# Patient Record
Sex: FEMALE | Race: WHITE | NOT HISPANIC OR LATINO | Employment: FULL TIME | URBAN - METROPOLITAN AREA
[De-identification: names, ages, dates, MRNs, and addresses within clinical notes are randomized per-mention and may not be internally consistent; named-entity substitution may affect disease eponyms.]

---

## 2017-11-30 ENCOUNTER — CONVERSION ENCOUNTER (OUTPATIENT)
Dept: GENERAL RADIOLOGY | Facility: HOSPITAL | Age: 47
End: 2017-11-30

## 2018-03-01 ENCOUNTER — OFFICE VISIT CONVERTED (OUTPATIENT)
Dept: FAMILY MEDICINE CLINIC | Facility: CLINIC | Age: 48
End: 2018-03-01
Attending: NURSE PRACTITIONER

## 2018-03-19 ENCOUNTER — OFFICE VISIT CONVERTED (OUTPATIENT)
Dept: FAMILY MEDICINE CLINIC | Facility: CLINIC | Age: 48
End: 2018-03-19
Attending: NURSE PRACTITIONER

## 2018-04-13 ENCOUNTER — OFFICE VISIT CONVERTED (OUTPATIENT)
Dept: SURGERY | Facility: CLINIC | Age: 48
End: 2018-04-13
Attending: SURGERY

## 2018-04-13 ENCOUNTER — OFFICE VISIT CONVERTED (OUTPATIENT)
Dept: FAMILY MEDICINE CLINIC | Facility: CLINIC | Age: 48
End: 2018-04-13
Attending: NURSE PRACTITIONER

## 2018-04-20 ENCOUNTER — CONVERSION ENCOUNTER (OUTPATIENT)
Dept: SURGERY | Facility: CLINIC | Age: 48
End: 2018-04-20

## 2018-04-20 ENCOUNTER — OFFICE VISIT CONVERTED (OUTPATIENT)
Dept: SURGERY | Facility: CLINIC | Age: 48
End: 2018-04-20
Attending: SURGERY

## 2018-05-04 ENCOUNTER — OFFICE VISIT CONVERTED (OUTPATIENT)
Dept: SURGERY | Facility: CLINIC | Age: 48
End: 2018-05-04
Attending: SURGERY

## 2018-08-27 ENCOUNTER — OFFICE VISIT CONVERTED (OUTPATIENT)
Dept: FAMILY MEDICINE CLINIC | Facility: CLINIC | Age: 48
End: 2018-08-27
Attending: NURSE PRACTITIONER

## 2018-08-27 ENCOUNTER — CONVERSION ENCOUNTER (OUTPATIENT)
Dept: FAMILY MEDICINE CLINIC | Facility: CLINIC | Age: 48
End: 2018-08-27

## 2018-09-27 ENCOUNTER — OFFICE VISIT CONVERTED (OUTPATIENT)
Dept: SURGERY | Facility: CLINIC | Age: 48
End: 2018-09-27
Attending: SURGERY

## 2018-11-28 ENCOUNTER — OFFICE VISIT CONVERTED (OUTPATIENT)
Dept: FAMILY MEDICINE CLINIC | Facility: CLINIC | Age: 48
End: 2018-11-28
Attending: NURSE PRACTITIONER

## 2018-12-03 ENCOUNTER — CONVERSION ENCOUNTER (OUTPATIENT)
Dept: GENERAL RADIOLOGY | Facility: HOSPITAL | Age: 48
End: 2018-12-03

## 2019-03-11 ENCOUNTER — OFFICE VISIT CONVERTED (OUTPATIENT)
Dept: FAMILY MEDICINE CLINIC | Facility: CLINIC | Age: 49
End: 2019-03-11
Attending: NURSE PRACTITIONER

## 2019-03-11 ENCOUNTER — HOSPITAL ENCOUNTER (OUTPATIENT)
Dept: FAMILY MEDICINE CLINIC | Facility: CLINIC | Age: 49
Discharge: HOME | End: 2019-03-11
Attending: NURSE PRACTITIONER

## 2019-03-11 LAB
ALBUMIN SERPL-MCNC: 3.7 G/DL (ref 3.5–5)
ALBUMIN/GLOB SERPL: 0.8 {RATIO} (ref 1.4–2.6)
ALP SERPL-CCNC: 123 U/L (ref 42–98)
ALT SERPL-CCNC: 27 U/L (ref 10–40)
ANION GAP SERPL CALC-SCNC: 17 MMOL/L (ref 8–19)
AST SERPL-CCNC: 27 U/L (ref 15–50)
BASOPHILS # BLD AUTO: 0.02 10*3/UL (ref 0–0.2)
BASOPHILS NFR BLD AUTO: 0.2 % (ref 0–3)
BILIRUB SERPL-MCNC: 0.54 MG/DL (ref 0.2–1.3)
BUN SERPL-MCNC: 14 MG/DL (ref 5–25)
BUN/CREAT SERPL: 15 {RATIO} (ref 6–20)
CALCIUM SERPL-MCNC: 9.3 MG/DL (ref 8.7–10.4)
CHLORIDE SERPL-SCNC: 94 MMOL/L (ref 99–111)
CHOLEST SERPL-MCNC: 161 MG/DL (ref 107–200)
CHOLEST/HDLC SERPL: 2.8 {RATIO} (ref 3–6)
CONV ABS IMM GRAN: 0.06 10*3/UL (ref 0–0.2)
CONV CO2: 24 MMOL/L (ref 22–32)
CONV IMMATURE GRAN: 0.6 % (ref 0–1.8)
CONV TOTAL PROTEIN: 8.3 G/DL (ref 6.3–8.2)
CREAT UR-MCNC: 0.91 MG/DL (ref 0.5–0.9)
DEPRECATED RDW RBC AUTO: 43.3 FL (ref 36.4–46.3)
EOSINOPHIL # BLD AUTO: 0.16 10*3/UL (ref 0–0.7)
EOSINOPHIL # BLD AUTO: 1.6 % (ref 0–7)
ERYTHROCYTE [DISTWIDTH] IN BLOOD BY AUTOMATED COUNT: 13.2 % (ref 11.7–14.4)
EST. AVERAGE GLUCOSE BLD GHB EST-MCNC: 197 MG/DL
GFR SERPLBLD BASED ON 1.73 SQ M-ARVRAT: >60 ML/MIN/{1.73_M2}
GLOBULIN UR ELPH-MCNC: 4.6 G/DL (ref 2–3.5)
GLUCOSE SERPL-MCNC: 236 MG/DL (ref 65–99)
HBA1C MFR BLD: 13 G/DL (ref 12–16)
HBA1C MFR BLD: 8.5 % (ref 3.5–5.7)
HCT VFR BLD AUTO: 41.5 % (ref 37–47)
HDLC SERPL-MCNC: 57 MG/DL (ref 40–60)
LDLC SERPL CALC-MCNC: 92 MG/DL (ref 70–100)
LYMPHOCYTES # BLD AUTO: 0.43 10*3/UL (ref 1–5)
MCH RBC QN AUTO: 28.1 PG (ref 27–31)
MCHC RBC AUTO-ENTMCNC: 31.3 G/DL (ref 33–37)
MCV RBC AUTO: 89.8 FL (ref 81–99)
MONOCYTES # BLD AUTO: 0.53 10*3/UL (ref 0.2–1.2)
MONOCYTES NFR BLD AUTO: 5.3 % (ref 3–10)
NEUTROPHILS # BLD AUTO: 8.8 10*3/UL (ref 2–8)
NEUTROPHILS NFR BLD AUTO: 88 % (ref 30–85)
NRBC CBCN: 0 % (ref 0–0.7)
OSMOLALITY SERPL CALC.SUM OF ELEC: 278 MOSM/KG (ref 273–304)
PLATELET # BLD AUTO: 205 10*3/UL (ref 130–400)
PMV BLD AUTO: 11.7 FL (ref 9.4–12.3)
POTASSIUM SERPL-SCNC: 5 MMOL/L (ref 3.5–5.3)
RBC # BLD AUTO: 4.62 10*6/UL (ref 4.2–5.4)
SODIUM SERPL-SCNC: 130 MMOL/L (ref 135–147)
T4 FREE SERPL-MCNC: 1.1 NG/DL (ref 0.9–1.8)
TRIGL SERPL-MCNC: 61 MG/DL (ref 40–150)
TSH SERPL-ACNC: 1.24 M[IU]/L (ref 0.27–4.2)
VARIANT LYMPHS NFR BLD MANUAL: 4.3 % (ref 20–45)
VLDLC SERPL-MCNC: 12 MG/DL (ref 5–37)
WBC # BLD AUTO: 10 10*3/UL (ref 4.8–10.8)

## 2019-11-04 ENCOUNTER — OFFICE VISIT CONVERTED (OUTPATIENT)
Dept: FAMILY MEDICINE CLINIC | Facility: CLINIC | Age: 49
End: 2019-11-04
Attending: NURSE PRACTITIONER

## 2019-11-04 ENCOUNTER — HOSPITAL ENCOUNTER (OUTPATIENT)
Dept: FAMILY MEDICINE CLINIC | Facility: CLINIC | Age: 49
Discharge: HOME | End: 2019-11-04
Attending: NURSE PRACTITIONER

## 2019-11-04 LAB
ALBUMIN SERPL-MCNC: 3.9 G/DL (ref 3.5–5)
ALBUMIN/GLOB SERPL: 0.8 {RATIO} (ref 1.4–2.6)
ALP SERPL-CCNC: 99 U/L (ref 42–98)
ALT SERPL-CCNC: 20 U/L (ref 10–40)
ANION GAP SERPL CALC-SCNC: 15 MMOL/L (ref 8–19)
AST SERPL-CCNC: 21 U/L (ref 15–50)
BASOPHILS # BLD AUTO: 0.04 10*3/UL (ref 0–0.2)
BASOPHILS NFR BLD AUTO: 0.7 % (ref 0–3)
BILIRUB SERPL-MCNC: 0.3 MG/DL (ref 0.2–1.3)
BUN SERPL-MCNC: 11 MG/DL (ref 5–25)
BUN/CREAT SERPL: 14 {RATIO} (ref 6–20)
CALCIUM SERPL-MCNC: 9.5 MG/DL (ref 8.7–10.4)
CHLORIDE SERPL-SCNC: 99 MMOL/L (ref 99–111)
CHOLEST SERPL-MCNC: 167 MG/DL (ref 107–200)
CHOLEST/HDLC SERPL: 2.9 {RATIO} (ref 3–6)
CONV ABS IMM GRAN: 0.03 10*3/UL (ref 0–0.2)
CONV CO2: 25 MMOL/L (ref 22–32)
CONV IMMATURE GRAN: 0.5 % (ref 0–1.8)
CONV TOTAL PROTEIN: 9.1 G/DL (ref 6.3–8.2)
CREAT UR-MCNC: 0.81 MG/DL (ref 0.5–0.9)
DEPRECATED RDW RBC AUTO: 42.3 FL (ref 36.4–46.3)
EOSINOPHIL # BLD AUTO: 0.07 10*3/UL (ref 0–0.7)
EOSINOPHIL # BLD AUTO: 1.2 % (ref 0–7)
ERYTHROCYTE [DISTWIDTH] IN BLOOD BY AUTOMATED COUNT: 13 % (ref 11.7–14.4)
EST. AVERAGE GLUCOSE BLD GHB EST-MCNC: 226 MG/DL
GFR SERPLBLD BASED ON 1.73 SQ M-ARVRAT: >60 ML/MIN/{1.73_M2}
GLOBULIN UR ELPH-MCNC: 5.2 G/DL (ref 2–3.5)
GLUCOSE SERPL-MCNC: 250 MG/DL (ref 65–99)
HBA1C MFR BLD: 9.5 % (ref 3.5–5.7)
HCT VFR BLD AUTO: 43.1 % (ref 37–47)
HDLC SERPL-MCNC: 57 MG/DL (ref 40–60)
HGB BLD-MCNC: 13.3 G/DL (ref 12–16)
LDLC SERPL CALC-MCNC: 96 MG/DL (ref 70–100)
LYMPHOCYTES # BLD AUTO: 1.19 10*3/UL (ref 1–5)
LYMPHOCYTES NFR BLD AUTO: 20.4 % (ref 20–45)
MCH RBC QN AUTO: 27.6 PG (ref 27–31)
MCHC RBC AUTO-ENTMCNC: 30.9 G/DL (ref 33–37)
MCV RBC AUTO: 89.4 FL (ref 81–99)
MONOCYTES # BLD AUTO: 0.51 10*3/UL (ref 0.2–1.2)
MONOCYTES NFR BLD AUTO: 8.8 % (ref 3–10)
NEUTROPHILS # BLD AUTO: 3.98 10*3/UL (ref 2–8)
NEUTROPHILS NFR BLD AUTO: 68.4 % (ref 30–85)
NRBC CBCN: 0 % (ref 0–0.7)
OSMOLALITY SERPL CALC.SUM OF ELEC: 286 MOSM/KG (ref 273–304)
PLATELET # BLD AUTO: 201 10*3/UL (ref 130–400)
PMV BLD AUTO: 11.7 FL (ref 9.4–12.3)
POTASSIUM SERPL-SCNC: 4.9 MMOL/L (ref 3.5–5.3)
RBC # BLD AUTO: 4.82 10*6/UL (ref 4.2–5.4)
SODIUM SERPL-SCNC: 134 MMOL/L (ref 135–147)
T4 FREE SERPL-MCNC: 1.3 NG/DL (ref 0.9–1.8)
TRIGL SERPL-MCNC: 68 MG/DL (ref 40–150)
TSH SERPL-ACNC: 1.05 M[IU]/L (ref 0.27–4.2)
VLDLC SERPL-MCNC: 14 MG/DL (ref 5–37)
WBC # BLD AUTO: 5.82 10*3/UL (ref 4.8–10.8)

## 2019-11-06 ENCOUNTER — HOSPITAL ENCOUNTER (OUTPATIENT)
Dept: GENERAL RADIOLOGY | Facility: HOSPITAL | Age: 49
Discharge: HOME | End: 2019-11-06
Attending: NURSE PRACTITIONER

## 2019-12-13 ENCOUNTER — HOSPITAL ENCOUNTER (OUTPATIENT)
Dept: GENERAL RADIOLOGY | Facility: HOSPITAL | Age: 49
Discharge: HOME | End: 2019-12-13
Attending: NURSE PRACTITIONER

## 2019-12-16 ENCOUNTER — OFFICE VISIT CONVERTED (OUTPATIENT)
Dept: FAMILY MEDICINE CLINIC | Facility: CLINIC | Age: 49
End: 2019-12-16
Attending: NURSE PRACTITIONER

## 2020-01-08 ENCOUNTER — OFFICE VISIT CONVERTED (OUTPATIENT)
Dept: SURGERY | Facility: CLINIC | Age: 50
End: 2020-01-08
Attending: SURGERY

## 2020-01-21 ENCOUNTER — HOSPITAL ENCOUNTER (OUTPATIENT)
Dept: GASTROENTEROLOGY | Facility: HOSPITAL | Age: 50
Setting detail: HOSPITAL OUTPATIENT SURGERY
Discharge: HOME | End: 2020-01-21
Attending: SURGERY

## 2020-01-21 LAB — GLUCOSE BLD-MCNC: 222 MG/DL (ref 65–99)

## 2020-02-05 ENCOUNTER — OFFICE VISIT CONVERTED (OUTPATIENT)
Dept: SURGERY | Facility: CLINIC | Age: 50
End: 2020-02-05
Attending: SURGERY

## 2020-02-05 ENCOUNTER — CONVERSION ENCOUNTER (OUTPATIENT)
Dept: SURGERY | Facility: CLINIC | Age: 50
End: 2020-02-05

## 2020-02-21 ENCOUNTER — HOSPITAL ENCOUNTER (OUTPATIENT)
Dept: PERIOP | Facility: HOSPITAL | Age: 50
Setting detail: HOSPITAL OUTPATIENT SURGERY
Discharge: HOME | End: 2020-02-21
Attending: SURGERY

## 2020-02-21 LAB
GLUCOSE BLD-MCNC: 185 MG/DL (ref 65–99)
GLUCOSE BLD-MCNC: 209 MG/DL (ref 65–99)

## 2020-02-26 ENCOUNTER — OFFICE VISIT CONVERTED (OUTPATIENT)
Dept: SURGERY | Facility: CLINIC | Age: 50
End: 2020-02-26
Attending: SURGERY

## 2020-03-04 ENCOUNTER — OFFICE VISIT CONVERTED (OUTPATIENT)
Dept: SURGERY | Facility: CLINIC | Age: 50
End: 2020-03-04
Attending: NURSE PRACTITIONER

## 2020-03-05 ENCOUNTER — HOSPITAL ENCOUNTER (OUTPATIENT)
Dept: SURGERY | Facility: CLINIC | Age: 50
Discharge: HOME | End: 2020-03-05
Attending: NURSE PRACTITIONER

## 2020-03-08 LAB
AMOXICILLIN+CLAV SUSC ISLT: 4
AMPICILLIN SUSC ISLT: 16
AMPICILLIN+SULBAC SUSC ISLT: 4
BACTERIA SPEC AEROBE CULT: ABNORMAL
CEFAZOLIN SUSC ISLT: <=4
CEFEPIME SUSC ISLT: 2
CEFEPIME SUSC ISLT: <=1
CEFTAZIDIME SUSC ISLT: 2
CEFTAZIDIME SUSC ISLT: <=1
CEFTRIAXONE SUSC ISLT: <=1
CEFUROXIME ORAL SUSC ISLT: 16
CEFUROXIME PARENTER SUSC ISLT: 16
CIPROFLOXACIN SUSC ISLT: <=0.25
CIPROFLOXACIN SUSC ISLT: <=0.25
ERTAPENEM SUSC ISLT: <=0.5
GENTAMICIN SUSC ISLT: <=1
GENTAMICIN SUSC ISLT: <=1
LEVOFLOXACIN SUSC ISLT: 0.5
LEVOFLOXACIN SUSC ISLT: <=0.12
TETRACYCLINE SUSC ISLT: >=16
TMP SMX SUSC ISLT: >=320
TOBRAMYCIN SUSC ISLT: <=1
TOBRAMYCIN SUSC ISLT: <=1

## 2020-03-11 ENCOUNTER — OFFICE VISIT CONVERTED (OUTPATIENT)
Dept: SURGERY | Facility: CLINIC | Age: 50
End: 2020-03-11
Attending: SURGERY

## 2020-03-18 ENCOUNTER — OFFICE VISIT CONVERTED (OUTPATIENT)
Dept: SURGERY | Facility: CLINIC | Age: 50
End: 2020-03-18
Attending: SURGERY

## 2020-04-01 ENCOUNTER — CONVERSION ENCOUNTER (OUTPATIENT)
Dept: SURGERY | Facility: CLINIC | Age: 50
End: 2020-04-01

## 2020-04-01 ENCOUNTER — HOSPITAL ENCOUNTER (OUTPATIENT)
Dept: SURGERY | Facility: CLINIC | Age: 50
Discharge: HOME | End: 2020-04-01
Attending: SURGERY

## 2020-04-01 ENCOUNTER — OFFICE VISIT CONVERTED (OUTPATIENT)
Dept: SURGERY | Facility: CLINIC | Age: 50
End: 2020-04-01
Attending: SURGERY

## 2020-04-03 LAB
AMPICILLIN SUSC ISLT: <=0.25
BACTERIA SPEC AEROBE CULT: ABNORMAL
CEFOTAXIME SUSC ISLT: <=0.12
CEFTRIAXONE SUSC ISLT: <=0.12
CLINDAMYCIN SUSC ISLT: >=1
LEVOFLOXACIN SUSC ISLT: 0.5
PENICILLIN G SUSC ISLT: <=0.06
TETRACYCLINE SUSC ISLT: <=0.25
TIGECYCLINE SUSC ISLT: <=0.06
VANCOMYCIN SUSC ISLT: 0.5

## 2020-04-15 ENCOUNTER — OFFICE VISIT CONVERTED (OUTPATIENT)
Dept: SURGERY | Facility: CLINIC | Age: 50
End: 2020-04-15
Attending: SURGERY

## 2020-04-22 ENCOUNTER — OFFICE VISIT CONVERTED (OUTPATIENT)
Dept: SURGERY | Facility: CLINIC | Age: 50
End: 2020-04-22
Attending: SURGERY

## 2020-05-01 ENCOUNTER — HOSPITAL ENCOUNTER (OUTPATIENT)
Dept: PERIOP | Facility: HOSPITAL | Age: 50
Setting detail: HOSPITAL OUTPATIENT SURGERY
Discharge: HOME | End: 2020-05-01
Attending: SURGERY

## 2020-05-01 LAB
GLUCOSE BLD-MCNC: 204 MG/DL (ref 65–99)
GLUCOSE BLD-MCNC: 205 MG/DL (ref 65–99)

## 2020-05-07 ENCOUNTER — HOSPITAL ENCOUNTER (OUTPATIENT)
Dept: SURGERY | Facility: CLINIC | Age: 50
Discharge: HOME | End: 2020-05-07
Attending: NURSE PRACTITIONER

## 2020-05-07 ENCOUNTER — OFFICE VISIT CONVERTED (OUTPATIENT)
Dept: SURGERY | Facility: CLINIC | Age: 50
End: 2020-05-07
Attending: NURSE PRACTITIONER

## 2020-05-08 ENCOUNTER — HOSPITAL ENCOUNTER (OUTPATIENT)
Dept: INFUSION THERAPY | Facility: HOSPITAL | Age: 50
Setting detail: RECURRING SERIES
Discharge: HOME | End: 2020-08-06
Attending: NURSE PRACTITIONER

## 2020-05-13 ENCOUNTER — OFFICE VISIT CONVERTED (OUTPATIENT)
Dept: SURGERY | Facility: CLINIC | Age: 50
End: 2020-05-13
Attending: SURGERY

## 2020-05-20 ENCOUNTER — OFFICE VISIT CONVERTED (OUTPATIENT)
Dept: SURGERY | Facility: CLINIC | Age: 50
End: 2020-05-20
Attending: SURGERY

## 2020-06-03 ENCOUNTER — OFFICE VISIT CONVERTED (OUTPATIENT)
Dept: SURGERY | Facility: CLINIC | Age: 50
End: 2020-06-03
Attending: SURGERY

## 2020-06-17 ENCOUNTER — OFFICE VISIT CONVERTED (OUTPATIENT)
Dept: SURGERY | Facility: CLINIC | Age: 50
End: 2020-06-17
Attending: SURGERY

## 2020-06-18 ENCOUNTER — CONVERSION ENCOUNTER (OUTPATIENT)
Dept: FAMILY MEDICINE CLINIC | Facility: CLINIC | Age: 50
End: 2020-06-18

## 2020-06-18 ENCOUNTER — OFFICE VISIT CONVERTED (OUTPATIENT)
Dept: FAMILY MEDICINE CLINIC | Facility: CLINIC | Age: 50
End: 2020-06-18
Attending: NURSE PRACTITIONER

## 2020-06-18 ENCOUNTER — HOSPITAL ENCOUNTER (OUTPATIENT)
Dept: FAMILY MEDICINE CLINIC | Facility: CLINIC | Age: 50
Discharge: HOME | End: 2020-06-18
Attending: NURSE PRACTITIONER

## 2020-06-18 LAB
ALBUMIN SERPL-MCNC: 3.9 G/DL (ref 3.5–5)
ALBUMIN/GLOB SERPL: 0.9 {RATIO} (ref 1.4–2.6)
ALP SERPL-CCNC: 112 U/L (ref 42–98)
ALT SERPL-CCNC: 19 U/L (ref 10–40)
ANION GAP SERPL CALC-SCNC: 14 MMOL/L (ref 8–19)
AST SERPL-CCNC: 20 U/L (ref 15–50)
BASOPHILS # BLD AUTO: 0.04 10*3/UL (ref 0–0.2)
BASOPHILS NFR BLD AUTO: 0.6 % (ref 0–3)
BILIRUB SERPL-MCNC: 0.29 MG/DL (ref 0.2–1.3)
BUN SERPL-MCNC: 13 MG/DL (ref 5–25)
BUN/CREAT SERPL: 16 {RATIO} (ref 6–20)
CALCIUM SERPL-MCNC: 9.6 MG/DL (ref 8.7–10.4)
CHLORIDE SERPL-SCNC: 102 MMOL/L (ref 99–111)
CHOLEST SERPL-MCNC: 154 MG/DL (ref 107–200)
CHOLEST/HDLC SERPL: 2.9 {RATIO} (ref 3–6)
CONV ABS IMM GRAN: 0.03 10*3/UL (ref 0–0.2)
CONV CO2: 25 MMOL/L (ref 22–32)
CONV CREATININE URINE, RANDOM: 102.3 MG/DL (ref 10–300)
CONV IMMATURE GRAN: 0.4 % (ref 0–1.8)
CONV MICROALBUM.,U,RANDOM: <12 MG/L (ref 0–20)
CONV TOTAL PROTEIN: 8.4 G/DL (ref 6.3–8.2)
CREAT UR-MCNC: 0.81 MG/DL (ref 0.5–0.9)
DEPRECATED RDW RBC AUTO: 43.6 FL (ref 36.4–46.3)
EOSINOPHIL # BLD AUTO: 0.07 10*3/UL (ref 0–0.7)
EOSINOPHIL # BLD AUTO: 1 % (ref 0–7)
ERYTHROCYTE [DISTWIDTH] IN BLOOD BY AUTOMATED COUNT: 13.6 % (ref 11.7–14.4)
EST. AVERAGE GLUCOSE BLD GHB EST-MCNC: 209 MG/DL
GFR SERPLBLD BASED ON 1.73 SQ M-ARVRAT: >60 ML/MIN/{1.73_M2}
GLOBULIN UR ELPH-MCNC: 4.5 G/DL (ref 2–3.5)
GLUCOSE SERPL-MCNC: 163 MG/DL (ref 65–99)
HBA1C MFR BLD: 8.9 % (ref 3.5–5.7)
HCT VFR BLD AUTO: 42 % (ref 37–47)
HDLC SERPL-MCNC: 54 MG/DL (ref 40–60)
HGB BLD-MCNC: 12.9 G/DL (ref 12–16)
LDLC SERPL CALC-MCNC: 88 MG/DL (ref 70–100)
LYMPHOCYTES # BLD AUTO: 1.28 10*3/UL (ref 1–5)
LYMPHOCYTES NFR BLD AUTO: 17.8 % (ref 20–45)
MCH RBC QN AUTO: 26.5 PG (ref 27–31)
MCHC RBC AUTO-ENTMCNC: 30.7 G/DL (ref 33–37)
MCV RBC AUTO: 86.2 FL (ref 81–99)
MICROALBUMIN/CREAT UR: 11.7 MG/G{CRE} (ref 0–35)
MONOCYTES # BLD AUTO: 0.54 10*3/UL (ref 0.2–1.2)
MONOCYTES NFR BLD AUTO: 7.5 % (ref 3–10)
NEUTROPHILS # BLD AUTO: 5.23 10*3/UL (ref 2–8)
NEUTROPHILS NFR BLD AUTO: 72.7 % (ref 30–85)
NRBC CBCN: 0 % (ref 0–0.7)
OSMOLALITY SERPL CALC.SUM OF ELEC: 286 MOSM/KG (ref 273–304)
PLATELET # BLD AUTO: 275 10*3/UL (ref 130–400)
PMV BLD AUTO: 11.6 FL (ref 9.4–12.3)
POTASSIUM SERPL-SCNC: 4.7 MMOL/L (ref 3.5–5.3)
RBC # BLD AUTO: 4.87 10*6/UL (ref 4.2–5.4)
SODIUM SERPL-SCNC: 136 MMOL/L (ref 135–147)
T4 FREE SERPL-MCNC: 1.2 NG/DL (ref 0.9–1.8)
TRIGL SERPL-MCNC: 62 MG/DL (ref 40–150)
TSH SERPL-ACNC: 0.78 M[IU]/L (ref 0.27–4.2)
VLDLC SERPL-MCNC: 12 MG/DL (ref 5–37)
WBC # BLD AUTO: 7.19 10*3/UL (ref 4.8–10.8)

## 2020-07-22 ENCOUNTER — OFFICE VISIT CONVERTED (OUTPATIENT)
Dept: SURGERY | Facility: CLINIC | Age: 50
End: 2020-07-22
Attending: SURGERY

## 2020-07-22 ENCOUNTER — CONVERSION ENCOUNTER (OUTPATIENT)
Dept: SURGERY | Facility: CLINIC | Age: 50
End: 2020-07-22

## 2020-08-07 ENCOUNTER — HOSPITAL ENCOUNTER (OUTPATIENT)
Dept: INFUSION THERAPY | Facility: HOSPITAL | Age: 50
Setting detail: RECURRING SERIES
Discharge: HOME | End: 2020-08-31
Attending: NURSE PRACTITIONER

## 2020-10-01 ENCOUNTER — OFFICE VISIT CONVERTED (OUTPATIENT)
Dept: FAMILY MEDICINE CLINIC | Facility: CLINIC | Age: 50
End: 2020-10-01
Attending: NURSE PRACTITIONER

## 2020-10-01 ENCOUNTER — HOSPITAL ENCOUNTER (OUTPATIENT)
Dept: LAB | Facility: HOSPITAL | Age: 50
Discharge: HOME | End: 2020-10-01
Attending: NURSE PRACTITIONER

## 2020-10-01 LAB
ALBUMIN SERPL-MCNC: 4.1 G/DL (ref 3.5–5)
ALBUMIN/GLOB SERPL: 1 {RATIO} (ref 1.4–2.6)
ALP SERPL-CCNC: 99 U/L (ref 53–141)
ALT SERPL-CCNC: 28 U/L (ref 10–40)
ANION GAP SERPL CALC-SCNC: 15 MMOL/L (ref 8–19)
AST SERPL-CCNC: 29 U/L (ref 15–50)
BASOPHILS # BLD AUTO: 0.05 10*3/UL (ref 0–0.2)
BASOPHILS NFR BLD AUTO: 0.9 % (ref 0–3)
BILIRUB SERPL-MCNC: 0.38 MG/DL (ref 0.2–1.3)
BUN SERPL-MCNC: 13 MG/DL (ref 5–25)
BUN/CREAT SERPL: 15 {RATIO} (ref 6–20)
CALCIUM SERPL-MCNC: 9.3 MG/DL (ref 8.7–10.4)
CHLORIDE SERPL-SCNC: 103 MMOL/L (ref 99–111)
CHOLEST SERPL-MCNC: 171 MG/DL (ref 107–200)
CHOLEST/HDLC SERPL: 2.8 {RATIO} (ref 3–6)
CONV ABS IMM GRAN: 0.01 10*3/UL (ref 0–0.2)
CONV CO2: 24 MMOL/L (ref 22–32)
CONV IMMATURE GRAN: 0.2 % (ref 0–1.8)
CONV TOTAL PROTEIN: 8.3 G/DL (ref 6.3–8.2)
CREAT UR-MCNC: 0.86 MG/DL (ref 0.5–0.9)
DEPRECATED RDW RBC AUTO: 45.6 FL (ref 36.4–46.3)
EOSINOPHIL # BLD AUTO: 0.13 10*3/UL (ref 0–0.7)
EOSINOPHIL # BLD AUTO: 2.3 % (ref 0–7)
ERYTHROCYTE [DISTWIDTH] IN BLOOD BY AUTOMATED COUNT: 13.7 % (ref 11.7–14.4)
EST. AVERAGE GLUCOSE BLD GHB EST-MCNC: 214 MG/DL
GFR SERPLBLD BASED ON 1.73 SQ M-ARVRAT: >60 ML/MIN/{1.73_M2}
GLOBULIN UR ELPH-MCNC: 4.2 G/DL (ref 2–3.5)
GLUCOSE SERPL-MCNC: 151 MG/DL (ref 65–99)
HBA1C MFR BLD: 9.1 % (ref 3.5–5.7)
HCT VFR BLD AUTO: 44.3 % (ref 37–47)
HDLC SERPL-MCNC: 61 MG/DL (ref 40–60)
HGB BLD-MCNC: 13.7 G/DL (ref 12–16)
LDLC SERPL CALC-MCNC: 93 MG/DL (ref 70–100)
LYMPHOCYTES # BLD AUTO: 1.4 10*3/UL (ref 1–5)
LYMPHOCYTES NFR BLD AUTO: 25 % (ref 20–45)
MCH RBC QN AUTO: 28 PG (ref 27–31)
MCHC RBC AUTO-ENTMCNC: 30.9 G/DL (ref 33–37)
MCV RBC AUTO: 90.4 FL (ref 81–99)
MONOCYTES # BLD AUTO: 0.48 10*3/UL (ref 0.2–1.2)
MONOCYTES NFR BLD AUTO: 8.6 % (ref 3–10)
NEUTROPHILS # BLD AUTO: 3.54 10*3/UL (ref 2–8)
NEUTROPHILS NFR BLD AUTO: 63 % (ref 30–85)
NRBC CBCN: 0 % (ref 0–0.7)
OSMOLALITY SERPL CALC.SUM OF ELEC: 289 MOSM/KG (ref 273–304)
PLATELET # BLD AUTO: 210 10*3/UL (ref 130–400)
PMV BLD AUTO: 11.4 FL (ref 9.4–12.3)
POTASSIUM SERPL-SCNC: 4.3 MMOL/L (ref 3.5–5.3)
RBC # BLD AUTO: 4.9 10*6/UL (ref 4.2–5.4)
SODIUM SERPL-SCNC: 138 MMOL/L (ref 135–147)
TRIGL SERPL-MCNC: 83 MG/DL (ref 40–150)
VLDLC SERPL-MCNC: 17 MG/DL (ref 5–37)
WBC # BLD AUTO: 5.61 10*3/UL (ref 4.8–10.8)

## 2020-12-18 ENCOUNTER — HOSPITAL ENCOUNTER (OUTPATIENT)
Dept: GENERAL RADIOLOGY | Facility: HOSPITAL | Age: 50
Discharge: HOME | End: 2020-12-18
Attending: NURSE PRACTITIONER

## 2020-12-18 ENCOUNTER — OFFICE VISIT CONVERTED (OUTPATIENT)
Dept: FAMILY MEDICINE CLINIC | Facility: CLINIC | Age: 50
End: 2020-12-18
Attending: NURSE PRACTITIONER

## 2020-12-18 ENCOUNTER — HOSPITAL ENCOUNTER (OUTPATIENT)
Dept: FAMILY MEDICINE CLINIC | Facility: CLINIC | Age: 50
Discharge: HOME | End: 2020-12-18
Attending: NURSE PRACTITIONER

## 2020-12-18 ENCOUNTER — CONVERSION ENCOUNTER (OUTPATIENT)
Dept: FAMILY MEDICINE CLINIC | Facility: CLINIC | Age: 50
End: 2020-12-18

## 2020-12-18 LAB — URATE SERPL-MCNC: 3.9 MG/DL (ref 2.5–7.5)

## 2021-01-04 ENCOUNTER — OFFICE VISIT CONVERTED (OUTPATIENT)
Dept: FAMILY MEDICINE CLINIC | Facility: CLINIC | Age: 51
End: 2021-01-04
Attending: NURSE PRACTITIONER

## 2021-01-04 ENCOUNTER — HOSPITAL ENCOUNTER (OUTPATIENT)
Dept: FAMILY MEDICINE CLINIC | Facility: CLINIC | Age: 51
Discharge: HOME | End: 2021-01-04
Attending: NURSE PRACTITIONER

## 2021-01-04 LAB
ALBUMIN SERPL-MCNC: 3.9 G/DL (ref 3.5–5)
ALBUMIN/GLOB SERPL: 0.9 {RATIO} (ref 1.4–2.6)
ALP SERPL-CCNC: 110 U/L (ref 53–141)
ALT SERPL-CCNC: 29 U/L (ref 10–40)
ANION GAP SERPL CALC-SCNC: 13 MMOL/L (ref 8–19)
AST SERPL-CCNC: 26 U/L (ref 15–50)
BILIRUB SERPL-MCNC: 0.37 MG/DL (ref 0.2–1.3)
BUN SERPL-MCNC: 8 MG/DL (ref 5–25)
BUN/CREAT SERPL: 9 {RATIO} (ref 6–20)
CALCIUM SERPL-MCNC: 9.2 MG/DL (ref 8.7–10.4)
CHLORIDE SERPL-SCNC: 103 MMOL/L (ref 99–111)
CHOLEST SERPL-MCNC: 172 MG/DL (ref 107–200)
CHOLEST/HDLC SERPL: 3 {RATIO} (ref 3–6)
CONV CO2: 25 MMOL/L (ref 22–32)
CONV TOTAL PROTEIN: 8.4 G/DL (ref 6.3–8.2)
CREAT UR-MCNC: 0.9 MG/DL (ref 0.5–0.9)
EST. AVERAGE GLUCOSE BLD GHB EST-MCNC: 223 MG/DL
GFR SERPLBLD BASED ON 1.73 SQ M-ARVRAT: >60 ML/MIN/{1.73_M2}
GLOBULIN UR ELPH-MCNC: 4.5 G/DL (ref 2–3.5)
GLUCOSE SERPL-MCNC: 197 MG/DL (ref 65–99)
HBA1C MFR BLD: 9.4 % (ref 3.5–5.7)
HDLC SERPL-MCNC: 58 MG/DL (ref 40–60)
LDLC SERPL CALC-MCNC: 101 MG/DL (ref 70–100)
OSMOLALITY SERPL CALC.SUM OF ELEC: 288 MOSM/KG (ref 273–304)
POTASSIUM SERPL-SCNC: 4.1 MMOL/L (ref 3.5–5.3)
SODIUM SERPL-SCNC: 137 MMOL/L (ref 135–147)
T4 FREE SERPL-MCNC: 1.2 NG/DL (ref 0.9–1.8)
TRIGL SERPL-MCNC: 65 MG/DL (ref 40–150)
TSH SERPL-ACNC: 1.59 M[IU]/L (ref 0.27–4.2)
VLDLC SERPL-MCNC: 13 MG/DL (ref 5–37)

## 2021-01-28 ENCOUNTER — HOSPITAL ENCOUNTER (OUTPATIENT)
Dept: GENERAL RADIOLOGY | Facility: HOSPITAL | Age: 51
Discharge: HOME | End: 2021-01-28
Attending: NURSE PRACTITIONER

## 2021-03-31 ENCOUNTER — CONVERSION ENCOUNTER (OUTPATIENT)
Dept: FAMILY MEDICINE CLINIC | Facility: CLINIC | Age: 51
End: 2021-03-31

## 2021-03-31 ENCOUNTER — OFFICE VISIT CONVERTED (OUTPATIENT)
Dept: FAMILY MEDICINE CLINIC | Facility: CLINIC | Age: 51
End: 2021-03-31
Attending: NURSE PRACTITIONER

## 2021-03-31 ENCOUNTER — HOSPITAL ENCOUNTER (OUTPATIENT)
Dept: FAMILY MEDICINE CLINIC | Facility: CLINIC | Age: 51
Discharge: HOME | End: 2021-03-31
Attending: NURSE PRACTITIONER

## 2021-03-31 LAB
ALBUMIN SERPL-MCNC: 3.9 G/DL (ref 3.5–5)
ALBUMIN/GLOB SERPL: 0.9 {RATIO} (ref 1.4–2.6)
ALP SERPL-CCNC: 104 U/L (ref 53–141)
ALT SERPL-CCNC: 28 U/L (ref 10–40)
ANION GAP SERPL CALC-SCNC: 13 MMOL/L (ref 8–19)
AST SERPL-CCNC: 27 U/L (ref 15–50)
BILIRUB SERPL-MCNC: 0.3 MG/DL (ref 0.2–1.3)
BUN SERPL-MCNC: 19 MG/DL (ref 5–25)
BUN/CREAT SERPL: 20 {RATIO} (ref 6–20)
CALCIUM SERPL-MCNC: 9 MG/DL (ref 8.7–10.4)
CHLORIDE SERPL-SCNC: 104 MMOL/L (ref 99–111)
CHOLEST SERPL-MCNC: 143 MG/DL (ref 107–200)
CHOLEST/HDLC SERPL: 2.6 {RATIO} (ref 3–6)
CONV CO2: 24 MMOL/L (ref 22–32)
CONV TOTAL PROTEIN: 8.3 G/DL (ref 6.3–8.2)
CREAT UR-MCNC: 0.93 MG/DL (ref 0.5–0.9)
EST. AVERAGE GLUCOSE BLD GHB EST-MCNC: 169 MG/DL
GFR SERPLBLD BASED ON 1.73 SQ M-ARVRAT: >60 ML/MIN/{1.73_M2}
GLOBULIN UR ELPH-MCNC: 4.4 G/DL (ref 2–3.5)
GLUCOSE SERPL-MCNC: 144 MG/DL (ref 65–99)
HBA1C MFR BLD: 7.5 % (ref 3.5–5.7)
HDLC SERPL-MCNC: 56 MG/DL (ref 40–60)
LDLC SERPL CALC-MCNC: 73 MG/DL (ref 70–100)
OSMOLALITY SERPL CALC.SUM OF ELEC: 289 MOSM/KG (ref 273–304)
POTASSIUM SERPL-SCNC: 4.4 MMOL/L (ref 3.5–5.3)
SODIUM SERPL-SCNC: 137 MMOL/L (ref 135–147)
TRIGL SERPL-MCNC: 70 MG/DL (ref 40–150)
VLDLC SERPL-MCNC: 14 MG/DL (ref 5–37)

## 2021-05-10 NOTE — H&P
History and Physical      Patient Name: Mariela Aldrich   Patient ID: [id number]   Sex: Female   YOB: 1970    Primary Care Provider: Berenice THOMAS   Referring Provider: Berenice THOMAS    Visit Date: May 7, 2020    Provider: WILLIAM Ramirez   Location: [facility]   Location Address: [address] [place] [address]  [id number]   Location Phone: [phone]          Chief Complaint  · Follow Up      History Of Present Illness  Mariela Aldrich is a 49 year old /White female who is here to follow up status post surgery.      Patient reports today with complaints of wound dehiscence since last night. Patient underwent an re-excision of recurrent right groin hidradenitis. Patient reports that she has been with an extreme amount of yellowish drainage since the surgery. She currently denies any fever or chills.       Past Medical History  Disease Name Date Onset Notes   Anxiety --  --    Diabetes mellitus type II, controlled --  --    Hyperlipidemia --  --    Hypothyroidism --  --    Panic attack --  --    Screening Mammogram 12/13/19 WNL, Repeat in 1 year   Sinus trouble --  --          Past Surgical History  Procedure Name Date Notes   Colonoscopy 01/21/2020 External hemorrhoids   Hidradenitis incision and drainage --  right groin   Hysterectomy 2010 --    Knee surgery 2013 Bilateral         Medication List  Name Date Started Instructions   glipizide 10 mg oral tablet 02/11/2020 TAKE 1 TABLET BY MOUTH TWICE DAILY BEFORE MEAL(S) for 30 days   Januvia 100 mg oral tablet 02/11/2020 TAKE ONE TABLET BY MOUTH ONCE DAILY for 30 days   lisinopril 5 mg oral tablet 02/11/2020 take 1 tablet (5 mg) by oral route once daily for 30 days   Livalo 2 mg oral tablet 03/19/2020 take 1 tablet (2 mg) by oral route once daily for 14 days   miconazole nitrate 2 % topical aerosol powder 04/15/2020 apply to affected area(s) by topical route 2 times a day for 14 days   Multi Vitamin oral liquid  --     Requip XL 2 mg oral tablet extended release 24 hr 02/11/2020 take 1 tablet by oral route daily for 30 days   sertraline 100 mg oral tablet 11/04/2019 TAKE ONE & ONE-HALF TABLETS BY MOUTH ONCE DAILY for 90 days   Silvadene 1 % topical cream 04/15/2020 apply to affected area(s) by topical route 3 times a day for 14 days   Symbicort 160-4.5 mcg/actuation inhalation HFA aerosol inhaler 04/16/2020 INHALE 2 PUFFS BY MOUTH TWICE DAILY IN THE MORNING AND IN THE EVENING   Synjardy XR 10-1,000 mg oral tablet, IR - ER, biphasic 24hr 03/16/2020 TAKE 1 TABLET BY MOUTH ONCE DAILY IN THE MORNING WITH A MEAL   Synthroid 88 mcg oral tablet 02/11/2020 TAKE 1 TABLET BY MOUTH ONCE DAILY   Tresiba FlexTouch U-200 200 unit/mL (3 mL) subcutaneous insulin pen 11/05/2019 inject by subcutaneous route 10 units and titrate by 2 units every 5 days until fasting bs 130 or less. max 80 units per day.   Ventolin HFA 90 mcg/actuation inhalation HFA aerosol inhaler 03/19/2020 inhale 1 - 2 puffs (90 - 180 mcg) by inhalation route every 4-6 hours as needed   Zithromax Z-Justin 250 mg oral tablet 12/16/2019 take 2 tablets (500 mg) by oral route once daily for 1 day then 1 tablet (250 mg) by oral route once daily for 4 days   Zyrtec 10 mg oral tablet  take 1 tablet (10 mg) by oral route once daily         Allergy List  Allergen Name Date Reaction Notes   atorvastatin --  --  leg cramps   Crestor --  --  myalgia   Lipitor --  Leg cramps --    Lodine --  --  --    PENICILLINS --  --  --    pravastatin --  Leg cramps --        Allergies Reconciled  Family Medical History  Disease Name Relative/Age Notes   Thyroid disorder Brother/43  Father/70  Sister/49   thyroid cancer   Diabetes Brother/  Grandmother (maternal)/  Sister/   --          Social History  Finding Status Start/Stop Quantity Notes   Alcohol Never --/-- --  --    Tobacco Never --/-- --  --          Review of Systems  · Constitutional  o Denies  o : chills, fever  · Eyes  o Denies  o : yellowish  "discoloration of eyes  · HENT  o Denies  o : difficulty swallowing  · Cardiovascular  o Denies  o : chest pain on exertion  · Respiratory  o Denies  o : shortness of breath  · Genitourinary  o Denies  o : abnormal color of urine  · Integument  o Admits  o : additional integumentary symptoms except as noted in the HPI  o Denies  o : rash  · Neurologic  o Denies  o : tingling or numbness  · Musculoskeletal  o Denies  o : joint pain  · Endocrine  o Denies  o : weight gain, weight loss      Vitals  Date Time BP Position Site L\R Cuff Size HR RR TEMP (F) WT  HT  BMI kg/m2 BSA m2 O2 Sat HC       05/07/2020 10:23 AM       16  198lbs 8oz 5'  4\" 34.07 2.02           Physical Examination  · Constitutional  o Appearance  o : well developed, well-nourished, patient in no apparent distress  · Head and Face  o Head  o :   § Inspection  § : atraumatic, normocephalic  o Face  o :   § Inspection  § : no facial lesions  · Eyes  o Conjunctivae  o : conjunctivae normal  o Sclerae  o : sclerae white  · Neck  o Inspection/Palpation  o : normal appearance, no masses or tenderness, trachea midline  · Respiratory  o Respiratory Effort  o : breathing unlabored  · Skin and Subcutaneous Tissue  o General Inspection  o : no lesions present, no areas of discoloration, skin turgor normal, texture normal  · Neurologic  o Mental Status Examination  o :   § Orientation  § : grossly oriented to person, place and time  § Attention  § : attention normal, concentration abilities normal  § Fund of Knowledge  § : fund of knowledge within normal limits, patient aware of current events  o Gait and Station  o : normal gait, able to stand without difficulty  · Psychiatric  o Judgement and Insight  o : judgment and insight intact  o Mood and Affect  o : mood normal, affect appropriate     Right groin incision has dehisced 4cmx 6cm in depth at the posterior part of the incision. Granulation tissue is noted in the bed of the wound. There is a moderate amount of " thick yellowish drainage. Wound was cultured. wound was packed normal saline kerlex and covered with a dry dressing.               Assessment  · Postoperative Exam Following Surgery     V67.00/Z09  · Wound dehiscence     998.30/T81.30XA      Plan  · Orders  o Wound Culture with Sensitivities if indicated [facility] ([id number]) - V67.00/Z09, 998.30/T81.30XA - 05/07/2020   right groin  o WOUND CARE CONSULTATION (WOUND) - V67.00/Z09, 998.30/T81.30XA - 05/07/2020   Evaluate and treat left groin wound.   · Medications  o Medications have been Reconciled  o Transition of Care or Provider Policy  · Instructions  o Follow up in 1 week.  o Wound culture  o Follow-up with Dr. You in 1 week.   o Discussed with the patient about seeing Wound care at the hospital daily for dressing changes. She agrees with this plan is willing to proceed.   o Pain Rx given at this visit.  o Wound culture.  o Electronically Identified Patient Education Materials Provided Electronically  · Disposition  o Call or Return if symptoms worsen or persist.            Electronically Signed by: WILLIAM Ramirez -Author on May 8, 2020 08:49:34 AM

## 2021-05-12 NOTE — PROGRESS NOTES
Progress Note      Patient Name: Mariela Aldrich   Patient ID: [id number]   Sex: Female   YOB: 1970    Primary Care Provider: Berenice THOMAS   Referring Provider: Berenice THOMAS    Visit Date: April 22, 2020    Provider: Donato You MD   Location: [facility]   Location Address: [address] [place] [address]  [id number]   Location Phone: [phone]          Chief Complaint  · Follow Up Surgery      History Of Present Illness  Mariela Aldrich is a 49 year old /White female who presents today for a postoperative visit. She is very well known to me for attempted resection of groin hidradenitis. The patient has been using silvadene and has been using a fungal powder for a rash she had in her groin. The rash seems to be gone but she is still having a lot of mucosal drainage from the area.       Past Medical History  Anxiety; Diabetes mellitus type II, controlled; Hyperlipidemia; Hypothyroidism; Panic attack; Screening Mammogram; Sinus trouble         Past Surgical History  Colonoscopy; Hidradenitis incision and drainage; Hysterectomy; Knee surgery         Medication List  Bactrim -160 mg oral tablet; glipizide 10 mg oral tablet; Januvia 100 mg oral tablet; lisinopril 5 mg oral tablet; Livalo 2 mg oral tablet; miconazole nitrate 2 % topical aerosol powder; minocycline 100 mg oral capsule; Requip XL 2 mg oral tablet extended release 24 hr; sertraline 100 mg oral tablet; Silvadene 1 % topical cream; Symbicort 160-4.5 mcg/actuation inhalation HFA aerosol inhaler; Synjardy XR 10-1,000 mg oral tablet, IR - ER, biphasic 24hr; Synthroid 88 mcg oral tablet; Tresiba FlexTouch U-200 200 unit/mL (3 mL) subcutaneous insulin pen; Ventolin HFA 90 mcg/actuation inhalation HFA aerosol inhaler; Zithromax Z-Justin 250 mg oral tablet; Zyrtec 10 mg oral tablet         Allergy List  atorvastatin; Crestor; Lipitor; Lodine; PENICILLINS; pravastatin       Allergies Reconciled  [facility]  "History  Thyroid disorder; Diabetes         Social History  Alcohol (Never); Tobacco (Never)         Review of Systems  · Constitutional  o Denies  o : chills, fever  · Gastrointestinal  o Denies  o : nausea, vomiting      Vitals  Date Time BP Position Site L\R Cuff Size HR RR TEMP (F) WT  HT  BMI kg/m2 BSA m2 O2 Sat HC       04/22/2020 09:56 AM       16  201lbs 0oz 5'  4\" 34.5 2.03           Physical Examination  · Constitutional  o Appearance  o : well developed, well-nourished, alert and in no acute distress  · Head and Face  o Head  o :   § Inspection  § : no deformities or lesions  · Eyes  o Conjunctivae  o : clear  o Sclerae  o : nonicteric  · Neck  o Inspection/Palpation  o : normal appearance, no masses or tenderness, trachea midline  · Respiratory  o Respiratory Effort  o : breathing unlabored  o Inspection of Chest  o : normal appearance, no retractions  · Cardiovascular  o Heart  o : regular rate and rhythm  · Gastrointestinal  o Abdominal Examination  o :   § Abdomen  § : soft, nontender, nondistended  · Lymphatic  o Neck  o : no lymphadenopathy present  o Axilla  o : no lymphadenopathy present  o Groin  o : her groin appears to show basically hidradenitis again. She has a lot of mucosal discharge. She has lumped up, heaped up areas around the incision. It is now indurated. She may have some cellulitis.   · Skin and Subcutaneous Tissue  o General Inspection  o : no rashes present, no lesions present, no areas of discoloration  · Neurologic  o Cranial Nerves  o : grossly intact  o Sensation  o : grossly intact  o Gait and Station  o :   § Gait Screening  § : normal gait, able to stand without diffculty  o Cerebellar Function  o : no obvious abnormalities  · Psychiatric  o Judgement and Insight  o : judgment and insight intact  o Mood and Affect  o : mood normal, affect appropriate          Assessment  · Postoperative Exam Following Surgery     V67.00       Patient with a likely recurrent hidradenitis. "     Problems Reconciled  Plan  · Medications  o Medications have been Reconciled  o Transition of Care or Provider Policy  · Instructions  o Electronically Identified Patient Education Materials Provided Electronically     I don't think local wound care will help at this point in time. I think her hidradenitis has just recurred. She may have some cellulitis associated with it. What I would like to do is give her some antibiotics and make sure we clear up the infection as much as possible. We will likely need to plan for re-excision of the hidradenitis. I would like to discuss her case with plastics and see if they have any recommendations as well. I discussed all of this with the patient. All questions were answered.  She voiced understanding and agreed to proceed with the above plan.             Electronically Signed by: Padmaja Frey-, -Author on April 22, 2020 05:23:26 PM  Electronically Co-signed by: Donato You MD -Reviewer on April 22, 2020 11:36:50 PM

## 2021-05-12 NOTE — PROGRESS NOTES
Progress Note      Patient Name: Mariela Aldrich   Patient ID: [id number]   Sex: Female   YOB: 1970    Primary Care Provider: Berenice THOMAS   Referring Provider: Berenice THOMAS    Visit Date: April 1, 2020    Provider: Donato You MD   Location: [facility]   Location Address: [address] [place] [address]  [id number]   Location Phone: [phone]          Chief Complaint  · Follow Up Office Visit      History Of Present Illness  Mariela Aldrich is a 49 year old /White female who presents today follows-up status post excision of hidradenitis. The patient has been doing relatively well. She is still having some drainage from her right groin although there are some areas that appear to be improving. She also now has some pain and swelling on her left buttock, which has been draining some purulent material.       Past Medical History  Anxiety; Diabetes mellitus type II, controlled; Hyperlipidemia; Hypothyroidism; Panic attack; Screening Mammogram; Sinus trouble         Past Surgical History  Colonoscopy; Hidradenitis incision and drainage; Hysterectomy; Knee surgery         Medication List  Bactrim -160 mg oral tablet; glipizide 10 mg oral tablet; Januvia 100 mg oral tablet; lisinopril 5 mg oral tablet; Livalo 2 mg oral tablet; minocycline 100 mg oral capsule; Requip XL 2 mg oral tablet extended release 24 hr; sertraline 100 mg oral tablet; Symbicort 160-4.5 mcg/actuation inhalation HFA aerosol inhaler; Synjardy XR 10-1,000 mg oral tablet, IR - ER, biphasic 24hr; Synthroid 88 mcg oral tablet; Tresiba FlexTouch U-200 200 unit/mL (3 mL) subcutaneous insulin pen; Ventolin HFA 90 mcg/actuation inhalation HFA aerosol inhaler; Zithromax Z-Justin 250 mg oral tablet; Zyrtec 10 mg oral tablet         Allergy List  atorvastatin; Crestor; Lipitor; Lodine; PENICILLINS; pravastatin       Allergies Reconciled  Family Medical History  Thyroid disorder; Diabetes         Social  "History  Alcohol (Never); Tobacco (Never)         Review of Systems  · Cardiovascular  o Denies  o : chest pain on exertion, shortness of breath, lower extremity swelling  · Respiratory  o Denies  o : shortness of breath, coughing up blood  · Gastrointestinal  o Denies  o : chronic abdominal pain      Vitals  Date Time BP Position Site L\R Cuff Size HR RR TEMP (F) WT  HT  BMI kg/m2 BSA m2 O2 Sat        04/01/2020 10:00 AM       16  200lbs 0oz 5'  4\" 34.33 2.02           Physical Examination  · Constitutional  o Appearance  o : well developed, well-nourished, alert and in no acute distress  · Head and Face  o Head  o :   § Inspection  § : no deformities or lesions  · Eyes  o Conjunctivae  o : clear  o Sclerae  o : nonicteric  · Neck  o Inspection/Palpation  o : normal appearance, no masses or tenderness, trachea midline  · Respiratory  o Respiratory Effort  o : breathing unlabored  o Inspection of Chest  o : normal appearance, no retractions  · Cardiovascular  o Heart  o : regular rate and rhythm  · Gastrointestinal  o Abdominal Examination  o :   § Abdomen  § : soft, nontender, nondistended  · Lymphatic  o Neck  o : no lymphadenopathy present  o Axilla  o : no lymphadenopathy present  o Groin  o : at the right groin, the most medial portion of her wound actually appears to be healing better. The lateral portion of the wound also appears to be healing better. The center portion, which extends up to the mons, still has some open draining areas and some mucosal drainage. No obvious signs of infection.   · Skin and Subcutaneous Tissue  o General Inspection  o : her left anterior buttock has an approximately 3.0 cm round lesion, which is firm and indurated. There is some purulent drainage.  · Neurologic  o Cranial Nerves  o : grossly intact  o Sensation  o : grossly intact  o Gait and Station  o :   § Gait Screening  § : normal gait, able to stand without diffculty  o Cerebellar Function  o : no obvious " abnormalities  · Psychiatric  o Judgement and Insight  o : judgment and insight intact  o Mood and Affect  o : mood normal, affect appropriate          Assessment  · Postoperative follow-up     V67.00/Z09  · left bottom abscess     682.9/L02.91       Patient with a healing wound in the right groin and a new wound in the left buttock.     Problems Reconciled  Plan  · Orders  o Wound Culture ([id number]) - V67.00/Z09, 682.9/L02.91 - 04/01/2020  o I & D abscess, simple or single [facility] ([id number]) - 682.9/L02.91 - 04/01/2020  · Medications  o Medications have been Reconciled  o Transition of Care or Provider Policy     We I&D'd the left buttock wound today. She was prepped and local anesthesia was injected over the area of the wound and it was incised with an 11-blade, making an approximately 1.0 cm incision. We explored the wound with a culture as well as a cotton tipped applicator to break up any loculations. The culture was sent. We then copiously irrigated the wound and packed it with 1/4-inch strip gauze and dressed it with an ABD pad. Her right groin wound was dressed with iodine-soaked 4 x 4's and covered with gauze. The patient tolerated the procedure well. She will follow-up with me in 1-2 weeks for reassessment of the wounds.     We will call in some antibiotics for cellulitis and I would like to try and get her some Aquacel for the right groin wound. I discussed all of this with the patient. All questions were answered.  She voiced understanding and agreed to proceed with the above plan.             Electronically Signed by: Padmaja Frey-, -Author on April 1, 2020 01:45:11 PM  Electronically Co-signed by: Donato You MD -Reviewer on April 2, 2020 11:19:01 PM

## 2021-05-12 NOTE — PROGRESS NOTES
Progress Note      Patient Name: Mariela Aldrich   Patient ID: [id number]   Sex: Female   YOB: 1970    Primary Care Provider: Berenice THOMAS   Referring Provider: Berenice THOMAS    Visit Date: April 15, 2020    Provider: Donato You MD   Location: [facility]   Location Address: [address] [place] [address]  [id number]   Location Phone: [phone]          Chief Complaint  · Follow Up Office Visit      History Of Present Illness  Mariela Aldrich is a 49 year old /White female who presents today for a postoperative visit. She follows-up status post excision of groin hidradenitis. The patient continues to have mucosal drainage from the area. She is having a little bit of red irritated rash in the area as well.       Past Medical History  Anxiety; Diabetes mellitus type II, controlled; Hyperlipidemia; Hypothyroidism; Panic attack; Screening Mammogram; Sinus trouble         Past Surgical History  Colonoscopy; Hidradenitis incision and drainage; Hysterectomy; Knee surgery         Medication List  Bactrim -160 mg oral tablet; glipizide 10 mg oral tablet; Januvia 100 mg oral tablet; lisinopril 5 mg oral tablet; Livalo 2 mg oral tablet; miconazole nitrate 2 % topical aerosol powder; minocycline 100 mg oral capsule; Requip XL 2 mg oral tablet extended release 24 hr; sertraline 100 mg oral tablet; Silvadene 1 % topical cream; Symbicort 160-4.5 mcg/actuation inhalation HFA aerosol inhaler; Synjardy XR 10-1,000 mg oral tablet, IR - ER, biphasic 24hr; Synthroid 88 mcg oral tablet; Tresiba FlexTouch U-200 200 unit/mL (3 mL) subcutaneous insulin pen; Ventolin HFA 90 mcg/actuation inhalation HFA aerosol inhaler; Zithromax Z-Justin 250 mg oral tablet; Zyrtec 10 mg oral tablet         Allergy List  atorvastatin; Crestor; Lipitor; Lodine; PENICILLINS; pravastatin       Allergies Reconciled  Family Medical History  Thyroid disorder; Diabetes         Social History  Alcohol (Never);  "Tobacco (Never)         Review of Systems  · Cardiovascular  o Denies  o : chest pain on exertion, shortness of breath, lower extremity swelling  · Respiratory  o Denies  o : shortness of breath, coughing up blood  · Gastrointestinal  o Denies  o : chronic abdominal pain      Vitals  Date Time BP Position Site L\R Cuff Size HR RR TEMP (F) WT  HT  BMI kg/m2 BSA m2 O2 Sat HC       04/15/2020 09:14 AM       14  201lbs 0oz 5'  4\" 34.5 2.03           Physical Examination  · Constitutional  o Appearance  o : well developed, well-nourished, alert and in no acute distress  · Head and Face  o Head  o :   § Inspection  § : no deformities or lesions  · Eyes  o Conjunctivae  o : clear  o Sclerae  o : nonicteric  · Neck  o Inspection/Palpation  o : normal appearance, no masses or tenderness, trachea midline  · Respiratory  o Respiratory Effort  o : breathing unlabored  o Inspection of Chest  o : normal appearance, no retractions  · Cardiovascular  o Heart  o : regular rate and rhythm  · Gastrointestinal  o Abdominal Examination  o :   § Abdomen  § : soft, nontender, nondistended  · Lymphatic  o Neck  o : no lymphadenopathy present  o Axilla  o : no lymphadenopathy present  o Groin  o : her right groin does have some mucosal drainage. She has some more cobblestone appearing tissue underneath and poor epithelialization. She also has some petechial appearing rash in her groin.   · Skin and Subcutaneous Tissue  o General Inspection  o : no rashes present, no lesions present, no areas of discoloration  · Neurologic  o Cranial Nerves  o : grossly intact  o Sensation  o : grossly intact  o Gait and Station  o :   § Gait Screening  § : normal gait, able to stand without diffculty  o Cerebellar Function  o : no obvious abnormalities  · Psychiatric  o Judgement and Insight  o : judgment and insight intact  o Mood and Affect  o : mood normal, affect appropriate          Assessment  · Follow-up examination following " surgery     V67.00/Z09       Patient status post excision of hidradenitis with possible groin fungal infection.     Problems Reconciled  Plan  · Medications  o Medications have been Reconciled  o Transition of Care or Provider Policy     The patient doesn't seem to be epithelializing like I had hoped. It does look like she probably has some recurrence of the hidradenitis. She also may have a fungal infection in the groin. She also never got the Aquacel, as the dressings were way too expensive, which I agree she shouldn't have to pay that much dressings. We will start her on some Silvadene. I will cover with some 4 x 4. We will also get her some fungal powder for the rash. I will follow-up with her again in a week or two and I did discuss with her we may have to re-excise this area, as it looks like there may be some recurrence of the hidradenitis. I discussed all of this with the patient. All questions were answered.  They voiced understanding and agreed to proceed with the above plan.             Electronically Signed by: Padmaja Frey-, -Author on April 16, 2020 09:00:24 AM  Electronically Co-signed by: Donato You MD -Reviewer on April 16, 2020 11:19:34 AM

## 2021-05-13 NOTE — PROGRESS NOTES
Progress Note      Patient Name: Mariela Aldrich   Patient ID: [id number]   Sex: Female   YOB: 1970    Primary Care Provider: Berenice THOMAS   Referring Provider: Berenice THOMAS    Visit Date: May 20, 2020    Provider: Donato You MD   Location[facility]   Location Address: [address] [place] [address]  [id number]   Location Phone: [phone]          Chief Complaint  · Follow Up Office Visit      History Of Present Illness  Mariela Aldrich is a 49 year old /White female who presents today for a postoperative visit. She follows-up status post placement of a wound VAC after excision of hidradenitis in her right groin. The patient is doing well. She is getting wound VAC therapy. She is not reporting any new symptoms or issues.       Past Medical History  Anxiety; Diabetes mellitus type II, controlled; Hyperlipidemia; Hypothyroidism; Panic attack; Screening Mammogram; Sinus trouble         Past Surgical History  Colonoscopy; Hidradenitis incision and drainage; Hysterectomy; Knee surgery         Medication List  glipizide 10 mg oral tablet; Januvia 100 mg oral tablet; levofloxacin 500 mg oral tablet; lisinopril 5 mg oral tablet; Livalo 2 mg oral tablet; miconazole nitrate 2 % topical aerosol powder; Multi Vitamin oral liquid; Requip XL 2 mg oral tablet extended release 24 hr; ropinirole 2 mg oral tablet extended release 24 hr; sertraline 100 mg oral tablet; Silvadene 1 % topical cream; Symbicort 160-4.5 mcg/actuation inhalation HFA aerosol inhaler; Synjardy XR 10-1,000 mg oral tablet, IR - ER, biphasic 24hr; Synthroid 88 mcg oral tablet; Tresiba FlexTouch U-200 200 unit/mL (3 mL) subcutaneous insulin pen; Ventolin HFA 90 mcg/actuation inhalation HFA aerosol inhaler; Zithromax Z-Justin 250 mg oral tablet; Zyrtec 10 mg oral tablet         Allergy List  atorvastatin; Crestor; Lipitor; Lodine; PENICILLINS; pravastatin       Allergies Reconciled  Family [facility]  "History  Thyroid disorder; Diabetes         Social History  Alcohol (Never); Tobacco (Never)         Immunizations  Name Date Admin   Influenza    [id number]    Tdap          Review of Systems  · Constitutional  o Denies  o : chills, fever  · Gastrointestinal  o Denies  o : nausea, vomiting      Vitals  Date Time BP Position Site L\R Cuff Size HR RR TEMP (F) WT  HT  BMI kg/m2 BSA m2 O2 Sat        05/20/2020 10:37 AM       12  199lbs 0oz 5'  4\" 34.16 2.02           Physical Examination  · Constitutional  o Appearance  o : well developed, well-nourished, alert and in no acute distress  · Head and Face  o Head  o :   § Inspection  § : no deformities or lesions  · Eyes  o Conjunctivae  o : clear  o Sclerae  o : nonicteric  · Neck  o Inspection/Palpation  o : normal appearance, no masses or tenderness, trachea midline  · Respiratory  o Respiratory Effort  o : breathing unlabored  o Inspection of Chest  o : normal appearance, no retractions  · Cardiovascular  o Heart  o : regular rate and rhythm  · Gastrointestinal  o Abdominal Examination  o :   § Abdomen  § : abdomen is soft.  · Lymphatic  o Neck  o : no lymphadenopathy present  o Axilla  o : no lymphadenopathy present  o Groin  o : her right groin still has the wound VAC in place although I did get pictures from the wound care nurse today and it does seem to be healing quite well and closing appropriately.  · Skin and Subcutaneous Tissue  o General Inspection  o : no rashes present, no lesions present, no areas of discoloration  · Neurologic  o Cranial Nerves  o : grossly intact  o Sensation  o : grossly intact  o Gait and Station  o :   § Gait Screening  § : normal gait, able to stand without diffculty  o Cerebellar Function  o : no obvious abnormalities  · Psychiatric  o Judgement and Insight  o : judgment and insight intact  o Mood and Affect  o : mood normal, affect appropriate          Assessment     Patient status post excision of right groin hidradenitis. "       Plan  · Medications  o Medications have been Reconciled  o Transition of Care or Provider Policy  · Instructions  o Electronically Identified Patient Education Materials Provided Electronically     The patient is doing well and healing as expected. I am pleased with her overall progress. However, she is continuing to have a need for VAC therapy, which is difficult, so we will need to keep her out of work for another two weeks until hopefully she completes her VAC therapy. I discussed all of this with the patient. All questions were answered.  She voiced understanding and agreed to proceed with the above plan. She can follow-up with me in two weeks for reassessment.             Electronically Signed by: Padmaja Frey-, -Author on May 27, 2020 08:12:37 AM  Electronically Co-signed by: Donato You MD -Reviewer on May 27, 2020 09:06:36 AM

## 2021-05-13 NOTE — PROGRESS NOTES
Progress Note      Patient Name: Mariela Aldrich   Patient ID: [id number]   Sex: Female   YOB: 1970    Primary Care Provider: Berenice THOMAS   Referring Provider: Berenice THOMAS    Visit Date: June 18, 2020    Provider: WILLIAM Hickman   Location: [facility]   Location Address: [address] [place] [address]  [id number]   Location Phone: [phone]          Chief Complaint  · 6 Month Follow up for Diabetes, Hypothyroidism, and Hyperlipidemia      History Of Present Illness  Mariela Aldrich is a 49 year old /White female who presents for evaluation and treatment of:      6 Month Follow up for Diabetes, Hypothyroidism, and Hyperlipidemia  Pt had 2 surgeries with Dr. Moreira and currently doing would care at [facility] for incision in rui area.  pt continues with wound care.     Last lab work done 11/2019  Med refills needed    DM - 146 this morning. pt has cutback on sugar. improved over the last month.     Hypothyroid - compliant with meds.     Hyperlipidemia - denies any myalgias.    asthma - pt reports not as controlled as when she took adviair previously. pt is awakening coughing 3 nights per week. pt takes Zyrtec nightly. previously took singulair with minimal improvement.    RLS - flaring on occasion. pt is not drinking much water.      flu shot-9/2019  Pneu- 7/2019 P23  Tdap-2020  Mammo- 12/16/19  Colon-1/21/20  Dexa- 11/25/15  Non-smoker       Past Medical History  Disease Name Date Onset Notes   Anxiety --  --    Diabetes mellitus type II, controlled --  --    Hyperlipidemia --  --    Hypothyroidism --  --    Panic attack --  --    Screening Mammogram 12/13/19 WNL, Repeat in 1 year   Sinus trouble --  --          Past Surgical History  Procedure Name Date Notes   Colonoscopy 01/21/2020 External hemorrhoids   Hidradenitis incision and drainage --  right groin   Hysterectomy 2010 --    Knee surgery 2013 Bilateral         Medication List  Name Date Started  Instructions   Advair Diskus 250-50 mcg/dose inhalation blister with device 06/18/2020 inhale 1 puff by inhalation route 2 times per day in the morning and evening approximately 12 hours apart   glipizide 10 mg oral tablet 06/18/2020 TAKE 1 TABLET BY MOUTH TWICE DAILY BEFORE MEAL(S)   Humira 40 mg/0.8 mL subcutaneous syringe kit  inject 0.8 milliliter (40 mg) by subcutaneous route once weekly in the abdomen or thigh (rotate sites)   Januvia 100 mg oral tablet 06/18/2020 TAKE ONE TABLET BY MOUTH ONCE DAILY for 30 days   lisinopril 5 mg oral tablet 06/18/2020 TAKE 1 TABLET BY MOUTH ONCE DAILY   Livalo 2 mg oral tablet 06/18/2020 take 1 tablet (2 mg) by oral route once daily for 30 days   Multi Vitamin oral liquid  --    ropinirole 2 mg oral tablet extended release 24 hr 06/18/2020 TAKE 1 TABLET BY MOUTH ONCE DAILY   sertraline 100 mg oral tablet 06/18/2020 TAKE ONE & ONE-HALF TABLETS BY MOUTH ONCE DAILY   Synjardy XR 10-1,000 mg oral tablet, IR - ER, biphasic 24hr 06/18/2020 TAKE 1 TABLET BY MOUTH ONCE DAILY IN THE MORNING WITH A MEAL   Synthroid 88 mcg oral tablet 06/18/2020 TAKE 1 TABLET BY MOUTH ONCE DAILY   Tresiba FlexTouch U-200 200 unit/mL (3 mL) subcutaneous insulin pen 06/18/2020 inject 36 unites by subcutaneous route QD   Ventolin HFA 90 mcg/actuation inhalation HFA aerosol inhaler 03/19/2020 inhale 1 - 2 puffs (90 - 180 mcg) by inhalation route every 4-6 hours as needed   zinc 50 mg oral tablet  take 1 tablet by oral route daily   Zyrtec 10 mg oral tablet  take 1 tablet (10 mg) by oral route once daily         Allergy List  Allergen Name Date Reaction Notes   atorvastatin --  --  leg cramps   Crestor --  --  myalgia   Lipitor --  Leg cramps --    Lodine --  --  --    PENICILLINS --  --  --    pravastatin --  Leg cramps --          Family Medical History  Disease Name Relative/Age Notes   Thyroid disorder Brother/43  Father/70  Sister/49   thyroid cancer   Diabetes Brother/  Grandmother  "(maternal)/  Sister/   --          Social History  Finding Status Start/Stop Quantity Notes   Alcohol Never --/-- --  --    Tobacco Never --/-- --  --          Immunizations  NameDate Admin Mfg Trade Name Lot Number Route Inj VIS Given VIS Publication   Krtoslwup23/13/2019 NE Not Entered  NE NE     Comments:    [id number]/30/2019 NE Not Entered  NE NE 07/30/2019    Comments:    Tdap03/16/2020 NE Not Entered  NE NE     Comments: Administered by [facility]. see scanned injection info         Review of Systems  · Constitutional  o Denies  o : fatigue, fever, weight gain, weight loss, chills  · Cardiovascular  o Denies  o : chest Pain, palpitations, edema (swelling)  · Respiratory  o Denies  o : frequent cough, shortness of breath  · Gastrointestinal  o Denies  o : nausea, vomiting, changes in bowel habits  · Genitourinary  o Denies  o : dysuria, urinary frequency, urinary urgency, polyuria  · Neurologic  o Denies  o : headache, tingling or numbness, dizziness  · Musculoskeletal  o Admits  o : muscle aches, myalgias  o Denies  o : joint pain  · Psychiatric  o Denies  o : mood changes, memory changes, SI/HI      Vitals  Date Time BP Position Site L\R Cuff Size HR RR TEMP (F) WT  HT  BMI kg/m2 BSA m2 O2 Sat        11/04/2019 07:27 /69 Sitting    93 - R 18  200lbs 0oz 5'  4\" 34.33 2.02 96 %    12/16/2019 01:49 /76 Sitting    84 - R 24 99.4 203lbs 0oz 5'  4\" 34.84 2.04 98 %    06/18/2020 10:03 /75 Sitting    80 - R 18 99.1 197lbs 8oz 5'  4\" 33.9 2.01 98 %          Physical Examination  · Constitutional  o Appearance  o : well-nourished, in no acute distress  · Eyes  o Conjunctivae  o : conjunctivae normal  o Sclerae  o : sclerae white  o Pupils and Irises  o : pupils equal and round  o Eyelids/Ocular Adnexae  o : eyelid appearance normal, no exudates present  · Neck  o Inspection/Palpation  o : normal appearance, no masses or tenderness, trachea midline  o Range of Motion  o : cervical range " of motion within normal limits  o Thyroid  o : gland size normal, nontender, no nodules or masses present on palpation  · Respiratory  o Respiratory Effort  o : breathing unlabored  o Inspection of Chest  o : normal appearance  o Auscultation of Lungs  o : normal breath sounds throughout inspiration and expiration  · Cardiovascular  o Heart  o :   § Auscultation of Heart  § : regular rate and rhythm, no murmurs, gallops or rubs  o Peripheral Vascular System  o :   § Carotid Arteries  § : normal pulses bilaterally, no bruits present  · Gastrointestinal  o Abdominal Examination  o : abdomen nontender to palpation, tone normal without rigidity or guarding, no masses present, bowel sounds present  · Skin and Subcutaneous Tissue  o General Inspection  o : no rashes or lesions present, no areas of discoloration  o Body Hair  o : hair normal for age, general body hair distribution normal for age  o Digits and Nails  o : no clubbing, cyanosis, deformities or edema present, normal appearing nails  · Neurologic  o Mental Status Examination  o :   § Orientation  § : grossly oriented to person, place and time  o Gait and Station  o : normal gait, able to stand without difficulty  · Psychiatric  o Judgement and Insight  o : judgment and insight intact  o Mood and Affect  o : mood normal, affect appropriate          Assessment  · Mild persistent asthma with acute exacerbation     493.92/J45.31  · Type 2 diabetes mellitus with hyperglycemia, with long-term current use of insulin       Type 2 diabetes mellitus with hyperglycemia     250.00/E11.65  Long term (current) use of insulin     250.00/Z79.4  · Hyperlipidemia     272.4/E78.5  · Hypothyroidism     244.9/E03.9      Plan  · Orders  o Diabetes 2 Panel (Urine Microalbumin, CMP, Lipid, A1c, ) [facility] (78378, 91442, 14018, 04429) - - 06/18/2020  o CBC with Auto Diff [facility] (53299) - - 06/18/2020  o Thyroid Profile (02187, 24498, THYII) - 244.9/E03.9 - 06/18/2020  o ACO-39: Current  medications updated and reviewed () - - 06/18/2020  · Medications  o Blood Glucose Monitoring miscellaneous kit   SIG: use as directed DX: DM E11.9   DISP: (1) Kit with 0 refills  Prescribed on 06/18/2020     o Glucometer Test Strips   SIG: test blood sugar 1-2 times per day dx E11   DISP: (2) 100 counts with 3 refills  Prescribed on 06/18/2020     o Lancets,Ultra Thin 26 gauge miscellaneous misc   SIG: use as directed for 90 days Dx: 250.00   DISP: (1) 100 ct box with 3 refills  Prescribed on 06/18/2020     o glipizide 10 mg oral tablet   SIG: TAKE 1 TABLET BY MOUTH TWICE DAILY BEFORE MEAL(S)   DISP: (180) Tablet with 1 refills  Adjusted on 06/18/2020     o Januvia 100 mg oral tablet   SIG: TAKE ONE TABLET BY MOUTH ONCE DAILY for 30 days   DISP: (90) Tablet with 1 refills  Adjusted on 06/18/2020     o lisinopril 5 mg oral tablet   SIG: TAKE 1 TABLET BY MOUTH ONCE DAILY   DISP: (90) Tablet with 1 refills  Adjusted on 06/18/2020     o Livalo 2 mg oral tablet   SIG: take 1 tablet (2 mg) by oral route once daily for 30 days   DISP: (30) tablets with 5 refills  Adjusted on 06/18/2020     o ropinirole 2 mg oral tablet extended release 24 hr   SIG: TAKE 1 TABLET BY MOUTH ONCE DAILY   DISP: (90) Each with 1 refills  Adjusted on 06/18/2020     o sertraline 100 mg oral tablet   SIG: TAKE ONE & ONE-HALF TABLETS BY MOUTH ONCE DAILY   DISP: (135) Tablet with 1 refills  Adjusted on 06/18/2020     o Advair Diskus 250-50 mcg/dose inhalation blister with device   SIG: inhale 1 puff by inhalation route 2 times per day in the morning and evening approximately 12 hours apart   DISP: (1) 60 ct blist pack with 5 refills  Adjusted on 06/18/2020     o Synthroid 88 mcg oral tablet   SIG: TAKE 1 TABLET BY MOUTH ONCE DAILY   DISP: (30) Tablet with 2 refills  Adjusted on 06/18/2020     o Tresiba FlexTouch U-200 200 unit/mL (3 mL) subcutaneous insulin pen   SIG: inject 36 unites by subcutaneous route QD   DISP: (2) 3 ml syringe with 2  refills  Adjusted on 06/18/2020     o Synjardy XR 10-1,000 mg oral tablet, IR - ER, biphasic 24hr   SIG: TAKE 1 TABLET BY MOUTH ONCE DAILY IN THE MORNING WITH A MEAL   DISP: (30) Each with 2 refills  Refilled on 06/18/2020     o Medications have been Reconciled  o Transition of Care or Provider Policy  · Instructions  o Continue blood sugar monitoring daily and record. Bring your log to office visits. Call the office for readings below 70 and above 250 or any complications.  o Daily foot care. Avoid walking barefoot. Annual Dilated Eye Exam.  o Discussed with patient blood pressure monitoring, hemoglobin A1C levels need to be below 7.0, and LDL (Lipid) goals below 70.  o Advised that cheeses and other sources of dairy fats, animal fats, fast food, and the extras (candy, pastries, pies, doughnuts and cookies) all contain LDL raising nutrients. Advised to increase fruits, vegetables, whole grains, and to monitor portion sizes.   o Patient was educated/instructed on their diagnosis, treatment and medications prior to discharge from the clinic today.  o Call the office with any concerns or questions.  · Disposition  o Return to Office in 3 months.            Electronically Signed by: WILLIAM Hickman -Author on June 18, 2020 11:00:09 AM

## 2021-05-13 NOTE — PROGRESS NOTES
Progress Note      Patient Name: Mariela Aldrich   Patient ID: [id number]   Sex: Female   YOB: 1970    Primary Care Provider: Berenice THOMAS   Referring Provider: Berenice THOMAS    Visit Date: May 13, 2020    Provider: Donato You MD   Location: [facility]   Location Address: [address] [place] [address]  [id number]   Location Phone: [phone]          Chief Complaint  · Follow Up Office Visit      History Of Present Illness  Mariela Aldrich is a 49 year old /White female who presents today for a postoperative visit. She follows-up status post excision of groin hidradenitis. This is the first time I have seen the patient since her re-excision. She was noted by our NP, who saw her in the office, to have dehiscence of the wound. She was referred to the wound care center. I discussed her case with the wound care nurse and we have elected to place a wound VAC in that area. She seems to be doing fairly well with the VAC although she is having some pain with the VAC application and removal.       Past Medical History  Anxiety; Diabetes mellitus type II, controlled; Hyperlipidemia; Hypothyroidism; Panic attack; Screening Mammogram; Sinus trouble         Past Surgical History  Colonoscopy; Hidradenitis incision and drainage; Hysterectomy; Knee surgery         Medication List  glipizide 10 mg oral tablet; Januvia 100 mg oral tablet; levofloxacin 500 mg oral tablet; lisinopril 5 mg oral tablet; Livalo 2 mg oral tablet; miconazole nitrate 2 % topical aerosol powder; Multi Vitamin oral liquid; Requip XL 2 mg oral tablet extended release 24 hr; sertraline 100 mg oral tablet; Silvadene 1 % topical cream; Symbicort 160-4.5 mcg/actuation inhalation HFA aerosol inhaler; Synjardy XR 10-1,000 mg oral tablet, IR - ER, biphasic 24hr; Synthroid 88 mcg oral tablet; Tresiba FlexTouch U-200 200 unit/mL (3 mL) subcutaneous insulin pen; Ventolin HFA 90 mcg/actuation inhalation HFA aerosol  "inhaler; Zithromax Z-Justin 250 mg oral tablet; Zyrtec 10 mg oral tablet         Allergy List  atorvastatin; Crestor; Lipitor; Lodine; PENICILLINS; pravastatin       Allergies Reconciled  Family Medical History  Thyroid disorder; Diabetes         Social History  Alcohol (Never); Tobacco (Never)         Review of Systems  · Cardiovascular  o Denies  o : chest pain on exertion, shortness of breath, lower extremity swelling  · Respiratory  o Denies  o : shortness of breath, coughing up blood  · Gastrointestinal  o Denies  o : chronic abdominal pain      Vitals  Date Time BP Position Site L\R Cuff Size HR RR TEMP (F) WT  HT  BMI kg/m2 BSA m2 O2 Sat        05/13/2020 10:28 AM       14  198lbs 0oz 5'  4\" 33.99 2.01           Physical Examination  · Constitutional  o Appearance  o : well developed, well-nourished, alert and in no acute distress  · Head and Face  o Head  o :   § Inspection  § : no deformities or lesions  · Eyes  o Conjunctivae  o : clear  o Sclerae  o : nonicteric  · Neck  o Inspection/Palpation  o : normal appearance, no masses or tenderness, trachea midline  · Respiratory  o Respiratory Effort  o : breathing unlabored  o Inspection of Chest  o : normal appearance, no retractions  · Cardiovascular  o Heart  o : regular rate and rhythm  · Gastrointestinal  o Abdominal Examination  o :   § Abdomen  § : abdomen is soft.  · Lymphatic  o Neck  o : no lymphadenopathy present  o Axilla  o : no lymphadenopathy present  o Groin  o : at her right groin, the VAC has been removed for this visit. She has good granulation tissue in the base of the wound. She still has some heaped up margins of the skin. The remaining sutures have pulled through the skin.  · Skin and Subcutaneous Tissue  o General Inspection  o : no rashes present, no lesions present, no areas of discoloration  · Neurologic  o Cranial Nerves  o : grossly intact  o Sensation  o : grossly intact  o Gait and Station  o :   § Gait Screening  § : normal gait, " able to stand without diffculty  o Cerebellar Function  o : no obvious abnormalities  · Psychiatric  o Judgement and Insight  o : judgment and insight intact  o Mood and Affect  o : mood normal, affect appropriate          Assessment  · Follow-up surgery care     V67.00/Z09       Patient with right groin hidradenitis status post re-excision.     Problems Reconciled  Plan  · Medications  o Medications have been Reconciled  o Transition of Care or Provider Policy     Her re-excision is obviously not groin as well as I had hoped. I went ahead and removed her sutures today. We will continue on the VAC therapy for now, to see if we can get her to close up this wound. If she has recurrence of the hidradenitis, we may need to discuss with dermatology reinstituting Humira or some other biologic agent, as the repeated excisions are not as fruitful as I would have hoped. We will follow-up with wound VAC placement and see if we can get her to heal up appropriately. She should be out of work for the next week and we can reassess in one week to see how she is doing for when she can restart work. I discussed all of this with the patient. All questions were answered.  She voiced understanding and agreed to proceed with the above plan.             Electronically Signed by: Padmaja Frey-, -Author on May 14, 2020 09:45:06 AM  Electronically Co-signed by: Donato You MD -Reviewer on May 14, 2020 08:36:35 PM

## 2021-05-13 NOTE — PROGRESS NOTES
Progress Note      Patient Name: Mariela Aldrich   Patient ID: [id number]   Sex: Female   YOB: 1970    Primary Care Provider: Berenice THOMAS   Referring Provider: Berenice THOMAS    Visit Date: October 1, 2020    Provider: WILLIAM Hickman   Location: [facility]   Location Address: [address] [place] [address]  [id number]   Location Phone: [phone]          Chief Complaint  · 3 Month Follow up for Diabetes, HTN, Hypothyroidism, RLS and Anxiety      History Of Present Illness  Mariela Aldrich is a 50 year old /White female who presents for evaluation and treatment of:      3 Month Follow up for Diabetes, HTN, Hypothyroidism, RLS and Anxiety  Last lab work done 6/18/20  Med refills needed    Pt also requested to have Ropinirole increased for RLS. pt reports leg move mostly during the day.    DM - reports fasting bs 135. pt hasn't check post prandial however reports poor dietary intake. pt reports urinary frequency and incontinence with increase in synjardy. pt request lowering med.    HTN - wnl. pt reports compliance with meds.    Pt wants flu shot today       Past Medical History  Disease Name Date Onset Notes   Anxiety --  --    Diabetes mellitus type II, controlled --  --    Hyperlipidemia --  --    Hypothyroidism --  --    Panic attack --  --    Screening Mammogram 12/13/19 WNL, Repeat in 1 year   Sinus trouble --  --          Past Surgical History  Procedure Name Date Notes   Colonoscopy 01/21/2020 External hemorrhoids   Hidradenitis incision and drainage --  right groin   Hysterectomy 2010 --    Knee surgery 2013 Bilateral         Medication List  Name Date Started Instructions   Advair Diskus 250-50 mcg/dose inhalation blister with device 06/18/2020 inhale 1 puff by inhalation route 2 times per day in the morning and evening approximately 12 hours apart   Blood Glucose Monitoring miscellaneous kit 06/18/2020 use as directed DX: DM  "E11.9   glipizide 10 mg oral tablet 06/18/2020 TAKE 1 TABLET BY MOUTH TWICE DAILY BEFORE MEAL(S)   Glucometer Test Strips 06/18/2020 test blood sugar 1-2 times per day dx E11   Humira 40 mg/0.8 mL subcutaneous syringe kit  inject 0.8 milliliter (40 mg) by subcutaneous route once weekly in the abdomen or thigh (rotate sites)   Januvia 100 mg oral tablet 06/18/2020 TAKE ONE TABLET BY MOUTH ONCE DAILY for 30 days   Lancets,Ultra Thin 26 gauge miscellaneous misc 06/18/2020 use as directed for 90 days Dx: 250.00   lisinopril 5 mg oral tablet 06/18/2020 TAKE 1 TABLET BY MOUTH ONCE DAILY   Livalo 2 mg oral tablet 06/18/2020 take 1 tablet (2 mg) by oral route once daily for 30 days   Multi Vitamin oral liquid  --    Pen Needle 31 gauge x 5/16\" miscellaneous needle 08/10/2020 use as directed with Tresiba Flex Pen (Dx: Diabetes- E11.9)   ropinirole 2 mg oral tablet extended release 24 hr 06/18/2020 TAKE 1 TABLET BY MOUTH ONCE DAILY   sertraline 100 mg oral tablet 06/18/2020 TAKE ONE & ONE-HALF TABLETS BY MOUTH ONCE DAILY   Synjardy XR 25-1,000 mg oral tablet, IR - ER, biphasic 24hr 06/19/2020 take 1 tablet by oral route once daily in the morning with a meal for 30 days   Synthroid 88 mcg oral tablet 10/01/2020 Take 1 tablet by mouth once daily   Tresiba FlexTouch U-200 200 unit/mL (3 mL) subcutaneous insulin pen 06/18/2020 inject 36 unites by subcutaneous route QD   Ventolin HFA 90 mcg/actuation inhalation HFA aerosol inhaler 03/19/2020 inhale 1 - 2 puffs (90 - 180 mcg) by inhalation route every 4-6 hours as needed   zinc 50 mg oral tablet  take 1 tablet by oral route daily   Zyrtec 10 mg oral tablet  take 1 tablet (10 mg) by oral route once daily         Allergy List  Allergen Name Date Reaction Notes   atorvastatin --  --  leg cramps   Crestor --  --  myalgia   Lipitor --  Leg cramps --    Lodine --  --  --    PENICILLINS --  --  --    pravastatin --  Leg cramps --          Family Medical History  Disease Name Relative/Age " "Notes   Thyroid disorder Brother/43  Father/70  Sister/49   thyroid cancer   Diabetes Brother/  Grandmother (maternal)/  Sister/   --          Social History  Finding Status Start/Stop Quantity Notes   Alcohol Never --/-- --  --    Tobacco Never --/-- --  --          Immunizations  NameDate Admin Mfg Trade Name Lot Number Route Inj VIS Given VIS Publication   Lyshtbyel83/13/2019 NE Not Entered  NE NE     Comments:    Aehasjuok0638/30/2019 NE Not Entered  NE NE 07/30/2019    Comments:    Tdap03/16/2020 NE Not Entered  NE NE     Comments: Administered by [facility]. see scanned injection info         Review of Systems  · Constitutional  o Denies  o : fatigue, fever, weight gain, weight loss, chills  · Cardiovascular  o Denies  o : chest Pain, palpitations, edema (swelling)  · Respiratory  o Denies  o : frequent cough, shortness of breath  · Gastrointestinal  o Denies  o : nausea, vomiting, changes in bowel habits  · Genitourinary  o Denies  o : dysuria, urinary frequency, urinary urgency, polyuria  · Neurologic  o Denies  o : headache, tingling or numbness, dizziness  · Musculoskeletal  o Denies  o : joint pain, myalgias  · Psychiatric  o Denies  o : mood changes, memory changes, SI/HI      Vitals  Date Time BP Position Site L\R Cuff Size HR RR TEMP (F) WT  HT  BMI kg/m2 BSA m2 O2 Sat FR L/min FiO2 HC       06/18/2020 10:03 /75 Sitting    80 - R 18 99.1 197lbs 8oz 5'  4\" 33.9 2.01 98 %      10/01/2020 07:36 /69 Sitting    81 - R 18 98.3 209lbs 8oz 5'  4\" 35.96 2.07 97 %            Physical Examination  · Constitutional  o Appearance  o : well-nourished, in no acute distress  · Eyes  o Conjunctivae  o : conjunctivae normal  o Sclerae  o : sclerae white  o Pupils and Irises  o : pupils equal and round  o Eyelids/Ocular Adnexae  o : eyelid appearance normal, no exudates present  · Neck  o Inspection/Palpation  o : normal appearance, no masses or tenderness, trachea midline  o Range of Motion  o : " cervical range of motion within normal limits  o Thyroid  o : gland size normal, nontender, no nodules or masses present on palpation  · Respiratory  o Respiratory Effort  o : breathing unlabored  o Inspection of Chest  o : normal appearance  o Auscultation of Lungs  o : normal breath sounds throughout inspiration and expiration  · Cardiovascular  o Heart  o :   § Auscultation of Heart  § : regular rate and rhythm, no murmurs, gallops or rubs  o Peripheral Vascular System  o :   § Carotid Arteries  § : normal pulses bilaterally, no bruits present  § Extremities  § : no clubbing or edema  · Gastrointestinal  o Abdominal Examination  o : abdomen nontender to palpation, tone normal without rigidity or guarding, no masses present, bowel sounds present  · Skin and Subcutaneous Tissue  o General Inspection  o : no rashes or lesions present, no areas of discoloration  o Body Hair  o : hair normal for age, general body hair distribution normal for age  o Digits and Nails  o : no clubbing, cyanosis, deformities or edema present, normal appearing nails  · Neurologic  o Mental Status Examination  o :   § Orientation  § : grossly oriented to person, place and time  o Gait and Station  o : normal gait, able to stand without difficulty  · Psychiatric  o Judgement and Insight  o : judgment and insight intact  o Mood and Affect  o : mood normal, affect appropriate          Assessment  · Need for influenza vaccination     V04.81/Z23  · Anxiety disorder     300.00/F41.9  · Type 2 diabetes mellitus with hyperglycemia, with long-term current use of insulin       Type 2 diabetes mellitus with hyperglycemia     250.00/E11.65  Long term (current) use of insulin     250.00/Z79.4  · Hyperlipidemia     272.4/E78.5  · Hypothyroidism     244.9/E03.9  · RLS (restless legs syndrome)     333.94/G25.81      Plan  · Orders  o Immunization Admin Fee (Single) ([facility]) (52989) - V04.81/Z23 - 10/01/2020  o Fluzone Quadrivalent Vaccine, age 6 months +  (39437) - V04.81/Z23 - 10/01/2020   Vaccine - Influenza; Dose: 0.5; Site: Right Deltoid; Route: Intramuscular; Date: 10/01/2020 08:01:00; Exp: 06/30/2021; Lot: [id number]; Mfg: Guanriofi pasteur; TradeName: Fluzone Quadrivalent; Administered By: Felecia Felipe; Comment: Injection given. pt tolerated well. NDC 45421-565-74  o Diabetes 1 Panel (CMP, Lipid, A1c) [facility] (30368, 76425, 14696) - - 10/01/2020  o CBC with Auto Diff [facility] (26501) - - 10/01/2020  o ACO-39: Current medications updated and reviewed (1159F, ) - - 10/01/2020  o ACO-14: Influenza immunization administered or previously received [facility] () - - 10/01/2020  o ACO-19: Colorectal cancer screening results documented and reviewed (3017F) - - 10/01/2020  · Medications  o ropinirole 4 mg oral tablet extended release 24 hr   SIG: take 1 tablet by oral route once a day (at bedtime) for 90 days   DISP: (90) Tablet with 1 refills  Adjusted on 10/01/2020     o Synjardy XR 12.5-1,000 mg oral tablet, IR - ER, biphasic 24hr   SIG: take 1 tablet by oral route once daily in the morning with a meal for 30 days   DISP: (30) Tablet with 2 refills  Adjusted on 10/01/2020     o Synthroid 88 mcg oral tablet   SIG: Take 1 tablet by mouth once daily   DISP: (90) Tablet with 0 refills  Adjusted on 10/01/2020     o Tresiba FlexTouch U-200 200 unit/mL (3 mL) subcutaneous insulin pen   SIG: inject 42 units subcutaneous route QD, increase by 1 unit every 35 days until fasting bs 130 or less.   DISP: (3) Pen Needle with 2 refills  Adjusted on 10/01/2020     o Medications have been Reconciled  o Transition of Care or Provider Policy  · Instructions  o Continue blood sugar monitoring daily and record. Bring your log to office visits. Call the office for readings below 70 and above 250 or any complications.  o Daily foot care. Avoid walking barefoot. Annual Dilated Eye Exam.  o Discussed with patient blood pressure monitoring, hemoglobin A1C levels need to be below 7.0, and LDL  (Lipid) goals below 70.  o Advised that cheeses and other sources of dairy fats, animal fats, fast food, and the extras (candy, pastries, pies, doughnuts and cookies) all contain LDL raising nutrients. Advised to increase fruits, vegetables, whole grains, and to monitor portion sizes.   o Patient was educated/instructed on their diagnosis, treatment and medications prior to discharge from the clinic today.  o Call the office with any concerns or questions.  · Disposition  o Return to Office in 3 months.            Electronically Signed by: WILLIAM Hickman -Author on October 2, 2020 09:47:30 AM

## 2021-05-13 NOTE — PROGRESS NOTES
Progress Note      Patient Name: Mariela Aldrich   Patient ID: [id number]   Sex: Female   YOB: 1970    Primary Care Provider: Berenice THOMAS   Referring Provider: Berenice THOMAS    Visit Date: July 22, 2020    Provider: Donato You MD   Location: [facility]   Location Address: [address] [place] [address]  [id number]   Location Phone: [phone]          Chief Complaint  · Follow Up Office Visit      History Of Present Illness  Mariela Aldrich is a 49 year old /White female who presents today for a postoperative visit. She follows-up for a right groin wound after excision of hidradenitis. The patient has been getting very good care from our wound care center and reports no issues. She has had a little bit of open area that has required packing but otherwise has been doing well.       Past Medical History  Anxiety; Diabetes mellitus type II, controlled; Hyperlipidemia; Hypothyroidism; Panic attack; Screening Mammogram; Sinus trouble         Past Surgical History  Colonoscopy; Hidradenitis incision and drainage; Hysterectomy; Knee surgery         Medication List  Advair Diskus 250-50 mcg/dose inhalation blister with device; Blood Glucose Monitoring miscellaneous kit; glipizide 10 mg oral tablet; Glucometer Test Strips; Humira 40 mg/0.8 mL subcutaneous syringe kit; Januvia 100 mg oral tablet; Lancets,Ultra Thin 26 gauge miscellaneous misc; lisinopril 5 mg oral tablet; Livalo 2 mg oral tablet; Multi Vitamin oral liquid; ropinirole 2 mg oral tablet extended release 24 hr; sertraline 100 mg oral tablet; Synjardy XR 25-1,000 mg oral tablet, IR - ER, biphasic 24hr; Synthroid 88 mcg oral tablet; Tresiba FlexTouch U-200 200 unit/mL (3 mL) subcutaneous insulin pen; Ventolin HFA 90 mcg/actuation inhalation HFA aerosol inhaler; zinc 50 mg oral tablet; Zyrtec 10 mg oral tablet         Allergy List  atorvastatin; Crestor; Lipitor; Lodine; PENICILLINS; pravastatin       Allergies  "Reconciled  Family Medical History  Thyroid disorder; Diabetes         Social History  Alcohol (Never); Tobacco (Never)         Review of Systems  · Cardiovascular  o Denies  o : chest pain on exertion, shortness of breath, lower extremity swelling  · Respiratory  o Denies  o : shortness of breath, coughing up blood  · Gastrointestinal  o Denies  o : chronic abdominal pain      Vitals  Date Time BP Position Site L\R Cuff Size HR RR TEMP (F) WT  HT  BMI kg/m2 BSA m2 O2 Sat        07/22/2020 03:09 PM       12  197lbs 0oz 5'  4\" 33.81 2.01           Physical Examination  · Constitutional  o Appearance  o : well developed, well-nourished, alert and in no acute distress  · Head and Face  o Head  o :   § Inspection  § : no deformities or lesions  · Eyes  o Conjunctivae  o : clear  o Sclerae  o : nonicteric  · Neck  o Inspection/Palpation  o : normal appearance, no masses or tenderness, trachea midline  · Respiratory  o Respiratory Effort  o : breathing unlabored  o Inspection of Chest  o : normal appearance, no retractions  · Cardiovascular  o Heart  o : regular rate and rhythm  · Gastrointestinal  o Abdominal Examination  o :   § Abdomen  § : soft, nontender, nondistended  · Lymphatic  o Neck  o : no lymphadenopathy present  o Axilla  o : no lymphadenopathy present  o Groin  o : no lymphadenopathy present  · Skin and Subcutaneous Tissue  o General Inspection  o : her groin incision does appear to be healing well. She has a small area that is open with some packing. I did put some silver nitrate there but otherwise I am pretty pleased with how this is looking finally.   · Neurologic  o Cranial Nerves  o : grossly intact  o Sensation  o : grossly intact  o Gait and Station  o :   § Gait Screening  § : normal gait, able to stand without diffculty  o Cerebellar Function  o : no obvious abnormalities  · Psychiatric  o Judgement and Insight  o : judgment and insight intact  o Mood and Affect  o : mood normal, affect " appropriate          Assessment  · Postop check     V67.00/Z09    Problems Reconciled  Plan  · Medications  o Medications have been Reconciled  o Transition of Care or Provider Policy     At this point, the patient can continue her local wound care. As long as it continues to heal appropriately, she doesn't have to come back for follow-up with me. I will continue to follow her in conjunction with the wound care nurses. I discussed all of this with the patient. All questions were answered.  She voiced understanding and agreed to proceed with the above plan.             Electronically Signed by: Padmaja Frey-, -Author on July 23, 2020 10:15:26 AM  Electronically Co-signed by: Donato You MD -Reviewer on July 23, 2020 07:49:57 PM

## 2021-05-13 NOTE — PROGRESS NOTES
Progress Note      Patient Name: Mariela Aldrich   Patient ID: [id number]   Sex: Female   YOB: 1970    Primary Care Provider: Berenice THOMAS   Referring Provider: Berenice THOMAS    Visit Date: Manda 3, 2020    Provider: Donato You MD   Location: [facility]   Location Address: [address] [place] [address]  [id number]   Location Phone: [phone]          Chief Complaint  · Follow Up Office Visit      History Of Present Illness  Mariela Aldrich is a 49 year old /White female who presents today for a postoperative visit. She presents today for follow-up after excision of hidradenitis in the right groin. The patient continues to get local wound care. She has some open areas but they are healing. She is getting good granulation tissue. She is not reporting any new symptoms.       Past Medical History  Anxiety; Diabetes mellitus type II, controlled; Hyperlipidemia; Hypothyroidism; Panic attack; Screening Mammogram; Sinus trouble         Past Surgical History  Colonoscopy; Hidradenitis incision and drainage; Hysterectomy; Knee surgery         Medication List  glipizide 10 mg oral tablet; Januvia 100 mg oral tablet; levofloxacin 500 mg oral tablet; lisinopril 5 mg oral tablet; Livalo 2 mg oral tablet; miconazole nitrate 2 % topical aerosol powder; Multi Vitamin oral liquid; Requip XL 2 mg oral tablet extended release 24 hr; ropinirole 2 mg oral tablet extended release 24 hr; sertraline 100 mg oral tablet; Silvadene 1 % topical cream; Symbicort 160-4.5 mcg/actuation inhalation HFA aerosol inhaler; Synjardy XR 10-1,000 mg oral tablet, IR - ER, biphasic 24hr; Synthroid 88 mcg oral tablet; Tresiba FlexTouch U-200 200 unit/mL (3 mL) subcutaneous insulin pen; Ventolin HFA 90 mcg/actuation inhalation HFA aerosol inhaler; Zithromax Z-Justin 250 mg oral tablet; Zyrtec 10 mg oral tablet         Allergy List  atorvastatin; Crestor; Lipitor; Lodine; PENICILLINS; pravastatin  "      Allergies Reconciled  Family Medical History  Thyroid disorder; Diabetes         Social History  Alcohol (Never); Tobacco (Never)         Review of Systems  · Cardiovascular  o Denies  o : chest pain on exertion, shortness of breath, lower extremity swelling  · Respiratory  o Denies  o : shortness of breath, coughing up blood  · Gastrointestinal  o Denies  o : chronic abdominal pain      Vitals  Date Time BP Position Site L\R Cuff Size HR RR TEMP (F) WT  HT  BMI kg/m2 BSA m2 O2 Sat        06/03/2020 09:35 AM       12  203lbs 4oz 5'  4\" 34.89 2.04           Physical Examination  · Constitutional  o Appearance  o : well developed, well-nourished, alert and in no acute distress  · Head and Face  o Head  o :   § Inspection  § : no deformities or lesions  · Eyes  o Conjunctivae  o : clear  o Sclerae  o : nonicteric  · Neck  o Inspection/Palpation  o : normal appearance, no masses or tenderness, trachea midline  · Respiratory  o Respiratory Effort  o : breathing unlabored  o Inspection of Chest  o : normal appearance, no retractions  · Cardiovascular  o Heart  o : regular rate and rhythm  · Gastrointestinal  o Abdominal Examination  o :   § Abdomen  § : abdomen is soft.   · Lymphatic  o Neck  o : no lymphadenopathy present  o Axilla  o : no lymphadenopathy present  o Groin  o : no lymphadenopathy present  · Skin and Subcutaneous Tissue  o General Inspection  o : right groin incision is still open in the mid portion but she has good granulation tissue. She still has a little bit of mucoid drainage from the area. Overall, she appears to be healing relatively well.   · Neurologic  o Cranial Nerves  o : grossly intact  o Sensation  o : grossly intact  o Gait and Station  o :   § Gait Screening  § : normal gait, able to stand without diffculty  o Cerebellar Function  o : no obvious abnormalities  · Psychiatric  o Judgement and Insight  o : judgment and insight intact  o Mood and Affect  o : mood normal, affect " appropriate          Assessment  · Follow-up surgery care     V67.00/Z09       Patient status post excision of hidradenitis in the right groin.     Problems Reconciled  Plan  · Medications  o Medications have been Reconciled  o Transition of Care or Provider Policy     She is getting local wound care. I would prefer if we could to get the wound VAC back on. I think that helped her the most and it also decreases the amount of drainage she has to worry about. If she can't get the VAC back on, she can do local wound care as directed. I will follow-up with her again for reassessment in two weeks. She feels she is not able to adequately work secondary to her wound and I think that is reasonable given the findings today. We will keep her off for another two weeks. I discussed all of this with the patient. All questions were answered.  She voiced understanding and agreed to proceed with the above plan.             Electronically Signed by: Padmaja Frey-, -Author on June 4, 2020 09:05:36 AM  Electronically Co-signed by: Donato You MD -Reviewer on June 4, 2020 10:19:35 AM

## 2021-05-13 NOTE — PROGRESS NOTES
Progress Note      Patient Name: Mariela Aldrich   Patient ID: [id number]   Sex: Female   YOB: 1970    Primary Care Provider: Berenice THOMAS   Referring Provider: Berenice THOMAS    Visit Date: June 17, 2020    Provider: Donato You MD   Location: [facility]   Location Address: [address] [place] [address]  [id number]   Location Phone: [phone]          Chief Complaint  · Follow Up Office Visit      History Of Present Illness  Mariela Aldrich is a 49 year old /White female who presents today for a postoperative visit. She follows-up status post debridement of a right groin wound. The patient has been seen at our outpatient wound care center. She had a VAC previously placed but developed a rash and some irritation from the VAC so she is just getting local wound care with silvadene dressings and is tolerating that well. She seems to be improving. She did see dermatology today and they have restarted her on Humira. Otherwise, she is doing well.       Past Medical History  Anxiety; Diabetes mellitus type II, controlled; Hyperlipidemia; Hypothyroidism; Panic attack; Screening Mammogram; Sinus trouble         Past Surgical History  Colonoscopy; Hidradenitis incision and drainage; Hysterectomy; Knee surgery         Medication List  glipizide 10 mg oral tablet; Januvia 100 mg oral tablet; levofloxacin 500 mg oral tablet; lisinopril 5 mg oral tablet; Livalo 2 mg oral tablet; miconazole nitrate 2 % topical aerosol powder; Multi Vitamin oral liquid; Requip XL 2 mg oral tablet extended release 24 hr; ropinirole 2 mg oral tablet extended release 24 hr; sertraline 100 mg oral tablet; Silvadene 1 % topical cream; Symbicort 160-4.5 mcg/actuation inhalation HFA aerosol inhaler; Synjardy XR 10-1,000 mg oral tablet, IR - ER, biphasic 24hr; Synthroid 88 mcg oral tablet; Tresiba FlexTouch U-200 200 unit/mL (3 mL) subcutaneous insulin pen; Ventolin HFA 90 mcg/actuation inhalation HFA  "aerosol inhaler; Zithromax Z-Justin 250 mg oral tablet; Zyrtec 10 mg oral tablet         Allergy List  atorvastatin; Crestor; Lipitor; Lodine; PENICILLINS; pravastatin       Allergies Reconciled  Family Medical History  Thyroid disorder; Diabetes         Social History  Alcohol (Never); Tobacco (Never)         Review of Systems  · Cardiovascular  o Denies  o : chest pain on exertion, shortness of breath, lower extremity swelling  · Respiratory  o Denies  o : shortness of breath, coughing up blood  · Gastrointestinal  o Denies  o : chronic abdominal pain      Vitals  Date Time BP Position Site L\R Cuff Size HR RR TEMP (F) WT  HT  BMI kg/m2 BSA m2 O2 Sat HC       06/17/2020 12:58 PM       14  203lbs 0oz 5'  4\" 34.84 2.04           Physical Examination  · Constitutional  o Appearance  o : well developed, well-nourished, alert and in no acute distress  · Head and Face  o Head  o :   § Inspection  § : no deformities or lesions  · Eyes  o Conjunctivae  o : clear  o Sclerae  o : nonicteric  · Neck  o Inspection/Palpation  o : normal appearance, no masses or tenderness, trachea midline  · Respiratory  o Respiratory Effort  o : breathing unlabored  o Inspection of Chest  o : normal appearance, no retractions  · Cardiovascular  o Heart  o : regular rate and rhythm  · Gastrointestinal  o Abdominal Examination  o :   § Abdomen  § : abdomen is soft.  · Lymphatic  o Neck  o : no lymphadenopathy present  o Axilla  o : no lymphadenopathy present  o Groin  o : groin incision appears to be well dressed. I have seen pictures previously and she does have some granulation tissue and is healing well.  · Skin and Subcutaneous Tissue  o General Inspection  o : no rashes present, no lesions present, no areas of discoloration  · Neurologic  o Cranial Nerves  o : grossly intact  o Sensation  o : grossly intact  o Gait and Station  o :   § Gait Screening  § : normal gait, able to stand without diffculty  o Cerebellar Function  o : no obvious " abnormalities  · Psychiatric  o Judgement and Insight  o : judgment and insight intact  o Mood and Affect  o : mood normal, affect appropriate              Assessment  · Postoperative follow-up     V67.00/Z09       Patient status post excision of hidradenitis from the right groin.     Problems Reconciled  Plan  · Medications  o Medications have been Reconciled  o Transition of Care or Provider Policy     The patient is doing well and healing as expected. I am pleased with her progress. I think restarting the Humira is the probably the right answer, as we have had stops and starts with the success of her surgical intervention. I discussed all of this with the patient. All questions were answered.  She voiced understanding and agreed to proceed with the above plan.             Electronically Signed by: Padmaja Frey-, -Author on June 18, 2020 09:42:31 AM  Electronically Co-signed by: Donato You MD -Reviewer on June 18, 2020 03:41:16 PM

## 2021-05-14 VITALS
HEART RATE: 89 BPM | HEIGHT: 64 IN | TEMPERATURE: 98.1 F | WEIGHT: 199 LBS | RESPIRATION RATE: 18 BRPM | DIASTOLIC BLOOD PRESSURE: 73 MMHG | BODY MASS INDEX: 33.97 KG/M2 | OXYGEN SATURATION: 97 % | SYSTOLIC BLOOD PRESSURE: 102 MMHG

## 2021-05-14 VITALS
RESPIRATION RATE: 18 BRPM | DIASTOLIC BLOOD PRESSURE: 63 MMHG | WEIGHT: 210 LBS | HEART RATE: 97 BPM | SYSTOLIC BLOOD PRESSURE: 121 MMHG | TEMPERATURE: 98.1 F | BODY MASS INDEX: 35.85 KG/M2 | HEIGHT: 64 IN | OXYGEN SATURATION: 97 %

## 2021-05-14 VITALS
OXYGEN SATURATION: 97 % | BODY MASS INDEX: 36.43 KG/M2 | SYSTOLIC BLOOD PRESSURE: 138 MMHG | TEMPERATURE: 98.1 F | WEIGHT: 213.37 LBS | DIASTOLIC BLOOD PRESSURE: 76 MMHG | RESPIRATION RATE: 24 BRPM | HEIGHT: 64 IN | HEART RATE: 84 BPM

## 2021-05-14 VITALS
TEMPERATURE: 98.3 F | HEIGHT: 64 IN | RESPIRATION RATE: 18 BRPM | HEART RATE: 81 BPM | WEIGHT: 209.5 LBS | SYSTOLIC BLOOD PRESSURE: 120 MMHG | OXYGEN SATURATION: 97 % | DIASTOLIC BLOOD PRESSURE: 69 MMHG | BODY MASS INDEX: 35.77 KG/M2

## 2021-05-14 NOTE — PROGRESS NOTES
Progress Note      Patient Name: Mariela Aldrich   Patient ID: [id number]   Sex: Female   YOB: 1970    Primary Care Provider: Berenice THOMAS   Referring Provider: Berenice THOMAS    Visit Date: January 4, 2021    Provider: WILLIAM Hickman   Location: [facility]   Location Address: [address] [place] [address]  [id number]   Location Phone: [phone]          Chief Complaint  · 3 Month Follow up for Anxiety, Diabetes, Hypothyroidism, Hyperlipidemia, and RLS.      History Of Present Illness  Mariela Aldrich is a 50 year old /White female who presents for evaluation and treatment of:      3 Month Follow up for Anxiety, Diabetes, Hypothyroidism, Hyperlipidemia, and RLS.  Last lab work done 10/1/20  Med refills needed    Pt reported having some sinus drainage and congestion from allergies lately. Taking Zyrtec. pt had covid exposure on x-mas bella.     Dm - pt has joined weight [employer]. Pt reports her bs has been around 172 this morning. pt reports compliance with med. pt has not however titrated insulin as recommend to fasting bs of 130 or less.     RLS - controlled with meds.     Anxiety - pt reports controlled and doing well.     Hyperlipidemia - pt denies any myalgias.           flu shot-10/1/20  Pap- Unsure, since Hyst in 2010  Mammo-12/13/19, scheduled 1/28/21 @ [facility]  Colon- 1/2020  Dexa- 11/25/15       Past Medical History  Disease Name Date Onset Notes   Anxiety --  --    Bone Density Screening 11/25/15 --    Diabetes mellitus type II, controlled --  --    Hyperlipidemia --  --    Hypothyroidism --  --    Panic attack --  --    RLS (restless legs syndrome) 10/01/2020 --    Screening Mammogram 12/13/19 WNL, Repeat in 1 year   Sinus trouble --  --          Past Surgical History  Procedure Name Date Notes   Colonoscopy 01/21/2020 External hemorrhoids   Hidradenitis incision and drainage --  right groin   Hysterectomy 2010 --    Knee surgery  "2013 Bilateral         Medication List  Name Date Started Instructions   Advair Diskus 250-50 mcg/dose inhalation blister with device 01/04/2021 inhale 1 puff by inhalation route 2 times per day in the morning and evening approximately 12 hours apart   albuterol sulfate 90 mcg/actuation inhalation HFA aerosol inhaler 10/06/2020 inhale 1 - 2 puffs (90 - 180 mcg) by inhalation route every 4-6 hours as needed   Blood Glucose Monitoring miscellaneous kit 06/18/2020 use as directed DX: DM E11.9   diclofenac sodium 1 % topical gel 12/18/2020 apply 2 grams to the affected area(s) by topical route 4 times per day   diclofenac sodium 50 mg oral tablet,delayed release (DR/EC) 12/18/2020 take 1 tablet (50 mg) by oral route 2 times per day for 30 days   glipizide 10 mg oral tablet 01/04/2021 TAKE 1 TABLET BY MOUTH TWICE DAILY BEFORE MEAL(S)   Humira 40 mg/0.8 mL subcutaneous syringe kit  inject 0.8 milliliter (40 mg) by subcutaneous route once weekly in the abdomen or thigh (rotate sites)   Januvia 100 mg oral tablet 01/04/2021 Take 1 tablet by mouth once daily for 30 days   Lancets,Ultra Thin 26 gauge miscellaneous misc 06/18/2020 use as directed for 90 days Dx: 250.00   lisinopril 5 mg oral tablet 12/10/2020 TAKE 1 TABLET BY MOUTH ONCE DAILY   Livalo 2 mg oral tablet 01/04/2021 Take 1 tablet by mouth once daily   Multi Vitamin oral liquid  --    Pen Needle 31 gauge x 5/16\" miscellaneous needle 08/10/2020 use as directed with Tresiba Flex Pen (Dx: Diabetes- E11.9)   ropinirole 4 mg oral tablet extended release 24 hr 10/01/2020 take 1 tablet by oral route once a day (at bedtime) for 90 days   sertraline 100 mg oral tablet 01/04/2021 TAKE ONE & ONE-HALF TABLETS BY MOUTH ONCE DAILY   Synjardy XR 12.5-1,000 mg oral tablet, IR - ER, biphasic 24hr 01/04/2021 take 1 tablet by oral route once daily in the morning with a meal for 30 days   Synthroid 88 mcg oral tablet 12/31/2020 Take 1 tablet by mouth once daily   Tresiba FlexTouch " U-200 200 unit/mL (3 mL) subcutaneous insulin pen 01/04/2021 inject 46 units subcutaneous route QD, increase by 1 unit every 3-5 days until fasting bs 130 or less.   zinc 50 mg oral tablet  take 1 tablet by oral route daily   Zyrtec 10 mg oral tablet  take 1 tablet (10 mg) by oral route once daily         Allergy List  Allergen Name Date Reaction Notes   atorvastatin --  --  leg cramps   Crestor --  --  myalgia   Lipitor --  Leg cramps --    Lodine --  --  --    PENICILLINS --  --  --    pravastatin --  Leg cramps --          Family Medical History  Disease Name Relative/Age Notes   Thyroid disorder Brother/43  Father/70  Sister/49   thyroid cancer   Diabetes Brother/  Grandmother (maternal)/  Sister/   --          Social History  Finding Status Start/Stop Quantity Notes   Alcohol Never --/-- --  --    Tobacco Never --/-- --  --          Immunizations  NameDate Admin Mfg Trade Name Lot Number Route Inj VIS Given VIS Publication   Tmhyxudgh72/01/2020 [facility] Fluzone Quadrivalent [id number] IM RD 10/01/2020 08/15/2019   Comments: Injection given. pt tolerated well. NDC 27048-044-52   Xoolzxwae4743/30/2019 NE Not Entered  NE NE 07/30/2019    Comments:    Tdap03/16/2020 NE Not Entered  NE NE     Comments: Administered by [facility]. see scanned injection info         Review of Systems  · Constitutional  o Denies  o : fatigue, fever, weight gain, weight loss, chills  · Cardiovascular  o Denies  o : chest Pain, palpitations, edema (swelling)  · Respiratory  o Denies  o : frequent cough, shortness of breath  · Gastrointestinal  o Denies  o : nausea, vomiting, changes in bowel habits  · Genitourinary  o Denies  o : dysuria, urinary frequency, urinary urgency, polyuria  · Neurologic  o Denies  o : headache, tingling or numbness, dizziness  · Musculoskeletal  o Denies  o : joint pain, myalgias  · Endocrine  o Denies  o : polydipsia, polyphagia  · Psychiatric  o Denies  o : mood changes, memory changes,  "SI/HI  · Allergic-Immunologic  o Admits  o : seasonal allergies  o Denies  o : eczema, urticaria      Vitals  Date Time BP Position Site L\R Cuff Size HR RR TEMP (F) WT  HT  BMI kg/m2 BSA m2 O2 Sat FR L/min FiO2 HC       10/01/2020 07:36 /69 Sitting    81 - R 18 98.3 209lbs 8oz 5'  4\" 35.96 2.07 97 %      12/18/2020 11:24 /76 Sitting    84 - R 24 98.1 213lbs 6oz 5'  4\" 36.63 2.09 97 %      01/04/2021 07:08 /63 Sitting    97 - R 18 98.1 210lbs 0oz 5'  4\" 36.05 2.07 97 %            Physical Examination  · Constitutional  o Appearance  o : well-nourished, in no acute distress  · Eyes  o Conjunctivae  o : conjunctivae normal  o Sclerae  o : sclerae white  o Pupils and Irises  o : pupils equal and round  o Eyelids/Ocular Adnexae  o : eyelid appearance normal, no exudates present  · Ears, Nose, Mouth and Throat  o Ears  o :   § External Ears  § : external auditory canal appearance within normal limits, no discharge present  § Otoscopic Examination  § : tympanic membrane appearance within normal limits bilaterally, cerumen present gordy  o Nose  o :   § External Nose  § : appearance normal  § Intranasal Exam  § : mucosa within normal limits, vestibules normal, no intranasal lesions present, septum midline, sinuses non tender to palpation  § Nasopharynx  § : no discharge present  o Oral Cavity  o :   § Oral Mucosa  § : oral mucosa normal  § Lips  § : lip appearance normal  § Teeth  § : normal dentation for age  o Throat  o :   § Oropharynx  § : no inflammation or lesions present, tonsils within normal limits  · Neck  o Inspection/Palpation  o : normal appearance, no masses or tenderness, trachea midline  o Range of Motion  o : cervical range of motion within normal limits  o Thyroid  o : gland size normal, nontender, no nodules or masses present on palpation  · Respiratory  o Respiratory Effort  o : breathing unlabored  o Inspection of Chest  o : normal appearance  o Auscultation of Lungs  o : normal breath " sounds throughout inspiration and expiration  · Cardiovascular  o Heart  o :   § Auscultation of Heart  § : regular rate and rhythm, no murmurs, gallops or rubs  o Peripheral Vascular System  o :   § Carotid Arteries  § : normal pulses bilaterally, no bruits present  § Extremities  § : no clubbing or edema  · Gastrointestinal  o Abdominal Examination  o : abdomen nontender to palpation, tone normal without rigidity or guarding, no masses present, bowel sounds present  · Skin and Subcutaneous Tissue  o General Inspection  o : no rashes or lesions present, no areas of discoloration  o Body Hair  o : hair normal for age, general body hair distribution normal for age  o Digits and Nails  o : no clubbing, cyanosis, deformities or edema present, normal appearing nails  · Neurologic  o Mental Status Examination  o :   § Orientation  § : grossly oriented to person, place and time  o Gait and Station  o : normal gait, able to stand without difficulty  · Psychiatric  o Judgement and Insight  o : judgment and insight intact  o Mood and Affect  o : mood normal, affect appropriate          Assessment  · Anxiety disorder     300.00/F41.9  · Type 2 diabetes mellitus with hyperglycemia, with long-term current use of insulin       Type 2 diabetes mellitus with hyperglycemia     250.00/E11.65  Long term (current) use of insulin     250.00/Z79.4  · Hyperlipidemia     272.4/E78.5  · Hypothyroidism     244.9/E03.9  · RLS (restless legs syndrome)     333.94/G25.81      Plan  · Orders  o Diabetes 1 Panel (CMP, Lipid, A1c) [facility] (10220, 97958, 71533) - - 01/04/2021  o Thyroid Profile (53644, 03912, THYII) - 244.9/E03.9 - 01/04/2021  o ACO-14: Influenza immunization administered or previously received [facility] () - - 01/04/2021  o ACO-20: Screening Mammography documented and reviewed [facility] () - - 01/04/2021  · Medications  o glipizide 10 mg oral tablet   SIG: TAKE 1 TABLET BY MOUTH TWICE DAILY BEFORE MEAL(S)   DISP: (180) Tablet with 1  refills  Adjusted on 01/04/2021     o Januvia 100 mg oral tablet   SIG: Take 1 tablet by mouth once daily for 30 days   DISP: (90) Tablet with 1 refills  Adjusted on 01/04/2021     o Livalo 2 mg oral tablet   SIG: Take 1 tablet by mouth once daily   DISP: (30) Tablet with 5 refills  Adjusted on 01/04/2021     o Tresiba FlexTouch U-200 200 unit/mL (3 mL) subcutaneous insulin pen   SIG: inject 46 units subcutaneous route QD, increase by 1 unit every 3-5 days until fasting bs 130 or less.   DISP: (3) Pen Needle with 2 refills  Adjusted on 01/04/2021     o Advair Diskus 250-50 mcg/dose inhalation blister with device   SIG: inhale 1 puff by inhalation route 2 times per day in the morning and evening approximately 12 hours apart   DISP: (1) Each with 5 refills  Refilled on 01/04/2021     o sertraline 100 mg oral tablet   SIG: TAKE ONE & ONE-HALF TABLETS BY MOUTH ONCE DAILY   DISP: (135) Tablet with 1 refills  Refilled on 01/04/2021     o Synjardy XR 12.5-1,000 mg oral tablet, IR - ER, biphasic 24hr   SIG: take 1 tablet by oral route once daily in the morning with a meal for 30 days   DISP: (30) Tablet with 2 refills  Refilled on 01/04/2021     o Medications have been Reconciled  o Transition of Care or Provider Policy  · Instructions  o Continue blood sugar monitoring daily and record. Bring your log to office visits. Call the office for readings below 70 and above 250 or any complications.  o Daily foot care. Avoid walking barefoot. Annual Dilated Eye Exam.  o Discussed with patient blood pressure monitoring, hemoglobin A1C levels need to be below 7.0, and LDL (Lipid) goals below 70.  o Advised that cheeses and other sources of dairy fats, animal fats, fast food, and the extras (candy, pastries, pies, doughnuts and cookies) all contain LDL raising nutrients. Advised to increase fruits, vegetables, whole grains, and to monitor portion sizes.   o Patient instructed/educated on their diet and exercise program.  o Patient  was educated/instructed on their diagnosis, treatment and medications prior to discharge from the clinic today.  o Call the office with any concerns or questions.  · Disposition  o Return to Office in 3 months.            Electronically Signed by: WILLIAM Hickman - on January 4, 2021 08:13:45 AM

## 2021-05-14 NOTE — PROGRESS NOTES
Progress Note      Patient Name: Mariela Aldrich   Patient ID: [id number]   Sex: Female   YOB: 1970    Primary Care Provider: Berenice THOMAS   Referring Provider: Berenice THOMAS    Visit Date: March 31, 2021    Provider: WILLIAM Hickman   Location: [facility]   Location Address: [address] [place] [address]  [id number]   Location Phone: [phone]          Chief Complaint  · 3 Month Follow up for Anxiety, Diabetes, Hypothyroidism, Hyperlipidemia, and RLS.      History Of Present Illness  Mariela Aldrich is a 50 year old /White female who presents for evaluation and treatment of:      3 Month Follow up for Anxiety, Diabetes, Hypothyroidism, Hyperlipidemia, and RLS.  Last lab work done 1/4/21  Med refills needed   Pt brought in Blood sugar log, Wants to talk about her sugars.  Pt has had approx. 3 low blood sugars in the 60's. pt reports waking in the middle of the night.   Pt has made dietary changes and is doing weight watchers. over 10 lb weight loss.   Pt reports compliance with medications.     b/p wnl.    RLS - controlled except when she does extra walking at night.     flu shot-10/1/20  Pap-UKN since Hyst in 2010  Mammo- 1/28/21   Colon- 1/2020  Dexa- 11/25/15       Past Medical History  Disease Name Date Onset Notes   Anxiety --  --    Bone Density Screening 11/25/15 --    Diabetes mellitus type II, controlled --  --    Hyperlipidemia --  --    Hypothyroidism --  --    Panic attack --  --    RLS (restless legs syndrome) 10/01/2020 --    Screening Mammogram 1/28/21 WNL, Repeat in 1 year   Sinus trouble --  --          Past Surgical History  Procedure Name Date Notes   Colonoscopy 01/21/2020 External hemorrhoids   Hidradenitis incision and drainage --  right groin   Hysterectomy 2010 --    Knee surgery 2013 Bilateral         Medication List  Name Date Started Instructions   Advair Diskus 250-50 mcg/dose inhalation blister with device  "01/04/2021 inhale 1 puff by inhalation route 2 times per day in the morning and evening approximately 12 hours apart   albuterol sulfate 90 mcg/actuation inhalation HFA aerosol inhaler 10/06/2020 inhale 1 - 2 puffs (90 - 180 mcg) by inhalation route every 4-6 hours as needed   Blood Glucose Monitoring miscellaneous kit 06/18/2020 use as directed DX: DM E11.9   diclofenac sodium 1 % topical gel 12/18/2020 apply 2 grams to the affected area(s) by topical route 4 times per day   diclofenac sodium 50 mg oral tablet,delayed release (DR/EC) 12/18/2020 take 1 tablet (50 mg) by oral route 2 times per day for 30 days   glipizide 10 mg oral tablet 03/31/2021 take 1 tablet (10 mg) by oral route 2 times per day before meals for 90 days   Humira 40 mg/0.8 mL subcutaneous syringe kit  inject 0.8 milliliter (40 mg) by subcutaneous route once weekly in the abdomen or thigh (rotate sites)   Januvia 100 mg oral tablet 01/04/2021 Take 1 tablet by mouth once daily for 30 days   Lancets,Ultra Thin 26 gauge miscellaneous misc 06/18/2020 use as directed for 90 days Dx: 250.00   lisinopril 5 mg oral tablet 12/10/2020 TAKE 1 TABLET BY MOUTH ONCE DAILY   Livalo 2 mg oral tablet 01/04/2021 Take 1 tablet by mouth once daily   Multi Vitamin oral liquid  --    Pen Needle 31 gauge x 5/16\" miscellaneous needle 08/10/2020 use as directed with Tresiba Flex Pen (Dx: Diabetes- E11.9)   ropinirole 4 mg oral tablet extended release 24 hr 10/01/2020 take 1 tablet by oral route once a day (at bedtime) for 90 days   sertraline 100 mg oral tablet 01/04/2021 TAKE ONE & ONE-HALF TABLETS BY MOUTH ONCE DAILY   Synjardy XR 12.5-1,000 mg oral tablet, IR - ER, biphasic 24hr 01/04/2021 take 1 tablet by oral route once daily in the morning with a meal for 30 days   Synthroid 88 mcg oral tablet 12/31/2020 Take 1 tablet by mouth once daily   Tresiba FlexTouch U-200 200 unit/mL (3 mL) subcutaneous insulin pen 03/31/2021 inject 42 units subcutaneous route QD.   zinc 50 " mg oral tablet  take 1 tablet by oral route daily   Zyrtec 10 mg oral tablet  take 1 tablet (10 mg) by oral route once daily         Allergy List  Allergen Name Date Reaction Notes   atorvastatin --  --  leg cramps   Crestor --  --  myalgia   Lipitor --  Leg cramps --    Lodine --  --  --    PENICILLINS --  --  --    pravastatin --  Leg cramps --          Family Medical History  Disease Name Relative/Age Notes   Thyroid disorder Brother/43  Father/70  Sister/49   thyroid cancer   Diabetes Brother/  Grandmother (maternal)/  Sister/   --          Social History  Finding Status Start/Stop Quantity Notes   Alcohol Never --/-- --  --    Tobacco Never --/-- --  --          Immunizations  NameDate Admin Mfg Trade Name Lot Number Route Inj VIS Given VIS Publication   Wjzcppgzw58/01/2020 PMC Fluzone Quadrivalent [id number] IM RD 10/01/2020 08/15/2019   Comments: Injection given. pt tolerated well. NDC 28298-517-22   Beyurbjco3328/30/2019 NE Not Entered  NE NE 07/30/2019    Comments:    Tdap03/16/2020 NE Not Entered  NE NE     Comments: Administered by [facility]. see scanned injection info         Review of Systems  · Constitutional  o Admits  o : weight loss  o Denies  o : fatigue, fever, weight gain, chills  · Cardiovascular  o Denies  o : chest Pain, palpitations, edema (swelling)  · Respiratory  o Denies  o : frequent cough, shortness of breath  · Gastrointestinal  o Denies  o : nausea, vomiting, changes in bowel habits  · Genitourinary  o Denies  o : dysuria, urinary frequency, urinary urgency, polyuria  · Neurologic  o Denies  o : headache, tingling or numbness, dizziness  · Musculoskeletal  o Denies  o : joint pain, myalgias  · Endocrine  o Admits  o : low blood sugar  o Denies  o : polydipsia, polyphagia  · Psychiatric  o Denies  o : mood changes, memory changes, SI/HI      Vitals  Date Time BP Position Site L\R Cuff Size HR RR TEMP (F) WT  HT  BMI kg/m2 BSA m2 O2 Sat FR L/min FiO2 HC       12/18/2020 11:24 AM  "138/76 Sitting    84 - R 24 98.1 213lbs 6oz 5'  4\" 36.63 2.09 97 %      01/04/2021 07:08 /63 Sitting    97 - R 18 98.1 210lbs 0oz 5'  4\" 36.05 2.07 97 %      03/31/2021 07:08 /73 Sitting    89 - R 18 98.1 199lbs 0oz 5'  4\" 34.16 2.02 97 %            Physical Examination  · Constitutional  o Appearance  o : well-nourished, in no acute distress  · Eyes  o Conjunctivae  o : conjunctivae normal  o Sclerae  o : sclerae white  o Pupils and Irises  o : pupils equal and round  o Eyelids/Ocular Adnexae  o : eyelid appearance normal, no exudates present  · Neck  o Inspection/Palpation  o : normal appearance, no masses or tenderness, trachea midline  o Range of Motion  o : cervical range of motion within normal limits  o Thyroid  o : gland size normal, nontender, no nodules or masses present on palpation  · Respiratory  o Respiratory Effort  o : breathing unlabored  o Inspection of Chest  o : normal appearance  o Auscultation of Lungs  o : normal breath sounds throughout inspiration and expiration  · Cardiovascular  o Heart  o :   § Auscultation of Heart  § : regular rate and rhythm, no murmurs, gallops or rubs  o Peripheral Vascular System  o :   § Carotid Arteries  § : normal pulses bilaterally, no bruits present  § Extremities  § : no clubbing or edema  · Gastrointestinal  o Abdominal Examination  o : abdomen nontender to palpation, tone normal without rigidity or guarding, no masses present, bowel sounds present  · Skin and Subcutaneous Tissue  o General Inspection  o : no rashes or lesions present, no areas of discoloration  o Body Hair  o : hair normal for age, general body hair distribution normal for age  o Digits and Nails  o : no clubbing, cyanosis, deformities or edema present, normal appearing nails  · Neurologic  o Mental Status Examination  o :   § Orientation  § : grossly oriented to person, place and time  o Gait and Station  o : normal gait, able to stand without " difficulty  · Psychiatric  o Judgement and Insight  o : judgment and insight intact  o Mood and Affect  o : mood normal, affect appropriate          Assessment  · Anxiety disorder     300.00/F41.9  · Type 2 diabetes mellitus with hyperglycemia, with long-term current use of insulin       Type 2 diabetes mellitus with hyperglycemia     250.00/E11.65  Long term (current) use of insulin     250.00/Z79.4  · Hyperlipidemia     272.4/E78.5  · RLS (restless legs syndrome)     333.94/G25.81      Plan  · Orders  o Diabetes 1 Panel (CMP, Lipid, A1c) [facility] (72371, 82163, 94823) - - 03/31/2021  o ACO-14: Influenza immunization administered or previously received [facility] () - - 03/31/2021  o ACO-20: Screening Mammography documented and reviewed [facility] () - - 03/31/2021  o ACO-19: Colorectal cancer screening results documented and reviewed (3017F) - - 03/31/2021  o ACO-39: Current medications updated and reviewed (, 1159F) - - 03/31/2021  · Medications  o glipizide 10 mg oral tablet   SIG: take 1 tablet (10 mg) by oral route 2 times per day before meals for 90 days   DISP: (180) Tablet with 1 refills  Adjusted on 03/31/2021     o Tresiba FlexTouch U-200 200 unit/mL (3 mL) subcutaneous insulin pen   SIG: inject 42 units subcutaneous route QD.   DISP: (3) Pen Needle with 2 refills  Adjusted on 03/31/2021     o Medications have been Reconciled  o Transition of Care or Provider Policy  · Instructions  o Patient was given an SSRI/SSNRI medication and warned of possible side effects of the medication including potential for increased risk of suicidal thoughts and feelings. Patient was instructed that if they begin to exhibit any of these effects they will discontinue the medication immediately and contact our office or the ER ASAP.  o Continue blood sugar monitoring daily and record. Bring your log to office visits. Call the office for readings below 70 and above 250 or any complications.  o Daily foot care. Avoid walking  barefoot. Annual Dilated Eye Exam.  o Discussed with patient blood pressure monitoring, hemoglobin A1C levels need to be below 7.0, and LDL (Lipid) goals below 70.  o Advised that cheeses and other sources of dairy fats, animal fats, fast food, and the extras (candy, pastries, pies, doughnuts and cookies) all contain LDL raising nutrients. Advised to increase fruits, vegetables, whole grains, and to monitor portion sizes.   o Patient is taking medications as prescribed and doing well.   o Patient was educated/instructed on their diagnosis, treatment and medications prior to discharge from the clinic today.  o Call the office with any concerns or questions.  · Disposition  o Return to Office in 3 months.            Electronically Signed by: WILLIAM Hickman -Author on March 31, 2021 08:08:58 AM

## 2021-05-14 NOTE — PROGRESS NOTES
"   Progress Note      Patient Name: Mariela Aldrich   Patient ID: [id number]   Sex: Female   YOB: 1970    Primary Care Provider: Berenice THOMAS   Referring Provider: Berenice THOMAS    Visit Date: December 18, 2020    Provider: WILLIAM Hickman   Location: [facility]   Location Address: [address] [place] [address]  [id number]   Location Phone: [phone]          Chief Complaint  · Acute Visit for Right Hand/Wrist Pain      History Of Present Illness  Mariela Aldrich is a 50 year old /White female who presents for evaluation and treatment of:      Acute Visit for Right Hand/Wrist Pain  Has been going on for 1 month.  Pain with movement and thumb will \"click\".    Pt has been painting and reports pain is worse since doing this. brace has helped some. pt has not taken any otc meds.           Past Medical History  Disease Name Date Onset Notes   Anxiety --  --    Diabetes mellitus type II, controlled --  --    Hyperlipidemia --  --    Hypothyroidism --  --    Panic attack --  --    RLS (restless legs syndrome) 10/01/2020 --    Screening Mammogram 12/13/19 WNL, Repeat in 1 year   Sinus trouble --  --          Past Surgical History  Procedure Name Date Notes   Colonoscopy 01/21/2020 External hemorrhoids   Hidradenitis incision and drainage --  right groin   Hysterectomy 2010 --    Knee surgery 2013 Bilateral         Medication List  Name Date Started Instructions   Advair Diskus 250-50 mcg/dose inhalation blister with device 06/18/2020 inhale 1 puff by inhalation route 2 times per day in the morning and evening approximately 12 hours apart   albuterol sulfate 90 mcg/actuation inhalation HFA aerosol inhaler 10/06/2020 inhale 1 - 2 puffs (90 - 180 mcg) by inhalation route every 4-6 hours as needed   Blood Glucose Monitoring miscellaneous kit 06/18/2020 use as directed DX: DM E11.9   glipizide 10 mg oral tablet 12/14/2020 TAKE 1 TABLET BY MOUTH TWICE " "DAILY BEFORE MEAL(S)   Humira 40 mg/0.8 mL subcutaneous syringe kit  inject 0.8 milliliter (40 mg) by subcutaneous route once weekly in the abdomen or thigh (rotate sites)   Januvia 100 mg oral tablet 12/14/2020 Take 1 tablet by mouth once daily for 30 days   Lancets,Ultra Thin 26 gauge miscellaneous misc 06/18/2020 use as directed for 90 days Dx: 250.00   lisinopril 5 mg oral tablet 12/10/2020 TAKE 1 TABLET BY MOUTH ONCE DAILY   Livalo 2 mg oral tablet 06/18/2020 take 1 tablet (2 mg) by oral route once daily for 30 days   Multi Vitamin oral liquid  --    Pen Needle 31 gauge x 5/16\" miscellaneous needle 08/10/2020 use as directed with Tresiba Flex Pen (Dx: Diabetes- E11.9)   ropinirole 4 mg oral tablet extended release 24 hr 10/01/2020 take 1 tablet by oral route once a day (at bedtime) for 90 days   sertraline 100 mg oral tablet 06/18/2020 TAKE ONE & ONE-HALF TABLETS BY MOUTH ONCE DAILY   Synjardy XR 12.5-1,000 mg oral tablet, IR - ER, biphasic 24hr 10/01/2020 take 1 tablet by oral route once daily in the morning with a meal for 30 days   Synthroid 88 mcg oral tablet 10/19/2020 Take 1 tablet by mouth once daily   Tresiba FlexTouch U-200 200 unit/mL (3 mL) subcutaneous insulin pen 10/01/2020 inject 42 units subcutaneous route QD, increase by 1 unit every 35 days until fasting bs 130 or less.   Wixela Inhub 250-50 MCG/DOSE Inhalation Aerosol Powder Breath Activated 12/16/2020 INHALE 1 PUFF BY MOUTH TWICE DAILY IN THE MORNING AND EVENING APPROXIMATELY 12 HOURS APART.   zinc 50 mg oral tablet  take 1 tablet by oral route daily   Zyrtec 10 mg oral tablet  take 1 tablet (10 mg) by oral route once daily         Allergy List  Allergen Name Date Reaction Notes   atorvastatin --  --  leg cramps   Crestor --  --  myalgia   Lipitor --  Leg cramps --    Lodine --  --  --    PENICILLINS --  --  --    pravastatin --  Leg cramps --          Family Medical History  Disease Name Relative/Age Notes   Thyroid disorder " "Brother/43  Father/70  Sister/49   thyroid cancer   Diabetes Brother/  Grandmother (maternal)/  Sister/   --          Social History  Finding Status Start/Stop Quantity Notes   Alcohol Never --/-- --  --    Tobacco Never --/-- --  --          Immunizations  NameDate Admin Mfg Trade Name Lot Number Route Inj VIS Given VIS Publication   Seuexqhng07/01/2020 PMC Fluzone Quadrivalent [id number] IM RD 10/01/2020 08/15/2019   Comments: Injection given. pt tolerated well. NDC 95434-658-30   Cflldfoyb3869/30/2019 NE Not Entered  NE NE 07/30/2019    Comments:    Tdap03/16/2020 NE Not Entered  NE NE     Comments: Administered by [facility]. see scanned injection info         Review of Systems  · Constitutional  o Denies  o : fever, fatigue, weight loss, weight gain  · Cardiovascular  o Denies  o : lower extremity edema, claudication, chest pressure, palpitations  · Respiratory  o Denies  o : shortness of breath, wheezing, frequent cough, hemoptysis, dyspnea on exertion  · Gastrointestinal  o Denies  o : nausea, vomiting, diarrhea, constipation, abdominal pain  · Musculoskeletal  o Admits  o : wrist pain  o Denies  o : joint pain, myalgias      Vitals  Date Time BP Position Site L\R Cuff Size HR RR TEMP (F) WT  HT  BMI kg/m2 BSA m2 O2 Sat FR L/min FiO2 HC       10/01/2020 07:36 /69 Sitting    81 - R 18 98.3 209lbs 8oz 5'  4\" 35.96 2.07 97 %      12/18/2020 11:24 /76 Sitting    84 - R 24 98.1 213lbs 6oz 5'  4\" 36.63 2.09 97 %            Physical Examination  · Constitutional  o Appearance  o : well-nourished, in no acute distress  · Eyes  o Conjunctivae  o : conjunctivae normal  o Sclerae  o : sclerae white  o Pupils and Irises  o : pupils equal and round  o Eyelids/Ocular Adnexae  o : eyelid appearance normal, no exudates present  · Respiratory  o Respiratory Effort  o : breathing unlabored  o Inspection of Chest  o : normal appearance  o Auscultation of Lungs  o : normal breath sounds throughout inspiration " and expiration  · Cardiovascular  o Heart  o :   § Auscultation of Heart  § : regular rate and rhythm, no murmurs, gallops or rubs  · Musculoskeletal  o Right Upper Extremity  o : tenderness to palp on radial aspect of wrist. increased pain with thumb extention  · Skin and Subcutaneous Tissue  o General Inspection  o : no rashes or lesions present, no areas of discoloration  o Body Hair  o : hair normal for age, general body hair distribution normal for age  o Digits and Nails  o : no clubbing, cyanosis, deformities or edema present, normal appearing nails  · Neurologic  o Mental Status Examination  o :   § Orientation  § : grossly oriented to person, place and time  o Gait and Station  o : normal gait, able to stand without difficulty  · Psychiatric  o Judgement and Insight  o : judgment and insight intact  o Mood and Affect  o : mood normal, affect appropriate          Assessment  · Screening for depression     V79.0/Z13.89  · Visit for screening mammogram     V76.12/Z12.31  · Hand pain, right     729.5/M79.641  · Wrist pain     719.43/M25.539  · Arthralgia     719.40/M25.50  recommend continue to wear brace and rest wrist.  · Tendinopathy     727.9/M67.90      Plan  · Orders  o Annual depression screening, 15 minutes (, 80351) - V79.0/Z13.89 - 12/18/2020  o ACO-18: Negative screen for clinical depression using a standardized tool () - V79.0/Z13.89 - 12/18/2020  o Screening Mammography; Bilateral 3D (05232, 90766, ) - V76.12/Z12.31 - 12/18/2020  o ACO-39: Current medications updated and reviewed (1159F, ) - - 12/18/2020  o ACO-18: Negative screen for clinical depression using a standardized tool () - - 12/18/2020  o Xray wrist 3v right [facility] ([id number]) - 719.43/M25.539 - 12/18/2020  o Uric Acid Serum [facility] (67290) - - 12/18/2020  · Medications  o diclofenac sodium 50 mg oral tablet,delayed release (DR/EC)   SIG: take 1 tablet (50 mg) by oral route 2 times per day for 30 days    DISP: (60) Tablet with 0 refills  Prescribed on 12/18/2020     o diclofenac sodium 1 % topical gel   SIG: apply 2 grams to the affected area(s) by topical route 4 times per day   DISP: (1) Tube with 0 refills  Prescribed on 12/18/2020     o Medications have been Reconciled  o Transition of Care or Provider Policy  · Instructions  o Depression Screen completed and scanned into the EMR under the designated folder within the patient's documents.  o Today's PHQ-9 result is __0_  o Patient was educated/instructed on their diagnosis, treatment and medications prior to discharge from the clinic today.  o Call the office with any concerns or questions.  · Disposition  o Follow up pending results.            Electronically Signed by: WILLIAM Hickman -Author on December 18, 2020 11:36:05 AM

## 2021-05-15 VITALS — RESPIRATION RATE: 16 BRPM | HEIGHT: 64 IN | WEIGHT: 200 LBS | BODY MASS INDEX: 34.15 KG/M2

## 2021-05-15 VITALS — WEIGHT: 197 LBS | BODY MASS INDEX: 33.63 KG/M2 | RESPIRATION RATE: 12 BRPM | HEIGHT: 64 IN

## 2021-05-15 VITALS — WEIGHT: 203 LBS | HEIGHT: 64 IN | RESPIRATION RATE: 14 BRPM | BODY MASS INDEX: 34.66 KG/M2

## 2021-05-15 VITALS — BODY MASS INDEX: 34.66 KG/M2 | WEIGHT: 203 LBS | RESPIRATION RATE: 14 BRPM | HEIGHT: 64 IN

## 2021-05-15 VITALS — RESPIRATION RATE: 12 BRPM | BODY MASS INDEX: 34.7 KG/M2 | WEIGHT: 203.25 LBS | HEIGHT: 64 IN

## 2021-05-15 VITALS
HEART RATE: 84 BPM | DIASTOLIC BLOOD PRESSURE: 76 MMHG | BODY MASS INDEX: 34.66 KG/M2 | OXYGEN SATURATION: 98 % | RESPIRATION RATE: 24 BRPM | SYSTOLIC BLOOD PRESSURE: 115 MMHG | WEIGHT: 203 LBS | HEIGHT: 64 IN | TEMPERATURE: 99.4 F

## 2021-05-15 VITALS
BODY MASS INDEX: 34.15 KG/M2 | SYSTOLIC BLOOD PRESSURE: 115 MMHG | DIASTOLIC BLOOD PRESSURE: 69 MMHG | OXYGEN SATURATION: 96 % | WEIGHT: 200 LBS | HEART RATE: 93 BPM | HEIGHT: 64 IN | RESPIRATION RATE: 18 BRPM

## 2021-05-15 VITALS — BODY MASS INDEX: 34.31 KG/M2 | HEIGHT: 64 IN | RESPIRATION RATE: 16 BRPM | WEIGHT: 201 LBS

## 2021-05-15 VITALS — HEIGHT: 64 IN | BODY MASS INDEX: 34.15 KG/M2 | RESPIRATION RATE: 16 BRPM | WEIGHT: 200 LBS

## 2021-05-15 VITALS — HEIGHT: 64 IN | RESPIRATION RATE: 16 BRPM | WEIGHT: 198.5 LBS | BODY MASS INDEX: 33.89 KG/M2

## 2021-05-15 VITALS — HEIGHT: 64 IN | BODY MASS INDEX: 33.97 KG/M2 | WEIGHT: 199 LBS | RESPIRATION RATE: 14 BRPM

## 2021-05-15 VITALS — HEIGHT: 64 IN | RESPIRATION RATE: 14 BRPM | WEIGHT: 198 LBS | BODY MASS INDEX: 33.8 KG/M2

## 2021-05-15 VITALS — WEIGHT: 203 LBS | HEIGHT: 64 IN | RESPIRATION RATE: 16 BRPM | BODY MASS INDEX: 34.66 KG/M2

## 2021-05-15 VITALS — WEIGHT: 199.25 LBS | HEIGHT: 64 IN | RESPIRATION RATE: 12 BRPM | BODY MASS INDEX: 34.02 KG/M2

## 2021-05-15 VITALS
WEIGHT: 197.5 LBS | HEIGHT: 64 IN | BODY MASS INDEX: 33.72 KG/M2 | SYSTOLIC BLOOD PRESSURE: 106 MMHG | HEART RATE: 80 BPM | OXYGEN SATURATION: 98 % | TEMPERATURE: 99.1 F | RESPIRATION RATE: 18 BRPM | DIASTOLIC BLOOD PRESSURE: 75 MMHG

## 2021-05-15 VITALS — BODY MASS INDEX: 34.31 KG/M2 | RESPIRATION RATE: 14 BRPM | WEIGHT: 201 LBS | HEIGHT: 64 IN

## 2021-05-15 VITALS — BODY MASS INDEX: 33.97 KG/M2 | RESPIRATION RATE: 12 BRPM | HEIGHT: 64 IN | WEIGHT: 199 LBS

## 2021-05-15 VITALS — HEIGHT: 64 IN | RESPIRATION RATE: 12 BRPM | WEIGHT: 198 LBS | BODY MASS INDEX: 33.8 KG/M2

## 2021-05-16 VITALS — RESPIRATION RATE: 16 BRPM | BODY MASS INDEX: 32.78 KG/M2 | WEIGHT: 192 LBS | HEIGHT: 64 IN

## 2021-05-16 VITALS
SYSTOLIC BLOOD PRESSURE: 96 MMHG | HEART RATE: 102 BPM | TEMPERATURE: 97.7 F | HEIGHT: 66 IN | WEIGHT: 186 LBS | RESPIRATION RATE: 21 BRPM | BODY MASS INDEX: 29.89 KG/M2 | DIASTOLIC BLOOD PRESSURE: 73 MMHG | OXYGEN SATURATION: 97 %

## 2021-05-16 VITALS — WEIGHT: 183 LBS | RESPIRATION RATE: 16 BRPM | HEIGHT: 64 IN | BODY MASS INDEX: 31.24 KG/M2

## 2021-05-16 VITALS
HEART RATE: 101 BPM | WEIGHT: 183 LBS | OXYGEN SATURATION: 99 % | TEMPERATURE: 98.6 F | DIASTOLIC BLOOD PRESSURE: 83 MMHG | SYSTOLIC BLOOD PRESSURE: 122 MMHG | BODY MASS INDEX: 31.24 KG/M2 | RESPIRATION RATE: 20 BRPM | HEIGHT: 64 IN

## 2021-05-16 VITALS
HEIGHT: 64 IN | DIASTOLIC BLOOD PRESSURE: 73 MMHG | BODY MASS INDEX: 32.95 KG/M2 | SYSTOLIC BLOOD PRESSURE: 122 MMHG | RESPIRATION RATE: 23 BRPM | OXYGEN SATURATION: 98 % | TEMPERATURE: 97.7 F | WEIGHT: 193 LBS

## 2021-05-16 VITALS
WEIGHT: 197 LBS | TEMPERATURE: 98.3 F | HEIGHT: 64 IN | DIASTOLIC BLOOD PRESSURE: 66 MMHG | BODY MASS INDEX: 33.63 KG/M2 | RESPIRATION RATE: 30 BRPM | SYSTOLIC BLOOD PRESSURE: 91 MMHG | OXYGEN SATURATION: 98 % | HEART RATE: 112 BPM

## 2021-05-16 VITALS — BODY MASS INDEX: 31.24 KG/M2 | WEIGHT: 183 LBS | RESPIRATION RATE: 16 BRPM | HEIGHT: 64 IN

## 2021-05-16 VITALS
BODY MASS INDEX: 32.27 KG/M2 | HEIGHT: 64 IN | HEART RATE: 93 BPM | OXYGEN SATURATION: 98 % | WEIGHT: 189 LBS | SYSTOLIC BLOOD PRESSURE: 115 MMHG | TEMPERATURE: 98.1 F | RESPIRATION RATE: 21 BRPM | DIASTOLIC BLOOD PRESSURE: 76 MMHG

## 2021-05-16 VITALS
RESPIRATION RATE: 21 BRPM | TEMPERATURE: 97.6 F | BODY MASS INDEX: 32.78 KG/M2 | OXYGEN SATURATION: 97 % | DIASTOLIC BLOOD PRESSURE: 74 MMHG | HEIGHT: 64 IN | SYSTOLIC BLOOD PRESSURE: 118 MMHG | WEIGHT: 192 LBS | HEART RATE: 81 BPM

## 2021-06-07 RX ORDER — LISINOPRIL 5 MG/1
TABLET ORAL
Qty: 90 TABLET | Refills: 0 | Status: SHIPPED | OUTPATIENT
Start: 2021-06-07 | End: 2021-09-13

## 2021-06-23 PROBLEM — F41.9 ANXIETY: Status: ACTIVE | Noted: 2021-06-23

## 2021-06-23 PROBLEM — E78.5 HYPERLIPIDEMIA: Status: ACTIVE | Noted: 2021-06-23

## 2021-06-23 PROBLEM — E11.9 DIABETES MELLITUS TYPE II, CONTROLLED: Status: ACTIVE | Noted: 2021-06-23

## 2021-06-23 PROBLEM — J34.9 SINUS TROUBLE: Status: ACTIVE | Noted: 2021-06-23

## 2021-06-23 PROBLEM — F41.0 PANIC ATTACK: Status: ACTIVE | Noted: 2021-06-23

## 2021-06-23 PROBLEM — G25.81 RLS (RESTLESS LEGS SYNDROME): Status: ACTIVE | Noted: 2020-10-01

## 2021-06-23 PROBLEM — E03.9 HYPOTHYROIDISM: Status: ACTIVE | Noted: 2021-06-23

## 2021-06-23 RX ORDER — LEVOTHYROXINE SODIUM 88 UG/1
TABLET ORAL
COMMUNITY
Start: 2021-04-12 | End: 2021-07-19

## 2021-06-23 RX ORDER — ADALIMUMAB 40MG/0.8ML
0.8 KIT SUBCUTANEOUS
COMMUNITY

## 2021-06-23 RX ORDER — EMPAGLIFLOZIN, METFORMIN HYDROCHLORIDE 12.5; 1 MG/1; MG/1
TABLET, EXTENDED RELEASE ORAL
COMMUNITY
Start: 2021-05-28 | End: 2021-10-12

## 2021-06-23 RX ORDER — SERTRALINE HYDROCHLORIDE 100 MG/1
100 TABLET, FILM COATED ORAL DAILY
COMMUNITY
End: 2021-12-17

## 2021-06-23 RX ORDER — ROPINIROLE 4 MG/1
4 TABLET, FILM COATED ORAL NIGHTLY
COMMUNITY
End: 2021-11-15 | Stop reason: SDUPTHER

## 2021-06-23 RX ORDER — ZINC GLUCONATE 50 MG
TABLET ORAL
COMMUNITY

## 2021-06-23 RX ORDER — DIPHENOXYLATE HYDROCHLORIDE AND ATROPINE SULFATE 2.5; .025 MG/1; MG/1
TABLET ORAL
COMMUNITY

## 2021-06-23 RX ORDER — CETIRIZINE HYDROCHLORIDE 10 MG/1
TABLET ORAL
COMMUNITY

## 2021-06-23 RX ORDER — ALBUTEROL SULFATE 90 UG/1
2 AEROSOL, METERED RESPIRATORY (INHALATION) EVERY 4 HOURS PRN
COMMUNITY

## 2021-06-23 RX ORDER — GLIPIZIDE 10 MG/1
10 TABLET ORAL
COMMUNITY
End: 2021-12-17

## 2021-06-30 ENCOUNTER — OFFICE VISIT (OUTPATIENT)
Dept: FAMILY MEDICINE CLINIC | Facility: CLINIC | Age: 51
End: 2021-06-30

## 2021-06-30 VITALS
WEIGHT: 202 LBS | HEIGHT: 64 IN | BODY MASS INDEX: 34.49 KG/M2 | TEMPERATURE: 98.4 F | OXYGEN SATURATION: 97 % | SYSTOLIC BLOOD PRESSURE: 110 MMHG | RESPIRATION RATE: 18 BRPM | DIASTOLIC BLOOD PRESSURE: 74 MMHG | HEART RATE: 94 BPM

## 2021-06-30 DIAGNOSIS — E11.65 CONTROLLED TYPE 2 DIABETES MELLITUS WITH HYPERGLYCEMIA, WITHOUT LONG-TERM CURRENT USE OF INSULIN (HCC): Primary | ICD-10-CM

## 2021-06-30 DIAGNOSIS — N32.81 OAB (OVERACTIVE BLADDER): ICD-10-CM

## 2021-06-30 LAB
ALBUMIN SERPL-MCNC: 4.4 G/DL (ref 3.5–5.2)
ALBUMIN UR-MCNC: <1.2 MG/DL
ALBUMIN/GLOB SERPL: 1.1 G/DL
ALP SERPL-CCNC: 89 U/L (ref 39–117)
ALT SERPL W P-5'-P-CCNC: 21 U/L (ref 1–33)
ANION GAP SERPL CALCULATED.3IONS-SCNC: 9.1 MMOL/L (ref 5–15)
AST SERPL-CCNC: 23 U/L (ref 1–32)
BILIRUB SERPL-MCNC: 0.3 MG/DL (ref 0–1.2)
BUN SERPL-MCNC: 13 MG/DL (ref 6–20)
BUN/CREAT SERPL: 14.4 (ref 7–25)
CALCIUM SPEC-SCNC: 9.2 MG/DL (ref 8.6–10.5)
CHLORIDE SERPL-SCNC: 101 MMOL/L (ref 98–107)
CHOLEST SERPL-MCNC: 170 MG/DL (ref 0–200)
CO2 SERPL-SCNC: 26.9 MMOL/L (ref 22–29)
CREAT SERPL-MCNC: 0.9 MG/DL (ref 0.57–1)
CREAT UR-MCNC: 82.3 MG/DL
GFR SERPL CREATININE-BSD FRML MDRD: 66 ML/MIN/1.73
GLOBULIN UR ELPH-MCNC: 4 GM/DL
GLUCOSE SERPL-MCNC: 160 MG/DL (ref 65–99)
HBA1C MFR BLD: 9.21 % (ref 4.8–5.6)
HDLC SERPL-MCNC: 60 MG/DL (ref 40–60)
LDLC SERPL CALC-MCNC: 95 MG/DL (ref 0–100)
LDLC/HDLC SERPL: 1.56 {RATIO}
MICROALBUMIN/CREAT UR: NORMAL MG/G{CREAT}
POTASSIUM SERPL-SCNC: 4.5 MMOL/L (ref 3.5–5.2)
PROT SERPL-MCNC: 8.4 G/DL (ref 6–8.5)
SODIUM SERPL-SCNC: 137 MMOL/L (ref 136–145)
TRIGL SERPL-MCNC: 83 MG/DL (ref 0–150)
VLDLC SERPL-MCNC: 15 MG/DL (ref 5–40)

## 2021-06-30 PROCEDURE — 80061 LIPID PANEL: CPT | Performed by: NURSE PRACTITIONER

## 2021-06-30 PROCEDURE — 83036 HEMOGLOBIN GLYCOSYLATED A1C: CPT | Performed by: NURSE PRACTITIONER

## 2021-06-30 PROCEDURE — 99213 OFFICE O/P EST LOW 20 MIN: CPT | Performed by: NURSE PRACTITIONER

## 2021-06-30 PROCEDURE — 80053 COMPREHEN METABOLIC PANEL: CPT | Performed by: NURSE PRACTITIONER

## 2021-06-30 PROCEDURE — 82570 ASSAY OF URINE CREATININE: CPT | Performed by: NURSE PRACTITIONER

## 2021-06-30 PROCEDURE — 82043 UR ALBUMIN QUANTITATIVE: CPT | Performed by: NURSE PRACTITIONER

## 2021-06-30 RX ORDER — OXYBUTYNIN CHLORIDE 10 MG/1
10 TABLET, EXTENDED RELEASE ORAL DAILY
Qty: 90 TABLET | Refills: 1 | Status: SHIPPED | OUTPATIENT
Start: 2021-06-30 | End: 2021-10-28 | Stop reason: SDUPTHER

## 2021-06-30 RX ORDER — PEN NEEDLE, DIABETIC 31 GX5/16"
1 NEEDLE, DISPOSABLE MISCELLANEOUS TAKE AS DIRECTED
COMMUNITY
Start: 2021-05-24 | End: 2021-08-30

## 2021-06-30 NOTE — PROGRESS NOTES
Chief Complaint  Sore Throat, Earache (ithcing ), Follow-up, and Hypothyroidism    Subjective          Mariela Aldrich is a 50 y.o. female who presents to [facility]  History of Present Illness    Dm with hyperglycemia. Pt reports poor dietary intake over the last week d/t vacation. However bs this morning was 123.    HTN - well controlled.     OAB - pt c/o stress incontinence ongoing for 1 year. Pt had hysterectomy 10 years ago. Pt had 2 vaginal deliveries.       PHQ-2 Total Score: 0   PHQ-9 Total Score: 0       Review of Systems   Constitutional: Negative for fatigue.   Respiratory: Negative for cough and shortness of breath.    Cardiovascular: Negative for chest pain and palpitations.   Gastrointestinal: Negative for nausea and vomiting.   Endocrine: Negative for cold intolerance and heat intolerance.   Genitourinary: Positive for urgency. Negative for difficulty urinating and dysuria.        Incontinence     Neurological: Negative for dizziness and headaches.   Hematological: Negative for adenopathy. Does not bruise/bleed easily.   Psychiatric/Behavioral: Negative for confusion, dysphoric mood and sleep disturbance. The patient is not nervous/anxious.           Medical History: has a past medical history of Anxiety, Diabetes mellitus type 2, controlled (CMS/[facility]), Hyperlipidemia, Hypothyroidism, Panic attack, RLS (restless legs syndrome), and Sinus trouble.     Surgical History: has a past surgical history that includes Colonoscopy (01/21/2020); Groin Abscess Incision and Drainage (Right); Hysterectomy (2010); and Knee surgery (Bilateral, 2013).     Family History: family history includes Diabetes in her brother, maternal grandmother, and sister; Thyroid disease in her sister; Thyroid disease (age of onset: 43) in her brother; Thyroid disease (age of onset: 70) in her father.     Social History: reports that she has never smoked. She has never used smokeless tobacco. She  reports that she does not drink alcohol and does not use drugs.    Allergies: Atorvastatin, Crestor [rosuvastatin], Etodolac, Iodine, Penicillins, and Pravastatin      Health Maintenance Due   Topic Date Due   • ANNUAL PHYSICAL  Never done   • Hepatitis B (1 of 3 - Risk 3-dose series) Never done   • ZOSTER VACCINE (1 of 2) Never done   • URINE MICROALBUMIN  06/18/2021   • HEPATITIS C SCREENING  Never done   • DIABETIC FOOT EXAM  Never done   • DIABETIC EYE EXAM  06/22/2021   • LIPID PANEL  06/23/2021            Current Outpatient Medications:   •  adalimumab (Humira) 40 MG/0.8ML Prefilled Syringe Kit injection, 0.8 mL., Disp: , Rfl:   •  albuterol sulfate  (90 Base) MCG/ACT inhaler, Inhale 2 puffs Every 4 (Four) Hours As Needed., Disp: , Rfl:   •  B-D ULTRAFINE III SHORT PEN 31G X 8 MM misc, Inject 1 pen under the skin into the appropriate area as directed Take As Directed., Disp: , Rfl:   •  Blood Glucose Monitoring Suppl kit, , Disp: , Rfl:   •  cetirizine (ZyrTEC Allergy) 10 MG tablet, Zyrtec 10 mg oral tablet take 1 tablet (10 mg) by oral route once daily   Active, Disp: , Rfl:   •  Empagliflozin-metFORMIN HCl ER (Synjardy XR) 12.5-1000 MG tablet sustained-release 24 hour, Synjardy XR 12.5-1000 MG Oral Tablet Extended Release 24 Hour TAKE 1 TABLET BY MOUTH ONCE DAILY IN THE MORNING WITH A MEAL FOR 30 DAYS 5/28/2021  Active, Disp: , Rfl:   •  glipizide (GLUCOTROL) 10 MG tablet, Take 10 mg by mouth 2 (Two) Times a Day Before Meals., Disp: , Rfl:   •  Insulin Degludec (TRESIBA FLEXTOUCH) 200 UNIT/ML solution pen-injector pen injection, Inject  under the skin into the appropriate area as directed., Disp: , Rfl:   •  levothyroxine (Synthroid) 88 MCG tablet, Synthroid 88 mcg oral tablet Take 1 tablet by mouth once daily 4/12/2021  Active, Disp: , Rfl:   •  lisinopril (PRINIVIL,ZESTRIL) 5 MG tablet, Take 1 tablet by mouth once daily, Disp: 90 tablet, Rfl: 0  •  mometasone-formoterol (DULERA 100) 100-5 MCG/ACT  "inhaler, Inhale 2 puffs 2 (two) times a day., Disp: , Rfl:   •  multivitamin (MULTI-VITAMIN PO), , Disp: , Rfl:   •  pitavastatin calcium (LIVALO) 2 MG tablet tablet, Take 2 mg by mouth Every Night., Disp: , Rfl:   •  rOPINIRole (REQUIP) 4 MG tablet, Take 4 mg by mouth Every Night., Disp: , Rfl:   •  SAXagliptin (ONGLYZA) 5 MG tablet, Take 5 mg by mouth Daily., Disp: , Rfl:   •  sertraline (ZOLOFT) 100 MG tablet, Take 100 mg by mouth Daily., Disp: , Rfl:   •  SITagliptin (JANUVIA) 100 MG tablet, Take 100 mg by mouth Daily., Disp: , Rfl:   •  Zinc 50 MG tablet, zinc 50 mg oral tablet take 1 tablet by oral route daily   Active, Disp: , Rfl:       Immunization History   Administered Date(s) Administered   • Flu Vaccine Quad PF >18YRS 10/01/2020   • Pneumococcal Polysaccharide (PPSV23) 07/20/2019   • Td 05/08/1998   • Tdap 03/16/2020         Objective       Vitals:    06/30/21 0735   BP: 110/74   Pulse: 94   Resp: 18   Temp: 98.4 °F (36.9 °C)   TempSrc: Temporal   SpO2: 97%   Weight: 91.6 kg (202 lb)   Height: 162.6 cm (64\")   PainSc: 0-No pain      Body mass index is 34.67 kg/m².   Wt Readings from Last 3 Encounters:   06/30/21 91.6 kg (202 lb)   03/31/21 90.3 kg (199 lb)   01/04/21 95.3 kg (210 lb)      BP Readings from Last 3 Encounters:   06/30/21 110/74   03/31/21 102/73   01/04/21 121/63        Physical Exam  Constitutional:       General: She is not in acute distress.     Appearance: Normal appearance.   Eyes:      General: Lids are normal. No scleral icterus.        Left eye: No discharge.      Conjunctiva/sclera: Conjunctivae normal.      Pupils: Pupils are equal, round, and reactive to light.   Neck:      Thyroid: No thyroid mass, thyromegaly or thyroid tenderness.      Vascular: No carotid bruit.   Cardiovascular:      Rate and Rhythm: Normal rate.      Pulses: Normal pulses.      Heart sounds: Normal heart sounds. No murmur heard.   No friction rub. No gallop.    Pulmonary:      Effort: Pulmonary effort is " normal. No retractions.      Breath sounds: Normal breath sounds.   Abdominal:      General: Abdomen is flat. Bowel sounds are normal. There is no distension.      Palpations: Abdomen is soft.      Tenderness: There is no abdominal tenderness. There is no guarding.   Musculoskeletal:      Cervical back: Full passive range of motion without pain, normal range of motion and neck supple. No rigidity or tenderness.      Right lower leg: No edema.      Left lower leg: No edema.   Lymphadenopathy:      Cervical: No cervical adenopathy.   Skin:     General: Skin is warm and dry.      Findings: No lesion or rash.      Nails: There is no clubbing.   Neurological:      General: No focal deficit present.      Mental Status: She is alert and oriented to person, place, and time.      Coordination: Coordination is intact.      Gait: Gait is intact.   Psychiatric:         Attention and Perception: Attention normal.         Mood and Affect: Mood and affect normal.         Speech: Speech normal.         Behavior: Behavior normal. Behavior is cooperative.         Thought Content: Thought content normal.         Cognition and Memory: Cognition normal.         Judgment: Judgment normal.             Result Review :     CMP    CMP 10/1/20 1/4/21 3/31/21   Glucose 151 (A) 197 (A) 144 (A)   BUN 13 8 19   Creatinine 0.86 0.90 0.93 (A)   Sodium 138 137 137   Potassium 4.3 4.1 4.4   Chloride 103 103 104   Calcium 9.3 9.2 9.0   Albumin 4.1 3.9 3.9   Total Bilirubin 0.38 0.37 0.30   Alkaline Phosphatase 99 110 104   AST (SGOT) 29 26 27   ALT (SGPT) 28 29 28   (A) Abnormal value       Comments are available for some flowsheets but are not being displayed.           Lipid Panel    Lipid Panel 10/1/20 1/4/21 3/31/21   Total Cholesterol 171 172 143   Triglycerides 83 65 70   HDL Cholesterol 61 (A) 58 56   VLDL Cholesterol 17 13 14   LDL Cholesterol  93 101 (A) 73   (A) Abnormal value       Comments are available for some flowsheets but are not  being displayed.                          Assessment and Plan        Diagnoses and all orders for this visit:    1. Controlled type 2 diabetes mellitus with hyperglycemia, without long-term current use of insulin (CMS/[facility]) (Primary)  -     Comprehensive Metabolic Panel  -     Lipid Panel  -     Hemoglobin A1c  -     Microalbumin / Creatinine Urine Ratio - Urine, Clean Catch    2. OAB (overactive bladder)          Follow Up     Return in about 6 months (around 12/30/2021) for Next scheduled follow up.    Patient was given instructions and counseling regarding her condition or for health maintenance advice. Please see specific information pulled into the AVS if appropriate.     WILLIAM Hickman

## 2021-06-30 NOTE — PATIENT INSTRUCTIONS
Diabetes Mellitus and Nutrition, Adult  When you have diabetes, or diabetes mellitus, it is very important to have healthy eating habits because your blood sugar (glucose) levels are greatly affected by what you eat and drink. Eating healthy foods in the right amounts, at about the same times every day, can help you:  · Control your blood glucose.  · Lower your risk of heart disease.  · Improve your blood pressure.  · Reach or maintain a healthy weight.  What can affect my meal plan?  Every person with diabetes is different, and each person has different needs for a meal plan. Your health care provider may recommend that you work with a dietitian to make a meal plan that is best for you. Your meal plan may vary depending on factors such as:  · The calories you need.  · The medicines you take.  · Your weight.  · Your blood glucose, blood pressure, and cholesterol levels.  · Your activity level.  · Other health conditions you have, such as heart or kidney disease.  How do carbohydrates affect me?  Carbohydrates, also called carbs, affect your blood glucose level more than any other type of food. Eating carbs naturally raises the amount of glucose in your blood. Carb counting is a method for keeping track of how many carbs you eat. Counting carbs is important to keep your blood glucose at a healthy level, especially if you use insulin or take certain oral diabetes medicines.  It is important to know how many carbs you can safely have in each meal. This is different for every person. Your dietitian can help you calculate how many carbs you should have at each meal and for each snack.  How does alcohol affect me?  Alcohol can cause a sudden decrease in blood glucose (hypoglycemia), especially if you use insulin or take certain oral diabetes medicines. Hypoglycemia can be a life-threatening condition. Symptoms of hypoglycemia, such as sleepiness, dizziness, and confusion, are similar to symptoms of having too much  "alcohol.  · Do not drink alcohol if:  ? Your health care provider tells you not to drink.  ? You are pregnant, may be pregnant, or are planning to become pregnant.  · If you drink alcohol:  ? Do not drink on an empty stomach.  ? Limit how much you use to:  § 0-1 drink a day for women.  § 0-2 drinks a day for men.  ? Be aware of how much alcohol is in your drink. In the [place] one drink equals one 12 oz bottle of beer (355 mL), one 5 oz glass of wine (148 mL), or one 1½ oz glass of hard liquor (44 mL).  ? Keep yourself hydrated with water, diet soda, or unsweetened iced tea.  § Keep in mind that regular soda, juice, and other mixers may contain a lot of sugar and must be counted as carbs.  What are tips for following this plan?    Reading food labels  · Start by checking the serving size on the \"Nutrition Facts\" label of packaged foods and drinks. The amount of calories, carbs, fats, and other nutrients listed on the label is based on one serving of the item. Many items contain more than one serving per package.  · Check the total grams (g) of carbs in one serving. You can calculate the number of servings of carbs in one serving by dividing the total carbs by 15. For example, if a food has 30 g of total carbs per serving, it would be equal to 2 servings of carbs.  · Check the number of grams (g) of saturated fats and trans fats in one serving. Choose foods that have a low amount or none of these fats.  · Check the number of milligrams (mg) of salt (sodium) in one serving. Most people should limit total sodium intake to less than 2,300 mg per day.  · Always check the nutrition information of foods labeled as \"low-fat\" or \"nonfat.\" These foods may be higher in added sugar or refined carbs and should be avoided.  · Talk to your dietitian to identify your daily goals for nutrients listed on the label.  Shopping  · Avoid buying canned, pre-made, or processed foods. These foods tend to be high in fat, sodium, and added " sugar.  · Shop around the outside edge of the grocery store. This is where you will most often find fresh fruits and vegetables, bulk grains, fresh meats, and fresh dairy.  Cooking  · Use low-heat cooking methods, such as baking, instead of high-heat cooking methods like deep frying.  · Cook using healthy oils, such as olive, canola, or sunflower oil.  · Avoid cooking with butter, cream, or high-fat meats.  Meal planning  · Eat meals and snacks regularly, preferably at the same times every day. Avoid going long periods of time without eating.  · Eat foods that are high in fiber, such as fresh fruits, vegetables, beans, and whole grains. Talk with your dietitian about how many servings of carbs you can eat at each meal.  · Eat 4-6 oz (112-168 g) of lean protein each day, such as lean meat, chicken, fish, eggs, or tofu. One ounce (oz) of lean protein is equal to:  ? 1 oz (28 g) of meat, chicken, or fish.  ? 1 egg.  ? ¼ cup (62 g) of tofu.  · Eat some foods each day that contain healthy fats, such as avocado, nuts, seeds, and fish.  What foods should I eat?  Fruits  Berries. Apples. Oranges. Peaches. Apricots. Plums. Grapes. Willy. Papaya. Pomegranate. Kiwi. Cherries.  Vegetables  Lettuce. Spinach. Leafy greens, including kale, chard, guru greens, and mustard greens. Beets. Cauliflower. Cabbage. Broccoli. Carrots. Green beans. Tomatoes. Peppers. Onions. Cucumbers. Clemons sprouts.  Grains  Whole grains, such as whole-wheat or whole-grain bread, crackers, tortillas, cereal, and pasta. Unsweetened oatmeal. Quinoa. Brown or wild rice.  Meats and other proteins  Seafood. Poultry without skin. Lean cuts of poultry and beef. Tofu. Nuts. Seeds.  Dairy  Low-fat or fat-free dairy products such as milk, yogurt, and cheese.  The items listed above may not be a complete list of foods and beverages you can eat. Contact a dietitian for more information.  What foods should I avoid?  Fruits  Fruits canned with  syrup.  Vegetables  Canned vegetables. Frozen vegetables with butter or cream sauce.  Grains  Refined white flour and flour products such as bread, pasta, snack foods, and cereals. Avoid all processed foods.  Meats and other proteins  Fatty cuts of meat. Poultry with skin. Breaded or fried meats. Processed meat. Avoid saturated fats.  Dairy  Full-fat yogurt, cheese, or milk.  Beverages  Sweetened drinks, such as soda or iced tea.  The items listed above may not be a complete list of foods and beverages you should avoid. Contact a dietitian for more information.  Questions to ask a health care provider  · Do I need to meet with a diabetes educator?  · Do I need to meet with a dietitian?  · What number can I call if I have questions?  · When are the best times to check my blood glucose?  Where to find more information:  · American Diabetes Association: diabetes.org  · Academy of Nutrition and Dietetics: www.eatright.org  · National [place] of Diabetes and Digestive and Kidney Diseases: www.niddk.nih.gov  · Association of Diabetes Care and Education Specialists: www.diabeteseducator.org  Summary  · It is important to have healthy eating habits because your blood sugar (glucose) levels are greatly affected by what you eat and drink.  · A healthy meal plan will help you control your blood glucose and maintain a healthy lifestyle.  · Your health care provider may recommend that you work with a dietitian to make a meal plan that is best for you.  · Keep in mind that carbohydrates (carbs) and alcohol have immediate effects on your blood glucose levels. It is important to count carbs and to use alcohol carefully.  This information is not intended to replace advice given to you by your health care provider. Make sure you discuss any questions you have with your health care provider.  Document Revised: 11/24/2020 Document Reviewed: 11/24/2020  Elsevier Patient Education © 2021 Elsevier Inc.      Overactive Bladder,  Adult    Overactive bladder refers to a condition in which a person has a sudden need to pass urine. The person may leak urine if he or she cannot get to the bathroom fast enough (urinary incontinence). A person with this condition may also wake up several times in the night to go to the bathroom.  Overactive bladder is associated with poor nerve signals between your bladder and your brain. Your bladder may get the signal to empty before it is full. You may also have very sensitive muscles that make your bladder squeeze too soon. These symptoms might interfere with daily work or social activities.  What are the causes?  This condition may be associated with or caused by:  · Urinary tract infection.  · Infection of nearby tissues, such as the prostate.  · Prostate enlargement.  · Surgery on the uterus or urethra.  · Bladder stones, inflammation, or tumors.  · Drinking too much caffeine or alcohol.  · Certain medicines, especially medicines that get rid of extra fluid in the body (diuretics).  · Muscle or nerve weakness, especially from:  ? A spinal cord injury.  ? Stroke.  ? Multiple sclerosis.  ? Parkinson's disease.  · Diabetes.  · Constipation.  What increases the risk?  You may be at greater risk for overactive bladder if you:  · Are an older adult.  · Smoke.  · Are going through menopause.  · Have prostate problems.  · Have a neurological disease, such as stroke, dementia, Parkinson's disease, or multiple sclerosis (MS).  · Eat or drink things that irritate the bladder. These include alcohol, spicy food, and caffeine.  · Are overweight or obese.  What are the signs or symptoms?  Symptoms of this condition include:  · Sudden, strong urge to urinate.  · Leaking urine.  · Urinating 8 or more times a day.  · Waking up to urinate 2 or more times a night.  How is this diagnosed?  Your health care provider may suspect overactive bladder based on your symptoms. He or she will diagnose this condition by:  · A physical  exam and medical history.  · Blood or urine tests. You might need bladder or urine tests to help determine what is causing your overactive bladder.  You might also need to see a health care provider who specializes in urinary tract problems (urologist).  How is this treated?  Treatment for overactive bladder depends on the cause of your condition and whether it is mild or severe. You can also make lifestyle changes at home. Options include:  · Bladder training. This may include:  ? Learning to control the urge to urinate by following a schedule that directs you to urinate at regular intervals (timed voiding).  ? Doing Kegel exercises to strengthen your pelvic floor muscles, which support your bladder. Toning these muscles can help you control urination, even if your bladder muscles are overactive.  · Special devices. This may include:  ? Biofeedback, which uses sensors to help you become aware of your body's signals.  ? Electrical stimulation, which uses electrodes placed inside the body (implanted) or outside the body. These electrodes send gentle pulses of electricity to strengthen the nerves or muscles that control the bladder.  ? Women may use a plastic device that fits into the vagina and supports the bladder (pessary).  · Medicines.  ? Antibiotics to treat bladder infection.  ? Antispasmodics to stop the bladder from releasing urine at the wrong time.  ? Tricyclic antidepressants to relax bladder muscles.  ? Injections of botulinum toxin type A directly into the bladder tissue to relax bladder muscles.  · Lifestyle changes. This may include:  ? Weight loss. Talk to your health care provider about weight loss methods that would work best for you.  ? Diet changes. This may include reducing how much alcohol and caffeine you consume, or drinking fluids at different times of the day.  ? Not smoking. Do not use any products that contain nicotine or tobacco, such as cigarettes and e-cigarettes. If you need help  quitting, ask your health care provider.  · Surgery.  ? A device may be implanted to help manage the nerve signals that control urination.  ? An electrode may be implanted to stimulate electrical signals in the bladder.  ? A procedure may be done to change the shape of the bladder. This is done only in very severe cases.  Follow these instructions at home:  Lifestyle  · Make any diet or lifestyle changes that are recommended by your health care provider. These may include:  ? Drinking less fluid or drinking fluids at different times of the day.  ? Cutting down on caffeine or alcohol.  ? Doing Kegel exercises.  ? Losing weight if needed.  ? Eating a healthy and balanced diet to prevent constipation. This may include:  § Eating foods that are high in fiber, such as fresh fruits and vegetables, whole grains, and beans.  § Limiting foods that are high in fat and processed sugars, such as fried and sweet foods.  General instructions  · Take over-the-counter and prescription medicines only as told by your health care provider.  · If you were prescribed an antibiotic medicine, take it as told by your health care provider. Do not stop taking the antibiotic even if you start to feel better.  · Use any implants or pessary as told by your health care provider.  · If needed, wear pads to absorb urine leakage.  · Keep a journal or log to track how much and when you drink and when you feel the need to urinate. This will help your health care provider monitor your condition.  · Keep all follow-up visits as told by your health care provider. This is important.  Contact a health care provider if:  · You have a fever.  · Your symptoms do not get better with treatment.  · Your pain and discomfort get worse.  · You have more frequent urges to urinate.  Get help right away if:  · You are not able to control your bladder.  Summary  · Overactive bladder refers to a condition in which a person has a sudden need to pass urine.  · Several  conditions may lead to an overactive bladder.  · Treatment for overactive bladder depends on the cause and severity of your condition.  · Follow your health care provider's instructions about lifestyle changes, doing Kegel exercises, keeping a journal, and taking medicines.  This information is not intended to replace advice given to you by your health care provider. Make sure you discuss any questions you have with your health care provider.  Document Revised: 04/09/2020 Document Reviewed: 01/03/2019  Elsevier Patient Education © 2021 Elsevier Inc.

## 2021-07-06 ENCOUNTER — TELEPHONE (OUTPATIENT)
Dept: FAMILY MEDICINE CLINIC | Facility: CLINIC | Age: 51
End: 2021-07-06

## 2021-07-06 NOTE — TELEPHONE ENCOUNTER
Pt verbalized understanding via phone - pt declined to see Endo at this time. I advised pt of diet and exercise modifications and to call back if she decides to see Endo.

## 2021-07-19 RX ORDER — LEVOTHYROXINE SODIUM 88 MCG
TABLET ORAL
Qty: 90 TABLET | Refills: 1 | Status: SHIPPED | OUTPATIENT
Start: 2021-07-19 | End: 2022-01-24

## 2021-08-11 RX ORDER — INSULIN DEGLUDEC 200 U/ML
INJECTION, SOLUTION SUBCUTANEOUS
Qty: 9 ML | Refills: 0 | Status: SHIPPED | OUTPATIENT
Start: 2021-08-11 | End: 2021-09-20

## 2021-08-30 RX ORDER — PITAVASTATIN CALCIUM 2.09 MG/1
TABLET, FILM COATED ORAL
Qty: 30 TABLET | Refills: 0 | Status: SHIPPED | OUTPATIENT
Start: 2021-08-30 | End: 2021-09-20

## 2021-08-30 RX ORDER — PEN NEEDLE, DIABETIC 31 GX5/16"
NEEDLE, DISPOSABLE MISCELLANEOUS
Qty: 100 EACH | Refills: 0 | Status: SHIPPED | OUTPATIENT
Start: 2021-08-30 | End: 2021-11-29

## 2021-09-13 RX ORDER — LISINOPRIL 5 MG/1
TABLET ORAL
Qty: 90 TABLET | Refills: 1 | Status: SHIPPED | OUTPATIENT
Start: 2021-09-13 | End: 2022-03-04 | Stop reason: SDUPTHER

## 2021-09-20 RX ORDER — PITAVASTATIN CALCIUM 2.09 MG/1
TABLET, FILM COATED ORAL
Qty: 90 TABLET | Refills: 1 | Status: SHIPPED | OUTPATIENT
Start: 2021-09-20 | End: 2022-03-04 | Stop reason: SDUPTHER

## 2021-09-20 RX ORDER — INSULIN DEGLUDEC 200 U/ML
INJECTION, SOLUTION SUBCUTANEOUS
Qty: 9 ML | Refills: 1 | Status: SHIPPED | OUTPATIENT
Start: 2021-09-20 | End: 2021-12-17

## 2021-10-12 RX ORDER — EMPAGLIFLOZIN, METFORMIN HYDROCHLORIDE 12.5; 1 MG/1; MG/1
TABLET, EXTENDED RELEASE ORAL
Qty: 90 TABLET | Refills: 1 | Status: SHIPPED | OUTPATIENT
Start: 2021-10-12 | End: 2022-01-04 | Stop reason: DRUGHIGH

## 2021-10-28 ENCOUNTER — PATIENT MESSAGE (OUTPATIENT)
Dept: FAMILY MEDICINE CLINIC | Facility: CLINIC | Age: 51
End: 2021-10-28

## 2021-10-28 DIAGNOSIS — N32.81 OAB (OVERACTIVE BLADDER): ICD-10-CM

## 2021-10-28 RX ORDER — OXYBUTYNIN CHLORIDE 15 MG/1
15 TABLET, EXTENDED RELEASE ORAL DAILY
Qty: 90 TABLET | Refills: 1 | Status: SHIPPED | OUTPATIENT
Start: 2021-10-28 | End: 2022-01-03

## 2021-10-28 NOTE — TELEPHONE ENCOUNTER
From: Mariela Aldrich  To: WILLIAM Hickman  Sent: 10/28/2021 8:17 AM EDT  Subject: Medicine     My oxybutynin has stopped working. You said to let you know and you can increase it. Just wondering what to do.   Thanks,  Mariela Aldrich

## 2021-11-11 RX ORDER — ROPINIROLE 4 MG/1
TABLET, FILM COATED, EXTENDED RELEASE ORAL
Qty: 90 TABLET | Refills: 0 | OUTPATIENT
Start: 2021-11-11

## 2021-11-12 ENCOUNTER — TELEPHONE (OUTPATIENT)
Dept: FAMILY MEDICINE CLINIC | Facility: CLINIC | Age: 51
End: 2021-11-12

## 2021-11-12 NOTE — TELEPHONE ENCOUNTER
Caller: Mariela Aldrich    Relationship: Self    Best call back number: [phone]    What is the best time to reach you:ANYTIME  Who are you requesting to speak with (clinical staff, provider,  specific staff member):CLINICAL    Do you know the name of the person who called: N/A    What was the call regarding: PATIENT RECEIVED A MESSAGE TODAY THAT ONE OF HER REFILLS WAS DENIED AT THE PHARMACY.  SHE WOULD LIKE A CALL BACK.  PLEASE ADVISE.    Do you require a callback:YES

## 2021-11-15 RX ORDER — ROPINIROLE 4 MG/1
4 TABLET, FILM COATED ORAL NIGHTLY
Qty: 90 TABLET | Refills: 0 | Status: SHIPPED | OUTPATIENT
Start: 2021-11-15 | End: 2022-02-14

## 2021-11-29 RX ORDER — BLOOD SUGAR DIAGNOSTIC
STRIP MISCELLANEOUS
Qty: 50 EACH | Refills: 0 | Status: SHIPPED | OUTPATIENT
Start: 2021-11-29 | End: 2022-12-07

## 2021-11-29 RX ORDER — LANCETS
EACH MISCELLANEOUS
Qty: 102 EACH | Refills: 0 | Status: SHIPPED | OUTPATIENT
Start: 2021-11-29 | End: 2022-12-07

## 2021-11-29 RX ORDER — PEN NEEDLE, DIABETIC 31 GX5/16"
NEEDLE, DISPOSABLE MISCELLANEOUS
Qty: 100 EACH | Refills: 0 | Status: SHIPPED | OUTPATIENT
Start: 2021-11-29 | End: 2022-03-23

## 2021-12-17 RX ORDER — SERTRALINE HYDROCHLORIDE 100 MG/1
TABLET, FILM COATED ORAL
Qty: 135 TABLET | Refills: 0 | Status: SHIPPED | OUTPATIENT
Start: 2021-12-17 | End: 2022-03-04 | Stop reason: SDUPTHER

## 2021-12-17 RX ORDER — INSULIN DEGLUDEC 200 U/ML
INJECTION, SOLUTION SUBCUTANEOUS
Qty: 9 ML | Refills: 0 | Status: SHIPPED | OUTPATIENT
Start: 2021-12-17 | End: 2022-02-08

## 2021-12-17 RX ORDER — GLIPIZIDE 10 MG/1
TABLET ORAL
Qty: 180 TABLET | Refills: 0 | Status: SHIPPED | OUTPATIENT
Start: 2021-12-17 | End: 2022-03-04 | Stop reason: SDUPTHER

## 2021-12-17 RX ORDER — SITAGLIPTIN 100 MG/1
TABLET, FILM COATED ORAL
Qty: 90 TABLET | Refills: 0 | Status: SHIPPED | OUTPATIENT
Start: 2021-12-17 | End: 2022-03-04 | Stop reason: SDUPTHER

## 2022-01-03 ENCOUNTER — OFFICE VISIT (OUTPATIENT)
Dept: FAMILY MEDICINE CLINIC | Facility: CLINIC | Age: 52
End: 2022-01-03

## 2022-01-03 VITALS
SYSTOLIC BLOOD PRESSURE: 115 MMHG | BODY MASS INDEX: 35 KG/M2 | WEIGHT: 205 LBS | HEART RATE: 102 BPM | TEMPERATURE: 98.8 F | HEIGHT: 64 IN | DIASTOLIC BLOOD PRESSURE: 66 MMHG | OXYGEN SATURATION: 97 %

## 2022-01-03 DIAGNOSIS — R39.15 URINARY URGENCY: ICD-10-CM

## 2022-01-03 DIAGNOSIS — N32.81 OAB (OVERACTIVE BLADDER): ICD-10-CM

## 2022-01-03 DIAGNOSIS — Z01.84 IMMUNITY STATUS TESTING: ICD-10-CM

## 2022-01-03 DIAGNOSIS — E11.65 CONTROLLED TYPE 2 DIABETES MELLITUS WITH HYPERGLYCEMIA, WITHOUT LONG-TERM CURRENT USE OF INSULIN: ICD-10-CM

## 2022-01-03 DIAGNOSIS — E03.8 OTHER SPECIFIED HYPOTHYROIDISM: ICD-10-CM

## 2022-01-03 DIAGNOSIS — E78.2 MIXED HYPERLIPIDEMIA: Primary | ICD-10-CM

## 2022-01-03 DIAGNOSIS — Z12.31 VISIT FOR SCREENING MAMMOGRAM: ICD-10-CM

## 2022-01-03 LAB
ALBUMIN SERPL-MCNC: 4.2 G/DL (ref 3.5–5.2)
ALBUMIN/GLOB SERPL: 0.9 G/DL
ALP SERPL-CCNC: 109 U/L (ref 39–117)
ALT SERPL W P-5'-P-CCNC: 31 U/L (ref 1–33)
ANION GAP SERPL CALCULATED.3IONS-SCNC: 6.7 MMOL/L (ref 5–15)
AST SERPL-CCNC: 31 U/L (ref 1–32)
BILIRUB SERPL-MCNC: 0.3 MG/DL (ref 0–1.2)
BUN SERPL-MCNC: 13 MG/DL (ref 6–20)
BUN/CREAT SERPL: 12.4 (ref 7–25)
CALCIUM SPEC-SCNC: 9.4 MG/DL (ref 8.6–10.5)
CHLORIDE SERPL-SCNC: 99 MMOL/L (ref 98–107)
CHOLEST SERPL-MCNC: 145 MG/DL (ref 0–200)
CO2 SERPL-SCNC: 26.3 MMOL/L (ref 22–29)
CREAT SERPL-MCNC: 1.05 MG/DL (ref 0.57–1)
GFR SERPL CREATININE-BSD FRML MDRD: 55 ML/MIN/1.73
GLOBULIN UR ELPH-MCNC: 4.5 GM/DL
GLUCOSE SERPL-MCNC: 166 MG/DL (ref 65–99)
HBA1C MFR BLD: 9.3 % (ref 4.8–5.6)
HDLC SERPL-MCNC: 49 MG/DL (ref 40–60)
LDLC SERPL CALC-MCNC: 84 MG/DL (ref 0–100)
LDLC/HDLC SERPL: 1.72 {RATIO}
POTASSIUM SERPL-SCNC: 4.7 MMOL/L (ref 3.5–5.2)
PROT SERPL-MCNC: 8.7 G/DL (ref 6–8.5)
SODIUM SERPL-SCNC: 132 MMOL/L (ref 136–145)
TRIGL SERPL-MCNC: 58 MG/DL (ref 0–150)
TSH SERPL DL<=0.05 MIU/L-ACNC: 1.66 UIU/ML (ref 0.27–4.2)
VLDLC SERPL-MCNC: 12 MG/DL (ref 5–40)

## 2022-01-03 PROCEDURE — 80061 LIPID PANEL: CPT | Performed by: NURSE PRACTITIONER

## 2022-01-03 PROCEDURE — 86769 SARS-COV-2 COVID-19 ANTIBODY: CPT | Performed by: NURSE PRACTITIONER

## 2022-01-03 PROCEDURE — 83036 HEMOGLOBIN GLYCOSYLATED A1C: CPT | Performed by: NURSE PRACTITIONER

## 2022-01-03 PROCEDURE — 99214 OFFICE O/P EST MOD 30 MIN: CPT | Performed by: NURSE PRACTITIONER

## 2022-01-03 PROCEDURE — 80053 COMPREHEN METABOLIC PANEL: CPT | Performed by: NURSE PRACTITIONER

## 2022-01-03 PROCEDURE — 84443 ASSAY THYROID STIM HORMONE: CPT | Performed by: NURSE PRACTITIONER

## 2022-01-03 NOTE — PROGRESS NOTES
Answers for HPI/ROS submitted by the patient on 1/3/2022  Please describe your symptoms.: Check up  Have you had these symptoms before?: No  How long have you been having these symptoms?: 1-4 days  What is the primary reason for your visit?: Other    Chief Complaint  Follow-up, Hypothyroidism, Diabetes, and Hyperlipidemia    Subjective          Mariela Aldrich is a 51 y.o. female who presents to [facility]    History of Present Illness    HTN - well controlled.     DM - 106 yesterday and 149 this morning.     OAB - Pt reports compliance with med. Denies any improvement in stress incontinence and urinary urgency.    Allergies continue to flare. Pt has had allergy testing and will begin immunotherapy.     HLD - denies any myalgias.     PHQ-2 Total Score:     PHQ-9 Total Score:         Review of Systems   Constitutional: Negative for chills, fatigue and fever.   HENT: Positive for postnasal drip and sinus pressure. Negative for ear pain and sinus pain.    Respiratory: Negative for cough and shortness of breath.    Cardiovascular: Negative for chest pain and palpitations.   Gastrointestinal: Negative for constipation, diarrhea, nausea and vomiting.   Genitourinary: Positive for urgency.   Musculoskeletal: Negative for back pain and neck pain.   Skin: Negative for rash.   Allergic/Immunologic: Positive for environmental allergies.   Neurological: Negative for dizziness and headaches.           Medical History: has a past medical history of Anxiety, Diabetes mellitus type 2, controlled (HCC), Hyperlipidemia, Hypothyroidism, Panic attack, RLS (restless legs syndrome), and Sinus trouble.     Surgical History: has a past surgical history that includes Colonoscopy (01/21/2020); Groin Abscess Incision and Drainage (Right); Hysterectomy (2010); and Knee surgery (Bilateral, 2013).     Family History: family history includes Diabetes in her brother, maternal grandmother, and sister; Thyroid  disease in her sister; Thyroid disease (age of onset: 43) in her brother; Thyroid disease (age of onset: 70) in her father.     Social History: reports that she has never smoked. She has never used smokeless tobacco. She reports that she does not drink alcohol and does not use drugs.    Allergies: Atorvastatin, Crestor [rosuvastatin], Etodolac, Iodine, Penicillins, and Pravastatin      Health Maintenance Due   Topic Date Due   • ANNUAL PHYSICAL  Never done   • Hepatitis B (1 of 3 - Risk 3-dose series) Never done   • ZOSTER VACCINE (1 of 2) Never done   • HEPATITIS C SCREENING  Never done   • DIABETIC FOOT EXAM  Never done   • DIABETIC EYE EXAM  06/22/2021   • HEMOGLOBIN A1C  12/30/2021            Current Outpatient Medications:   •  Accu-Chek FastClix Lancets misc, USE TO CHECK BLOOD SUGAR 1-2 TIMES DAILY, Disp: 102 each, Rfl: 0  •  Accu-Chek Guide test strip, USE TO TEST 1 TO 2 TIMES DAILY., Disp: 50 each, Rfl: 0  •  adalimumab (Humira) 40 MG/0.8ML Prefilled Syringe Kit injection, 0.8 mL., Disp: , Rfl:   •  albuterol sulfate  (90 Base) MCG/ACT inhaler, Inhale 2 puffs Every 4 (Four) Hours As Needed., Disp: , Rfl:   •  B-D ULTRAFINE III SHORT PEN 31G X 8 MM misc, USE AS DIRECTED ONCE DAILY WITH TRESIBA FLEXPEN., Disp: 100 each, Rfl: 0  •  Blood Glucose Monitoring Suppl kit, , Disp: , Rfl:   •  cetirizine (ZyrTEC Allergy) 10 MG tablet, Zyrtec 10 mg oral tablet take 1 tablet (10 mg) by oral route once daily   Active, Disp: , Rfl:   •  glipizide (GLUCOTROL) 10 MG tablet, TAKE 1 TABLET BY MOUTH TWICE DAILY BEFORE MEAL(S), Disp: 180 tablet, Rfl: 0  •  Januvia 100 MG tablet, Take 1 tablet by mouth once daily, Disp: 90 tablet, Rfl: 0  •  lisinopril (PRINIVIL,ZESTRIL) 5 MG tablet, Take 1 tablet by mouth once daily, Disp: 90 tablet, Rfl: 1  •  Livalo 2 MG tablet tablet, Take 1 tablet by mouth once daily, Disp: 90 tablet, Rfl: 1  •  multivitamin (MULTI-VITAMIN PO), , Disp: , Rfl:   •  rOPINIRole (REQUIP) 4 MG tablet,  "Take 1 tablet by mouth Every Night., Disp: 90 tablet, Rfl: 0  •  sertraline (ZOLOFT) 100 MG tablet, TAKE 1 & 1/2 (ONE & ONE-HALF) TABLETS BY MOUTH ONCE DAILY, Disp: 135 tablet, Rfl: 0  •  Synjardy XR 12.5-1000 MG tablet sustained-release 24 hour, TAKE 1 TABLET BY MOUTH ONCE DAILY IN THE MORNING WITH A MEAL, Disp: 90 tablet, Rfl: 1  •  Synthroid 88 MCG tablet, Take 1 tablet by mouth once daily, Disp: 90 tablet, Rfl: 1  •  Tresiba FlexTouch 200 UNIT/ML solution pen-injector pen injection, INJECT 42 UNITS SUBCUTANEOUSLY ONCE DAILY, Disp: 9 mL, Rfl: 0  •  Wixela Inhub 250-50 MCG/DOSE DISKUS, INHALE 1 PUFF BY MOUTH TWICE DAILY IN THE MORNING AND EVENING APPROXIMATELY 12 HOURS APART., Disp: 60 each, Rfl: 5  •  Zinc 50 MG tablet, zinc 50 mg oral tablet take 1 tablet by oral route daily   Active, Disp: , Rfl:   •  Mirabegron ER (Myrbetriq) 50 MG tablet sustained-release 24 hour 24 hr tablet, Take 50 mg by mouth Daily., Disp: 30 tablet, Rfl: 2      Immunization History   Administered Date(s) Administered   • Flu Vaccine Quad PF >18YRS 10/01/2020   • Pneumococcal Polysaccharide (PPSV23) 07/20/2019   • Td 05/08/1998   • Tdap 03/16/2020         Objective       Vitals:    01/03/22 0745   BP: 115/66   BP Location: Right arm   Patient Position: Sitting   Cuff Size: Adult   Pulse: 102   Temp: 98.8 °F (37.1 °C)   TempSrc: Temporal   SpO2: 97%   Weight: 93 kg (205 lb)   Height: 162.6 cm (64.02\")      Body mass index is 35.17 kg/m².   Wt Readings from Last 3 Encounters:   01/03/22 93 kg (205 lb)   06/30/21 91.6 kg (202 lb)   03/31/21 90.3 kg (199 lb)      BP Readings from Last 3 Encounters:   01/03/22 115/66   06/30/21 110/74   03/31/21 102/73        Physical Exam  Constitutional:       General: She is not in acute distress.     Appearance: Normal appearance.   HENT:      Right Ear: Hearing, tympanic membrane, ear canal and external ear normal. No middle ear effusion.      Left Ear: Hearing, tympanic membrane, ear canal and external " ear normal.  No middle ear effusion.      Mouth/Throat:      Pharynx: No posterior oropharyngeal erythema.   Eyes:      General: Lids are normal. No scleral icterus.        Left eye: No discharge.      Conjunctiva/sclera: Conjunctivae normal.      Pupils: Pupils are equal, round, and reactive to light.   Neck:      Thyroid: No thyroid mass, thyromegaly or thyroid tenderness.      Vascular: No carotid bruit.   Cardiovascular:      Rate and Rhythm: Normal rate.      Pulses: Normal pulses.      Heart sounds: Normal heart sounds. No murmur heard.  No friction rub. No gallop.    Pulmonary:      Effort: Pulmonary effort is normal. No retractions.      Breath sounds: Normal breath sounds.   Abdominal:      General: Abdomen is flat. Bowel sounds are normal. There is no distension.      Palpations: Abdomen is soft.      Tenderness: There is no abdominal tenderness. There is no guarding.   Musculoskeletal:      Cervical back: Full passive range of motion without pain, normal range of motion and neck supple. No rigidity or tenderness.      Right lower leg: No edema.      Left lower leg: No edema.   Lymphadenopathy:      Cervical: No cervical adenopathy.   Skin:     General: Skin is warm and dry.      Findings: No lesion or rash.      Nails: There is no clubbing.   Neurological:      General: No focal deficit present.      Mental Status: She is alert and oriented to person, place, and time.      Coordination: Coordination is intact.      Gait: Gait is intact.   Psychiatric:         Attention and Perception: Attention normal.         Mood and Affect: Mood and affect normal.         Speech: Speech normal.         Behavior: Behavior normal. Behavior is cooperative.         Thought Content: Thought content normal.         Cognition and Memory: Cognition normal.         Judgment: Judgment normal.             Result Review :     Common labs    Common Labsle 3/31/21 6/30/21 6/30/21 6/30/21 6/30/21     0804 0804 0804 0805   Glucose 144  (A)  160 (A)     BUN 19  13     Creatinine 0.93 (A)  0.90     eGFR Non African Am   66     Sodium 137  137     Potassium 4.4  4.5     Chloride 104  101     Calcium 9.0  9.2     Albumin 3.9  4.40     Total Bilirubin 0.30  0.3     Alkaline Phosphatase 104  89     AST (SGOT) 27  23     ALT (SGPT) 28  21     Total Cholesterol    170    Total Cholesterol 143       Triglycerides 70   83    HDL Cholesterol 56   60    LDL Cholesterol  73   95    Hemoglobin A1C 7.5 (A) 9.21 (A)      Microalbumin, Urine     <1.2   (A) Abnormal value       Comments are available for some flowsheets but are not being displayed.                      Assessment and Plan        Diagnoses and all orders for this visit:    1. Mixed hyperlipidemia (Primary)  -     Comprehensive Metabolic Panel  -     Lipid Panel    2. Controlled type 2 diabetes mellitus with hyperglycemia, without long-term current use of insulin (HCC)  -     Hemoglobin A1c    3. Other specified hypothyroidism  -     TSH    4. Immunity status testing  -     SARS-CoV-2 Antibodies (Roche)    5. OAB (overactive bladder)  -     Mirabegron ER (Myrbetriq) 50 MG tablet sustained-release 24 hour 24 hr tablet; Take 50 mg by mouth Daily.  Dispense: 30 tablet; Refill: 2  -     Ambulatory Referral to Urology    6. Urinary urgency  -     Ambulatory Referral to Urology    7. Visit for screening mammogram  -     Mammo Screening Digital Tomosynthesis Bilateral With CAD; Future          Follow Up     Return in about 3 months (around 4/3/2022) for Next scheduled follow up.    Patient was given instructions and counseling regarding her condition or for health maintenance advice. Please see specific information pulled into the AVS if appropriate.     WILLIAM Hickman

## 2022-01-03 NOTE — PATIENT INSTRUCTIONS
Diabetes Mellitus and Nutrition, Adult  When you have diabetes, or diabetes mellitus, it is very important to have healthy eating habits because your blood sugar (glucose) levels are greatly affected by what you eat and drink. Eating healthy foods in the right amounts, at about the same times every day, can help you:  · Control your blood glucose.  · Lower your risk of heart disease.  · Improve your blood pressure.  · Reach or maintain a healthy weight.  What can affect my meal plan?  Every person with diabetes is different, and each person has different needs for a meal plan. Your health care provider may recommend that you work with a dietitian to make a meal plan that is best for you. Your meal plan may vary depending on factors such as:  · The calories you need.  · The medicines you take.  · Your weight.  · Your blood glucose, blood pressure, and cholesterol levels.  · Your activity level.  · Other health conditions you have, such as heart or kidney disease.  How do carbohydrates affect me?  Carbohydrates, also called carbs, affect your blood glucose level more than any other type of food. Eating carbs naturally raises the amount of glucose in your blood. Carb counting is a method for keeping track of how many carbs you eat. Counting carbs is important to keep your blood glucose at a healthy level, especially if you use insulin or take certain oral diabetes medicines.  It is important to know how many carbs you can safely have in each meal. This is different for every person. Your dietitian can help you calculate how many carbs you should have at each meal and for each snack.  How does alcohol affect me?  Alcohol can cause a sudden decrease in blood glucose (hypoglycemia), especially if you use insulin or take certain oral diabetes medicines. Hypoglycemia can be a life-threatening condition. Symptoms of hypoglycemia, such as sleepiness, dizziness, and confusion, are similar to symptoms of having too much  "alcohol.  · Do not drink alcohol if:  ? Your health care provider tells you not to drink.  ? You are pregnant, may be pregnant, or are planning to become pregnant.  · If you drink alcohol:  ? Do not drink on an empty stomach.  ? Limit how much you use to:  § 0-1 drink a day for women.  § 0-2 drinks a day for men.  ? Be aware of how much alcohol is in your drink. In the [place] one drink equals one 12 oz bottle of beer (355 mL), one 5 oz glass of wine (148 mL), or one 1½ oz glass of hard liquor (44 mL).  ? Keep yourself hydrated with water, diet soda, or unsweetened iced tea.  § Keep in mind that regular soda, juice, and other mixers may contain a lot of sugar and must be counted as carbs.  What are tips for following this plan?    Reading food labels  · Start by checking the serving size on the \"Nutrition Facts\" label of packaged foods and drinks. The amount of calories, carbs, fats, and other nutrients listed on the label is based on one serving of the item. Many items contain more than one serving per package.  · Check the total grams (g) of carbs in one serving. You can calculate the number of servings of carbs in one serving by dividing the total carbs by 15. For example, if a food has 30 g of total carbs per serving, it would be equal to 2 servings of carbs.  · Check the number of grams (g) of saturated fats and trans fats in one serving. Choose foods that have a low amount or none of these fats.  · Check the number of milligrams (mg) of salt (sodium) in one serving. Most people should limit total sodium intake to less than 2,300 mg per day.  · Always check the nutrition information of foods labeled as \"low-fat\" or \"nonfat.\" These foods may be higher in added sugar or refined carbs and should be avoided.  · Talk to your dietitian to identify your daily goals for nutrients listed on the label.  Shopping  · Avoid buying canned, pre-made, or processed foods. These foods tend to be high in fat, sodium, and added " sugar.  · Shop around the outside edge of the grocery store. This is where you will most often find fresh fruits and vegetables, bulk grains, fresh meats, and fresh dairy.  Cooking  · Use low-heat cooking methods, such as baking, instead of high-heat cooking methods like deep frying.  · Cook using healthy oils, such as olive, canola, or sunflower oil.  · Avoid cooking with butter, cream, or high-fat meats.  Meal planning  · Eat meals and snacks regularly, preferably at the same times every day. Avoid going long periods of time without eating.  · Eat foods that are high in fiber, such as fresh fruits, vegetables, beans, and whole grains. Talk with your dietitian about how many servings of carbs you can eat at each meal.  · Eat 4-6 oz (112-168 g) of lean protein each day, such as lean meat, chicken, fish, eggs, or tofu. One ounce (oz) of lean protein is equal to:  ? 1 oz (28 g) of meat, chicken, or fish.  ? 1 egg.  ? ¼ cup (62 g) of tofu.  · Eat some foods each day that contain healthy fats, such as avocado, nuts, seeds, and fish.  What foods should I eat?  Fruits  Berries. Apples. Oranges. Peaches. Apricots. Plums. Grapes. Willy. Papaya. Pomegranate. Kiwi. Cherries.  Vegetables  Lettuce. Spinach. Leafy greens, including kale, chard, guru greens, and mustard greens. Beets. Cauliflower. Cabbage. Broccoli. Carrots. Green beans. Tomatoes. Peppers. Onions. Cucumbers. Poyen sprouts.  Grains  Whole grains, such as whole-wheat or whole-grain bread, crackers, tortillas, cereal, and pasta. Unsweetened oatmeal. Quinoa. Brown or wild rice.  Meats and other proteins  Seafood. Poultry without skin. Lean cuts of poultry and beef. Tofu. Nuts. Seeds.  Dairy  Low-fat or fat-free dairy products such as milk, yogurt, and cheese.  The items listed above may not be a complete list of foods and beverages you can eat. Contact a dietitian for more information.  What foods should I avoid?  Fruits  Fruits canned with  syrup.  Vegetables  Canned vegetables. Frozen vegetables with butter or cream sauce.  Grains  Refined white flour and flour products such as bread, pasta, snack foods, and cereals. Avoid all processed foods.  Meats and other proteins  Fatty cuts of meat. Poultry with skin. Breaded or fried meats. Processed meat. Avoid saturated fats.  Dairy  Full-fat yogurt, cheese, or milk.  Beverages  Sweetened drinks, such as soda or iced tea.  The items listed above may not be a complete list of foods and beverages you should avoid. Contact a dietitian for more information.  Questions to ask a health care provider  · Do I need to meet with a diabetes educator?  · Do I need to meet with a dietitian?  · What number can I call if I have questions?  · When are the best times to check my blood glucose?  Where to find more information:  · American Diabetes Association: diabetes.org  · Academy of Nutrition and Dietetics: www.eatright.org  · National Colon of Diabetes and Digestive and Kidney Diseases: www.niddk.nih.gov  · Association of Diabetes Care and Education Specialists: www.diabeteseducator.org  Summary  · It is important to have healthy eating habits because your blood sugar (glucose) levels are greatly affected by what you eat and drink.  · A healthy meal plan will help you control your blood glucose and maintain a healthy lifestyle.  · Your health care provider may recommend that you work with a dietitian to make a meal plan that is best for you.  · Keep in mind that carbohydrates (carbs) and alcohol have immediate effects on your blood glucose levels. It is important to count carbs and to use alcohol carefully.  This information is not intended to replace advice given to you by your health care provider. Make sure you discuss any questions you have with your health care provider.  Document Revised: 11/24/2020 Document Reviewed: 11/24/2020  Elsevier Patient Education © 2021 [employer]

## 2022-01-04 LAB — SARS-COV-2 AB SERPL QL IA: NEGATIVE

## 2022-01-04 RX ORDER — EMPAGLIFLOZIN, METFORMIN HYDROCHLORIDE 12.5; 1 MG/1; MG/1
1 TABLET, EXTENDED RELEASE ORAL 2 TIMES DAILY
Qty: 180 TABLET | Refills: 1 | Status: SHIPPED | OUTPATIENT
Start: 2022-01-04 | End: 2022-09-07 | Stop reason: SDUPTHER

## 2022-01-24 RX ORDER — LEVOTHYROXINE SODIUM 88 MCG
TABLET ORAL
Qty: 90 TABLET | Refills: 0 | Status: SHIPPED | OUTPATIENT
Start: 2022-01-24 | End: 2022-03-04 | Stop reason: SDUPTHER

## 2022-02-07 ENCOUNTER — OFFICE VISIT (OUTPATIENT)
Dept: FAMILY MEDICINE CLINIC | Facility: CLINIC | Age: 52
End: 2022-02-07

## 2022-02-07 VITALS
WEIGHT: 208 LBS | SYSTOLIC BLOOD PRESSURE: 120 MMHG | BODY MASS INDEX: 35.51 KG/M2 | TEMPERATURE: 98.3 F | DIASTOLIC BLOOD PRESSURE: 60 MMHG | OXYGEN SATURATION: 95 % | HEART RATE: 113 BPM | HEIGHT: 64 IN

## 2022-02-07 DIAGNOSIS — R05.9 COUGH: ICD-10-CM

## 2022-02-07 DIAGNOSIS — Z20.822 EXPOSURE TO COVID-19 VIRUS: Primary | ICD-10-CM

## 2022-02-07 LAB
EXPIRATION DATE: ABNORMAL
INTERNAL CONTROL: ABNORMAL
Lab: ABNORMAL
SARS-COV-2 AG UPPER RESP QL IA.RAPID: DETECTED

## 2022-02-07 PROCEDURE — 87426 SARSCOV CORONAVIRUS AG IA: CPT | Performed by: FAMILY MEDICINE

## 2022-02-07 PROCEDURE — 99203 OFFICE O/P NEW LOW 30 MIN: CPT | Performed by: FAMILY MEDICINE

## 2022-02-07 NOTE — PROGRESS NOTES
Chief Complaint  cough, congestion (started last night, temp 99.6 ); Headache (pain across upper shoulder, into ears. ); and  Positive for Covid last week    SUBJECTIVE  Mariela Aldrich presents to [facility]    51-year-old has diabetes  had Covid patient has some nasal congestion cough some headache no dyspnea on exertion or shortness of breath not been vaccinated    PAST MEDICAL HISTORY  Allergies   Allergen Reactions   • Atorvastatin Myalgia   • Crestor [Rosuvastatin] Other (See Comments)     Legs cramps    • Etodolac Unknown - Low Severity   • Iodine Unknown - High Severity   • Penicillins Swelling   • Pravastatin Myalgia        Past Surgical History:   • COLONOSCOPY    exernal hemorrhoids   • GROIN ABSCESS INCISION AND DRAINAGE    hidradenitis   • HYSTERECTOMY   • KNEE SURGERY       Social History     Tobacco Use   • Smoking status: Never Smoker   • Smokeless tobacco: Never Used   Substance Use Topics   • Alcohol use: Never       Family History   Problem Relation Age of Onset   • Thyroid disease Father 70   • Thyroid disease Sister    • Diabetes Sister    • Thyroid disease Brother 43        thyroid cancer   • Diabetes Brother    • Diabetes Maternal Grandmother         Health Maintenance Due   Topic Date Due   • ANNUAL PHYSICAL  Never done   • COVID-19 Vaccine (1) Never done   • Hepatitis B (1 of 3 - Risk 3-dose series) Never done   • ZOSTER VACCINE (1 of 2) Never done   • HEPATITIS C SCREENING  Never done   • DIABETIC FOOT EXAM  Never done   • DIABETIC EYE EXAM  06/22/2021        Last Completed Colonoscopy          COLORECTAL CANCER SCREENING (COLONOSCOPY - Every 10 Years) Next due on 1/21/2030 01/21/2020  Outside Procedure: HI COLONOSCOPY FLX DX W/COLLJ SPEC WHEN PFRMD                REVIEW OF SYSTEMS    Cardiovascular no chest pain  ENT no loss of smell or taste just nasal congestion no pharyngitis  Respiratory some cough no shortness of breath pulse ox  "here in the office 95      OBJECTIVE  Vitals:    02/07/22 1524   BP: 120/60   Pulse: 113   Temp: 98.3 °F (36.8 °C)   SpO2: 95%   Weight: 94.3 kg (208 lb)   Height: 162.6 cm (64\")     Body mass index is 35.7 kg/m².    PHYSICAL EXAM    General no distress does not appear ill  Lungs clear and equal bilaterally no rales no rhonchi no wheezes  Cardiovascular regular rhythm no murmur  Covid positive ASSESSMENT & PLAN  Diagnoses and all orders for this visit:    1. Exposure to COVID-19 virus (Primary)  -     POCT SARS-CoV-2 Antigen GERMAN    2. Cough  -     POCT SARS-CoV-2 Antigen GERMAN    Other orders  -     Molnupiravir (LAGEVRIO) 200 MG capsule; Take 4 capsules by mouth Every 12 (Twelve) Hours for 5 days.  Dispense: 40 capsule; Refill: 0          Covid positive acute case only for 2 days needs to take the antiviral recheck if shortness of breath isolate for 5 days            Patient was given instructions and counseling regarding her condition or for health maintenance advice. Please see specific information pulled into the AVS if appropriate.   "

## 2022-02-08 RX ORDER — INSULIN DEGLUDEC 200 U/ML
INJECTION, SOLUTION SUBCUTANEOUS
Qty: 9 ML | Refills: 0 | Status: SHIPPED | OUTPATIENT
Start: 2022-02-08 | End: 2022-03-04 | Stop reason: SDUPTHER

## 2022-02-14 RX ORDER — ROPINIROLE 4 MG/1
TABLET, FILM COATED ORAL
Qty: 90 TABLET | Refills: 0 | Status: SHIPPED | OUTPATIENT
Start: 2022-02-14 | End: 2022-03-04 | Stop reason: SDUPTHER

## 2022-03-04 ENCOUNTER — TELEPHONE (OUTPATIENT)
Dept: FAMILY MEDICINE CLINIC | Facility: CLINIC | Age: 52
End: 2022-03-04

## 2022-03-04 ENCOUNTER — OFFICE VISIT (OUTPATIENT)
Dept: FAMILY MEDICINE CLINIC | Facility: CLINIC | Age: 52
End: 2022-03-04

## 2022-03-04 VITALS
WEIGHT: 205 LBS | HEART RATE: 89 BPM | SYSTOLIC BLOOD PRESSURE: 113 MMHG | HEIGHT: 64 IN | DIASTOLIC BLOOD PRESSURE: 69 MMHG | TEMPERATURE: 97.9 F | BODY MASS INDEX: 35 KG/M2 | OXYGEN SATURATION: 96 %

## 2022-03-04 DIAGNOSIS — Z79.4 TYPE 2 DIABETES MELLITUS WITH HYPERGLYCEMIA, WITH LONG-TERM CURRENT USE OF INSULIN: ICD-10-CM

## 2022-03-04 DIAGNOSIS — N32.81 OAB (OVERACTIVE BLADDER): ICD-10-CM

## 2022-03-04 DIAGNOSIS — E78.2 MIXED HYPERLIPIDEMIA: Primary | ICD-10-CM

## 2022-03-04 DIAGNOSIS — G25.81 RLS (RESTLESS LEGS SYNDROME): ICD-10-CM

## 2022-03-04 DIAGNOSIS — E03.8 OTHER SPECIFIED HYPOTHYROIDISM: ICD-10-CM

## 2022-03-04 DIAGNOSIS — E11.65 TYPE 2 DIABETES MELLITUS WITH HYPERGLYCEMIA, WITH LONG-TERM CURRENT USE OF INSULIN: ICD-10-CM

## 2022-03-04 DIAGNOSIS — F41.9 ANXIETY: ICD-10-CM

## 2022-03-04 PROCEDURE — 99214 OFFICE O/P EST MOD 30 MIN: CPT | Performed by: NURSE PRACTITIONER

## 2022-03-04 RX ORDER — ROPINIROLE 4 MG/1
4 TABLET, FILM COATED ORAL NIGHTLY
Qty: 90 TABLET | Refills: 1 | Status: SHIPPED | OUTPATIENT
Start: 2022-03-04 | End: 2022-05-16

## 2022-03-04 RX ORDER — ADALIMUMAB 40MG/0.4ML
KIT SUBCUTANEOUS
COMMUNITY
Start: 2022-02-07 | End: 2023-02-03

## 2022-03-04 RX ORDER — GLIPIZIDE 10 MG/1
10 TABLET ORAL
Qty: 180 TABLET | Refills: 1 | Status: SHIPPED | OUTPATIENT
Start: 2022-03-04 | End: 2023-01-23

## 2022-03-04 RX ORDER — SERTRALINE HYDROCHLORIDE 100 MG/1
150 TABLET, FILM COATED ORAL DAILY
Qty: 45 TABLET | Refills: 3 | Status: SHIPPED | OUTPATIENT
Start: 2022-03-04 | End: 2022-07-18

## 2022-03-04 RX ORDER — FLASH GLUCOSE SENSOR
1 KIT MISCELLANEOUS
Qty: 6 EACH | Refills: 1 | Status: SHIPPED | OUTPATIENT
Start: 2022-03-04 | End: 2022-03-09

## 2022-03-04 RX ORDER — LEVOTHYROXINE SODIUM 88 MCG
88 TABLET ORAL DAILY
Qty: 3 TABLET | Refills: 3 | Status: SHIPPED | OUTPATIENT
Start: 2022-03-04 | End: 2022-05-03

## 2022-03-04 RX ORDER — INSULIN DEGLUDEC 200 U/ML
42 INJECTION, SOLUTION SUBCUTANEOUS DAILY
Qty: 9 ML | Refills: 3 | Status: SHIPPED | OUTPATIENT
Start: 2022-03-04 | End: 2022-03-23

## 2022-03-04 RX ORDER — LISINOPRIL 5 MG/1
5 TABLET ORAL DAILY
Qty: 90 TABLET | Refills: 1 | Status: SHIPPED | OUTPATIENT
Start: 2022-03-04 | End: 2022-09-01

## 2022-03-04 NOTE — PATIENT INSTRUCTIONS
Diabetes Mellitus and Nutrition, Adult  When you have diabetes, or diabetes mellitus, it is very important to have healthy eating habits because your blood sugar (glucose) levels are greatly affected by what you eat and drink. Eating healthy foods in the right amounts, at about the same times every day, can help you:  · Control your blood glucose.  · Lower your risk of heart disease.  · Improve your blood pressure.  · Reach or maintain a healthy weight.  What can affect my meal plan?  Every person with diabetes is different, and each person has different needs for a meal plan. Your health care provider may recommend that you work with a dietitian to make a meal plan that is best for you. Your meal plan may vary depending on factors such as:  · The calories you need.  · The medicines you take.  · Your weight.  · Your blood glucose, blood pressure, and cholesterol levels.  · Your activity level.  · Other health conditions you have, such as heart or kidney disease.  How do carbohydrates affect me?  Carbohydrates, also called carbs, affect your blood glucose level more than any other type of food. Eating carbs naturally raises the amount of glucose in your blood. Carb counting is a method for keeping track of how many carbs you eat. Counting carbs is important to keep your blood glucose at a healthy level, especially if you use insulin or take certain oral diabetes medicines.  It is important to know how many carbs you can safely have in each meal. This is different for every person. Your dietitian can help you calculate how many carbs you should have at each meal and for each snack.  How does alcohol affect me?  Alcohol can cause a sudden decrease in blood glucose (hypoglycemia), especially if you use insulin or take certain oral diabetes medicines. Hypoglycemia can be a life-threatening condition. Symptoms of hypoglycemia, such as sleepiness, dizziness, and confusion, are similar to symptoms of having too much  "alcohol.  · Do not drink alcohol if:  ? Your health care provider tells you not to drink.  ? You are pregnant, may be pregnant, or are planning to become pregnant.  · If you drink alcohol:  ? Do not drink on an empty stomach.  ? Limit how much you use to:  § 0-1 drink a day for women.  § 0-2 drinks a day for men.  ? Be aware of how much alcohol is in your drink. In the [place] one drink equals one 12 oz bottle of beer (355 mL), one 5 oz glass of wine (148 mL), or one 1½ oz glass of hard liquor (44 mL).  ? Keep yourself hydrated with water, diet soda, or unsweetened iced tea.  § Keep in mind that regular soda, juice, and other mixers may contain a lot of sugar and must be counted as carbs.  What are tips for following this plan?    Reading food labels  · Start by checking the serving size on the \"Nutrition Facts\" label of packaged foods and drinks. The amount of calories, carbs, fats, and other nutrients listed on the label is based on one serving of the item. Many items contain more than one serving per package.  · Check the total grams (g) of carbs in one serving. You can calculate the number of servings of carbs in one serving by dividing the total carbs by 15. For example, if a food has 30 g of total carbs per serving, it would be equal to 2 servings of carbs.  · Check the number of grams (g) of saturated fats and trans fats in one serving. Choose foods that have a low amount or none of these fats.  · Check the number of milligrams (mg) of salt (sodium) in one serving. Most people should limit total sodium intake to less than 2,300 mg per day.  · Always check the nutrition information of foods labeled as \"low-fat\" or \"nonfat.\" These foods may be higher in added sugar or refined carbs and should be avoided.  · Talk to your dietitian to identify your daily goals for nutrients listed on the label.  Shopping  · Avoid buying canned, pre-made, or processed foods. These foods tend to be high in fat, sodium, and added " sugar.  · Shop around the outside edge of the grocery store. This is where you will most often find fresh fruits and vegetables, bulk grains, fresh meats, and fresh dairy.  Cooking  · Use low-heat cooking methods, such as baking, instead of high-heat cooking methods like deep frying.  · Cook using healthy oils, such as olive, canola, or sunflower oil.  · Avoid cooking with butter, cream, or high-fat meats.  Meal planning  · Eat meals and snacks regularly, preferably at the same times every day. Avoid going long periods of time without eating.  · Eat foods that are high in fiber, such as fresh fruits, vegetables, beans, and whole grains. Talk with your dietitian about how many servings of carbs you can eat at each meal.  · Eat 4-6 oz (112-168 g) of lean protein each day, such as lean meat, chicken, fish, eggs, or tofu. One ounce (oz) of lean protein is equal to:  ? 1 oz (28 g) of meat, chicken, or fish.  ? 1 egg.  ? ¼ cup (62 g) of tofu.  · Eat some foods each day that contain healthy fats, such as avocado, nuts, seeds, and fish.  What foods should I eat?  Fruits  Berries. Apples. Oranges. Peaches. Apricots. Plums. Grapes. Willy. Papaya. Pomegranate. Kiwi. Cherries.  Vegetables  Lettuce. Spinach. Leafy greens, including kale, chard, guru greens, and mustard greens. Beets. Cauliflower. Cabbage. Broccoli. Carrots. Green beans. Tomatoes. Peppers. Onions. Cucumbers. [place] sprouts.  Grains  Whole grains, such as whole-wheat or whole-grain bread, crackers, tortillas, cereal, and pasta. Unsweetened oatmeal. Quinoa. Brown or wild rice.  Meats and other proteins  Seafood. Poultry without skin. Lean cuts of poultry and beef. Tofu. Nuts. Seeds.  Dairy  Low-fat or fat-free dairy products such as milk, yogurt, and cheese.  The items listed above may not be a complete list of foods and beverages you can eat. Contact a dietitian for more information.  What foods should I avoid?  Fruits  Fruits canned with  syrup.  Vegetables  Canned vegetables. Frozen vegetables with butter or cream sauce.  Grains  Refined white flour and flour products such as bread, pasta, snack foods, and cereals. Avoid all processed foods.  Meats and other proteins  Fatty cuts of meat. Poultry with skin. Breaded or fried meats. Processed meat. Avoid saturated fats.  Dairy  Full-fat yogurt, cheese, or milk.  Beverages  Sweetened drinks, such as soda or iced tea.  The items listed above may not be a complete list of foods and beverages you should avoid. Contact a dietitian for more information.  Questions to ask a health care provider  · Do I need to meet with a diabetes educator?  · Do I need to meet with a dietitian?  · What number can I call if I have questions?  · When are the best times to check my blood glucose?  Where to find more information:  · American Diabetes Association: diabetes.org  · Academy of Nutrition and Dietetics: www.eatright.org  · National [place] of Diabetes and Digestive and Kidney Diseases: www.niddk.nih.gov  · Association of Diabetes Care and Education Specialists: www.diabeteseducator.org  Summary  · It is important to have healthy eating habits because your blood sugar (glucose) levels are greatly affected by what you eat and drink.  · A healthy meal plan will help you control your blood glucose and maintain a healthy lifestyle.  · Your health care provider may recommend that you work with a dietitian to make a meal plan that is best for you.  · Keep in mind that carbohydrates (carbs) and alcohol have immediate effects on your blood glucose levels. It is important to count carbs and to use alcohol carefully.  This information is not intended to replace advice given to you by your health care provider. Make sure you discuss any questions you have with your health care provider.  Document Revised: 11/24/2020 Document Reviewed: 11/24/2020  Elsevier Patient Education © 2021 [employer] Inc.

## 2022-03-04 NOTE — PROGRESS NOTES
Chief Complaint  Follow-up (3 month ), Hyperlipidemia, and Diabetes    Subjective          Mariela Aldrich is a 51 y.o. female who presents to [facility]    History of Present Illness    Diabetes-patient reports fasting blood sugar this morning was 145.  Patient reports field Synjardy to take twice daily.    Hypertension-well-controlled.    Hyperlipidemia-patient denies any myalgias with Livalo.    Restless leg-well-controlled with medications.    Anxiety-patient reports Zoloft is working well.    PHQ-2 Total Score: 0   PHQ-9 Total Score: 0       Review of Systems   Constitutional: Negative for chills, fatigue and fever.   Respiratory: Negative for cough and shortness of breath.    Cardiovascular: Negative for chest pain and palpitations.   Gastrointestinal: Negative for constipation, diarrhea, nausea and vomiting.   Musculoskeletal: Negative for back pain and neck pain.   Skin: Negative for rash.   Neurological: Negative for dizziness and headaches.   Psychiatric/Behavioral: Negative for sleep disturbance and suicidal ideas. The patient is nervous/anxious.           Medical History: has a past medical history of Anxiety, Diabetes mellitus type 2, controlled ([facility]), Hyperlipidemia, Hypothyroidism, Panic attack, RLS (restless legs syndrome), and Sinus trouble.     Surgical History: has a past surgical history that includes Colonoscopy (01/21/2020); Groin Abscess Incision and Drainage (Right); Hysterectomy (2010); and Knee surgery (Bilateral, 2013).     Family History: family history includes Diabetes in her brother, maternal grandmother, and sister; Thyroid disease in her sister; Thyroid disease (age of onset: 43) in her brother; Thyroid disease (age of onset: 70) in her father.     Social History: reports that she has never smoked. She has never used smokeless tobacco. She reports that she does not drink alcohol and does not use drugs.    Allergies: Atorvastatin, Crestor  [rosuvastatin], Etodolac, Iodine, Penicillins, and Pravastatin      Health Maintenance Due   Topic Date Due   • ANNUAL PHYSICAL  Never done   • Hepatitis B (1 of 3 - Risk 3-dose series) Never done   • ZOSTER VACCINE (1 of 2) Never done   • HEPATITIS C SCREENING  Never done   • DIABETIC FOOT EXAM  Never done   • DIABETIC EYE EXAM  06/22/2021            Current Outpatient Medications:   •  Accu-Chek FastClix Lancets misc, USE TO CHECK BLOOD SUGAR 1-2 TIMES DAILY, Disp: 102 each, Rfl: 0  •  Accu-Chek Guide test strip, USE TO TEST 1 TO 2 TIMES DAILY., Disp: 50 each, Rfl: 0  •  adalimumab (Humira) 40 MG/0.8ML Prefilled Syringe Kit injection, 0.8 mL., Disp: , Rfl:   •  albuterol sulfate  (90 Base) MCG/ACT inhaler, Inhale 2 puffs Every 4 (Four) Hours As Needed., Disp: , Rfl:   •  B-D ULTRAFINE III SHORT PEN 31G X 8 MM misc, USE AS DIRECTED ONCE DAILY WITH TRESIBA FLEXPEN., Disp: 100 each, Rfl: 0  •  Blood Glucose Monitoring Suppl kit, , Disp: , Rfl:   •  cetirizine (ZyrTEC Allergy) 10 MG tablet, Zyrtec 10 mg oral tablet take 1 tablet (10 mg) by oral route once daily   Active, Disp: , Rfl:   •  Empagliflozin-metFORMIN HCl ER (Synjardy XR) 12.5-1000 MG tablet sustained-release 24 hour, Take 1 tablet by mouth 2 (Two) Times a Day., Disp: 180 tablet, Rfl: 1  •  fluticasone-salmeterol (Wixela Inhub) 250-50 MCG/DOSE DISKUS, Inhale 1 puff 2 (Two) Times a Day., Disp: 60 each, Rfl: 5  •  glipizide (GLUCOTROL) 10 MG tablet, Take 1 tablet by mouth 2 (Two) Times a Day Before Meals., Disp: 180 tablet, Rfl: 1  •  Humira Pen 40 MG/0.4ML Pen-injector Kit, , Disp: , Rfl:   •  Insulin Degludec (Tresiba FlexTouch) 200 UNIT/ML solution pen-injector pen injection, Inject 42 Units under the skin into the appropriate area as directed Daily., Disp: 9 mL, Rfl: 3  •  lisinopril (PRINIVIL,ZESTRIL) 5 MG tablet, Take 1 tablet by mouth Daily., Disp: 90 tablet, Rfl: 1  •  Mirabegron ER (Myrbetriq) 50 MG tablet sustained-release 24 hour 24 hr  "tablet, Take 50 mg by mouth Daily., Disp: 28 tablet, Rfl: 0  •  multivitamin (MULTI-VITAMIN PO), , Disp: , Rfl:   •  pitavastatin calcium (Livalo) 2 MG tablet tablet, Take 1 tablet by mouth Daily., Disp: 90 tablet, Rfl: 1  •  rOPINIRole (REQUIP) 4 MG tablet, Take 1 tablet by mouth Every Night., Disp: 90 tablet, Rfl: 1  •  sertraline (ZOLOFT) 100 MG tablet, Take 1.5 tablets by mouth Daily., Disp: 45 tablet, Rfl: 3  •  SITagliptin (Januvia) 100 MG tablet, Take 1 tablet by mouth Daily., Disp: 90 tablet, Rfl: 1  •  Synthroid 88 MCG tablet, Take 1 tablet by mouth Daily., Disp: 3 tablet, Rfl: 3  •  Zinc 50 MG tablet, zinc 50 mg oral tablet take 1 tablet by oral route daily   Active, Disp: , Rfl:   •  Continuous Blood Gluc Sensor (Electric ImpStyle Kwabena 14 Day Sensor) misc, 1 unit marking on U-100 syringe Every 14 (Fourteen) Days. Dx: E11.9, Disp: 6 each, Rfl: 1      Immunization History   Administered Date(s) Administered   • Flu Vaccine Quad PF >18YRS 10/01/2020   • Pneumococcal Polysaccharide (PPSV23) 07/20/2019   • Td 05/08/1998   • Tdap 03/16/2020         Objective       Vitals:    03/04/22 0801   BP: 113/69   Pulse: 89   Temp: 97.9 °F (36.6 °C)   SpO2: 96%   Weight: 93 kg (205 lb)   Height: 162.6 cm (64\")      Body mass index is 35.19 kg/m².   Wt Readings from Last 3 Encounters:   03/04/22 93 kg (205 lb)   02/07/22 94.3 kg (208 lb)   01/03/22 93 kg (205 lb)      BP Readings from Last 3 Encounters:   03/04/22 113/69   02/07/22 120/60   01/03/22 115/66        Physical Exam  Vitals reviewed.   Constitutional:       Appearance: Normal appearance. She is well-developed.   HENT:      Head: Normocephalic and atraumatic.   Eyes:      Conjunctiva/sclera: Conjunctivae normal.      Pupils: Pupils are equal, round, and reactive to light.   Cardiovascular:      Rate and Rhythm: Normal rate and regular rhythm.      Heart sounds: Normal heart sounds. No murmur heard.      Pulmonary:      Effort: Pulmonary effort is normal.      Breath " sounds: Normal breath sounds. No wheezing or rhonchi.   Abdominal:      General: Bowel sounds are normal. There is no distension.      Palpations: Abdomen is soft.      Tenderness: There is no abdominal tenderness.   Skin:     General: Skin is warm and dry.   Neurological:      Mental Status: She is alert and oriented to person, place, and time.   Psychiatric:         Mood and Affect: Mood and affect normal.         Behavior: Behavior normal.         Thought Content: Thought content normal.         Judgment: Judgment normal.             Result Review :     CMP    CMP 3/31/21 6/30/21 1/3/22   Glucose 144 (A) 160 (A) 166 (A)   BUN 19 13 13   Creatinine 0.93 (A) 0.90 1.05 (A)   eGFR Non African Am  66 55 (A)   Sodium 137 137 132 (A)   Potassium 4.4 4.5 4.7   Chloride 104 101 99   Calcium 9.0 9.2 9.4   Albumin 3.9 4.40 4.20   Total Bilirubin 0.30 0.3 0.3   Alkaline Phosphatase 104 89 109   AST (SGOT) 27 23 31   ALT (SGPT) 28 21 31   (A) Abnormal value       Comments are available for some flowsheets but are not being displayed.           Lipid Panel    Lipid Panel 3/31/21 6/30/21 1/3/22   Total Cholesterol  170 145   Total Cholesterol 143     Triglycerides 70 83 58   HDL Cholesterol 56 60 49   VLDL Cholesterol 14 15 12   LDL Cholesterol  73 95 84   LDL/HDL Ratio  1.56 1.72      Comments are available for some flowsheets but are not being displayed.           Most Recent A1C    HGBA1C Most Recent 1/3/22   Hemoglobin A1C 9.30 (A)   (A) Abnormal value                       Assessment and Plan        Diagnoses and all orders for this visit:    1. Mixed hyperlipidemia (Primary)  -     pitavastatin calcium (Livalo) 2 MG tablet tablet; Take 1 tablet by mouth Daily.  Dispense: 90 tablet; Refill: 1    2. OAB (overactive bladder)    3. Other specified hypothyroidism  -     Synthroid 88 MCG tablet; Take 1 tablet by mouth Daily.  Dispense: 3 tablet; Refill: 3    4. Type 2 diabetes mellitus with hyperglycemia, with long-term current  use of insulin (HCC)  -     glipizide (GLUCOTROL) 10 MG tablet; Take 1 tablet by mouth 2 (Two) Times a Day Before Meals.  Dispense: 180 tablet; Refill: 1  -     SITagliptin (Januvia) 100 MG tablet; Take 1 tablet by mouth Daily.  Dispense: 90 tablet; Refill: 1  -     lisinopril (PRINIVIL,ZESTRIL) 5 MG tablet; Take 1 tablet by mouth Daily.  Dispense: 90 tablet; Refill: 1  -     Insulin Degludec (Tresiba FlexTouch) 200 UNIT/ML solution pen-injector pen injection; Inject 42 Units under the skin into the appropriate area as directed Daily.  Dispense: 9 mL; Refill: 3  -     Comprehensive Metabolic Panel; Future  -     Lipid Panel; Future  -     Hemoglobin A1c; Future  -     Microalbumin / Creatinine Urine Ratio - Urine, Clean Catch; Future  -     Continuous Blood Gluc Sensor (FreeStyle Kwabena 14 Day Sensor) misc; 1 unit marking on U-100 syringe Every 14 (Fourteen) Days. Dx: E11.9  Dispense: 6 each; Refill: 1    5. RLS (restless legs syndrome)  -     rOPINIRole (REQUIP) 4 MG tablet; Take 1 tablet by mouth Every Night.  Dispense: 90 tablet; Refill: 1    6. Anxiety  -     sertraline (ZOLOFT) 100 MG tablet; Take 1.5 tablets by mouth Daily.  Dispense: 45 tablet; Refill: 3    Other orders  -     fluticasone-salmeterol (Wixela Inhub) 250-50 MCG/DOSE DISKUS; Inhale 1 puff 2 (Two) Times a Day.  Dispense: 60 each; Refill: 5      Discussed glipizide and low blood sugars take with meals.  If patient has a low blood sugar below 70 recommend patient drop second night dose of glipizide.    Follow Up     Return in about 3 months (around 6/4/2022) for Next scheduled follow up.    Patient was given instructions and counseling regarding her condition or for health maintenance advice. Please see specific information pulled into the AVS if appropriate.     WILLIAM Hickman

## 2022-03-04 NOTE — TELEPHONE ENCOUNTER
Pharmacy Name:  [facility]    Pharmacy representative name: JONNATHAN    Pharmacy representative phone number: [phone]    What medication are you calling in regards to: FREESTYLE DUNCAN    Who is the provider that prescribed the medication: WILLIAM RENNER    Additional notes: PHARMACY NEEDS CLARIFICATION ON WHETHER THE SENSORS OR STRIPS ARE NEEDED.

## 2022-03-07 RX ORDER — FLASH GLUCOSE SENSOR
1 KIT MISCELLANEOUS
Qty: 1 EACH | Refills: 0 | Status: SHIPPED | OUTPATIENT
Start: 2022-03-07 | End: 2022-03-09

## 2022-03-08 ENCOUNTER — TELEPHONE (OUTPATIENT)
Dept: UROLOGY | Facility: CLINIC | Age: 52
End: 2022-03-08

## 2022-03-09 ENCOUNTER — PATIENT MESSAGE (OUTPATIENT)
Dept: FAMILY MEDICINE CLINIC | Facility: CLINIC | Age: 52
End: 2022-03-09

## 2022-03-09 RX ORDER — FLASH GLUCOSE SENSOR
1 KIT MISCELLANEOUS
Qty: 6 EACH | Refills: 1 | Status: SHIPPED | OUTPATIENT
Start: 2022-03-09 | End: 2022-03-10 | Stop reason: CLARIF

## 2022-03-10 NOTE — TELEPHONE ENCOUNTER
From: Mariela Aldrich  To: WILLIAM Hickman  Sent: 3/9/2022 5:35 PM EST  Subject: Freestyle Kwabena 2    Berenice,  I love the Freestyle Kwabena 2. It is really helping me to watch what I eat, the price for one sensor is $37.99. So that would be 75.98 a month. I researched on the internet and called the company and all they can do is send me a voucher for 2 at $75.00 a month. I cannot afford this and would therefore have to go back to the finger poke style. Is there anyway to get samples or discounts so I can continue to use this product?? I absolutely love it. Thanks for any help you can provide.   Thank you,   Mariela

## 2022-03-15 ENCOUNTER — HOSPITAL ENCOUNTER (OUTPATIENT)
Dept: MAMMOGRAPHY | Facility: HOSPITAL | Age: 52
Discharge: HOME | End: 2022-03-15
Admitting: NURSE PRACTITIONER

## 2022-03-15 DIAGNOSIS — Z12.31 VISIT FOR SCREENING MAMMOGRAM: ICD-10-CM

## 2022-03-15 PROCEDURE — 77067 SCR MAMMO BI INCL CAD: CPT

## 2022-03-15 PROCEDURE — 77063 BREAST TOMOSYNTHESIS BI: CPT

## 2022-03-23 DIAGNOSIS — E11.65 TYPE 2 DIABETES MELLITUS WITH HYPERGLYCEMIA, WITH LONG-TERM CURRENT USE OF INSULIN: ICD-10-CM

## 2022-03-23 DIAGNOSIS — Z79.4 TYPE 2 DIABETES MELLITUS WITH HYPERGLYCEMIA, WITH LONG-TERM CURRENT USE OF INSULIN: ICD-10-CM

## 2022-03-23 RX ORDER — PEN NEEDLE, DIABETIC 31 GX5/16"
NEEDLE, DISPOSABLE MISCELLANEOUS
Qty: 100 EACH | Refills: 0 | Status: SHIPPED | OUTPATIENT
Start: 2022-03-23 | End: 2022-06-27

## 2022-03-23 RX ORDER — INSULIN DEGLUDEC 200 U/ML
INJECTION, SOLUTION SUBCUTANEOUS
Qty: 9 ML | Refills: 0 | Status: SHIPPED | OUTPATIENT
Start: 2022-03-23 | End: 2022-06-06 | Stop reason: SDUPTHER

## 2022-05-03 DIAGNOSIS — E03.8 OTHER SPECIFIED HYPOTHYROIDISM: ICD-10-CM

## 2022-05-03 RX ORDER — LEVOTHYROXINE SODIUM 88 MCG
TABLET ORAL
Qty: 90 TABLET | Refills: 0 | Status: SHIPPED | OUTPATIENT
Start: 2022-05-03 | End: 2022-08-01

## 2022-05-03 NOTE — TELEPHONE ENCOUNTER
Caller: Elinor Mariela Negar    Relationship: Self    Best call back number:[phone]    Requested Prescriptions:   Requested Prescriptions     Pending Prescriptions Disp Refills   • Synthroid 88 MCG tablet [Pharmacy Med Name: Synthroid 88 MCG Oral Tablet] 90 tablet 0     Sig: Take 1 tablet by mouth once daily        Pharmacy where request should be sent: [facility] [address] [place] [address] [phone] [facility] [phone] [facility]     Additional details provided by patient:     Does the patient have less than a 3 day supply:  [x] Yes  [] No    Samuel Bethea Rep   05/03/22 09:31 EDT

## 2022-05-16 DIAGNOSIS — G25.81 RLS (RESTLESS LEGS SYNDROME): ICD-10-CM

## 2022-05-16 RX ORDER — ROPINIROLE 4 MG/1
TABLET, FILM COATED ORAL
Qty: 90 TABLET | Refills: 0 | Status: SHIPPED | OUTPATIENT
Start: 2022-05-16 | End: 2022-08-15

## 2022-06-01 DIAGNOSIS — N32.81 OAB (OVERACTIVE BLADDER): ICD-10-CM

## 2022-06-01 RX ORDER — MIRABEGRON 50 MG/1
TABLET, FILM COATED, EXTENDED RELEASE ORAL
Qty: 30 TABLET | Refills: 0 | Status: SHIPPED | OUTPATIENT
Start: 2022-06-01 | End: 2022-06-27

## 2022-06-06 ENCOUNTER — OFFICE VISIT (OUTPATIENT)
Dept: FAMILY MEDICINE CLINIC | Facility: CLINIC | Age: 52
End: 2022-06-06

## 2022-06-06 ENCOUNTER — LAB (OUTPATIENT)
Dept: LAB | Facility: HOSPITAL | Age: 52
End: 2022-06-06

## 2022-06-06 VITALS
DIASTOLIC BLOOD PRESSURE: 74 MMHG | OXYGEN SATURATION: 98 % | HEIGHT: 64 IN | HEART RATE: 93 BPM | WEIGHT: 204 LBS | BODY MASS INDEX: 34.83 KG/M2 | SYSTOLIC BLOOD PRESSURE: 126 MMHG

## 2022-06-06 DIAGNOSIS — E11.65 TYPE 2 DIABETES MELLITUS WITH HYPERGLYCEMIA, WITH LONG-TERM CURRENT USE OF INSULIN: Primary | ICD-10-CM

## 2022-06-06 DIAGNOSIS — E11.65 TYPE 2 DIABETES MELLITUS WITH HYPERGLYCEMIA, WITH LONG-TERM CURRENT USE OF INSULIN: ICD-10-CM

## 2022-06-06 DIAGNOSIS — Z79.4 TYPE 2 DIABETES MELLITUS WITH HYPERGLYCEMIA, WITH LONG-TERM CURRENT USE OF INSULIN: Primary | ICD-10-CM

## 2022-06-06 DIAGNOSIS — Z79.4 TYPE 2 DIABETES MELLITUS WITH HYPERGLYCEMIA, WITH LONG-TERM CURRENT USE OF INSULIN: ICD-10-CM

## 2022-06-06 DIAGNOSIS — N32.81 OAB (OVERACTIVE BLADDER): ICD-10-CM

## 2022-06-06 LAB
ALBUMIN SERPL-MCNC: 3.9 G/DL (ref 3.5–5.2)
ALBUMIN UR-MCNC: <1.2 MG/DL
ALBUMIN/GLOB SERPL: 0.8 G/DL
ALP SERPL-CCNC: 94 U/L (ref 39–117)
ALT SERPL W P-5'-P-CCNC: 22 U/L (ref 1–33)
ANION GAP SERPL CALCULATED.3IONS-SCNC: 11 MMOL/L (ref 5–15)
AST SERPL-CCNC: 20 U/L (ref 1–32)
BILIRUB SERPL-MCNC: 0.3 MG/DL (ref 0–1.2)
BUN SERPL-MCNC: 16 MG/DL (ref 6–20)
BUN/CREAT SERPL: 18 (ref 7–25)
CALCIUM SPEC-SCNC: 9.2 MG/DL (ref 8.6–10.5)
CHLORIDE SERPL-SCNC: 103 MMOL/L (ref 98–107)
CHOLEST SERPL-MCNC: 159 MG/DL (ref 0–200)
CO2 SERPL-SCNC: 23 MMOL/L (ref 22–29)
CREAT SERPL-MCNC: 0.89 MG/DL (ref 0.57–1)
CREAT UR-MCNC: 49.1 MG/DL
EGFRCR SERPLBLD CKD-EPI 2021: 78.1 ML/MIN/1.73
GLOBULIN UR ELPH-MCNC: 4.8 GM/DL
GLUCOSE SERPL-MCNC: 161 MG/DL (ref 65–99)
HBA1C MFR BLD: 9.4 % (ref 4.8–5.6)
HDLC SERPL-MCNC: 66 MG/DL (ref 40–60)
LDLC SERPL CALC-MCNC: 81 MG/DL (ref 0–100)
LDLC/HDLC SERPL: 1.22 {RATIO}
MICROALBUMIN/CREAT UR: NORMAL MG/G{CREAT}
POTASSIUM SERPL-SCNC: 4.4 MMOL/L (ref 3.5–5.2)
PROT SERPL-MCNC: 8.7 G/DL (ref 6–8.5)
SODIUM SERPL-SCNC: 137 MMOL/L (ref 136–145)
TRIGL SERPL-MCNC: 62 MG/DL (ref 0–150)
VLDLC SERPL-MCNC: 12 MG/DL (ref 5–40)

## 2022-06-06 PROCEDURE — 80061 LIPID PANEL: CPT

## 2022-06-06 PROCEDURE — 82570 ASSAY OF URINE CREATININE: CPT

## 2022-06-06 PROCEDURE — 80053 COMPREHEN METABOLIC PANEL: CPT

## 2022-06-06 PROCEDURE — 83036 HEMOGLOBIN GLYCOSYLATED A1C: CPT

## 2022-06-06 PROCEDURE — 99213 OFFICE O/P EST LOW 20 MIN: CPT | Performed by: NURSE PRACTITIONER

## 2022-06-06 PROCEDURE — 82043 UR ALBUMIN QUANTITATIVE: CPT

## 2022-06-06 RX ORDER — INSULIN DEGLUDEC 200 U/ML
42 INJECTION, SOLUTION SUBCUTANEOUS DAILY
Qty: 7 PEN | Refills: 1 | Status: SHIPPED | OUTPATIENT
Start: 2022-06-06 | End: 2022-09-01

## 2022-06-06 NOTE — PROGRESS NOTES
Chief Complaint  Diabetes    Subjective          Mariela Aldrich is a 51 y.o. female who presents to [facility]    History of Present Illness    DM uncontrolled. - previous hgba1c is 9.3.  Pt is using freestyle jennifer. Pt lowest bs was 68, highest was 435. Most recent was 168 this morning. Pt reports she is doing better with monitoring bs. Pt reports compliance with meds.     OAB - pt reports medication is working well.     PHQ-2 Total Score:     PHQ-9 Total Score:          Review of Systems   Constitutional: Negative for fatigue.   Respiratory: Negative for cough and shortness of breath.    Cardiovascular: Negative for chest pain and palpitations.   Gastrointestinal: Negative for nausea and vomiting.   Endocrine: Negative for cold intolerance and heat intolerance.   Genitourinary: Negative for difficulty urinating and dysuria.   Musculoskeletal: Negative for arthralgias, gait problem and joint swelling.   Neurological: Negative for dizziness, seizures and headaches.   Hematological: Negative for adenopathy. Does not bruise/bleed easily.   Psychiatric/Behavioral: Negative for confusion, dysphoric mood and sleep disturbance. The patient is not nervous/anxious.           Medical History: has a past medical history of Anxiety, Diabetes mellitus type 2, controlled (HCC), Hyperlipidemia, Hypothyroidism, Panic attack, RLS (restless legs syndrome), and Sinus trouble.     Surgical History: has a past surgical history that includes Colonoscopy (01/21/2020); Groin Abscess Incision and Drainage (Right); Hysterectomy (2010); and Knee surgery (Bilateral, 2013).     Family History: family history includes Diabetes in her brother, maternal grandmother, and sister; Thyroid disease in her sister; Thyroid disease (age of onset: 43) in her brother; Thyroid disease (age of onset: 70) in her father.     Social History: reports that she has never smoked. She has never used smokeless tobacco. She reports  that she does not drink alcohol and does not use drugs.    Allergies: Atorvastatin, Crestor [rosuvastatin], Etodolac, Iodine, Penicillins, and Pravastatin      Health Maintenance Due   Topic Date Due   • ANNUAL PHYSICAL  Never done   • COVID-19 Vaccine (1) Never done   • Hepatitis B (1 of 3 - Risk 3-dose series) Never done   • ZOSTER VACCINE (1 of 2) Never done   • Pneumococcal Vaccine 0-64 (2 - PCV) 07/20/2020   • HEPATITIS C SCREENING  Never done   • DIABETIC FOOT EXAM  Never done   • DIABETIC EYE EXAM  06/22/2021            Current Outpatient Medications:   •  Accu-Chek FastClix Lancets misc, USE TO CHECK BLOOD SUGAR 1-2 TIMES DAILY, Disp: 102 each, Rfl: 0  •  Accu-Chek Guide test strip, USE TO TEST 1 TO 2 TIMES DAILY., Disp: 50 each, Rfl: 0  •  adalimumab (Humira) 40 MG/0.8ML Prefilled Syringe Kit injection, 0.8 mL., Disp: , Rfl:   •  albuterol sulfate  (90 Base) MCG/ACT inhaler, Inhale 2 puffs Every 4 (Four) Hours As Needed., Disp: , Rfl:   •  B-D ULTRAFINE III SHORT PEN 31G X 8 MM misc, USE AS DIRECTED ONCE DAILY WITH  TRESIBA  FLEXPEN, Disp: 100 each, Rfl: 0  •  Blood Glucose Monitoring Suppl kit, , Disp: , Rfl:   •  cetirizine (zyrTEC) 10 MG tablet, Zyrtec 10 mg oral tablet take 1 tablet (10 mg) by oral route once daily   Active, Disp: , Rfl:   •  Continuous Blood Gluc Sensor (FreeStyle Kwabena 2 Sensor) misc, 1 each Every 14 (Fourteen) Days. Change sensor every 14 days. Dx: E11.9, Disp: 2 each, Rfl: 5  •  Empagliflozin-metFORMIN HCl ER (Synjardy XR) 12.5-1000 MG tablet sustained-release 24 hour, Take 1 tablet by mouth 2 (Two) Times a Day., Disp: 180 tablet, Rfl: 1  •  fluticasone-salmeterol (Wixela Inhub) 250-50 MCG/DOSE DISKUS, Inhale 1 puff 2 (Two) Times a Day., Disp: 60 each, Rfl: 5  •  glipizide (GLUCOTROL) 10 MG tablet, Take 1 tablet by mouth 2 (Two) Times a Day Before Meals., Disp: 180 tablet, Rfl: 1  •  Humira Pen 40 MG/0.4ML Pen-injector Kit, , Disp: , Rfl:   •  Insulin Degludec (Tresiba  "FlexTouch) 200 UNIT/ML solution pen-injector pen injection, Inject 42 Units under the skin into the appropriate area as directed Daily., Disp: 7 pen, Rfl: 1  •  lisinopril (PRINIVIL,ZESTRIL) 5 MG tablet, Take 1 tablet by mouth Daily., Disp: 90 tablet, Rfl: 1  •  multivitamin (THERAGRAN) tablet tablet, , Disp: , Rfl:   •  Myrbetriq 50 MG tablet sustained-release 24 hour 24 hr tablet, Take 1 tablet by mouth once daily, Disp: 30 tablet, Rfl: 0  •  pitavastatin calcium (Livalo) 2 MG tablet tablet, Take 1 tablet by mouth Daily., Disp: 90 tablet, Rfl: 1  •  rOPINIRole (REQUIP) 4 MG tablet, TAKE 1 TABLET BY MOUTH ONCE DAILY AT NIGHT, Disp: 90 tablet, Rfl: 0  •  sertraline (ZOLOFT) 100 MG tablet, Take 1.5 tablets by mouth Daily., Disp: 45 tablet, Rfl: 3  •  SITagliptin (Januvia) 100 MG tablet, Take 1 tablet by mouth Daily., Disp: 90 tablet, Rfl: 1  •  Synthroid 88 MCG tablet, Take 1 tablet by mouth once daily, Disp: 90 tablet, Rfl: 0  •  Zinc 50 MG tablet, zinc 50 mg oral tablet take 1 tablet by oral route daily   Active, Disp: , Rfl:       Immunization History   Administered Date(s) Administered   • Fluzone Split Quad (Multi-dose) 10/01/2020   • Pneumococcal Polysaccharide (PPSV23) 07/20/2019   • Td 05/08/1998   • Tdap 03/16/2020         Objective       Vitals:    06/06/22 0704   BP: 126/74   Pulse: 93   SpO2: 98%   Weight: 92.5 kg (204 lb)   Height: 162.6 cm (64\")      Body mass index is 35.02 kg/m².   Wt Readings from Last 3 Encounters:   06/06/22 92.5 kg (204 lb)   03/04/22 93 kg (205 lb)   02/07/22 94.3 kg (208 lb)      BP Readings from Last 3 Encounters:   06/06/22 126/74   03/04/22 113/69   02/07/22 120/60        Physical Exam  Constitutional:       General: She is not in acute distress.     Appearance: Normal appearance.   Eyes:      General: Lids are normal. No scleral icterus.     Conjunctiva/sclera: Conjunctivae normal.      Pupils: Pupils are equal, round, and reactive to light.   Neck:      Thyroid: No thyroid " mass, thyromegaly or thyroid tenderness.      Vascular: No carotid bruit.   Cardiovascular:      Rate and Rhythm: Normal rate.      Pulses: Normal pulses.      Heart sounds: Normal heart sounds. No murmur heard.    No friction rub. No gallop.   Pulmonary:      Effort: Pulmonary effort is normal. No retractions.      Breath sounds: Normal breath sounds.   Abdominal:      General: Abdomen is flat. Bowel sounds are normal. There is no distension.      Palpations: Abdomen is soft.      Tenderness: There is no abdominal tenderness. There is no guarding.   Musculoskeletal:      Cervical back: Full passive range of motion without pain, normal range of motion and neck supple. No rigidity or tenderness.      Right lower leg: No edema.      Left lower leg: No edema.   Lymphadenopathy:      Cervical: No cervical adenopathy.   Skin:     General: Skin is warm and dry.      Findings: No lesion or rash.      Nails: There is no clubbing.   Neurological:      General: No focal deficit present.      Mental Status: She is alert and oriented to person, place, and time.      Coordination: Coordination is intact.      Gait: Gait is intact.   Psychiatric:         Attention and Perception: Attention normal.         Mood and Affect: Mood and affect normal.         Speech: Speech normal.         Behavior: Behavior normal. Behavior is cooperative.         Thought Content: Thought content normal.         Cognition and Memory: Cognition normal.         Judgment: Judgment normal.             Result Review :     Common labs    Common Labsle 6/30/21 6/30/21 6/30/21 6/30/21 1/3/22 1/3/22 1/3/22    0804 0804 0804 0805 0829 [id number] 0829   Glucose  160 (A)     166 (A)   BUN  13     13   Creatinine  0.90     1.05 (A)   eGFR Non  Am  66     55 (A)   Sodium  137     132 (A)   Potassium  4.5     4.7   Chloride  101     99   Calcium  9.2     9.4   Albumin  4.40     4.20   Total Bilirubin  0.3     0.3   Alkaline Phosphatase  89     109   AST (SGOT)  23      31   ALT (SGPT)  21     31   Total Cholesterol   170   145    Triglycerides   83   58    HDL Cholesterol   60   49    LDL Cholesterol    95   84    Hemoglobin A1C 9.21 (A)    9.30 (A)     Microalbumin, Urine    <1.2      (A) Abnormal value                           Assessment and Plan        Diagnoses and all orders for this visit:    1. Type 2 diabetes mellitus with hyperglycemia, with long-term current use of insulin (HCC) (Primary)  -     Insulin Degludec (Tresiba FlexTouch) 200 UNIT/ML solution pen-injector pen injection; Inject 42 Units under the skin into the appropriate area as directed Daily.  Dispense: 7 pen; Refill: 1    2. OAB (overactive bladder)    Continue Myrbetriq as pt is doing well on med.       Follow Up     Return in about 3 months (around 9/6/2022) for Next scheduled follow up.    Patient was given instructions and counseling regarding her condition or for health maintenance advice. Please see specific information pulled into the AVS if appropriate.     WILLIAM Hickman

## 2022-06-06 NOTE — PATIENT INSTRUCTIONS
Diabetes Mellitus and Nutrition, Adult  When you have diabetes, or diabetes mellitus, it is very important to have healthy eating habits because your blood sugar (glucose) levels are greatly affected by what you eat and drink. Eating healthy foods in the right amounts, at about the same times every day, can help you:  Control your blood glucose.  Lower your risk of heart disease.  Improve your blood pressure.  Reach or maintain a healthy weight.  What can affect my meal plan?  Every person with diabetes is different, and each person has different needs for a meal plan. Your health care provider may recommend that you work with a dietitian to make a meal plan that is best for you. Your meal plan may vary depending on factors such as:  The calories you need.  The medicines you take.  Your weight.  Your blood glucose, blood pressure, and cholesterol levels.  Your activity level.  Other health conditions you have, such as heart or kidney disease.  How do carbohydrates affect me?  Carbohydrates, also called carbs, affect your blood glucose level more than any other type of food. Eating carbs naturally raises the amount of glucose in your blood. Carb counting is a method for keeping track of how many carbs you eat. Counting carbs is important to keep your blood glucose at a healthy level, especially if you use insulin or take certain oral diabetes medicines.  It is important to know how many carbs you can safely have in each meal. This is different for every person. Your dietitian can help you calculate how many carbs you should have at each meal and for each snack.  How does alcohol affect me?  Alcohol can cause a sudden decrease in blood glucose (hypoglycemia), especially if you use insulin or take certain oral diabetes medicines. Hypoglycemia can be a life-threatening condition. Symptoms of hypoglycemia, such as sleepiness, dizziness, and confusion, are similar to symptoms of having too much alcohol.  Do not drink  "alcohol if:  Your health care provider tells you not to drink.  You are pregnant, may be pregnant, or are planning to become pregnant.  If you drink alcohol:  Do not drink on an empty stomach.  Limit how much you use to:  0-1 drink a day for women.  0-2 drinks a day for men.  Be aware of how much alcohol is in your drink. In the [place] one drink equals one 12 oz bottle of beer (355 mL), one 5 oz glass of wine (148 mL), or one 1½ oz glass of hard liquor (44 mL).  Keep yourself hydrated with water, diet soda, or unsweetened iced tea.  Keep in mind that regular soda, juice, and other mixers may contain a lot of sugar and must be counted as carbs.  What are tips for following this plan?    Reading food labels  Start by checking the serving size on the \"Nutrition Facts\" label of packaged foods and drinks. The amount of calories, carbs, fats, and other nutrients listed on the label is based on one serving of the item. Many items contain more than one serving per package.  Check the total grams (g) of carbs in one serving. You can calculate the number of servings of carbs in one serving by dividing the total carbs by 15. For example, if a food has 30 g of total carbs per serving, it would be equal to 2 servings of carbs.  Check the number of grams (g) of saturated fats and trans fats in one serving. Choose foods that have a low amount or none of these fats.  Check the number of milligrams (mg) of salt (sodium) in one serving. Most people should limit total sodium intake to less than 2,300 mg per day.  Always check the nutrition information of foods labeled as \"low-fat\" or \"nonfat.\" These foods may be higher in added sugar or refined carbs and should be avoided.  Talk to your dietitian to identify your daily goals for nutrients listed on the label.  Shopping  Avoid buying canned, pre-made, or processed foods. These foods tend to be high in fat, sodium, and added sugar.  Shop around the outside edge of the grocery store. This " is where you will most often find fresh fruits and vegetables, bulk grains, fresh meats, and fresh dairy.  Cooking  Use low-heat cooking methods, such as baking, instead of high-heat cooking methods like deep frying.  Cook using healthy oils, such as olive, canola, or sunflower oil.  Avoid cooking with butter, cream, or high-fat meats.  Meal planning  Eat meals and snacks regularly, preferably at the same times every day. Avoid going long periods of time without eating.  Eat foods that are high in fiber, such as fresh fruits, vegetables, beans, and whole grains. Talk with your dietitian about how many servings of carbs you can eat at each meal.  Eat 4-6 oz (112-168 g) of lean protein each day, such as lean meat, chicken, fish, eggs, or tofu. One ounce (oz) of lean protein is equal to:  1 oz (28 g) of meat, chicken, or fish.  1 egg.  ¼ cup (62 g) of tofu.  Eat some foods each day that contain healthy fats, such as avocado, nuts, seeds, and fish.  What foods should I eat?  Fruits  Berries. Apples. Oranges. Peaches. Apricots. Plums. Grapes. Poteet. Papaya. Pomegranate. Kiwi. Cherries.  Vegetables  Lettuce. Spinach. Leafy greens, including kale, chard, guru greens, and mustard greens. Beets. Cauliflower. Cabbage. Broccoli. Carrots. Green beans. Tomatoes. Peppers. Onions. Cucumbers. [place] sprouts.  Grains  Whole grains, such as whole-wheat or whole-grain bread, crackers, tortillas, cereal, and pasta. Unsweetened oatmeal. Quinoa. Brown or wild rice.  Meats and other proteins  Seafood. Poultry without skin. Lean cuts of poultry and beef. Tofu. Nuts. Seeds.  Dairy  Low-fat or fat-free dairy products such as milk, yogurt, and cheese.  The items listed above may not be a complete list of foods and beverages you can eat. Contact a dietitian for more information.  What foods should I avoid?  Fruits  Fruits canned with syrup.  Vegetables  Canned vegetables. Frozen vegetables with butter or cream sauce.  Grains  Refined  white flour and flour products such as bread, pasta, snack foods, and cereals. Avoid all processed foods.  Meats and other proteins  Fatty cuts of meat. Poultry with skin. Breaded or fried meats. Processed meat. Avoid saturated fats.  Dairy  Full-fat yogurt, cheese, or milk.  Beverages  Sweetened drinks, such as soda or iced tea.  The items listed above may not be a complete list of foods and beverages you should avoid. Contact a dietitian for more information.  Questions to ask a health care provider  Do I need to meet with a diabetes educator?  Do I need to meet with a dietitian?  What number can I call if I have questions?  When are the best times to check my blood glucose?  Where to find more information:  American Diabetes Association: diabetes.org  Academy of Nutrition and Dietetics: www.eatright.org  National [place] of Diabetes and Digestive and Kidney Diseases: www.niddk.nih.gov  Association of Diabetes Care and Education Specialists: www.diabeteseducator.org  Summary  It is important to have healthy eating habits because your blood sugar (glucose) levels are greatly affected by what you eat and drink.  A healthy meal plan will help you control your blood glucose and maintain a healthy lifestyle.  Your health care provider may recommend that you work with a dietitian to make a meal plan that is best for you.  Keep in mind that carbohydrates (carbs) and alcohol have immediate effects on your blood glucose levels. It is important to count carbs and to use alcohol carefully.  This information is not intended to replace advice given to you by your health care provider. Make sure you discuss any questions you have with your health care provider.  Document Revised: 11/24/2020 Document Reviewed: 11/24/2020  Elsevier Patient Education © 2021 [employer]

## 2022-06-27 DIAGNOSIS — N32.81 OAB (OVERACTIVE BLADDER): ICD-10-CM

## 2022-06-27 RX ORDER — PEN NEEDLE, DIABETIC 31 GX5/16"
NEEDLE, DISPOSABLE MISCELLANEOUS
Qty: 100 EACH | Refills: 1 | Status: SHIPPED | OUTPATIENT
Start: 2022-06-27 | End: 2023-01-13

## 2022-06-27 RX ORDER — MIRABEGRON 50 MG/1
TABLET, FILM COATED, EXTENDED RELEASE ORAL
Qty: 30 TABLET | Refills: 2 | Status: SHIPPED | OUTPATIENT
Start: 2022-06-27 | End: 2022-10-03

## 2022-07-18 DIAGNOSIS — F41.9 ANXIETY: ICD-10-CM

## 2022-07-18 RX ORDER — SERTRALINE HYDROCHLORIDE 100 MG/1
TABLET, FILM COATED ORAL
Qty: 45 TABLET | Refills: 0 | Status: SHIPPED | OUTPATIENT
Start: 2022-07-18 | End: 2022-08-15

## 2022-07-30 DIAGNOSIS — E03.8 OTHER SPECIFIED HYPOTHYROIDISM: ICD-10-CM

## 2022-08-01 RX ORDER — LEVOTHYROXINE SODIUM 88 MCG
TABLET ORAL
Qty: 90 TABLET | Refills: 0 | Status: SHIPPED | OUTPATIENT
Start: 2022-08-01 | End: 2022-09-07 | Stop reason: SDUPTHER

## 2022-08-15 DIAGNOSIS — G25.81 RLS (RESTLESS LEGS SYNDROME): ICD-10-CM

## 2022-08-15 DIAGNOSIS — F41.9 ANXIETY: ICD-10-CM

## 2022-08-15 RX ORDER — ROPINIROLE 4 MG/1
TABLET, FILM COATED ORAL
Qty: 90 TABLET | Refills: 0 | Status: SHIPPED | OUTPATIENT
Start: 2022-08-15 | End: 2022-09-07 | Stop reason: SDUPTHER

## 2022-08-15 RX ORDER — SERTRALINE HYDROCHLORIDE 100 MG/1
TABLET, FILM COATED ORAL
Qty: 45 TABLET | Refills: 0 | Status: SHIPPED | OUTPATIENT
Start: 2022-08-15 | End: 2022-09-07 | Stop reason: SDUPTHER

## 2022-09-01 DIAGNOSIS — E11.65 TYPE 2 DIABETES MELLITUS WITH HYPERGLYCEMIA, WITH LONG-TERM CURRENT USE OF INSULIN: ICD-10-CM

## 2022-09-01 DIAGNOSIS — Z79.4 TYPE 2 DIABETES MELLITUS WITH HYPERGLYCEMIA, WITH LONG-TERM CURRENT USE OF INSULIN: ICD-10-CM

## 2022-09-01 RX ORDER — LISINOPRIL 5 MG/1
TABLET ORAL
Qty: 90 TABLET | Refills: 0 | Status: SHIPPED | OUTPATIENT
Start: 2022-09-01 | End: 2022-12-07 | Stop reason: SDUPTHER

## 2022-09-01 RX ORDER — INSULIN DEGLUDEC 200 U/ML
INJECTION, SOLUTION SUBCUTANEOUS
Qty: 9 ML | Refills: 0 | Status: SHIPPED | OUTPATIENT
Start: 2022-09-01 | End: 2022-09-07 | Stop reason: SDUPTHER

## 2022-09-01 NOTE — PROGRESS NOTES
Chief Complaint    Annual Exam  Annual Exam    Subjective          Mariela Aldrich is a 52 y.o. female who presents to [facility]    History of Present Illness    Annual Exam    DM - pt reports bs was variable and pt stopped glucotrol at night d/t having low bs of 56. Pt is working on diet. Doing 1900 calorie diet.    RLS - worse with increase activity but improved with med. Pt denies any numbness or tingling. Pt has used vibration plate with improvement.     Hypothyroid - energy level is improved. Pt is compliant with meds.     HLD - pt denies any myalgias.       PHQ-2 Total Score:     PHQ-9 Total Score:          Review of Systems   Constitutional: Negative for fatigue.   Respiratory: Negative for cough and shortness of breath.    Cardiovascular: Negative for chest pain and palpitations.   Gastrointestinal: Negative for nausea and vomiting.   Endocrine: Negative for cold intolerance and heat intolerance.   Genitourinary: Negative for difficulty urinating and dysuria.   Musculoskeletal: Negative for arthralgias, gait problem and joint swelling.        RLS   Neurological: Negative for dizziness, seizures and headaches.   Hematological: Negative for adenopathy. Does not bruise/bleed easily.   Psychiatric/Behavioral: Negative for confusion, dysphoric mood and sleep disturbance. The patient is not nervous/anxious.           Medical History: has a past medical history of Anxiety, Diabetes mellitus type 2, controlled (HCC), Hyperlipidemia, Hypothyroidism, Panic attack, RLS (restless legs syndrome), and Sinus trouble.     Surgical History: has a past surgical history that includes Colonoscopy (01/21/2020); Groin Abscess Incision and Drainage (Right); Hysterectomy (2010); and Knee surgery (Bilateral, 2013).     Family History: family history includes Diabetes in her brother, maternal grandmother, and sister; Thyroid disease in her sister; Thyroid disease (age of onset: 43) in her brother;  Thyroid disease (age of onset: 70) in her father.     Social History: reports that she has never smoked. She has never used smokeless tobacco. She reports that she does not drink alcohol and does not use drugs.    Allergies: Atorvastatin, Crestor [rosuvastatin], Etodolac, Iodine, Penicillins, and Pravastatin      Health Maintenance Due   Topic Date Due   • ANNUAL PHYSICAL  Never done   • DIABETIC FOOT EXAM  Never done            Current Outpatient Medications:   •  Accu-Chek FastClix Lancets misc, USE TO CHECK BLOOD SUGAR 1-2 TIMES DAILY, Disp: 102 each, Rfl: 0  •  Accu-Chek Guide test strip, USE TO TEST 1 TO 2 TIMES DAILY., Disp: 50 each, Rfl: 0  •  adalimumab (Humira) 40 MG/0.8ML Prefilled Syringe Kit injection, 0.8 mL., Disp: , Rfl:   •  albuterol sulfate  (90 Base) MCG/ACT inhaler, Inhale 2 puffs Every 4 (Four) Hours As Needed., Disp: , Rfl:   •  B-D ULTRAFINE III SHORT PEN 31G X 8 MM misc, USE AS DIRECTED ONCE DAILY WITH TRESIBA FLEXPEN, Disp: 100 each, Rfl: 1  •  Blood Glucose Monitoring Suppl kit, , Disp: , Rfl:   •  cetirizine (zyrTEC) 10 MG tablet, Zyrtec 10 mg oral tablet take 1 tablet (10 mg) by oral route once daily   Active, Disp: , Rfl:   •  Continuous Blood Gluc Sensor (FreeStyle Kwabena 2 Sensor) misc, 1 each Every 14 (Fourteen) Days. Change sensor every 14 days. Dx: E11.9, Disp: 2 each, Rfl: 5  •  Empagliflozin-metFORMIN HCl ER (Synjardy XR) 12.5-1000 MG tablet sustained-release 24 hour, Take 1 tablet by mouth 2 (Two) Times a Day., Disp: 180 tablet, Rfl: 1  •  fluticasone-salmeterol (Wixela Inhub) 250-50 MCG/DOSE DISKUS, Inhale 1 puff 2 (Two) Times a Day., Disp: 60 each, Rfl: 5  •  glipizide (GLUCOTROL) 10 MG tablet, Take 1 tablet by mouth 2 (Two) Times a Day Before Meals., Disp: 180 tablet, Rfl: 1  •  Humira Pen 40 MG/0.4ML Pen-injector Kit, , Disp: , Rfl:   •  Insulin Degludec (Tresiba FlexTouch) 200 UNIT/ML solution pen-injector pen injection, Inject 48 Units under the skin into the  "appropriate area as directed Daily., Disp: 21 mL, Rfl: 1  •  lisinopril (PRINIVIL,ZESTRIL) 5 MG tablet, Take 1 tablet by mouth once daily, Disp: 90 tablet, Rfl: 0  •  multivitamin (THERAGRAN) tablet tablet, , Disp: , Rfl:   •  Myrbetriq 50 MG tablet sustained-release 24 hour 24 hr tablet, Take 1 tablet by mouth once daily, Disp: 30 tablet, Rfl: 2  •  pitavastatin calcium (Livalo) 2 MG tablet tablet, Take 1 tablet by mouth Daily., Disp: 90 tablet, Rfl: 1  •  rOPINIRole (REQUIP) 4 MG tablet, Take 1 tablet by mouth Every Night., Disp: 90 tablet, Rfl: 1  •  sertraline (ZOLOFT) 100 MG tablet, Take 1.5 tablets by mouth Daily., Disp: 135 tablet, Rfl: 1  •  SITagliptin (Januvia) 100 MG tablet, Take 1 tablet by mouth Daily., Disp: 90 tablet, Rfl: 1  •  sulfamethoxazole-trimethoprim (BACTRIM DS,SEPTRA DS) 800-160 MG per tablet, Take 1 tablet by mouth 2 (Two) Times a Day., Disp: , Rfl:   •  Synthroid 88 MCG tablet, Take 1 tablet by mouth Daily., Disp: 90 tablet, Rfl: 1  •  Zinc 50 MG tablet, zinc 50 mg oral tablet take 1 tablet by oral route daily   Active, Disp: , Rfl:       Immunization History   Administered Date(s) Administered   • Fluzone Quad >6mos (Multi-dose) 10/01/2020   • Pneumococcal Polysaccharide (PPSV23) 07/20/2019   • Td 05/08/1998   • Tdap 03/16/2020         Objective       Vitals:    09/07/22 0701   BP: 108/66   BP Location: Left arm   Patient Position: Sitting   Cuff Size: Adult   Pulse: 88   Temp: 97.8 °F (36.6 °C)   TempSrc: Temporal   SpO2: 99%   Weight: 93.5 kg (206 lb 3 oz)   Height: 162.6 cm (64\")      Body mass index is 35.39 kg/m².   Wt Readings from Last 3 Encounters:   09/07/22 93.5 kg (206 lb 3 oz)   06/06/22 92.5 kg (204 lb)   03/04/22 93 kg (205 lb)      BP Readings from Last 3 Encounters:   09/07/22 108/66   06/06/22 126/74   03/04/22 113/69        Physical Exam  Constitutional:       General: She is not in acute distress.     Appearance: Normal appearance.   Eyes:      General: Lids are normal. " No scleral icterus.     Conjunctiva/sclera: Conjunctivae normal.      Pupils: Pupils are equal, round, and reactive to light.   Neck:      Thyroid: No thyroid mass, thyromegaly or thyroid tenderness.      Vascular: No carotid bruit.   Cardiovascular:      Rate and Rhythm: Normal rate.      Pulses: Normal pulses.           Dorsalis pedis pulses are 2+ on the right side and 2+ on the left side.      Heart sounds: Normal heart sounds. No murmur heard.    No friction rub. No gallop.   Pulmonary:      Effort: Pulmonary effort is normal. No retractions.      Breath sounds: Normal breath sounds.   Abdominal:      General: Abdomen is flat. Bowel sounds are normal. There is no distension.      Palpations: Abdomen is soft.      Tenderness: There is no abdominal tenderness. There is no guarding.   Musculoskeletal:      Cervical back: Full passive range of motion without pain, normal range of motion and neck supple. No rigidity or tenderness.      Right lower leg: No edema.      Left lower leg: No edema.   Feet:      Right foot:      Protective Sensation: 8 sites tested. 8 sites sensed.      Skin integrity: Callus present. No ulcer or blister.      Toenail Condition: Right toenails are normal.      Left foot:      Protective Sensation: 8 sites tested. 7 sites sensed.      Skin integrity: Callus present. No ulcer or blister.      Toenail Condition: Left toenails are normal.      Comments: Diabetic Foot Exam Performed     Lymphadenopathy:      Cervical: No cervical adenopathy.   Skin:     General: Skin is warm and dry.      Findings: No lesion or rash.      Nails: There is no clubbing.   Neurological:      General: No focal deficit present.      Mental Status: She is alert and oriented to person, place, and time.      Coordination: Coordination is intact.      Gait: Gait is intact.   Psychiatric:         Attention and Perception: Attention normal.         Mood and Affect: Mood and affect normal.         Speech: Speech normal.          Behavior: Behavior normal. Behavior is cooperative.         Thought Content: Thought content normal.         Cognition and Memory: Cognition normal.         Judgment: Judgment normal.             Result Review :       Common labs    Common Labsle 1/3/22 1/3/22 1/3/22 6/6/22 6/6/22 6/6/22 6/6/22    0829 0829 0829 0742 0742 0742 0744   Glucose   166 (A) 161 (A)      BUN   13 16      Creatinine   1.05 (A) 0.89      eGFR Non African Am   55 (A)       Sodium   132 (A) 137      Potassium   4.7 4.4      Chloride   99 103      Calcium   9.4 9.2      Albumin   4.20 3.90      Total Bilirubin   0.3 0.3      Alkaline Phosphatase   109 94      AST (SGOT)   31 20      ALT (SGPT)   31 22      Total Cholesterol  145   159     Triglycerides  58   62     HDL Cholesterol  49   66 (A)     LDL Cholesterol   84   81     Hemoglobin A1C 9.30 (A)     9.40 (A)    Microalbumin, Urine       <1.2   (A) Abnormal value                             Assessment and Plan        Diagnoses and all orders for this visit:    1. Annual physical exam (Primary)    2. Type 2 diabetes mellitus with hyperglycemia, with long-term current use of insulin (HCC)  -     Comprehensive Metabolic Panel; Future  -     Lipid Panel; Future  -     Hemoglobin A1c; Future  -     Empagliflozin-metFORMIN HCl ER (Synjardy XR) 12.5-1000 MG tablet sustained-release 24 hour; Take 1 tablet by mouth 2 (Two) Times a Day.  Dispense: 180 tablet; Refill: 1  -     SITagliptin (Januvia) 100 MG tablet; Take 1 tablet by mouth Daily.  Dispense: 90 tablet; Refill: 1  -     Insulin Degludec (Tresiba FlexTouch) 200 UNIT/ML solution pen-injector pen injection; Inject 48 Units under the skin into the appropriate area as directed Daily.  Dispense: 21 mL; Refill: 1    3. Other specified hypothyroidism  -     TSH; Future  -     Synthroid 88 MCG tablet; Take 1 tablet by mouth Daily.  Dispense: 90 tablet; Refill: 1    4. Mixed hyperlipidemia  -     pitavastatin calcium (Livalo) 2 MG tablet tablet;  Take 1 tablet by mouth Daily.  Dispense: 90 tablet; Refill: 1    5. RLS (restless legs syndrome)  -     rOPINIRole (REQUIP) 4 MG tablet; Take 1 tablet by mouth Every Night.  Dispense: 90 tablet; Refill: 1    6. Anxiety  -     sertraline (ZOLOFT) 100 MG tablet; Take 1.5 tablets by mouth Daily.  Dispense: 135 tablet; Refill: 1          Follow Up     Return in about 3 months (around 12/7/2022).    Updated annual wellness visit checklist.  Immunizations discussed.  Screening discussed and/or ordered.  Recommend yearly dental and eye exams. Also discussed monitoring of blood pressure and lipids.        Patient was given instructions and counseling regarding her condition or for health maintenance advice. Please see specific information pulled into the AVS if appropriate.     WILLIAM Hickman

## 2022-09-07 ENCOUNTER — OFFICE VISIT (OUTPATIENT)
Dept: FAMILY MEDICINE CLINIC | Facility: CLINIC | Age: 52
End: 2022-09-07

## 2022-09-07 VITALS
WEIGHT: 206.19 LBS | HEIGHT: 64 IN | DIASTOLIC BLOOD PRESSURE: 66 MMHG | OXYGEN SATURATION: 99 % | BODY MASS INDEX: 35.2 KG/M2 | TEMPERATURE: 97.8 F | SYSTOLIC BLOOD PRESSURE: 108 MMHG | HEART RATE: 88 BPM

## 2022-09-07 DIAGNOSIS — G25.81 RLS (RESTLESS LEGS SYNDROME): ICD-10-CM

## 2022-09-07 DIAGNOSIS — E03.8 OTHER SPECIFIED HYPOTHYROIDISM: ICD-10-CM

## 2022-09-07 DIAGNOSIS — E78.2 MIXED HYPERLIPIDEMIA: ICD-10-CM

## 2022-09-07 DIAGNOSIS — E11.65 TYPE 2 DIABETES MELLITUS WITH HYPERGLYCEMIA, WITH LONG-TERM CURRENT USE OF INSULIN: ICD-10-CM

## 2022-09-07 DIAGNOSIS — Z00.00 ANNUAL PHYSICAL EXAM: Primary | ICD-10-CM

## 2022-09-07 DIAGNOSIS — Z79.4 TYPE 2 DIABETES MELLITUS WITH HYPERGLYCEMIA, WITH LONG-TERM CURRENT USE OF INSULIN: ICD-10-CM

## 2022-09-07 DIAGNOSIS — F41.9 ANXIETY: ICD-10-CM

## 2022-09-07 PROCEDURE — 99396 PREV VISIT EST AGE 40-64: CPT | Performed by: NURSE PRACTITIONER

## 2022-09-07 RX ORDER — INSULIN DEGLUDEC 200 U/ML
48 INJECTION, SOLUTION SUBCUTANEOUS DAILY
Qty: 21 ML | Refills: 1 | Status: SHIPPED | OUTPATIENT
Start: 2022-09-07 | End: 2022-12-07 | Stop reason: SDUPTHER

## 2022-09-07 RX ORDER — SERTRALINE HYDROCHLORIDE 100 MG/1
150 TABLET, FILM COATED ORAL DAILY
Qty: 135 TABLET | Refills: 1 | Status: SHIPPED | OUTPATIENT
Start: 2022-09-07 | End: 2022-12-07 | Stop reason: SDUPTHER

## 2022-09-07 RX ORDER — LEVOTHYROXINE SODIUM 88 MCG
88 TABLET ORAL DAILY
Qty: 90 TABLET | Refills: 1 | Status: SHIPPED | OUTPATIENT
Start: 2022-09-07 | End: 2022-12-07 | Stop reason: SDUPTHER

## 2022-09-07 RX ORDER — GLIPIZIDE 10 MG/1
10 TABLET ORAL
Qty: 180 TABLET | Refills: 1 | Status: CANCELLED | OUTPATIENT
Start: 2022-09-07

## 2022-09-07 RX ORDER — ROPINIROLE 4 MG/1
4 TABLET, FILM COATED ORAL NIGHTLY
Qty: 90 TABLET | Refills: 1 | Status: SHIPPED | OUTPATIENT
Start: 2022-09-07 | End: 2022-12-07 | Stop reason: SDUPTHER

## 2022-09-07 RX ORDER — SULFAMETHOXAZOLE AND TRIMETHOPRIM 800; 160 MG/1; MG/1
1 TABLET ORAL 2 TIMES DAILY
COMMUNITY
Start: 2022-08-15 | End: 2023-03-14 | Stop reason: SDUPTHER

## 2022-09-07 RX ORDER — EMPAGLIFLOZIN, METFORMIN HYDROCHLORIDE 12.5; 1 MG/1; MG/1
1 TABLET, EXTENDED RELEASE ORAL 2 TIMES DAILY
Qty: 180 TABLET | Refills: 1 | Status: SHIPPED | OUTPATIENT
Start: 2022-09-07 | End: 2022-12-07 | Stop reason: SDUPTHER

## 2022-09-07 NOTE — PATIENT INSTRUCTIONS
Diabetes Mellitus and Nutrition, Adult  When you have diabetes, or diabetes mellitus, it is very important to have healthy eating habits because your blood sugar (glucose) levels are greatly affected by what you eat and drink. Eating healthy foods in the right amounts, at about the same times every day, can help you:  Control your blood glucose.  Lower your risk of heart disease.  Improve your blood pressure.  Reach or maintain a healthy weight.  What can affect my meal plan?  Every person with diabetes is different, and each person has different needs for a meal plan. Your health care provider may recommend that you work with a dietitian to make a meal plan that is best for you. Your meal plan may vary depending on factors such as:  The calories you need.  The medicines you take.  Your weight.  Your blood glucose, blood pressure, and cholesterol levels.  Your activity level.  Other health conditions you have, such as heart or kidney disease.  How do carbohydrates affect me?  Carbohydrates, also called carbs, affect your blood glucose level more than any other type of food. Eating carbs naturally raises the amount of glucose in your blood. Carb counting is a method for keeping track of how many carbs you eat. Counting carbs is important to keep your blood glucose at a healthy level, especially if you use insulin or take certain oral diabetes medicines.  It is important to know how many carbs you can safely have in each meal. This is different for every person. Your dietitian can help you calculate how many carbs you should have at each meal and for each snack.  How does alcohol affect me?  Alcohol can cause a sudden decrease in blood glucose (hypoglycemia), especially if you use insulin or take certain oral diabetes medicines. Hypoglycemia can be a life-threatening condition. Symptoms of hypoglycemia, such as sleepiness, dizziness, and confusion, are similar to symptoms of having too much alcohol.  Do not drink  "alcohol if:  Your health care provider tells you not to drink.  You are pregnant, may be pregnant, or are planning to become pregnant.  If you drink alcohol:  Do not drink on an empty stomach.  Limit how much you use to:  0-1 drink a day for women.  0-2 drinks a day for men.  Be aware of how much alcohol is in your drink. In the [place] one drink equals one 12 oz bottle of beer (355 mL), one 5 oz glass of wine (148 mL), or one 1½ oz glass of hard liquor (44 mL).  Keep yourself hydrated with water, diet soda, or unsweetened iced tea.  Keep in mind that regular soda, juice, and other mixers may contain a lot of sugar and must be counted as carbs.  What are tips for following this plan?    Reading food labels  Start by checking the serving size on the \"Nutrition Facts\" label of packaged foods and drinks. The amount of calories, carbs, fats, and other nutrients listed on the label is based on one serving of the item. Many items contain more than one serving per package.  Check the total grams (g) of carbs in one serving. You can calculate the number of servings of carbs in one serving by dividing the total carbs by 15. For example, if a food has 30 g of total carbs per serving, it would be equal to 2 servings of carbs.  Check the number of grams (g) of saturated fats and trans fats in one serving. Choose foods that have a low amount or none of these fats.  Check the number of milligrams (mg) of salt (sodium) in one serving. Most people should limit total sodium intake to less than 2,300 mg per day.  Always check the nutrition information of foods labeled as \"low-fat\" or \"nonfat.\" These foods may be higher in added sugar or refined carbs and should be avoided.  Talk to your dietitian to identify your daily goals for nutrients listed on the label.  Shopping  Avoid buying canned, pre-made, or processed foods. These foods tend to be high in fat, sodium, and added sugar.  Shop around the outside edge of the grocery store. This " is where you will most often find fresh fruits and vegetables, bulk grains, fresh meats, and fresh dairy.  Cooking  Use low-heat cooking methods, such as baking, instead of high-heat cooking methods like deep frying.  Cook using healthy oils, such as olive, canola, or sunflower oil.  Avoid cooking with butter, cream, or high-fat meats.  Meal planning  Eat meals and snacks regularly, preferably at the same times every day. Avoid going long periods of time without eating.  Eat foods that are high in fiber, such as fresh fruits, vegetables, beans, and whole grains. Talk with your dietitian about how many servings of carbs you can eat at each meal.  Eat 4-6 oz (112-168 g) of lean protein each day, such as lean meat, chicken, fish, eggs, or tofu. One ounce (oz) of lean protein is equal to:  1 oz (28 g) of meat, chicken, or fish.  1 egg.  ¼ cup (62 g) of tofu.  Eat some foods each day that contain healthy fats, such as avocado, nuts, seeds, and fish.  What foods should I eat?  Fruits  Berries. Apples. Oranges. Peaches. Apricots. Plums. Grapes. [place]. Papaya. Pomegranate. Kiwi. Cherries.  Vegetables  Lettuce. Spinach. Leafy greens, including kale, chard, guru greens, and mustard greens. Beets. Cauliflower. Cabbage. Broccoli. Carrots. Green beans. Tomatoes. Peppers. Onions. Cucumbers. [place] sprouts.  Grains  Whole grains, such as whole-wheat or whole-grain bread, crackers, tortillas, cereal, and pasta. Unsweetened oatmeal. Quinoa. Brown or wild rice.  Meats and other proteins  Seafood. Poultry without skin. Lean cuts of poultry and beef. Tofu. Nuts. Seeds.  Dairy  Low-fat or fat-free dairy products such as milk, yogurt, and cheese.  The items listed above may not be a complete list of foods and beverages you can eat. Contact a dietitian for more information.  What foods should I avoid?  Fruits  Fruits canned with syrup.  Vegetables  Canned vegetables. Frozen vegetables with butter or cream sauce.  Grains  Refined  white flour and flour products such as bread, pasta, snack foods, and cereals. Avoid all processed foods.  Meats and other proteins  Fatty cuts of meat. Poultry with skin. Breaded or fried meats. Processed meat. Avoid saturated fats.  Dairy  Full-fat yogurt, cheese, or milk.  Beverages  Sweetened drinks, such as soda or iced tea.  The items listed above may not be a complete list of foods and beverages you should avoid. Contact a dietitian for more information.  Questions to ask a health care provider  Do I need to meet with a diabetes educator?  Do I need to meet with a dietitian?  What number can I call if I have questions?  When are the best times to check my blood glucose?  Where to find more information:  American Diabetes Association: diabetes.org  Academy of Nutrition and Dietetics: www.eatright.org  National [place] of Diabetes and Digestive and Kidney Diseases: www.niddk.nih.gov  Association of Diabetes Care and Education Specialists: www.diabeteseducator.org  Summary  It is important to have healthy eating habits because your blood sugar (glucose) levels are greatly affected by what you eat and drink.  A healthy meal plan will help you control your blood glucose and maintain a healthy lifestyle.  Your health care provider may recommend that you work with a dietitian to make a meal plan that is best for you.  Keep in mind that carbohydrates (carbs) and alcohol have immediate effects on your blood glucose levels. It is important to count carbs and to use alcohol carefully.  This information is not intended to replace advice given to you by your health care provider. Make sure you discuss any questions you have with your health care provider.  Document Revised: 11/24/2020 Document Reviewed: 11/24/2020  Elsevier Patient Education © 2021 [employer]

## 2022-09-08 ENCOUNTER — CLINICAL SUPPORT (OUTPATIENT)
Dept: FAMILY MEDICINE CLINIC | Facility: CLINIC | Age: 52
End: 2022-09-08

## 2022-09-08 DIAGNOSIS — E03.8 OTHER SPECIFIED HYPOTHYROIDISM: ICD-10-CM

## 2022-09-08 LAB — TSH SERPL DL<=0.05 MIU/L-ACNC: 0.7 UIU/ML (ref 0.27–4.2)

## 2022-09-08 PROCEDURE — 84443 ASSAY THYROID STIM HORMONE: CPT | Performed by: NURSE PRACTITIONER

## 2022-09-08 PROCEDURE — 36415 COLL VENOUS BLD VENIPUNCTURE: CPT | Performed by: NURSE PRACTITIONER

## 2022-09-19 DIAGNOSIS — E11.65 TYPE 2 DIABETES MELLITUS WITH HYPERGLYCEMIA, WITH LONG-TERM CURRENT USE OF INSULIN: ICD-10-CM

## 2022-09-19 DIAGNOSIS — Z79.4 TYPE 2 DIABETES MELLITUS WITH HYPERGLYCEMIA, WITH LONG-TERM CURRENT USE OF INSULIN: ICD-10-CM

## 2022-09-19 RX ORDER — SITAGLIPTIN 100 MG/1
TABLET, FILM COATED ORAL
Qty: 90 TABLET | Refills: 0 | OUTPATIENT
Start: 2022-09-19

## 2022-09-19 RX ORDER — LISINOPRIL 5 MG/1
TABLET ORAL
Qty: 90 TABLET | Refills: 0 | OUTPATIENT
Start: 2022-09-19

## 2022-09-30 RX ORDER — FLUTICASONE PROPIONATE AND SALMETEROL 250; 50 UG/1; UG/1
POWDER RESPIRATORY (INHALATION)
Qty: 60 EACH | Refills: 2 | Status: SHIPPED | OUTPATIENT
Start: 2022-09-30 | End: 2022-12-07 | Stop reason: SDUPTHER

## 2022-10-03 DIAGNOSIS — N32.81 OAB (OVERACTIVE BLADDER): ICD-10-CM

## 2022-10-03 RX ORDER — MIRABEGRON 50 MG/1
TABLET, FILM COATED, EXTENDED RELEASE ORAL
Qty: 30 TABLET | Refills: 2 | Status: SHIPPED | OUTPATIENT
Start: 2022-10-03 | End: 2022-12-07 | Stop reason: SDUPTHER

## 2022-12-07 ENCOUNTER — OFFICE VISIT (OUTPATIENT)
Dept: FAMILY MEDICINE CLINIC | Facility: CLINIC | Age: 52
End: 2022-12-07

## 2022-12-07 VITALS
OXYGEN SATURATION: 99 % | TEMPERATURE: 98.3 F | HEART RATE: 88 BPM | DIASTOLIC BLOOD PRESSURE: 70 MMHG | BODY MASS INDEX: 34.02 KG/M2 | SYSTOLIC BLOOD PRESSURE: 112 MMHG | WEIGHT: 198.2 LBS

## 2022-12-07 DIAGNOSIS — Z12.31 VISIT FOR SCREENING MAMMOGRAM: Primary | ICD-10-CM

## 2022-12-07 DIAGNOSIS — J45.20 MILD INTERMITTENT ASTHMA WITHOUT COMPLICATION: ICD-10-CM

## 2022-12-07 DIAGNOSIS — E78.2 MIXED HYPERLIPIDEMIA: ICD-10-CM

## 2022-12-07 DIAGNOSIS — Z79.4 TYPE 2 DIABETES MELLITUS WITH HYPERGLYCEMIA, WITH LONG-TERM CURRENT USE OF INSULIN: ICD-10-CM

## 2022-12-07 DIAGNOSIS — G25.81 RLS (RESTLESS LEGS SYNDROME): ICD-10-CM

## 2022-12-07 DIAGNOSIS — N32.81 OAB (OVERACTIVE BLADDER): ICD-10-CM

## 2022-12-07 DIAGNOSIS — E03.8 OTHER SPECIFIED HYPOTHYROIDISM: ICD-10-CM

## 2022-12-07 DIAGNOSIS — E11.65 TYPE 2 DIABETES MELLITUS WITH HYPERGLYCEMIA, WITH LONG-TERM CURRENT USE OF INSULIN: ICD-10-CM

## 2022-12-07 DIAGNOSIS — F41.9 ANXIETY: ICD-10-CM

## 2022-12-07 LAB
ALBUMIN SERPL-MCNC: 3.8 G/DL (ref 3.5–5.2)
ALBUMIN/GLOB SERPL: 0.8 G/DL
ALP SERPL-CCNC: 100 U/L (ref 39–117)
ALT SERPL W P-5'-P-CCNC: 17 U/L (ref 1–33)
ANION GAP SERPL CALCULATED.3IONS-SCNC: 10 MMOL/L (ref 5–15)
AST SERPL-CCNC: 22 U/L (ref 1–32)
BILIRUB SERPL-MCNC: 0.3 MG/DL (ref 0–1.2)
BUN SERPL-MCNC: 17 MG/DL (ref 6–20)
BUN/CREAT SERPL: 17 (ref 7–25)
CALCIUM SPEC-SCNC: 9.5 MG/DL (ref 8.6–10.5)
CHLORIDE SERPL-SCNC: 101 MMOL/L (ref 98–107)
CHOLEST SERPL-MCNC: 150 MG/DL (ref 0–200)
CO2 SERPL-SCNC: 25 MMOL/L (ref 22–29)
CREAT SERPL-MCNC: 1 MG/DL (ref 0.57–1)
EGFRCR SERPLBLD CKD-EPI 2021: 67.5 ML/MIN/1.73
GLOBULIN UR ELPH-MCNC: 5 GM/DL
GLUCOSE SERPL-MCNC: 119 MG/DL (ref 65–99)
HBA1C MFR BLD: 10.4 % (ref 4.8–5.6)
HDLC SERPL-MCNC: 60 MG/DL (ref 40–60)
LDLC SERPL CALC-MCNC: 75 MG/DL (ref 0–100)
LDLC/HDLC SERPL: 1.24 {RATIO}
POTASSIUM SERPL-SCNC: 4.6 MMOL/L (ref 3.5–5.2)
PROT SERPL-MCNC: 8.8 G/DL (ref 6–8.5)
SODIUM SERPL-SCNC: 136 MMOL/L (ref 136–145)
TRIGL SERPL-MCNC: 77 MG/DL (ref 0–150)
VLDLC SERPL-MCNC: 15 MG/DL (ref 5–40)

## 2022-12-07 PROCEDURE — 99214 OFFICE O/P EST MOD 30 MIN: CPT | Performed by: NURSE PRACTITIONER

## 2022-12-07 PROCEDURE — 83036 HEMOGLOBIN GLYCOSYLATED A1C: CPT | Performed by: NURSE PRACTITIONER

## 2022-12-07 PROCEDURE — 80053 COMPREHEN METABOLIC PANEL: CPT | Performed by: NURSE PRACTITIONER

## 2022-12-07 PROCEDURE — 80061 LIPID PANEL: CPT | Performed by: NURSE PRACTITIONER

## 2022-12-07 RX ORDER — LEVOTHYROXINE SODIUM 88 MCG
88 TABLET ORAL DAILY
Qty: 90 TABLET | Refills: 1 | Status: SHIPPED | OUTPATIENT
Start: 2022-12-07 | End: 2023-03-29 | Stop reason: SDUPTHER

## 2022-12-07 RX ORDER — LANCETS
90 EACH MISCELLANEOUS 3 TIMES DAILY PRN
Qty: 100 EACH | Refills: 3 | Status: SHIPPED | OUTPATIENT
Start: 2022-12-07 | End: 2023-03-29 | Stop reason: SDUPTHER

## 2022-12-07 RX ORDER — PROCHLORPERAZINE 25 MG/1
1 SUPPOSITORY RECTAL
Qty: 1 EACH | Refills: 3 | Status: SHIPPED | OUTPATIENT
Start: 2022-12-07 | End: 2023-03-29 | Stop reason: SDUPTHER

## 2022-12-07 RX ORDER — CETIRIZINE HYDROCHLORIDE 10 MG/1
TABLET ORAL
Status: CANCELLED | OUTPATIENT
Start: 2022-12-07

## 2022-12-07 RX ORDER — SERTRALINE HYDROCHLORIDE 100 MG/1
150 TABLET, FILM COATED ORAL DAILY
Qty: 135 TABLET | Refills: 1 | Status: SHIPPED | OUTPATIENT
Start: 2022-12-07 | End: 2023-03-29 | Stop reason: SDUPTHER

## 2022-12-07 RX ORDER — INSULIN DEGLUDEC 200 U/ML
48 INJECTION, SOLUTION SUBCUTANEOUS DAILY
Qty: 21 ML | Refills: 1 | Status: SHIPPED | OUTPATIENT
Start: 2022-12-07 | End: 2023-03-29 | Stop reason: SDUPTHER

## 2022-12-07 RX ORDER — LISINOPRIL 5 MG/1
5 TABLET ORAL DAILY
Qty: 90 TABLET | Refills: 1 | Status: SHIPPED | OUTPATIENT
Start: 2022-12-07 | End: 2023-03-29 | Stop reason: SDUPTHER

## 2022-12-07 RX ORDER — SEMAGLUTIDE 1.34 MG/ML
0.25 INJECTION, SOLUTION SUBCUTANEOUS WEEKLY
Qty: 1.5 ML | Refills: 0 | Status: SHIPPED | OUTPATIENT
Start: 2022-12-07 | End: 2023-03-29

## 2022-12-07 RX ORDER — FLUTICASONE PROPIONATE AND SALMETEROL 250; 50 UG/1; UG/1
1 POWDER RESPIRATORY (INHALATION) 2 TIMES DAILY
Qty: 60 EACH | Refills: 2 | Status: SHIPPED | OUTPATIENT
Start: 2022-12-07 | End: 2023-03-31

## 2022-12-07 RX ORDER — BLOOD-GLUCOSE METER
1 EACH MISCELLANEOUS ONCE
Qty: 1 KIT | Refills: 0 | Status: SHIPPED | OUTPATIENT
Start: 2022-12-07 | End: 2022-12-07

## 2022-12-07 RX ORDER — EMPAGLIFLOZIN, METFORMIN HYDROCHLORIDE 12.5; 1 MG/1; MG/1
1 TABLET, EXTENDED RELEASE ORAL 2 TIMES DAILY
Qty: 180 TABLET | Refills: 1 | Status: SHIPPED | OUTPATIENT
Start: 2022-12-07 | End: 2023-03-29 | Stop reason: SDUPTHER

## 2022-12-07 RX ORDER — ROPINIROLE 4 MG/1
4 TABLET, FILM COATED ORAL NIGHTLY
Qty: 90 TABLET | Refills: 1 | Status: SHIPPED | OUTPATIENT
Start: 2022-12-07 | End: 2023-03-29 | Stop reason: SDUPTHER

## 2022-12-07 NOTE — PROGRESS NOTES
Chief Complaint  Diabetes    Subjective          Mariela Aldrich is a 52 y.o. female who presents to [facility]    History of Present Illness    Diabetes - pt reports bs this morning was 113. Pt would like to try ozempic. Pt has lost 8 lbs with diet modifications and has joined the gym.  Patient has not had any low blood sugars.    Hypertension-blood pressure is well controlled.    Overactive bladder-is improved with Myrbetriq but not fully resolved.  Patient has nocturia x2.    Anxiety-well managed.    At bedtime-patient continues to be managed by dermatology.    Hyperlipidemia-patient reports she did not have myalgias until starting the gym.  Patient does not have adequate water intake.    RLS - improved with medication and going to the gym.    Asthma well controlled. Pt has not used rescue inhaler in over 1 year. Immunotherapy is going well.        Review of Systems   Constitutional: Negative for fatigue.   Respiratory: Negative for cough and shortness of breath.    Cardiovascular: Negative for chest pain and palpitations.   Gastrointestinal: Negative for nausea and vomiting.   Endocrine: Negative for cold intolerance and heat intolerance.   Genitourinary: Negative for difficulty urinating and dysuria.   Musculoskeletal: Negative for arthralgias, gait problem and joint swelling.   Neurological: Positive for numbness. Negative for dizziness, seizures and headaches.   Hematological: Negative for adenopathy. Does not bruise/bleed easily.   Psychiatric/Behavioral: Negative for confusion, dysphoric mood and sleep disturbance. The patient is not nervous/anxious.           Medical History: has a past medical history of Anxiety, Diabetes mellitus type 2, controlled ([facility]), Hyperlipidemia, Hypothyroidism, Panic attack, RLS (restless legs syndrome), and Sinus trouble.     Surgical History: has a past surgical history that includes Colonoscopy (01/21/2020); Groin Abscess Incision and  Drainage (Right); Hysterectomy (2010); and Knee surgery (Bilateral, 2013).     Family History: family history includes Diabetes in her brother, maternal grandmother, and sister; Thyroid disease in her sister; Thyroid disease (age of onset: 43) in her brother; Thyroid disease (age of onset: 70) in her father.     Social History: reports that she has never smoked. She has never used smokeless tobacco. She reports that she does not drink alcohol and does not use drugs.    Allergies: Atorvastatin, Crestor [rosuvastatin], Etodolac, Iodine, Penicillins, and Pravastatin      Health Maintenance Due   Topic Date Due   • ZOSTER VACCINE (1 of 2) Never done   • HEMOGLOBIN A1C  12/06/2022            Current Outpatient Medications:   •  albuterol sulfate  (90 Base) MCG/ACT inhaler, Inhale 2 puffs Every 4 (Four) Hours As Needed., Disp: , Rfl:   •  B-D ULTRAFINE III SHORT PEN 31G X 8 MM misc, USE AS DIRECTED ONCE DAILY WITH TRESIBA FLEXPEN, Disp: 100 each, Rfl: 1  •  cetirizine (zyrTEC) 10 MG tablet, Zyrtec 10 mg oral tablet take 1 tablet (10 mg) by oral route once daily   Active, Disp: , Rfl:   •  Empagliflozin-metFORMIN HCl ER (Synjardy XR) 12.5-1000 MG tablet sustained-release 24 hour, Take 1 tablet by mouth 2 (Two) Times a Day., Disp: 180 tablet, Rfl: 1  •  Fluticasone-Salmeterol (ADVAIR/WIXELA) 250-50 MCG/ACT DISKUS, Inhale 1 puff 2 (Two) Times a Day., Disp: 60 each, Rfl: 2  •  Humira Pen 40 MG/0.4ML Pen-injector Kit, , Disp: , Rfl:   •  Insulin Degludec (Tresiba FlexTouch) 200 UNIT/ML solution pen-injector pen injection, Inject 48 Units under the skin into the appropriate area as directed Daily., Disp: 21 mL, Rfl: 1  •  lisinopril (PRINIVIL,ZESTRIL) 5 MG tablet, Take 1 tablet by mouth Daily., Disp: 90 tablet, Rfl: 1  •  Mirabegron ER (Myrbetriq) 50 MG tablet sustained-release 24 hour 24 hr tablet, Take 50 mg by mouth Daily., Disp: 30 tablet, Rfl: 5  •  multivitamin (THERAGRAN) tablet tablet, , Disp: , Rfl:   •   pitavastatin calcium (Livalo) 2 MG tablet tablet, Take 1 tablet by mouth Daily., Disp: 90 tablet, Rfl: 1  •  rOPINIRole (REQUIP) 4 MG tablet, Take 1 tablet by mouth Every Night., Disp: 90 tablet, Rfl: 1  •  sertraline (ZOLOFT) 100 MG tablet, Take 1.5 tablets by mouth Daily., Disp: 135 tablet, Rfl: 1  •  sulfamethoxazole-trimethoprim (BACTRIM DS,SEPTRA DS) 800-160 MG per tablet, Take 1 tablet by mouth 2 (Two) Times a Day., Disp: , Rfl:   •  Synthroid 88 MCG tablet, Take 1 tablet by mouth Daily., Disp: 90 tablet, Rfl: 1  •  Zinc 50 MG tablet, zinc 50 mg oral tablet take 1 tablet by oral route daily   Active, Disp: , Rfl:   •  adalimumab (Humira) 40 MG/0.8ML Prefilled Syringe Kit injection, 0.8 mL., Disp: , Rfl:   •  Blood Glucose Monitoring Suppl (OneTouch Verio) w/Device kit, 1 each 1 (One) Time for 1 dose., Disp: 1 kit, Rfl: 0  •  Continuous Blood Gluc Transmit (Dexcom G6 Transmitter) misc, 1 Device Every 3 (Three) Months., Disp: 1 each, Rfl: 3  •  glipizide (GLUCOTROL) 10 MG tablet, Take 1 tablet by mouth 2 (Two) Times a Day Before Meals., Disp: 180 tablet, Rfl: 1  •  glucose blood test strip, Use as instructed Dx: DM E11, Disp: 100 each, Rfl: 3  •  ONE TOUCH CLUB LANCETS misc, 90 each 3 (Three) Times a Day As Needed (check blood sugar)., Disp: 100 each, Rfl: 3  •  Semaglutide,0.25 or 0.5MG/DOS, (Ozempic, 0.25 or 0.5 MG/DOSE,) 2 MG/1.5ML solution pen-injector, Inject 0.25 mg under the skin into the appropriate area as directed 1 (One) Time Per Week., Disp: 1.5 mL, Rfl: 0      Immunization History   Administered Date(s) Administered   • Fluzone Quad >6mos (Multi-dose) 10/01/2020   • Pneumococcal Polysaccharide (PPSV23) 07/20/2019   • Td 05/08/1998   • Tdap 03/16/2020         Objective       Vitals:    12/07/22 0810   BP: 112/70   Pulse: 88   Temp: 98.3 °F (36.8 °C)   TempSrc: Temporal   SpO2: 99%   Weight: 89.9 kg (198 lb 3.2 oz)      Body mass index is 34.02 kg/m².   Wt Readings from Last 3 Encounters:   12/07/22  89.9 kg (198 lb 3.2 oz)   09/07/22 93.5 kg (206 lb 3 oz)   06/06/22 92.5 kg (204 lb)      BP Readings from Last 3 Encounters:   12/07/22 112/70   09/07/22 108/66   06/06/22 126/74        Physical Exam  Vitals reviewed.   Constitutional:       General: She is not in acute distress.     Appearance: Normal appearance. She is well-developed.   HENT:      Head: Normocephalic and atraumatic.   Eyes:      General: Lids are normal. No scleral icterus.     Conjunctiva/sclera: Conjunctivae normal.      Pupils: Pupils are equal, round, and reactive to light.   Neck:      Thyroid: No thyroid mass, thyromegaly or thyroid tenderness.      Vascular: No carotid bruit.   Cardiovascular:      Rate and Rhythm: Normal rate and regular rhythm.      Pulses: Normal pulses.      Heart sounds: Normal heart sounds. No murmur heard.    No friction rub. No gallop.   Pulmonary:      Effort: Pulmonary effort is normal. No retractions.      Breath sounds: Normal breath sounds. No wheezing or rhonchi.   Abdominal:      General: Abdomen is flat. Bowel sounds are normal. There is no distension.      Palpations: Abdomen is soft.      Tenderness: There is no abdominal tenderness. There is no guarding.   Musculoskeletal:      Cervical back: Full passive range of motion without pain, normal range of motion and neck supple. No rigidity or tenderness.      Right lower leg: No edema.      Left lower leg: No edema.   Lymphadenopathy:      Cervical: No cervical adenopathy.   Skin:     General: Skin is warm and dry.      Findings: No lesion or rash.      Nails: There is no clubbing.   Neurological:      General: No focal deficit present.      Mental Status: She is alert and oriented to person, place, and time.      Coordination: Coordination is intact.      Gait: Gait is intact.   Psychiatric:         Attention and Perception: Attention normal.         Mood and Affect: Mood and affect normal.         Speech: Speech normal.         Behavior: Behavior normal.  Behavior is cooperative.         Thought Content: Thought content normal.         Cognition and Memory: Cognition normal.         Judgment: Judgment normal.             Result Review :       Common labs    Common Labs 1/3/22 1/3/22 1/3/22 6/6/22 6/6/22 6/6/22 6/6/22    0829 0829 0829 0742 0742 0742 0744   Glucose   166 (A) 161 (A)      BUN   13 16      Creatinine   1.05 (A) 0.89      eGFR Non African Am   55 (A)       Sodium   132 (A) 137      Potassium   4.7 4.4      Chloride   99 103      Calcium   9.4 9.2      Albumin   4.20 3.90      Total Bilirubin   0.3 0.3      Alkaline Phosphatase   109 94      AST (SGOT)   31 20      ALT (SGPT)   31 22      Total Cholesterol  145   159     Triglycerides  58   62     HDL Cholesterol  49   66 (A)     LDL Cholesterol   84   81     Hemoglobin A1C 9.30 (A)     9.40 (A)    Microalbumin, Urine       <1.2   (A) Abnormal value                         Assessment and Plan        Diagnoses and all orders for this visit:    1. Visit for screening mammogram (Primary)  -     Mammo Screening Digital Tomosynthesis Bilateral With CAD; Future    2. Type 2 diabetes mellitus with hyperglycemia, with long-term current use of insulin (HCC)  -     Empagliflozin-metFORMIN HCl ER (Synjardy XR) 12.5-1000 MG tablet sustained-release 24 hour; Take 1 tablet by mouth 2 (Two) Times a Day.  Dispense: 180 tablet; Refill: 1  -     Insulin Degludec (Tresiba FlexTouch) 200 UNIT/ML solution pen-injector pen injection; Inject 48 Units under the skin into the appropriate area as directed Daily.  Dispense: 21 mL; Refill: 1  -     lisinopril (PRINIVIL,ZESTRIL) 5 MG tablet; Take 1 tablet by mouth Daily.  Dispense: 90 tablet; Refill: 1  -     Continuous Blood Gluc Transmit (Dexcom G6 Transmitter) misc; 1 Device Every 3 (Three) Months.  Dispense: 1 each; Refill: 3  -     Blood Glucose Monitoring Suppl (OneTouch Verio) w/Device kit; 1 each 1 (One) Time for 1 dose.  Dispense: 1 kit; Refill: 0  -     ONE TOUCH CLUB  LANCETS misc; 90 each 3 (Three) Times a Day As Needed (check blood sugar).  Dispense: 100 each; Refill: 3  -     glucose blood test strip; Use as instructed Dx: DM E11  Dispense: 100 each; Refill: 3  -     Semaglutide,0.25 or 0.5MG/DOS, (Ozempic, 0.25 or 0.5 MG/DOSE,) 2 MG/1.5ML solution pen-injector; Inject 0.25 mg under the skin into the appropriate area as directed 1 (One) Time Per Week.  Dispense: 1.5 mL; Refill: 0    3. OAB (overactive bladder)  -     Mirabegron ER (Myrbetriq) 50 MG tablet sustained-release 24 hour 24 hr tablet; Take 50 mg by mouth Daily.  Dispense: 30 tablet; Refill: 5    4. Mixed hyperlipidemia  -     pitavastatin calcium (Livalo) 2 MG tablet tablet; Take 1 tablet by mouth Daily.  Dispense: 90 tablet; Refill: 1    5. RLS (restless legs syndrome)  -     rOPINIRole (REQUIP) 4 MG tablet; Take 1 tablet by mouth Every Night.  Dispense: 90 tablet; Refill: 1    6. Anxiety  -     sertraline (ZOLOFT) 100 MG tablet; Take 1.5 tablets by mouth Daily.  Dispense: 135 tablet; Refill: 1    7. Other specified hypothyroidism  -     Synthroid 88 MCG tablet; Take 1 tablet by mouth Daily.  Dispense: 90 tablet; Refill: 1    8. Mild intermittent asthma without complication  -     Fluticasone-Salmeterol (ADVAIR/WIXELA) 250-50 MCG/ACT DISKUS; Inhale 1 puff 2 (Two) Times a Day.  Dispense: 60 each; Refill: 2      Continue blood sugar monitoring daily and record.  Bring your log to office visits.  Call the office for readings below 70 and above 250 or any complications.    Daily foot care.  Avoid walking barefoot.    Annual dilated eye exam.    Discussed with patient blood pressure monitoring, hemoglobin A1c levels need to be below 7, and LDL goals below 70.      Follow Up     Return in about 3 months (around 3/7/2023) for Next scheduled follow up.    Patient was given instructions and counseling regarding her condition or for health maintenance advice. Please see specific information pulled into the AVS if appropriate.      Berenice Avelar, APRN

## 2022-12-09 ENCOUNTER — TELEPHONE (OUTPATIENT)
Dept: FAMILY MEDICINE CLINIC | Facility: CLINIC | Age: 52
End: 2022-12-09

## 2022-12-09 NOTE — TELEPHONE ENCOUNTER
Caller: [facility] [address] [place], [place] [address], [place] - [address] [facility] [phone] [facility] [phone]     Relationship: Pharmacy          What was the call regarding:     TARYN SAID THEY DO NOT HAVE THE ONCE TOUCH VERIO IN STOCK BUT THEY DO HAVE THE ONE TOUCH VERIO FLEX . SHE IS WANTING TO KNOW IF PCP  ZURDO WOULD LIKE TO CHANGE THE PRESCRIPTION TO THIS.       Do you require a callback: YES

## 2022-12-21 RX ORDER — PROCHLORPERAZINE 25 MG/1
SUPPOSITORY RECTAL
Qty: 9 EACH | Refills: 1 | Status: SHIPPED | OUTPATIENT
Start: 2022-12-21 | End: 2023-03-29 | Stop reason: SDUPTHER

## 2023-01-13 RX ORDER — PEN NEEDLE, DIABETIC 31 GX5/16"
NEEDLE, DISPOSABLE MISCELLANEOUS
Qty: 100 EACH | Refills: 0 | Status: SHIPPED | OUTPATIENT
Start: 2023-01-13 | End: 2023-03-29 | Stop reason: SDUPTHER

## 2023-01-23 DIAGNOSIS — Z79.4 TYPE 2 DIABETES MELLITUS WITH HYPERGLYCEMIA, WITH LONG-TERM CURRENT USE OF INSULIN: ICD-10-CM

## 2023-01-23 DIAGNOSIS — E11.65 TYPE 2 DIABETES MELLITUS WITH HYPERGLYCEMIA, WITH LONG-TERM CURRENT USE OF INSULIN: ICD-10-CM

## 2023-01-23 RX ORDER — GLIPIZIDE 10 MG/1
TABLET ORAL
Qty: 180 TABLET | Refills: 1 | Status: SHIPPED | OUTPATIENT
Start: 2023-01-23 | End: 2023-03-29 | Stop reason: SDUPTHER

## 2023-01-24 ENCOUNTER — PREP FOR SURGERY (OUTPATIENT)
Dept: OTHER | Facility: HOSPITAL | Age: 53
End: 2023-01-24
Payer: COMMERCIAL

## 2023-01-24 ENCOUNTER — OFFICE VISIT (OUTPATIENT)
Dept: SURGERY | Facility: CLINIC | Age: 53
End: 2023-01-24
Payer: COMMERCIAL

## 2023-01-24 VITALS — RESPIRATION RATE: 12 BRPM | BODY MASS INDEX: 34.11 KG/M2 | WEIGHT: 199.8 LBS | HEIGHT: 64 IN

## 2023-01-24 DIAGNOSIS — L73.2 HIDRADENITIS: Primary | ICD-10-CM

## 2023-01-24 PROCEDURE — 99214 OFFICE O/P EST MOD 30 MIN: CPT | Performed by: SURGERY

## 2023-01-24 RX ORDER — SODIUM CHLORIDE 0.9 % (FLUSH) 0.9 %
10 SYRINGE (ML) INJECTION AS NEEDED
Status: CANCELLED | OUTPATIENT
Start: 2023-01-24

## 2023-01-24 RX ORDER — CLINDAMYCIN PHOSPHATE 900 MG/50ML
900 INJECTION INTRAVENOUS ONCE
Status: CANCELLED | OUTPATIENT
Start: 2023-01-24 | End: 2023-01-24

## 2023-01-24 RX ORDER — SODIUM CHLORIDE 0.9 % (FLUSH) 0.9 %
10 SYRINGE (ML) INJECTION EVERY 12 HOURS SCHEDULED
Status: CANCELLED | OUTPATIENT
Start: 2023-01-24

## 2023-01-24 RX ORDER — SODIUM CHLORIDE 9 MG/ML
40 INJECTION, SOLUTION INTRAVENOUS AS NEEDED
Status: CANCELLED | OUTPATIENT
Start: 2023-01-24

## 2023-01-24 NOTE — PROGRESS NOTES
Chief Complaint: Cyst    Subjective         History of Present Illness  Mariela Aldrich is a 52 y.o. female presents to [facility]. The patient is to be seen for  hidradenitis and a pilonidal cyst. The patient is accompanied by a adult male.    Hidradenitis   The patient continues to have difficulty healing from her right groin. She reports having a red, purple area that itches at times, but it has never opened up. The area on her left groin is still draining. She has been seeing Dr. Frey, who started her on Humira and Bactrim, which seems to be helping some. She last took the Humira this morning, and she notes that she takes the medication once a week. The patient states that after the surgery she had her wound packed and used a wound care pack. The patient states that she has been using a cream in the area where the skin is breaking down. The adult male states that the hidradenitis knot would get to the size of a baseball. The patient states that the hidradenitis would feel like a hard knot and notes it also feels like someone stabbing her. She reports that eventually the hidradenitis would start to drain. The patient does inquire if she can continue to take Bactrim.    Pilonidal cyst  The states that the cyst on her back does drains intermittently. The patient states that she is ready for the cyst to be removed.    Urinary incontinence  The patient states that after getting a hysterectomy in 2010 she noticed the urinary incontinence. She states that when she started taking Synjardy, the medication made the urinary incontinence worse. The patient states that she stopped eating sweets and no longer has urinary incontinence. She states that she is unsure if she has cystocele.     Objective     Past Medical History:   Diagnosis Date   • Anxiety    • Diabetes mellitus type 2, controlled (HCC)    • Hyperlipidemia    • Hypothyroidism    • Panic attack    • RLS (restless legs  syndrome)    • Sinus trouble        Past Surgical History:   Procedure Laterality Date   • COLONOSCOPY  01/21/2020    exernal hemorrhoids   • GROIN ABSCESS INCISION AND DRAINAGE Right     hidradenitis   • HYSTERECTOMY  2010   • KNEE SURGERY Bilateral 2013         Current Outpatient Medications:   •  adalimumab (Humira) 40 MG/0.8ML Prefilled Syringe Kit injection, 0.8 mL., Disp: , Rfl:   •  albuterol sulfate  (90 Base) MCG/ACT inhaler, Inhale 2 puffs Every 4 (Four) Hours As Needed., Disp: , Rfl:   •  B-D ULTRAFINE III SHORT PEN 31G X 8 MM misc, USE AS DIRECTED ONCE DAILY WITH TRESIBA FLEXPEN., Disp: 100 each, Rfl: 0  •  cetirizine (zyrTEC) 10 MG tablet, Zyrtec 10 mg oral tablet take 1 tablet (10 mg) by oral route once daily   Active, Disp: , Rfl:   •  Continuous Blood Gluc Sensor (Dexcom G6 Sensor), Every 10 (Ten) Days., Disp: 9 each, Rfl: 1  •  Continuous Blood Gluc Transmit (Dexcom G6 Transmitter) misc, 1 Device Every 3 (Three) Months., Disp: 1 each, Rfl: 3  •  Empagliflozin-metFORMIN HCl ER (Synjardy XR) 12.5-1000 MG tablet sustained-release 24 hour, Take 1 tablet by mouth 2 (Two) Times a Day., Disp: 180 tablet, Rfl: 1  •  Fluticasone-Salmeterol (ADVAIR/WIXELA) 250-50 MCG/ACT DISKUS, Inhale 1 puff 2 (Two) Times a Day., Disp: 60 each, Rfl: 2  •  glipizide (GLUCOTROL) 10 MG tablet, TAKE 1 TABLET BY MOUTH TWICE DAILY BEFORE MEAL(S), Disp: 180 tablet, Rfl: 1  •  glucose blood test strip, Use as instructed Dx: DM E11, Disp: 100 each, Rfl: 3  •  Humira Pen 40 MG/0.4ML Pen-injector Kit, , Disp: , Rfl:   •  Insulin Degludec (Tresiba FlexTouch) 200 UNIT/ML solution pen-injector pen injection, Inject 48 Units under the skin into the appropriate area as directed Daily., Disp: 21 mL, Rfl: 1  •  lisinopril (PRINIVIL,ZESTRIL) 5 MG tablet, Take 1 tablet by mouth Daily., Disp: 90 tablet, Rfl: 1  •  Mirabegron ER (Myrbetriq) 50 MG tablet sustained-release 24 hour 24 hr tablet, Take 50 mg by mouth Daily., Disp: 30 tablet,  Rfl: 5  •  multivitamin (THERAGRAN) tablet tablet, , Disp: , Rfl:   •  ONE TOUCH CLUB LANCETS misc, 90 each 3 (Three) Times a Day As Needed (check blood sugar)., Disp: 100 each, Rfl: 3  •  pitavastatin calcium (Livalo) 2 MG tablet tablet, Take 1 tablet by mouth Daily., Disp: 90 tablet, Rfl: 1  •  rOPINIRole (REQUIP) 4 MG tablet, Take 1 tablet by mouth Every Night., Disp: 90 tablet, Rfl: 1  •  Semaglutide,0.25 or 0.5MG/DOS, (Ozempic, 0.25 or 0.5 MG/DOSE,) 2 MG/1.5ML solution pen-injector, Inject 0.25 mg under the skin into the appropriate area as directed 1 (One) Time Per Week., Disp: 1.5 mL, Rfl: 0  •  sertraline (ZOLOFT) 100 MG tablet, Take 1.5 tablets by mouth Daily., Disp: 135 tablet, Rfl: 1  •  sulfamethoxazole-trimethoprim (BACTRIM DS,SEPTRA DS) 800-160 MG per tablet, Take 1 tablet by mouth 2 (Two) Times a Day., Disp: , Rfl:   •  Synthroid 88 MCG tablet, Take 1 tablet by mouth Daily., Disp: 90 tablet, Rfl: 1  •  Zinc 50 MG tablet, zinc 50 mg oral tablet take 1 tablet by oral route daily   Active, Disp: , Rfl:     Allergies   Allergen Reactions   • Atorvastatin Myalgia   • Crestor [Rosuvastatin] Other (See Comments)     Legs cramps    • Etodolac Unknown - Low Severity   • Iodine Unknown - High Severity   • Penicillins Swelling   • Pravastatin Myalgia        Family History   Problem Relation Age of Onset   • Thyroid disease Father 70   • Thyroid disease Sister    • Diabetes Sister    • Thyroid disease Brother 43        thyroid cancer   • Diabetes Brother    • Diabetes Maternal Grandmother        Social History     Socioeconomic History   • Marital status:    Tobacco Use   • Smoking status: Never   • Smokeless tobacco: Never   Vaping Use   • Vaping Use: Never used   Substance and Sexual Activity   • Alcohol use: Never   • Drug use: Never   • Sexual activity: Defer        Physical Exam   Post Surgical Incision     No acute distress. Nonlabored breathing. Abdomen is soft. She has a 10 x 12 cm area of red,  firm, nodular punctate infection consistent with hidradenitis in her left lower stomach. She also has a 6 x 7 cm area just above her gluteal fold that looks somewhat similar.    Result Review :               Assessment and Plan    There are no diagnoses linked to this encounter.     1. Patient with likely hidradenitis and pilonidal cyst.  We will plan for excision of these in the operating room. Risks, benefits, alternatives of these procedures were discussed extensively. All questions were answered. Patient voiced understanding and agreed to proceed with the above plan. We will go ahead and schedule her out at least 2 weeks because I would like her to stop the Humira for 2 weeks prior to surgery and she will need to hold the Humira postoperatively until healed. I have asked her to let Dr. Frey, her dermatologist, know that we are putting her Humira treatments on hold.            Follow Up   No follow-ups on file.  Patient was given instructions and counseling regarding her condition or for health maintenance advice. Please see specific information pulled into the AVS if appropriate.       Transcribed from ambient dictation for Donato You MD by Daniela Mittal.  01/24/23   15:14 EST    Patient or patient representative verbalized consent to the visit recording.  I have personally performed the services described in this document as transcribed by the above individual, and it is both accurate and complete.

## 2023-02-03 RX ORDER — TRIAMCINOLONE ACETONIDE 5 MG/G
1 CREAM TOPICAL DAILY
COMMUNITY
End: 2023-03-16

## 2023-02-03 NOTE — PRE-PROCEDURE INSTRUCTIONS
IMPORTANT INSTRUCTIONS - PRE-ADMISSION TESTING  1. DO NOT EAT OR CHEW anything after midnight the night before your procedure.    2. You may have CLEAR liquids up to __2____ hours prior to ARRIVAL time.   3. Take the following medications the morning of your procedure with JUST A SIP OF WATER:  __ALBUTEROL INHALER IF NEEDED AND BRING WITH YOU, ADVAIR INHALER AND BRING WITH YOU, MYRBETRIQ, BACTRIM, SYNTHROID_____________________________________________________________________________________________________________________________________________________________________________________    4. DO NOT BRING your medications to the hospital with you, UNLESS something has changed since your PRE-Admission Testing appointment.  5. Hold all vitamins, supplements, and NSAIDS (Non- steroidal anti-inflammatory meds) for one week prior to surgery (you MAY take Tylenol or Acetaminophen).  6. If you are diabetic, check your blood sugar the morning of your procedure. If it is less than 70 or if you are feeling symptomatic, call the following number for further instructions: [phone]  7. Use your inhalers/nebulizers as usual, the morning of your procedure. BRING YOUR INHALERS with you.   8. Bring your CPAP or BIPAP to hospital, ONLY IF YOU WILL BE SPENDING THE NIGHT.   9. Make sure you have a ride home and have someone who will stay with you the day of your procedure after you go home.  10. If you have any questions, please call your Pre-Admission Testing Nurse, __ANABELL______________ at [phone]___________.   11. Per anesthesia request, do not smoke for 24 hours before your procedure or as instructed by your surgeon.   12. BATHING INSTRUCTIONS GIVEN. NO JEWELRY DAY OF PROCEDURE. NO NAIL POLISH UPPER OR LOWER EXTREMITIES.   13. CASH, CHECK, OR CARD FOR MEDS TO BED IF INDICATED AT DISCHARGE  14. ENTRANCE A ELEVATOR A 3RD FLOOR SDS

## 2023-02-08 ENCOUNTER — ANESTHESIA (OUTPATIENT)
Dept: PERIOP | Facility: HOSPITAL | Age: 53
End: 2023-02-08
Payer: COMMERCIAL

## 2023-02-08 ENCOUNTER — ANESTHESIA EVENT (OUTPATIENT)
Dept: PERIOP | Facility: HOSPITAL | Age: 53
End: 2023-02-08
Payer: COMMERCIAL

## 2023-02-08 ENCOUNTER — HOSPITAL ENCOUNTER (OUTPATIENT)
Facility: HOSPITAL | Age: 53
Setting detail: HOSPITAL OUTPATIENT SURGERY
Discharge: HOME | End: 2023-02-08
Attending: SURGERY | Admitting: SURGERY
Payer: COMMERCIAL

## 2023-02-08 VITALS
SYSTOLIC BLOOD PRESSURE: 126 MMHG | TEMPERATURE: 97.7 F | BODY MASS INDEX: 33.61 KG/M2 | WEIGHT: 196.87 LBS | HEART RATE: 89 BPM | RESPIRATION RATE: 16 BRPM | HEIGHT: 64 IN | OXYGEN SATURATION: 99 % | DIASTOLIC BLOOD PRESSURE: 65 MMHG

## 2023-02-08 DIAGNOSIS — L73.2 HIDRADENITIS: ICD-10-CM

## 2023-02-08 LAB
ANION GAP SERPL CALCULATED.3IONS-SCNC: 9.1 MMOL/L (ref 5–15)
BUN SERPL-MCNC: 14 MG/DL (ref 6–20)
BUN/CREAT SERPL: 17.3 (ref 7–25)
CALCIUM SPEC-SCNC: 9.3 MG/DL (ref 8.6–10.5)
CHLORIDE SERPL-SCNC: 104 MMOL/L (ref 98–107)
CO2 SERPL-SCNC: 22.9 MMOL/L (ref 22–29)
CREAT SERPL-MCNC: 0.81 MG/DL (ref 0.57–1)
EGFRCR SERPLBLD CKD-EPI 2021: 86.9 ML/MIN/1.73
GLUCOSE BLDC GLUCOMTR-MCNC: 127 MG/DL (ref 70–99)
GLUCOSE SERPL-MCNC: 151 MG/DL (ref 65–99)
POTASSIUM SERPL-SCNC: 4.8 MMOL/L (ref 3.5–5.2)
SODIUM SERPL-SCNC: 136 MMOL/L (ref 136–145)

## 2023-02-08 PROCEDURE — 11462 EXC SKN HDRDNT ING SMPL/NTRM: CPT | Performed by: SPECIALIST/TECHNOLOGIST, OTHER

## 2023-02-08 PROCEDURE — 25010000002 METOCLOPRAMIDE PER 10 MG: Performed by: ANESTHESIOLOGY

## 2023-02-08 PROCEDURE — 25010000002 DEXAMETHASONE PER 1 MG: Performed by: NURSE ANESTHETIST, CERTIFIED REGISTERED

## 2023-02-08 PROCEDURE — 11771 EXC PILONIDAL CYST XTNSV: CPT | Performed by: SPECIALIST/TECHNOLOGIST, OTHER

## 2023-02-08 PROCEDURE — 80048 BASIC METABOLIC PNL TOTAL CA: CPT | Performed by: ANESTHESIOLOGY

## 2023-02-08 PROCEDURE — 82962 GLUCOSE BLOOD TEST: CPT

## 2023-02-08 PROCEDURE — 93005 ELECTROCARDIOGRAM TRACING: CPT | Performed by: ANESTHESIOLOGY

## 2023-02-08 PROCEDURE — 25010000002 ONDANSETRON PER 1 MG: Performed by: NURSE ANESTHETIST, CERTIFIED REGISTERED

## 2023-02-08 PROCEDURE — 0 LIDOCAINE 1 % SOLUTION 10 ML VIAL: Performed by: SURGERY

## 2023-02-08 PROCEDURE — 88304 TISSUE EXAM BY PATHOLOGIST: CPT | Performed by: SURGERY

## 2023-02-08 PROCEDURE — 11462 EXC SKN HDRDNT ING SMPL/NTRM: CPT | Performed by: SURGERY

## 2023-02-08 PROCEDURE — 25010000002 PROPOFOL 10 MG/ML EMULSION: Performed by: NURSE ANESTHETIST, CERTIFIED REGISTERED

## 2023-02-08 PROCEDURE — 25010000002 MIDAZOLAM PER 1 MG: Performed by: ANESTHESIOLOGY

## 2023-02-08 PROCEDURE — 25010000002 FENTANYL CITRATE (PF) 50 MCG/ML SOLUTION: Performed by: NURSE ANESTHETIST, CERTIFIED REGISTERED

## 2023-02-08 PROCEDURE — 11771 EXC PILONIDAL CYST XTNSV: CPT | Performed by: SURGERY

## 2023-02-08 RX ORDER — PROMETHAZINE HYDROCHLORIDE 25 MG/1
25 SUPPOSITORY RECTAL ONCE AS NEEDED
Status: DISCONTINUED | OUTPATIENT
Start: 2023-02-08 | End: 2023-02-08 | Stop reason: HOSPADM

## 2023-02-08 RX ORDER — HYDROCODONE BITARTRATE AND ACETAMINOPHEN 5; 325 MG/1; MG/1
1-2 TABLET ORAL EVERY 4 HOURS PRN
Qty: 30 TABLET | Refills: 0 | Status: SHIPPED | OUTPATIENT
Start: 2023-02-08 | End: 2023-02-21 | Stop reason: SDUPTHER

## 2023-02-08 RX ORDER — ACETAMINOPHEN 500 MG
1000 TABLET ORAL ONCE
Status: COMPLETED | OUTPATIENT
Start: 2023-02-08 | End: 2023-02-08

## 2023-02-08 RX ORDER — DOCUSATE SODIUM 100 MG/1
100 CAPSULE, LIQUID FILLED ORAL 2 TIMES DAILY PRN
Qty: 10 CAPSULE | Refills: 1 | Status: SHIPPED | OUTPATIENT
Start: 2023-02-08 | End: 2023-03-29

## 2023-02-08 RX ORDER — ONDANSETRON 2 MG/ML
4 INJECTION INTRAMUSCULAR; INTRAVENOUS ONCE AS NEEDED
Status: DISCONTINUED | OUTPATIENT
Start: 2023-02-08 | End: 2023-02-08 | Stop reason: HOSPADM

## 2023-02-08 RX ORDER — PROMETHAZINE HYDROCHLORIDE 12.5 MG/1
25 TABLET ORAL ONCE AS NEEDED
Status: DISCONTINUED | OUTPATIENT
Start: 2023-02-08 | End: 2023-02-08 | Stop reason: HOSPADM

## 2023-02-08 RX ORDER — CLINDAMYCIN PHOSPHATE 900 MG/50ML
900 INJECTION INTRAVENOUS ONCE
Status: COMPLETED | OUTPATIENT
Start: 2023-02-08 | End: 2023-02-08

## 2023-02-08 RX ORDER — DEXAMETHASONE SODIUM PHOSPHATE 4 MG/ML
INJECTION, SOLUTION INTRA-ARTICULAR; INTRALESIONAL; INTRAMUSCULAR; INTRAVENOUS; SOFT TISSUE AS NEEDED
Status: DISCONTINUED | OUTPATIENT
Start: 2023-02-08 | End: 2023-02-08 | Stop reason: SURG

## 2023-02-08 RX ORDER — MAGNESIUM HYDROXIDE 1200 MG/15ML
LIQUID ORAL AS NEEDED
Status: DISCONTINUED | OUTPATIENT
Start: 2023-02-08 | End: 2023-02-08 | Stop reason: HOSPADM

## 2023-02-08 RX ORDER — HYDROCODONE BITARTRATE AND ACETAMINOPHEN 5; 325 MG/1; MG/1
1 TABLET ORAL ONCE AS NEEDED
Status: DISCONTINUED | OUTPATIENT
Start: 2023-02-08 | End: 2023-02-08 | Stop reason: HOSPADM

## 2023-02-08 RX ORDER — PROPOFOL 10 MG/ML
VIAL (ML) INTRAVENOUS AS NEEDED
Status: DISCONTINUED | OUTPATIENT
Start: 2023-02-08 | End: 2023-02-08 | Stop reason: SURG

## 2023-02-08 RX ORDER — MIDAZOLAM HYDROCHLORIDE 1 MG/ML
2 INJECTION INTRAMUSCULAR; INTRAVENOUS ONCE
Status: COMPLETED | OUTPATIENT
Start: 2023-02-08 | End: 2023-02-08

## 2023-02-08 RX ORDER — ONDANSETRON 2 MG/ML
INJECTION INTRAMUSCULAR; INTRAVENOUS AS NEEDED
Status: DISCONTINUED | OUTPATIENT
Start: 2023-02-08 | End: 2023-02-08 | Stop reason: SURG

## 2023-02-08 RX ORDER — SODIUM CHLORIDE, SODIUM LACTATE, POTASSIUM CHLORIDE, CALCIUM CHLORIDE 600; 310; 30; 20 MG/100ML; MG/100ML; MG/100ML; MG/100ML
9 INJECTION, SOLUTION INTRAVENOUS CONTINUOUS PRN
Status: DISCONTINUED | OUTPATIENT
Start: 2023-02-08 | End: 2023-02-08 | Stop reason: HOSPADM

## 2023-02-08 RX ORDER — OXYCODONE HYDROCHLORIDE 5 MG/1
5 TABLET ORAL
Status: DISCONTINUED | OUTPATIENT
Start: 2023-02-08 | End: 2023-02-08 | Stop reason: HOSPADM

## 2023-02-08 RX ORDER — ROCURONIUM BROMIDE 10 MG/ML
INJECTION, SOLUTION INTRAVENOUS AS NEEDED
Status: DISCONTINUED | OUTPATIENT
Start: 2023-02-08 | End: 2023-02-08 | Stop reason: SURG

## 2023-02-08 RX ORDER — MEPERIDINE HYDROCHLORIDE 25 MG/ML
12.5 INJECTION INTRAMUSCULAR; INTRAVENOUS; SUBCUTANEOUS
Status: DISCONTINUED | OUTPATIENT
Start: 2023-02-08 | End: 2023-02-08 | Stop reason: HOSPADM

## 2023-02-08 RX ORDER — SODIUM CHLORIDE 9 MG/ML
40 INJECTION, SOLUTION INTRAVENOUS AS NEEDED
Status: DISCONTINUED | OUTPATIENT
Start: 2023-02-08 | End: 2023-02-08 | Stop reason: HOSPADM

## 2023-02-08 RX ORDER — FENTANYL CITRATE 50 UG/ML
INJECTION, SOLUTION INTRAMUSCULAR; INTRAVENOUS AS NEEDED
Status: DISCONTINUED | OUTPATIENT
Start: 2023-02-08 | End: 2023-02-08 | Stop reason: SURG

## 2023-02-08 RX ORDER — SODIUM CHLORIDE 0.9 % (FLUSH) 0.9 %
10 SYRINGE (ML) INJECTION AS NEEDED
Status: DISCONTINUED | OUTPATIENT
Start: 2023-02-08 | End: 2023-02-08 | Stop reason: HOSPADM

## 2023-02-08 RX ORDER — SODIUM CHLORIDE 0.9 % (FLUSH) 0.9 %
10 SYRINGE (ML) INJECTION EVERY 12 HOURS SCHEDULED
Status: DISCONTINUED | OUTPATIENT
Start: 2023-02-08 | End: 2023-02-08 | Stop reason: HOSPADM

## 2023-02-08 RX ORDER — METOCLOPRAMIDE HYDROCHLORIDE 5 MG/ML
10 INJECTION INTRAMUSCULAR; INTRAVENOUS ONCE AS NEEDED
Status: COMPLETED | OUTPATIENT
Start: 2023-02-08 | End: 2023-02-08

## 2023-02-08 RX ORDER — LIDOCAINE HYDROCHLORIDE 20 MG/ML
INJECTION, SOLUTION EPIDURAL; INFILTRATION; INTRACAUDAL; PERINEURAL AS NEEDED
Status: DISCONTINUED | OUTPATIENT
Start: 2023-02-08 | End: 2023-02-08 | Stop reason: SURG

## 2023-02-08 RX ADMIN — METOCLOPRAMIDE HYDROCHLORIDE 10 MG: 5 INJECTION INTRAMUSCULAR; INTRAVENOUS at 12:36

## 2023-02-08 RX ADMIN — SUGAMMADEX 200 MG: 100 INJECTION, SOLUTION INTRAVENOUS at 14:39

## 2023-02-08 RX ADMIN — LIDOCAINE HYDROCHLORIDE 80 MG: 20 INJECTION, SOLUTION EPIDURAL; INFILTRATION; INTRACAUDAL; PERINEURAL at 13:12

## 2023-02-08 RX ADMIN — MIDAZOLAM HYDROCHLORIDE 2 MG: 2 INJECTION, SOLUTION INTRAMUSCULAR; INTRAVENOUS at 12:37

## 2023-02-08 RX ADMIN — ONDANSETRON 4 MG: 2 INJECTION INTRAMUSCULAR; INTRAVENOUS at 13:19

## 2023-02-08 RX ADMIN — SODIUM CHLORIDE, POTASSIUM CHLORIDE, SODIUM LACTATE AND CALCIUM CHLORIDE: 600; 310; 30; 20 INJECTION, SOLUTION INTRAVENOUS at 14:21

## 2023-02-08 RX ADMIN — SODIUM CHLORIDE, POTASSIUM CHLORIDE, SODIUM LACTATE AND CALCIUM CHLORIDE 9 ML/HR: 600; 310; 30; 20 INJECTION, SOLUTION INTRAVENOUS at 11:54

## 2023-02-08 RX ADMIN — ROCURONIUM BROMIDE 50 MG: 10 INJECTION, SOLUTION INTRAVENOUS at 13:12

## 2023-02-08 RX ADMIN — ACETAMINOPHEN 1000 MG: 500 TABLET ORAL at 11:54

## 2023-02-08 RX ADMIN — PROPOFOL 150 MG: 10 INJECTION, EMULSION INTRAVENOUS at 13:12

## 2023-02-08 RX ADMIN — FENTANYL CITRATE 100 MCG: 50 INJECTION, SOLUTION INTRAMUSCULAR; INTRAVENOUS at 13:12

## 2023-02-08 RX ADMIN — DEXAMETHASONE SODIUM PHOSPHATE 4 MG: 4 INJECTION, SOLUTION INTRA-ARTICULAR; INTRALESIONAL; INTRAMUSCULAR; INTRAVENOUS; SOFT TISSUE at 13:19

## 2023-02-08 RX ADMIN — OXYCODONE HYDROCHLORIDE 5 MG: 5 TABLET ORAL at 15:26

## 2023-02-08 RX ADMIN — CLINDAMYCIN IN 5 PERCENT DEXTROSE 900 MG: 18 INJECTION, SOLUTION INTRAVENOUS at 13:13
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
## 2023-02-08 NOTE — OP NOTE
Preoperative diagnosis: Pilonidal cyst, abdominal wall hidradenitis    Postoperative diagnosis: Same    Procedure: Excision of pilonidal cyst and abdominal wall hidradenitis    Surgeon: Avelino    Anesthesia: General    Assistant: Irma Spangler    EBL: 37    Specimens: Pilonidal cyst- 10 x 6 cm taken down to the presacral fascia, abdominal wall hidradenitis- 35 x 10 cm-take down to the deep subcutaneous tissue    Complications: None    Indications: 52-year-old female with a history of hidradenitis presents with worsening purulent drainage and pain from her abdominal wall hidradenitis as well as what appears to be pilonidal cyst.  She presents today for elective excision.    PROCEDURE    Patient was seen in the preoperative holding area.  Risks, benefits, alternatives of the operation were again discussed in detail.  All of the patient and family's questions were answered.  They voiced understanding, and agreed to proceed.    Patient was brought to the operating room.  Monitoring devices and Pepe stockings were placed.  Anesthesia was administered.  Patient was prepped and draped in standard surgical fashion.  Patient was placed in the prone position and began the pilonidal cyst excision first.  After prepping and draping a timeout was performed to verify both the correct patient and correct procedure.    We began by injecting local anesthesia around the area of the pilonidal cyst.  Elliptical incision was made around the cyst.  We carried our dissection down the combination of sharp dissection and electrocautery.  We dissected down to go to the presacral fascia.  We dissected laterally until we got to normal healthy appearing tissue.  We amputated the pilonidal cyst site.  We then copiously irrigated the wound bed.  We made sure thing appeared to be hemostatic with no obvious underlying injury.  A Penrose drain was placed in the wound bed.  The wound was closed with deep interrupted 3-0 Vicryl sutures.  Skin was  closed with interrupted 3-0 nylon sutures.  It was dressed with Adaptic 4 x 4's and tape.    Patient was then flipped onto the OR stretcher and placed in the supine position.  She was reprepped and draped.  We injected local anesthesia around the area of the abdominal wall hidradenitis.  Incision was made around the areas of hidradenitis.  We carried our dissection down sharply and with electrocautery.  We dissected the hidradenitis sites free.  We dissected until we got to normal healthy appearing tissue.  She also had a small area on her right groin crease which was draining purulent material.  I excised that as well.  We then copiously irrigated this wound bed.  We again made sure the appear to be hemostatic with electrocautery.  A large Penrose drain was placed in the wound bed.  We then closed the wound with deep interrupted 3-0 Vicryl's.  The skin incision was closed with staples.  We also closed the right groin wound with interrupted 3-0 Sutures.  These wounds were also dressed with Adaptic 4 x 4's and tape.    The patient was then aroused from anesthesia, and taken off the OR table, and taken to PACU in stable condition.  Sponge, needle, and instrument counts were correct x2.  Irma Spangler was present for the entire procedure and helped in all portions of the case.    Assistant: Irma Spangler CSA was responsible for performing the following activities: Retraction, Suction, Irrigation, Suturing, Closing and Placing Dressing and their skilled assistance was necessary for the success of this case.      The operative note was dictated with the help of the dragon dictation system.

## 2023-02-08 NOTE — DISCHARGE INSTRUCTIONS
DISCHARGE INSTRUCTIONS  SURGICAL / AMBULATORY  PROCEDURES      For your surgery you had:  General anesthesia (you may have a sore throat for the first 24 hours)     You may experience dizziness, drowsiness, or light-headedness for several hours following surgery/procedure.  Do not stay alone today or tonight.  Limit your activity for 24 hours.  Resume your diet slowly.  Follow whatever special dietary instructions you may have been given by your doctor.  You should not drive or operate machinery, drink alcohol, or sign legally binding documents for 24 hours or while you are taking pain medication.    NOTIFY YOUR DOCTOR IF YOU EXPERIENCE ANY OF THE FOLLOWING:  Temperature greater than 101 degrees Fahrenheit  Shaking Chills  Redness or excessive drainage from incision  Nausea, vomiting and/or pain that is not controlled by prescribed medications  Increase in bleeding or bleeding that is excessive  Unable to urinate in 6 hours after surgery  If unable to reach your doctor, please go to the closest Emergency Room  You may begin dressing changes:     [x] in 24 hours (start after shower tomorrow.)   [] in 48 hours   [] Other:  You may remove Band-Aid/dressing tomorrow.  You may shower : tomorrow evening.  Apply an ice pack 24-48 hours.  Medications per physician instructions as indicated on Discharge Medication Information Sheet.      SPECIAL INSTRUCTIONS:  May shower tomorrow.  No tub bathing, swimming, or submerging incisions for 2 weeks.  Expect drainage from incisions especially at the Penrose drain sites.  Keep wounds covered with gauze and tape.  Change gauze daily. No lifting greater than 15 pounds or strenuous activity for 2 weeks.  No driving if taking narcotic pain medication.    Last dose of pain medication was given at:   -Tylenol (1000mg) last at 11:54am. 4000 mg total in 24 hrs.  -oxy 5 mg @ 3:26, next @ 7:26 pm

## 2023-02-08 NOTE — ANESTHESIA PREPROCEDURE EVALUATION
Anesthesia Evaluation     Patient summary reviewed and Nursing notes reviewed   no history of anesthetic complications:  NPO Solid Status: > 8 hours  NPO Liquid Status: > 2 hours           Airway   Mallampati: II  TM distance: >3 FB  Neck ROM: full  No difficulty expected  Dental      Pulmonary - normal exam    breath sounds clear to auscultation  (+) asthma,  Cardiovascular - normal exam  Exercise tolerance: good (4-7 METS)    Rhythm: regular  Rate: normal    (+) valvular problems/murmurs, hyperlipidemia,       Neuro/Psych  (+) psychiatric history,    GI/Hepatic/Renal/Endo    (+)   diabetes mellitus type 2, thyroid problem hypothyroidism    Musculoskeletal (-) negative ROS    Abdominal    Substance History - negative use     OB/GYN negative ob/gyn ROS         Other - negative ROS       ROS/Med Hx Other: PAT Nursing Notes unavailable.                   Anesthesia Plan    ASA 2     general     (Patient understands anesthesia not responsible for dental damage.    Pt with mvp will need preop antibiotics    Pt has dexcom right arm)  intravenous induction     Anesthetic plan, risks, benefits, and alternatives have been provided, discussed and informed consent has been obtained with: patient.    Use of blood products discussed with patient .   Plan discussed with CRNA.        CODE STATUS:

## 2023-02-10 LAB
CYTO UR: NORMAL
LAB AP CASE REPORT: NORMAL
LAB AP CLINICAL INFORMATION: NORMAL
PATH REPORT.FINAL DX SPEC: NORMAL
PATH REPORT.GROSS SPEC: NORMAL

## 2023-02-14 LAB — QT INTERVAL: 370 MS

## 2023-02-20 ENCOUNTER — TELEPHONE (OUTPATIENT)
Dept: SURGERY | Facility: CLINIC | Age: 53
End: 2023-02-20
Payer: COMMERCIAL

## 2023-02-20 NOTE — TELEPHONE ENCOUNTER
Pt had surgery on 02/08 for hidradenitis.  called to report a change in the drainage color and an odor that has started coming from the drainage. Pt has a penrose drain in the abdomen which is where the drainage is coming from. Also reports that there is a spot where the incision has opened. Area is not hot but does have a warm spot. She is on Bactrim already and they are concerned.

## 2023-02-20 NOTE — TELEPHONE ENCOUNTER
Billy is scheduling pt.  I called and informed pt of appt.  Pt wanted to be seen sooner, informed pt that was the soonest appt we had and if pt wanted to be seen sooner or had further concerns to please go to the ER.

## 2023-02-21 ENCOUNTER — OFFICE VISIT (OUTPATIENT)
Dept: SURGERY | Facility: CLINIC | Age: 53
End: 2023-02-21
Payer: COMMERCIAL

## 2023-02-21 VITALS — BODY MASS INDEX: 33.09 KG/M2 | WEIGHT: 193.8 LBS | HEIGHT: 64 IN

## 2023-02-21 DIAGNOSIS — L73.2 HIDRADENITIS: Primary | ICD-10-CM

## 2023-02-21 PROCEDURE — 99024 POSTOP FOLLOW-UP VISIT: CPT | Performed by: NURSE PRACTITIONER

## 2023-02-21 RX ORDER — HYDROCODONE BITARTRATE AND ACETAMINOPHEN 5; 325 MG/1; MG/1
1 TABLET ORAL EVERY 6 HOURS PRN
Qty: 20 TABLET | Refills: 0 | Status: SHIPPED | OUTPATIENT
Start: 2023-02-21

## 2023-02-21 RX ORDER — CLINDAMYCIN HYDROCHLORIDE 150 MG/1
150 CAPSULE ORAL 3 TIMES DAILY
Qty: 40 CAPSULE | Refills: 0 | Status: SHIPPED | OUTPATIENT
Start: 2023-02-21 | End: 2023-03-03

## 2023-02-21 NOTE — PROGRESS NOTES
Chief Complaint: Follow-up (WOUND CHECK)    Subjective      Postop concerns       History of Present Illness  Mariela Aldrich is a 52 y.o. female presents to [facility] GENERAL SURGERY for postop concerns.    Patient underwent an excision of hidradenitis of inguinal area on 2/8/2023 performed by Dr. Donato You.    Today patient presents with her  with complaints of drainage from her surgical area, she reports this drainage has odor to it.  Patient reports that she is currently on Bactrim.    She currently denies any fever or chills.    Admits to tolerating her diet well with no nausea.  Having bowel movements without difficulty.  Objective     Past Medical History:   Diagnosis Date   • Anxiety    • Asthma     PRN INHALER AND TAKES ALLERGY INJECTIONS   • Diabetes mellitus type 2, controlled ([facility])     AVERAGE    • Hidradenitis    • Hyperlipidemia    • Hypothyroidism    • Murmur     DIAGNOSED WHEN 17 BUT IS ASYMPTOMATIC AND IS FOLLOWED ONLY BY PCP   • Panic attack    • Pilonidal cyst    • Rheumatic fever     AS A CHILD   • RLS (restless legs syndrome)    • Sinus trouble        Past Surgical History:   Procedure Laterality Date   • COLONOSCOPY  01/21/2020    exernal hemorrhoids   • GROIN ABSCESS INCISION AND DRAINAGE Right     hidradenitis   • HYSTERECTOMY  2010   • KNEE SURGERY Bilateral 2013   • PILONIDAL CYSTECTOMY N/A 2/8/2023    Procedure: Excision Hidradenitis of Inguinal Area and Pilonidal Cystectomy;  Surgeon: Donato You MD;  Location: [facility];  Service: General;  Laterality: N/A;       Outpatient Medications Marked as Taking for the 2/21/23 encounter (Office Visit) with Ashley Sanchez, APRGAMAL   Medication Sig Dispense Refill   • adalimumab (Humira) 40 MG/0.8ML Prefilled Syringe Kit injection 0.8 mL. REPORTS THAT STOPPED A COUPLE WEEKS AGO PER DR YOU     • albuterol sulfate  (90 Base) MCG/ACT inhaler Inhale 2 puffs Every 4 (Four) Hours As Needed.     •  B-D ULTRAFINE III SHORT PEN 31G X 8 MM misc USE AS DIRECTED ONCE DAILY WITH TRESIBA FLEXPEN. 100 each 0   • cetirizine (zyrTEC) 10 MG tablet Zyrtec 10 mg oral tablet take 1 tablet (10 mg) by oral route once daily   Active     • Continuous Blood Gluc Sensor (Dexcom G6 Sensor) Every 10 (Ten) Days. 9 each 1   • Continuous Blood Gluc Transmit (Dexcom G6 Transmitter) misc 1 Device Every 3 (Three) Months. 1 each 3   • docusate sodium (Colace) 100 MG capsule Take 1 capsule by mouth 2 (Two) Times a Day As Needed for Constipation. 10 capsule 1   • Empagliflozin-metFORMIN HCl ER (Synjardy XR) 12.5-1000 MG tablet sustained-release 24 hour Take 1 tablet by mouth 2 (Two) Times a Day. (Patient taking differently: Take 1 tablet by mouth 2 (Two) Times a Day. INSTRUCTED PER ANES) 180 tablet 1   • Fluticasone-Salmeterol (ADVAIR/WIXELA) 250-50 MCG/ACT DISKUS Inhale 1 puff 2 (Two) Times a Day. 60 each 2   • glipizide (GLUCOTROL) 10 MG tablet TAKE 1 TABLET BY MOUTH TWICE DAILY BEFORE MEAL(S) 180 tablet 1   • glucose blood test strip Use as instructed Dx: DM E11 100 each 3   • Insulin Degludec (Tresiba FlexTouch) 200 UNIT/ML solution pen-injector pen injection Inject 48 Units under the skin into the appropriate area as directed Daily. (Patient taking differently: Inject 54 Units under the skin into the appropriate area as directed Every Night. INSTRUCTED PER ANESTHESIA PROTOCOL) 21 mL 1   • lisinopril (PRINIVIL,ZESTRIL) 5 MG tablet Take 1 tablet by mouth Daily. (Patient taking differently: Take 5 mg by mouth Every Night.) 90 tablet 1   • Mirabegron ER (Myrbetriq) 50 MG tablet sustained-release 24 hour 24 hr tablet Take 50 mg by mouth Daily. 30 tablet 5   • multivitamin (THERAGRAN) tablet tablet      • ONE TOUCH CLUB LANCETS misc 90 each 3 (Three) Times a Day As Needed (check blood sugar). 100 each 3   • pitavastatin calcium (Livalo) 2 MG tablet tablet Take 1 tablet by mouth Daily. (Patient taking differently: Take 2 mg by mouth Every  Night.) 90 tablet 1   • rOPINIRole (REQUIP) 4 MG tablet Take 1 tablet by mouth Every Night. 90 tablet 1   • Semaglutide,0.25 or 0.5MG/DOS, (Ozempic, 0.25 or 0.5 MG/DOSE,) 2 MG/1.5ML solution pen-injector Inject 0.25 mg under the skin into the appropriate area as directed 1 (One) Time Per Week. (Patient taking differently: Inject 0.25 mg under the skin into the appropriate area as directed 1 (One) Time Per Week. TAKES ON MONDAY) 1.5 mL 0   • sertraline (ZOLOFT) 100 MG tablet Take 1.5 tablets by mouth Daily. (Patient taking differently: Take 150 mg by mouth Every Night.) 135 tablet 1   • sulfamethoxazole-trimethoprim (BACTRIM DS,SEPTRA DS) 800-160 MG per tablet Take 1 tablet by mouth 2 (Two) Times a Day.     • Synthroid 88 MCG tablet Take 1 tablet by mouth Daily. 90 tablet 1   • triamcinolone (KENALOG) 0.5 % cream Apply 1 application topically to the appropriate area as directed Daily.     • Zinc 50 MG tablet zinc 50 mg oral tablet take 1 tablet by oral route daily   Active     • [DISCONTINUED] HYDROcodone-acetaminophen (NORCO) 5-325 MG per tablet Take 1-2 tablets by mouth Every 4 (Four) Hours As Needed (Pain). 30 tablet 0       Allergies   Allergen Reactions   • Etodolac Unknown - High Severity     HEAVINESS ON CHEST   • Penicillins Swelling   • Crestor [Rosuvastatin] Other (See Comments)     Legs cramps    • Pravastatin Myalgia   • Atorvastatin Myalgia        Family History   Problem Relation Age of Onset   • Thyroid disease Father 70   • Thyroid disease Sister    • Diabetes Sister    • Thyroid disease Brother 43        thyroid cancer   • Diabetes Brother    • Diabetes Maternal Grandmother        Social History     Socioeconomic History   • Marital status:    Tobacco Use   • Smoking status: Never   • Smokeless tobacco: Never   Vaping Use   • Vaping Use: Never used   Substance and Sexual Activity   • Alcohol use: Never   • Drug use: Never   • Sexual activity: Defer       Review of Systems   Constitutional:  "Negative for chills and fever.   Gastrointestinal: Positive for abdominal pain. Negative for abdominal distention, anal bleeding, blood in stool, constipation, diarrhea and rectal pain.        Vital Signs:   Ht 162.6 cm (64\")   Wt 87.9 kg (193 lb 12.8 oz)   BMI 33.27 kg/m²      Physical Exam  Vitals and nursing note reviewed.   Constitutional:       General: She is not in acute distress.     Appearance: She is obese.   HENT:      Head: Normocephalic.   Cardiovascular:      Rate and Rhythm: Normal rate.   Pulmonary:      Effort: Pulmonary effort is normal.      Breath sounds: No stridor.   Abdominal:      Palpations: Abdomen is soft.      Tenderness: There is abdominal tenderness.      Comments: Abdomen: Surgical incision is open on the lateral end.  I have removed all staples from the skin.  There is moderate amount of erythema around the surgical incision.  Penrose drain on left lateral side draining thick copious purulent drainage.     Left groin: Surgical incision is clean dry and intact with sutures present.   Neurological:      Mental Status: She is alert.          Result Review :          []  Laboratory  []  Radiology  []  Pathology  []  Microbiology  []  EKG/Telemetry   []  Cardiology/Vascular   [x]  Old records  Today I reviewed Dr. You's operative report.     Assessment and Plan    Diagnoses and all orders for this visit:    1. Hidradenitis (Primary)  -     HYDROcodone-acetaminophen (Norco) 5-325 MG per tablet; Take 1 tablet by mouth Every 6 (Six) Hours As Needed for Moderate Pain.  Dispense: 20 tablet; Refill: 0    Other orders  -     clindamycin (Cleocin) 150 MG capsule; Take 1 capsule by mouth 3 (Three) Times a Day for 10 days.  Dispense: 40 capsule; Refill: 0        Follow Up   Return for Keep scheduled follow-up appointment with Dr. You.     Seek medical emergency services:  Fever greater than 101 associated with chills.  Extreme pain.    Patient was given instructions and counseling " regarding her condition or for health maintenance advice. Please see specific information pulled into the AVS if appropriate.

## 2023-03-02 ENCOUNTER — OFFICE VISIT (OUTPATIENT)
Dept: SURGERY | Facility: CLINIC | Age: 53
End: 2023-03-02
Payer: COMMERCIAL

## 2023-03-02 VITALS — HEIGHT: 64 IN | BODY MASS INDEX: 32.95 KG/M2 | WEIGHT: 193 LBS

## 2023-03-02 DIAGNOSIS — L73.2 HIDRADENITIS: ICD-10-CM

## 2023-03-02 DIAGNOSIS — Z98.890 S/P SURGICAL REMOVAL OF PILONIDAL CYST: Primary | ICD-10-CM

## 2023-03-02 PROCEDURE — 99024 POSTOP FOLLOW-UP VISIT: CPT | Performed by: SURGERY

## 2023-03-02 NOTE — PROGRESS NOTES
Chief Complaint: Post-op    Subjective         History of Present Illness  Mariela Aldrich is a 52 y.o. female presents to [facility] GENERAL SURGERY. The patient is to be seen for status post excision of hidradenitis.    The patient is to be seen for follow-up after excision of hidradenitis. She is accompanied by an adult male.     Status post excision of hidradenitis  The patient was seen by WILLIAM Ramirez on 02/21. She discontinued the Bactrim, started clindamycin and instructed her to take a probiotic. She reports ongoing purulent drainage from the right thigh wound. She is unsure if the Penrose drain is obstructed. The erythema on the abdominal incision has improved. Addtionally the adult male adds she has active drainage from the pilonidal cystectomy as well. She admits she is not taking Humira injections at this time. She reports her glucose is controlled at this time. Pathology results demonstrated benign skin with sinus tract, fibrosis, and acute and chronic inflammation.    The patient reports pain when she swallows for 2 days.     Objective     Past Medical History:   Diagnosis Date   • Anxiety    • Asthma     PRN INHALER AND TAKES ALLERGY INJECTIONS   • Diabetes mellitus type 2, controlled ([facility])     AVERAGE    • Hidradenitis    • Hyperlipidemia    • Hypothyroidism    • Murmur     DIAGNOSED WHEN 17 BUT IS ASYMPTOMATIC AND IS FOLLOWED ONLY BY PCP   • Panic attack    • Pilonidal cyst    • Rheumatic fever     AS A CHILD   • RLS (restless legs syndrome)    • Sinus trouble        Past Surgical History:   Procedure Laterality Date   • COLONOSCOPY  01/21/2020    exernal hemorrhoids   • GROIN ABSCESS INCISION AND DRAINAGE Right     hidradenitis   • HYSTERECTOMY  2010   • KNEE SURGERY Bilateral 2013   • PILONIDAL CYSTECTOMY N/A 2/8/2023    Procedure: Excision Hidradenitis of Inguinal Area and Pilonidal Cystectomy;  Surgeon: Donato You MD;  Location: [facility] MAIN OR;  Service:  General;  Laterality: N/A;         Current Outpatient Medications:   •  adalimumab (Humira) 40 MG/0.8ML Prefilled Syringe Kit injection, 0.8 mL. REPORTS THAT STOPPED A COUPLE WEEKS AGO PER DR AGUSTIN, Disp: , Rfl:   •  albuterol sulfate  (90 Base) MCG/ACT inhaler, Inhale 2 puffs Every 4 (Four) Hours As Needed., Disp: , Rfl:   •  B-D ULTRAFINE III SHORT PEN 31G X 8 MM misc, USE AS DIRECTED ONCE DAILY WITH TRESIBA FLEXPEN., Disp: 100 each, Rfl: 0  •  cetirizine (zyrTEC) 10 MG tablet, Zyrtec 10 mg oral tablet take 1 tablet (10 mg) by oral route once daily   Active, Disp: , Rfl:   •  clindamycin (Cleocin) 150 MG capsule, Take 1 capsule by mouth 3 (Three) Times a Day for 10 days., Disp: 40 capsule, Rfl: 0  •  Continuous Blood Gluc Sensor (Dexcom G6 Sensor), Every 10 (Ten) Days., Disp: 9 each, Rfl: 1  •  Continuous Blood Gluc Transmit (Dexcom G6 Transmitter) misc, 1 Device Every 3 (Three) Months., Disp: 1 each, Rfl: 3  •  docusate sodium (Colace) 100 MG capsule, Take 1 capsule by mouth 2 (Two) Times a Day As Needed for Constipation., Disp: 10 capsule, Rfl: 1  •  Empagliflozin-metFORMIN HCl ER (Synjardy XR) 12.5-1000 MG tablet sustained-release 24 hour, Take 1 tablet by mouth 2 (Two) Times a Day. (Patient taking differently: Take 1 tablet by mouth 2 (Two) Times a Day. INSTRUCTED PER PAGE), Disp: 180 tablet, Rfl: 1  •  Fluticasone-Salmeterol (ADVAIR/WIXELA) 250-50 MCG/ACT DISKUS, Inhale 1 puff 2 (Two) Times a Day., Disp: 60 each, Rfl: 2  •  glipizide (GLUCOTROL) 10 MG tablet, TAKE 1 TABLET BY MOUTH TWICE DAILY BEFORE MEAL(S), Disp: 180 tablet, Rfl: 1  •  glucose blood test strip, Use as instructed Dx: DM E11, Disp: 100 each, Rfl: 3  •  HYDROcodone-acetaminophen (Norco) 5-325 MG per tablet, Take 1 tablet by mouth Every 6 (Six) Hours As Needed for Moderate Pain., Disp: 20 tablet, Rfl: 0  •  Insulin Degludec (Tresiba FlexTouch) 200 UNIT/ML solution pen-injector pen injection, Inject 48 Units under the skin into the  appropriate area as directed Daily. (Patient taking differently: Inject 54 Units under the skin into the appropriate area as directed Every Night. INSTRUCTED PER ANESTHESIA PROTOCOL), Disp: 21 mL, Rfl: 1  •  lisinopril (PRINIVIL,ZESTRIL) 5 MG tablet, Take 1 tablet by mouth Daily. (Patient taking differently: Take 1 tablet by mouth Every Night.), Disp: 90 tablet, Rfl: 1  •  Mirabegron ER (Myrbetriq) 50 MG tablet sustained-release 24 hour 24 hr tablet, Take 50 mg by mouth Daily., Disp: 30 tablet, Rfl: 5  •  multivitamin (THERAGRAN) tablet tablet, , Disp: , Rfl:   •  ONE TOUCH CLUB LANCETS misc, 90 each 3 (Three) Times a Day As Needed (check blood sugar)., Disp: 100 each, Rfl: 3  •  pitavastatin calcium (Livalo) 2 MG tablet tablet, Take 1 tablet by mouth Daily. (Patient taking differently: Take 1 tablet by mouth Every Night.), Disp: 90 tablet, Rfl: 1  •  rOPINIRole (REQUIP) 4 MG tablet, Take 1 tablet by mouth Every Night., Disp: 90 tablet, Rfl: 1  •  Semaglutide,0.25 or 0.5MG/DOS, (Ozempic, 0.25 or 0.5 MG/DOSE,) 2 MG/1.5ML solution pen-injector, Inject 0.25 mg under the skin into the appropriate area as directed 1 (One) Time Per Week. (Patient taking differently: Inject 0.25 mg under the skin into the appropriate area as directed 1 (One) Time Per Week. TAKES ON MONDAY), Disp: 1.5 mL, Rfl: 0  •  sertraline (ZOLOFT) 100 MG tablet, Take 1.5 tablets by mouth Daily. (Patient taking differently: Take 1.5 tablets by mouth Every Night.), Disp: 135 tablet, Rfl: 1  •  sulfamethoxazole-trimethoprim (BACTRIM DS,SEPTRA DS) 800-160 MG per tablet, Take 1 tablet by mouth 2 (Two) Times a Day., Disp: , Rfl:   •  Synthroid 88 MCG tablet, Take 1 tablet by mouth Daily., Disp: 90 tablet, Rfl: 1  •  triamcinolone (KENALOG) 0.5 % cream, Apply 1 application topically to the appropriate area as directed Daily., Disp: , Rfl:   •  Zinc 50 MG tablet, zinc 50 mg oral tablet take 1 tablet by oral route daily   Active, Disp: , Rfl:     Allergies    Allergen Reactions   • Etodolac Unknown - High Severity     HEAVINESS ON CHEST   • Penicillins Swelling   • Crestor [Rosuvastatin] Other (See Comments)     Legs cramps    • Pravastatin Myalgia   • Atorvastatin Myalgia        Family History   Problem Relation Age of Onset   • Thyroid disease Father 70   • Thyroid disease Sister    • Diabetes Sister    • Thyroid disease Brother 43        thyroid cancer   • Diabetes Brother    • Diabetes Maternal Grandmother        Social History     Socioeconomic History   • Marital status:    Tobacco Use   • Smoking status: Never   • Smokeless tobacco: Never   Vaping Use   • Vaping Use: Never used   Substance and Sexual Activity   • Alcohol use: Never   • Drug use: Never   • Sexual activity: Defer        Physical Exam   No acute distress. Nonlabored breathing. Abdomen is soft.    Post Surgical Incision  Surgical wound: Abdominal incision is healing relatively well. She has some small areas that are opening, but no active drainage along the midline. Right thigh wound does have some drainage and does look like it's active hidradenitis in that wound. Buttocks wound from the pilonidal cystectomy is completely open and draining. There is no skin approximation. It is not actively infected, but it is completely open.    Result Review :               Assessment and Plan    Diagnoses and all orders for this visit:    1. S/P surgical removal of pilonidal cyst (Primary)  -     Ambulatory Referral to Wound Clinic    1. Status post excision of hidradenitis in the inguinal and pilonidal region.       -     I will refer the patient over to wound care to see if they can get her in and get her evaluated for possible wound VAC on her pilonidal region. The abdominal and thigh wound are healing relatively well, and I think she can just continue the local wound care that she has been doing. Her sutures and staples were removed today. Discussed with the patient. All questions were answered. She  voiced understanding and agreed to proceed with the above plan.    Transcribed from ambient dictation for Donato You MD by Eliane Valero.  03/02/23   15:33 EST    Patient or patient representative verbalized consent to the visit recording.  I have personally performed the services described in this document as transcribed by the above individual, and it is both accurate and complete.      Follow Up   No follow-ups on file.  Patient was given instructions and counseling regarding her condition or for health maintenance advice. Please see specific information pulled into the AVS if appropriate.       Transcribed from ambient dictation for Donato You MD by Eliane Valero.  03/02/23   14:17 EST    Patient or patient representative verbalized consent to the visit recording.  I have personally performed the services described in this document as transcribed by the above individual, and it is both accurate and complete.

## 2023-03-03 ENCOUNTER — TELEPHONE (OUTPATIENT)
Dept: SURGERY | Facility: CLINIC | Age: 53
End: 2023-03-03
Payer: COMMERCIAL

## 2023-03-03 ENCOUNTER — HOSPITAL ENCOUNTER (OUTPATIENT)
Dept: INFUSION THERAPY | Facility: HOSPITAL | Age: 53
Discharge: HOME | End: 2023-03-03
Admitting: NURSE PRACTITIONER
Payer: COMMERCIAL

## 2023-03-03 VITALS
RESPIRATION RATE: 20 BRPM | DIASTOLIC BLOOD PRESSURE: 78 MMHG | TEMPERATURE: 98.2 F | OXYGEN SATURATION: 98 % | SYSTOLIC BLOOD PRESSURE: 114 MMHG | HEART RATE: 109 BPM

## 2023-03-03 DIAGNOSIS — L05.91 PILONIDAL CYST: Primary | ICD-10-CM

## 2023-03-03 DIAGNOSIS — Z98.890 S/P SURGICAL REMOVAL OF PILONIDAL CYST: Primary | ICD-10-CM

## 2023-03-03 DIAGNOSIS — L73.2 HIDRADENITIS: Primary | ICD-10-CM

## 2023-03-03 PROCEDURE — G0463 HOSPITAL OUTPT CLINIC VISIT: HCPCS

## 2023-03-03 NOTE — TELEPHONE ENCOUNTER
Caller: TOREY VINCENT   Relationship: SELF   Best call back number:[phone]    Who are you requesting to speak with (clinical staff, provider,  specific staff member): MA/NURSE     What was the call regarding: PT CALLING TO SPEAK W/ MA/NURSE IN REGARDS TO WOUND CLINIC REFERRAL PLACED.    Do you require a callback: YES

## 2023-03-03 NOTE — SIGNIFICANT NOTE
Wound Eval / Progress Noted     [facility]     Patient Name: Mariela Aldrich  : 1970  MRN: [id number]  Today's Date: 3/3/2023                 Admit Date: 3/3/2023    Visit Dx:    ICD-10-CM ICD-9-CM   1. Hidradenitis  L73.2 705.83       Patient Active Problem List   Diagnosis    Anxiety    Type 2 diabetes mellitus with hyperglycemia, with long-term current use of insulin (HCC)    Hyperlipidemia    Hypothyroidism    Panic attack    RLS (restless legs syndrome)    Sinus trouble    OAB (overactive bladder)    Mild intermittent asthma without complication    Hidradenitis        Past Medical History:   Diagnosis Date    Anxiety     Asthma     PRN INHALER AND TAKES ALLERGY INJECTIONS    Diabetes mellitus type 2, controlled (HCC)     AVERAGE     Hidradenitis     Hyperlipidemia     Hypothyroidism     Murmur     DIAGNOSED WHEN 17 BUT IS ASYMPTOMATIC AND IS FOLLOWED ONLY BY PCP    Panic attack     Pilonidal cyst     Rheumatic fever     AS A CHILD    RLS (restless legs syndrome)     Sinus trouble         Past Surgical History:   Procedure Laterality Date    COLONOSCOPY  2020    exernal hemorrhoids    GROIN ABSCESS INCISION AND DRAINAGE Right     hidradenitis    HYSTERECTOMY  2010    KNEE SURGERY Bilateral 2013    PILONIDAL CYSTECTOMY N/A 2023    Procedure: Excision Hidradenitis of Inguinal Area and Pilonidal Cystectomy;  Surgeon: Donato You MD;  Location: [facility] OR;  Service: General;  Laterality: N/A;         Physical Assessment:  Wound 23 1333 medial gluteal Incision (Active)   Wound Image   23 1655   Dressing Appearance intact;moist drainage 23 1655   Closure Sutures 23 1655   Base moist;red 23 1655   Periwound redness;warm 23 1655   Periwound Temperature warm 23 1655   Periwound Skin Turgor soft 23 1655   Edges open 23 1655   Wound Length (cm) 5.3 cm 23 1655   Wound Width (cm) 0.3 cm 23 1655   Wound Depth (cm) 1.8 cm  03/03/23 1655   Wound Surface Area (cm^2) 1.59 cm^2 03/03/23 1655   Wound Volume (cm^3) 2.862 cm^3 03/03/23 1655   Drainage Characteristics/Odor serosanguineous 03/03/23 1655   Drainage Amount moderate 03/03/23 1655   Care, Wound cleansed with;irrigated with;sterile normal saline 03/03/23 1655   Dressing Care dressing applied;packed with;packing strip;silicone;border dressing;gauze 03/03/23 1655   Periwound Care absorptive dressing applied 03/03/23 1655       Wound 02/08/23 1424 Left lower abdomen Incision (Active)   Wound Image   03/03/23 1655   Dressing Appearance intact;moist drainage 03/03/23 1655   Closure None 03/03/23 1655   Base red;moist;yellow 03/03/23 1655   Periwound macerated;redness 03/03/23 1655   Periwound Temperature warm 03/03/23 1655   Periwound Skin Turgor soft 03/03/23 1655   Edges open;rolled/closed 03/03/23 1655   Drainage Characteristics/Odor serosanguineous 03/03/23 1655   Drainage Amount small 03/03/23 1655   Care, Wound cleansed with;irrigated with;sterile normal saline 03/03/23 1655   Dressing Care dressing applied;hydrofiber;silver impregnated;gauze, dry;abdominal pad 03/03/23 1655   Periwound Care absorptive dressing applied 03/03/23 1655       Wound 02/08/23 1425 Right groin (Active)   Wound Image   03/03/23 1655   Dressing Appearance intact;moist drainage 03/03/23 1655   Closure None 03/03/23 1655   Base red;moist 03/03/23 1655   Periwound macerated;redness 03/03/23 1655   Periwound Temperature warm 03/03/23 1655   Periwound Skin Turgor soft 03/03/23 1655   Edges open 03/03/23 1655   Wound Length (cm) 0.4 cm 03/03/23 1655   Wound Width (cm) 0.4 cm 03/03/23 1655   Wound Depth (cm) 1.2 cm 03/03/23 1655   Wound Surface Area (cm^2) 0.16 cm^2 03/03/23 1655   Wound Volume (cm^3) 0.192 cm^3 03/03/23 1655   Drainage Characteristics/Odor serosanguineous 03/03/23 1655   Drainage Amount small 03/03/23 1655   Care, Wound cleansed with;irrigated with;sterile normal saline 03/03/23 1655   Dressing  Care dressing applied;packing strip;packed with;silicone;border dressing;gauze, dry 03/03/23 1655   Periwound Care absorptive dressing applied 03/03/23 1655        Wound Check / Follow-up:  Patient seen today for wound eval and treat and to obtain measurements for wound vac placement.   Patient with surgical incision to gluteal crease where pilonidal cyst was removed. Upper portion of incision with suture and penrose drain in place. Lower portion of incision dehisced with red moist tissue visible. Both areas with serosanguinous drainage visible on dressing.   Wound cleansed and irrigated with NS and gauze. Wound opening filled with plain packing strip, covered with dry gauze and secured with silicone border dressing.   Patient also with surgical intervention for hydradenitis suppurativa. She has an incision along the crease of abdominal fold and also an incision to her right groin. Penrose drain in place to left aspect of abdominal incision. Superficial separation of incision along left side. Recommending daily dressing with silver impregnated hydrofiber.   Right groin with small open area at base that is deeper. Cleansed with NS and gauze. Packing strip applied to opening to allow drainage and remainder of incision line covered with dry dressing and secured with silicone border dressing.       Impression: Surgical incision to gluteal crease, right groin and abdominal fold with intermittent dehiscence and delayed closure    Short term goals:  Regain skin integrity. Daily dressing changes. Apply wound vac to gluteal incision opening once approved.    Miladys Lloyd RN    3/3/2023    18:00 EST

## 2023-03-04 ENCOUNTER — HOSPITAL ENCOUNTER (OUTPATIENT)
Dept: INFUSION THERAPY | Facility: HOSPITAL | Age: 53
Discharge: HOME | End: 2023-03-04
Admitting: NURSE PRACTITIONER
Payer: COMMERCIAL

## 2023-03-04 VITALS
DIASTOLIC BLOOD PRESSURE: 70 MMHG | RESPIRATION RATE: 20 BRPM | TEMPERATURE: 98.9 F | HEART RATE: 100 BPM | SYSTOLIC BLOOD PRESSURE: 104 MMHG | OXYGEN SATURATION: 98 %

## 2023-03-04 DIAGNOSIS — L73.2 HIDRADENITIS: Primary | ICD-10-CM

## 2023-03-04 PROCEDURE — G0463 HOSPITAL OUTPT CLINIC VISIT: HCPCS

## 2023-03-05 ENCOUNTER — HOSPITAL ENCOUNTER (OUTPATIENT)
Dept: INFUSION THERAPY | Facility: HOSPITAL | Age: 53
Discharge: HOME | End: 2023-03-05
Admitting: NURSE PRACTITIONER
Payer: COMMERCIAL

## 2023-03-05 VITALS — TEMPERATURE: 98.5 F | SYSTOLIC BLOOD PRESSURE: 103 MMHG | DIASTOLIC BLOOD PRESSURE: 68 MMHG | HEART RATE: 106 BPM

## 2023-03-05 DIAGNOSIS — L73.2 HIDRADENITIS: Primary | ICD-10-CM

## 2023-03-05 PROCEDURE — G0463 HOSPITAL OUTPT CLINIC VISIT: HCPCS

## 2023-03-06 ENCOUNTER — TELEPHONE (OUTPATIENT)
Dept: SURGERY | Facility: CLINIC | Age: 53
End: 2023-03-06
Payer: COMMERCIAL

## 2023-03-06 ENCOUNTER — HOSPITAL ENCOUNTER (OUTPATIENT)
Dept: INFUSION THERAPY | Facility: HOSPITAL | Age: 53
Discharge: HOME | End: 2023-03-06
Admitting: NURSE PRACTITIONER
Payer: COMMERCIAL

## 2023-03-06 VITALS
RESPIRATION RATE: 18 BRPM | HEART RATE: 68 BPM | SYSTOLIC BLOOD PRESSURE: 107 MMHG | DIASTOLIC BLOOD PRESSURE: 57 MMHG | OXYGEN SATURATION: 100 % | TEMPERATURE: 98.7 F

## 2023-03-06 DIAGNOSIS — L73.2 HIDRADENITIS: Primary | ICD-10-CM

## 2023-03-06 PROCEDURE — 97605 NEG PRS WND THER DME<=50SQCM: CPT

## 2023-03-06 NOTE — TELEPHONE ENCOUNTER
Patient spoke to Leighann.  She tole her that Dr. You extended her time off work 3 or 4 more weeks, when she was seen on 03/02/23.  She needs a note on letterhead for her employer, and can get from My Chart.

## 2023-03-06 NOTE — SIGNIFICANT NOTE
Wound Eval / Progress Noted     [facility]     Patient Name: Mariela Aldrich  : 1970  MRN: [id number]  Today's Date: 3/6/2023                 Admit Date: 3/6/2023    Visit Dx:  No diagnosis found.    Patient Active Problem List   Diagnosis   • Anxiety   • Type 2 diabetes mellitus with hyperglycemia, with long-term current use of insulin ([facility])   • Hyperlipidemia   • Hypothyroidism   • Panic attack   • RLS (restless legs syndrome)   • Sinus trouble   • OAB (overactive bladder)   • Mild intermittent asthma without complication   • Hidradenitis        Past Medical History:   Diagnosis Date   • Anxiety    • Asthma     PRN INHALER AND TAKES ALLERGY INJECTIONS   • Diabetes mellitus type 2, controlled ([facility])     AVERAGE    • Hidradenitis    • Hyperlipidemia    • Hypothyroidism    • Murmur     DIAGNOSED WHEN 17 BUT IS ASYMPTOMATIC AND IS FOLLOWED ONLY BY PCP   • Panic attack    • Pilonidal cyst    • Rheumatic fever     AS A CHILD   • RLS (restless legs syndrome)    • Sinus trouble         Past Surgical History:   Procedure Laterality Date   • COLONOSCOPY  2020    exernal hemorrhoids   • GROIN ABSCESS INCISION AND DRAINAGE Right     hidradenitis   • HYSTERECTOMY     • KNEE SURGERY Bilateral    • PILONIDAL CYSTECTOMY N/A 2023    Procedure: Excision Hidradenitis of Inguinal Area and Pilonidal Cystectomy;  Surgeon: Donato You MD;  Location: [facility] OR;  Service: General;  Laterality: N/A;         Physical Assessment:  Wound 23 1333 medial gluteal Incision (Active)   Wound Image    23 1405   Dressing Appearance intact;dry 23 1405   Closure None 23 1405   Base moist;red;granulating;other (see comments);bleeding 23 1405   Red (%), Wound Tissue Color 100 23 1405   Periwound redness;moist;intact 23 1405   Periwound Temperature warm 23 1405   Periwound Skin Turgor soft 23 1405   Edges open 23 1405   Wound Length (cm) 4.8 cm 23  1405   Wound Width (cm) 1.5 cm 03/06/23 1405   Wound Depth (cm) 2.7 cm 03/06/23 1405   Wound Surface Area (cm^2) 7.2 cm^2 03/06/23 1405   Wound Volume (cm^3) 19.44 cm^3 03/06/23 1405   Drainage Characteristics/Odor serosanguineous;bleeding controlled 03/06/23 1405   Drainage Amount moderate 03/06/23 1405   Care, Wound cleansed with;irrigated with;sterile normal saline;negative pressure wound therapy 03/06/23 1405   Dressing Care dressing applied;foam;transparent film 03/06/23 1405   Periwound Care absorptive dressing applied 03/06/23 1405       Wound 02/08/23 1424 Left lower abdomen Incision (Active)   Wound Image   03/06/23 1405   Dressing Appearance moist drainage;intact 03/06/23 1405   Closure None 03/06/23 1405   Base red;dry;scab 03/06/23 1405   Periwound intact;dry;redness 03/06/23 1405   Periwound Temperature warm 03/06/23 1405   Periwound Skin Turgor soft 03/06/23 1405   Edges open;rolled/closed 03/06/23 1405   Drainage Characteristics/Odor serosanguineous 03/06/23 1405   Drainage Amount small 03/06/23 1405   Care, Wound cleansed with;irrigated with;sterile normal saline 03/06/23 1405   Dressing Care dressing applied;silver impregnated;hydrofiber;abdominal pad;other (see comments) 03/06/23 1405   Periwound Care absorptive dressing applied 03/06/23 1405       Wound 02/08/23 1425 Right groin (Active)   Wound Image   03/06/23 1405   Dressing Appearance moist drainage;intact 03/06/23 1405   Closure None 03/06/23 1405   Base moist;red 03/06/23 1405   Red (%), Wound Tissue Color 100 03/06/23 1405   Periwound intact;redness;moist 03/06/23 1405   Periwound Temperature warm 03/06/23 1405   Periwound Skin Turgor soft 03/06/23 1405   Edges open 03/06/23 1405   Wound Length (cm) 0.5 cm 03/06/23 1405   Wound Width (cm) 1 cm 03/06/23 1405   Wound Depth (cm) 0.8 cm 03/06/23 1405   Wound Surface Area (cm^2) 0.5 cm^2 03/06/23 1405   Wound Volume (cm^3) 0.4 cm^3 03/06/23 1405   Drainage Characteristics/Odor serosanguineous  03/06/23 1405   Drainage Amount small 03/06/23 1405   Care, Wound cleansed with;irrigated with;sterile normal saline 03/06/23 1405   Dressing Care dressing applied;packed with;packing strip;silicone;border dressing 03/06/23 1405   Periwound Care absorptive dressing applied 03/06/23 1405        Wound Check / Follow-up:  Patient seen today for a wound check, wound vac placement, and dressing changes. Surgical incision to gluteal crease from the removal of a pilonidal cyst. Wound base is red and moist, some sanguineous drainage noted. Periwound with raised tissue from prolonged exposure to drainage; tissue is reddened and moist. Irritation also noted. Penrose drain remains intact at this time. Filled wound bed with black foam and secured with transparent drape. Applied a second black foam for track pad up the right hip. Wound vac connected, foam compressed. Applied a hydrocolloid and eakins to drape closer to rectum to assist with seal.   Surgical incision to abdominal fold with a penrose drain at 3 o'clock. Separation noted along incision line. Cleansed with NS. Applied silver impregnated hydrofiber to wound and secured with an ABD pad. Right groin with small area with red moist tissue, measurable depth noted. Cleansed with NS. Filled wound with plain packing strip. Recommending every other day dressing changes    Impression: surgical incision to gluteal crease with secondary closure, right groin and abdominal fold with noted dehiscence, delayed closure    Short term goals:  Regain skin integrity, every other day dressing changes, NPWT on [facility]    No Henriquez RN    3/6/2023    16:25 EST

## 2023-03-08 ENCOUNTER — TELEPHONE (OUTPATIENT)
Dept: SURGERY | Facility: CLINIC | Age: 53
End: 2023-03-08
Payer: COMMERCIAL

## 2023-03-08 ENCOUNTER — HOSPITAL ENCOUNTER (OUTPATIENT)
Dept: INFUSION THERAPY | Facility: HOSPITAL | Age: 53
Discharge: HOME | End: 2023-03-08
Admitting: NURSE PRACTITIONER
Payer: COMMERCIAL

## 2023-03-08 VITALS
TEMPERATURE: 98.1 F | DIASTOLIC BLOOD PRESSURE: 68 MMHG | SYSTOLIC BLOOD PRESSURE: 105 MMHG | OXYGEN SATURATION: 96 % | RESPIRATION RATE: 20 BRPM | HEART RATE: 106 BPM

## 2023-03-08 DIAGNOSIS — L73.2 HIDRADENITIS: Primary | ICD-10-CM

## 2023-03-08 PROCEDURE — 97605 NEG PRS WND THER DME<=50SQCM: CPT

## 2023-03-08 NOTE — TELEPHONE ENCOUNTER
I spoke with patient in regards to her work note. She has requested that we write out a statement on letterhead stating why she is off of work/extending her S.T.D. Patient has given me verbal consent to note that she is extending her S.T.D/time off due to post op complications to include that she is now on a wound vac post op. Note will be written and put into patients chart. Patient verbalized understanding.

## 2023-03-08 NOTE — SIGNIFICANT NOTE
Wound Eval / Progress Noted    [facility]     Patient Name: Mariela Aldrich  : 1970  MRN: [id number]  Today's Date: 3/8/2023                 Admit Date: 3/8/2023    Visit Dx:    ICD-10-CM ICD-9-CM   1. Hidradenitis  L73.2 705.83       Patient Active Problem List   Diagnosis   • Anxiety   • Type 2 diabetes mellitus with hyperglycemia, with long-term current use of insulin ([facility])   • Hyperlipidemia   • Hypothyroidism   • Panic attack   • RLS (restless legs syndrome)   • Sinus trouble   • OAB (overactive bladder)   • Mild intermittent asthma without complication   • Hidradenitis        Past Medical History:   Diagnosis Date   • Anxiety    • Asthma     PRN INHALER AND TAKES ALLERGY INJECTIONS   • Diabetes mellitus type 2, controlled ([facility])     AVERAGE    • Hidradenitis    • Hyperlipidemia    • Hypothyroidism    • Murmur     DIAGNOSED WHEN 17 BUT IS ASYMPTOMATIC AND IS FOLLOWED ONLY BY PCP   • Panic attack    • Pilonidal cyst    • Rheumatic fever     AS A CHILD   • RLS (restless legs syndrome)    • Sinus trouble         Past Surgical History:   Procedure Laterality Date   • COLONOSCOPY  2020    exernal hemorrhoids   • GROIN ABSCESS INCISION AND DRAINAGE Right     hidradenitis   • HYSTERECTOMY     • KNEE SURGERY Bilateral    • PILONIDAL CYSTECTOMY N/A 2023    Procedure: Excision Hidradenitis of Inguinal Area and Pilonidal Cystectomy;  Surgeon: Donato You MD;  Location: [facility];  Service: General;  Laterality: N/A;         Physical Assessment:  Wound 23 1333 medial gluteal Incision (Active)   Wound Image   23 1105   Dressing Appearance intact;dry 23 1105   Closure None 23 1105   Base moist;red;granulating;bleeding;other (see comments) 23 1105   Red (%), Wound Tissue Color 100 23 1105   Periwound redness;moist;intact 23 1105   Periwound Temperature warm 23 1105   Periwound Skin Turgor soft 23 1105   Edges open 23 1105    Drainage Characteristics/Odor serosanguineous;bleeding controlled 03/08/23 1105   Drainage Amount moderate 03/08/23 1105   Care, Wound cleansed with;irrigated with;sterile normal saline;negative pressure wound therapy 03/08/23 1105   Dressing Care dressing applied;foam;transparent film 03/08/23 1105   Periwound Care absorptive dressing applied;barrier film applied 03/08/23 1105       Wound 02/08/23 1424 Left lower abdomen Incision (Active)   Wound Image   03/08/23 1105   Dressing Appearance moist drainage;intact 03/08/23 1105   Closure None 03/08/23 1105   Base red;dry;scab 03/08/23 1105   Periwound intact;dry;redness 03/08/23 1105   Periwound Temperature warm 03/08/23 1105   Periwound Skin Turgor soft 03/08/23 1105   Edges open;rolled/closed 03/08/23 1105   Drainage Amount none 03/08/23 1105   Care, Wound cleansed with;irrigated with;sterile normal saline 03/08/23 1105   Dressing Care dressing applied;silver impregnated;hydrofiber;abdominal pad 03/08/23 1105   Periwound Care absorptive dressing applied 03/08/23 1105       Wound 02/08/23 1425 Right groin (Active)   Wound Image   03/08/23 1105   Dressing Appearance moist drainage;intact 03/08/23 1105   Closure None 03/08/23 1105   Base moist;red 03/08/23 1105   Periwound intact;redness;moist 03/08/23 1105   Periwound Temperature warm 03/08/23 1105   Periwound Skin Turgor soft 03/08/23 1105   Edges open 03/08/23 1105   Drainage Characteristics/Odor serosanguineous 03/08/23 1105   Drainage Amount small 03/08/23 1105   Care, Wound cleansed with;irrigated with;sterile normal saline 03/08/23 1105   Dressing Care dressing applied;packed with;gauze, iodoform;packing strip;border dressing 03/08/23 1105   Periwound Care absorptive dressing applied 03/08/23 1105        Wound Check / Follow-up:  Patient seen today for a wound check and wound vac dressing change. Wound vac foam compressed and dressing intact. Patient reports no issues. Removed dressing with no  difficulty.  Tracie-rectal wound base is red and moist, with beefy red granular tissue. Moderate amount of sanguineous upon removal of dressing. Periwound is red and moist, some irritation noted. Cleansed and irrigated with NS. Applied skin barrier to periwound and then silver impregnated hydrofiber to protect the periwound. Filled wound bed with black foam and secured with transparent drape. Applied a second black foam up the right hip for a bridge. Wound vac connected, foam compressed.   Surgical incision to abdominal fold with penrose drain intact. Some drainage from the penrose drain causing irritation and redness. Cleansed incision with NS. Applied silver impregnated hydrofiber to incision and covered with an ABD pad. Right groin with small area with red moist, measurable depth noted. Cleansed with NS.Filled wound with plain packing strip. Recommending every other day dressing changes.     Impression: surgical incision to gluteal crease with secondary closure, right groin and abdominal incision with delayed closure    Short term goals:  Regain skin integrity, every other day dressing change NPWT on [facility]    No Henriquez RN    3/8/2023    11:26 EST

## 2023-03-10 ENCOUNTER — HOSPITAL ENCOUNTER (OUTPATIENT)
Dept: INFUSION THERAPY | Facility: HOSPITAL | Age: 53
Discharge: HOME | End: 2023-03-10
Admitting: NURSE PRACTITIONER
Payer: COMMERCIAL

## 2023-03-10 VITALS
HEART RATE: 118 BPM | RESPIRATION RATE: 20 BRPM | OXYGEN SATURATION: 100 % | TEMPERATURE: 98.1 F | DIASTOLIC BLOOD PRESSURE: 65 MMHG | SYSTOLIC BLOOD PRESSURE: 114 MMHG

## 2023-03-10 DIAGNOSIS — L73.2 HIDRADENITIS: Primary | ICD-10-CM

## 2023-03-10 PROCEDURE — 97605 NEG PRS WND THER DME<=50SQCM: CPT

## 2023-03-10 PROCEDURE — G0463 HOSPITAL OUTPT CLINIC VISIT: HCPCS

## 2023-03-13 ENCOUNTER — HOSPITAL ENCOUNTER (OUTPATIENT)
Dept: INFUSION THERAPY | Facility: HOSPITAL | Age: 53
Discharge: HOME | End: 2023-03-13
Admitting: NURSE PRACTITIONER
Payer: COMMERCIAL

## 2023-03-13 VITALS
DIASTOLIC BLOOD PRESSURE: 67 MMHG | RESPIRATION RATE: 18 BRPM | SYSTOLIC BLOOD PRESSURE: 113 MMHG | TEMPERATURE: 98.5 F | HEART RATE: 105 BPM | OXYGEN SATURATION: 99 %

## 2023-03-13 DIAGNOSIS — L73.2 HIDRADENITIS: Primary | ICD-10-CM

## 2023-03-13 PROCEDURE — 97605 NEG PRS WND THER DME<=50SQCM: CPT

## 2023-03-13 NOTE — ADDENDUM NOTE
Encounter addended by: Lanette Cantu RN on: 3/13/2023 2:54 PM   Actions taken: Flowsheet data copied forward, Flowsheet accepted, Clinical Note Signed

## 2023-03-13 NOTE — SIGNIFICANT NOTE
Wound Eval / Progress Noted    [facility]     Patient Name: Mariela Aldrich  : 1970  MRN: [id number]  Today's Date: 3/13/2023                 Admit Date: 3/13/2023    Visit Dx:    ICD-10-CM ICD-9-CM   1. Hidradenitis  L73.2 705.83       Patient Active Problem List   Diagnosis   • Anxiety   • Type 2 diabetes mellitus with hyperglycemia, with long-term current use of insulin ([facility])   • Hyperlipidemia   • Hypothyroidism   • Panic attack   • RLS (restless legs syndrome)   • Sinus trouble   • OAB (overactive bladder)   • Mild intermittent asthma without complication   • Hidradenitis        Past Medical History:   Diagnosis Date   • Anxiety    • Asthma     PRN INHALER AND TAKES ALLERGY INJECTIONS   • Diabetes mellitus type 2, controlled ([facility])     AVERAGE    • Hidradenitis    • Hyperlipidemia    • Hypothyroidism    • Murmur     DIAGNOSED WHEN 17 BUT IS ASYMPTOMATIC AND IS FOLLOWED ONLY BY PCP   • Panic attack    • Pilonidal cyst    • Rheumatic fever     AS A CHILD   • RLS (restless legs syndrome)    • Sinus trouble         Past Surgical History:   Procedure Laterality Date   • COLONOSCOPY  2020    exernal hemorrhoids   • GROIN ABSCESS INCISION AND DRAINAGE Right     hidradenitis   • HYSTERECTOMY     • KNEE SURGERY Bilateral    • PILONIDAL CYSTECTOMY N/A 2023    Procedure: Excision Hidradenitis of Inguinal Area and Pilonidal Cystectomy;  Surgeon: Donato You MD;  Location: [facility];  Service: General;  Laterality: N/A;         Physical Assessment:  Wound 23 1333 medial gluteal Incision (Active)   Wound Image   23 1250   Dressing Appearance dry;intact 23 1250   Closure None 23 1250   Base moist;red;granulating;bleeding;other (see comments) 23 1250   Periwound redness;moist;intact 23 1250   Periwound Temperature warm 23 1250   Periwound Skin Turgor soft 23 1250   Edges open 23 1250   Wound Length (cm) 4 cm 23 1250   Wound  Width (cm) 1.1 cm 03/13/23 1250   Wound Depth (cm) 3.2 cm 03/13/23 1250   Wound Surface Area (cm^2) 4.4 cm^2 03/13/23 1250   Wound Volume (cm^3) 14.08 cm^3 03/13/23 1250   Drainage Characteristics/Odor serosanguineous;bleeding controlled 03/13/23 1250   Drainage Amount moderate 03/13/23 1250   Care, Wound cleansed with;irrigated with;sterile normal saline;negative pressure wound therapy 03/13/23 1250   Dressing Care dressing applied;foam;transparent film;gauze;hydrocolloid;hydrofiber;silver impregnated 03/13/23 1250   Periwound Care absorptive dressing applied;barrier film applied;hydrocolloid barrier applied 03/13/23 1250       Wound 02/08/23 1424 Left lower abdomen Incision (Active)   Wound Image   03/13/23 1250   Dressing Appearance moist drainage;intact 03/13/23 1250   Closure None 03/13/23 1250   Base red;dry;scab;moist 03/13/23 1250   Periwound intact;dry;redness 03/13/23 1250   Periwound Temperature warm 03/13/23 1250   Periwound Skin Turgor soft 03/13/23 1250   Edges open;rolled/closed 03/13/23 1250   Drainage Characteristics/Odor serosanguineous;yellow 03/13/23 1250   Drainage Amount small 03/13/23 1250   Care, Wound cleansed with;sterile normal saline 03/13/23 1250   Dressing Care dressing applied;hydrofiber;silver impregnated;abdominal pad 03/13/23 1250   Periwound Care absorptive dressing applied 03/13/23 1250       Wound 02/08/23 1425 Right groin (Active)   Wound Image   03/13/23 1250   Dressing Appearance moist drainage;intact 03/13/23 1250   Closure None 03/13/23 1250   Base moist;red 03/13/23 1250   Periwound intact;redness;dry 03/13/23 1250   Periwound Temperature warm 03/13/23 1250   Periwound Skin Turgor soft 03/13/23 1250   Edges open 03/13/23 1250   Wound Length (cm) 0.3 cm 03/13/23 1250   Wound Width (cm) 1 cm 03/13/23 1250   Wound Depth (cm) 0.7 cm 03/13/23 1250   Wound Surface Area (cm^2) 0.3 cm^2 03/13/23 1250   Wound Volume (cm^3) 0.21 cm^3 03/13/23 1250   Drainage Characteristics/Odor  serosanguineous 03/13/23 1250   Drainage Amount small 03/13/23 1250   Care, Wound cleansed with;irrigated with;sterile normal saline 03/13/23 1250   Dressing Care dressing applied;packed with;packing strip;silicone;border dressing 03/13/23 1250   Periwound Care absorptive dressing applied 03/13/23 1250          Wound Check / Follow-up:  Patient seen today for wound follow-up and wound VAC dressing change. Wound VAC foam compressed, VAC is functioning properly without alarms. Dressing removed with use of adhesive remover spray. Cleansed and irrigated wound with normal saline and gauze. Penrose drain remains in place. Wound bed with moist, red tissue. Irritation remains to periwound tissue but lessened. Placed silver impregnated hydrofiber to periwound tissue. Filled wound bed with one piece of black foam. Applied additional piece of black foam for bridge and track pad. Applied strips of hydrocolloid to distal portion of dressing. Wound VAC connected, foam compressed, seal obtained. No signs of leaking. Surgical incision to abdominal fold with penrose drain in place. Irritation underneath drain remains with superficial open, red, pink tissue. Cleansed with normal saline and gauze. Applied silver impregnated hydrofiber to all areas and secured with ABD pad and paper tape. Right groin with small open area. Cleansed with normal saline and gauze. Filled wound with plain packing strip gauze and secured with silicone border dressing. Left groin with new reddened, raised area. Induration noted to surrounding tissue. Cleansed gently with normal saline and gauze. Applied silver impregnated hydrofiber and secured with silicone border dressing. Notified surgeon, Dr. You, of findings. MD states he will call in antibiotics for patient. Notified patient of this and she verbalized understanding. Recommending to continue current treatment.      Impression: Surgical incision to gluteal crease, right groin, and abdomen with  delayed closure.      Short term goals:  Regain skin integrity, negative pressure wound therapy, daily dressing changes.      Lanette Cantu RN    3/13/2023    14:43 EDT

## 2023-03-14 ENCOUNTER — APPOINTMENT (OUTPATIENT)
Dept: CT IMAGING | Facility: HOSPITAL | Age: 53
End: 2023-03-14
Payer: COMMERCIAL

## 2023-03-14 ENCOUNTER — TELEPHONE (OUTPATIENT)
Dept: WOUND CARE | Facility: HOSPITAL | Age: 53
End: 2023-03-14
Payer: COMMERCIAL

## 2023-03-14 ENCOUNTER — TELEPHONE (OUTPATIENT)
Dept: SURGERY | Facility: CLINIC | Age: 53
End: 2023-03-14
Payer: COMMERCIAL

## 2023-03-14 ENCOUNTER — HOSPITAL ENCOUNTER (EMERGENCY)
Facility: HOSPITAL | Age: 53
Discharge: HOME | End: 2023-03-15
Attending: EMERGENCY MEDICINE | Admitting: EMERGENCY MEDICINE
Payer: COMMERCIAL

## 2023-03-14 DIAGNOSIS — L02.91 ABSCESS: Primary | ICD-10-CM

## 2023-03-14 LAB
ALBUMIN SERPL-MCNC: 3.5 G/DL (ref 3.5–5.2)
ALBUMIN/GLOB SERPL: 0.6 G/DL
ALP SERPL-CCNC: 99 U/L (ref 39–117)
ALT SERPL W P-5'-P-CCNC: 14 U/L (ref 1–33)
ANION GAP SERPL CALCULATED.3IONS-SCNC: 10.7 MMOL/L (ref 5–15)
AST SERPL-CCNC: 16 U/L (ref 1–32)
BASOPHILS # BLD AUTO: 0.05 10*3/MM3 (ref 0–0.2)
BASOPHILS NFR BLD AUTO: 0.3 % (ref 0–1.5)
BILIRUB SERPL-MCNC: 0.3 MG/DL (ref 0–1.2)
BUN SERPL-MCNC: 17 MG/DL (ref 6–20)
BUN/CREAT SERPL: 23.6 (ref 7–25)
CALCIUM SPEC-SCNC: 9.4 MG/DL (ref 8.6–10.5)
CHLORIDE SERPL-SCNC: 98 MMOL/L (ref 98–107)
CO2 SERPL-SCNC: 24.3 MMOL/L (ref 22–29)
CREAT SERPL-MCNC: 0.72 MG/DL (ref 0.57–1)
D-LACTATE SERPL-SCNC: 0.8 MMOL/L (ref 0.5–2)
DEPRECATED RDW RBC AUTO: 39.8 FL (ref 37–54)
EGFRCR SERPLBLD CKD-EPI 2021: 100.1 ML/MIN/1.73
EOSINOPHIL # BLD AUTO: 0.17 10*3/MM3 (ref 0–0.4)
EOSINOPHIL NFR BLD AUTO: 1.1 % (ref 0.3–6.2)
ERYTHROCYTE [DISTWIDTH] IN BLOOD BY AUTOMATED COUNT: 13.2 % (ref 12.3–15.4)
GLOBULIN UR ELPH-MCNC: 5.5 GM/DL
GLUCOSE SERPL-MCNC: 146 MG/DL (ref 65–99)
HCT VFR BLD AUTO: 36.9 % (ref 34–46.6)
HGB BLD-MCNC: 12.2 G/DL (ref 12–15.9)
IMM GRANULOCYTES # BLD AUTO: 0.09 10*3/MM3 (ref 0–0.05)
IMM GRANULOCYTES NFR BLD AUTO: 0.6 % (ref 0–0.5)
LYMPHOCYTES # BLD AUTO: 2.24 10*3/MM3 (ref 0.7–3.1)
LYMPHOCYTES NFR BLD AUTO: 14.6 % (ref 19.6–45.3)
MCH RBC QN AUTO: 27.4 PG (ref 26.6–33)
MCHC RBC AUTO-ENTMCNC: 33.1 G/DL (ref 31.5–35.7)
MCV RBC AUTO: 82.9 FL (ref 79–97)
MONOCYTES # BLD AUTO: 1.02 10*3/MM3 (ref 0.1–0.9)
MONOCYTES NFR BLD AUTO: 6.6 % (ref 5–12)
NEUTROPHILS NFR BLD AUTO: 11.79 10*3/MM3 (ref 1.7–7)
NEUTROPHILS NFR BLD AUTO: 76.8 % (ref 42.7–76)
NRBC BLD AUTO-RTO: 0 /100 WBC (ref 0–0.2)
PLATELET # BLD AUTO: 354 10*3/MM3 (ref 140–450)
PMV BLD AUTO: 9.8 FL (ref 6–12)
POTASSIUM SERPL-SCNC: 4.1 MMOL/L (ref 3.5–5.2)
PROT SERPL-MCNC: 9 G/DL (ref 6–8.5)
RBC # BLD AUTO: 4.45 10*6/MM3 (ref 3.77–5.28)
SODIUM SERPL-SCNC: 133 MMOL/L (ref 136–145)
WBC NRBC COR # BLD: 15.36 10*3/MM3 (ref 3.4–10.8)

## 2023-03-14 PROCEDURE — 25010000002 ONDANSETRON PER 1 MG: Performed by: EMERGENCY MEDICINE

## 2023-03-14 PROCEDURE — 99284 EMERGENCY DEPT VISIT MOD MDM: CPT

## 2023-03-14 PROCEDURE — 25010000002 MORPHINE PER 10 MG: Performed by: EMERGENCY MEDICINE

## 2023-03-14 PROCEDURE — 74177 CT ABD & PELVIS W/CONTRAST: CPT

## 2023-03-14 PROCEDURE — 87040 BLOOD CULTURE FOR BACTERIA: CPT | Performed by: EMERGENCY MEDICINE

## 2023-03-14 PROCEDURE — 96374 THER/PROPH/DIAG INJ IV PUSH: CPT

## 2023-03-14 PROCEDURE — 96375 TX/PRO/DX INJ NEW DRUG ADDON: CPT

## 2023-03-14 PROCEDURE — 85025 COMPLETE CBC W/AUTO DIFF WBC: CPT | Performed by: EMERGENCY MEDICINE

## 2023-03-14 PROCEDURE — 83605 ASSAY OF LACTIC ACID: CPT | Performed by: EMERGENCY MEDICINE

## 2023-03-14 PROCEDURE — 80053 COMPREHEN METABOLIC PANEL: CPT | Performed by: EMERGENCY MEDICINE

## 2023-03-14 PROCEDURE — 25510000001 IOPAMIDOL PER 1 ML: Performed by: EMERGENCY MEDICINE

## 2023-03-14 RX ORDER — SULFAMETHOXAZOLE AND TRIMETHOPRIM 800; 160 MG/1; MG/1
1 TABLET ORAL ONCE
Status: COMPLETED | OUTPATIENT
Start: 2023-03-14 | End: 2023-03-14

## 2023-03-14 RX ORDER — ONDANSETRON 2 MG/ML
4 INJECTION INTRAMUSCULAR; INTRAVENOUS ONCE
Status: COMPLETED | OUTPATIENT
Start: 2023-03-14 | End: 2023-03-14

## 2023-03-14 RX ORDER — SULFAMETHOXAZOLE AND TRIMETHOPRIM 800; 160 MG/1; MG/1
1 TABLET ORAL 2 TIMES DAILY
Qty: 20 TABLET | Refills: 0 | Status: SHIPPED | OUTPATIENT
Start: 2023-03-14 | End: 2023-03-29

## 2023-03-14 RX ORDER — CEPHALEXIN 250 MG/1
500 CAPSULE ORAL ONCE
Status: COMPLETED | OUTPATIENT
Start: 2023-03-14 | End: 2023-03-14

## 2023-03-14 RX ADMIN — SODIUM CHLORIDE 1000 ML: 9 INJECTION, SOLUTION INTRAVENOUS at 22:04

## 2023-03-14 RX ADMIN — SULFAMETHOXAZOLE AND TRIMETHOPRIM 1 TABLET: 800; 160 TABLET ORAL at 22:04

## 2023-03-14 RX ADMIN — IOPAMIDOL 100 ML: 755 INJECTION, SOLUTION INTRAVENOUS at 23:16

## 2023-03-14 RX ADMIN — ONDANSETRON 4 MG: 2 INJECTION INTRAMUSCULAR; INTRAVENOUS at 22:04

## 2023-03-14 RX ADMIN — CEPHALEXIN 500 MG: 250 CAPSULE ORAL at 22:04

## 2023-03-14 RX ADMIN — MORPHINE SULFATE 4 MG: 4 INJECTION, SOLUTION INTRAMUSCULAR; INTRAVENOUS at 22:04

## 2023-03-14 NOTE — ED PROVIDER NOTES
Time: 4:58 PM EDT  Date of encounter:  3/14/2023  Independent Historian/Clinical History and Information was obtained by:   Patient  Chief Complaint   Patient presents with   • Groin Pain       History is limited by: N/A    History of Present Illness:  Patient is a 52 y.o. year old female who presents to the emergency department for evaluation of abscess. Has hx of hidradenitis suppurativa and had recent surgery for abscesses as well as pilonidal cyst. Patient has wound vac to back. She has had surgery by Dr Yuo for multiple abscesses. She started having increase in pain in left groin with swelling she called her surgeon and he called in  abx today, she has not picked it up yet. Patient report wound nurse convinced her to come in. No fever.    HPI    Patient Care Team  Primary Care Provider: Berenice Avelar APRN    Past Medical History:     Allergies   Allergen Reactions   • Etodolac Unknown - High Severity     HEAVINESS ON CHEST   • Penicillins Swelling   • Atorvastatin Myalgia   • Crestor [Rosuvastatin] Other (See Comments)     Legs cramps    • Pravastatin Myalgia     Past Medical History:   Diagnosis Date   • Anxiety    • Asthma     PRN INHALER AND TAKES ALLERGY INJECTIONS   • Diabetes mellitus type 2, controlled ([facility])     AVERAGE    • Hidradenitis    • Hyperlipidemia    • Hypothyroidism    • Murmur     DIAGNOSED WHEN 17 BUT IS ASYMPTOMATIC AND IS FOLLOWED ONLY BY PCP   • Panic attack    • Pilonidal cyst    • Rheumatic fever     AS A CHILD   • RLS (restless legs syndrome)    • Sinus trouble      Past Surgical History:   Procedure Laterality Date   • COLONOSCOPY  01/21/2020    exernal hemorrhoids   • GROIN ABSCESS INCISION AND DRAINAGE Right     hidradenitis   • HYSTERECTOMY  2010   • KNEE SURGERY Bilateral 2013   • PILONIDAL CYSTECTOMY N/A 2/8/2023    Procedure: Excision Hidradenitis of Inguinal Area and Pilonidal Cystectomy;  Surgeon: Donato You MD;  Location: [facility] MAIN OR;  Service:  General;  Laterality: N/A;     Family History   Problem Relation Age of Onset   • Thyroid disease Father 70   • Thyroid disease Sister    • Diabetes Sister    • Thyroid disease Brother 43        thyroid cancer   • Diabetes Brother    • Diabetes Maternal Grandmother        Home Medications:  Prior to Admission medications    Medication Sig Start Date End Date Taking? Authorizing Provider   adalimumab (Humira) 40 MG/0.8ML Prefilled Syringe Kit injection 0.8 mL. REPORTS THAT STOPPED A COUPLE WEEKS AGO PER DR AGUSTIN    Provider, MD Erica   albuterol sulfate  (90 Base) MCG/ACT inhaler Inhale 2 puffs Every 4 (Four) Hours As Needed.    Provider, MD CHAVO Maldonado-EASTON ULTRAFINE III SHORT PEN 31G X 8 MM misc USE AS DIRECTED ONCE DAILY WITH TRESIBA FLEXPEN. 1/13/23   Berenice Avelar APRN   cetirizine (zyrTEC) 10 MG tablet Zyrtec 10 mg oral tablet take 1 tablet (10 mg) by oral route once daily   Active    Provider, MD Erica   Continuous Blood Gluc Sensor (Dexcom G6 Sensor) Every 10 (Ten) Days. 12/21/22   Berenice Avelar APRN   Continuous Blood Gluc Transmit (Dexcom G6 Transmitter) misc 1 Device Every 3 (Three) Months. 12/7/22   Berenice Avelar APRN   docusate sodium (Colace) 100 MG capsule Take 1 capsule by mouth 2 (Two) Times a Day As Needed for Constipation. 2/8/23 2/8/24  Donato Agustin MD   Empagliflozin-metFORMIN HCl ER (Synjardy XR) 12.5-1000 MG tablet sustained-release 24 hour Take 1 tablet by mouth 2 (Two) Times a Day.  Patient taking differently: Take 1 tablet by mouth 2 (Two) Times a Day. INSTRUCTED PER ANES 12/7/22   Berenice Avelar APRN   Fluticasone-Salmeterol (ADVAIR/WIXELA) 250-50 MCG/ACT DISKUS Inhale 1 puff 2 (Two) Times a Day. 12/7/22   Berenice Avelar APRN   glipizide (GLUCOTROL) 10 MG tablet TAKE 1 TABLET BY MOUTH TWICE DAILY BEFORE MEAL(S) 1/23/23   Berenice Avelar APRN   glucose blood test strip Use as instructed Dx: DM E11 12/7/22   Berenice Avelar APRN    HYDROcodone-acetaminophen (Norco) 5-325 MG per tablet Take 1 tablet by mouth Every 6 (Six) Hours As Needed for Moderate Pain. 2/21/23   Ashley Sanchez APRN   Insulin Degludec (Tresiba FlexTouch) 200 UNIT/ML solution pen-injector pen injection Inject 48 Units under the skin into the appropriate area as directed Daily.  Patient taking differently: Inject 54 Units under the skin into the appropriate area as directed Every Night. INSTRUCTED PER ANESTHESIA PROTOCOL 12/7/22   Berenice Avelar APRN   lisinopril (PRINIVIL,ZESTRIL) 5 MG tablet Take 1 tablet by mouth Daily.  Patient taking differently: Take 1 tablet by mouth Every Night. 12/7/22   Berenice Avelar APRN   Mirabegron ER (Myrbetriq) 50 MG tablet sustained-release 24 hour 24 hr tablet Take 50 mg by mouth Daily. 12/7/22   Berenice Avelar APRN   multivitamin (THERAGRAN) tablet tablet     Provider, MD Erica   ONE TOUCH CLUB LANCETS misc 90 each 3 (Three) Times a Day As Needed (check blood sugar). 12/7/22   Berenice Avelar APRN   pitavastatin calcium (Livalo) 2 MG tablet tablet Take 1 tablet by mouth Daily.  Patient taking differently: Take 1 tablet by mouth Every Night. 12/7/22   Berenice Avelar APRN   rOPINIRole (REQUIP) 4 MG tablet Take 1 tablet by mouth Every Night. 12/7/22   Berenice Avelar APRN   Semaglutide,0.25 or 0.5MG/DOS, (Ozempic, 0.25 or 0.5 MG/DOSE,) 2 MG/1.5ML solution pen-injector Inject 0.25 mg under the skin into the appropriate area as directed 1 (One) Time Per Week.  Patient taking differently: Inject 0.25 mg under the skin into the appropriate area as directed 1 (One) Time Per Week. TAKES ON MONDAY 12/7/22   Berenice Avelar APRN   sertraline (ZOLOFT) 100 MG tablet Take 1.5 tablets by mouth Daily.  Patient taking differently: Take 1.5 tablets by mouth Every Night. 12/7/22   Berenice Avelar APRN   sulfamethoxazole-trimethoprim (BACTRIM DS,SEPTRA DS) 800-160 MG per tablet Take 1 tablet by mouth 2 (Two) Times a Day. 3/14/23   Avelino,  "Donato ESCOBAR MD   Synthroid 88 MCG tablet Take 1 tablet by mouth Daily. 12/7/22   Berenice vAelar APRN   triamcinolone (KENALOG) 0.5 % cream Apply 1 application topically to the appropriate area as directed Daily.    ProviderErica MD   Zinc 50 MG tablet zinc 50 mg oral tablet take 1 tablet by oral route daily   Active    ProviderErica MD   sulfamethoxazole-trimethoprim (BACTRIM DS,SEPTRA DS) 800-160 MG per tablet Take 1 tablet by mouth 2 (Two) Times a Day. 8/15/22 3/14/23  ProviderErica MD        Social History:   Social History     Tobacco Use   • Smoking status: Never   • Smokeless tobacco: Never   Vaping Use   • Vaping Use: Never used   Substance Use Topics   • Alcohol use: Never   • Drug use: Never         Review of Systems:  Review of Systems   Constitutional: Positive for chills. Negative for fever.   HENT: Negative for congestion, ear pain and sore throat.    Eyes: Negative for pain.   Respiratory: Negative for cough, chest tightness and shortness of breath.    Cardiovascular: Negative for chest pain.   Gastrointestinal: Negative for abdominal pain, diarrhea, nausea and vomiting.   Genitourinary: Negative for flank pain and hematuria.   Musculoskeletal: Negative for joint swelling.   Skin: Positive for wound. Negative for pallor.   Neurological: Negative for seizures and headaches.   All other systems reviewed and are negative.       Physical Exam:  /69   Pulse 93   Temp 99.1 °F (37.3 °C) (Oral)   Resp 18   Ht 162.6 cm (64\")   Wt 89 kg (196 lb 3.4 oz)   SpO2 92%   BMI 33.68 kg/m²     Physical Exam  Constitutional:       Appearance: Normal appearance.      Comments: Appears uncomfortable   HENT:      Head: Normocephalic and atraumatic.      Nose: Nose normal.      Mouth/Throat:      Mouth: Mucous membranes are moist.   Eyes:      Extraocular Movements: Extraocular movements intact.      Conjunctiva/sclera: Conjunctivae normal.      Pupils: Pupils are equal, round, and reactive " to light.   Cardiovascular:      Rate and Rhythm: Regular rhythm. Tachycardia present.      Pulses: Normal pulses.      Heart sounds: Normal heart sounds.   Pulmonary:      Effort: Pulmonary effort is normal.      Breath sounds: Normal breath sounds.   Abdominal:      General: There is no distension.      Palpations: Abdomen is soft.      Tenderness: There is no abdominal tenderness.   Musculoskeletal:         General: Normal range of motion.      Cervical back: Normal range of motion and neck supple.   Skin:     General: Skin is warm and dry.      Capillary Refill: Capillary refill takes less than 2 seconds.      Comments: Patient has incision site over lower abdomen.  She has mild surrounding erythema small amount of purulent drainage.  She also has an abscess that has packing in right groin area with surrounding erythema minimal drainage.  Patient has an abscess in her left groin area that is erythematous with fluctuance approximately 3 x 3 cm in size.  Patient also has a wound VAC to her lower back with minimal surrounding erythema mild abdominal tenderness.  Bilateral lower extremities neurovascularly intact.   Neurological:      General: No focal deficit present.      Mental Status: She is alert and oriented to person, place, and time. Mental status is at baseline.   Psychiatric:         Mood and Affect: Mood normal.         Behavior: Behavior normal.                  Procedures:  Incision & Drainage    Date/Time: 3/15/2023 2:03 AM  Performed by: Mariela Paul APRN  Authorized by: Toi Simmons MD     Consent:     Consent obtained:  Verbal    Consent given by:  Patient    Risks, benefits, and alternatives were discussed: yes      Risks discussed:  Bleeding, incomplete drainage, pain and infection    Alternatives discussed:  No treatment  Universal protocol:     Procedure explained and questions answered to patient or proxy's satisfaction: yes      Patient identity confirmed:  Verbally with patient and  "arm band  Location:     Type:  Abscess    Size:  2 cm    Location:  Trunk    Trunk location:  Abdomen  Pre-procedure details:     Skin preparation:  Povidone-iodine  Sedation:     Sedation type:  None  Anesthesia:     Anesthesia method:  Local infiltration    Local anesthetic:  Lidocaine 1% WITH epi  Procedure type:     Complexity:  Simple  Procedure details:     Ultrasound guidance: no      Needle aspiration: no      Incision types:  Stab incision    Incision depth:  Subcutaneous    Wound management:  Irrigated with saline    Drainage:  Bloody and purulent    Drainage amount:  Copious    Wound treatment:  Wound left open    Packing materials:  1/4 in iodoform gauze    Amount 1/4\" iodoform:  4 cm  Post-procedure details:     Procedure completion:  Tolerated well, no immediate complications          Medical Decision Making:      Comorbidities that affect care:    Hidradenitis, pilonidal cyst, Diabetes, Thyroid Disease    External Notes reviewed:    Previous Clinic Note: Patient was seen by Dr. You in his office on 3/2/2023 for follow-up after surgery: Removal of pilonidal cyst and excision of hidradenitis and inguinal and pilonidal region      The following orders were placed and all results were independently analyzed by me:  Orders Placed This Encounter   Procedures   • Incision & Drainage   • Blood Culture - Blood,   • Blood Culture - Blood,   • CT Abdomen Pelvis With Contrast   • Comprehensive Metabolic Panel   • Lactic Acid, Plasma   • CBC Auto Differential   • CBC & Differential       Medications Given in the Emergency Department:  Medications   morphine injection 4 mg (4 mg Intravenous Given 3/14/23 2204)   ondansetron (ZOFRAN) injection 4 mg (4 mg Intravenous Given 3/14/23 2204)   sodium chloride 0.9 % bolus 1,000 mL (0 mL Intravenous Stopped 3/15/23 0015)   sulfamethoxazole-trimethoprim (BACTRIM DS,SEPTRA DS) 800-160 MG per tablet 1 tablet (1 tablet Oral Given 3/14/23 2204)   cephalexin (KEFLEX) " capsule 500 mg (500 mg Oral Given 3/14/23 2204)   iopamidol (ISOVUE-370) 76 % injection 100 mL (100 mL Intravenous Given 3/14/23 2316)   lidocaine 1% - EPINEPHrine 1:371590 (XYLOCAINE W/EPI) 1 %-1:484505 injection 10 mL (10 mL Injection Given 3/15/23 0149)   morphine injection 4 mg (4 mg Intravenous Given 3/15/23 0148)        ED Course:    The patient was initially evaluated in the triage area where orders were placed. The patient was later dispositioned by Toi Simmons MD.      The patient was advised to stay for completion of workup which includes but is not limited to communication of labs and radiological results, reassessment and plan. The patient was advised that leaving prior to disposition by a provider could result in critical findings that are not communicated to the patient.          Labs:    Lab Results (last 24 hours)     Procedure Component Value Units Date/Time    CBC & Differential [[id number]]  (Abnormal) Collected: 03/14/23 2157    Specimen: Blood Updated: 03/14/23 2210    Narrative:      The following orders were created for panel order CBC & Differential.  Procedure                               Abnormality         Status                     ---------                               -----------         ------                     CBC Auto Differential[[id number]]        Abnormal            Final result                 Please view results for these tests on the individual orders.    Comprehensive Metabolic Panel [[id number]]  (Abnormal) Collected: 03/14/23 2157    Specimen: Blood Updated: 03/14/23 2225     Glucose 146 mg/dL      BUN 17 mg/dL      Creatinine 0.72 mg/dL      Sodium 133 mmol/L      Potassium 4.1 mmol/L      Chloride 98 mmol/L      CO2 24.3 mmol/L      Calcium 9.4 mg/dL      Total Protein 9.0 g/dL      Albumin 3.5 g/dL      ALT (SGPT) 14 U/L      AST (SGOT) 16 U/L      Alkaline Phosphatase 99 U/L      Total Bilirubin 0.3 mg/dL      Globulin 5.5 gm/dL      A/G Ratio 0.6 g/dL       BUN/Creatinine Ratio 23.6     Anion Gap 10.7 mmol/L      eGFR 100.1 mL/min/1.73     Narrative:      GFR Normal >60  Chronic Kidney Disease <60  Kidney Failure <15      Lactic Acid, Plasma [[id number]]  (Normal) Collected: 03/14/23 2157    Specimen: Blood Updated: 03/14/23 2223     Lactate 0.8 mmol/L     Blood Culture - Blood, Arm, Left [[id number]] Collected: 03/14/23 2157    Specimen: Blood from Arm, Left Updated: 03/14/23 2207    Blood Culture - Blood, Arm, Left [[id number]] Collected: 03/14/23 2157    Specimen: Blood from Arm, Left Updated: 03/14/23 2207    CBC Auto Differential [[id number]]  (Abnormal) Collected: 03/14/23 2157    Specimen: Blood Updated: 03/14/23 2210     WBC 15.36 10*3/mm3      RBC 4.45 10*6/mm3      Hemoglobin 12.2 g/dL      Hematocrit 36.9 %      MCV 82.9 fL      MCH 27.4 pg      MCHC 33.1 g/dL      RDW 13.2 %      RDW-SD 39.8 fl      MPV 9.8 fL      Platelets 354 10*3/mm3      Neutrophil % 76.8 %      Lymphocyte % 14.6 %      Monocyte % 6.6 %      Eosinophil % 1.1 %      Basophil % 0.3 %      Immature Grans % 0.6 %      Neutrophils, Absolute 11.79 10*3/mm3      Lymphocytes, Absolute 2.24 10*3/mm3      Monocytes, Absolute 1.02 10*3/mm3      Eosinophils, Absolute 0.17 10*3/mm3      Basophils, Absolute 0.05 10*3/mm3      Immature Grans, Absolute 0.09 10*3/mm3      nRBC 0.0 /100 WBC            Imaging:    CT Abdomen Pelvis With Contrast    Result Date: 3/15/2023  PROCEDURE: CT ABDOMEN PELVIS W CONTRAST  COMPARISON: 2/8/2011.  INDICATIONS: 52-year-old female w/ h/o abdominal pain, not otherwise specified (nos); also, h/o hidradenitis suppurativa (HS); recent surgery.  TECHNIQUE: After obtaining the patient's consent, 694 CT images were created with non-ionic intravenous contrast material.  No oral contrast agent was administered for the study.  PROTOCOL:   Standard CT imaging protocol performed.    RADIATION:   Total DLP: 1,398.1 mGy*cm   Automated exposure control was utilized to minimize radiation  dose. CONTRAST: 100 mL Isovue 370 I.V.  FINDINGS: There are recent postoperative changes of the anterior pelvic wall.  A surgical drain is in place within the anterior subcutaneous pelvic wall.  No focal sizable (drainable) fluid collection is seen in this region.  Within the left inguinal region, there is an area of mixed attenuation, which is superficially located and estimated at approximately 3.3 x 2.9 x 2.6 cm in long-axis axial, short-axis axial, and craniocaudal extent, respectively.  It is incompletely imaged, especially in its inferior extent.  It is probably centered at the level of an intertriginous fold.  It has a central CT number of approximately 31 Hounsfield units.  It does not contain gas.  No retained foreign body is associated with the finding.  It is nonspecific.  An abscess (or an infected epidermal inclusion cyst) cannot be excluded.  It may be related to the patient's known hidradenitis suppurativa (HS).  No ectopic gas collections are seen.  No other definite focal ectopic fluid collections are suggested.  There is also a thickened appearance of the skin at the level of the superior gluteal fold, right slightly greater than left, as seen on image 113 of series 201 and adjacent images.  There is thought to be a surgical drain at the site, as well.  It may be related to recent surgical treatment (resection) of a pilonidal cyst.  No definite recurrent pilonidal cyst is appreciated.  Granulation tissue may account for the skin thickening.  There is the suggestion of an open wound healing by secondary intent at this level.  Additionally, there may be a tiny hiatal hernia.  There is mild diffuse hepatic steatosis with hepatomegaly.  Probably no splenomegaly.  No gallstones or acute cholecystitis.  Calcified lymph nodes are seen, especially in the portacaval region, and are of uncertain significance or origin.  They may be related to old granulomatous disease.  A similar CT appearance was seen in  this region on the prior CT study from 2/8/2011.  A trace amount pericardial effusion is seen.  There is mild subsegmental atelectasis in lung bases.  No acute infiltrate is identified.  No pleural effusion.  No cardiac enlargement.  No adrenal mass.  No renal/ureteral stones or hydronephrosis or obstructive uropathy.  No acute pyelonephritis.  There may be tiny renal cysts, which are too small to characterize by this study.  Atherosclerotic changes are seen without aneurysmal dilatation of the aortoiliac arterial system.  The urinary bladder is distended.  No urinary bladder wall thickening or urinary bladder calculi.  Degenerative changes involve the imaged spine and the bilateral sacroiliac joints.  There is mild pubic osteitis.  There is bilateral L5 spondylolysis with minimal spondylolisthesis of L5 upon S1, estimated at 6 mm.  There are suspected coronary artery calcifications.  There is a tiny fat-containing umbilical hernia.  It does not contain bowel.  The cecum is thought to be redundant.  It is located in the anterior mid to left paracentral lower abdomen/upper pelvis, as seen on image 79 of series 201, image 52 of series 202, image 138 of series 203, and adjacent images.  The appendix is not clearly identified.  The patient has undergone hysterectomy.        1. There are recent postoperative changes involving the anterior pelvic wall and the region of the superior gluteal fold with surgical drains in place.  No sizable (drainable) focal fluid collection is seen in these regions.  2. There is a superficial (subcutaneous) heterogeneous low-density mass involving the left groin, which is partially imaged on the study.  It may be related to the patient's history of hidradenitis suppurativa (HS).  It may represent an abscess.  Please correlate with physical exam findings in this region.  3. Otherwise, no acute findings are appreciated.  4. Please see above comments for further detail.   1.   Please note that  portions of this note were completed with a voice recognition program.  KRISHNA BAUTISTA JR, MD       Electronically Signed and Approved By: KRISHNA BAUTISTA JR, MD on 3/15/2023 at 0:10                  Differential Diagnosis and Discussion:      Extremity Pain: Differential diagnosis includes but is not limited to soft tissue sprain, tendonitis, tendon injury, dislocation, fracture, deep vein thrombosis, arterial insufficiency, osteoarthritis, bursitis, and ligamentous damage.  Fever: Based on the complaint of fever, differential diagnosis includes but is not limited to meningitis, pneumonia, pyelonephritis, acute uti,  systemic immune response syndrome, sepsis, viral syndrome, fungal infection, tick born illness and other bacterial infections.    All labs were reviewed and interpreted by me.  CT scan radiology interpretation was reviewed by me.    MDM  Number of Diagnoses or Management Options  Abscess  Diagnosis management comments: Patient is afebrile nontoxic-appearing.  Vital signs are stable.  On exam she has a fluctuant mass in the left groin consistent with abscess.  Patient recently had multiple procedures done.  CT was obtained that showed postoperative changes no acute findings.  Labs were obtained that showed elevated white count of 15.  Patient already has a prescription for Bactrim.  Will add Keflex.  Abscess was drained the emergency department.  Patient tolerated procedure well.  Recommend close follow-up with her surgeon.  Discussed return precautions, discharge instructions and answered all her questions.       Amount and/or Complexity of Data Reviewed  Clinical lab tests: reviewed  Tests in the radiology section of CPT®: reviewed  Review and summarize past medical records: yes  Independent visualization of images, tracings, or specimens: yes    Risk of Complications, Morbidity, and/or Mortality  Presenting problems: moderate  Management options: moderate             Patient Care  Considerations:    NARCOTICS: I considered prescribing opiate pain medication as an outpatient, however patient already has pain medication at home      Consultants/Shared Management Plan:    None    Social Determinants of Health:    Patient is independent, reliable, and has access to care.       Disposition and Care Coordination:    Discharged: The patient is suitable and stable for discharge with no need for consideration of observation or admission.    I have explained the patient´s condition, diagnoses and treatment plan based on the information available to me at this time. I have answered questions and addressed any concerns. The patient has a good  understanding of the patient´s diagnosis, condition, and treatment plan as can be expected at this point. The vital signs have been stable. The patient´s condition is stable and appropriate for discharge from the emergency department.      The patient will pursue further outpatient evaluation with the primary care physician or other designated or consulting physician as outlined in the discharge instructions. They are agreeable to this plan of care and follow-up instructions have been explained in detail. The patient has received these instructions in written format and have expressed an understanding of the discharge instructions. The patient is aware that any significant change in condition or worsening of symptoms should prompt an immediate return to this or the closest emergency department or call to 911.  I have explained discharge medications and the need for follow up with the patient/caretakers. This was also printed in the discharge instructions. Patient was discharged with the following medications and follow up:      Medication List      New Prescriptions    cephalexin 500 MG capsule  Commonly known as: KEFLEX  Take 1 capsule by mouth 4 (Four) Times a Day for 7 days.        Changed    lisinopril 5 MG tablet  Commonly known as: PRINIVIL,ZESTRIL  Take 1  tablet by mouth Daily.  What changed: when to take this     Ozempic (0.25 or 0.5 MG/DOSE) 2 MG/1.5ML solution pen-injector  Generic drug: Semaglutide(0.25 or 0.5MG/DOS)  Inject 0.25 mg under the skin into the appropriate area as directed 1 (One) Time Per Week.  What changed: additional instructions     pitavastatin calcium 2 MG tablet tablet  Commonly known as: Livalo  Take 1 tablet by mouth Daily.  What changed: when to take this     sertraline 100 MG tablet  Commonly known as: ZOLOFT  Take 1.5 tablets by mouth Daily.  What changed: when to take this     Synjardy XR 12.5-1000 MG tablet sustained-release 24 hour  Generic drug: Empagliflozin-metFORMIN HCl ER  Take 1 tablet by mouth 2 (Two) Times a Day.  What changed: additional instructions     Tresiba FlexTouch 200 UNIT/ML solution pen-injector pen injection  Generic drug: Insulin Degludec  Inject 48 Units under the skin into the appropriate area as directed Daily.  What changed:   · how much to take  · when to take this  · additional instructions           Where to Get Your Medications      These medications were sent to [facility] [address] [place] [address] [facility] - [phone]  - [phone] [address] [address]    Phone: [phone]   · cephalexin 500 MG capsule      Berenice Avelar, WILLIAM  [address] [place] [id number] [phone]    In 2 days         Final diagnoses:   Abscess        ED Disposition     ED Disposition   Discharge    Condition   Stable    Comment   --             This medical record created using voice recognition software.           Toi Simmons MD  03/15/23 0607

## 2023-03-14 NOTE — TELEPHONE ENCOUNTER
Excision of pilonidal cyst and abdominal wall hidradenitis on 02/08/23.    Patient's  called and said patient has a a place of hydradenitis suprativa that has come up on the left groin.  He said it has grown overnight, and needs to be lanced.  She has been up all night and has stabbing pain.  He said she needs to get in with Dr. You or maybe April to have lanced.    He said an antibiotic was prescribed, but it will not help for the pain.    He said she went to wound care yesterday, and silvadene was put on it and it was bandaged.

## 2023-03-14 NOTE — TELEPHONE ENCOUNTER
Caller: VERONICA VINCENT    Relationship to patient: Emergency Contact    Best call back number: [phone]    Patient is needing: PT'S  CHECKING ON STATUS OF PRESCRIPTION FOR WIFE. HE STATES THAT WOUND CARE NURSE CALLED AND SENT IN PICTURES OF AN AREA ON WOUND. PT IS IN A LOT OF PAIN. SHE HAS A FU WITH DR. AGUSTIN ON 3/16.

## 2023-03-15 ENCOUNTER — HOSPITAL ENCOUNTER (OUTPATIENT)
Dept: INFUSION THERAPY | Facility: HOSPITAL | Age: 53
Discharge: HOME | End: 2023-03-15
Admitting: NURSE PRACTITIONER
Payer: COMMERCIAL

## 2023-03-15 VITALS
HEART RATE: 93 BPM | BODY MASS INDEX: 33.5 KG/M2 | HEIGHT: 64 IN | OXYGEN SATURATION: 92 % | DIASTOLIC BLOOD PRESSURE: 69 MMHG | RESPIRATION RATE: 18 BRPM | SYSTOLIC BLOOD PRESSURE: 111 MMHG | WEIGHT: 196.21 LBS | TEMPERATURE: 99.1 F

## 2023-03-15 VITALS
HEART RATE: 104 BPM | DIASTOLIC BLOOD PRESSURE: 71 MMHG | SYSTOLIC BLOOD PRESSURE: 119 MMHG | OXYGEN SATURATION: 96 % | RESPIRATION RATE: 20 BRPM | TEMPERATURE: 97.7 F

## 2023-03-15 DIAGNOSIS — L73.2 HIDRADENITIS: Primary | ICD-10-CM

## 2023-03-15 PROCEDURE — 25010000002 MORPHINE PER 10 MG: Performed by: EMERGENCY MEDICINE

## 2023-03-15 PROCEDURE — 96376 TX/PRO/DX INJ SAME DRUG ADON: CPT

## 2023-03-15 PROCEDURE — 97605 NEG PRS WND THER DME<=50SQCM: CPT

## 2023-03-15 RX ORDER — LIDOCAINE HYDROCHLORIDE AND EPINEPHRINE 10; 10 MG/ML; UG/ML
10 INJECTION, SOLUTION INFILTRATION; PERINEURAL ONCE
Status: COMPLETED | OUTPATIENT
Start: 2023-03-15 | End: 2023-03-15

## 2023-03-15 RX ORDER — CEPHALEXIN 500 MG/1
500 CAPSULE ORAL 4 TIMES DAILY
Qty: 28 CAPSULE | Refills: 0 | Status: SHIPPED | OUTPATIENT
Start: 2023-03-15 | End: 2023-03-22

## 2023-03-15 RX ADMIN — MORPHINE SULFATE 4 MG: 4 INJECTION, SOLUTION INTRAMUSCULAR; INTRAVENOUS at 01:48

## 2023-03-15 RX ADMIN — LIDOCAINE HYDROCHLORIDE AND EPINEPHRINE 10 ML: 10; 10 INJECTION, SOLUTION INFILTRATION; PERINEURAL at 01:49

## 2023-03-15 NOTE — DISCHARGE INSTRUCTIONS
Take Bactrim that is prescribed by Dr You as well as Keflex. Please return for fever, uncontrolled pain or any concerning symptoms

## 2023-03-15 NOTE — SIGNIFICANT NOTE
Wound Eval / Progress Noted    [facility]     Patient Name: Mariela Aldrich  : 1970  MRN: [id number]  Today's Date: 3/15/2023                 Admit Date: 3/15/2023    Visit Dx:    ICD-10-CM ICD-9-CM   1. Hidradenitis  L73.2 705.83       Patient Active Problem List   Diagnosis   • Anxiety   • Type 2 diabetes mellitus with hyperglycemia, with long-term current use of insulin ([facility])   • Hyperlipidemia   • Hypothyroidism   • Panic attack   • RLS (restless legs syndrome)   • Sinus trouble   • OAB (overactive bladder)   • Mild intermittent asthma without complication   • Hidradenitis        Past Medical History:   Diagnosis Date   • Anxiety    • Asthma     PRN INHALER AND TAKES ALLERGY INJECTIONS   • Diabetes mellitus type 2, controlled ([facility])     AVERAGE    • Hidradenitis    • Hyperlipidemia    • Hypothyroidism    • Murmur     DIAGNOSED WHEN 17 BUT IS ASYMPTOMATIC AND IS FOLLOWED ONLY BY PCP   • Panic attack    • Pilonidal cyst    • Rheumatic fever     AS A CHILD   • RLS (restless legs syndrome)    • Sinus trouble         Past Surgical History:   Procedure Laterality Date   • COLONOSCOPY  2020    exernal hemorrhoids   • GROIN ABSCESS INCISION AND DRAINAGE Right     hidradenitis   • HYSTERECTOMY     • KNEE SURGERY Bilateral    • PILONIDAL CYSTECTOMY N/A 2023    Procedure: Excision Hidradenitis of Inguinal Area and Pilonidal Cystectomy;  Surgeon: Donato You MD;  Location: [facility];  Service: General;  Laterality: N/A;         Physical Assessment:  Wound 23 1333 medial gluteal Incision (Active)   Wound Image   03/15/23 1146   Dressing Appearance dry;intact 03/15/23 1146   Closure None 03/15/23 1146   Base moist;red;granulating;bleeding 03/15/23 1146   Periwound redness;moist;intact 03/15/23 1146   Periwound Temperature warm 03/15/23 1146   Periwound Skin Turgor soft 03/15/23 1146   Edges open 03/15/23 1146   Drainage Characteristics/Odor serosanguineous;bleeding controlled  03/15/23 1146   Drainage Amount moderate 03/15/23 1146   Care, Wound cleansed with;irrigated with;sterile normal saline;negative pressure wound therapy 03/15/23 1146   Dressing Care dressing applied;foam;transparent film;silver impregnated;hydrofiber;hydrocolloid 03/15/23 1146   Periwound Care absorptive dressing applied;barrier film applied;hydrocolloid barrier applied 03/15/23 1146       Wound 02/08/23 1424 Left lower abdomen Incision (Active)   Wound Image   03/15/23 1146   Dressing Appearance moist drainage;intact 03/15/23 1146   Closure None 03/15/23 1146   Base red;dry;moist;pink 03/15/23 1146   Periwound intact;dry;redness 03/15/23 1146   Periwound Temperature warm 03/15/23 1146   Periwound Skin Turgor soft 03/15/23 1146   Edges open;rolled/closed 03/15/23 1146   Drainage Characteristics/Odor serosanguineous;yellow 03/15/23 1146   Drainage Amount small 03/15/23 1146   Care, Wound cleansed with;sterile normal saline 03/15/23 1146   Dressing Care dressing applied;silver impregnated;hydrofiber;abdominal pad 03/15/23 1146   Periwound Care absorptive dressing applied 03/15/23 1146       Wound 02/08/23 1425 Right groin (Active)   Wound Image   03/15/23 1146   Dressing Appearance moist drainage;intact 03/15/23 1146   Closure None 03/15/23 1146   Base moist;red 03/15/23 1146   Periwound intact;redness;dry 03/15/23 1146   Periwound Temperature warm 03/15/23 1146   Periwound Skin Turgor soft 03/15/23 1146   Edges open 03/15/23 1146   Drainage Characteristics/Odor serosanguineous 03/15/23 1146   Drainage Amount small 03/15/23 1146   Care, Wound cleansed with;irrigated with;sterile normal saline 03/15/23 1146   Dressing Care dressing applied;packed with;packing strip;silicone;border dressing 03/15/23 1146   Periwound Care absorptive dressing applied 03/15/23 1146       Wound 03/15/23 Left anterior groin Incision (Active)   Wound Image   03/15/23 1224   Dressing Appearance moist drainage;intact 03/15/23 1224   Closure  None 03/15/23 1224   Base red;moist 03/15/23 1224   Periwound dry;intact;redness;blanchable 03/15/23 1224   Periwound Temperature warm 03/15/23 1224   Periwound Skin Turgor soft 03/15/23 1224   Edges open 03/15/23 1224   Wound Length (cm) 0.3 cm 03/15/23 1224   Wound Width (cm) 0.5 cm 03/15/23 1224   Wound Depth (cm) 1.3 cm 03/15/23 1224   Wound Surface Area (cm^2) 0.15 cm^2 03/15/23 1224   Wound Volume (cm^3) 0.195 cm^3 03/15/23 1224   Drainage Characteristics/Odor serosanguineous;creamy 03/15/23 1224   Drainage Amount small 03/15/23 1224   Care, Wound cleansed with;irrigated with;sterile normal saline 03/15/23 1224   Dressing Care dressing applied;packed with;packing strip;gauze, iodoform;silicone;border dressing 03/15/23 1224   Periwound Care absorptive dressing applied 03/15/23 1224      Wound Check / Follow-up:  Patient seen today for wound follow-up and wound VAC dressing change. Wound VAC foam compressed, VAC is functioning properly without alarms. Dressing removed with use of adhesive remover spray. Cleansed and irrigated wound with normal saline and gauze. Penrose drain remains in place. Wound bed with moist, red tissue. Irritation remains to periwound tissue but lessened. Placed silver impregnated hydrofiber to periwound tissue. Filled wound bed with one piece of black foam. Applied additional piece of black foam for bridge and track pad. Applied strips of hydrocolloid to distal portion of dressing. Wound VAC connected, foam compressed, seal obtained. No signs of leaking. Surgical incision to abdominal fold with penrose drain in place. Irritation underneath drain remains with superficial open, red, pink tissue. Cleansed with normal saline and gauze. Applied silver impregnated hydrofiber to all areas and secured with ABD pad and paper tape. Right groin with small open area. Cleansed with normal saline and gauze. Filled wound with plain packing strip gauze and secured with silicone border dressing. Patient  was seen in emergency department yesterday for left groin area. Incision was made. Cleansed and irrigated with normal saline. Filled wound with iodoform packing strip and secured with silicone border dressing. Recommending daily dressing changes. Recommending to continue current treatment.      Impression: Surgical incision to gluteal crease, right groin, and abdomen with delayed closure.      Short term goals:  Regain skin integrity, negative pressure wound therapy, daily dressing changes.      Lanette Cantu RN    3/15/2023    12:26 EDT

## 2023-03-16 ENCOUNTER — OFFICE VISIT (OUTPATIENT)
Dept: SURGERY | Facility: CLINIC | Age: 53
End: 2023-03-16
Payer: COMMERCIAL

## 2023-03-16 ENCOUNTER — PREP FOR SURGERY (OUTPATIENT)
Dept: OTHER | Facility: HOSPITAL | Age: 53
End: 2023-03-16
Payer: COMMERCIAL

## 2023-03-16 VITALS — WEIGHT: 196 LBS | BODY MASS INDEX: 33.46 KG/M2 | HEIGHT: 64 IN

## 2023-03-16 DIAGNOSIS — L73.2 HIDRADENITIS: Primary | ICD-10-CM

## 2023-03-16 PROCEDURE — 99024 POSTOP FOLLOW-UP VISIT: CPT | Performed by: SURGERY

## 2023-03-16 RX ORDER — SODIUM CHLORIDE 0.9 % (FLUSH) 0.9 %
10 SYRINGE (ML) INJECTION AS NEEDED
Status: CANCELLED | OUTPATIENT
Start: 2023-03-16

## 2023-03-16 RX ORDER — MELOXICAM 7.5 MG/1
7.5 TABLET ORAL DAILY
COMMUNITY
End: 2023-03-16

## 2023-03-16 RX ORDER — CLINDAMYCIN PHOSPHATE 900 MG/50ML
900 INJECTION INTRAVENOUS ONCE
Status: CANCELLED | OUTPATIENT
Start: 2023-03-16 | End: 2023-03-16

## 2023-03-16 RX ORDER — SODIUM CHLORIDE 0.9 % (FLUSH) 0.9 %
10 SYRINGE (ML) INJECTION EVERY 12 HOURS SCHEDULED
Status: CANCELLED | OUTPATIENT
Start: 2023-03-16

## 2023-03-16 RX ORDER — SODIUM CHLORIDE 9 MG/ML
40 INJECTION, SOLUTION INTRAVENOUS AS NEEDED
Status: CANCELLED | OUTPATIENT
Start: 2023-03-16

## 2023-03-16 NOTE — PROGRESS NOTES
Chief Complaint: Follow-up (POST-OP CONCERNS/REMOVE DRAINS)    Subjective         History of Present Illness  Mariela Aldrich is a 52 y.o. female presents to [facility] GENERAL SUR[facility]ERY. The patient is to be seen for follow-up for hidradenitis.    The patient is to be seen for follow-up after excision of hidradenitis. She is accompanied by an adult male.    Status post excision of hidradenitis  The patient reports she went to the emergency room on 03/15/2023. A CT scan of the hidradenitis was obtained at the emergency room before they excised it. The patient reports she was given Keflex and Bactrim. Notes from the emergency room visit revealed postoperative changes involving the anterior pelvic wall and region of the superior gluteal gluteal fold with drains in place. No fluid collection in these regions. Superficial subcutaneous in the left groin which is partially may result, may be related to patient's history of hidradenitis, it may represent an abscess otherwise no acute findings were noted. The patient is taking probiotics, activia, and protein shakes.      Objective     Past Medical History:   Diagnosis Date   • Anxiety    • Asthma     PRN INHALER AND TAKES ALLERGY INJECTIONS   • Diabetes mellitus type 2, controlled ([facility])     AVERAGE    • Hidradenitis    • Hyperlipidemia    • Hypothyroidism    • Murmur     DIAGNOSED WHEN 17 BUT IS ASYMPTOMATIC AND IS FOLLOWED ONLY BY PCP   • Panic attack    • Pilonidal cyst    • Rheumatic fever     AS A CHILD   • RLS (restless legs syndrome)    • Sinus trouble        Past Surgical History:   Procedure Laterality Date   • COLONOSCOPY  01/21/2020    exernal hemorrhoids   • GROIN ABSCESS INCISION AND DRAINAGE Right     hidradenitis   • HYSTERECTOMY  2010   • KNEE SURGERY Bilateral 2013   • PILONIDAL CYSTECTOMY N/A 2/8/2023    Procedure: Excision Hidradenitis of Inguinal Area and Pilonidal Cystectomy;  Surgeon: Donato You MD;  Location: [facility]  MAIN OR;  Service: General;  Laterality: N/A;         Current Outpatient Medications:   •  adalimumab (Humira) 40 MG/0.8ML Prefilled Syringe Kit injection, 0.8 mL. REPORTS THAT STOPPED A COUPLE WEEKS AGO PER DR AGUSTIN, Disp: , Rfl:   •  albuterol sulfate  (90 Base) MCG/ACT inhaler, Inhale 2 puffs Every 4 (Four) Hours As Needed., Disp: , Rfl:   •  B-D ULTRAFINE III SHORT PEN 31G X 8 MM misc, USE AS DIRECTED ONCE DAILY WITH TRESIBA FLEXPEN., Disp: 100 each, Rfl: 0  •  cephalexin (KEFLEX) 500 MG capsule, Take 1 capsule by mouth 4 (Four) Times a Day for 7 days., Disp: 28 capsule, Rfl: 0  •  cetirizine (zyrTEC) 10 MG tablet, Zyrtec 10 mg oral tablet take 1 tablet (10 mg) by oral route once daily   Active, Disp: , Rfl:   •  Continuous Blood Gluc Sensor (Dexcom G6 Sensor), Every 10 (Ten) Days., Disp: 9 each, Rfl: 1  •  Continuous Blood Gluc Transmit (Dexcom G6 Transmitter) misc, 1 Device Every 3 (Three) Months., Disp: 1 each, Rfl: 3  •  docusate sodium (Colace) 100 MG capsule, Take 1 capsule by mouth 2 (Two) Times a Day As Needed for Constipation., Disp: 10 capsule, Rfl: 1  •  Empagliflozin-metFORMIN HCl ER (Synjardy XR) 12.5-1000 MG tablet sustained-release 24 hour, Take 1 tablet by mouth 2 (Two) Times a Day. (Patient taking differently: Take 1 tablet by mouth 2 (Two) Times a Day. INSTRUCTED PER PAGE), Disp: 180 tablet, Rfl: 1  •  Fluticasone-Salmeterol (ADVAIR/WIXELA) 250-50 MCG/ACT DISKUS, Inhale 1 puff 2 (Two) Times a Day., Disp: 60 each, Rfl: 2  •  glipizide (GLUCOTROL) 10 MG tablet, TAKE 1 TABLET BY MOUTH TWICE DAILY BEFORE MEAL(S), Disp: 180 tablet, Rfl: 1  •  glucose blood test strip, Use as instructed Dx: DM E11, Disp: 100 each, Rfl: 3  •  HYDROcodone-acetaminophen (Norco) 5-325 MG per tablet, Take 1 tablet by mouth Every 6 (Six) Hours As Needed for Moderate Pain., Disp: 20 tablet, Rfl: 0  •  Insulin Degludec (Tresiba FlexTouch) 200 UNIT/ML solution pen-injector pen injection, Inject 48 Units under the  skin into the appropriate area as directed Daily. (Patient taking differently: Inject 54 Units under the skin into the appropriate area as directed Every Night. INSTRUCTED PER ANESTHESIA PROTOCOL), Disp: 21 mL, Rfl: 1  •  lisinopril (PRINIVIL,ZESTRIL) 5 MG tablet, Take 1 tablet by mouth Daily. (Patient taking differently: Take 1 tablet by mouth Every Night.), Disp: 90 tablet, Rfl: 1  •  meloxicam (MOBIC) 7.5 MG tablet, Take 1 tablet by mouth Daily., Disp: , Rfl:   •  metFORMIN (GLUCOPHAGE) 500 MG tablet, Take 2 tablets by mouth., Disp: , Rfl:   •  Mirabegron ER (Myrbetriq) 50 MG tablet sustained-release 24 hour 24 hr tablet, Take 50 mg by mouth Daily., Disp: 30 tablet, Rfl: 5  •  mometasone-formoterol (DULERA 100) 100-5 MCG/ACT inhaler, Inhale 2 puffs., Disp: , Rfl:   •  multivitamin (THERAGRAN) tablet tablet, , Disp: , Rfl:   •  ONE TOUCH CLUB LANCETS misc, 90 each 3 (Three) Times a Day As Needed (check blood sugar)., Disp: 100 each, Rfl: 3  •  pitavastatin calcium (Livalo) 2 MG tablet tablet, Take 1 tablet by mouth Daily. (Patient taking differently: Take 1 tablet by mouth Every Night.), Disp: 90 tablet, Rfl: 1  •  rOPINIRole (REQUIP) 4 MG tablet, Take 1 tablet by mouth Every Night., Disp: 90 tablet, Rfl: 1  •  SAXagliptin (ONGLYZA) 5 MG tablet, Take 1 tablet by mouth Daily., Disp: , Rfl:   •  Semaglutide,0.25 or 0.5MG/DOS, (Ozempic, 0.25 or 0.5 MG/DOSE,) 2 MG/1.5ML solution pen-injector, Inject 0.25 mg under the skin into the appropriate area as directed 1 (One) Time Per Week. (Patient taking differently: Inject 0.25 mg under the skin into the appropriate area as directed 1 (One) Time Per Week. TAKES ON MONDAY), Disp: 1.5 mL, Rfl: 0  •  sertraline (ZOLOFT) 100 MG tablet, Take 1.5 tablets by mouth Daily. (Patient taking differently: Take 1.5 tablets by mouth Every Night.), Disp: 135 tablet, Rfl: 1  •  sulfamethoxazole-trimethoprim (BACTRIM DS,SEPTRA DS) 800-160 MG per tablet, Take 1 tablet by mouth 2 (Two) Times a  Day., Disp: 20 tablet, Rfl: 0  •  Synthroid 88 MCG tablet, Take 1 tablet by mouth Daily., Disp: 90 tablet, Rfl: 1  •  triamcinolone (KENALOG) 0.5 % cream, Apply 1 application topically to the appropriate area as directed Daily., Disp: , Rfl:   •  Zinc 50 MG tablet, zinc 50 mg oral tablet take 1 tablet by oral route daily   Active, Disp: , Rfl:     Allergies   Allergen Reactions   • Etodolac Unknown - High Severity     HEAVINESS ON CHEST   • Penicillins Swelling   • Atorvastatin Myalgia   • Crestor [Rosuvastatin] Other (See Comments)     Legs cramps    • Pravastatin Myalgia        Family History   Problem Relation Age of Onset   • Thyroid disease Father 70   • Thyroid disease Sister    • Diabetes Sister    • Thyroid disease Brother 43        thyroid cancer   • Diabetes Brother    • Diabetes Maternal Grandmother        Social History     Socioeconomic History   • Marital status:    Tobacco Use   • Smoking status: Never   • Smokeless tobacco: Never   Vaping Use   • Vaping Use: Never used   Substance and Sexual Activity   • Alcohol use: Never   • Drug use: Never   • Sexual activity: Defer        Physical Exam  Constitutional:       General: She is not in acute distress.     Appearance: Normal appearance. She is well-developed and normal weight.   Pulmonary:      Effort: Pulmonary effort is normal.      Breath sounds: Normal air entry.   Abdominal:      General: There is no distension.      Palpations: Abdomen is soft.      Tenderness: There is no abdominal tenderness.        Post Surgical Incision  Surgical wound: Her abdominal incision seems to be healing relatively well. There is a small area on her abdominal incision; however, her left and right groin both appear like they are not healing appropriately.    Result Review :               Assessment and Plan    There are no diagnoses linked to this encounter.     1. Hidradenitis suppurativa.  - Plan for re-excision of her groins. Risks, benefits, alternatives of  the procedure were discussed extensively. All questions were answered. Patient voiced understanding and agreed to proceed with the above plan.        Follow Up   No follow-ups on file.  Patient was given instructions and counseling regarding her condition or for health maintenance advice. Please see specific information pulled into the AVS if appropriate.       Transcribed from ambient dictation for Donato You MD by Deb Hall.  03/16/23   12:06 EDT    Patient or patient representative verbalized consent to the visit recording.  I have personally performed the services described in this document as transcribed by the above individual, and it is both accurate and complete.

## 2023-03-16 NOTE — PRE-PROCEDURE INSTRUCTIONS
Patient instructed to have no food past midnight, clears up to 2 hours prior to arrival time. Patient instructed to wear no lotions, jewelry or piercing's day of surgery.  Patient to shower with surgical soap am of surgery.  Patient to take keflex, bactrim, mybetriq, zyrtec, synthroid am of surgery.

## 2023-03-16 NOTE — H&P (VIEW-ONLY)
Chief Complaint: Follow-up (POST-OP CONCERNS/REMOVE DRAINS)    Subjective         History of Present Illness  Mariela Aldrich is a 52 y.o. female presents to [facility] GENERAL SURGERY. The patient is to be seen for follow-up for hidradenitis.    The patient is to be seen for follow-up after excision of hidradenitis. She is accompanied by an adult male.    Status post excision of hidradenitis  The patient reports she went to the emergency room on 03/15/2023. A CT scan of the hidradenitis was obtained at the emergency room before they excised it. The patient reports she was given Keflex and Bactrim. Notes from the emergency room visit revealed postoperative changes involving the anterior pelvic wall and region of the superior gluteal gluteal fold with drains in place. No fluid collection in these regions. Superficial subcutaneous in the left groin which is partially may result, may be related to patient's history of hidradenitis, it may represent an abscess otherwise no acute findings were noted. The patient is taking probiotics, activia, and protein shakes.      Objective     Past Medical History:   Diagnosis Date   • Anxiety    • Asthma     PRN INHALER AND TAKES ALLERGY INJECTIONS   • Diabetes mellitus type 2, controlled ([facility])     AVERAGE    • Hidradenitis    • Hyperlipidemia    • Hypothyroidism    • Murmur     DIAGNOSED WHEN 17 BUT IS ASYMPTOMATIC AND IS FOLLOWED ONLY BY PCP   • Panic attack    • Pilonidal cyst    • Rheumatic fever     AS A CHILD   • RLS (restless legs syndrome)    • Sinus trouble        Past Surgical History:   Procedure Laterality Date   • COLONOSCOPY  01/21/2020    exernal hemorrhoids   • GROIN ABSCESS INCISION AND DRAINAGE Right     hidradenitis   • HYSTERECTOMY  2010   • KNEE SURGERY Bilateral 2013   • PILONIDAL CYSTECTOMY N/A 2/8/2023    Procedure: Excision Hidradenitis of Inguinal Area and Pilonidal Cystectomy;  Surgeon: Donato You MD;  Location: [facility]  MAIN OR;  Service: General;  Laterality: N/A;         Current Outpatient Medications:   •  adalimumab (Humira) 40 MG/0.8ML Prefilled Syringe Kit injection, 0.8 mL. REPORTS THAT STOPPED A COUPLE WEEKS AGO PER DR AGUSTIN, Disp: , Rfl:   •  albuterol sulfate  (90 Base) MCG/ACT inhaler, Inhale 2 puffs Every 4 (Four) Hours As Needed., Disp: , Rfl:   •  B-D ULTRAFINE III SHORT PEN 31G X 8 MM misc, USE AS DIRECTED ONCE DAILY WITH TRESIBA FLEXPEN., Disp: 100 each, Rfl: 0  •  cephalexin (KEFLEX) 500 MG capsule, Take 1 capsule by mouth 4 (Four) Times a Day for 7 days., Disp: 28 capsule, Rfl: 0  •  cetirizine (zyrTEC) 10 MG tablet, Zyrtec 10 mg oral tablet take 1 tablet (10 mg) by oral route once daily   Active, Disp: , Rfl:   •  Continuous Blood Gluc Sensor (Dexcom G6 Sensor), Every 10 (Ten) Days., Disp: 9 each, Rfl: 1  •  Continuous Blood Gluc Transmit (Dexcom G6 Transmitter) misc, 1 Device Every 3 (Three) Months., Disp: 1 each, Rfl: 3  •  docusate sodium (Colace) 100 MG capsule, Take 1 capsule by mouth 2 (Two) Times a Day As Needed for Constipation., Disp: 10 capsule, Rfl: 1  •  Empagliflozin-metFORMIN HCl ER (Synjardy XR) 12.5-1000 MG tablet sustained-release 24 hour, Take 1 tablet by mouth 2 (Two) Times a Day. (Patient taking differently: Take 1 tablet by mouth 2 (Two) Times a Day. INSTRUCTED PER PAGE), Disp: 180 tablet, Rfl: 1  •  Fluticasone-Salmeterol (ADVAIR/WIXELA) 250-50 MCG/ACT DISKUS, Inhale 1 puff 2 (Two) Times a Day., Disp: 60 each, Rfl: 2  •  glipizide (GLUCOTROL) 10 MG tablet, TAKE 1 TABLET BY MOUTH TWICE DAILY BEFORE MEAL(S), Disp: 180 tablet, Rfl: 1  •  glucose blood test strip, Use as instructed Dx: DM E11, Disp: 100 each, Rfl: 3  •  HYDROcodone-acetaminophen (Norco) 5-325 MG per tablet, Take 1 tablet by mouth Every 6 (Six) Hours As Needed for Moderate Pain., Disp: 20 tablet, Rfl: 0  •  Insulin Degludec (Tresiba FlexTouch) 200 UNIT/ML solution pen-injector pen injection, Inject 48 Units under the  skin into the appropriate area as directed Daily. (Patient taking differently: Inject 54 Units under the skin into the appropriate area as directed Every Night. INSTRUCTED PER ANESTHESIA PROTOCOL), Disp: 21 mL, Rfl: 1  •  lisinopril (PRINIVIL,ZESTRIL) 5 MG tablet, Take 1 tablet by mouth Daily. (Patient taking differently: Take 1 tablet by mouth Every Night.), Disp: 90 tablet, Rfl: 1  •  meloxicam (MOBIC) 7.5 MG tablet, Take 1 tablet by mouth Daily., Disp: , Rfl:   •  metFORMIN (GLUCOPHAGE) 500 MG tablet, Take 2 tablets by mouth., Disp: , Rfl:   •  Mirabegron ER (Myrbetriq) 50 MG tablet sustained-release 24 hour 24 hr tablet, Take 50 mg by mouth Daily., Disp: 30 tablet, Rfl: 5  •  mometasone-formoterol (DULERA 100) 100-5 MCG/ACT inhaler, Inhale 2 puffs., Disp: , Rfl:   •  multivitamin (THERAGRAN) tablet tablet, , Disp: , Rfl:   •  ONE TOUCH CLUB LANCETS misc, 90 each 3 (Three) Times a Day As Needed (check blood sugar)., Disp: 100 each, Rfl: 3  •  pitavastatin calcium (Livalo) 2 MG tablet tablet, Take 1 tablet by mouth Daily. (Patient taking differently: Take 1 tablet by mouth Every Night.), Disp: 90 tablet, Rfl: 1  •  rOPINIRole (REQUIP) 4 MG tablet, Take 1 tablet by mouth Every Night., Disp: 90 tablet, Rfl: 1  •  SAXagliptin (ONGLYZA) 5 MG tablet, Take 1 tablet by mouth Daily., Disp: , Rfl:   •  Semaglutide,0.25 or 0.5MG/DOS, (Ozempic, 0.25 or 0.5 MG/DOSE,) 2 MG/1.5ML solution pen-injector, Inject 0.25 mg under the skin into the appropriate area as directed 1 (One) Time Per Week. (Patient taking differently: Inject 0.25 mg under the skin into the appropriate area as directed 1 (One) Time Per Week. TAKES ON MONDAY), Disp: 1.5 mL, Rfl: 0  •  sertraline (ZOLOFT) 100 MG tablet, Take 1.5 tablets by mouth Daily. (Patient taking differently: Take 1.5 tablets by mouth Every Night.), Disp: 135 tablet, Rfl: 1  •  sulfamethoxazole-trimethoprim (BACTRIM DS,SEPTRA DS) 800-160 MG per tablet, Take 1 tablet by mouth 2 (Two) Times a  Day., Disp: 20 tablet, Rfl: 0  •  Synthroid 88 MCG tablet, Take 1 tablet by mouth Daily., Disp: 90 tablet, Rfl: 1  •  triamcinolone (KENALOG) 0.5 % cream, Apply 1 application topically to the appropriate area as directed Daily., Disp: , Rfl:   •  Zinc 50 MG tablet, zinc 50 mg oral tablet take 1 tablet by oral route daily   Active, Disp: , Rfl:     Allergies   Allergen Reactions   • Etodolac Unknown - High Severity     HEAVINESS ON CHEST   • Penicillins Swelling   • Atorvastatin Myalgia   • Crestor [Rosuvastatin] Other (See Comments)     Legs cramps    • Pravastatin Myalgia        Family History   Problem Relation Age of Onset   • Thyroid disease Father 70   • Thyroid disease Sister    • Diabetes Sister    • Thyroid disease Brother 43        thyroid cancer   • Diabetes Brother    • Diabetes Maternal Grandmother        Social History     Socioeconomic History   • Marital status:    Tobacco Use   • Smoking status: Never   • Smokeless tobacco: Never   Vaping Use   • Vaping Use: Never used   Substance and Sexual Activity   • Alcohol use: Never   • Drug use: Never   • Sexual activity: Defer        Physical Exam  Constitutional:       General: She is not in acute distress.     Appearance: Normal appearance. She is well-developed and normal weight.   Pulmonary:      Effort: Pulmonary effort is normal.      Breath sounds: Normal air entry.   Abdominal:      General: There is no distension.      Palpations: Abdomen is soft.      Tenderness: There is no abdominal tenderness.        Post Surgical Incision  Surgical wound: Her abdominal incision seems to be healing relatively well. There is a small area on her abdominal incision; however, her left and right groin both appear like they are not healing appropriately.    Result Review :               Assessment and Plan    There are no diagnoses linked to this encounter.     1. Hidradenitis suppurativa.  - Plan for re-excision of her groins. Risks, benefits, alternatives of  the procedure were discussed extensively. All questions were answered. Patient voiced understanding and agreed to proceed with the above plan.        Follow Up   No follow-ups on file.  Patient was given instructions and counseling regarding her condition or for health maintenance advice. Please see specific information pulled into the AVS if appropriate.       Transcribed from ambient dictation for Donato You MD by Deb Hall.  03/16/23   12:06 EDT    Patient or patient representative verbalized consent to the visit recording.  I have personally performed the services described in this document as transcribed by the above individual, and it is both accurate and complete.

## 2023-03-17 ENCOUNTER — HOSPITAL ENCOUNTER (OUTPATIENT)
Dept: INFUSION THERAPY | Facility: HOSPITAL | Age: 53
Discharge: HOME | End: 2023-03-17
Admitting: NURSE PRACTITIONER
Payer: COMMERCIAL

## 2023-03-17 VITALS
TEMPERATURE: 97.8 F | OXYGEN SATURATION: 98 % | SYSTOLIC BLOOD PRESSURE: 129 MMHG | RESPIRATION RATE: 20 BRPM | DIASTOLIC BLOOD PRESSURE: 73 MMHG | HEART RATE: 110 BPM

## 2023-03-17 DIAGNOSIS — L73.2 HIDRADENITIS: Primary | ICD-10-CM

## 2023-03-17 PROCEDURE — 97605 NEG PRS WND THER DME<=50SQCM: CPT

## 2023-03-17 NOTE — SIGNIFICANT NOTE
Wound Eval / Progress Noted    [facility]     Patient Name: Mariela Aldrich  : 1970  MRN: [id number]  Today's Date: 3/17/2023                 Admit Date: 3/17/2023    Visit Dx:    ICD-10-CM ICD-9-CM   1. Hidradenitis  L73.2 705.83       Patient Active Problem List   Diagnosis   • Anxiety   • Type 2 diabetes mellitus with hyperglycemia, with long-term current use of insulin ([facility])   • Hyperlipidemia   • Hypothyroidism   • Panic attack   • RLS (restless legs syndrome)   • Sinus trouble   • OAB (overactive bladder)   • Mild intermittent asthma without complication   • Hidradenitis        Past Medical History:   Diagnosis Date   • Anxiety    • Asthma     PRN INHALER AND TAKES ALLERGY INJECTIONS   • Diabetes mellitus type 2, controlled ([facility])     AVERAGE    • Hidradenitis    • Hyperlipidemia    • Hypothyroidism    • Murmur     DIAGNOSED WHEN 17 BUT IS ASYMPTOMATIC AND IS FOLLOWED ONLY BY PCP   • Panic attack    • Pilonidal cyst    • Rheumatic fever     AS A CHILD   • RLS (restless legs syndrome)    • Sinus trouble         Past Surgical History:   Procedure Laterality Date   • COLONOSCOPY  2020    exernal hemorrhoids   • GROIN ABSCESS INCISION AND DRAINAGE Right     hidradenitis   • HYSTERECTOMY     • KNEE SURGERY Bilateral    • PILONIDAL CYSTECTOMY N/A 2023    Procedure: Excision Hidradenitis of Inguinal Area and Pilonidal Cystectomy;  Surgeon: Donato You MD;  Location: [facility];  Service: General;  Laterality: N/A;         Physical Assessment:  Wound 23 1333 medial gluteal Incision (Active)   Wound Image   23 1125   Dressing Appearance intact;dry 23 1125   Closure None 23 1125   Base moist;red;granulating;bleeding 23 1125   Red (%), Wound Tissue Color 100 23 1125   Periwound redness;moist;intact 23 1125   Periwound Temperature warm 23 1125   Periwound Skin Turgor soft 23 112   Edges open 23 1125   Drainage  Characteristics/Odor serosanguineous;bleeding controlled 03/17/23 1125   Drainage Amount moderate 03/17/23 1125   Care, Wound cleansed with;irrigated with;sterile normal saline;negative pressure wound therapy 03/17/23 1125   Dressing Care dressing applied;foam;transparent film;skin barrier agent applied;silver impregnated;hydrofiber;hydrocolloid 03/17/23 1125   Periwound Care absorptive dressing applied 03/17/23 1125       Wound 02/08/23 1424 Left lower abdomen Incision (Active)   Dressing Appearance moist drainage;intact 03/17/23 1125   Closure None 03/17/23 1125   Base red;dry;moist;pink 03/17/23 1125   Periwound intact;dry;redness 03/17/23 1125   Periwound Temperature warm 03/17/23 1125   Periwound Skin Turgor soft 03/17/23 1125   Edges open;rolled/closed 03/17/23 1125   Drainage Characteristics/Odor serosanguineous 03/17/23 1125   Drainage Amount small 03/17/23 1125   Care, Wound cleansed with;irrigated with;sterile normal saline 03/17/23 1125   Dressing Care dressing applied;silver impregnated;hydrofiber;silicone;border dressing 03/17/23 1125   Periwound Care absorptive dressing applied 03/17/23 1125       Wound 02/08/23 1425 Right groin (Active)   Wound Image   03/17/23 1125   Dressing Appearance moist drainage;intact 03/17/23 1125   Closure None 03/17/23 1125   Base moist;red;granulating 03/17/23 1125   Red (%), Wound Tissue Color 100 03/17/23 1125   Periwound intact;redness;dry 03/17/23 1125   Periwound Temperature warm 03/17/23 1125   Periwound Skin Turgor soft 03/17/23 1125   Edges open 03/17/23 1125   Drainage Characteristics/Odor serosanguineous 03/17/23 1125   Drainage Amount small 03/17/23 1125   Care, Wound cleansed with;irrigated with;sterile normal saline 03/17/23 1125   Dressing Care dressing applied;packed with;gauze, iodoform;packing strip;silicone;border dressing 03/17/23 1125   Periwound Care absorptive dressing applied 03/17/23 1125       Wound 03/15/23 Left anterior groin Incision (Active)    Wound Image   03/17/23 1125   Dressing Appearance intact;moist drainage 03/17/23 1125   Closure None 03/17/23 1125   Base red;moist 03/17/23 1125   Red (%), Wound Tissue Color 100 03/17/23 1125   Periwound dry;intact;redness;blanchable 03/17/23 1125   Periwound Temperature warm 03/17/23 1125   Periwound Skin Turgor soft 03/17/23 1125   Edges open 03/17/23 1125   Drainage Characteristics/Odor serosanguineous 03/17/23 1125   Drainage Amount small 03/17/23 1125   Care, Wound cleansed with;irrigated with;sterile normal saline 03/17/23 1125   Dressing Care dressing applied;packed with;gauze, iodoform;packing strip;silicone;border dressing 03/17/23 1125   Periwound Care absorptive dressing applied 03/17/23 1125       Wound 03/17/23 1445 medial coccyx Incision (Active)   Wound Image   03/17/23 1125   Dressing Appearance moist drainage;intact 03/17/23 1125   Closure None 03/17/23 1125   Base red;moist 03/17/23 1125   Red (%), Wound Tissue Color 100 03/17/23 1125   Periwound intact;moist;redness 03/17/23 1125   Periwound Temperature warm 03/17/23 1125   Periwound Skin Turgor soft 03/17/23 1125   Edges open 03/17/23 1125   Wound Length (cm) 1 cm 03/17/23 1125   Wound Width (cm) 0.3 cm 03/17/23 1125   Wound Depth (cm) 4.9 cm 03/17/23 1125   Wound Surface Area (cm^2) 0.3 cm^2 03/17/23 1125   Wound Volume (cm^3) 1.47 cm^3 03/17/23 1125   Drainage Characteristics/Odor serosanguineous 03/17/23 1125   Drainage Amount small 03/17/23 1125   Care, Wound cleansed with;irrigated with;sterile normal saline;negative pressure wound therapy 03/17/23 1125   Dressing Care dressing applied;foam;skin barrier agent applied;transparent film 03/17/23 1125   Periwound Care absorptive dressing applied;barrier film applied 03/17/23 9982        Wound Check / Follow-up:  Patient seen today for a wound check and wound vac dressing change. Patient reports that she is going for surgery on Monday for a re-excision of the bilateral groin wounds per  surgeons notes.     Wound vac foam compressed and no alarms noted. Dressing removed with adhesive remover spray. Surgeon gave this nurse the order to remove the penrose drain from the sacral region. 2 sutures removed and one penrose drain removed with no difficulties. Sacral wound bed is red and moist; loni-rectal wound with red moist granular tissue; moderate amount of bleeding noted. Periwound red moist irritated tissue r/t drainage. Cleansed and irrigated the sacral wound and loni-rectal wound with copious NS. Applied marathon to periwound and then applied silver impregnated hydrofiber. Filled wound beds with 1 black foam and secured with transparent drape. Applied a third black foam as a bridge. The fourth foam was applied to the right hip as a bridge. Wound vac connected, foam compressed.   Bilateral groin wounds with red moist tissue to wound beds; periwound's reddened and moist. Cleansed and irrigated with NS. Filled wound beds with iodoform packing strip. Recommend to continue current wound care at this time.   Abdominal incision with intermittent dehiscence. Open wounds with red moist tissue. Cleansed with NS. Applied silver impregnated hydrofiber to the incision and secured with an ABD pad and paper tape. Recommend to continue current care at this time.     Impression: surgical incisions with secondary closure to coccyx, surgical incision to bilateral groin and abdomin    Short term goals: regain skin integrity, NPWT on MWF, daily dressing changes    No Henriquez RN    3/17/2023    14:49 EDT

## 2023-03-19 LAB
BACTERIA SPEC AEROBE CULT: NORMAL
BACTERIA SPEC AEROBE CULT: NORMAL

## 2023-03-20 ENCOUNTER — HOSPITAL ENCOUNTER (OUTPATIENT)
Facility: HOSPITAL | Age: 53
Setting detail: HOSPITAL OUTPATIENT SURGERY
Discharge: HOME | End: 2023-03-20
Attending: SURGERY | Admitting: SURGERY
Payer: COMMERCIAL

## 2023-03-20 ENCOUNTER — APPOINTMENT (OUTPATIENT)
Dept: INFUSION THERAPY | Facility: HOSPITAL | Age: 53
End: 2023-03-20
Payer: COMMERCIAL

## 2023-03-20 ENCOUNTER — ANESTHESIA (OUTPATIENT)
Dept: PERIOP | Facility: HOSPITAL | Age: 53
End: 2023-03-20
Payer: COMMERCIAL

## 2023-03-20 ENCOUNTER — ANESTHESIA EVENT (OUTPATIENT)
Dept: PERIOP | Facility: HOSPITAL | Age: 53
End: 2023-03-20
Payer: COMMERCIAL

## 2023-03-20 VITALS
TEMPERATURE: 96.3 F | SYSTOLIC BLOOD PRESSURE: 118 MMHG | RESPIRATION RATE: 18 BRPM | BODY MASS INDEX: 33.31 KG/M2 | HEART RATE: 97 BPM | OXYGEN SATURATION: 98 % | WEIGHT: 195.11 LBS | HEIGHT: 64 IN | DIASTOLIC BLOOD PRESSURE: 61 MMHG

## 2023-03-20 DIAGNOSIS — L73.2 HIDRADENITIS: ICD-10-CM

## 2023-03-20 LAB
GLUCOSE BLDC GLUCOMTR-MCNC: 108 MG/DL (ref 70–130)
GLUCOSE BLDC GLUCOMTR-MCNC: 158 MG/DL (ref 70–130)

## 2023-03-20 PROCEDURE — 97605 NEG PRS WND THER DME<=50SQCM: CPT

## 2023-03-20 PROCEDURE — 25010000002 PROPOFOL 10 MG/ML EMULSION: Performed by: NURSE ANESTHETIST, CERTIFIED REGISTERED

## 2023-03-20 PROCEDURE — 25010000002 MIDAZOLAM PER 1MG: Performed by: ANESTHESIOLOGY

## 2023-03-20 PROCEDURE — 11462 EXC SKN HDRDNT ING SMPL/NTRM: CPT | Performed by: SURGERY

## 2023-03-20 PROCEDURE — 25010000002 HYDROMORPHONE 1 MG/ML SOLUTION: Performed by: NURSE ANESTHETIST, CERTIFIED REGISTERED

## 2023-03-20 PROCEDURE — 25010000002 DEXAMETHASONE PER 1 MG: Performed by: NURSE ANESTHETIST, CERTIFIED REGISTERED

## 2023-03-20 PROCEDURE — 25010000002 FENTANYL CITRATE (PF) 50 MCG/ML SOLUTION: Performed by: NURSE ANESTHETIST, CERTIFIED REGISTERED

## 2023-03-20 PROCEDURE — 88304 TISSUE EXAM BY PATHOLOGIST: CPT | Performed by: SURGERY

## 2023-03-20 PROCEDURE — 0 LIDOCAINE 1 % SOLUTION 10 ML VIAL: Performed by: SURGERY

## 2023-03-20 PROCEDURE — 25010000002 ONDANSETRON PER 1 MG: Performed by: NURSE ANESTHETIST, CERTIFIED REGISTERED

## 2023-03-20 PROCEDURE — 11462 EXC SKN HDRDNT ING SMPL/NTRM: CPT | Performed by: SPECIALIST/TECHNOLOGIST, OTHER

## 2023-03-20 RX ORDER — DOCUSATE SODIUM 100 MG/1
100 CAPSULE, LIQUID FILLED ORAL DAILY PRN
Qty: 30 CAPSULE | Refills: 1 | Status: SHIPPED | OUTPATIENT
Start: 2023-03-20 | End: 2024-03-19

## 2023-03-20 RX ORDER — HYDROCODONE BITARTRATE AND ACETAMINOPHEN 5; 325 MG/1; MG/1
1-2 TABLET ORAL EVERY 4 HOURS PRN
Qty: 20 TABLET | Refills: 0 | Status: SHIPPED | OUTPATIENT
Start: 2023-03-20

## 2023-03-20 RX ORDER — ROCURONIUM BROMIDE 10 MG/ML
INJECTION, SOLUTION INTRAVENOUS AS NEEDED
Status: DISCONTINUED | OUTPATIENT
Start: 2023-03-20 | End: 2023-03-20 | Stop reason: SURG

## 2023-03-20 RX ORDER — MAGNESIUM HYDROXIDE 1200 MG/15ML
LIQUID ORAL AS NEEDED
Status: DISCONTINUED | OUTPATIENT
Start: 2023-03-20 | End: 2023-03-20 | Stop reason: HOSPADM

## 2023-03-20 RX ORDER — ACETAMINOPHEN 500 MG
1000 TABLET ORAL ONCE
Status: COMPLETED | OUTPATIENT
Start: 2023-03-20 | End: 2023-03-20

## 2023-03-20 RX ORDER — CLINDAMYCIN PHOSPHATE 900 MG/50ML
900 INJECTION INTRAVENOUS ONCE
Status: COMPLETED | OUTPATIENT
Start: 2023-03-20 | End: 2023-03-20

## 2023-03-20 RX ORDER — SODIUM CHLORIDE 9 MG/ML
40 INJECTION, SOLUTION INTRAVENOUS AS NEEDED
Status: DISCONTINUED | OUTPATIENT
Start: 2023-03-20 | End: 2023-03-20 | Stop reason: HOSPADM

## 2023-03-20 RX ORDER — HYDROCODONE BITARTRATE AND ACETAMINOPHEN 5; 325 MG/1; MG/1
1 TABLET ORAL ONCE AS NEEDED
Status: DISCONTINUED | OUTPATIENT
Start: 2023-03-20 | End: 2023-03-20 | Stop reason: HOSPADM

## 2023-03-20 RX ORDER — FENTANYL CITRATE 50 UG/ML
INJECTION, SOLUTION INTRAMUSCULAR; INTRAVENOUS AS NEEDED
Status: DISCONTINUED | OUTPATIENT
Start: 2023-03-20 | End: 2023-03-20 | Stop reason: SURG

## 2023-03-20 RX ORDER — LIDOCAINE HYDROCHLORIDE 20 MG/ML
INJECTION, SOLUTION EPIDURAL; INFILTRATION; INTRACAUDAL; PERINEURAL AS NEEDED
Status: DISCONTINUED | OUTPATIENT
Start: 2023-03-20 | End: 2023-03-20 | Stop reason: SURG

## 2023-03-20 RX ORDER — MEPERIDINE HYDROCHLORIDE 25 MG/ML
12.5 INJECTION INTRAMUSCULAR; INTRAVENOUS; SUBCUTANEOUS
Status: DISCONTINUED | OUTPATIENT
Start: 2023-03-20 | End: 2023-03-20 | Stop reason: HOSPADM

## 2023-03-20 RX ORDER — PROPOFOL 10 MG/ML
VIAL (ML) INTRAVENOUS AS NEEDED
Status: DISCONTINUED | OUTPATIENT
Start: 2023-03-20 | End: 2023-03-20 | Stop reason: SURG

## 2023-03-20 RX ORDER — PROMETHAZINE HYDROCHLORIDE 12.5 MG/1
25 TABLET ORAL ONCE AS NEEDED
Status: DISCONTINUED | OUTPATIENT
Start: 2023-03-20 | End: 2023-03-20 | Stop reason: HOSPADM

## 2023-03-20 RX ORDER — PROMETHAZINE HYDROCHLORIDE 25 MG/1
25 SUPPOSITORY RECTAL ONCE AS NEEDED
Status: DISCONTINUED | OUTPATIENT
Start: 2023-03-20 | End: 2023-03-20 | Stop reason: HOSPADM

## 2023-03-20 RX ORDER — SODIUM CHLORIDE, SODIUM LACTATE, POTASSIUM CHLORIDE, CALCIUM CHLORIDE 600; 310; 30; 20 MG/100ML; MG/100ML; MG/100ML; MG/100ML
9 INJECTION, SOLUTION INTRAVENOUS CONTINUOUS PRN
Status: DISCONTINUED | OUTPATIENT
Start: 2023-03-20 | End: 2023-03-20 | Stop reason: HOSPADM

## 2023-03-20 RX ORDER — OXYCODONE HYDROCHLORIDE 5 MG/1
5 TABLET ORAL
Status: DISCONTINUED | OUTPATIENT
Start: 2023-03-20 | End: 2023-03-20 | Stop reason: HOSPADM

## 2023-03-20 RX ORDER — SODIUM CHLORIDE 0.9 % (FLUSH) 0.9 %
10 SYRINGE (ML) INJECTION EVERY 12 HOURS SCHEDULED
Status: DISCONTINUED | OUTPATIENT
Start: 2023-03-20 | End: 2023-03-20 | Stop reason: HOSPADM

## 2023-03-20 RX ORDER — SODIUM CHLORIDE 0.9 % (FLUSH) 0.9 %
10 SYRINGE (ML) INJECTION AS NEEDED
Status: DISCONTINUED | OUTPATIENT
Start: 2023-03-20 | End: 2023-03-20 | Stop reason: HOSPADM

## 2023-03-20 RX ORDER — DEXAMETHASONE SODIUM PHOSPHATE 4 MG/ML
INJECTION, SOLUTION INTRA-ARTICULAR; INTRALESIONAL; INTRAMUSCULAR; INTRAVENOUS; SOFT TISSUE AS NEEDED
Status: DISCONTINUED | OUTPATIENT
Start: 2023-03-20 | End: 2023-03-20 | Stop reason: SURG

## 2023-03-20 RX ORDER — ONDANSETRON 2 MG/ML
INJECTION INTRAMUSCULAR; INTRAVENOUS AS NEEDED
Status: DISCONTINUED | OUTPATIENT
Start: 2023-03-20 | End: 2023-03-20 | Stop reason: SURG

## 2023-03-20 RX ORDER — MIDAZOLAM HYDROCHLORIDE 2 MG/2ML
2 INJECTION, SOLUTION INTRAMUSCULAR; INTRAVENOUS ONCE
Status: COMPLETED | OUTPATIENT
Start: 2023-03-20 | End: 2023-03-20

## 2023-03-20 RX ORDER — ONDANSETRON 2 MG/ML
4 INJECTION INTRAMUSCULAR; INTRAVENOUS ONCE AS NEEDED
Status: DISCONTINUED | OUTPATIENT
Start: 2023-03-20 | End: 2023-03-20 | Stop reason: HOSPADM

## 2023-03-20 RX ADMIN — CLINDAMYCIN IN 5 PERCENT DEXTROSE 900 MG: 18 INJECTION, SOLUTION INTRAVENOUS at 13:58

## 2023-03-20 RX ADMIN — MIDAZOLAM HYDROCHLORIDE 2 MG: 1 INJECTION, SOLUTION INTRAMUSCULAR; INTRAVENOUS at 13:46

## 2023-03-20 RX ADMIN — FENTANYL CITRATE 50 MCG: 50 INJECTION, SOLUTION INTRAMUSCULAR; INTRAVENOUS at 14:10

## 2023-03-20 RX ADMIN — OXYCODONE HYDROCHLORIDE 5 MG: 5 TABLET ORAL at 15:15

## 2023-03-20 RX ADMIN — SODIUM CHLORIDE, POTASSIUM CHLORIDE, SODIUM LACTATE AND CALCIUM CHLORIDE 9 ML/HR: 600; 310; 30; 20 INJECTION, SOLUTION INTRAVENOUS at 12:26

## 2023-03-20 RX ADMIN — SUGAMMADEX 200 MG: 100 INJECTION, SOLUTION INTRAVENOUS at 14:43

## 2023-03-20 RX ADMIN — FENTANYL CITRATE 50 MCG: 50 INJECTION, SOLUTION INTRAMUSCULAR; INTRAVENOUS at 14:04

## 2023-03-20 RX ADMIN — ROCURONIUM BROMIDE 50 MG: 10 INJECTION, SOLUTION INTRAVENOUS at 14:04

## 2023-03-20 RX ADMIN — ACETAMINOPHEN 1000 MG: 500 TABLET ORAL at 12:26

## 2023-03-20 RX ADMIN — HYDROMORPHONE HYDROCHLORIDE 0.5 MG: 1 INJECTION, SOLUTION INTRAMUSCULAR; INTRAVENOUS; SUBCUTANEOUS at 15:16

## 2023-03-20 RX ADMIN — PROPOFOL 180 MG: 10 INJECTION, EMULSION INTRAVENOUS at 14:04

## 2023-03-20 RX ADMIN — LIDOCAINE HYDROCHLORIDE 100 MG: 20 INJECTION, SOLUTION EPIDURAL; INFILTRATION; INTRACAUDAL; PERINEURAL at 14:04

## 2023-03-20 RX ADMIN — DEXAMETHASONE SODIUM PHOSPHATE 4 MG: 4 INJECTION, SOLUTION INTRA-ARTICULAR; INTRALESIONAL; INTRAMUSCULAR; INTRAVENOUS; SOFT TISSUE at 13:59

## 2023-03-20 RX ADMIN — ONDANSETRON 4 MG: 2 INJECTION INTRAMUSCULAR; INTRAVENOUS at 13:59

## 2023-03-20 RX ADMIN — SODIUM CHLORIDE, POTASSIUM CHLORIDE, SODIUM LACTATE AND CALCIUM CHLORIDE: 600; 310; 30; 20 INJECTION, SOLUTION INTRAVENOUS at 14:49

## 2023-03-20 RX ADMIN — HYDROMORPHONE HYDROCHLORIDE 0.5 MG: 1 INJECTION, SOLUTION INTRAMUSCULAR; INTRAVENOUS; SUBCUTANEOUS at 15:24

## 2023-03-20 NOTE — DISCHARGE INSTR - APPOINTMENTS
PLEASE CALL DR AGUSTIN'S OFFICE TOMORROW MORNING TO SCHEDULE A TWO WEEK FOLLOW-UP APPOINTMENT FROM THE DAY OF SURGERY.    DR AGUSTIN'S OFFICE [phone]

## 2023-03-20 NOTE — ANESTHESIA POSTPROCEDURE EVALUATION
Patient: Mariela Aldrich    Procedure Summary     Date: 03/20/23 Room / Location: [facility] OR 08 / [facility] MAIN OR    Anesthesia Start: 1358 Anesthesia Stop: 1452    Procedure: Excision Hidradenitis of Bilateral Inguinal Areas (Bilateral: Groin) Diagnosis:       Hidradenitis      (Hidradenitis [L73.2])    Surgeons: Donato You MD Provider: Brittni Lin MD    Anesthesia Type: general ASA Status: 3          Anesthesia Type: general    Vitals  Vitals Value Taken Time   /73 03/20/23 1540   Temp 36.7 °C (98 °F) 03/20/23 1500   Pulse 93 03/20/23 1543   Resp 12 03/20/23 1525   SpO2 93 % 03/20/23 1543   Vitals shown include unvalidated device data.        Post Anesthesia Care and Evaluation    Patient location during evaluation: bedside  Patient participation: complete - patient participated  Level of consciousness: awake  Pain management: adequate    Airway patency: patent  Anesthetic complications: No anesthetic complications  PONV Status: none  Cardiovascular status: acceptable and stable  Respiratory status: acceptable  Hydration status: acceptable    Comments: An Anesthesiologist personally participated in the most demanding procedures (including induction and emergence if applicable) in the anesthesia plan, monitored the course of anesthesia administration at frequent intervals and remained physically present and available for immediate diagnosis and treatment of emergencies.

## 2023-03-20 NOTE — DISCHARGE INSTRUCTIONS
DISCHARGE INSTRUCTIONS  SURGICAL / AMBULATORY  PROCEDURES      For your surgery you had:  General anesthesia (you may have a sore throat for the first 24 hours)  You have received an anesthesia medication today that can cause hormonal forms of birth control to be ineffective. You should use a different form of birth control (to prevent pregnancy) for 7 days.     You may experience dizziness, drowsiness, or light-headedness for several hours following surgery/procedure.  Do not stay alone today or tonight.  Limit your activity for 24 hours.  Resume your diet slowly.  Follow whatever special dietary instructions you may have been given by your doctor.  You should not drive or operate machinery, drink alcohol, or sign legally binding documents for 24 hours or while you are taking pain medication.    Last dose of pain medication was given at:    OXYIR 5MG AT 3:15 PM  TYLENOL 1000 MG 12:25 PM .    NOTIFY YOUR DOCTOR IF YOU EXPERIENCE ANY OF THE FOLLOWING:  Temperature greater than 101 degrees Fahrenheit  Shaking Chills  Redness or excessive drainage from incision  Nausea, vomiting and/or pain that is not controlled by prescribed medications  Increase in bleeding or bleeding that is excessive  Unable to urinate in 6 hours after surgery  If unable to reach your doctor, please go to the closest Emergency Room  You may begin dressing changes:     [] in 24 hours   [] in 48 hours   [x] Other: Change dressings with dry                                dressings as often as needed   You may remove Band-Aid/dressing AS INSTRUCTED  You may shower or bathe AS INSTRUCTED  .  Apply an ice pack 24-48 hours.  Medications per physician instructions as indicated on Discharge Medication Information Sheet.        SPECIAL INSTRUCTIONS:

## 2023-03-20 NOTE — SIGNIFICANT NOTE
Wound Eval / Progress Noted    [facility]     Patient Name: Mariela Aldrich  : 1970  MRN: [id number]  Today's Date: 3/20/2023                 Admit Date: 3/20/2023    Visit Dx:    ICD-10-CM ICD-9-CM   1. Hidradenitis  L73.2 705.83       Patient Active Problem List   Diagnosis   • Anxiety   • Type 2 diabetes mellitus with hyperglycemia, with long-term current use of insulin ([facility])   • Hyperlipidemia   • Hypothyroidism   • Panic attack   • RLS (restless legs syndrome)   • Sinus trouble   • OAB (overactive bladder)   • Mild intermittent asthma without complication   • Hidradenitis        Past Medical History:   Diagnosis Date   • Anxiety    • Asthma     PRN INHALER AND TAKES ALLERGY INJECTIONS   • Diabetes mellitus type 2, controlled ([facility])     AVERAGE    • Hidradenitis    • Hyperlipidemia    • Hypothyroidism    • Murmur     DIAGNOSED WHEN 17 BUT IS ASYMPTOMATIC AND IS FOLLOWED ONLY BY PCP   • Panic attack    • Pilonidal cyst    • Rheumatic fever     AS A CHILD   • RLS (restless legs syndrome)    • Sinus trouble         Past Surgical History:   Procedure Laterality Date   • COLONOSCOPY  2020    exernal hemorrhoids   • GROIN ABSCESS INCISION AND DRAINAGE Right     hidradenitis   • HYSTERECTOMY     • KNEE SURGERY Bilateral    • PILONIDAL CYSTECTOMY N/A 2023    Procedure: Excision Hidradenitis of Inguinal Area and Pilonidal Cystectomy;  Surgeon: Donato You MD;  Location: [facility];  Service: General;  Laterality: N/A;         Physical Assessment:  Wound 23 1333 medial gluteal Incision (Active)   Dressing Appearance moist drainage;intact 23 1700   Closure None 23 1700   Base moist;red;granulating 23 170   Periwound moist;pink;redness 23 1700   Periwound Temperature warm 23 1700   Periwound Skin Turgor soft 23 1700   Edges open 23 1700   Wound Length (cm) 4.6 cm 23 1700   Wound Width (cm) 2.2 cm 23 1700   Wound Depth  (cm) 3.6 cm 03/20/23 1700   Wound Surface Area (cm^2) 10.12 cm^2 03/20/23 1700   Wound Volume (cm^3) 36.432 cm^3 03/20/23 1700   Drainage Characteristics/Odor serosanguineous;sanguineous 03/20/23 1700   Drainage Amount small 03/20/23 1700   Care, Wound cleansed with;irrigated with;sterile normal saline;negative pressure wound therapy 03/20/23 1700   Dressing Care dressing applied;gauze;transparent film;hydrofiber;silver impregnated;hydrocolloid 03/20/23 1700   Periwound Care absorptive dressing applied;barrier film applied 03/20/23 1700       Wound 03/20/23 1425 anterior groin Incision (Active)   Dressing Appearance dry;intact;no drainage 03/20/23 1610   Closure Adhesive bandage 03/20/23 1610       NPWT (Negative Pressure Wound Therapy) 03/17/23 1513 coccyx (Active)   Therapy Setting continuous therapy 03/20/23 1700   Dressing foam, black 03/20/23 1700   Pressure Setting 125 mmHg 03/20/23 1700   Sponges Inserted 3 03/20/23 1700   Sponges Removed 3 03/20/23 1700   Finger sweep complete Yes 03/20/23 1700      Wound Check / Follow-up:  Patient seen today in outpatient surgery today for wound follow-up and wound VAC dressing change. Wound VAC foam compressed, functioning without alarms. Patient and spouse state there were issues with leaking over the weekend and spouse repaired with hydrocolloid dressing. Dressing removed. Cleansed and irrigated perirectal wound and sacral wound (previous drain site) with normal saline and gauze. Periwound tissue less reddened than previously but continued to apply silver impregnated hydrofiber. Filled wound beds with one piece of black foam each. Applied a third piece of black foam as a bridge and for track pad. Secured all with transparent drape after preparing skin with skin barrier wipe. Applied strips of hydrocolloid dressing to distal portion of dressing to aid in securement/seal. VAC attached, seal obtained, no signs of leaking. Recommending to continue current treatment. Patient  status post excision of hidradenitis of bilateral inguinal areas today with general surgeon. Surgical dressing to abdomen/groin is clean, dry, and intact. Instructed patient and spouse to follow post-operative wound care instructions given per surgeon. They verbalized understanding.  Patient to return to outpatient nursing services for dressing changes on Wednesday.      Impression: Surgical incisions with delayed closure.      Short term goals:  Regain skin integrity, negative pressure wound therapy, 3x per week dressing changes, skin protection.      Lanette Cantu RN    3/20/2023    17:12 EDT

## 2023-03-20 NOTE — ANESTHESIA PREPROCEDURE EVALUATION
Anesthesia Evaluation     Patient summary reviewed and Nursing notes reviewed   no history of anesthetic complications:  NPO Solid Status: > 8 hours  NPO Liquid Status: > 2 hours           Airway   Mallampati: II  TM distance: >3 FB  Neck ROM: full  No difficulty expected  Dental      Pulmonary - normal exam    breath sounds clear to auscultation  (+) asthma,  Cardiovascular - normal exam  Exercise tolerance: good (4-7 METS)    Rhythm: regular  Rate: normal    (+) valvular problems/murmurs murmur,       Neuro/Psych- negative ROS  GI/Hepatic/Renal/Endo    (+)   diabetes mellitus, thyroid problem     Musculoskeletal (-) negative ROS    Abdominal    Substance History - negative use     OB/GYN negative ob/gyn ROS         Other - negative ROS       ROS/Med Hx Other: PAT Nursing Notes unavailable.                   Anesthesia Plan    ASA 3     general       Anesthetic plan, risks, benefits, and alternatives have been provided, discussed and informed consent has been obtained with: patient.        CODE STATUS:

## 2023-03-20 NOTE — OP NOTE
Preoperative diagnosis: Hidradenitis    Postoperative diagnosis: Same    Procedure: Excision bilateral groin hidradenitis    Surgeon: Avelino    Anesthesia: General    Assistant: Irma Spangler    EBL: 11    Specimens: Right groin hidradenitis, left groin hidradenitis    Complications: None    Indications: 52-year-old female who is previous undergone multiple excisions for hidradenitis has recurrence and presents today for elective excision    PROCEDURE    Patient was seen in the preoperative holding area.  Risks, benefits, alternatives of the operation were again discussed in detail.  All of the patient and family's questions were answered.  They voiced understanding, and agreed to proceed.    Patient was brought to the operating room.  Monitoring devices and Pepe stockings were placed.  Anesthesia was administered.  Patient was prepped and draped in standard surgical fashion.  After prepping and draping a timeout was performed to verify both the correct patient and correct procedure.    We began by injecting local anesthesia around the areas of the left and right groin hidradenitis.  Elliptical incisions were made to encompass the areas of hidradenitis both on the left and the right.  We amputated these areas with cautery.  We copiously irrigated the wound beds.  The wounds were then closed with multiple interrupted 3-0 nylon sutures over Penrose drains.    I also fulgurated some areas of hidradenitis along the abdominal wall along her previous incision sites.    All wounds were dressed with Adaptic 4 x 4's and tape as well as ABD pads.    The patient was then aroused from anesthesia, and taken off the OR table, and taken to PACU in stable condition.  Sponge, needle, and instrument counts were correct x2.  Irma Spangler was present for the entire procedure and helped in all portions of the case.    Assistant: Irma Spangler CSA was responsible for performing the following activities: Retraction, Suction, Irrigation,  Suturing and Closing and their skilled assistance was necessary for the success of this case.      The operative note was dictated with the help of the dragon dictation system.

## 2023-03-22 ENCOUNTER — HOSPITAL ENCOUNTER (OUTPATIENT)
Dept: INFUSION THERAPY | Facility: HOSPITAL | Age: 53
Discharge: HOME | End: 2023-03-22
Payer: COMMERCIAL

## 2023-03-22 VITALS
DIASTOLIC BLOOD PRESSURE: 65 MMHG | SYSTOLIC BLOOD PRESSURE: 109 MMHG | HEART RATE: 97 BPM | OXYGEN SATURATION: 100 % | TEMPERATURE: 97.9 F | RESPIRATION RATE: 18 BRPM

## 2023-03-22 DIAGNOSIS — L73.2 HIDRADENITIS: Primary | ICD-10-CM

## 2023-03-22 PROCEDURE — 97605 NEG PRS WND THER DME<=50SQCM: CPT

## 2023-03-22 NOTE — SIGNIFICANT NOTE
Wound Eval / Progress Noted     [facility]     Patient Name: Mariela Aldrich  : 1970  MRN: [id number]  Today's Date: 3/22/2023                 Admit Date: 3/22/2023    Visit Dx:    ICD-10-CM ICD-9-CM   1. Hidradenitis  L73.2 705.83       Patient Active Problem List   Diagnosis   • Anxiety   • Type 2 diabetes mellitus with hyperglycemia, with long-term current use of insulin ([facility])   • Hyperlipidemia   • Hypothyroidism   • Panic attack   • RLS (restless legs syndrome)   • Sinus trouble   • OAB (overactive bladder)   • Mild intermittent asthma without complication   • Hidradenitis        Past Medical History:   Diagnosis Date   • Anxiety    • Asthma     PRN INHALER AND TAKES ALLERGY INJECTIONS   • Diabetes mellitus type 2, controlled ([facility])     AVERAGE    • Hidradenitis    • Hyperlipidemia    • Hypothyroidism    • Murmur     DIAGNOSED WHEN 17 BUT IS ASYMPTOMATIC AND IS FOLLOWED ONLY BY PCP   • Panic attack    • Pilonidal cyst    • Rheumatic fever     AS A CHILD   • RLS (restless legs syndrome)    • Sinus trouble         Past Surgical History:   Procedure Laterality Date   • COLONOSCOPY  2020    exernal hemorrhoids   • EXCISION LESION Bilateral 3/20/2023    Procedure: Excision Hidradenitis of Bilateral Inguinal Areas;  Surgeon: Donato You MD;  Location: [facility];  Service: General;  Laterality: Bilateral;   • GROIN ABSCESS INCISION AND DRAINAGE Right     hidradenitis   • HYSTERECTOMY     • KNEE SURGERY Bilateral    • PILONIDAL CYSTECTOMY N/A 2023    Procedure: Excision Hidradenitis of Inguinal Area and Pilonidal Cystectomy;  Surgeon: Donato You MD;  Location: [facility];  Service: General;  Laterality: N/A;         Physical Assessment:  Wound 23 1333 medial gluteal Incision (Active)   Wound Image   23 1455   Dressing Appearance intact;dry 23 1455   Closure None 23 1455   Base moist;red;granulating;bleeding 23 1455   Red (%), Wound  Tissue Color 100 03/22/23 1455   Periwound redness;moist;intact 03/22/23 1455   Periwound Temperature warm 03/22/23 1455   Periwound Skin Turgor soft 03/22/23 1455   Edges open 03/22/23 1455   Wound Length (cm) 4 cm 03/22/23 1455   Wound Width (cm) 1 cm 03/22/23 1455   Wound Depth (cm) 3 cm 03/22/23 1455   Wound Surface Area (cm^2) 4 cm^2 03/22/23 1455   Wound Volume (cm^3) 12 cm^3 03/22/23 1455   Drainage Characteristics/Odor bleeding controlled;sanguineous 03/22/23 1455   Drainage Amount moderate 03/22/23 1455   Care, Wound cleansed with;irrigated with;sterile normal saline;negative pressure wound therapy 03/22/23 1455   Dressing Care dressing applied;foam;transparent film;hydrocolloid;skin barrier agent applied 03/22/23 1455   Periwound Care absorptive dressing applied;barrier film applied 03/22/23 1455       Wound 03/22/23 1510 Bilateral distal abdomen Incision (Active)   Wound Image    03/22/23 1455   Dressing Appearance intact;dry 03/22/23 1455   Closure None 03/22/23 1455   Base red;moist;slough 03/22/23 1455   Periwound intact;dry;redness 03/22/23 1455   Periwound Temperature warm 03/22/23 1455   Periwound Skin Turgor soft 03/22/23 1455   Edges open 03/22/23 1455   Wound Length (cm) 1 cm 03/22/23 1455   Wound Width (cm) 1.9 cm 03/22/23 1455   Wound Depth (cm) 13 cm 03/22/23 1455   Wound Surface Area (cm^2) 1.9 cm^2 03/22/23 1455   Wound Volume (cm^3) 24.7 cm^3 03/22/23 1455   Drainage Characteristics/Odor serosanguineous;yellow 03/22/23 1455   Drainage Amount small 03/22/23 1455   Care, Wound cleansed with;irrigated with;sterile normal saline 03/22/23 1455   Dressing Care dressing applied;packed with;silver impregnated;hydrofiber;gauze, iodoform;packing strip;abdominal pad 03/22/23 1455   Periwound Care absorptive dressing applied 03/22/23 1455       Wound 03/22/23 1515 Left anterior greater trochanter Incision (Active)   Wound Image   03/22/23 1455   Dressing Appearance intact;dry 03/22/23 1455   Closure  Sutures;Other (Comment) 03/22/23 1455   Base dry;pink 03/22/23 1455   Periwound intact;dry 03/22/23 1455   Periwound Temperature warm 03/22/23 1455   Periwound Skin Turgor soft 03/22/23 1455   Edges rolled/closed 03/22/23 1455   Drainage Amount none 03/22/23 1455   Care, Wound cleansed with;sterile normal saline 03/22/23 1455   Dressing Care dressing applied;non-adherent;petroleum-based;gauze;silicone;border dressing 03/22/23 1455   Periwound Care absorptive dressing applied 03/22/23 1455       Wound 03/22/23 1516 Right anterior greater trochanter Incision (Active)   Wound Image   03/22/23 1455   Dressing Appearance intact;dry 03/22/23 1455   Closure Sutures;Other (Comment) 03/22/23 1455   Base pink;dry 03/22/23 1455   Periwound intact;dry 03/22/23 1455   Periwound Temperature warm 03/22/23 1455   Periwound Skin Turgor soft 03/22/23 1455   Edges rolled/closed 03/22/23 1455   Drainage Amount none 03/22/23 1455   Care, Wound cleansed with;sterile normal saline 03/22/23 1455   Dressing Care dressing applied;non-adherent;petroleum-based;gauze;silicone;border dressing 03/22/23 1455   Periwound Care absorptive dressing applied 03/22/23 1455       Wound 03/22/23 1518 medial coccyx Incision (Active)   Wound Image   03/22/23 1455   Dressing Appearance intact;dry 03/22/23 1455   Closure None 03/22/23 1455   Base bleeding;moist;red;granulating 03/22/23 1455   Red (%), Wound Tissue Color 100 03/22/23 1455   Periwound intact;dry;redness 03/22/23 1455   Periwound Temperature warm 03/22/23 1455   Periwound Skin Turgor soft 03/22/23 1455   Edges open 03/22/23 1455   Wound Length (cm) 0.4 cm 03/22/23 1455   Wound Width (cm) 0.2 cm 03/22/23 1455   Wound Depth (cm) 2.3 cm 03/22/23 1455   Wound Surface Area (cm^2) 0.08 cm^2 03/22/23 1455   Wound Volume (cm^3) 0.184 cm^3 03/22/23 1455   Drainage Characteristics/Odor bleeding uncontrolled;sanguineous 03/22/23 1455   Drainage Amount small 03/22/23 1455   Care, Wound cleansed  with;irrigated with;sterile normal saline;negative pressure wound therapy 03/22/23 1455   Dressing Care dressing applied;foam;transparent film;hydrocolloid;skin barrier agent applied 03/22/23 1455   Periwound Care barrier film applied 03/22/23 1455        Wound Check / Follow-up:  Patient seen today for a wound check and dressing change. Patient is s/p excision hidradenitis of bilateral inguinal arelis. Bilateral groin wound edges are approximated with sutures; penrose drains in place at this time. No areas of dehiscence noted.  Lower abdominal incision with areas of dehiscence noted. Spouse reports that surgeon cauterized tissue. Wound bases with some red tissue but majority of tissue is yellow. Cleansed and irrigated with NS. Filled wound beds with silver impregnated hydrofiber. Penrose drain removed from abdominal incision; significant wound depth noted. Cleansed and irrigated with NS. Filled with iodoform packing strip. Recommend to fill wound with iodoform packing strip; discussed dressing changes with patient and spouse both agreeable. Demonstrated dressing change with spouse.  Sacral wound bed is red and moist; loni-rectal wound with red moist granular tissue; moderate amount of bleeding noted. Periwound has improved but remains reddened. Cleansed and irrigated the sacral wound and loni-rectal wound with copious NS. Applied marathon to periwound. Filled wound beds with 1 black foam and secured with transparent drape. Applied a third black foam as a bridge. The fourth foam was applied to the right hip as a bridge. Wound vac connected, foam compressed. No alarms noted at this time. Applied a hydrocolloid dressing near rectum to assist with seal.     Impression: surgical incision with secondary closure to coccyx, surgical incisions to bilateral groin and abdomen    Short term goals:  Regain skin integrity, NPWT on MWF, daily dressing changes    No Henriquez RN    3/22/2023    15:38 EDT

## 2023-03-24 ENCOUNTER — HOSPITAL ENCOUNTER (OUTPATIENT)
Dept: INFUSION THERAPY | Facility: HOSPITAL | Age: 53
Discharge: HOME | End: 2023-03-24
Admitting: NURSE PRACTITIONER
Payer: COMMERCIAL

## 2023-03-24 VITALS
OXYGEN SATURATION: 100 % | TEMPERATURE: 98.3 F | DIASTOLIC BLOOD PRESSURE: 67 MMHG | RESPIRATION RATE: 20 BRPM | HEART RATE: 114 BPM | SYSTOLIC BLOOD PRESSURE: 101 MMHG

## 2023-03-24 DIAGNOSIS — L73.2 HIDRADENITIS: Primary | ICD-10-CM

## 2023-03-24 PROCEDURE — 97605 NEG PRS WND THER DME<=50SQCM: CPT

## 2023-03-24 NOTE — SIGNIFICANT NOTE
Wound Eval / Progress Noted     [facility]     Patient Name: Mariela Aldrich  : 1970  MRN: [id number]  Today's Date: 3/24/2023                 Admit Date: 3/24/2023    Visit Dx:    ICD-10-CM ICD-9-CM   1. Hidradenitis  L73.2 705.83       Patient Active Problem List   Diagnosis   • Anxiety   • Type 2 diabetes mellitus with hyperglycemia, with long-term current use of insulin ([facility])   • Hyperlipidemia   • Hypothyroidism   • Panic attack   • RLS (restless legs syndrome)   • Sinus trouble   • OAB (overactive bladder)   • Mild intermittent asthma without complication   • Hidradenitis        Past Medical History:   Diagnosis Date   • Anxiety    • Asthma     PRN INHALER AND TAKES ALLERGY INJECTIONS   • Diabetes mellitus type 2, controlled ([facility])     AVERAGE    • Hidradenitis    • Hyperlipidemia    • Hypothyroidism    • Murmur     DIAGNOSED WHEN 17 BUT IS ASYMPTOMATIC AND IS FOLLOWED ONLY BY PCP   • Panic attack    • Pilonidal cyst    • Rheumatic fever     AS A CHILD   • RLS (restless legs syndrome)    • Sinus trouble         Past Surgical History:   Procedure Laterality Date   • COLONOSCOPY  2020    exernal hemorrhoids   • EXCISION LESION Bilateral 3/20/2023    Procedure: Excision Hidradenitis of Bilateral Inguinal Areas;  Surgeon: Donato You MD;  Location: [facility];  Service: General;  Laterality: Bilateral;   • GROIN ABSCESS INCISION AND DRAINAGE Right     hidradenitis   • HYSTERECTOMY     • KNEE SURGERY Bilateral    • PILONIDAL CYSTECTOMY N/A 2023    Procedure: Excision Hidradenitis of Inguinal Area and Pilonidal Cystectomy;  Surgeon: Donato You MD;  Location: [facility];  Service: General;  Laterality: N/A;         Physical Assessment:  Wound 23 1333 medial gluteal Incision (Active)   Wound Image   23 1545   Dressing Appearance dry;intact 23 1545   Closure None 23 1545   Base moist;red;granulating;bleeding 23 1545   Periwound  redness;moist;intact 03/24/23 1545   Periwound Temperature warm 03/24/23 1545   Periwound Skin Turgor soft 03/24/23 1545   Edges open 03/24/23 1545   Drainage Characteristics/Odor bleeding controlled;sanguineous 03/24/23 1545   Drainage Amount small 03/24/23 1545   Care, Wound cleansed with;irrigated with;sterile normal saline;negative pressure wound therapy 03/24/23 1545   Dressing Care dressing applied;foam;transparent film;skin barrier agent applied;hydrocolloid 03/24/23 1545   Periwound Care absorptive dressing applied;barrier film applied;hydrocolloid barrier applied 03/24/23 1545       Wound 03/22/23 1510 Bilateral distal abdomen Incision (Active)   Wound Image   03/24/23 1540   Dressing Appearance dry;intact 03/24/23 1540   Closure None 03/24/23 1540   Base red;moist;slough 03/24/23 1540   Periwound intact;dry;redness 03/24/23 1540   Periwound Temperature warm 03/24/23 1540   Periwound Skin Turgor soft 03/24/23 1540   Edges open 03/24/23 1540   Drainage Characteristics/Odor serosanguineous;yellow 03/24/23 1540   Drainage Amount small 03/24/23 1540   Care, Wound cleansed with;irrigated with;sterile normal saline 03/24/23 1540   Dressing Care dressing applied;packed with;hydrofiber;silver impregnated;packing strip;gauze, iodoform;abdominal pad 03/24/23 1540   Periwound Care absorptive dressing applied 03/24/23 1540       Wound 03/22/23 1515 Left anterior greater trochanter Incision (Active)   Wound Image   03/24/23 1542   Dressing Appearance moist drainage;intact 03/24/23 1542   Closure Sutures;Other (Comment) 03/24/23 1542   Base dry;pink 03/24/23 1542   Periwound dry;intact 03/24/23 1542   Periwound Temperature warm 03/24/23 1542   Periwound Skin Turgor soft 03/24/23 1542   Edges rolled/closed 03/24/23 1542   Drainage Characteristics/Odor serosanguineous 03/24/23 1542   Drainage Amount small 03/24/23 1542   Care, Wound cleansed with;sterile normal saline 03/24/23 1542   Dressing Care dressing  applied;petroleum-based;non-adherent;gauze;gauze, dry;abdominal pad 03/24/23 1542   Periwound Care absorptive dressing applied 03/24/23 1542       Wound 03/22/23 1516 Right anterior greater trochanter Incision (Active)   Wound Image   03/24/23 1541   Dressing Appearance moist drainage;intact 03/24/23 1541   Closure Sutures;Other (Comment) 03/24/23 1541   Base dry;pink 03/24/23 1541   Periwound intact;dry 03/24/23 1541   Periwound Temperature warm 03/24/23 1541   Periwound Skin Turgor soft 03/24/23 1541   Edges rolled/closed 03/24/23 1541   Drainage Characteristics/Odor serosanguineous 03/24/23 1541   Drainage Amount small 03/24/23 1541   Care, Wound cleansed with;sterile normal saline 03/24/23 1541   Dressing Care dressing applied;petroleum-based;non-adherent;gauze;abdominal pad;gauze, dry 03/24/23 1541   Periwound Care absorptive dressing applied 03/24/23 1541       Wound 03/22/23 1518 medial coccyx Incision (Active)   Wound Image   03/24/23 1546   Dressing Appearance dry;intact 03/24/23 1546   Closure None 03/24/23 1546   Base bleeding;moist;red;granulating 03/24/23 1546   Periwound intact;dry;redness 03/24/23 1546   Periwound Temperature warm 03/24/23 1546   Periwound Skin Turgor soft 03/24/23 1546   Edges open 03/24/23 1546   Drainage Characteristics/Odor bleeding controlled;sanguineous 03/24/23 1546   Drainage Amount small 03/24/23 1546   Care, Wound cleansed with;irrigated with;sterile normal saline;negative pressure wound therapy 03/24/23 1546   Dressing Care dressing applied;foam;transparent film;skin barrier agent applied;hydrocolloid 03/24/23 1546   Periwound Care barrier film applied;hydrocolloid barrier applied 03/24/23 1546       NPWT (Negative Pressure Wound Therapy) 03/17/23 1513 coccyx (Active)   Therapy Setting continuous therapy 03/24/23 1544   Dressing foam, black 03/24/23 1544   Pressure Setting 125 mmHg 03/24/23 1544   Sponges Inserted 2 03/24/23 1544   Sponges Removed 4 03/24/23 1544   Finger  sweep complete Yes 03/24/23 1544      Wound Check / Follow-up:  Patient seen today for wound follow-up and wound VAC dressing change. Wound VAC foam compressed, VAC is functioning properly without alarms. Dressing removed with use of adhesive remover spray. Cleansed and irrigated sacral wound and perirectal wound with normal saline. Wound beds are red, moist, and granulating. Periwound tissue remains with redness. Moisture noted around perirectal wound. Applied marathon skin protectant to periwound tissue. Filled wound beds with one piece of black foam, applied second piece of black foam as bridge. Secured with transparent drape. Applied strips of hydrocolloid to distal portion of dressing to aid in seal/securement. VAC attached, foam compressed, seal obtained, no signs of leaking. Recommending to continue current care. Bilateral groin with penrose drains in place and sutures. Cleansed with normal saline and gauze. Applied non-adherent petroleum based gauze, dry gauze, and secured with ABD pad and paper tape. Recommending to continue current care. Scattered areas of dehiscence noted to lower abdomen. Cleansed with normal saline and gauze. Filled wounds with silver impregnated hydrofiber. Left lower abdomen with open area from previous drain site. Cleansed and irrigated with normal saline. Filled wound bed with iodoform packing strip. Secured all with ABD pads and paper tape. Recommending to continue current care.      Impression: Surgical incisions with delayed closure. Penrose drains to bilateral groin.      Short term goals:  Regain skin integrity, negative pressure wound therapy, MWF dressing changes, daily dressing changes.      Lanette Cantu RN    3/24/2023    15:47 EDT

## 2023-03-27 ENCOUNTER — HOSPITAL ENCOUNTER (OUTPATIENT)
Dept: INFUSION THERAPY | Facility: HOSPITAL | Age: 53
Discharge: HOME | End: 2023-03-27
Admitting: NURSE PRACTITIONER
Payer: COMMERCIAL

## 2023-03-27 VITALS
HEART RATE: 108 BPM | SYSTOLIC BLOOD PRESSURE: 111 MMHG | TEMPERATURE: 98.7 F | DIASTOLIC BLOOD PRESSURE: 71 MMHG | OXYGEN SATURATION: 97 % | RESPIRATION RATE: 20 BRPM

## 2023-03-27 DIAGNOSIS — L73.2 HIDRADENITIS: Primary | ICD-10-CM

## 2023-03-27 PROCEDURE — 97605 NEG PRS WND THER DME<=50SQCM: CPT

## 2023-03-27 PROCEDURE — G0463 HOSPITAL OUTPT CLINIC VISIT: HCPCS

## 2023-03-27 NOTE — SIGNIFICANT NOTE
Wound Eval / Progress Noted     [facility]     Patient Name: Mariela Aldrich  : 1970  MRN: [id number]  Today's Date: 3/27/2023                 Admit Date: 3/27/2023    Visit Dx:    ICD-10-CM ICD-9-CM   1. Hidradenitis  L73.2 705.83       Patient Active Problem List   Diagnosis    Anxiety    Type 2 diabetes mellitus with hyperglycemia, with long-term current use of insulin (HCC)    Hyperlipidemia    Hypothyroidism    Panic attack    RLS (restless legs syndrome)    Sinus trouble    OAB (overactive bladder)    Mild intermittent asthma without complication    Hidradenitis        Past Medical History:   Diagnosis Date    Anxiety     Asthma     PRN INHALER AND TAKES ALLERGY INJECTIONS    Diabetes mellitus type 2, controlled (HCC)     AVERAGE     Hidradenitis     Hyperlipidemia     Hypothyroidism     Murmur     DIAGNOSED WHEN 17 BUT IS ASYMPTOMATIC AND IS FOLLOWED ONLY BY PCP    Panic attack     Pilonidal cyst     Rheumatic fever     AS A CHILD    RLS (restless legs syndrome)     Sinus trouble         Past Surgical History:   Procedure Laterality Date    COLONOSCOPY  2020    exernal hemorrhoids    EXCISION LESION Bilateral 3/20/2023    Procedure: Excision Hidradenitis of Bilateral Inguinal Areas;  Surgeon: Donato You MD;  Location: [facility];  Service: General;  Laterality: Bilateral;    GROIN ABSCESS INCISION AND DRAINAGE Right     hidradenitis    HYSTERECTOMY  2010    KNEE SURGERY Bilateral 2013    PILONIDAL CYSTECTOMY N/A 2023    Procedure: Excision Hidradenitis of Inguinal Area and Pilonidal Cystectomy;  Surgeon: Donato You MD;  Location: [facility];  Service: General;  Laterality: N/A;         Physical Assessment:  Wound 23 1333 medial gluteal Incision (Active)   Wound Image   23 1445   Dressing Appearance intact;moist drainage 23 1445   Closure None 23 1445   Base moist;red 23 1445   Red (%), Wound Tissue Color 100 23 1445    Periwound redness;macerated 03/27/23 1445   Periwound Temperature warm 03/27/23 1445   Periwound Skin Turgor soft 03/27/23 1445   Edges open 03/27/23 1445   Wound Length (cm) 5 cm 03/27/23 1445   Wound Width (cm) 0.1 cm 03/27/23 1445   Wound Depth (cm) 1.4 cm 03/27/23 1445   Wound Surface Area (cm^2) 0.5 cm^2 03/27/23 1445   Wound Volume (cm^3) 0.7 cm^3 03/27/23 1445   Drainage Characteristics/Odor serosanguineous 03/27/23 1445   Drainage Amount small 03/27/23 1445   Care, Wound cleansed with;sterile normal saline;negative pressure wound therapy 03/27/23 1445   Dressing Care dressing applied 03/27/23 1445   Periwound Care barrier film applied 03/27/23 1445       Wound 03/22/23 1510 Bilateral distal abdomen Incision (Active)   Wound Image   03/27/23 1445   Dressing Appearance intact;moist drainage 03/27/23 1445   Base moist;red 03/27/23 1445   Periwound redness;macerated 03/27/23 1445   Edges open;rolled/closed 03/27/23 1445   Drainage Characteristics/Odor serosanguineous 03/27/23 1445   Drainage Amount small 03/27/23 1445   Care, Wound cleansed with;irrigated with;sterile normal saline 03/27/23 1445   Dressing Care dressing applied;abdominal pad;hydrofiber;packing strip;silver impregnated 03/27/23 1445   Periwound Care absorptive dressing applied 03/27/23 1445       Wound 03/22/23 1515 Left anterior greater trochanter Incision (Active)   Wound Image   03/27/23 1445   Dressing Appearance intact;moist drainage 03/27/23 1445   Closure Surface sutures 03/27/23 1445   Base moist;red 03/27/23 1445   Periwound redness 03/27/23 1445   Periwound Temperature warm 03/27/23 1445   Periwound Skin Turgor soft 03/27/23 1445   Edges open 03/27/23 1445   Drainage Characteristics/Odor serosanguineous;purulent 03/27/23 1445   Drainage Amount small 03/27/23 1445   Care, Wound cleansed with;irrigated with;sterile normal saline 03/27/23 1445   Dressing Care dressing applied;gauze, dry;hydrofiber;packing strip;silver impregnated  03/27/23 1445   Periwound Care absorptive dressing applied 03/27/23 1445       Wound 03/22/23 1516 Right anterior greater trochanter Incision (Active)   Wound Image   03/27/23 1445   Dressing Appearance intact;moist drainage 03/27/23 1445   Closure Surface sutures 03/27/23 1445   Base pink;red;moist 03/27/23 1445   Periwound moist;redness 03/27/23 1445   Periwound Temperature warm 03/27/23 1445   Periwound Skin Turgor soft 03/27/23 1445   Edges rolled/closed 03/27/23 1445   Drainage Characteristics/Odor serosanguineous 03/27/23 1445   Drainage Amount scant 03/27/23 1445   Care, Wound cleansed with;sterile normal saline 03/27/23 1445   Dressing Care dressing applied;gauze, dry;hydrofiber;silver impregnated 03/27/23 1445   Periwound Care absorptive dressing applied 03/27/23 1445       Wound 03/22/23 1518 medial coccyx Incision (Active)   Wound Image   03/27/23 1445   Dressing Appearance intact;moist drainage 03/27/23 1445   Closure None 03/27/23 1445   Base moist;red 03/27/23 1445   Red (%), Wound Tissue Color 100 03/27/23 1445   Periwound redness;macerated 03/27/23 1445   Periwound Temperature warm 03/27/23 1445   Periwound Skin Turgor soft 03/27/23 1445   Edges open 03/27/23 1445   Wound Length (cm) 1 cm 03/27/23 1445   Wound Width (cm) 0.2 cm 03/27/23 1445   Wound Depth (cm) 1.3 cm 03/27/23 1445   Wound Surface Area (cm^2) 0.2 cm^2 03/27/23 1445   Wound Volume (cm^3) 0.26 cm^3 03/27/23 1445   Drainage Characteristics/Odor serosanguineous 03/27/23 1445   Drainage Amount small 03/27/23 1445   Care, Wound cleansed with;irrigated with;sterile normal saline;negative pressure wound therapy 03/27/23 1445   Dressing Care dressing applied 03/27/23 1445   Periwound Care barrier film applied 03/27/23 1445       NPWT (Negative Pressure Wound Therapy) 03/17/23 1513 coccyx (Active)   Therapy Setting continuous therapy 03/27/23 1445   Dressing foam, black 03/27/23 1445   Pressure Setting 125 mmHg 03/27/23 1445   Sponges Inserted  4 03/27/23 1445   Finger sweep complete Yes 03/27/23 1445        Wound Check / Follow-up:  Patient seen today for wound follow-up, dressing change. Patient with wound vac to two sites on gluteal crease. Upper and lower portion of incision open. Cleansed and irrigated with NS and gauze. Blotted dry. Wound margins lined with hydrocolloid for skin protection. Open wounds filled with black foam and then then draped. Openings bridged together and then bridge created to right hip for track pad. Additional eakins and duoderm needed to seal above rectum. With sitting and spreading of gluteal aspects, intermittent suction loss but then reseals immediately with position change. No lifting of drape noted while standing. Provided spouse with Duoderm to apply to lower portion of dressing if needed at home.   Abdominal incision with superficial openings on right and medial side. Left side with opening that can be packed. Entire incision lined with silver impregnated hydrofiber and then secured with ABD pad.   Right groin with sutures and Penrose drain in place. No open tissue noted. Cleansed and irrigated with NS, dry gauze applied and secured.   Sutures to left groin have partially opened leaving open cavity. Cleansed and irrigated wound. Recommending to pack around penrose drain and then to secure with gauze pad.     Impression: Surgical wounds with dehiscence to gluteal aspect. Surgical wounds to abdominal crease and bilateral groin with intermittent dehiscence    Short term goals:  Regain skin integrity. Dressing change daily to abdomen and bilateral groin and negative pressure wound therapy to gluteal asepct MWF    Miladys Lloyd RN    3/27/2023    16:12 EDT

## 2023-03-29 ENCOUNTER — LAB (OUTPATIENT)
Dept: LAB | Facility: HOSPITAL | Age: 53
End: 2023-03-29
Payer: COMMERCIAL

## 2023-03-29 ENCOUNTER — HOSPITAL ENCOUNTER (OUTPATIENT)
Dept: INFUSION THERAPY | Facility: HOSPITAL | Age: 53
Discharge: HOME | End: 2023-03-29
Admitting: NURSE PRACTITIONER
Payer: COMMERCIAL

## 2023-03-29 ENCOUNTER — OFFICE VISIT (OUTPATIENT)
Dept: FAMILY MEDICINE CLINIC | Facility: CLINIC | Age: 53
End: 2023-03-29
Payer: COMMERCIAL

## 2023-03-29 VITALS
TEMPERATURE: 97.5 F | HEART RATE: 93 BPM | DIASTOLIC BLOOD PRESSURE: 71 MMHG | WEIGHT: 193.4 LBS | OXYGEN SATURATION: 97 % | BODY MASS INDEX: 33.2 KG/M2 | SYSTOLIC BLOOD PRESSURE: 99 MMHG

## 2023-03-29 VITALS
TEMPERATURE: 97.7 F | DIASTOLIC BLOOD PRESSURE: 71 MMHG | OXYGEN SATURATION: 100 % | SYSTOLIC BLOOD PRESSURE: 127 MMHG | RESPIRATION RATE: 20 BRPM | HEART RATE: 101 BPM

## 2023-03-29 DIAGNOSIS — Z79.4 TYPE 2 DIABETES MELLITUS WITH HYPERGLYCEMIA, WITH LONG-TERM CURRENT USE OF INSULIN: ICD-10-CM

## 2023-03-29 DIAGNOSIS — G25.81 RLS (RESTLESS LEGS SYNDROME): ICD-10-CM

## 2023-03-29 DIAGNOSIS — L73.2 HIDRADENITIS: Primary | ICD-10-CM

## 2023-03-29 DIAGNOSIS — E11.65 TYPE 2 DIABETES MELLITUS WITH HYPERGLYCEMIA, WITH LONG-TERM CURRENT USE OF INSULIN: ICD-10-CM

## 2023-03-29 DIAGNOSIS — E78.2 MIXED HYPERLIPIDEMIA: ICD-10-CM

## 2023-03-29 DIAGNOSIS — E03.8 OTHER SPECIFIED HYPOTHYROIDISM: ICD-10-CM

## 2023-03-29 DIAGNOSIS — F41.9 ANXIETY: ICD-10-CM

## 2023-03-29 LAB
ALBUMIN SERPL-MCNC: 3.7 G/DL (ref 3.5–5.2)
ALBUMIN UR-MCNC: <1.2 MG/DL
ALBUMIN/GLOB SERPL: 0.7 G/DL
ALP SERPL-CCNC: 99 U/L (ref 39–117)
ALT SERPL W P-5'-P-CCNC: 15 U/L (ref 1–33)
ANION GAP SERPL CALCULATED.3IONS-SCNC: 8 MMOL/L (ref 5–15)
AST SERPL-CCNC: 21 U/L (ref 1–32)
BILIRUB SERPL-MCNC: <0.2 MG/DL (ref 0–1.2)
BUN SERPL-MCNC: 12 MG/DL (ref 6–20)
BUN/CREAT SERPL: 15.4 (ref 7–25)
CALCIUM SPEC-SCNC: 9 MG/DL (ref 8.6–10.5)
CHLORIDE SERPL-SCNC: 103 MMOL/L (ref 98–107)
CHOLEST SERPL-MCNC: 150 MG/DL (ref 0–200)
CO2 SERPL-SCNC: 25 MMOL/L (ref 22–29)
CREAT SERPL-MCNC: 0.78 MG/DL (ref 0.57–1)
CREAT UR-MCNC: 83.7 MG/DL
EGFRCR SERPLBLD CKD-EPI 2021: 91 ML/MIN/1.73
GLOBULIN UR ELPH-MCNC: 5.1 GM/DL
GLUCOSE SERPL-MCNC: 172 MG/DL (ref 65–99)
HBA1C MFR BLD: 8.1 % (ref 4.8–5.6)
HDLC SERPL-MCNC: 49 MG/DL (ref 40–60)
LDLC SERPL CALC-MCNC: 84 MG/DL (ref 0–100)
LDLC/HDLC SERPL: 1.7 {RATIO}
MICROALBUMIN/CREAT UR: NORMAL MG/G{CREAT}
POTASSIUM SERPL-SCNC: 4.4 MMOL/L (ref 3.5–5.2)
PROT SERPL-MCNC: 8.8 G/DL (ref 6–8.5)
SODIUM SERPL-SCNC: 136 MMOL/L (ref 136–145)
T4 FREE SERPL-MCNC: 1.53 NG/DL (ref 0.93–1.7)
TRIGL SERPL-MCNC: 89 MG/DL (ref 0–150)
TSH SERPL DL<=0.05 MIU/L-ACNC: 1.86 UIU/ML (ref 0.27–4.2)
VLDLC SERPL-MCNC: 17 MG/DL (ref 5–40)

## 2023-03-29 PROCEDURE — 82570 ASSAY OF URINE CREATININE: CPT | Performed by: NURSE PRACTITIONER

## 2023-03-29 PROCEDURE — 83036 HEMOGLOBIN GLYCOSYLATED A1C: CPT | Performed by: NURSE PRACTITIONER

## 2023-03-29 PROCEDURE — 97605 NEG PRS WND THER DME<=50SQCM: CPT

## 2023-03-29 PROCEDURE — 84439 ASSAY OF FREE THYROXINE: CPT | Performed by: NURSE PRACTITIONER

## 2023-03-29 PROCEDURE — 84443 ASSAY THYROID STIM HORMONE: CPT | Performed by: NURSE PRACTITIONER

## 2023-03-29 PROCEDURE — 80053 COMPREHEN METABOLIC PANEL: CPT | Performed by: NURSE PRACTITIONER

## 2023-03-29 PROCEDURE — 80061 LIPID PANEL: CPT | Performed by: NURSE PRACTITIONER

## 2023-03-29 PROCEDURE — 82043 UR ALBUMIN QUANTITATIVE: CPT | Performed by: NURSE PRACTITIONER

## 2023-03-29 PROCEDURE — G0463 HOSPITAL OUTPT CLINIC VISIT: HCPCS

## 2023-03-29 RX ORDER — LISINOPRIL 5 MG/1
5 TABLET ORAL NIGHTLY
Qty: 90 TABLET | Refills: 1 | Status: SHIPPED | OUTPATIENT
Start: 2023-03-29

## 2023-03-29 RX ORDER — INSULIN DEGLUDEC 200 U/ML
50 INJECTION, SOLUTION SUBCUTANEOUS NIGHTLY
Qty: 9 ML | Refills: 1 | Status: SHIPPED | OUTPATIENT
Start: 2023-03-29

## 2023-03-29 RX ORDER — LANCETS
90 EACH MISCELLANEOUS 3 TIMES DAILY PRN
Qty: 100 EACH | Refills: 3 | Status: SHIPPED | OUTPATIENT
Start: 2023-03-29

## 2023-03-29 RX ORDER — LEVOTHYROXINE SODIUM 88 MCG
88 TABLET ORAL DAILY
Qty: 90 TABLET | Refills: 1 | Status: SHIPPED | OUTPATIENT
Start: 2023-03-29

## 2023-03-29 RX ORDER — SERTRALINE HYDROCHLORIDE 100 MG/1
150 TABLET, FILM COATED ORAL DAILY
Qty: 135 TABLET | Refills: 1 | Status: SHIPPED | OUTPATIENT
Start: 2023-03-29

## 2023-03-29 RX ORDER — EMPAGLIFLOZIN, METFORMIN HYDROCHLORIDE 12.5; 1 MG/1; MG/1
1 TABLET, EXTENDED RELEASE ORAL 2 TIMES DAILY
Qty: 180 TABLET | Refills: 1 | Status: SHIPPED | OUTPATIENT
Start: 2023-03-29

## 2023-03-29 RX ORDER — GLIPIZIDE 10 MG/1
10 TABLET ORAL
Qty: 180 TABLET | Refills: 1 | Status: SHIPPED | OUTPATIENT
Start: 2023-03-29

## 2023-03-29 RX ORDER — ROPINIROLE 4 MG/1
4 TABLET, FILM COATED ORAL NIGHTLY
Qty: 90 TABLET | Refills: 1 | Status: SHIPPED | OUTPATIENT
Start: 2023-03-29

## 2023-03-29 RX ORDER — PEN NEEDLE, DIABETIC 31 GX5/16"
1 NEEDLE, DISPOSABLE MISCELLANEOUS DAILY
Qty: 100 EACH | Refills: 3 | Status: SHIPPED | OUTPATIENT
Start: 2023-03-29

## 2023-03-29 RX ORDER — PROCHLORPERAZINE 25 MG/1
1 SUPPOSITORY RECTAL
Qty: 1 EACH | Refills: 3 | Status: SHIPPED | OUTPATIENT
Start: 2023-03-29

## 2023-03-29 RX ORDER — SEMAGLUTIDE 1.34 MG/ML
0.25 INJECTION, SOLUTION SUBCUTANEOUS WEEKLY
Qty: 1.5 ML | Refills: 0 | Status: CANCELLED | OUTPATIENT
Start: 2023-03-29

## 2023-03-29 RX ORDER — SEMAGLUTIDE 1.34 MG/ML
0.5 INJECTION, SOLUTION SUBCUTANEOUS WEEKLY
Qty: 4.5 ML | Refills: 1 | Status: SHIPPED | OUTPATIENT
Start: 2023-03-29

## 2023-03-29 RX ORDER — PROCHLORPERAZINE 25 MG/1
SUPPOSITORY RECTAL
Qty: 9 EACH | Refills: 1 | Status: SHIPPED | OUTPATIENT
Start: 2023-03-29

## 2023-03-29 NOTE — SIGNIFICANT NOTE
Wound Eval / Progress Noted     [facility]     Patient Name: Mariela Aldrich  : 1970  MRN: [id number]  Today's Date: 3/29/2023                 Admit Date: 3/29/2023    Visit Dx:    ICD-10-CM ICD-9-CM   1. Hidradenitis  L73.2 705.83       Patient Active Problem List   Diagnosis    Anxiety    Type 2 diabetes mellitus with hyperglycemia, with long-term current use of insulin (HCC)    Hyperlipidemia    Hypothyroidism    Panic attack    RLS (restless legs syndrome)    Sinus trouble    OAB (overactive bladder)    Mild intermittent asthma without complication    Hidradenitis        Past Medical History:   Diagnosis Date    Anxiety     Asthma     PRN INHALER AND TAKES ALLERGY INJECTIONS    Diabetes mellitus type 2, controlled (HCC)     AVERAGE     Hidradenitis     Hyperlipidemia     Hypothyroidism     Murmur     DIAGNOSED WHEN 17 BUT IS ASYMPTOMATIC AND IS FOLLOWED ONLY BY PCP    Panic attack     Pilonidal cyst     Rheumatic fever     AS A CHILD    RLS (restless legs syndrome)     Sinus trouble         Past Surgical History:   Procedure Laterality Date    COLONOSCOPY  2020    exernal hemorrhoids    EXCISION LESION Bilateral 3/20/2023    Procedure: Excision Hidradenitis of Bilateral Inguinal Areas;  Surgeon: Donato You MD;  Location: [facility];  Service: General;  Laterality: Bilateral;    GROIN ABSCESS INCISION AND DRAINAGE Right     hidradenitis    HYSTERECTOMY  2010    KNEE SURGERY Bilateral 2013    PILONIDAL CYSTECTOMY N/A 2023    Procedure: Excision Hidradenitis of Inguinal Area and Pilonidal Cystectomy;  Surgeon: Donato You MD;  Location: [facility];  Service: General;  Laterality: N/A;         Physical Assessment:  Wound 23 1333 medial gluteal Incision (Active)   Wound Image   23 1035   Dressing Appearance intact;moist drainage 23 1035   Closure Tape 23 1035   Base red;moist;granulating 23 1035   Red (%), Wound Tissue Color 100 23  1035   Periwound macerated;moist;redness 03/29/23 1035   Periwound Temperature warm 03/29/23 1035   Periwound Skin Turgor soft 03/29/23 1035   Edges open 03/29/23 1035   Drainage Characteristics/Odor serosanguineous 03/29/23 1035   Drainage Amount small 03/29/23 1035   Care, Wound cleansed with;irrigated with;sterile normal saline;negative pressure wound therapy 03/29/23 1035   Dressing Care dressing applied;hydrocolloid 03/29/23 1035   Periwound Care barrier film applied 03/29/23 1035       Wound 03/22/23 1510 Bilateral distal abdomen Incision (Active)   Wound Image   03/29/23 1035   Dressing Appearance intact;moist drainage 03/29/23 1035   Closure None 03/29/23 1035   Base moist;red 03/29/23 1035   Periwound macerated;moist;redness;warm 03/29/23 1035   Periwound Temperature warm 03/29/23 1035   Periwound Skin Turgor soft 03/29/23 1035   Edges open;rolled/closed 03/29/23 1035   Drainage Characteristics/Odor serosanguineous 03/29/23 1035   Drainage Amount small 03/29/23 1035   Care, Wound cleansed with;irrigated with;sterile normal saline 03/29/23 1035   Dressing Care dressing applied;packed with;packing strip;hydrofiber;silver impregnated;abdominal pad 03/29/23 1035   Periwound Care absorptive dressing applied 03/29/23 1035       Wound 03/22/23 1515 Left anterior greater trochanter Incision (Active)   Wound Image   03/29/23 1035   Dressing Appearance intact;moist drainage 03/29/23 1035   Closure Surface sutures 03/29/23 1035   Base red;moist 03/29/23 1035   Periwound redness 03/29/23 1035   Periwound Temperature warm 03/29/23 1035   Periwound Skin Turgor soft 03/29/23 1035   Edges open 03/29/23 1035   Drainage Characteristics/Odor serosanguineous;purulent 03/29/23 1035   Drainage Amount small 03/29/23 1035   Care, Wound cleansed with;irrigated with;sterile normal saline 03/29/23 1035   Dressing Care dressing applied;gauze;packed with;packing strip;hydrofiber;silver impregnated 03/29/23 1035   Periwound Care  absorptive dressing applied 03/29/23 1035       Wound 03/22/23 1516 Right anterior greater trochanter Incision (Active)   Wound Image   03/29/23 1035   Dressing Appearance intact;no drainage 03/29/23 1035   Closure Surface sutures 03/29/23 1035   Base red;pink;moist 03/29/23 1035   Periwound redness;moist 03/29/23 1035   Periwound Temperature warm 03/29/23 1035   Periwound Skin Turgor soft 03/29/23 1035   Edges rolled/closed 03/29/23 1035   Drainage Amount none 03/29/23 1035   Care, Wound cleansed with;irrigated with;sterile normal saline 03/29/23 1035   Dressing Care dressing applied;gauze;hydrofiber;silver impregnated 03/29/23 1035   Periwound Care absorptive dressing applied 03/29/23 1035       Wound 03/22/23 1518 medial coccyx Incision (Active)   Wound Image   03/29/23 1035   Dressing Appearance intact;moist drainage 03/29/23 1035   Closure Tape 03/29/23 1035   Base moist;red;granulating 03/29/23 1035   Red (%), Wound Tissue Color 100 03/29/23 1035   Periwound macerated;redness;moist 03/29/23 1035   Periwound Temperature warm 03/29/23 1035   Periwound Skin Turgor soft 03/29/23 1035   Edges open 03/29/23 1035   Drainage Characteristics/Odor serosanguineous 03/29/23 1035   Drainage Amount small 03/29/23 1035   Care, Wound cleansed with;irrigated with;sterile normal saline;negative pressure wound therapy 03/29/23 1035   Dressing Care dressing applied;hydrocolloid 03/29/23 1035   Periwound Care barrier film applied 03/29/23 1035       NPWT (Negative Pressure Wound Therapy) 03/17/23 1513 coccyx (Active)   Therapy Setting continuous therapy 03/29/23 1035   Dressing foam, black 03/29/23 1035   Pressure Setting 125 mmHg 03/29/23 1035   Sponges Inserted 4 03/29/23 1035   Sponges Removed 4 03/29/23 1035   Finger sweep complete Yes 03/29/23 1035        Wound Check / Follow-up:  Patient seen today for wound follow-up and wound dressing changes. Patient with intact dressings. Wound vac without alarms or leaks. Patient states  wound vac cut off several times over the past couple days but she was able to start it back up. Instructed patient to monitor for continued problems with the vac and then notify [facility] if vac continues to do the same thing.   To gluteal wounds, dressings removed and wounds irrigated. Cleansed with NS and gauze. Tracie-wound with maceration and redness but improving. Hydrocolloid applied around lower portion of lower gluteal opening and then hydrocolloid applied around upper opening. Wound filled with black foam and then secured with drape. Bridge created between the openings and then bridge with landing pad created to right hip area and draped. Tubing bolstered and secured with silicone border dressing.   Patient also with abdominal incision with scattered opening. Open wound to left of abdominal incision with packing still necessary. Cleansed and irrigated incision, filled larger opening with plain packing strip and then covered incision line with silver impregnated hydrofiber and covered with ABD pads.   Left groin with penrose drain with partially dehisced incision with  sutures and opening. Irrigated wound and filled with packing strips. Covered with gauze and secured with tape.   Right groin with surgical incision with sutures and penrose drain. No opening noted that site. Cleansed with NS and gauze. Covered with silver impregnated hydrofiber and secured with gauze and tape.   Patients spouse will continue to do daily site care to abdomen and bilateral groin except for on days that the patient comes to [facility] for wound care.     Impression: Surgical wounds with dehiscence to upper and lower gluteal crease, left side of abdominal incision and left groin. Surgical incision to right groin    Short term goals:  Regain skin integrity. Daily dressing changes to abdomen and bilateral groin. Negative pressure wound therapy to gluteal crease MWF.    Miladys Lloyd RN    3/29/2023    13:38 EDT

## 2023-03-31 ENCOUNTER — HOSPITAL ENCOUNTER (OUTPATIENT)
Dept: INFUSION THERAPY | Facility: HOSPITAL | Age: 53
Discharge: HOME | End: 2023-03-31
Admitting: NURSE PRACTITIONER
Payer: COMMERCIAL

## 2023-03-31 VITALS
RESPIRATION RATE: 20 BRPM | DIASTOLIC BLOOD PRESSURE: 68 MMHG | TEMPERATURE: 97.8 F | HEART RATE: 102 BPM | OXYGEN SATURATION: 98 % | SYSTOLIC BLOOD PRESSURE: 106 MMHG

## 2023-03-31 DIAGNOSIS — L73.2 HIDRADENITIS: Primary | ICD-10-CM

## 2023-03-31 DIAGNOSIS — J45.20 MILD INTERMITTENT ASTHMA WITHOUT COMPLICATION: ICD-10-CM

## 2023-03-31 PROCEDURE — 97605 NEG PRS WND THER DME<=50SQCM: CPT

## 2023-03-31 PROCEDURE — G0463 HOSPITAL OUTPT CLINIC VISIT: HCPCS

## 2023-03-31 RX ORDER — FLUTICASONE PROPIONATE AND SALMETEROL 250; 50 UG/1; UG/1
POWDER RESPIRATORY (INHALATION)
Qty: 60 EACH | Refills: 0 | Status: SHIPPED | OUTPATIENT
Start: 2023-03-31

## 2023-03-31 NOTE — SIGNIFICANT NOTE
Wound Eval / Progress Noted     [facility]     Patient Name: Mariela Aldrich  : 1970  MRN: [id number]  Today's Date: 3/31/2023                 Admit Date: 3/31/2023    Visit Dx:    ICD-10-CM ICD-9-CM   1. Hidradenitis  L73.2 705.83       Patient Active Problem List   Diagnosis    Anxiety    Type 2 diabetes mellitus with hyperglycemia, with long-term current use of insulin (HCC)    Hyperlipidemia    Hypothyroidism    Panic attack    RLS (restless legs syndrome)    Sinus trouble    OAB (overactive bladder)    Mild intermittent asthma without complication    Hidradenitis        Past Medical History:   Diagnosis Date    Anxiety     Asthma     PRN INHALER AND TAKES ALLERGY INJECTIONS    Diabetes mellitus type 2, controlled (HCC)     AVERAGE     Hidradenitis     Hyperlipidemia     Hypothyroidism     Murmur     DIAGNOSED WHEN 17 BUT IS ASYMPTOMATIC AND IS FOLLOWED ONLY BY PCP    Panic attack     Pilonidal cyst     Rheumatic fever     AS A CHILD    RLS (restless legs syndrome)     Sinus trouble         Past Surgical History:   Procedure Laterality Date    COLONOSCOPY  2020    exernal hemorrhoids    EXCISION LESION Bilateral 3/20/2023    Procedure: Excision Hidradenitis of Bilateral Inguinal Areas;  Surgeon: Donato You MD;  Location: [facility];  Service: General;  Laterality: Bilateral;    GROIN ABSCESS INCISION AND DRAINAGE Right     hidradenitis    HYSTERECTOMY  2010    KNEE SURGERY Bilateral 2013    PILONIDAL CYSTECTOMY N/A 2023    Procedure: Excision Hidradenitis of Inguinal Area and Pilonidal Cystectomy;  Surgeon: Donato You MD;  Location: [facility];  Service: General;  Laterality: N/A;         Physical Assessment:  Wound 23 1333 medial gluteal Incision (Active)   Wound Image   23 1320   Dressing Appearance intact;moist drainage 23 1320   Closure None 23 1320   Base red;moist;granulating 23 1320   Red (%), Wound Tissue Color 100 23  1320   Periwound redness;swelling;macerated;moist 03/31/23 1320   Periwound Temperature warm 03/31/23 1320   Periwound Skin Turgor soft 03/31/23 1320   Edges open 03/31/23 1320   Wound Length (cm) 4.3 cm 03/31/23 1320   Wound Width (cm) 0.1 cm 03/31/23 1320   Wound Depth (cm) 0.6 cm 03/31/23 1320   Wound Surface Area (cm^2) 0.43 cm^2 03/31/23 1320   Wound Volume (cm^3) 0.258 cm^3 03/31/23 1320   Drainage Characteristics/Odor serosanguineous 03/31/23 1320   Drainage Amount small 03/31/23 1320   Care, Wound cleansed with;irrigated with;sterile normal saline;negative pressure wound therapy 03/31/23 1320   Dressing Care dressing applied 03/31/23 1320   Periwound Care absorptive dressing applied;barrier film applied;hydrocolloid barrier applied 03/31/23 1320       Wound 03/22/23 1510 Bilateral distal abdomen Incision (Active)   Wound Image   03/31/23 1320   Dressing Appearance moist drainage;intact 03/31/23 1320   Closure None 03/31/23 1320   Base moist;red 03/31/23 1320   Periwound redness;other (see comments) 03/31/23 1320   Periwound Temperature warm 03/31/23 1320   Periwound Skin Turgor soft 03/31/23 1320   Edges open;rolled/closed 03/31/23 1320   Drainage Characteristics/Odor serosanguineous;serous 03/31/23 1320   Drainage Amount small 03/31/23 1320   Care, Wound cleansed with;irrigated with;sterile normal saline 03/31/23 1320   Dressing Care dressing applied;packed with;packing strip;silver impregnated;hydrofiber;silicone;border dressing 03/31/23 1320   Periwound Care absorptive dressing applied 03/31/23 1320       Wound 03/22/23 1515 Left anterior greater trochanter Incision (Active)   Wound Image   03/31/23 1320   Dressing Appearance moist drainage 03/31/23 1320   Closure Surface sutures 03/31/23 1320   Base moist;red 03/31/23 1320   Periwound redness 03/31/23 1320   Periwound Temperature warm 03/31/23 1320   Periwound Skin Turgor soft 03/31/23 1320   Edges open 03/31/23 1320   Drainage Characteristics/Odor  serosanguineous 03/31/23 1320   Drainage Amount small 03/31/23 1320   Care, Wound cleansed with;irrigated with;sterile normal saline 03/31/23 1320   Dressing Care dressing applied;packing strip;packed with;hydrofiber;silver impregnated;silicone;border dressing 03/31/23 1320   Periwound Care absorptive dressing applied 03/31/23 1320       Wound 03/22/23 1516 Right anterior greater trochanter Incision (Active)   Wound Image   03/31/23 1320   Dressing Appearance intact;moist drainage 03/31/23 1320   Closure Surface sutures 03/31/23 1320   Base moist;red 03/31/23 1320   Periwound indurated;redness;warm 03/31/23 1320   Periwound Temperature warm 03/31/23 1320   Periwound Skin Turgor soft 03/31/23 1320   Edges rolled/closed;open 03/31/23 1320   Drainage Characteristics/Odor serosanguineous;yellow;purulent 03/31/23 1320   Drainage Amount moderate 03/31/23 1320   Care, Wound cleansed with;irrigated with;sterile normal saline 03/31/23 1320   Dressing Care dressing applied;border dressing;hydrofiber;silver impregnated;silicone 03/31/23 1320   Periwound Care absorptive dressing applied 03/31/23 1320       Wound 03/22/23 1518 medial coccyx Incision (Active)   Wound Image   03/31/23 1320   Dressing Appearance intact;moist drainage 03/31/23 1320   Closure None 03/31/23 1320   Base moist;red;granulating 03/31/23 1320   Red (%), Wound Tissue Color 100 03/31/23 1320   Periwound redness;macerated 03/31/23 1320   Periwound Temperature warm 03/31/23 1320   Periwound Skin Turgor soft 03/31/23 1320   Edges open 03/31/23 1320   Wound Length (cm) 1 cm 03/31/23 1320   Wound Width (cm) 0.2 cm 03/31/23 1320   Wound Depth (cm) 1.3 cm 03/31/23 1320   Wound Surface Area (cm^2) 0.2 cm^2 03/31/23 1320   Wound Volume (cm^3) 0.26 cm^3 03/31/23 1320   Drainage Characteristics/Odor serosanguineous 03/31/23 1320   Drainage Amount small 03/31/23 1320   Care, Wound cleansed with;irrigated with;sterile normal saline;negative pressure wound therapy  03/31/23 1320   Dressing Care dressing applied 03/31/23 1320   Periwound Care absorptive dressing applied;hydrocolloid barrier applied;barrier film applied 03/31/23 1320       NPWT (Negative Pressure Wound Therapy) 03/17/23 1513 coccyx (Active)   Therapy Setting continuous therapy 03/31/23 1320   Dressing foam, black 03/31/23 1320   Pressure Setting 125 mmHg 03/31/23 1320   Sponges Inserted 4 03/31/23 1320   Sponges Removed 4 03/31/23 1320   Finger sweep complete Yes 03/31/23 1320        Wound Check / Follow-up:  Patient seen today for wound check / follow-up. Patient with HS to anterior lower abdomen and bilateral groin. Surgical incision to gluteal aspect with dehiscence.   Dressings removed and all wounds cleansed and irrigated.   Wound vac reapplied to bilateral gluteal open wounds with a bridge and then bridged to right hip.   Vac connected and foam compressed. No leaks upon completion.   Left groin incision remains open with penrose drain in place. Drainage is decreasing. Cleansed and irrigated wound bed and then filled with packing strip. And covered incision with silver impregnated hydrofiber.  Right groin wound with Penrose drain with sutures that are intact today there is purulence noted in increased amounts from around Penrose drain as well as at base of wound. No visible openings noted. Small raised reddened area but no drainage from that area at this time. Cleansed and irrigated copiously. Covered with silver impregnated hydrofiber.   Abdominal incision with intermittent superficial wounds that are improving. Opening at left side decreasing in size and drainage decreasing. Filled left lateral opening with packing strip and then covered the remainder of the incision line with silver impregnated hydrofiber.   Patient is concerned about tape causing irritation and trauma to skin and wanting to discuss options. Will attempt to utilize silicone border dressings this visit but also discussed trying mesh  undergarments and just loosely holding ABD pads or gauze over wounds.  Will discuss further with patient to see what preference is at next visit.     Impression: Surgical wound with dehiscence to gluteal crease. Surgical wounds and debridements to lower abdomen and bilateral groin    Short term goals:  Regain skin integrity. Daily dressing changes to groin and abdomen with negative pressure wound therapy to gluteal crease.    Miladys Lloyd RN    3/31/2023    17:46 EDT

## 2023-04-03 ENCOUNTER — HOSPITAL ENCOUNTER (OUTPATIENT)
Dept: INFUSION THERAPY | Facility: HOSPITAL | Age: 53
Discharge: HOME | End: 2023-04-03
Admitting: NURSE PRACTITIONER
Payer: COMMERCIAL

## 2023-04-03 VITALS
RESPIRATION RATE: 20 BRPM | TEMPERATURE: 98 F | OXYGEN SATURATION: 97 % | DIASTOLIC BLOOD PRESSURE: 76 MMHG | SYSTOLIC BLOOD PRESSURE: 97 MMHG | HEART RATE: 97 BPM

## 2023-04-03 DIAGNOSIS — L73.2 HIDRADENITIS: Primary | ICD-10-CM

## 2023-04-03 PROCEDURE — 97605 NEG PRS WND THER DME<=50SQCM: CPT

## 2023-04-03 PROCEDURE — G0463 HOSPITAL OUTPT CLINIC VISIT: HCPCS

## 2023-04-03 RX ORDER — SULFAMETHOXAZOLE AND TRIMETHOPRIM 800; 160 MG/1; MG/1
1 TABLET ORAL 2 TIMES DAILY
Qty: 20 TABLET | Refills: 0 | Status: SHIPPED | OUTPATIENT
Start: 2023-04-03

## 2023-04-03 NOTE — SIGNIFICANT NOTE
Wound Eval / Progress Noted     [facility]     Patient Name: Mariela Aldrich  : 1970  MRN: [id number]  Today's Date: 4/3/2023                 Admit Date: 4/3/2023    Visit Dx:    ICD-10-CM ICD-9-CM   1. Hidradenitis  L73.2 705.83       Patient Active Problem List   Diagnosis    Anxiety    Type 2 diabetes mellitus with hyperglycemia, with long-term current use of insulin    Hyperlipidemia    Hypothyroidism    Panic attack    RLS (restless legs syndrome)    Sinus trouble    OAB (overactive bladder)    Mild intermittent asthma without complication    Hidradenitis        Past Medical History:   Diagnosis Date    Anxiety     Asthma     PRN INHALER AND TAKES ALLERGY INJECTIONS    Diabetes mellitus type 2, controlled     AVERAGE     Hidradenitis     Hyperlipidemia     Hypothyroidism     Murmur     DIAGNOSED WHEN 17 BUT IS ASYMPTOMATIC AND IS FOLLOWED ONLY BY PCP    Panic attack     Pilonidal cyst     Rheumatic fever     AS A CHILD    RLS (restless legs syndrome)     Sinus trouble         Past Surgical History:   Procedure Laterality Date    COLONOSCOPY  2020    exernal hemorrhoids    EXCISION LESION Bilateral 3/20/2023    Procedure: Excision Hidradenitis of Bilateral Inguinal Areas;  Surgeon: Donato You MD;  Location: [facility];  Service: General;  Laterality: Bilateral;    GROIN ABSCESS INCISION AND DRAINAGE Right     hidradenitis    HYSTERECTOMY  2010    KNEE SURGERY Bilateral 2013    PILONIDAL CYSTECTOMY N/A 2023    Procedure: Excision Hidradenitis of Inguinal Area and Pilonidal Cystectomy;  Surgeon: Donato You MD;  Location: [facility];  Service: General;  Laterality: N/A;         Physical Assessment:  Wound 23 1333 medial gluteal Incision (Active)   Wound Image   23 0935   Dressing Appearance intact;moist drainage 23 0935   Closure None 23 0935   Base moist;red;granulating 23 0935   Red (%), Wound Tissue Color 100 23 0935   Periwound  redness;moist 04/03/23 0935   Periwound Temperature warm 04/03/23 0935   Periwound Skin Turgor soft 04/03/23 0935   Edges open 04/03/23 0935   Wound Length (cm) 4.4 cm 04/03/23 0935   Wound Width (cm) 0.1 cm 04/03/23 0935   Wound Depth (cm) 0.4 cm 04/03/23 0935   Wound Surface Area (cm^2) 0.44 cm^2 04/03/23 0935   Wound Volume (cm^3) 0.176 cm^3 04/03/23 0935   Drainage Characteristics/Odor serosanguineous 04/03/23 0935   Drainage Amount small 04/03/23 0935   Care, Wound cleansed with;irrigated with;sterile normal saline;negative pressure wound therapy 04/03/23 0935   Dressing Care dressing applied;hydrocolloid 04/03/23 0935   Periwound Care absorptive dressing applied;barrier film applied 04/03/23 0935       Wound 03/22/23 1510 Bilateral distal abdomen Incision (Active)   Wound Image   04/03/23 0935   Dressing Appearance intact;moist drainage 04/03/23 0935   Closure None 04/03/23 0935   Base moist;red 04/03/23 0935   Periwound pink;intact;dry 04/03/23 0935   Periwound Temperature warm 04/03/23 0935   Periwound Skin Turgor soft 04/03/23 0935   Edges open;rolled/closed 04/03/23 0935   Drainage Characteristics/Odor serosanguineous 04/03/23 0935   Drainage Amount small 04/03/23 0935   Care, Wound cleansed with;irrigated with;sterile normal saline 04/03/23 0935   Dressing Care dressing applied;border dressing;hydrofiber;silver impregnated;silicone 04/03/23 0935   Periwound Care absorptive dressing applied 04/03/23 0935       Wound 03/22/23 1515 Left anterior greater trochanter Incision (Active)   Wound Image   04/03/23 0935   Dressing Appearance moist drainage;intact 04/03/23 0935   Closure Surface sutures 04/03/23 0935   Base moist;red 04/03/23 0935   Periwound redness;moist 04/03/23 0935   Periwound Temperature warm 04/03/23 0935   Periwound Skin Turgor soft 04/03/23 0935   Edges open 04/03/23 0935   Wound Length (cm) 4.8 cm 04/03/23 0935   Wound Width (cm) 0.2 cm 04/03/23 0935   Wound Depth (cm) 1.1 cm 04/03/23 0935    Wound Surface Area (cm^2) 0.96 cm^2 04/03/23 0935   Wound Volume (cm^3) 1.056 cm^3 04/03/23 0935   Drainage Characteristics/Odor serosanguineous 04/03/23 0935   Drainage Amount small 04/03/23 0935   Care, Wound cleansed with;irrigated with;sterile normal saline 04/03/23 0935   Dressing Care dressing applied;packed with;packing strip;hydrofiber;silver impregnated;silicone;border dressing 04/03/23 0935   Periwound Care absorptive dressing applied 04/03/23 0935   Number of Sutures Removed 2 04/03/23 0935       Wound 03/22/23 1516 Right anterior greater trochanter Incision (Active)   Wound Image   04/03/23 0935   Dressing Appearance intact;moist drainage 04/03/23 0935   Closure Surface sutures;Other (Comment) 04/03/23 0935   Base moist;red 04/03/23 0935   Periwound moist;redness 04/03/23 0935   Periwound Temperature warm 04/03/23 0935   Periwound Skin Turgor soft 04/03/23 0935   Edges open 04/03/23 0935   Drainage Characteristics/Odor serosanguineous;purulent 04/03/23 0935   Drainage Amount small 04/03/23 0935   Care, Wound irrigated with;sterile normal saline 04/03/23 0935   Dressing Care dressing applied;border dressing;hydrofiber;silver impregnated;silicone 04/03/23 0935   Periwound Care absorptive dressing applied 04/03/23 0935       Wound 03/22/23 1518 medial coccyx Incision (Active)   Wound Image   04/03/23 0935   Dressing Appearance intact;moist drainage 04/03/23 0935   Closure None 04/03/23 0935   Base moist;red;granulating 04/03/23 0935   Red (%), Wound Tissue Color 100 04/03/23 0935   Periwound macerated;redness 04/03/23 0935   Periwound Temperature warm 04/03/23 0935   Periwound Skin Turgor soft 04/03/23 0935   Edges open 04/03/23 0935   Wound Length (cm) 0.6 cm 04/03/23 0935   Wound Width (cm) 0.1 cm 04/03/23 0935   Wound Depth (cm) 0.6 cm 04/03/23 0935   Wound Surface Area (cm^2) 0.06 cm^2 04/03/23 0935   Wound Volume (cm^3) 0.036 cm^3 04/03/23 0935   Drainage Characteristics/Odor serosanguineous 04/03/23  0935   Drainage Amount small 04/03/23 0935   Care, Wound cleansed with;irrigated with;sterile normal saline;negative pressure wound therapy 04/03/23 0935   Dressing Care dressing applied;hydrocolloid 04/03/23 0935   Periwound Care absorptive dressing applied;barrier film applied 04/03/23 0935       NPWT (Negative Pressure Wound Therapy) 03/17/23 1513 coccyx (Active)   Therapy Setting continuous therapy 04/03/23 0935   Dressing foam, black 04/03/23 0935   Pressure Setting 125 mmHg 04/03/23 0935   Sponges Inserted 4 04/03/23 0935   Sponges Removed 4 04/03/23 0935   Finger sweep complete Yes 04/03/23 0935        Wound Check / Follow-up:  Patient seen today for wound follow-up /  wound vac dressing change. Patient states she took her shower this morning and the penrose drain fell out. Spoke with Dr. You and received orders to remove sutures from left groin to that are no longer holding tissue together.   To left groin cleansed and irrigated wound and then filled with packing strip and covered with silver impregnated hydrofiber and silicone border dressing as tolerated better by patient.   To abdominal incision. Cleansed with NS and gauze. Small amount of packing strip applied to open area on left of abdominal incision. Incision line covered with silver impregnated hydrofiber and secured with silicone border dressing. Recommending to continue current care. Improvement and continued healing is noted.   Right groin with Penrose drain intact with sutures visible. Small amount of purulent drainage remains present. Induration no longer palpable. Cleansed and irrigated with NS and gauze. Covered incision line with silver impregnated hydrofiber and secured with silicone border dressing.   Wound vac dressing to gluteal crease intact with no leaks. Dressing removed. Skin with maceration and irritated on left aspect of lower gluteal opening but overall improved from earlier dressings. Wound bed with red moist granulating  tissue. Cleansed and irrigated copiously. Black foam applied to wound bases. Hydrocolloid dressing applied around opening. Drape applied after skin barrier applied and then bridge created between both openings and then bridge also created to right hip. Vac connected, suction applied. Any potential leaks addressed and no leaks upon completion with compressed foam.  Patient has a follow-up appointment on Wednesday and is hoping to return to work next week and would like to have vac off at that time.   Due to purulence noted draining on right side, Dr. You will call in antibiotics for patient to prevent any further complications or infection.       Impression: Surgical wounds with dehiscence to left groin and gluteal aspects. Surgical wounds with continued drainage and penrose drain    Short term goals:  Regain skin integrity. Daily dressing changes to abdomen and bilateral groin. Negative pressure wound therapy to Gluteal crease EFRAIN Lloyd RN    4/3/2023    13:17 EDT

## 2023-04-05 ENCOUNTER — HOSPITAL ENCOUNTER (OUTPATIENT)
Dept: INFUSION THERAPY | Facility: HOSPITAL | Age: 53
Discharge: HOME | End: 2023-04-05
Admitting: NURSE PRACTITIONER
Payer: COMMERCIAL

## 2023-04-05 ENCOUNTER — OFFICE VISIT (OUTPATIENT)
Dept: SURGERY | Facility: CLINIC | Age: 53
End: 2023-04-05
Payer: COMMERCIAL

## 2023-04-05 VITALS — BODY MASS INDEX: 32.95 KG/M2 | WEIGHT: 193 LBS | RESPIRATION RATE: 16 BRPM | HEIGHT: 64 IN

## 2023-04-05 VITALS
HEART RATE: 104 BPM | SYSTOLIC BLOOD PRESSURE: 118 MMHG | TEMPERATURE: 98.3 F | OXYGEN SATURATION: 100 % | RESPIRATION RATE: 20 BRPM | DIASTOLIC BLOOD PRESSURE: 68 MMHG

## 2023-04-05 DIAGNOSIS — L73.2 HIDRADENITIS: Primary | ICD-10-CM

## 2023-04-05 PROCEDURE — G0463 HOSPITAL OUTPT CLINIC VISIT: HCPCS

## 2023-04-05 PROCEDURE — 99024 POSTOP FOLLOW-UP VISIT: CPT | Performed by: SURGERY

## 2023-04-05 PROCEDURE — 97605 NEG PRS WND THER DME<=50SQCM: CPT

## 2023-04-05 NOTE — SIGNIFICANT NOTE
Wound Eval / Progress Noted     [facility]     Patient Name: Mariela Aldrich  : 1970  MRN: [id number]  Today's Date: 2023                 Admit Date: 2023    Visit Dx:    ICD-10-CM ICD-9-CM   1. Hidradenitis  L73.2 705.83       Patient Active Problem List   Diagnosis    Anxiety    Type 2 diabetes mellitus with hyperglycemia, with long-term current use of insulin    Hyperlipidemia    Hypothyroidism    Panic attack    RLS (restless legs syndrome)    Sinus trouble    OAB (overactive bladder)    Mild intermittent asthma without complication    Hidradenitis        Past Medical History:   Diagnosis Date    Anxiety     Asthma     PRN INHALER AND TAKES ALLERGY INJECTIONS    Diabetes mellitus type 2, controlled     AVERAGE     Hidradenitis     Hyperlipidemia     Hypothyroidism     Murmur     DIAGNOSED WHEN 17 BUT IS ASYMPTOMATIC AND IS FOLLOWED ONLY BY PCP    Panic attack     Pilonidal cyst     Rheumatic fever     AS A CHILD    RLS (restless legs syndrome)     Sinus trouble         Past Surgical History:   Procedure Laterality Date    COLONOSCOPY  2020    exernal hemorrhoids    EXCISION LESION Bilateral 3/20/2023    Procedure: Excision Hidradenitis of Bilateral Inguinal Areas;  Surgeon: Donato You MD;  Location: [facility];  Service: General;  Laterality: Bilateral;    GROIN ABSCESS INCISION AND DRAINAGE Right     hidradenitis    HYSTERECTOMY  2010    KNEE SURGERY Bilateral 2013    PILONIDAL CYSTECTOMY N/A 2023    Procedure: Excision Hidradenitis of Inguinal Area and Pilonidal Cystectomy;  Surgeon: Donato You MD;  Location: [facility];  Service: General;  Laterality: N/A;         Physical Assessment:  Wound 23 1333 medial gluteal Incision (Active)   Wound Image   23 1330   Dressing Appearance intact;moist drainage 23 1330   Closure None 23 1330   Base moist;red;granulating 23 1330   Red (%), Wound Tissue Color 100 23 1330   Periwound  redness;macerated;moist 04/05/23 1330   Periwound Temperature warm 04/05/23 1330   Periwound Skin Turgor soft 04/05/23 1330   Edges open 04/05/23 1330   Drainage Characteristics/Odor serosanguineous;purulent 04/05/23 1330   Drainage Amount small 04/05/23 1330   Care, Wound cleansed with;irrigated with;sterile normal saline;negative pressure wound therapy 04/05/23 1330   Dressing Care dressing applied;hydrofiber;hydrocolloid;silver impregnated 04/05/23 1330   Periwound Care absorptive dressing applied 04/05/23 1330       Wound 03/22/23 1510 Bilateral distal abdomen Incision (Active)   Wound Image   04/05/23 1330   Dressing Appearance intact;moist drainage 04/05/23 1330   Closure None 04/05/23 1330   Base moist;red;scab;dry 04/05/23 1330   Periwound intact;redness;pink 04/05/23 1330   Periwound Temperature warm 04/05/23 1330   Periwound Skin Turgor soft 04/05/23 1330   Edges open;rolled/closed 04/05/23 1330   Drainage Characteristics/Odor serosanguineous 04/05/23 1330   Drainage Amount small 04/05/23 1330   Care, Wound cleansed with;irrigated with;sterile normal saline 04/05/23 1330   Dressing Care dressing applied;hydrofiber;silicone;silver impregnated;border dressing 04/05/23 1330   Periwound Care absorptive dressing applied 04/05/23 1330       Wound 03/22/23 1515 Left anterior greater trochanter Incision (Active)   Wound Image   04/05/23 1330   Dressing Appearance intact;moist drainage 04/05/23 1330   Closure None 04/05/23 1330   Base moist;red;granulating 04/05/23 1330   Periwound redness;moist 04/05/23 1330   Periwound Temperature warm 04/05/23 1330   Periwound Skin Turgor soft 04/05/23 1330   Edges open 04/05/23 1330   Drainage Characteristics/Odor serosanguineous 04/05/23 1330   Drainage Amount small 04/05/23 1330   Care, Wound cleansed with;irrigated with;sterile normal saline 04/05/23 1330   Dressing Care dressing applied;packed with;gauze, iodoform;border dressing;silicone;silver impregnated;hydrofiber  04/05/23 1330   Periwound Care absorptive dressing applied 04/05/23 1330       Wound 03/22/23 1516 Right anterior greater trochanter Incision (Active)   Wound Image   04/05/23 1330   Dressing Appearance moist drainage;intact 04/05/23 1330   Closure None 04/05/23 1330   Base moist;red 04/05/23 1330   Periwound redness;warm;moist 04/05/23 1330   Periwound Temperature warm 04/05/23 1330   Periwound Skin Turgor soft 04/05/23 1330   Edges open 04/05/23 1330   Drainage Characteristics/Odor serosanguineous 04/05/23 1330   Drainage Amount small 04/05/23 1330   Care, Wound cleansed with;irrigated with;sterile normal saline 04/05/23 1330   Dressing Care dressing applied;border dressing;packed with;gauze, iodoform;hydrofiber;silver impregnated;silicone 04/05/23 1330   Periwound Care absorptive dressing applied 04/05/23 1330       Wound 03/22/23 1518 medial coccyx Incision (Active)   Wound Image   04/05/23 1330   Dressing Appearance intact;moist drainage 04/05/23 1330   Closure None 04/05/23 1330   Base moist;red;granulating 04/05/23 1330   Red (%), Wound Tissue Color 100 04/05/23 1330   Periwound redness;moist;macerated 04/05/23 1330   Periwound Temperature warm 04/05/23 1330   Periwound Skin Turgor soft 04/05/23 1330   Edges open 04/05/23 1330   Drainage Characteristics/Odor serosanguineous 04/05/23 1330   Drainage Amount small 04/05/23 1330   Care, Wound cleansed with;irrigated with;sterile normal saline;negative pressure wound therapy 04/05/23 1330   Dressing Care dressing applied;hydrofiber;silver impregnated;hydrocolloid;transparent film 04/05/23 1330   Periwound Care absorptive dressing applied 04/05/23 1330       NPWT (Negative Pressure Wound Therapy) 03/17/23 1513 coccyx (Active)   Therapy Setting continuous therapy 04/05/23 1330   Dressing foam, black 04/05/23 1330   Pressure Setting 125 mmHg 04/05/23 1330   Sponges Inserted 4 04/05/23 1330   Sponges Removed 4 04/05/23 1330   Finger sweep complete Yes 04/05/23 1330         Wound Check / Follow-up:  Patient seen today for wound follow-up / wound vac dressing change. Patient had follow-up with surgeon today. Patient was started on Bactrim after Monday's appointment. Penrose drain to right groin fell out prior to appointment and he removed sutures. Abdominal wound is primarily closed with some crusting but also some remaining small intermittent open areas. Left lateral aspect of incision remains open enough to continue some packing but improving. Recommending to continue current wound care.   Left groin with decreased depth and red moist tissue to base. Recommending to continue current wound care with packing and dry dressing.  To right groin, shallow opening from where penrose drain was and also where purulent drainage was noted on Monday. Recommending to place small amount of packing to those areas and then continue covering with silver impregnated foam and silicone border dressing.   Gluteal wounds continue to improve and respond to wound vac with red moist granular tissue noted. Tracie-wound is macerated and moist, this visit. Patient states she has had increased tenderness to left gluteal aspect. With palpation, induration noted and palpated. With palpation purulent drainage noted within open wound base of lower gluteal wound. Only small amount was expressed. Cleansed and irrigated copiously. Wound beds  filled with black foam. Tracie-wound protected with marathon skin sealant, silver impregnated hydrofiber and either hydrocolloid or transparent drape. Two foam pieces bridged and then bridge created to right hip. Vac connected and foam compressed. No leaks upon completion. Recommended to patient that if she had discomfort to sit on moist heat to provide comfort and assist with drainage promotion.   Will consider possible removal of vac on Friday pending wound appearance.   Patient was placed off work for an additional month at this time.     Impression: Surgical wounds with closure  by secondary intent and with assistance of negative pressure wound therapy to gluteal crease incision wounds.     Short term goals:  Regain skin integrity. Daily dressings to abdomen and bilateral groin. Negative pressure wound therapy MWF to gluteal crease.    Miladys Lloyd RN    4/5/2023    17:20 EDT

## 2023-04-05 NOTE — PROGRESS NOTES
"Chief Complaint: Post-op    Subjective         History of Present Illness  Mariela Aldrich is a 52 y.o. female presents to [facility] GENERAL SURGERY. The patient is to be seen for status post excision of hidradenitis.    Status post excision of hidradenitis  The patient had sutures from left groin removed on 04/03 by Miladys Lloyd RN. She was prescribed antibiotics, which she has started. Patient reports \"the other 2\" fell out on 04/04. The adult male states that he packed it on 04/05 because the wound was opened. She denies the need for pain medication. The patient states she needs records for her disability stating she is unable to return to work at this time.     Objective     Past Medical History:   Diagnosis Date   • Anxiety    • Asthma     PRN INHALER AND TAKES ALLERGY INJECTIONS   • Diabetes mellitus type 2, controlled     AVERAGE    • Hidradenitis    • Hyperlipidemia    • Hypothyroidism    • Murmur     DIAGNOSED WHEN 17 BUT IS ASYMPTOMATIC AND IS FOLLOWED ONLY BY PCP   • Panic attack    • Pilonidal cyst    • Rheumatic fever     AS A CHILD   • RLS (restless legs syndrome)    • Sinus trouble        Past Surgical History:   Procedure Laterality Date   • COLONOSCOPY  01/21/2020    exernal hemorrhoids   • EXCISION LESION Bilateral 3/20/2023    Procedure: Excision Hidradenitis of Bilateral Inguinal Areas;  Surgeon: Donato You MD;  Location: [facility];  Service: General;  Laterality: Bilateral;   • GROIN ABSCESS INCISION AND DRAINAGE Right     hidradenitis   • HYSTERECTOMY  2010   • KNEE SURGERY Bilateral 2013   • PILONIDAL CYSTECTOMY N/A 2/8/2023    Procedure: Excision Hidradenitis of Inguinal Area and Pilonidal Cystectomy;  Surgeon: Donato You MD;  Location: [facility];  Service: General;  Laterality: N/A;         Current Outpatient Medications:   •  albuterol sulfate  (90 Base) MCG/ACT inhaler, Inhale 2 puffs Every 4 (Four) Hours As Needed., Disp: , " Rfl:   •  cetirizine (zyrTEC) 10 MG tablet, Zyrtec 10 mg oral tablet take 1 tablet (10 mg) by oral route once daily   Active, Disp: , Rfl:   •  Continuous Blood Gluc Sensor (Dexcom G6 Sensor), Every 10 (Ten) Days., Disp: 9 each, Rfl: 1  •  Continuous Blood Gluc Transmit (Dexcom G6 Transmitter) misc, 1 Device Every 3 (Three) Months., Disp: 1 each, Rfl: 3  •  docusate sodium (Colace) 100 MG capsule, Take 1 capsule by mouth Daily As Needed for Constipation., Disp: 30 capsule, Rfl: 1  •  Empagliflozin-metFORMIN HCl ER (Synjardy XR) 12.5-1000 MG tablet sustained-release 24 hour, Take 1 tablet by mouth 2 (Two) Times a Day., Disp: 180 tablet, Rfl: 1  •  Fluticasone-Salmeterol (ADVAIR/WIXELA) 250-50 MCG/ACT DISKUS, INHALE 1 PUFF BY MOUTH TWICE DAILY, Disp: 60 each, Rfl: 0  •  glipizide (GLUCOTROL) 10 MG tablet, Take 1 tablet by mouth 2 (Two) Times a Day Before Meals., Disp: 180 tablet, Rfl: 1  •  glucose blood test strip, Use as instructed Dx: DM E11, Disp: 100 each, Rfl: 3  •  HYDROcodone-acetaminophen (Norco) 5-325 MG per tablet, Take 1 tablet by mouth Every 6 (Six) Hours As Needed for Moderate Pain., Disp: 20 tablet, Rfl: 0  •  HYDROcodone-acetaminophen (NORCO) 5-325 MG per tablet, Take 1-2 tablets by mouth Every 4 (Four) Hours As Needed (Pain)., Disp: 20 tablet, Rfl: 0  •  Insulin Degludec (Tresiba FlexTouch) 200 UNIT/ML solution pen-injector pen injection, Inject 50 Units under the skin into the appropriate area as directed Every Night., Disp: 9 mL, Rfl: 1  •  Insulin Pen Needle (B-D ULTRAFINE III SHORT PEN) 31G X 8 MM misc, Inject 1 each under the skin into the appropriate area as directed Daily., Disp: 100 each, Rfl: 3  •  lisinopril (PRINIVIL,ZESTRIL) 5 MG tablet, Take 1 tablet by mouth Every Night., Disp: 90 tablet, Rfl: 1  •  Mirabegron ER (Myrbetriq) 50 MG tablet sustained-release 24 hour 24 hr tablet, Take 50 mg by mouth Daily., Disp: 30 tablet, Rfl: 5  •  multivitamin (THERAGRAN) tablet tablet, , Disp: , Rfl:    •  ONE TOUCH CLUB LANCETS misc, 90 each 3 (Three) Times a Day As Needed (check blood sugar)., Disp: 100 each, Rfl: 3  •  pitavastatin calcium (Livalo) 2 MG tablet tablet, Take 1 tablet by mouth Every Night., Disp: 90 tablet, Rfl: 1  •  rOPINIRole (REQUIP) 4 MG tablet, Take 1 tablet by mouth Every Night., Disp: 90 tablet, Rfl: 1  •  Semaglutide,0.25 or 0.5MG/DOS, (Ozempic, 0.25 or 0.5 MG/DOSE,) 2 MG/1.5ML solution pen-injector, Inject 0.5 mg under the skin into the appropriate area as directed 1 (One) Time Per Week., Disp: 4.5 mL, Rfl: 1  •  sertraline (ZOLOFT) 100 MG tablet, Take 1.5 tablets by mouth Daily., Disp: 135 tablet, Rfl: 1  •  sulfamethoxazole-trimethoprim (Bactrim DS) 800-160 MG per tablet, Take 1 tablet by mouth 2 (Two) Times a Day., Disp: 20 tablet, Rfl: 0  •  Synthroid 88 MCG tablet, Take 1 tablet by mouth Daily., Disp: 90 tablet, Rfl: 1  •  Zinc 50 MG tablet, zinc 50 mg oral tablet take 1 tablet by oral route daily   Active, Disp: , Rfl:   •  adalimumab (Humira) 40 MG/0.8ML Prefilled Syringe Kit injection, 0.8 mL. Currently on hold due to surgies (Patient not taking: Reported on 3/29/2023), Disp: , Rfl:     Allergies   Allergen Reactions   • Etodolac Unknown - High Severity     HEAVINESS ON CHEST   • Penicillins Swelling   • Statins Myalgia   • Atorvastatin Myalgia   • Crestor [Rosuvastatin] Other (See Comments)     Legs cramps    • Pravastatin Myalgia        Family History   Problem Relation Age of Onset   • Thyroid disease Father 70   • Thyroid disease Sister    • Diabetes Sister    • Thyroid disease Brother 43        thyroid cancer   • Diabetes Brother    • Diabetes Maternal Grandmother    • Malig Hyperthermia Neg Hx        Social History     Socioeconomic History   • Marital status:    Tobacco Use   • Smoking status: Never   • Smokeless tobacco: Never   Vaping Use   • Vaping Use: Never used   Substance and Sexual Activity   • Alcohol use: Never   • Drug use: Never   • Sexual activity:  Defer        Physical Exam   No acute distress. Nonlabored breathing. Abdomen is soft.  Post Surgical Incision  Surgical wound: Left groin wound is open and packed and appears healthy. The right groin wound has some mucosal blunting at the level where the drain was. I removed her stitches today. Her abdominal wound is healing slowly.      Result Review :               Assessment and Plan    There are no diagnoses linked to this encounter.     Patient status post excision of hidradenitis.  - Healing well. I did remove sutures today. She may require some further packing. Appreciate wound care nursing assistance. She can follow up with me in 4 weeks for reassessment. Patient will need disability paperwork addressed because I told her she will need another 4 weeks out of work. Discussed with the patient. All questions were answered. She voiced understanding and agreed to proceed with the plan.      Follow Up   No follow-ups on file.  Patient was given instructions and counseling regarding her condition or for health maintenance advice. Please see specific information pulled into the AVS if appropriate.       Transcribed from ambient dictation for Donato You MD by Eliane Valero.  04/05/23   10:32 EDT    Patient or patient representative verbalized consent to the visit recording.  I have personally performed the services described in this document as transcribed by the above individual, and it is both accurate and complete.

## 2023-04-06 ENCOUNTER — TELEPHONE (OUTPATIENT)
Dept: SURGERY | Facility: CLINIC | Age: 53
End: 2023-04-06
Payer: COMMERCIAL

## 2023-04-06 NOTE — TELEPHONE ENCOUNTER
CALLED PT TO INFORM HER WE NEED NEW FORMS FROM [employer]. SHE HAD A SECOND SURGERY IN MARCH REQUIRING NEW FORMS.

## 2023-04-07 ENCOUNTER — HOSPITAL ENCOUNTER (OUTPATIENT)
Dept: INFUSION THERAPY | Facility: HOSPITAL | Age: 53
Discharge: HOME | End: 2023-04-07
Admitting: NURSE PRACTITIONER
Payer: COMMERCIAL

## 2023-04-07 VITALS
OXYGEN SATURATION: 99 % | HEART RATE: 99 BPM | SYSTOLIC BLOOD PRESSURE: 110 MMHG | RESPIRATION RATE: 20 BRPM | DIASTOLIC BLOOD PRESSURE: 75 MMHG

## 2023-04-07 DIAGNOSIS — L73.2 HIDRADENITIS: Primary | ICD-10-CM

## 2023-04-07 PROCEDURE — G0463 HOSPITAL OUTPT CLINIC VISIT: HCPCS

## 2023-04-07 NOTE — SIGNIFICANT NOTE
Wound Eval / Progress Noted     [facility]     Patient Name: Mariela Aldrich  : 1970  MRN: [id number]  Today's Date: 2023                 Admit Date: 2023    Visit Dx:    ICD-10-CM ICD-9-CM   1. Hidradenitis  L73.2 705.83       Patient Active Problem List   Diagnosis    Anxiety    Type 2 diabetes mellitus with hyperglycemia, with long-term current use of insulin    Hyperlipidemia    Hypothyroidism    Panic attack    RLS (restless legs syndrome)    Sinus trouble    OAB (overactive bladder)    Mild intermittent asthma without complication    Hidradenitis        Past Medical History:   Diagnosis Date    Anxiety     Asthma     PRN INHALER AND TAKES ALLERGY INJECTIONS    Diabetes mellitus type 2, controlled     AVERAGE     Hidradenitis     Hyperlipidemia     Hypothyroidism     Murmur     DIAGNOSED WHEN 17 BUT IS ASYMPTOMATIC AND IS FOLLOWED ONLY BY PCP    Panic attack     Pilonidal cyst     Rheumatic fever     AS A CHILD    RLS (restless legs syndrome)     Sinus trouble         Past Surgical History:   Procedure Laterality Date    COLONOSCOPY  2020    exernal hemorrhoids    EXCISION LESION Bilateral 3/20/2023    Procedure: Excision Hidradenitis of Bilateral Inguinal Areas;  Surgeon: Donato You MD;  Location: [facility];  Service: General;  Laterality: Bilateral;    GROIN ABSCESS INCISION AND DRAINAGE Right     hidradenitis    HYSTERECTOMY  2010    KNEE SURGERY Bilateral 2013    PILONIDAL CYSTECTOMY N/A 2023    Procedure: Excision Hidradenitis of Inguinal Area and Pilonidal Cystectomy;  Surgeon: Donato You MD;  Location: [facility];  Service: General;  Laterality: N/A;         Physical Assessment:  Wound 23 1333 medial gluteal Incision (Active)   Wound Image   23 1145   Dressing Appearance intact;moist drainage 23 1145   Closure None 23 1145   Base moist;red;granulating 23 1145   Red (%), Wound Tissue Color 100 23 1145   Periwound  macerated;redness;moist 04/07/23 1145   Periwound Temperature warm 04/07/23 1145   Periwound Skin Turgor soft 04/07/23 1145   Edges open 04/07/23 1145   Wound Length (cm) 4.2 cm 04/07/23 1145   Wound Width (cm) 0.1 cm 04/07/23 1145   Wound Depth (cm) 0.4 cm 04/07/23 1145   Wound Surface Area (cm^2) 0.42 cm^2 04/07/23 1145   Wound Volume (cm^3) 0.168 cm^3 04/07/23 1145   Drainage Characteristics/Odor serosanguineous 04/07/23 1145   Drainage Amount small 04/07/23 1145   Care, Wound cleansed with;irrigated with;sterile normal saline 04/07/23 1145   Dressing Care dressing applied;foam;border dressing;hydrocolloid;silicone 04/07/23 1145   Periwound Care absorptive dressing applied 04/07/23 1145       Wound 03/22/23 1510 Bilateral distal abdomen Incision (Active)   Dressing Appearance intact;moist drainage 04/07/23 1145   Closure None 04/07/23 1145   Base red;moist;scab;dry 04/07/23 1145   Periwound redness;intact 04/07/23 1145   Periwound Temperature warm 04/07/23 1145   Periwound Skin Turgor soft 04/07/23 1145   Edges open;rolled/closed 04/07/23 1145   Drainage Characteristics/Odor serosanguineous 04/07/23 1145   Drainage Amount small 04/07/23 1145   Care, Wound cleansed with;irrigated with;sterile normal saline 04/07/23 1145   Dressing Care dressing applied;packed with;gauze, iodoform;silver impregnated;hydrofiber;border dressing;silicone 04/07/23 1145   Periwound Care absorptive dressing applied 04/07/23 1145       Wound 03/22/23 1515 Left anterior greater trochanter Incision (Active)   Dressing Appearance intact;moist drainage 04/07/23 1145   Closure None 04/07/23 1145   Base red;moist 04/07/23 1145   Periwound intact;redness 04/07/23 1145   Periwound Temperature warm 04/07/23 1145   Periwound Skin Turgor soft 04/07/23 1145   Edges open 04/07/23 1145   Drainage Characteristics/Odor serosanguineous 04/07/23 1145   Drainage Amount small 04/07/23 1145   Care, Wound cleansed with;irrigated with;sterile normal saline  04/07/23 1145   Dressing Care dressing applied;gauze, iodoform;packed with;silver impregnated;silicone;hydrofiber;border dressing 04/07/23 1145   Periwound Care absorptive dressing applied 04/07/23 1145       Wound 03/22/23 1516 Right anterior greater trochanter Incision (Active)   Dressing Appearance intact;moist drainage 04/07/23 1145   Closure None 04/07/23 1145   Base red;moist 04/07/23 1145   Periwound redness;warm 04/07/23 1145   Periwound Temperature warm 04/07/23 1145   Periwound Skin Turgor soft 04/07/23 1145   Edges open;rolled/closed 04/07/23 1145   Drainage Characteristics/Odor serosanguineous 04/07/23 1145   Drainage Amount small 04/07/23 1145   Care, Wound cleansed with;irrigated with;sterile normal saline 04/07/23 1145   Dressing Care dressing applied;gauze, iodoform;packing strip;silicone;silver impregnated;border dressing;hydrofiber 04/07/23 1145   Periwound Care absorptive dressing applied 04/07/23 1145       Wound 03/22/23 1518 medial coccyx Incision (Active)   Wound Image   04/07/23 1145   Dressing Appearance intact;moist drainage 04/07/23 1145   Closure None 04/07/23 1145   Base moist;red;granulating 04/07/23 1145   Red (%), Wound Tissue Color 100 04/07/23 1145   Periwound redness;macerated;moist 04/07/23 1145   Periwound Temperature warm 04/07/23 1145   Periwound Skin Turgor soft 04/07/23 1145   Edges open 04/07/23 1145   Wound Length (cm) 0.5 cm 04/07/23 1145   Wound Width (cm) 0.1 cm 04/07/23 1145   Wound Depth (cm) 0.5 cm 04/07/23 1145   Wound Surface Area (cm^2) 0.05 cm^2 04/07/23 1145   Wound Volume (cm^3) 0.025 cm^3 04/07/23 1145   Drainage Characteristics/Odor serosanguineous 04/07/23 1145   Drainage Amount small 04/07/23 1145   Care, Wound cleansed with;irrigated with;sterile normal saline 04/07/23 1145   Dressing Care dressing applied;foam;border dressing;silicone 04/07/23 1145   Periwound Care absorptive dressing applied 04/07/23 1145       NPWT (Negative Pressure Wound Therapy)  03/17/23 1513 coccyx (Active)   Therapy Setting vacuum off 04/07/23 1145        Wound Check / Follow-up:  Patient seen today for wound check and wound follow-up. Patient with intact wound vac dressing with no leaks or alarms.   Dressing along gluteal crease removed. Maceration persists with increasing margins. Will consider wound vac break to see if skin improves. Wound bases are red and moist with granulation. Irrigated with NS and gauze. Blotted dry. Recommending purple foam to wound bases secured with silicone border dressing and hydrocolloid at base over the weekend. Induration is palpable to bilateral aspects of lower gluteal incision. Decreased tenderness from last visit. With palpation, some purulence noted draining into wound bed. No new openings or increased in wound appearance. Patient continues to take Bactrim at this time.   Patient also with ongoing wounds to bilateral groin and lower abdomen. Abdominal incision is partially closed with only intermittent separation of tissue. Left lateral edge with continued improvement and almost flush with skin. Recommending to continue packing open area to left margin and then cover remainder of incision with silver impregnated hydrofiber and silicone border dressing.   Left groin with red moist wound bed that is filling in nicely. Cleansed and irrigated with Ns and gauze. Blotted dry. Recommending to continue current wound care with iodoform packing strip and silver impregnated hydrofiber and silicone border dressing.   Right groin with partial closure and intermittent opening. Cleansed with NS and gauze. Blotted dry. Recommending to continue current wound care with iodoform packing strip to openings and then to cover with silver impregnated hydrofiber and secure with silicone border dressing.     Impression: Surgical wounds with delayed wound closure and healing by secondary intent    Short term goals:  Regain skin integrity. Dressing changes MWF and prn for  saturation, rolling.    Miladys Lloyd RN    4/7/2023    16:06 EDT

## 2023-04-10 ENCOUNTER — HOSPITAL ENCOUNTER (OUTPATIENT)
Dept: INFUSION THERAPY | Facility: HOSPITAL | Age: 53
Discharge: HOME | End: 2023-04-10
Admitting: NURSE PRACTITIONER
Payer: COMMERCIAL

## 2023-04-10 VITALS
SYSTOLIC BLOOD PRESSURE: 107 MMHG | TEMPERATURE: 97.8 F | DIASTOLIC BLOOD PRESSURE: 72 MMHG | HEART RATE: 103 BPM | OXYGEN SATURATION: 97 % | RESPIRATION RATE: 20 BRPM

## 2023-04-10 DIAGNOSIS — L73.2 HIDRADENITIS: Primary | ICD-10-CM

## 2023-04-10 PROCEDURE — G0463 HOSPITAL OUTPT CLINIC VISIT: HCPCS

## 2023-04-10 RX ORDER — CALCIUM ACETATE MONOHYDRATE AND ALUMINUM SULFATE TETRADECAHYDRATE 952; 1347 MG/2299MG; MG/2299MG
1 POWDER, FOR SOLUTION TOPICAL DAILY
Status: CANCELLED
Start: 2023-04-11

## 2023-04-10 RX ORDER — CALCIUM ACETATE MONOHYDRATE AND ALUMINUM SULFATE TETRADECAHYDRATE 952; 1347 MG/2299MG; MG/2299MG
1 POWDER, FOR SOLUTION TOPICAL DAILY
Status: CANCELLED
Start: 2023-04-10

## 2023-04-10 NOTE — SIGNIFICANT NOTE
Wound Eval / Progress Noted     [facility]     Patient Name: Mariela Aldrich  : 1970  MRN: [id number]  Today's Date: 4/10/2023                 Admit Date: 4/10/2023    Visit Dx:  No diagnosis found.    Patient Active Problem List   Diagnosis   • Anxiety   • Type 2 diabetes mellitus with hyperglycemia, with long-term current use of insulin   • Hyperlipidemia   • Hypothyroidism   • Panic attack   • RLS (restless legs syndrome)   • Sinus trouble   • OAB (overactive bladder)   • Mild intermittent asthma without complication   • Hidradenitis        Past Medical History:   Diagnosis Date   • Anxiety    • Asthma     PRN INHALER AND TAKES ALLERGY INJECTIONS   • Diabetes mellitus type 2, controlled     AVERAGE    • Hidradenitis    • Hyperlipidemia    • Hypothyroidism    • Murmur     DIAGNOSED WHEN 17 BUT IS ASYMPTOMATIC AND IS FOLLOWED ONLY BY PCP   • Panic attack    • Pilonidal cyst    • Rheumatic fever     AS A CHILD   • RLS (restless legs syndrome)    • Sinus trouble         Past Surgical History:   Procedure Laterality Date   • COLONOSCOPY  2020    exernal hemorrhoids   • EXCISION LESION Bilateral 3/20/2023    Procedure: Excision Hidradenitis of Bilateral Inguinal Areas;  Surgeon: Donato You MD;  Location: [facility];  Service: General;  Laterality: Bilateral;   • GROIN ABSCESS INCISION AND DRAINAGE Right     hidradenitis   • HYSTERECTOMY     • KNEE SURGERY Bilateral    • PILONIDAL CYSTECTOMY N/A 2023    Procedure: Excision Hidradenitis of Inguinal Area and Pilonidal Cystectomy;  Surgeon: Donato You MD;  Location: [facility];  Service: General;  Laterality: N/A;         Physical Assessment:  Wound 23 1333 medial gluteal Incision (Active)   Wound Image   04/10/23 1140   Dressing Appearance intact;dry 04/10/23 1140   Closure None 04/10/23 1140   Base red;moist;bleeding 04/10/23 1140   Red (%), Wound Tissue Color 100 04/10/23 1140   Periwound  intact;redness;moist 04/10/23 1140   Periwound Temperature warm 04/10/23 1140   Periwound Skin Turgor soft 04/10/23 1140   Edges open 04/10/23 1140   Wound Length (cm) 4.1 cm 04/10/23 1140   Wound Width (cm) 1 cm 04/10/23 1140   Wound Depth (cm) 3.1 cm 04/10/23 1140   Wound Surface Area (cm^2) 4.1 cm^2 04/10/23 1140   Wound Volume (cm^3) 12.71 cm^3 04/10/23 1140   Drainage Characteristics/Odor serosanguineous 04/10/23 1140   Drainage Amount moderate 04/10/23 1140   Care, Wound cleansed with;irrigated with;sterile half normal saline 04/10/23 1140   Dressing Care dressing applied;foam;silicone;border dressing 04/10/23 1140   Periwound Care absorptive dressing applied 04/10/23 1140       Wound 03/22/23 1510 Bilateral distal abdomen Incision (Active)   Wound Image    04/10/23 1140   Dressing Appearance intact;dry 04/10/23 1140   Closure None 04/10/23 1140   Base red;moist 04/10/23 1140   Red (%), Wound Tissue Color 100 04/10/23 1140   Periwound intact;dry 04/10/23 1140   Periwound Temperature warm 04/10/23 1140   Periwound Skin Turgor soft 04/10/23 1140   Edges open 04/10/23 1140   Drainage Characteristics/Odor serosanguineous 04/10/23 1140   Drainage Amount small 04/10/23 1140   Care, Wound cleansed with;irrigated with;sterile normal saline 04/10/23 1140   Dressing Care dressing applied;packed with;gauze, iodoform;packing strip;silver impregnated;hydrofiber;silicone;border dressing 04/10/23 1140   Periwound Care absorptive dressing applied 04/10/23 1140       Wound 03/22/23 1515 Left anterior greater trochanter Incision (Active)   Wound Image   04/10/23 1140   Dressing Appearance dry;dressing loose 04/10/23 1140   Closure None 04/10/23 1140   Base red;moist;slough 04/10/23 1140   Red (%), Wound Tissue Color 95 04/10/23 1140   Yellow (%), Wound Tissue Color 5 04/10/23 1140   Periwound intact;redness;dry 04/10/23 1140   Periwound Temperature warm 04/10/23 1140   Periwound Skin Turgor soft 04/10/23 1140   Edges open  04/10/23 1140   Wound Length (cm) 1.5 cm 04/10/23 1140   Wound Width (cm) 4 cm 04/10/23 1140   Wound Depth (cm) 0.3 cm 04/10/23 1140   Wound Surface Area (cm^2) 6 cm^2 04/10/23 1140   Wound Volume (cm^3) 1.8 cm^3 04/10/23 1140   Drainage Characteristics/Odor serosanguineous 04/10/23 1140   Drainage Amount small 04/10/23 1140   Care, Wound cleansed with;irrigated with;sterile normal saline 04/10/23 1140   Dressing Care dressing applied;packed with;gauze, iodoform;packing strip;silicone;border dressing 04/10/23 1140   Periwound Care absorptive dressing applied 04/10/23 1140       Wound 03/22/23 1516 Right anterior greater trochanter Incision (Active)   Wound Image   04/10/23 1140   Dressing Appearance dry;dressing loose 04/10/23 1140   Closure None 04/10/23 1140   Base red;moist 04/10/23 1140   Red (%), Wound Tissue Color 100 04/10/23 1140   Periwound intact;redness;moist 04/10/23 1140   Periwound Temperature warm 04/10/23 1140   Periwound Skin Turgor soft 04/10/23 1140   Edges rolled/closed 04/10/23 1140   Wound Length (cm) 0.4 cm 04/10/23 1140   Wound Width (cm) 1 cm 04/10/23 1140   Wound Depth (cm) 3.1 cm 04/10/23 1140   Wound Surface Area (cm^2) 0.4 cm^2 04/10/23 1140   Wound Volume (cm^3) 1.24 cm^3 04/10/23 1140   Drainage Characteristics/Odor serosanguineous 04/10/23 1140   Drainage Amount small 04/10/23 1140   Care, Wound cleansed with;irrigated with;sterile normal saline 04/10/23 1140   Dressing Care dressing applied;packed with;gauze, iodoform;packing strip;silicone;border dressing 04/10/23 1140   Periwound Care absorptive dressing applied 04/10/23 1140       Wound 03/22/23 1518 medial coccyx Incision (Active)   Wound Image   04/10/23 1140   Dressing Appearance intact;dry 04/10/23 1140   Closure None 04/10/23 1140   Base red;moist 04/10/23 1140   Red (%), Wound Tissue Color 100 04/10/23 1140   Periwound moist;redness 04/10/23 1140   Periwound Temperature warm 04/10/23 1140   Periwound Skin Turgor soft  04/10/23 1140   Edges open 04/10/23 1140   Wound Length (cm) 1 cm 04/10/23 1140   Wound Width (cm) 0.4 cm 04/10/23 1140   Wound Depth (cm) 1 cm 04/10/23 1140   Wound Surface Area (cm^2) 0.4 cm^2 04/10/23 1140   Wound Volume (cm^3) 0.4 cm^3 04/10/23 1140   Drainage Characteristics/Odor serosanguineous 04/10/23 1140   Drainage Amount small 04/10/23 1140   Care, Wound cleansed with;irrigated with;sterile normal saline 04/10/23 1140   Dressing Care dressing applied;foam;silicone;border dressing 04/10/23 1140   Periwound Care absorptive dressing applied 04/10/23 1140        Wound Check / Follow-up:  Patient seen today for a wound check. Spouse present at bedside. All dressing loose with no visible drainage.  Maceration to periwound remains to wound margins of the gluteal incisions. Redness has decreased but will recommend the wound vac stay off for a couple more days to decrease redness. Wound bases are granular, red and moist. Cleansed and irrigated with NS. Filled wound bed with NS moistened purple foam. Induration has slightly improved; scant amount of purulent draining expressed. No new openings at this time. No further discomfort expressed by patient.  Abdominal incision with areas of intermittent separation of tissue. Left lateral edge continues to show improvement to the current treatment with shallow wound base. Wound base is red and moist.   Bilateral groin incisions with red moist wound bases that are continuing to fill in and responding to the iodoform packing. Right groin with intermittent closure between the two wounds. Recommend to continue current wound care at this time    Impression: surgical wounds with delayed closure, healing with secondary intent to all wound    Short term goals:  Regain skin integrity, dressing changes on MWF    No Henriquez RN    4/10/2023    13:37 EDT

## 2023-04-12 ENCOUNTER — HOSPITAL ENCOUNTER (OUTPATIENT)
Dept: INFUSION THERAPY | Facility: HOSPITAL | Age: 53
Discharge: HOME | End: 2023-04-12
Admitting: NURSE PRACTITIONER
Payer: COMMERCIAL

## 2023-04-12 VITALS
OXYGEN SATURATION: 96 % | DIASTOLIC BLOOD PRESSURE: 75 MMHG | TEMPERATURE: 97.9 F | SYSTOLIC BLOOD PRESSURE: 109 MMHG | RESPIRATION RATE: 20 BRPM | HEART RATE: 101 BPM

## 2023-04-12 DIAGNOSIS — L73.2 HIDRADENITIS: Primary | ICD-10-CM

## 2023-04-12 PROCEDURE — G0463 HOSPITAL OUTPT CLINIC VISIT: HCPCS

## 2023-04-12 RX ORDER — CALCIUM ACETATE MONOHYDRATE AND ALUMINUM SULFATE TETRADECAHYDRATE 952; 1347 MG/2299MG; MG/2299MG
1 POWDER, FOR SOLUTION TOPICAL DAILY
Status: DISCONTINUED | OUTPATIENT
Start: 2023-04-12 | End: 2023-04-14 | Stop reason: HOSPADM

## 2023-04-12 RX ORDER — CALCIUM ACETATE MONOHYDRATE AND ALUMINUM SULFATE TETRADECAHYDRATE 952; 1347 MG/2299MG; MG/2299MG
1 POWDER, FOR SOLUTION TOPICAL DAILY
Status: CANCELLED
Start: 2023-04-13

## 2023-04-12 RX ADMIN — ALUMINUM SULFATE TETRADECAHYDRATE, CALCIUM ACETATE MONOHYDRATE 1 PACKET: 615 POWDER, FOR SOLUTION TOPICAL at 11:07

## 2023-04-12 NOTE — SIGNIFICANT NOTE
Wound Eval / Progress Noted     [facility]     Patient Name: Mariela Aldrich  : 1970  MRN: [id number]  Today's Date: 2023                 Admit Date: 2023    Visit Dx:    ICD-10-CM ICD-9-CM   1. Hidradenitis  L73.2 705.83       Patient Active Problem List   Diagnosis   • Anxiety   • Type 2 diabetes mellitus with hyperglycemia, with long-term current use of insulin   • Hyperlipidemia   • Hypothyroidism   • Panic attack   • RLS (restless legs syndrome)   • Sinus trouble   • OAB (overactive bladder)   • Mild intermittent asthma without complication   • Hidradenitis        Past Medical History:   Diagnosis Date   • Anxiety    • Asthma     PRN INHALER AND TAKES ALLERGY INJECTIONS   • Diabetes mellitus type 2, controlled     AVERAGE    • Hidradenitis    • Hyperlipidemia    • Hypothyroidism    • Murmur     DIAGNOSED WHEN 17 BUT IS ASYMPTOMATIC AND IS FOLLOWED ONLY BY PCP   • Panic attack    • Pilonidal cyst    • Rheumatic fever     AS A CHILD   • RLS (restless legs syndrome)    • Sinus trouble         Past Surgical History:   Procedure Laterality Date   • COLONOSCOPY  2020    exernal hemorrhoids   • EXCISION LESION Bilateral 3/20/2023    Procedure: Excision Hidradenitis of Bilateral Inguinal Areas;  Surgeon: Donato You MD;  Location: [facility];  Service: General;  Laterality: Bilateral;   • GROIN ABSCESS INCISION AND DRAINAGE Right     hidradenitis   • HYSTERECTOMY     • KNEE SURGERY Bilateral    • PILONIDAL CYSTECTOMY N/A 2023    Procedure: Excision Hidradenitis of Inguinal Area and Pilonidal Cystectomy;  Surgeon: Donato You MD;  Location: [facility];  Service: General;  Laterality: N/A;         Physical Assessment:  Wound 23 1333 medial gluteal Incision (Active)   Wound Image   23 1200   Dressing Appearance dry;intact 23 1200   Closure None 23 1200   Base red;moist;bleeding 23 1200   Periwound intact;redness;moist;pink  04/12/23 1200   Periwound Temperature warm 04/12/23 1200   Periwound Skin Turgor soft 04/12/23 1200   Edges open 04/12/23 1200   Wound Length (cm) 4.2 cm 04/12/23 1200   Wound Width (cm) 1 cm 04/12/23 1200   Wound Depth (cm) 2.6 cm 04/12/23 1200   Wound Surface Area (cm^2) 4.2 cm^2 04/12/23 1200   Wound Volume (cm^3) 10.92 cm^3 04/12/23 1200   Drainage Characteristics/Odor serosanguineous;bleeding controlled 04/12/23 1200   Drainage Amount moderate 04/12/23 1200   Care, Wound cleansed with;irrigated with;sterile normal saline 04/12/23 1200   Dressing Care dressing applied;dressing moistened;foam;silicone;border dressing;calcium alginate;hydrocolloid 04/12/23 1200   Periwound Care absorptive dressing applied 04/12/23 1200       Wound 03/22/23 1510 Bilateral distal abdomen Incision (Active)   Wound Image   04/12/23 1200   Dressing Appearance dry;intact 04/12/23 1200   Closure None 04/12/23 1200   Base red;moist 04/12/23 1200   Periwound intact;dry 04/12/23 1200   Periwound Temperature warm 04/12/23 1200   Periwound Skin Turgor soft 04/12/23 1200   Edges open 04/12/23 1200   Wound Length (cm) 0.4 cm 04/12/23 1200   Wound Width (cm) 1.1 cm 04/12/23 1200   Wound Depth (cm) 0.2 cm 04/12/23 1200   Wound Surface Area (cm^2) 0.44 cm^2 04/12/23 1200   Wound Volume (cm^3) 0.088 cm^3 04/12/23 1200   Drainage Characteristics/Odor serosanguineous 04/12/23 1200   Drainage Amount small 04/12/23 1200   Care, Wound cleansed with;irrigated with;sterile normal saline 04/12/23 1200   Dressing Care dressing applied;packed with;packing strip;gauze, iodoform;hydrofiber;silver impregnated;silicone;border dressing 04/12/23 1200   Periwound Care absorptive dressing applied 04/12/23 1200       Wound 03/22/23 1515 Left anterior greater trochanter Incision (Active)   Wound Image   04/12/23 1200   Dressing Appearance dry;intact 04/12/23 1200   Closure None 04/12/23 1200   Base red;moist 04/12/23 1200   Periwound intact;redness;dry 04/12/23 1200    Periwound Temperature warm 04/12/23 1200   Periwound Skin Turgor soft 04/12/23 1200   Edges open 04/12/23 1200   Drainage Characteristics/Odor serosanguineous 04/12/23 1200   Drainage Amount small 04/12/23 1200   Care, Wound cleansed with;irrigated with;sterile normal saline 04/12/23 1200   Dressing Care dressing applied;packed with;packing strip;gauze, iodoform;hydrofiber;silver impregnated;silicone;border dressing 04/12/23 1200   Periwound Care absorptive dressing applied 04/12/23 1200       Wound 03/22/23 1516 Right anterior greater trochanter Incision (Active)   Wound Image   04/12/23 1200   Dressing Appearance dry;intact 04/12/23 1200   Closure None 04/12/23 1200   Base red;moist 04/12/23 1200   Periwound intact;redness;moist 04/12/23 1200   Periwound Temperature warm 04/12/23 1200   Periwound Skin Turgor soft 04/12/23 1200   Edges open 04/12/23 1200   Wound Length (cm) 0.3 cm 04/12/23 1200   Wound Width (cm) 1 cm 04/12/23 1200   Wound Depth (cm) 2.2 cm 04/12/23 1200   Wound Surface Area (cm^2) 0.3 cm^2 04/12/23 1200   Wound Volume (cm^3) 0.66 cm^3 04/12/23 1200   Drainage Characteristics/Odor serosanguineous 04/12/23 1200   Drainage Amount small 04/12/23 1200   Care, Wound cleansed with;irrigated with;sterile normal saline 04/12/23 1200   Dressing Care dressing applied;packed with;packing strip;gauze, iodoform;silver impregnated;hydrofiber;silicone;border dressing 04/12/23 1200   Periwound Care absorptive dressing applied 04/12/23 1200       Wound 03/22/23 1518 medial coccyx Incision (Active)   Dressing Appearance dry;intact 04/12/23 1200   Closure None 04/12/23 1200   Base red;moist 04/12/23 1200   Periwound moist;redness;pink 04/12/23 1200   Periwound Temperature warm 04/12/23 1200   Periwound Skin Turgor soft 04/12/23 1200   Edges open 04/12/23 1200   Wound Length (cm) 1.2 cm 04/12/23 1200   Wound Width (cm) 0.4 cm 04/12/23 1200   Wound Depth (cm) 0.9 cm 04/12/23 1200   Wound Surface Area (cm^2) 0.48 cm^2  04/12/23 1200   Wound Volume (cm^3) 0.432 cm^3 04/12/23 1200   Drainage Characteristics/Odor serosanguineous 04/12/23 1200   Drainage Amount small 04/12/23 1200   Care, Wound cleansed with;irrigated with;sterile normal saline 04/12/23 1200   Dressing Care dressing applied;dressing moistened;foam;silicone;border dressing;hydrocolloid;calcium alginate 04/12/23 1200   Periwound Care absorptive dressing applied 04/12/23 1200      Wound Check / Follow-up:  Patient seen today for outpatient wound follow-up and dressing change. Spouse present at bedside. He states that dressing to coccyx/perirectal area came off at home but he replaced it. All dressings are intact at this time. Removed dressing to coccyx/perirectal area. Moisture and redness remains to periwound tissue. Wound bases are red and moist. Induration to right gluteal aspect has improved, only slight amount remains. Scant amount of drainage expressed upon palpation. Cleansed and irrigated with normal saline. Placed a [place]ome[place] soaked pillow case to periwound tissue and allowed to sit for fifteen minutes while abdominal and groin wound care was performed. After this was completed, filled coccyx and perirectal wound beds with normal saline moistened purple foam (hydrofera). Placed calcium alginate over wounds and periwound tissue. Secured with silicone border dressing. Strips of hydrocolloid dressing placed to distal portion of dressing to aid in seal and prevent lifting. Right groin incision with two open areas. Intermittent closure noted between the two. Wound bases are red and moist. Cleansed and irrigated with normal saline and gauze. Filled with iodoform packing strip, covered with silver impregnated hydrofiber and silicone border dressing. Left groin wound with minimal depth and red, moist base. Cleansed with normal saline and gauze. Filled with iodoform packing strip, covered with silver impregnated hydrofiber and silicone border dressing. Abdominal incisions  with two intermittent areas of open tissue. Wound bases are red and moist. Cleansed with normal saline and gauze. Filled with iodoform packing strip, covered with silver impregnated hydrofiber and silicone border dressing. Wounds appear to be responding to current treatment. Recommending to continue current wound care at this time.        Impression: Surgical wounds with delayed closure.        Short term goals:  Regain skin integrity, daily dressing changes, 3x per week dressing changes, skin protection.      Lanette Cantu RN    4/12/2023    12:21 EDT

## 2023-04-14 ENCOUNTER — HOSPITAL ENCOUNTER (OUTPATIENT)
Dept: INFUSION THERAPY | Facility: HOSPITAL | Age: 53
Discharge: HOME | End: 2023-04-14
Admitting: NURSE PRACTITIONER
Payer: COMMERCIAL

## 2023-04-14 VITALS
DIASTOLIC BLOOD PRESSURE: 80 MMHG | HEART RATE: 103 BPM | RESPIRATION RATE: 20 BRPM | TEMPERATURE: 98 F | OXYGEN SATURATION: 97 % | SYSTOLIC BLOOD PRESSURE: 109 MMHG

## 2023-04-14 DIAGNOSIS — L73.2 HIDRADENITIS: Primary | ICD-10-CM

## 2023-04-14 PROCEDURE — G0463 HOSPITAL OUTPT CLINIC VISIT: HCPCS

## 2023-04-14 RX ORDER — CALCIUM ACETATE MONOHYDRATE AND ALUMINUM SULFATE TETRADECAHYDRATE 952; 1347 MG/2299MG; MG/2299MG
1 POWDER, FOR SOLUTION TOPICAL DAILY
Status: CANCELLED
Start: 2023-04-15

## 2023-04-14 RX ORDER — CALCIUM ACETATE MONOHYDRATE AND ALUMINUM SULFATE TETRADECAHYDRATE 952; 1347 MG/2299MG; MG/2299MG
1 POWDER, FOR SOLUTION TOPICAL DAILY
Status: DISCONTINUED | OUTPATIENT
Start: 2023-04-14 | End: 2023-04-16 | Stop reason: HOSPADM

## 2023-04-14 NOTE — SIGNIFICANT NOTE
Wound Eval / Progress Noted     [facility]     Patient Name: Mariela Aldrich  : 1970  MRN: [id number]  Today's Date: 2023                 Admit Date: 2023    Visit Dx:    ICD-10-CM ICD-9-CM   1. Hidradenitis  L73.2 705.83       Patient Active Problem List   Diagnosis   • Anxiety   • Type 2 diabetes mellitus with hyperglycemia, with long-term current use of insulin   • Hyperlipidemia   • Hypothyroidism   • Panic attack   • RLS (restless legs syndrome)   • Sinus trouble   • OAB (overactive bladder)   • Mild intermittent asthma without complication   • Hidradenitis        Past Medical History:   Diagnosis Date   • Anxiety    • Asthma     PRN INHALER AND TAKES ALLERGY INJECTIONS   • Diabetes mellitus type 2, controlled     AVERAGE    • Hidradenitis    • Hyperlipidemia    • Hypothyroidism    • Murmur     DIAGNOSED WHEN 17 BUT IS ASYMPTOMATIC AND IS FOLLOWED ONLY BY PCP   • Panic attack    • Pilonidal cyst    • Rheumatic fever     AS A CHILD   • RLS (restless legs syndrome)    • Sinus trouble         Past Surgical History:   Procedure Laterality Date   • COLONOSCOPY  2020    exernal hemorrhoids   • EXCISION LESION Bilateral 3/20/2023    Procedure: Excision Hidradenitis of Bilateral Inguinal Areas;  Surgeon: Donato You MD;  Location: [facility];  Service: General;  Laterality: Bilateral;   • GROIN ABSCESS INCISION AND DRAINAGE Right     hidradenitis   • HYSTERECTOMY     • KNEE SURGERY Bilateral    • PILONIDAL CYSTECTOMY N/A 2023    Procedure: Excision Hidradenitis of Inguinal Area and Pilonidal Cystectomy;  Surgeon: Donato You MD;  Location: [facility];  Service: General;  Laterality: N/A;         Physical Assessment:  Wound 23 1333 medial gluteal Incision (Active)   Wound Image   23 1150   Dressing Appearance intact;dry 23 1150   Closure None 23 1150   Base red;moist;bleeding 23 1150   Red (%), Wound Tissue Color 100 23  1150   Periwound intact;redness;moist;pink 04/14/23 1150   Periwound Temperature warm 04/14/23 1150   Periwound Skin Turgor soft 04/14/23 1150   Edges open 04/14/23 1150   Drainage Characteristics/Odor serosanguineous;bleeding controlled 04/14/23 1150   Drainage Amount moderate 04/14/23 1150   Care, Wound cleansed with;irrigated with;sterile normal saline 04/14/23 1150   Dressing Care dressing applied;dressing moistened;foam;calcium alginate;silicone;border dressing;hydrocolloid 04/14/23 1150   Periwound Care absorptive dressing applied 04/14/23 1150       Wound 03/22/23 1510 Bilateral distal abdomen Incision (Active)   Wound Image   04/14/23 1150   Dressing Appearance intact;dry 04/14/23 1150   Closure None 04/14/23 1150   Base red;moist;epithelialization;dry 04/14/23 1150   Red (%), Wound Tissue Color 100 04/14/23 1150   Periwound intact;dry 04/14/23 1150   Periwound Temperature warm 04/14/23 1150   Periwound Skin Turgor soft 04/14/23 1150   Edges rolled/closed;open 04/14/23 1150   Drainage Characteristics/Odor serosanguineous 04/14/23 1150   Drainage Amount small 04/14/23 1150   Care, Wound cleansed with;irrigated with;sterile normal saline 04/14/23 1150   Dressing Care dressing applied;packed with;gauze, iodoform;packing strip;silver impregnated;hydrofiber;silicone;border dressing 04/14/23 1150   Periwound Care absorptive dressing applied 04/14/23 1150       Wound 03/22/23 1515 Left anterior greater trochanter Incision (Active)   Wound Image   04/14/23 1150   Dressing Appearance intact;dry 04/14/23 1150   Closure None 04/14/23 1150   Base red;moist;granulating 04/14/23 1150   Red (%), Wound Tissue Color 100 04/14/23 1150   Periwound intact;redness;dry 04/14/23 1150   Periwound Temperature warm 04/14/23 1150   Periwound Skin Turgor soft 04/14/23 1150   Edges open 04/14/23 1150   Drainage Characteristics/Odor serosanguineous 04/14/23 1150   Drainage Amount small 04/14/23 1150   Care, Wound cleansed with;irrigated  with;sterile normal saline 04/14/23 1150   Dressing Care dressing applied;packed with;gauze, iodoform;packing strip;silver impregnated;hydrofiber;silicone;border dressing 04/14/23 1150   Periwound Care absorptive dressing applied 04/14/23 1150       Wound 03/22/23 1516 Right anterior greater trochanter Incision (Active)   Wound Image   04/14/23 1150   Dressing Appearance intact;dry 04/14/23 1150   Closure None 04/14/23 1150   Base red;moist 04/14/23 1150   Red (%), Wound Tissue Color 100 04/14/23 1150   Periwound intact;redness;moist 04/14/23 1150   Periwound Temperature warm 04/14/23 1150   Periwound Skin Turgor soft 04/14/23 1150   Edges open 04/14/23 1150   Drainage Characteristics/Odor serosanguineous 04/14/23 1150   Drainage Amount small 04/14/23 1150   Care, Wound cleansed with;irrigated with;sterile normal saline 04/14/23 1150   Dressing Care dressing applied;packed with;gauze, iodoform;packing strip;silver impregnated;hydrofiber;silicone;border dressing 04/14/23 1150   Periwound Care absorptive dressing applied 04/14/23 1150       Wound 03/22/23 1518 medial coccyx Incision (Active)   Wound Image   04/14/23 1150   Dressing Appearance intact;dry 04/14/23 1150   Closure None 04/14/23 1150   Base red;moist 04/14/23 1150   Red (%), Wound Tissue Color 100 04/14/23 1150   Periwound moist;redness;pink 04/14/23 1150   Periwound Temperature warm 04/14/23 1150   Periwound Skin Turgor soft 04/14/23 1150   Edges open 04/14/23 1150   Drainage Characteristics/Odor serosanguineous 04/14/23 1150   Drainage Amount small 04/14/23 1150   Care, Wound cleansed with;irrigated with;sterile normal saline 04/14/23 1150   Dressing Care dressing applied;dressing moistened;foam;calcium alginate;silicone;border dressing;hydrocolloid 04/14/23 1150   Periwound Care absorptive dressing applied 04/14/23 1150        Wound Check / Follow-up:  Patient seen today for a wound check and dressing change. Spouse present at bedside; reporting the  dressing to the buttocks had came loose, he reinforced the dressing with a hydrocolloid. Dressing removed from the perirectal / sacral. Moisture and redness remains to the periwound. Induration remains to right gluteal aspect; small amount of purulent drainage expressed. Cleansed wounds with NS. Applied domeboro soaks to buttocks and allowed to sit for 15 minutes. After soak, applied moistened purple foam to wound beds and then placed a calcium alginate dressing over wounds and periwound then secured with a silicone border dressing and hydrocolloids.  Abdominal wound with 2 areas that remains open and closure to the center of the wound. Wound continue to respond to current treatment. Cleansed with NS. Filled wounds with iodoform packing strip and covered with silver impregnated hydrofiber and silicone border dressings.  Right groin incision with intermittent closure to center of wound with measurable depth to the open wound bases. Tissue is red and moist. Left groin is flush with the periwound; tissue is red and moist as well. Cleansed with NS. Filled wounds with iodoform packing and secured with silicone border dressings.  Patient continues to respond to all treatment; recommend to continue at this time.      Impression:  Surgical wounds with delayed closure    Short term goals:  Regain skin integrity, daily dressing changes done by spouse in the absence of the wound nurse,  3x week checks / dressing changes, skin protection    No Henriquez RN    4/14/2023    12:44 EDT

## 2023-04-17 ENCOUNTER — HOSPITAL ENCOUNTER (OUTPATIENT)
Dept: INFUSION THERAPY | Facility: HOSPITAL | Age: 53
Discharge: HOME | End: 2023-04-17
Admitting: NURSE PRACTITIONER
Payer: COMMERCIAL

## 2023-04-17 VITALS
OXYGEN SATURATION: 98 % | RESPIRATION RATE: 20 BRPM | SYSTOLIC BLOOD PRESSURE: 108 MMHG | HEART RATE: 98 BPM | TEMPERATURE: 98.4 F | DIASTOLIC BLOOD PRESSURE: 70 MMHG

## 2023-04-17 DIAGNOSIS — L73.2 HIDRADENITIS: Primary | ICD-10-CM

## 2023-04-17 PROCEDURE — G0463 HOSPITAL OUTPT CLINIC VISIT: HCPCS

## 2023-04-17 RX ORDER — CALCIUM ACETATE MONOHYDRATE AND ALUMINUM SULFATE TETRADECAHYDRATE 952; 1347 MG/2299MG; MG/2299MG
1 POWDER, FOR SOLUTION TOPICAL DAILY
Status: DISCONTINUED | OUTPATIENT
Start: 2023-04-17 | End: 2023-04-19 | Stop reason: HOSPADM

## 2023-04-17 RX ORDER — CALCIUM ACETATE MONOHYDRATE AND ALUMINUM SULFATE TETRADECAHYDRATE 952; 1347 MG/2299MG; MG/2299MG
1 POWDER, FOR SOLUTION TOPICAL DAILY
Status: CANCELLED
Start: 2023-04-18

## 2023-04-17 NOTE — SIGNIFICANT NOTE
Wound Eval / Progress Noted     [facility]     Patient Name: Mariela Aldrich  : 1970  MRN: [id number]  Today's Date: 2023                 Admit Date: 2023    Visit Dx:    ICD-10-CM ICD-9-CM   1. Hidradenitis  L73.2 705.83       Patient Active Problem List   Diagnosis   • Anxiety   • Type 2 diabetes mellitus with hyperglycemia, with long-term current use of insulin   • Hyperlipidemia   • Hypothyroidism   • Panic attack   • RLS (restless legs syndrome)   • Sinus trouble   • OAB (overactive bladder)   • Mild intermittent asthma without complication   • Hidradenitis        Past Medical History:   Diagnosis Date   • Anxiety    • Asthma     PRN INHALER AND TAKES ALLERGY INJECTIONS   • Diabetes mellitus type 2, controlled     AVERAGE    • Hidradenitis    • Hyperlipidemia    • Hypothyroidism    • Murmur     DIAGNOSED WHEN 17 BUT IS ASYMPTOMATIC AND IS FOLLOWED ONLY BY PCP   • Panic attack    • Pilonidal cyst    • Rheumatic fever     AS A CHILD   • RLS (restless legs syndrome)    • Sinus trouble         Past Surgical History:   Procedure Laterality Date   • COLONOSCOPY  2020    exernal hemorrhoids   • EXCISION LESION Bilateral 3/20/2023    Procedure: Excision Hidradenitis of Bilateral Inguinal Areas;  Surgeon: Donato You MD;  Location: [facility];  Service: General;  Laterality: Bilateral;   • GROIN ABSCESS INCISION AND DRAINAGE Right     hidradenitis   • HYSTERECTOMY     • KNEE SURGERY Bilateral    • PILONIDAL CYSTECTOMY N/A 2023    Procedure: Excision Hidradenitis of Inguinal Area and Pilonidal Cystectomy;  Surgeon: Donato You MD;  Location: [facility];  Service: General;  Laterality: N/A;         Physical Assessment:  Wound 23 1333 medial gluteal Incision (Active)   Wound Image   23 1150   Dressing Appearance intact;dry 23 1150   Closure None 23 1150   Base red;moist;bleeding 23 1150   Red (%), Wound Tissue Color 100 23  1150   Periwound intact;redness;moist;dry 04/17/23 1150   Periwound Temperature warm 04/17/23 1150   Periwound Skin Turgor soft 04/17/23 1150   Edges open 04/17/23 1150   Wound Length (cm) 4.2 cm 04/17/23 1150   Wound Width (cm) 1.5 cm 04/17/23 1150   Wound Depth (cm) 3.3 cm 04/17/23 1150   Wound Surface Area (cm^2) 6.3 cm^2 04/17/23 1150   Wound Volume (cm^3) 20.79 cm^3 04/17/23 1150   Drainage Characteristics/Odor serosanguineous;bleeding controlled;purulent 04/17/23 1150   Drainage Amount moderate 04/17/23 1150   Care, Wound cleansed with;irrigated with;sterile normal saline 04/17/23 1150   Dressing Care dressing applied;dressing moistened;foam;silicone;border dressing;hydrocolloid;other (see comments) 04/17/23 1150   Periwound Care absorptive dressing applied 04/17/23 1150       Wound 03/22/23 1510 Bilateral distal abdomen Incision (Active)   Wound Image   04/17/23 1150   Dressing Appearance intact;dry 04/17/23 1150   Closure None 04/17/23 1150   Base epithelialization;dry;pink 04/17/23 1150   Periwound intact;dry 04/17/23 1150   Periwound Temperature warm 04/17/23 1150   Periwound Skin Turgor soft 04/17/23 1150   Edges open;rolled/closed 04/17/23 1150   Wound Length (cm) 0.3 cm 04/17/23 1150   Wound Width (cm) 1 cm 04/17/23 1150   Wound Depth (cm) 0.2 cm 04/17/23 1150   Wound Surface Area (cm^2) 0.3 cm^2 04/17/23 1150   Wound Volume (cm^3) 0.06 cm^3 04/17/23 1150   Drainage Amount none 04/17/23 1150   Care, Wound cleansed with;irrigated with;sterile normal saline 04/17/23 1150   Dressing Care dressing applied;silver impregnated;hydrofiber;silicone;border dressing 04/17/23 1150   Periwound Care absorptive dressing applied 04/17/23 1150       Wound 03/22/23 1515 Left anterior greater trochanter Incision (Active)   Wound Image   04/17/23 1150   Dressing Appearance intact;dry 04/17/23 1150   Closure None 04/17/23 1150   Base red;moist;granulating 04/17/23 1150   Red (%), Wound Tissue Color 100 04/17/23 1150    Periwound intact;redness;dry 04/17/23 1150   Periwound Temperature warm 04/17/23 1150   Periwound Skin Turgor soft 04/17/23 1150   Edges open 04/17/23 1150   Drainage Characteristics/Odor serosanguineous 04/17/23 1150   Drainage Amount small 04/17/23 1150   Care, Wound cleansed with;irrigated with;sterile normal saline 04/17/23 1150   Dressing Care dressing applied;silver impregnated;hydrofiber;silicone;border dressing 04/17/23 1150   Periwound Care absorptive dressing applied 04/17/23 1150       Wound 03/22/23 1516 Right anterior greater trochanter Incision (Active)   Wound Image   04/17/23 1150   Dressing Appearance intact;dry 04/17/23 1150   Closure None 04/17/23 1150   Base red;moist 04/17/23 1150   Red (%), Wound Tissue Color 100 04/17/23 1150   Periwound intact;redness;dry 04/17/23 1150   Periwound Temperature warm 04/17/23 1150   Periwound Skin Turgor soft 04/17/23 1150   Edges open 04/17/23 1150   Wound Length (cm) 0.3 cm 04/17/23 1150   Wound Width (cm) 1 cm 04/17/23 1150   Wound Depth (cm) 0.5 cm 04/17/23 1150   Wound Surface Area (cm^2) 0.3 cm^2 04/17/23 1150   Wound Volume (cm^3) 0.15 cm^3 04/17/23 1150   Drainage Characteristics/Odor serosanguineous 04/17/23 1150   Drainage Amount small 04/17/23 1150   Care, Wound cleansed with;irrigated with;sterile normal saline 04/17/23 1150   Dressing Care dressing applied;packed with;gauze, iodoform;packing strip;silver impregnated;hydrofiber;silicone;border dressing 04/17/23 1150   Periwound Care absorptive dressing applied 04/17/23 1150       Wound 03/22/23 1518 medial coccyx Incision (Active)   Wound Image   04/17/23 1150   Dressing Appearance intact;dry 04/17/23 1150   Closure None 04/17/23 1150   Base red;moist 04/17/23 1150   Red (%), Wound Tissue Color 100 04/17/23 1150   Periwound moist;redness;pink 04/17/23 1150   Periwound Temperature warm 04/17/23 1150   Periwound Skin Turgor soft 04/17/23 1150   Edges open 04/17/23 1150   Wound Length (cm) 1 cm  04/17/23 1150   Wound Width (cm) 0.3 cm 04/17/23 1150   Wound Depth (cm) 0.5 cm 04/17/23 1150   Wound Surface Area (cm^2) 0.3 cm^2 04/17/23 1150   Wound Volume (cm^3) 0.15 cm^3 04/17/23 1150   Drainage Characteristics/Odor serosanguineous 04/17/23 1150   Drainage Amount scant 04/17/23 1150   Care, Wound cleansed with;irrigated with;sterile normal saline 04/17/23 1150   Dressing Care dressing applied;packed with;dressing moistened;foam;silicone;border dressing;other (see comments) 04/17/23 1150   Periwound Care absorptive dressing applied 04/17/23 1150        Wound Check / Follow-up:  Patient seen today for wound check and dressing change. Spouse present at bedside; reported that he had to reinforce the dressing to the buttocks.   Dressing removed from the perirectal / sacral wound; strong odor detected upon removal of dressing. Cleansed and irrigated with a copious amount of NS. Odor resolved once wound was cleansed. Induration remains present to the right aspect of the wound. Expressed a small amount of yellow purulent drainage. Applied domeboro soak to buttocks and allowed to sit for 15 minutes. After soak cleansed again with NS. Wound bases to the sacral / perirectal wounds a red and moist. Filled wounds with NS moistened purple foam and secured with a silicone border dressing.  Abdominal wound with closure to all previously dehisced tissue except the wound at 3 o'clock with minima depth measured. Cleansed with NS. Filled with NS moistened silver impregnated hydrofiber and secured with silicone border dressing. Recommend to patient to apply Aquaphor or A&D ointment to newly epithelialized tissue to keep soft.   Right groin incision with intermittent closure to center of wound with measurable depth to the open wound bases. Tissue remain red and moist. Left groin wound is flush with skin; tissue is red and moist. Cleansed with NS. Filled the right groin with iodoform packing strip and covered with silver  impregnated hydrofiber. Applied silver impregnated hydrofiber to the left groin and covered with silver impregnated hydrofiber. Recommend to continue current treatment at this time.     Impression: surgical wounds with delayed closure    Short term goals:  Regain skin integrity, daily dressing changes done by spouse in the absence of the wound nurse, 3x week checks / dressing changes    No Henriquez RN    4/17/2023    16:23 EDT

## 2023-04-19 ENCOUNTER — HOSPITAL ENCOUNTER (OUTPATIENT)
Dept: INFUSION THERAPY | Facility: HOSPITAL | Age: 53
Discharge: HOME | End: 2023-04-19
Admitting: NURSE PRACTITIONER
Payer: COMMERCIAL

## 2023-04-19 VITALS
TEMPERATURE: 97.9 F | HEART RATE: 96 BPM | OXYGEN SATURATION: 96 % | RESPIRATION RATE: 20 BRPM | SYSTOLIC BLOOD PRESSURE: 107 MMHG | DIASTOLIC BLOOD PRESSURE: 73 MMHG

## 2023-04-19 DIAGNOSIS — L73.2 HIDRADENITIS: Primary | ICD-10-CM

## 2023-04-19 PROCEDURE — G0463 HOSPITAL OUTPT CLINIC VISIT: HCPCS

## 2023-04-19 RX ORDER — CALCIUM ACETATE MONOHYDRATE AND ALUMINUM SULFATE TETRADECAHYDRATE 952; 1347 MG/2299MG; MG/2299MG
1 POWDER, FOR SOLUTION TOPICAL DAILY
Status: DISCONTINUED | OUTPATIENT
Start: 2023-04-19 | End: 2023-04-21 | Stop reason: HOSPADM

## 2023-04-19 RX ORDER — CALCIUM ACETATE MONOHYDRATE AND ALUMINUM SULFATE TETRADECAHYDRATE 952; 1347 MG/2299MG; MG/2299MG
1 POWDER, FOR SOLUTION TOPICAL DAILY
Status: CANCELLED
Start: 2023-04-20

## 2023-04-19 NOTE — SIGNIFICANT NOTE
Wound Eval / Progress Noted     [facility]     Patient Name: Mariela Aldrich  : 1970  MRN: [id number]  Today's Date: 2023                 Admit Date: 2023    Visit Dx:    ICD-10-CM ICD-9-CM   1. Hidradenitis  L73.2 705.83       Patient Active Problem List   Diagnosis   • Anxiety   • Type 2 diabetes mellitus with hyperglycemia, with long-term current use of insulin   • Hyperlipidemia   • Hypothyroidism   • Panic attack   • RLS (restless legs syndrome)   • Sinus trouble   • OAB (overactive bladder)   • Mild intermittent asthma without complication   • Hidradenitis        Past Medical History:   Diagnosis Date   • Anxiety    • Asthma     PRN INHALER AND TAKES ALLERGY INJECTIONS   • Diabetes mellitus type 2, controlled     AVERAGE    • Hidradenitis    • Hyperlipidemia    • Hypothyroidism    • Murmur     DIAGNOSED WHEN 17 BUT IS ASYMPTOMATIC AND IS FOLLOWED ONLY BY PCP   • Panic attack    • Pilonidal cyst    • Rheumatic fever     AS A CHILD   • RLS (restless legs syndrome)    • Sinus trouble         Past Surgical History:   Procedure Laterality Date   • COLONOSCOPY  2020    exernal hemorrhoids   • EXCISION LESION Bilateral 3/20/2023    Procedure: Excision Hidradenitis of Bilateral Inguinal Areas;  Surgeon: Donato You MD;  Location: [facility];  Service: General;  Laterality: Bilateral;   • GROIN ABSCESS INCISION AND DRAINAGE Right     hidradenitis   • HYSTERECTOMY     • KNEE SURGERY Bilateral    • PILONIDAL CYSTECTOMY N/A 2023    Procedure: Excision Hidradenitis of Inguinal Area and Pilonidal Cystectomy;  Surgeon: Donato You MD;  Location: [facility];  Service: General;  Laterality: N/A;         Physical Assessment:  Wound 23 1333 medial gluteal Incision (Active)   Wound Image   23 1135   Dressing Appearance intact;dry 23 1135   Closure None 23 1135   Base red;moist;bleeding 23 1135   Red (%), Wound Tissue Color 100 23  1135   Periwound intact;moist;dry;pink 04/19/23 1135   Periwound Temperature warm 04/19/23 1135   Periwound Skin Turgor soft 04/19/23 1135   Edges open 04/19/23 1135   Drainage Characteristics/Odor serosanguineous;bleeding controlled;purulent 04/19/23 1135   Drainage Amount small 04/19/23 1135   Care, Wound cleansed with;irrigated with;sterile normal saline 04/19/23 1135   Dressing Care dressing applied;dressing moistened;foam;silicone;border dressing;hydrocolloid;other (see comments) 04/19/23 1135   Periwound Care absorptive dressing applied 04/19/23 1135       Wound 03/22/23 1510 Bilateral distal abdomen Incision (Active)   Wound Image   04/19/23 1135   Dressing Appearance open to air 04/19/23 1135   Closure None 04/19/23 1135   Base red;moist 04/19/23 1135   Red (%), Wound Tissue Color 100 04/19/23 1135   Periwound intact;dry;pink 04/19/23 1135   Periwound Temperature warm 04/19/23 1135   Periwound Skin Turgor soft 04/19/23 1135   Edges rolled/closed;open 04/19/23 1135   Drainage Characteristics/Odor serosanguineous 04/19/23 1135   Drainage Amount scant 04/19/23 1135   Care, Wound cleansed with;sterile normal saline 04/19/23 1135   Dressing Care dressing applied;silver impregnated;hydrofiber;silicone;border dressing 04/19/23 1135   Periwound Care absorptive dressing applied 04/19/23 1135       Wound 03/22/23 1515 Left anterior greater trochanter Incision (Active)   Dressing Appearance intact;dry 04/19/23 1135   Closure None 04/19/23 1135   Base red;moist;granulating 04/19/23 1135   Red (%), Wound Tissue Color 100 04/19/23 1135   Periwound intact;redness;dry 04/19/23 1135   Periwound Temperature warm 04/19/23 1135   Periwound Skin Turgor soft 04/19/23 1135   Edges open 04/19/23 1135   Drainage Characteristics/Odor serosanguineous 04/19/23 1135   Drainage Amount small 04/19/23 1135   Care, Wound cleansed with;irrigated with;sterile normal saline 04/19/23 1135   Dressing Care dressing applied;silver  impregnated;hydrofiber;silicone;border dressing 04/19/23 1135   Periwound Care absorptive dressing applied 04/19/23 1135       Wound 03/22/23 1516 Right anterior greater trochanter Incision (Active)   Wound Image   04/19/23 1135   Dressing Appearance intact;dry 04/19/23 1135   Closure None 04/19/23 1135   Base red;moist 04/19/23 1135   Red (%), Wound Tissue Color 100 04/19/23 1135   Periwound intact;redness;dry 04/19/23 1135   Periwound Temperature warm 04/19/23 1135   Periwound Skin Turgor soft 04/19/23 1135   Edges open 04/19/23 1135   Drainage Characteristics/Odor serosanguineous 04/19/23 1135   Drainage Amount small 04/19/23 1135   Care, Wound cleansed with;irrigated with;sterile normal saline 04/19/23 1135   Dressing Care dressing applied;packed with;gauze, iodoform;packing strip;silver impregnated;hydrofiber;silicone;border dressing 04/19/23 1135   Periwound Care absorptive dressing applied 04/19/23 1135       Wound 03/22/23 1518 medial coccyx Incision (Active)   Wound Image   04/19/23 1135   Dressing Appearance intact;dry 04/19/23 1135   Closure None 04/19/23 1135   Base red;moist 04/19/23 1135   Red (%), Wound Tissue Color 100 04/19/23 1135   Periwound moist;redness;pink 04/19/23 1135   Periwound Temperature warm 04/19/23 1135   Periwound Skin Turgor soft 04/19/23 1135   Edges open 04/19/23 1135   Drainage Characteristics/Odor serosanguineous 04/19/23 1135   Drainage Amount scant 04/19/23 1135   Care, Wound cleansed with;irrigated with;sterile normal saline 04/19/23 1135   Dressing Care dressing applied;dressing moistened;foam;silicone;border dressing;other (see comments) 04/19/23 1135   Periwound Care absorptive dressing applied 04/19/23 1135        Wound Check / Follow-up:  Patient seen today for a wound check and dressing change. Spouse present at bedside.  Dressing removed  From the perirectal / sacral wounds. Irrigated wounds with NS. Induration remains present to the right gluteal aspect; small amount  of purulent drainage expressed. No odor detected. Applied dome[place] soak to buttocks and allowed to sit for 15 minutes. After the soak, cleansed and irrigated the wounds and second time. Wound bases are red and moist. Filled wounds with NS moistened purple foam and secured with a silicone border dressing and hydrocolloid.  Majority of abdominal surgical incision with closure, small area at 9 o'clock with superficial dehiscence and small amount of serosanguineous drainage. Cleansed with NS. Applied silver impregnated hydrofiber to the tissue. Remaining surgical incision left open to air.  Right groin incision with intermittent closure to center of the wound. Tissue is red and moist, measurable depth remains. Recommend to continue current wound care with iodoform packing. Left groin with ludy wound edges and decreased wound depth; pink epithelium to the wound edges. Wound base is red and moist. Cleansed with Ns. Recommend application of silver impregnated hydrofiber to the wound base.    Impression:srugical wounds with delayed closure    Short term goals:  Regain skin integrity, daily dressing changes, 3x week checks    No Henriquez RN    4/19/2023    12:08 EDT

## 2023-04-21 ENCOUNTER — HOSPITAL ENCOUNTER (OUTPATIENT)
Dept: INFUSION THERAPY | Facility: HOSPITAL | Age: 53
Discharge: HOME | End: 2023-04-21
Payer: COMMERCIAL

## 2023-04-21 VITALS
TEMPERATURE: 98.1 F | RESPIRATION RATE: 18 BRPM | DIASTOLIC BLOOD PRESSURE: 73 MMHG | SYSTOLIC BLOOD PRESSURE: 107 MMHG | HEART RATE: 104 BPM | OXYGEN SATURATION: 95 %

## 2023-04-21 DIAGNOSIS — L73.2 HIDRADENITIS: Primary | ICD-10-CM

## 2023-04-21 PROCEDURE — G0463 HOSPITAL OUTPT CLINIC VISIT: HCPCS

## 2023-04-21 RX ORDER — CALCIUM ACETATE MONOHYDRATE AND ALUMINUM SULFATE TETRADECAHYDRATE 952; 1347 MG/2299MG; MG/2299MG
1 POWDER, FOR SOLUTION TOPICAL DAILY
Status: CANCELLED
Start: 2023-04-22

## 2023-04-21 RX ORDER — CALCIUM ACETATE MONOHYDRATE AND ALUMINUM SULFATE TETRADECAHYDRATE 952; 1347 MG/2299MG; MG/2299MG
1 POWDER, FOR SOLUTION TOPICAL DAILY
Status: DISCONTINUED | OUTPATIENT
Start: 2023-04-21 | End: 2023-04-23 | Stop reason: HOSPADM

## 2023-04-21 RX ADMIN — Medication 1 PACKET: at 11:10

## 2023-04-21 NOTE — SIGNIFICANT NOTE
Wound Eval / Progress Noted     [facility]     Patient Name: Mariela Aldrich  : 1970  MRN: [id number]  Today's Date: 2023                 Admit Date: 2023    Visit Dx:    ICD-10-CM ICD-9-CM   1. Hidradenitis  L73.2 705.83       Patient Active Problem List   Diagnosis   • Anxiety   • Type 2 diabetes mellitus with hyperglycemia, with long-term current use of insulin   • Hyperlipidemia   • Hypothyroidism   • Panic attack   • RLS (restless legs syndrome)   • Sinus trouble   • OAB (overactive bladder)   • Mild intermittent asthma without complication   • Hidradenitis        Past Medical History:   Diagnosis Date   • Anxiety    • Asthma     PRN INHALER AND TAKES ALLERGY INJECTIONS   • Diabetes mellitus type 2, controlled     AVERAGE    • Hidradenitis    • Hyperlipidemia    • Hypothyroidism    • Murmur     DIAGNOSED WHEN 17 BUT IS ASYMPTOMATIC AND IS FOLLOWED ONLY BY PCP   • Panic attack    • Pilonidal cyst    • Rheumatic fever     AS A CHILD   • RLS (restless legs syndrome)    • Sinus trouble         Past Surgical History:   Procedure Laterality Date   • COLONOSCOPY  2020    exernal hemorrhoids   • EXCISION LESION Bilateral 3/20/2023    Procedure: Excision Hidradenitis of Bilateral Inguinal Areas;  Surgeon: Donato You MD;  Location: [facility];  Service: General;  Laterality: Bilateral;   • GROIN ABSCESS INCISION AND DRAINAGE Right     hidradenitis   • HYSTERECTOMY     • KNEE SURGERY Bilateral    • PILONIDAL CYSTECTOMY N/A 2023    Procedure: Excision Hidradenitis of Inguinal Area and Pilonidal Cystectomy;  Surgeon: Donato You MD;  Location: [facility];  Service: General;  Laterality: N/A;         Physical Assessment:  Wound 23 1333 medial gluteal Incision (Active)   Wound Image   23 1140   Dressing Appearance dry;intact 23 1140   Closure None 23 1140   Base red;moist 23 1140   Periwound intact;moist;dry;pink;redness 23  1140   Periwound Temperature warm 04/21/23 1140   Periwound Skin Turgor soft 04/21/23 1140   Edges open 04/21/23 1140   Wound Length (cm) 3.8 cm 04/21/23 1140   Wound Width (cm) 1.2 cm 04/21/23 1140   Wound Depth (cm) 2.3 cm 04/21/23 1140   Wound Surface Area (cm^2) 4.56 cm^2 04/21/23 1140   Wound Volume (cm^3) 10.488 cm^3 04/21/23 1140   Drainage Characteristics/Odor serosanguineous 04/21/23 1140   Drainage Amount small 04/21/23 1140   Care, Wound cleansed with;irrigated with;sterile normal saline 04/21/23 1140   Dressing Care dressing applied;dressing moistened;foam;silicone;border dressing;hydrocolloid 04/21/23 1140   Periwound Care absorptive dressing applied 04/21/23 1140       Wound 03/22/23 1510 Bilateral distal abdomen Incision (Active)   Wound Image   04/21/23 1140   Dressing Appearance dry 04/21/23 1140   Closure None 04/21/23 1140   Base red;moist 04/21/23 1140   Periwound intact;dry;pink 04/21/23 1140   Periwound Temperature warm 04/21/23 1140   Periwound Skin Turgor soft 04/21/23 1140   Edges rolled/closed;open 04/21/23 1140   Drainage Characteristics/Odor serosanguineous 04/21/23 1140   Drainage Amount scant 04/21/23 1140   Care, Wound cleansed with;sterile normal saline 04/21/23 1140   Dressing Care dressing applied;silver impregnated;hydrofiber;silicone;border dressing 04/21/23 1140   Periwound Care absorptive dressing applied 04/21/23 1140       Wound 03/22/23 1515 Left anterior greater trochanter Incision (Active)   Wound Image   04/21/23 1140   Dressing Appearance dry;intact 04/21/23 1140   Closure None 04/21/23 1140   Base red;moist;granulating 04/21/23 1140   Periwound intact;dry;pink 04/21/23 1140   Periwound Temperature warm 04/21/23 1140   Periwound Skin Turgor soft 04/21/23 1140   Edges open 04/21/23 1140   Wound Length (cm) 0.7 cm 04/21/23 1140   Wound Width (cm) 2.8 cm 04/21/23 1140   Wound Surface Area (cm^2) 1.96 cm^2 04/21/23 1140   Drainage Characteristics/Odor serosanguineous 04/21/23  1140   Drainage Amount small 04/21/23 1140   Care, Wound cleansed with;sterile normal saline 04/21/23 1140   Dressing Care dressing applied;hydrofiber;silver impregnated;silicone;border dressing 04/21/23 1140   Periwound Care absorptive dressing applied 04/21/23 1140       Wound 03/22/23 1516 Right anterior greater trochanter Incision (Active)   Wound Image   04/21/23 1140   Dressing Appearance dry;intact 04/21/23 1140   Closure None 04/21/23 1140   Base red;moist 04/21/23 1140   Periwound intact;redness;dry;pink 04/21/23 1140   Periwound Temperature warm 04/21/23 1140   Periwound Skin Turgor soft 04/21/23 1140   Edges open 04/21/23 1140   Wound Length (cm) 0.3 cm 04/21/23 1140   Wound Width (cm) 1.1 cm 04/21/23 1140   Wound Depth (cm) 0.5 cm 04/21/23 1140   Wound Surface Area (cm^2) 0.33 cm^2 04/21/23 1140   Wound Volume (cm^3) 0.165 cm^3 04/21/23 1140   Drainage Characteristics/Odor serosanguineous 04/21/23 1140   Drainage Amount small 04/21/23 1140   Care, Wound cleansed with;irrigated with;sterile normal saline 04/21/23 1140   Dressing Care dressing applied;packed with;packing strip;gauze, iodoform;hydrofiber;silver impregnated;silicone;border dressing 04/21/23 1140   Periwound Care absorptive dressing applied 04/21/23 1140       Wound 03/22/23 1518 medial coccyx Incision (Active)   Wound Image   04/21/23 1140   Dressing Appearance dry;intact 04/21/23 1140   Closure None 04/21/23 1140   Base red;moist 04/21/23 1140   Periwound moist;redness;pink 04/21/23 1140   Periwound Temperature warm 04/21/23 1140   Periwound Skin Turgor soft 04/21/23 1140   Edges open 04/21/23 1140   Wound Length (cm) 0.8 cm 04/21/23 1140   Wound Width (cm) 0.3 cm 04/21/23 1140   Wound Depth (cm) 2 cm 04/21/23 1140   Wound Surface Area (cm^2) 0.24 cm^2 04/21/23 1140   Wound Volume (cm^3) 0.48 cm^3 04/21/23 1140   Drainage Characteristics/Odor serosanguineous 04/21/23 1140   Drainage Amount scant 04/21/23 1140   Care, Wound cleansed  with;irrigated with;sterile normal saline 04/21/23 1140   Dressing Care dressing applied;dressing moistened;foam;silicone;border dressing 04/21/23 1140   Periwound Care absorptive dressing applied 04/21/23 1140      Wound Check / Follow-up:  Patient seen today for wound follow-up and dressing change. Spouse present at bedside. All dressings are intact at this time. Moisture and redness remains but is improving to periwound tissue surroudning coccyx and perirectal incisions. Cleansed and irrigated wounds with normal saline and gauze. Applied [place] soaked pillow case to periwound tissue and allowed to sit for 15 minutes. After this was completed, filled coccyx and perirectal wound beds with normal saline moistened purple foam (hydrofera). Secured with silicone border dressing. Strips of hydrocolloid dressing placed to distal portion of dressing to aid in seal and prevent lifting. Right groin incision with two open areas. Intermittent closure noted between the two. Wound bases are red and moist. Cleansed and irrigated with normal saline and gauze. Filled with iodoform packing strip, covered with silver impregnated hydrofiber and silicone border dressing. Left groin wound with minimal depth and red, moist base. Cleansed with normal saline and gauze. Placed silver impregnated hydrofiber to wound bed and covered with silicone border dressing. Two small open areas remain to abdominal incision. Cleansed with normal saline and gauze. Applied silver impregnated hydrofiber and secured with silicone border dressing. Recommending to continue current wound care treatment at this time.      Impression: Surgical wounds with delayed closure.      Short term goals:  Regain skin integrity, daily dressing changes, MWF dressing changes.      Lanette Cantu RN    4/21/2023    12:23 EDT

## 2023-04-24 ENCOUNTER — HOSPITAL ENCOUNTER (OUTPATIENT)
Dept: INFUSION THERAPY | Facility: HOSPITAL | Age: 53
Discharge: HOME | End: 2023-04-24
Admitting: NURSE PRACTITIONER
Payer: COMMERCIAL

## 2023-04-24 VITALS
HEART RATE: 97 BPM | TEMPERATURE: 98.3 F | RESPIRATION RATE: 20 BRPM | SYSTOLIC BLOOD PRESSURE: 117 MMHG | OXYGEN SATURATION: 99 % | DIASTOLIC BLOOD PRESSURE: 64 MMHG

## 2023-04-24 DIAGNOSIS — L73.2 HIDRADENITIS: Primary | ICD-10-CM

## 2023-04-24 PROCEDURE — G0463 HOSPITAL OUTPT CLINIC VISIT: HCPCS

## 2023-04-24 RX ORDER — CALCIUM ACETATE MONOHYDRATE AND ALUMINUM SULFATE TETRADECAHYDRATE 952; 1347 MG/2299MG; MG/2299MG
1 POWDER, FOR SOLUTION TOPICAL DAILY
Status: CANCELLED
Start: 2023-04-25

## 2023-04-24 RX ORDER — CALCIUM ACETATE MONOHYDRATE AND ALUMINUM SULFATE TETRADECAHYDRATE 952; 1347 MG/2299MG; MG/2299MG
1 POWDER, FOR SOLUTION TOPICAL DAILY
Status: DISCONTINUED | OUTPATIENT
Start: 2023-04-24 | End: 2023-04-26 | Stop reason: HOSPADM

## 2023-04-24 RX ADMIN — Medication 1 PACKET: at 11:12

## 2023-04-24 NOTE — SIGNIFICANT NOTE
Wound Eval / Progress Noted     [facility]     Patient Name: Mariela Aldrich  : 1970  MRN: [id number]  Today's Date: 2023                 Admit Date: 2023    Visit Dx:    ICD-10-CM ICD-9-CM   1. Hidradenitis  L73.2 705.83       Patient Active Problem List   Diagnosis   • Anxiety   • Type 2 diabetes mellitus with hyperglycemia, with long-term current use of insulin   • Hyperlipidemia   • Hypothyroidism   • Panic attack   • RLS (restless legs syndrome)   • Sinus trouble   • OAB (overactive bladder)   • Mild intermittent asthma without complication   • Hidradenitis        Past Medical History:   Diagnosis Date   • Anxiety    • Asthma     PRN INHALER AND TAKES ALLERGY INJECTIONS   • Diabetes mellitus type 2, controlled     AVERAGE    • Hidradenitis    • Hyperlipidemia    • Hypothyroidism    • Murmur     DIAGNOSED WHEN 17 BUT IS ASYMPTOMATIC AND IS FOLLOWED ONLY BY PCP   • Panic attack    • Pilonidal cyst    • Rheumatic fever     AS A CHILD   • RLS (restless legs syndrome)    • Sinus trouble         Past Surgical History:   Procedure Laterality Date   • COLONOSCOPY  2020    exernal hemorrhoids   • EXCISION LESION Bilateral 3/20/2023    Procedure: Excision Hidradenitis of Bilateral Inguinal Areas;  Surgeon: Donato You MD;  Location: [facility];  Service: General;  Laterality: Bilateral;   • GROIN ABSCESS INCISION AND DRAINAGE Right     hidradenitis   • HYSTERECTOMY     • KNEE SURGERY Bilateral    • PILONIDAL CYSTECTOMY N/A 2023    Procedure: Excision Hidradenitis of Inguinal Area and Pilonidal Cystectomy;  Surgeon: Donato You MD;  Location: [facility];  Service: General;  Laterality: N/A;         Physical Assessment:  Wound 23 1333 medial gluteal Incision (Active)   Wound Image   23 1154   Dressing Appearance moist drainage;intact 23 1154   Closure None 23 1154   Base red;moist 23 1154   Periwound redness;moist 23 1154    Periwound Temperature warm 04/24/23 1154   Periwound Skin Turgor soft 04/24/23 1154   Edges open 04/24/23 1154   Drainage Characteristics/Odor serosanguineous 04/24/23 1154   Drainage Amount small 04/24/23 1154   Care, Wound cleansed with;irrigated with;sterile normal saline 04/24/23 1154   Dressing Care dressing applied;dressing moistened;foam;silicone;border dressing;calcium alginate 04/24/23 1154   Periwound Care absorptive dressing applied 04/24/23 1154       Wound 03/22/23 1510 Bilateral distal abdomen Incision (Active)   Wound Image    04/24/23 1154   Dressing Appearance dry;intact 04/24/23 1154   Closure None 04/24/23 1154   Base red;moist 04/24/23 1154   Periwound intact;dry;pink;redness 04/24/23 1154   Periwound Temperature warm 04/24/23 1154   Periwound Skin Turgor soft 04/24/23 1154   Edges open;rolled/closed 04/24/23 1154   Drainage Characteristics/Odor serosanguineous 04/24/23 1154   Drainage Amount scant 04/24/23 1154   Care, Wound cleansed with;sterile normal saline 04/24/23 1154   Dressing Care dressing applied;hydrofiber;silver impregnated;silicone;border dressing 04/24/23 1154   Periwound Care absorptive dressing applied 04/24/23 1154       Wound 03/22/23 1515 Left anterior greater trochanter Incision (Active)   Wound Image   04/24/23 1154   Dressing Appearance dry;intact 04/24/23 1154   Closure None 04/24/23 1154   Base red;moist 04/24/23 1154   Periwound intact;dry;pink 04/24/23 1154   Periwound Temperature warm 04/24/23 1154   Periwound Skin Turgor soft 04/24/23 1154   Edges open 04/24/23 1154   Drainage Characteristics/Odor serosanguineous;sanguineous 04/24/23 1154   Drainage Amount small 04/24/23 1154   Care, Wound cleansed with;sterile normal saline 04/24/23 1154   Dressing Care dressing applied;hydrofiber;silver impregnated;silicone;border dressing 04/24/23 1154   Periwound Care absorptive dressing applied 04/24/23 1154       Wound 03/22/23 1516 Right anterior greater trochanter Incision  (Active)   Wound Image   04/24/23 1154   Dressing Appearance dry;intact 04/24/23 1154   Closure None 04/24/23 1154   Base red;moist 04/24/23 1154   Periwound intact;redness;dry;pink 04/24/23 1154   Periwound Temperature warm 04/24/23 1154   Periwound Skin Turgor soft 04/24/23 1154   Edges open 04/24/23 1154   Drainage Characteristics/Odor serosanguineous 04/24/23 1154   Drainage Amount small 04/24/23 1154   Care, Wound cleansed with;irrigated with;sterile normal saline 04/24/23 1154   Dressing Care dressing applied;packed with;packing strip;gauze, iodoform;hydrofiber;silver impregnated;silicone;border dressing 04/24/23 1154   Periwound Care absorptive dressing applied 04/24/23 1154       Wound 03/22/23 1518 medial coccyx Incision (Active)   Wound Image   04/24/23 1154   Dressing Appearance moist drainage;intact 04/24/23 1154   Closure None 04/24/23 1154   Base red;moist 04/24/23 1154   Periwound moist;redness 04/24/23 1154   Periwound Temperature warm 04/24/23 1154   Periwound Skin Turgor soft 04/24/23 1154   Edges open 04/24/23 1154   Drainage Characteristics/Odor serosanguineous 04/24/23 1154   Drainage Amount scant 04/24/23 1154   Care, Wound cleansed with;irrigated with;sterile normal saline 04/24/23 1154   Dressing Care dressing applied;dressing moistened;foam;silicone;border dressing;calcium alginate 04/24/23 1154   Periwound Care absorptive dressing applied 04/24/23 1154      Wound Check / Follow-up:  Patient seen today for wound follow-up and dressing change. Spouse present at bedside. All dressings are intact at this time. Moisture and redness present to tissue surrounding coccyx and perirectal incisions. Cleansed and irrigated wounds with normal saline and gauze. Applied dome[place] soaked pillow case to periwound tissue and allowed to sit for 15 minutes. After this was completed, filled coccyx and perirectal wound beds with normal saline moistened purple foam (hydrofera). Covered with calcium alginate  dressing. Secured with silicone border dressing. Strips of hydrocolloid dressing placed to distal portion of dressing to aid in seal and prevent lifting. There is small amount of induration present to inner right gluteal aspect and scant amount of purulent drainage expressed upon palpation. Right groin incision with two open areas. Intermittent closure noted between the two. Wound bases are red and moist. Cleansed and irrigated with normal saline and gauze. Filled with iodoform packing strip, covered with silver impregnated hydrofiber and silicone border dressing. Left groin wound with minimal depth and red, moist base. Cleansed with normal saline and gauze. Placed silver impregnated hydrofiber to wound bed and covered with silicone border dressing. Two small open areas remain to abdominal incision. Cleansed with normal saline and gauze. Applied silver impregnated hydrofiber and secured with silicone border dressing. Recommending to continue current wound care treatment at this time.      Impression: Surgical wounds with delayed closure.      Short term goals: Regain skin integrity, daily dressing changes, MWF dressing changes.      Lanette Cantu RN    4/24/2023    12:24 EDT

## 2023-04-26 ENCOUNTER — HOSPITAL ENCOUNTER (OUTPATIENT)
Dept: INFUSION THERAPY | Facility: HOSPITAL | Age: 53
Discharge: HOME | End: 2023-04-26
Admitting: NURSE PRACTITIONER
Payer: COMMERCIAL

## 2023-04-26 VITALS
HEART RATE: 103 BPM | RESPIRATION RATE: 20 BRPM | SYSTOLIC BLOOD PRESSURE: 114 MMHG | DIASTOLIC BLOOD PRESSURE: 70 MMHG | TEMPERATURE: 98.1 F | OXYGEN SATURATION: 99 %

## 2023-04-26 DIAGNOSIS — L73.2 HIDRADENITIS: Primary | ICD-10-CM

## 2023-04-26 PROCEDURE — G0463 HOSPITAL OUTPT CLINIC VISIT: HCPCS

## 2023-04-26 RX ORDER — CALCIUM ACETATE MONOHYDRATE AND ALUMINUM SULFATE TETRADECAHYDRATE 952; 1347 MG/2299MG; MG/2299MG
1 POWDER, FOR SOLUTION TOPICAL DAILY
Status: DISCONTINUED | OUTPATIENT
Start: 2023-04-26 | End: 2023-04-28 | Stop reason: HOSPADM

## 2023-04-26 RX ORDER — CALCIUM ACETATE MONOHYDRATE AND ALUMINUM SULFATE TETRADECAHYDRATE 952; 1347 MG/2299MG; MG/2299MG
1 POWDER, FOR SOLUTION TOPICAL DAILY
Status: CANCELLED
Start: 2023-04-27

## 2023-04-26 RX ORDER — CLINDAMYCIN HYDROCHLORIDE 150 MG/1
150 CAPSULE ORAL 4 TIMES DAILY
Qty: 40 CAPSULE | Refills: 0 | Status: SHIPPED | OUTPATIENT
Start: 2023-04-26 | End: 2023-05-06

## 2023-04-26 RX ADMIN — CALCIUM ACETATE MONOHYDRATE AND ALUMINUM SULFATE TETRADECAHYDRATE 1 PACKET: 952; 1347 POWDER, FOR SOLUTION TOPICAL at 11:22

## 2023-04-26 NOTE — SIGNIFICANT NOTE
Wound Eval / Progress Noted     [facility]     Patient Name: Mariela Aldrich  : 1970  MRN: [id number]  Today's Date: 2023                 Admit Date: 2023    Visit Dx:    ICD-10-CM ICD-9-CM   1. Hidradenitis  L73.2 705.83       Patient Active Problem List   Diagnosis   • Anxiety   • Type 2 diabetes mellitus with hyperglycemia, with long-term current use of insulin   • Hyperlipidemia   • Hypothyroidism   • Panic attack   • RLS (restless legs syndrome)   • Sinus trouble   • OAB (overactive bladder)   • Mild intermittent asthma without complication   • Hidradenitis        Past Medical History:   Diagnosis Date   • Anxiety    • Asthma     PRN INHALER AND TAKES ALLERGY INJECTIONS   • Diabetes mellitus type 2, controlled     AVERAGE    • Hidradenitis    • Hyperlipidemia    • Hypothyroidism    • Murmur     DIAGNOSED WHEN 17 BUT IS ASYMPTOMATIC AND IS FOLLOWED ONLY BY PCP   • Panic attack    • Pilonidal cyst    • Rheumatic fever     AS A CHILD   • RLS (restless legs syndrome)    • Sinus trouble         Past Surgical History:   Procedure Laterality Date   • COLONOSCOPY  2020    exernal hemorrhoids   • EXCISION LESION Bilateral 3/20/2023    Procedure: Excision Hidradenitis of Bilateral Inguinal Areas;  Surgeon: Donato You MD;  Location: [facility];  Service: General;  Laterality: Bilateral;   • GROIN ABSCESS INCISION AND DRAINAGE Right     hidradenitis   • HYSTERECTOMY     • KNEE SURGERY Bilateral    • PILONIDAL CYSTECTOMY N/A 2023    Procedure: Excision Hidradenitis of Inguinal Area and Pilonidal Cystectomy;  Surgeon: Donato You MD;  Location: [facility];  Service: General;  Laterality: N/A;         Physical Assessment:  Wound 23 1333 medial gluteal Incision (Active)   Wound Image   23 1206   Dressing Appearance moist drainage;intact 23 1206   Closure None 23 1206   Base red;moist 23 1206   Periwound redness;moist 23 1206    Periwound Temperature warm 04/26/23 1206   Periwound Skin Turgor soft 04/26/23 1206   Edges open 04/26/23 1206   Wound Length (cm) 4 cm 04/26/23 1206   Wound Width (cm) 1.3 cm 04/26/23 1206   Wound Depth (cm) 2.8 cm 04/26/23 1206   Wound Surface Area (cm^2) 5.2 cm^2 04/26/23 1206   Wound Volume (cm^3) 14.56 cm^3 04/26/23 1206   Drainage Characteristics/Odor serosanguineous;purulent 04/26/23 1206   Drainage Amount small 04/26/23 1206   Care, Wound cleansed with;irrigated with;sterile normal saline 04/26/23 1206   Dressing Care dressing applied;dressing moistened;foam;calcium alginate;silicone;border dressing 04/26/23 1206   Periwound Care absorptive dressing applied 04/26/23 1206       Wound 03/22/23 1510 Bilateral distal abdomen Incision (Active)   Wound Image   04/26/23 1206   Dressing Appearance dry;intact 04/26/23 1206   Closure None 04/26/23 1206   Base red;moist 04/26/23 1206   Periwound intact;dry;pink;redness 04/26/23 1206   Periwound Temperature warm 04/26/23 1206   Periwound Skin Turgor soft 04/26/23 1206   Edges open;rolled/closed 04/26/23 1206   Wound Length (cm) 0.2 cm 04/26/23 1206   Wound Width (cm) 0.9 cm 04/26/23 1206   Wound Depth (cm) 0.2 cm 04/26/23 1206   Wound Surface Area (cm^2) 0.18 cm^2 04/26/23 1206   Wound Volume (cm^3) 0.036 cm^3 04/26/23 1206   Drainage Characteristics/Odor serosanguineous 04/26/23 1206   Drainage Amount scant 04/26/23 1206   Care, Wound cleansed with;sterile normal saline 04/26/23 1206   Dressing Care dressing applied;calcium alginate;silver impregnated;silicone;border dressing 04/26/23 1206   Periwound Care absorptive dressing applied 04/26/23 1206       Wound 03/22/23 1515 Left anterior greater trochanter Incision (Active)   Dressing Appearance dry;intact 04/26/23 1206   Closure None 04/26/23 1206   Base red;moist 04/26/23 1206   Periwound intact;dry;pink 04/26/23 1206   Periwound Temperature warm 04/26/23 1206   Periwound Skin Turgor soft 04/26/23 1206   Edges open  04/26/23 1206   Drainage Characteristics/Odor serosanguineous;sanguineous 04/26/23 1206   Drainage Amount small 04/26/23 1206   Care, Wound cleansed with;sterile normal saline 04/26/23 1206   Dressing Care dressing applied;calcium alginate;silicone;border dressing 04/26/23 1206   Periwound Care absorptive dressing applied 04/26/23 1206       Wound 03/22/23 1516 Right anterior greater trochanter Incision (Active)   Dressing Appearance moist drainage;intact 04/26/23 1206   Closure None 04/26/23 1206   Base red;moist 04/26/23 1206   Periwound intact;redness;dry;pink 04/26/23 1206   Periwound Temperature warm 04/26/23 1206   Periwound Skin Turgor soft 04/26/23 1206   Edges open 04/26/23 1206   Wound Length (cm) 0.9 cm 04/26/23 1206   Wound Width (cm) 0.9 cm 04/26/23 1206   Wound Depth (cm) 0.6 cm 04/26/23 1206   Wound Surface Area (cm^2) 0.81 cm^2 04/26/23 1206   Wound Volume (cm^3) 0.486 cm^3 04/26/23 1206   Drainage Characteristics/Odor serosanguineous 04/26/23 1206   Drainage Amount small 04/26/23 1206   Care, Wound cleansed with;irrigated with;sterile normal saline 04/26/23 1206   Dressing Care dressing applied;packed with;packing strip;gauze, iodoform;calcium alginate;silicone;border dressing 04/26/23 1206   Periwound Care absorptive dressing applied 04/26/23 1206       Wound 03/22/23 1518 medial coccyx Incision (Active)   Wound Image   04/26/23 1206   Dressing Appearance moist drainage;intact 04/26/23 1206   Closure None 04/26/23 1206   Base red;moist 04/26/23 1206   Periwound moist;redness 04/26/23 1206   Periwound Temperature warm 04/26/23 1206   Periwound Skin Turgor soft 04/26/23 1206   Edges open 04/26/23 1206   Wound Length (cm) 0.8 cm 04/26/23 1206   Wound Width (cm) 0.3 cm 04/26/23 1206   Wound Depth (cm) 2 cm 04/26/23 1206   Wound Surface Area (cm^2) 0.24 cm^2 04/26/23 1206   Wound Volume (cm^3) 0.48 cm^3 04/26/23 1206   Drainage Characteristics/Odor serosanguineous 04/26/23 1206   Drainage Amount scant  04/26/23 1206   Care, Wound cleansed with;irrigated with;sterile normal saline 04/26/23 1206   Dressing Care dressing applied;dressing moistened;foam;calcium alginate;silicone;border dressing 04/26/23 1206   Periwound Care absorptive dressing applied 04/26/23 1206      Wound Check / Follow-up:  Patient seen today for wound follow-up and dressing change. Spouse present at bedside. All dressings are intact at this time. Moisture and redness present to tissue surrounding coccyx and perirectal incisions. Cleansed and irrigated wounds with normal saline and gauze. Applied dome[place] soaked pillow case to periwound tissue and allowed to sit for 15 minutes. After this was completed, filled coccyx and perirectal wound beds with normal saline moistened purple foam (hydrofera). Covered with calcium alginate dressing. Secured with silicone border dressing. Strips of hydrocolloid dressing placed to distal portion of dressing to aid in seal and prevent lifting. There is small amount of induration present to inner bilateral gluteal aspect and small amount of purulent drainage expressed upon palpation from both sides. Patient reports increased tenderness and pain. Notified surgeon. MD states he will call patient in antibiotics. Patient verbalized understanding. Right groin incision with two open areas. Intermittent closure noted between the two. Wound bases are red and moist. Cleansed and irrigated with normal saline and gauze. Filled with iodoform packing strip, covered with calcium alginate and silicone border dressing. Left groin wound with minimal depth and red, moist base. Cleansed with normal saline and gauze. Placed calcium alginate to wound bed and covered with silicone border dressing. Two small open areas remain to abdominal incision. Cleansed with normal saline and gauze. Applied calcium alginate and secured with silicone border dressing. Placed calcium alginate today to see if this will help her surrounding skin  irritation from drainage. Recommending to continue current wound care treatment at this time.      Impression: Surgical wounds with delayed closure.      Short term goals:  Regain skin integrity, daily dressing changes, MWF dressing changes.         Lanette Cantu RN    4/26/2023    15:21 EDT

## 2023-04-28 ENCOUNTER — HOSPITAL ENCOUNTER (OUTPATIENT)
Dept: INFUSION THERAPY | Facility: HOSPITAL | Age: 53
Discharge: HOME | End: 2023-04-28
Payer: COMMERCIAL

## 2023-04-28 VITALS
HEART RATE: 110 BPM | TEMPERATURE: 98.2 F | RESPIRATION RATE: 18 BRPM | DIASTOLIC BLOOD PRESSURE: 68 MMHG | OXYGEN SATURATION: 99 % | SYSTOLIC BLOOD PRESSURE: 102 MMHG

## 2023-04-28 DIAGNOSIS — L73.2 HIDRADENITIS: Primary | ICD-10-CM

## 2023-04-28 PROCEDURE — G0463 HOSPITAL OUTPT CLINIC VISIT: HCPCS

## 2023-04-28 RX ORDER — CALCIUM ACETATE MONOHYDRATE AND ALUMINUM SULFATE TETRADECAHYDRATE 952; 1347 MG/2299MG; MG/2299MG
1 POWDER, FOR SOLUTION TOPICAL DAILY
Status: CANCELLED
Start: 2023-04-29

## 2023-04-28 RX ORDER — CALCIUM ACETATE MONOHYDRATE AND ALUMINUM SULFATE TETRADECAHYDRATE 952; 1347 MG/2299MG; MG/2299MG
1 POWDER, FOR SOLUTION TOPICAL DAILY
Status: DISCONTINUED | OUTPATIENT
Start: 2023-04-28 | End: 2023-04-30 | Stop reason: HOSPADM

## 2023-04-28 NOTE — SIGNIFICANT NOTE
Wound Eval / Progress Noted     [facility]     Patient Name: Mariela Aldrich  : 1970  MRN: [id number]  Today's Date: 2023                 Admit Date: 2023    Visit Dx:    ICD-10-CM ICD-9-CM   1. Hidradenitis  L73.2 705.83       Patient Active Problem List   Diagnosis    Anxiety    Type 2 diabetes mellitus with hyperglycemia, with long-term current use of insulin    Hyperlipidemia    Hypothyroidism    Panic attack    RLS (restless legs syndrome)    Sinus trouble    OAB (overactive bladder)    Mild intermittent asthma without complication    Hidradenitis        Past Medical History:   Diagnosis Date    Anxiety     Asthma     PRN INHALER AND TAKES ALLERGY INJECTIONS    Diabetes mellitus type 2, controlled     AVERAGE     Hidradenitis     Hyperlipidemia     Hypothyroidism     Murmur     DIAGNOSED WHEN 17 BUT IS ASYMPTOMATIC AND IS FOLLOWED ONLY BY PCP    Panic attack     Pilonidal cyst     Rheumatic fever     AS A CHILD    RLS (restless legs syndrome)     Sinus trouble         Past Surgical History:   Procedure Laterality Date    COLONOSCOPY  2020    exernal hemorrhoids    EXCISION LESION Bilateral 3/20/2023    Procedure: Excision Hidradenitis of Bilateral Inguinal Areas;  Surgeon: Donato You MD;  Location: [facility];  Service: General;  Laterality: Bilateral;    GROIN ABSCESS INCISION AND DRAINAGE Right     hidradenitis    HYSTERECTOMY  2010    KNEE SURGERY Bilateral 2013    PILONIDAL CYSTECTOMY N/A 2023    Procedure: Excision Hidradenitis of Inguinal Area and Pilonidal Cystectomy;  Surgeon: Donato You MD;  Location: [facility];  Service: General;  Laterality: N/A;         Physical Assessment:  Wound 23 1333 medial gluteal Incision (Active)   Wound Image   23 1140   Dressing Appearance intact;moist drainage 23 1140   Base moist;red;granulating 23 1140   Periwound intact;pink 23 1140   Periwound Temperature warm 23 1140    Periwound Skin Turgor soft 04/28/23 1140   Edges open 04/28/23 1140   Drainage Characteristics/Odor serosanguineous 04/28/23 1140   Drainage Amount small 04/28/23 1140   Care, Wound cleansed with;irrigated with;sterile normal saline 04/28/23 1140   Dressing Care dressing applied;border dressing;calcium alginate;foam;silicone 04/28/23 1140   Periwound Care absorptive dressing applied 04/28/23 1140       Wound 03/22/23 1515 Left anterior greater trochanter Incision (Active)   Wound Image   04/28/23 1140   Dressing Appearance intact;moist drainage 04/28/23 1140   Base moist;red;granulating 04/28/23 1140   Red (%), Wound Tissue Color 100 04/28/23 1140   Periwound intact;pink 04/28/23 1140   Periwound Temperature warm 04/28/23 1140   Periwound Skin Turgor soft 04/28/23 1140   Edges open 04/28/23 1140   Drainage Characteristics/Odor serosanguineous 04/28/23 1140   Drainage Amount small 04/28/23 1140   Care, Wound cleansed with;sterile normal saline 04/28/23 1140   Dressing Care dressing applied;border dressing;hydrofiber;silver impregnated;silicone 04/28/23 1140   Periwound Care absorptive dressing applied 04/28/23 1140       Wound 03/22/23 1516 Right anterior greater trochanter Incision (Active)   Wound Image   04/28/23 1140   Dressing Appearance intact;moist drainage 04/28/23 1140   Base moist;red 04/28/23 1140   Red (%), Wound Tissue Color 100 04/28/23 1140   Periwound intact;pink 04/28/23 1140   Periwound Temperature warm 04/28/23 1140   Periwound Skin Turgor soft 04/28/23 1140   Edges open 04/28/23 1140   Drainage Characteristics/Odor serosanguineous 04/28/23 1140   Drainage Amount small 04/28/23 1140   Care, Wound cleansed with;sterile normal saline 04/28/23 1140   Dressing Care dressing applied;border dressing;silver impregnated;silicone;hydrofiber 04/28/23 1140   Periwound Care absorptive dressing applied 04/28/23 1140       Wound 03/22/23 1518 medial coccyx Incision (Active)   Wound Image   04/28/23 1140    Dressing Appearance intact;moist drainage 04/28/23 1140   Base moist;red 04/28/23 1140   Red (%), Wound Tissue Color 100 04/28/23 1140   Periwound intact;pink 04/28/23 1140   Periwound Temperature warm 04/28/23 1140   Periwound Skin Turgor soft 04/28/23 1140   Edges open 04/28/23 1140   Drainage Characteristics/Odor serosanguineous 04/28/23 1140   Drainage Amount small 04/28/23 1140   Care, Wound cleansed with;irrigated with;sterile normal saline 04/28/23 1140   Dressing Care dressing applied;foam;calcium alginate;border dressing;silicone 04/28/23 1140   Periwound Care absorptive dressing applied 04/28/23 1140              Wound Check / Follow-up:  Patient seen today for routine dressing change and wound check.  Patient with open wound to left groin with red moist tissue with granulation that is very shallow.   Right groin with small opening that is shallow with red moist tissue and hypergranulating wound margins.   Abdominal crease with superficial openings to right side and medial aspect. Recommending all wounds to be cleansed with NS and gauze. Blotted dry and then to have silver impregnated hydrofiber and silicone border dressings.   Domeboro soak applied to gluteal crease and bilateral gluteal aspects and allow to penetrate. Then tissue dried off.   To gluteal crease incision, upper wound opening is very small and difficult to detect. Cleansed and irrigated with NS and gauze. Recommending to continue current wound care.   Lower gluteal incision opening with red moist tissue with tunnel remaining at 6 o'clock. Periwound is improving with domeboro soaks. Recommending to continue current wound care.     Impression: Surgical wounds with delayed closure and closure by secondary intent    Short term goals:  Regain skin integrity. Dressing changes MWF and daily.     Miladys Lloyd RN    4/28/2023    18:00 EDT

## 2023-05-01 ENCOUNTER — HOSPITAL ENCOUNTER (OUTPATIENT)
Dept: INFUSION THERAPY | Facility: HOSPITAL | Age: 53
Discharge: HOME | End: 2023-05-01
Admitting: NURSE PRACTITIONER
Payer: COMMERCIAL

## 2023-05-01 DIAGNOSIS — L73.2 HIDRADENITIS: Primary | ICD-10-CM

## 2023-05-01 PROCEDURE — G0463 HOSPITAL OUTPT CLINIC VISIT: HCPCS

## 2023-05-01 RX ORDER — CALCIUM ACETATE MONOHYDRATE AND ALUMINUM SULFATE TETRADECAHYDRATE 952; 1347 MG/2299MG; MG/2299MG
1 POWDER, FOR SOLUTION TOPICAL DAILY
Status: DISCONTINUED | OUTPATIENT
Start: 2023-05-01 | End: 2023-05-03 | Stop reason: HOSPADM

## 2023-05-01 RX ORDER — CALCIUM ACETATE MONOHYDRATE AND ALUMINUM SULFATE TETRADECAHYDRATE 952; 1347 MG/2299MG; MG/2299MG
1 POWDER, FOR SOLUTION TOPICAL DAILY
Status: CANCELLED
Start: 2023-05-02

## 2023-05-01 RX ADMIN — Medication 1 PACKET: at 16:20

## 2023-05-01 NOTE — SIGNIFICANT NOTE
Wound Eval / Progress Noted     [facility]     Patient Name: Mariela Aldrich  : 1970  MRN: [id number]  Today's Date: 2023                 Admit Date: 2023    Visit Dx:    ICD-10-CM ICD-9-CM   1. Hidradenitis  L73.2 705.83       Patient Active Problem List   Diagnosis   • Anxiety   • Type 2 diabetes mellitus with hyperglycemia, with long-term current use of insulin   • Hyperlipidemia   • Hypothyroidism   • Panic attack   • RLS (restless legs syndrome)   • Sinus trouble   • OAB (overactive bladder)   • Mild intermittent asthma without complication   • Hidradenitis        Past Medical History:   Diagnosis Date   • Anxiety    • Asthma     PRN INHALER AND TAKES ALLERGY INJECTIONS   • Diabetes mellitus type 2, controlled     AVERAGE    • Hidradenitis    • Hyperlipidemia    • Hypothyroidism    • Murmur     DIAGNOSED WHEN 17 BUT IS ASYMPTOMATIC AND IS FOLLOWED ONLY BY PCP   • Panic attack    • Pilonidal cyst    • Rheumatic fever     AS A CHILD   • RLS (restless legs syndrome)    • Sinus trouble         Past Surgical History:   Procedure Laterality Date   • COLONOSCOPY  2020    exernal hemorrhoids   • EXCISION LESION Bilateral 3/20/2023    Procedure: Excision Hidradenitis of Bilateral Inguinal Areas;  Surgeon: Donato You MD;  Location: [facility];  Service: General;  Laterality: Bilateral;   • GROIN ABSCESS INCISION AND DRAINAGE Right     hidradenitis   • HYSTERECTOMY     • KNEE SURGERY Bilateral    • PILONIDAL CYSTECTOMY N/A 2023    Procedure: Excision Hidradenitis of Inguinal Area and Pilonidal Cystectomy;  Surgeon: Donato You MD;  Location: [facility];  Service: General;  Laterality: N/A;         Physical Assessment:  Wound 23 1333 medial gluteal Incision (Active)   Wound Image   23 1645   Dressing Appearance moist drainage;intact 23 1645   Closure None 23 1645   Base moist;red 23 1645   Periwound intact;pink 23 1645    Periwound Temperature warm 05/01/23 1645   Periwound Skin Turgor soft 05/01/23 1645   Edges open 05/01/23 1645   Drainage Characteristics/Odor serosanguineous 05/01/23 1645   Drainage Amount small 05/01/23 1645   Care, Wound cleansed with;irrigated with;sterile normal saline 05/01/23 1645   Dressing Care dressing applied;dressing moistened;foam;silicone;border dressing;hydrocolloid 05/01/23 1645   Periwound Care absorptive dressing applied 05/01/23 1645       Wound 03/22/23 1510 Bilateral distal abdomen Incision (Active)   Wound Image   05/01/23 1645   Dressing Appearance dry;intact 05/01/23 1645   Closure None 05/01/23 1645   Base red;moist 05/01/23 1645   Periwound intact;dry;pink;redness 05/01/23 1645   Periwound Temperature warm 05/01/23 1645   Periwound Skin Turgor soft 05/01/23 1645   Edges open;rolled/closed 05/01/23 1645   Drainage Characteristics/Odor serosanguineous 05/01/23 1645   Drainage Amount scant 05/01/23 1645   Care, Wound cleansed with;sterile normal saline 05/01/23 1645   Dressing Care dressing applied;silver impregnated;hydrofiber;silicone;border dressing 05/01/23 1645   Periwound Care absorptive dressing applied 05/01/23 1645       Wound 03/22/23 1515 Left anterior greater trochanter Incision (Active)   Wound Image   05/01/23 1645   Dressing Appearance moist drainage;intact 05/01/23 1645   Closure None 05/01/23 1645   Base moist;red 05/01/23 1645   Periwound intact;pink 05/01/23 1645   Periwound Temperature warm 05/01/23 1645   Periwound Skin Turgor soft 05/01/23 1645   Edges open 05/01/23 1645   Drainage Characteristics/Odor serosanguineous;sanguineous 05/01/23 1645   Drainage Amount small 05/01/23 1645   Care, Wound cleansed with;sterile normal saline 05/01/23 1645   Dressing Care dressing applied;silver impregnated;hydrofiber;silicone;border dressing 05/01/23 1645   Periwound Care absorptive dressing applied 05/01/23 1645       Wound 03/22/23 1516 Right anterior greater trochanter Incision  (Active)   Wound Image   05/01/23 1645   Dressing Appearance moist drainage;intact 05/01/23 1645   Closure None 05/01/23 1645   Base moist;red 05/01/23 1645   Periwound intact;pink 05/01/23 1645   Periwound Temperature warm 05/01/23 1645   Periwound Skin Turgor soft 05/01/23 1645   Edges open 05/01/23 1645   Drainage Characteristics/Odor serosanguineous 05/01/23 1645   Drainage Amount small 05/01/23 1645   Care, Wound cleansed with;sterile normal saline 05/01/23 1645   Dressing Care dressing applied;silver impregnated;hydrofiber;silicone;border dressing 05/01/23 1645   Periwound Care absorptive dressing applied 05/01/23 1645       Wound 03/22/23 1518 medial coccyx Incision (Active)   Wound Image   05/01/23 1645   Dressing Appearance moist drainage;intact 05/01/23 1645   Closure None 05/01/23 1645   Base moist;red 05/01/23 1645   Periwound intact;pink 05/01/23 1645   Periwound Temperature warm 05/01/23 1645   Periwound Skin Turgor soft 05/01/23 1645   Edges open 05/01/23 1645   Drainage Characteristics/Odor serosanguineous 05/01/23 1645   Drainage Amount small 05/01/23 1645   Care, Wound cleansed with;irrigated with;sterile normal saline 05/01/23 1645   Dressing Care dressing applied;dressing moistened;foam;silicone;border dressing;hydrocolloid 05/01/23 1645   Periwound Care absorptive dressing applied 05/01/23 1645      Wound Check / Follow-up:  Patient seen today for wound follow-up and dressing changes. Wound to left groin with moist red tissue and shallow wound base. Right groin open wounds with intermittent closure between the two. Hypergranulation is noted here as well. Superficial openings to medial abdomen. Cleansed all with normal saline and gauze. Applied silver impregnated hydrofiber and secured with silicone border dressings. Recommending to continue current care. Moisture and redness improving to periwound tissue surrounding perirectal and coccyx incisions. Cleansed and irrigated wounds with normal  saline and gauze. Applied dome[place] soaked pillow case to periwound tissue and allowed to sit for 15 minutes. After this was completed, filled coccyx and perirectal wound beds with normal saline moistened purple foam (hydrofera). Covered with calcium alginate dressing. Secured with silicone border dressing. Strips of hydrocolloid dressing placed to distal portion of dressing to aid in seal and prevent lifting. There is small amount of induration present to left gluteal aspect with scant amount of purulent drainage expressed. Patient states she is taking prescribed antibiotics. Recommending to continue current care. Patient is to follow up with general surgeon on Thursday.      Impression: Surgical wounds with delayed closure.      Short term goals:  Regain skin integrity, daily dressing changes, MWF dressing changes.      Lanette Cantu RN    5/1/2023    17:12 EDT

## 2023-05-02 DIAGNOSIS — J45.20 MILD INTERMITTENT ASTHMA WITHOUT COMPLICATION: ICD-10-CM

## 2023-05-02 RX ORDER — FLUTICASONE PROPIONATE AND SALMETEROL 250; 50 UG/1; UG/1
POWDER RESPIRATORY (INHALATION)
Qty: 60 EACH | Refills: 0 | Status: SHIPPED | OUTPATIENT
Start: 2023-05-02

## 2023-05-03 ENCOUNTER — HOSPITAL ENCOUNTER (OUTPATIENT)
Dept: INFUSION THERAPY | Facility: HOSPITAL | Age: 53
Discharge: HOME | End: 2023-05-03
Admitting: NURSE PRACTITIONER
Payer: COMMERCIAL

## 2023-05-03 VITALS
OXYGEN SATURATION: 96 % | TEMPERATURE: 98 F | HEART RATE: 102 BPM | DIASTOLIC BLOOD PRESSURE: 68 MMHG | SYSTOLIC BLOOD PRESSURE: 103 MMHG | RESPIRATION RATE: 18 BRPM

## 2023-05-03 DIAGNOSIS — L73.2 HIDRADENITIS: Primary | ICD-10-CM

## 2023-05-03 PROCEDURE — G0463 HOSPITAL OUTPT CLINIC VISIT: HCPCS

## 2023-05-03 RX ORDER — CALCIUM ACETATE MONOHYDRATE AND ALUMINUM SULFATE TETRADECAHYDRATE 952; 1347 MG/2299MG; MG/2299MG
1 POWDER, FOR SOLUTION TOPICAL DAILY
Status: CANCELLED
Start: 2023-05-04

## 2023-05-03 RX ORDER — CALCIUM ACETATE MONOHYDRATE AND ALUMINUM SULFATE TETRADECAHYDRATE 952; 1347 MG/2299MG; MG/2299MG
1 POWDER, FOR SOLUTION TOPICAL DAILY
Status: DISCONTINUED | OUTPATIENT
Start: 2023-05-03 | End: 2023-05-05 | Stop reason: HOSPADM

## 2023-05-03 RX ADMIN — Medication 1 PACKET: at 11:12

## 2023-05-03 NOTE — SIGNIFICANT NOTE
Wound Eval / Progress Noted     [facility]     Patient Name: Mariela Aldrich  : 1970  MRN: [id number]  Today's Date: 5/3/2023                 Admit Date: 5/3/2023    Visit Dx:    ICD-10-CM ICD-9-CM   1. Hidradenitis  L73.2 705.83       Patient Active Problem List   Diagnosis    Anxiety    Type 2 diabetes mellitus with hyperglycemia, with long-term current use of insulin    Hyperlipidemia    Hypothyroidism    Panic attack    RLS (restless legs syndrome)    Sinus trouble    OAB (overactive bladder)    Mild intermittent asthma without complication    Hidradenitis        Past Medical History:   Diagnosis Date    Anxiety     Asthma     PRN INHALER AND TAKES ALLERGY INJECTIONS    Diabetes mellitus type 2, controlled     AVERAGE     Hidradenitis     Hyperlipidemia     Hypothyroidism     Murmur     DIAGNOSED WHEN 17 BUT IS ASYMPTOMATIC AND IS FOLLOWED ONLY BY PCP    Panic attack     Pilonidal cyst     Rheumatic fever     AS A CHILD    RLS (restless legs syndrome)     Sinus trouble         Past Surgical History:   Procedure Laterality Date    COLONOSCOPY  2020    exernal hemorrhoids    EXCISION LESION Bilateral 3/20/2023    Procedure: Excision Hidradenitis of Bilateral Inguinal Areas;  Surgeon: Donato You MD;  Location: [facility];  Service: General;  Laterality: Bilateral;    GROIN ABSCESS INCISION AND DRAINAGE Right     hidradenitis    HYSTERECTOMY  2010    KNEE SURGERY Bilateral 2013    PILONIDAL CYSTECTOMY N/A 2023    Procedure: Excision Hidradenitis of Inguinal Area and Pilonidal Cystectomy;  Surgeon: Donato You MD;  Location: [facility];  Service: General;  Laterality: N/A;         Physical Assessment:  Wound 23 1333 medial gluteal Incision (Active)   Wound Image   23 1120   Dressing Appearance intact;moist drainage 23 1120   Base moist;red;granulating 23 1120   Red (%), Wound Tissue Color 100 23 1120   Periwound intact;pink;redness;dry  05/03/23 1120   Periwound Temperature warm 05/03/23 1120   Periwound Skin Turgor soft 05/03/23 1120   Edges open 05/03/23 1120   Drainage Characteristics/Odor serosanguineous;purulent 05/03/23 1120   Drainage Amount small 05/03/23 1120   Care, Wound cleansed with;irrigated with;sterile normal saline 05/03/23 1120   Dressing Care dressing applied;border dressing;calcium alginate;silicone;silver impregnated 05/03/23 1120   Periwound Care absorptive dressing applied 05/03/23 1120       Wound 03/22/23 1510 Bilateral distal abdomen Incision (Active)   Dressing Appearance intact;no drainage 05/03/23 1120   Base pink;dry 05/03/23 1120   Periwound intact;pink 05/03/23 1120   Periwound Temperature warm 05/03/23 1120   Periwound Skin Turgor soft 05/03/23 1120   Edges rolled/closed 05/03/23 1120       Wound 03/22/23 1515 Left anterior greater trochanter Incision (Active)   Dressing Appearance intact;moist drainage 05/03/23 1120   Base moist;red;granulating;epithelialization 05/03/23 1120   Red (%), Wound Tissue Color 100 05/03/23 1120   Periwound intact;pink 05/03/23 1120   Periwound Temperature warm 05/03/23 1120   Periwound Skin Turgor soft 05/03/23 1120   Edges open 05/03/23 1120   Drainage Characteristics/Odor serosanguineous 05/03/23 1120   Drainage Amount small 05/03/23 1120   Care, Wound cleansed with;sterile normal saline 05/03/23 1120   Dressing Care dressing applied;border dressing;hydrofiber;silver impregnated;silicone 05/03/23 1120   Periwound Care absorptive dressing applied 05/03/23 1120       Wound 03/22/23 1516 Right anterior greater trochanter Incision (Active)   Dressing Appearance intact;moist drainage 05/03/23 1120   Base moist;red 05/03/23 1120   Red (%), Wound Tissue Color 100 05/03/23 1120   Periwound intact;pink 05/03/23 1120   Edges open 05/03/23 1120   Drainage Characteristics/Odor serosanguineous 05/03/23 1120   Drainage Amount small 05/03/23 1120   Care, Wound cleansed with;sterile normal saline  05/03/23 1120   Dressing Care dressing applied;border dressing;silicone;silver impregnated;hydrofiber 05/03/23 1120   Periwound Care absorptive dressing applied 05/03/23 1120       Wound 03/22/23 1518 medial coccyx Incision (Active)   Wound Image   05/03/23 1120   Dressing Appearance intact;moist drainage 05/03/23 1120   Base moist;red 05/03/23 1120   Red (%), Wound Tissue Color 100 05/03/23 1120   Periwound pink;redness;dry 05/03/23 1120   Periwound Temperature warm 05/03/23 1120   Periwound Skin Turgor soft 05/03/23 1120   Edges open 05/03/23 1120   Drainage Characteristics/Odor serosanguineous 05/03/23 1120   Drainage Amount small 05/03/23 1120   Care, Wound cleansed with;irrigated with;sterile normal saline 05/03/23 1120   Dressing Care dressing applied;calcium alginate;border dressing;silicone 05/03/23 1120   Periwound Care absorptive dressing applied 05/03/23 1120        Wound Check / Follow-up:  Patient  seen today for wound follow-up and dressing change. Spouse changed dressing to gluteal crease yesterday after shower.  Anterior dressings removed. Left groin incision is filling in nicely with red moist granulation tissue noted. Cleansed and irrigated with NS and gauze. Recommending to continue silver impregnated hydrofiber dressing for wound closure  Right groin with two open areas with shallow wound beds. Hypergranulation noted to upper portion of right groin wound. Cleansed and irrigated with NS and gauze. Recommending to continue silver imrpegnated hydrofiber and silicone border dressing to site, however attempted to layer dressing to provide some pressure to hypergranulated tissue.   Abdominal crease with shallow openings that are currently actively oozing or draining. Recommending to continue site care with silver impregnated hydrofiber and silicone border dressing until completely closed.   Lower gluteal crease with shallow wound bed. Tunnel at 6 o'clock no longer visible. Cleansed wound with NS and  gauze. Tracie-wound with minor maceration but much improved with domeboro applications. Recommending to trial only calcium alginate to wound to monitor progress.   Upper gluteal crease with very small opening that is barely visible. Minor hypergranulation also visible to that location. Tracie-wound with minor maceration but also improved. Recommeding only calcium alginate this visit to monitor for wound progress.   Patient has follow-up visit with surgeon tomorrow.     Impression: HS with chronic wounds. Surgical wounds to bilateral groin with closure by secondary intent. Gluteal crease incision with dehiscence and closure by secondary intent    Short term goals:  Regain skin integrity. Dressing changes daily and MWF.    Miladys Lloyd RN    5/3/2023    13:05 EDT

## 2023-05-04 ENCOUNTER — OFFICE VISIT (OUTPATIENT)
Dept: SURGERY | Facility: CLINIC | Age: 53
End: 2023-05-04
Payer: COMMERCIAL

## 2023-05-04 VITALS — BODY MASS INDEX: 33.8 KG/M2 | WEIGHT: 198 LBS | HEIGHT: 64 IN | RESPIRATION RATE: 16 BRPM

## 2023-05-04 DIAGNOSIS — L73.2 HIDRADENITIS: Primary | ICD-10-CM

## 2023-05-04 NOTE — PROGRESS NOTES
Chief Complaint: Follow-up    Subjective         History of Present Illness  Mariela Aldrich is a 52 y.o. female presents to [facility] GENERAL SURGERY. The patient is to be seen for a follow-up after hidradenitis excision. She is accompanied by a male adult.    The patient has been improving. She has noticed an area on her right side where the skin is overgrowing. She is not going to wound care today, but is scheduled to see them on 05/08/2023, 05/10/2023, and 05/12/2023. She is to go back to work on 05/08/2023.    Objective    Review of Systems  A review of systems was performed, and the pertinent positives are noted in the HPI.      Past Medical History:   Diagnosis Date   • Anxiety    • Asthma     PRN INHALER AND TAKES ALLERGY INJECTIONS   • Diabetes mellitus type 2, controlled     AVERAGE    • Hidradenitis    • Hyperlipidemia    • Hypothyroidism    • Murmur     DIAGNOSED WHEN 17 BUT IS ASYMPTOMATIC AND IS FOLLOWED ONLY BY PCP   • Panic attack    • Pilonidal cyst    • Rheumatic fever     AS A CHILD   • RLS (restless legs syndrome)    • Sinus trouble        Past Surgical History:   Procedure Laterality Date   • COLONOSCOPY  01/21/2020    exernal hemorrhoids   • EXCISION LESION Bilateral 3/20/2023    Procedure: Excision Hidradenitis of Bilateral Inguinal Areas;  Surgeon: Donato You MD;  Location: [facility];  Service: General;  Laterality: Bilateral;   • GROIN ABSCESS INCISION AND DRAINAGE Right     hidradenitis   • HYSTERECTOMY  2010   • KNEE SURGERY Bilateral 2013   • PILONIDAL CYSTECTOMY N/A 2/8/2023    Procedure: Excision Hidradenitis of Inguinal Area and Pilonidal Cystectomy;  Surgeon: Donato You MD;  Location: [facility];  Service: General;  Laterality: N/A;         Current Outpatient Medications:   •  adalimumab (Humira) 40 MG/0.8ML Prefilled Syringe Kit injection, 0.8 mL. Currently on hold due to surgies, Disp: , Rfl:   •  albuterol sulfate  (90  Base) MCG/ACT inhaler, Inhale 2 puffs Every 4 (Four) Hours As Needed., Disp: , Rfl:   •  cetirizine (zyrTEC) 10 MG tablet, Zyrtec 10 mg oral tablet take 1 tablet (10 mg) by oral route once daily   Active, Disp: , Rfl:   •  clindamycin (Cleocin) 150 MG capsule, Take 1 capsule by mouth 4 (Four) Times a Day for 10 days., Disp: 40 capsule, Rfl: 0  •  Continuous Blood Gluc Sensor (Dexcom G6 Sensor), Every 10 (Ten) Days., Disp: 9 each, Rfl: 1  •  Continuous Blood Gluc Transmit (Dexcom G6 Transmitter) misc, 1 Device Every 3 (Three) Months., Disp: 1 each, Rfl: 3  •  docusate sodium (Colace) 100 MG capsule, Take 1 capsule by mouth Daily As Needed for Constipation., Disp: 30 capsule, Rfl: 1  •  Empagliflozin-metFORMIN HCl ER (Synjardy XR) 12.5-1000 MG tablet sustained-release 24 hour, Take 1 tablet by mouth 2 (Two) Times a Day., Disp: 180 tablet, Rfl: 1  •  Fluticasone-Salmeterol (ADVAIR/WIXELA) 250-50 MCG/ACT DISKUS, INHALE 1 DOSE BY MOUTH TWICE DAILY, Disp: 60 each, Rfl: 0  •  glipizide (GLUCOTROL) 10 MG tablet, Take 1 tablet by mouth 2 (Two) Times a Day Before Meals., Disp: 180 tablet, Rfl: 1  •  glucose blood test strip, Use as instructed Dx: DM E11, Disp: 100 each, Rfl: 3  •  HYDROcodone-acetaminophen (Norco) 5-325 MG per tablet, Take 1 tablet by mouth Every 6 (Six) Hours As Needed for Moderate Pain., Disp: 20 tablet, Rfl: 0  •  HYDROcodone-acetaminophen (NORCO) 5-325 MG per tablet, Take 1-2 tablets by mouth Every 4 (Four) Hours As Needed (Pain)., Disp: 20 tablet, Rfl: 0  •  Insulin Degludec (Tresiba FlexTouch) 200 UNIT/ML solution pen-injector pen injection, Inject 50 Units under the skin into the appropriate area as directed Every Night., Disp: 9 mL, Rfl: 1  •  Insulin Pen Needle (B-D ULTRAFINE III SHORT PEN) 31G X 8 MM misc, Inject 1 each under the skin into the appropriate area as directed Daily., Disp: 100 each, Rfl: 3  •  lisinopril (PRINIVIL,ZESTRIL) 5 MG tablet, Take 1 tablet by mouth Every Night., Disp: 90  tablet, Rfl: 1  •  multivitamin (THERAGRAN) tablet tablet, , Disp: , Rfl:   •  ONE TOUCH CLUB LANCETS misc, 90 each 3 (Three) Times a Day As Needed (check blood sugar)., Disp: 100 each, Rfl: 3  •  pitavastatin calcium (Livalo) 2 MG tablet tablet, Take 1 tablet by mouth Every Night., Disp: 90 tablet, Rfl: 1  •  rOPINIRole (REQUIP) 4 MG tablet, Take 1 tablet by mouth Every Night., Disp: 90 tablet, Rfl: 1  •  Semaglutide,0.25 or 0.5MG/DOS, (Ozempic, 0.25 or 0.5 MG/DOSE,) 2 MG/1.5ML solution pen-injector, Inject 0.5 mg under the skin into the appropriate area as directed 1 (One) Time Per Week., Disp: 4.5 mL, Rfl: 1  •  sertraline (ZOLOFT) 100 MG tablet, Take 1.5 tablets by mouth Daily., Disp: 135 tablet, Rfl: 1  •  sulfamethoxazole-trimethoprim (Bactrim DS) 800-160 MG per tablet, Take 1 tablet by mouth 2 (Two) Times a Day., Disp: 20 tablet, Rfl: 0  •  Synthroid 88 MCG tablet, Take 1 tablet by mouth Daily., Disp: 90 tablet, Rfl: 1  •  Zinc 50 MG tablet, zinc 50 mg oral tablet take 1 tablet by oral route daily   Active, Disp: , Rfl:   •  Mirabegron ER (Myrbetriq) 50 MG tablet sustained-release 24 hour 24 hr tablet, Take 50 mg by mouth Daily. (Patient not taking: Reported on 5/4/2023), Disp: 30 tablet, Rfl: 5  No current facility-administered medications for this visit.    Facility-Administered Medications Ordered in Other Visits:   •  aluminum sulfate-calcium acetate (DOMEBORO) topical packet 1 packet, 1 packet, Topical, Daily, Daniel, April, APRN, 1 packet at 05/03/23 1112    Allergies   Allergen Reactions   • Etodolac Unknown - High Severity     HEAVINESS ON CHEST   • Penicillins Swelling   • Statins Myalgia   • Atorvastatin Myalgia   • Crestor [Rosuvastatin] Other (See Comments)     Legs cramps    • Pravastatin Myalgia        Family History   Problem Relation Age of Onset   • Thyroid disease Father 70   • Thyroid disease Sister    • Diabetes Sister    • Thyroid disease Brother 43        thyroid cancer   • Diabetes  Brother    • Diabetes Maternal Grandmother    • Malig Hyperthermia Neg Hx        Social History     Socioeconomic History   • Marital status:    Tobacco Use   • Smoking status: Never   • Smokeless tobacco: Never   Vaping Use   • Vaping Use: Never used   Substance and Sexual Activity   • Alcohol use: Never   • Drug use: Never   • Sexual activity: Defer        Physical Exam  Constitutional:       General: She is not in acute distress.     Appearance: Normal appearance. She is well-developed and normal weight.   Pulmonary:      Effort: Pulmonary effort is normal.      Breath sounds: Normal air entry.   Abdominal:      General: There is no distension.      Palpations: Abdomen is soft.      Tenderness: There is no abdominal tenderness.        Post Surgical Incision  Surgical wound: All of her areas in her abdomen, left and right groin are healing well as well as her pilonidal area. There is a very small subcentimeter area in her right groin that had some overgrowth and I addressed that with some silver nitrate.    Result Review :               Assessment and Plan      1. Status post excision hidradenitis.  The right groin area was addressed with silver nitrate. Otherwise, she is doing well and can follow up with me as needed. She should continue her wound care appointments. She can return to work on Monday on 05/08/2022. Discussed with the patient. All questions were answered. She voiced understanding and agreed to proceed with the above plan.      Follow Up   No follow-ups on file.  Patient was given instructions and counseling regarding her condition or for health maintenance advice. Please see specific information pulled into the AVS if appropriate.       Transcribed from ambient dictation for Donato You MD by Daniela Michelle.  05/04/23   10:03 EDT    Patient or patient representative verbalized consent to the visit recording.  I have personally performed the services described in this document as  transcribed by the above individual, and it is both accurate and complete.

## 2023-05-04 NOTE — LETTER
May 4, 2023     Patient: Mariela Aldrich   YOB: 1970   Date of Visit: 5/4/2023       To Whom It May Concern:     Mariela Aldrich was seen in my office today and can return to work on 5/8/23 without restrictions.           Sincerely,        Donato You MD    CC: No Recipients

## 2023-05-05 ENCOUNTER — HOSPITAL ENCOUNTER (OUTPATIENT)
Dept: INFUSION THERAPY | Facility: HOSPITAL | Age: 53
Discharge: HOME | End: 2023-05-05
Payer: COMMERCIAL

## 2023-05-05 VITALS
TEMPERATURE: 98 F | DIASTOLIC BLOOD PRESSURE: 71 MMHG | SYSTOLIC BLOOD PRESSURE: 111 MMHG | HEART RATE: 107 BPM | OXYGEN SATURATION: 100 % | RESPIRATION RATE: 18 BRPM

## 2023-05-05 DIAGNOSIS — L73.2 HIDRADENITIS: Primary | ICD-10-CM

## 2023-05-05 PROCEDURE — G0463 HOSPITAL OUTPT CLINIC VISIT: HCPCS

## 2023-05-05 RX ORDER — CALCIUM ACETATE MONOHYDRATE AND ALUMINUM SULFATE TETRADECAHYDRATE 952; 1347 MG/2299MG; MG/2299MG
1 POWDER, FOR SOLUTION TOPICAL DAILY
Status: DISCONTINUED | OUTPATIENT
Start: 2023-05-05 | End: 2023-05-07 | Stop reason: HOSPADM

## 2023-05-05 RX ORDER — CALCIUM ACETATE MONOHYDRATE AND ALUMINUM SULFATE TETRADECAHYDRATE 952; 1347 MG/2299MG; MG/2299MG
1 POWDER, FOR SOLUTION TOPICAL DAILY
Status: CANCELLED
Start: 2023-05-06

## 2023-05-05 RX ADMIN — Medication 1 PACKET: at 10:51

## 2023-05-05 NOTE — SIGNIFICANT NOTE
Wound Eval / Progress Noted     [facility]     Patient Name: Mariela Aldrich  : 1970  MRN: [id number]  Today's Date: 2023                 Admit Date: 2023    Visit Dx:    ICD-10-CM ICD-9-CM   1. Hidradenitis  L73.2 705.83       Patient Active Problem List   Diagnosis   • Anxiety   • Type 2 diabetes mellitus with hyperglycemia, with long-term current use of insulin   • Hyperlipidemia   • Hypothyroidism   • Panic attack   • RLS (restless legs syndrome)   • Sinus trouble   • OAB (overactive bladder)   • Mild intermittent asthma without complication   • Hidradenitis        Past Medical History:   Diagnosis Date   • Anxiety    • Asthma     PRN INHALER AND TAKES ALLERGY INJECTIONS   • Diabetes mellitus type 2, controlled     AVERAGE    • Hidradenitis    • Hyperlipidemia    • Hypothyroidism    • Murmur     DIAGNOSED WHEN 17 BUT IS ASYMPTOMATIC AND IS FOLLOWED ONLY BY PCP   • Panic attack    • Pilonidal cyst    • Rheumatic fever     AS A CHILD   • RLS (restless legs syndrome)    • Sinus trouble         Past Surgical History:   Procedure Laterality Date   • COLONOSCOPY  2020    exernal hemorrhoids   • EXCISION LESION Bilateral 3/20/2023    Procedure: Excision Hidradenitis of Bilateral Inguinal Areas;  Surgeon: Donato You MD;  Location: [facility];  Service: General;  Laterality: Bilateral;   • GROIN ABSCESS INCISION AND DRAINAGE Right     hidradenitis   • HYSTERECTOMY     • KNEE SURGERY Bilateral    • PILONIDAL CYSTECTOMY N/A 2023    Procedure: Excision Hidradenitis of Inguinal Area and Pilonidal Cystectomy;  Surgeon: Donato You MD;  Location: [facility];  Service: General;  Laterality: N/A;         Physical Assessment:  Wound 23 1333 medial gluteal Incision (Active)   Wound Image   23 1122   Dressing Appearance moist drainage;intact 23 1122   Closure None 23 1122   Base moist;red;granulating 23 1122   Periwound  intact;pink;redness;dry 05/05/23 1122   Periwound Temperature warm 05/05/23 1122   Periwound Skin Turgor soft 05/05/23 1122   Edges open 05/05/23 1122   Wound Length (cm) 3.7 cm 05/05/23 1122   Wound Width (cm) 1.2 cm 05/05/23 1122   Wound Depth (cm) 1 cm 05/05/23 1122   Wound Surface Area (cm^2) 4.44 cm^2 05/05/23 1122   Wound Volume (cm^3) 4.44 cm^3 05/05/23 1122   Drainage Characteristics/Odor serosanguineous;purulent 05/05/23 1122   Drainage Amount small 05/05/23 1122   Care, Wound cleansed with;irrigated with;sterile normal saline 05/05/23 1122   Dressing Care dressing applied;calcium alginate;silicone;border dressing;silver impregnated 05/05/23 1122   Periwound Care absorptive dressing applied 05/05/23 1122       Wound 03/22/23 1510 Bilateral distal abdomen Incision (Active)   Wound Image   05/05/23 1122   Dressing Appearance open to air 05/05/23 1122   Closure None 05/05/23 1122   Base red;pink;moist 05/05/23 1122   Periwound intact;pink;dry 05/05/23 1122   Periwound Temperature warm 05/05/23 1122   Periwound Skin Turgor soft 05/05/23 1122   Edges open;rolled/closed 05/05/23 1122   Drainage Characteristics/Odor serosanguineous 05/05/23 1122   Drainage Amount scant 05/05/23 1122   Care, Wound cleansed with;sterile normal saline 05/05/23 1122   Dressing Care dressing applied;hydrofiber;silver impregnated;silicone;border dressing 05/05/23 1122   Periwound Care absorptive dressing applied 05/05/23 1122       Wound 03/22/23 1515 Left anterior greater trochanter Incision (Active)   Wound Image   05/05/23 1122   Dressing Appearance moist drainage;intact 05/05/23 1122   Closure None 05/05/23 1122   Base moist;red;granulating;epithelialization 05/05/23 1122   Periwound intact;pink 05/05/23 1122   Periwound Temperature warm 05/05/23 1122   Periwound Skin Turgor soft 05/05/23 1122   Edges open 05/05/23 1122   Wound Length (cm) 0.9 cm 05/05/23 1122   Wound Width (cm) 2.8 cm 05/05/23 1122   Wound Surface Area (cm^2) 2.52  cm^2 05/05/23 1122   Drainage Characteristics/Odor serosanguineous 05/05/23 1122   Drainage Amount small 05/05/23 1122   Care, Wound cleansed with;sterile normal saline 05/05/23 1122   Dressing Care dressing applied;hydrofiber;silver impregnated;silicone;border dressing 05/05/23 1122   Periwound Care absorptive dressing applied 05/05/23 1122       Wound 03/22/23 1516 Right anterior greater trochanter Incision (Active)   Wound Image   05/05/23 1122   Dressing Appearance moist drainage;intact 05/05/23 1122   Closure None 05/05/23 1122   Base moist;red 05/05/23 1122   Periwound intact;pink 05/05/23 1122   Periwound Temperature warm 05/05/23 1122   Periwound Skin Turgor soft 05/05/23 1122   Edges open 05/05/23 1122   Wound Length (cm) 1.2 cm 05/05/23 1122   Wound Width (cm) 0.7 cm 05/05/23 1122   Wound Depth (cm) 0.4 cm 05/05/23 1122   Wound Surface Area (cm^2) 0.84 cm^2 05/05/23 1122   Wound Volume (cm^3) 0.336 cm^3 05/05/23 1122   Tunneling [Depth (cm)/Location] proximal wound tracks to distal wound at 5o'clock 05/05/23 1122   Drainage Characteristics/Odor serosanguineous 05/05/23 1122   Drainage Amount small 05/05/23 1122   Care, Wound cleansed with;irrigated with;sterile normal saline 05/05/23 1122   Dressing Care dressing applied;hydrofiber;silver impregnated;silicone;border dressing 05/05/23 1122   Periwound Care absorptive dressing applied 05/05/23 1122       Wound 03/22/23 1518 medial coccyx Incision (Active)   Wound Image   05/05/23 1122   Dressing Appearance moist drainage;intact 05/05/23 1122   Closure None 05/05/23 1122   Base moist;red 05/05/23 1122   Periwound pink;redness;intact 05/05/23 1122   Periwound Temperature warm 05/05/23 1122   Periwound Skin Turgor soft 05/05/23 1122   Edges open 05/05/23 1122   Wound Length (cm) 0.4 cm 05/05/23 1122   Wound Width (cm) 0.2 cm 05/05/23 1122   Wound Depth (cm) 1.7 cm 05/05/23 1122   Wound Surface Area (cm^2) 0.08 cm^2 05/05/23 1122   Wound Volume (cm^3) 0.136 cm^3  05/05/23 1122   Drainage Characteristics/Odor serosanguineous 05/05/23 1122   Drainage Amount small 05/05/23 1122   Care, Wound cleansed with;irrigated with;sterile normal saline 05/05/23 1122   Dressing Care dressing applied;calcium alginate;border dressing;silicone 05/05/23 1122   Periwound Care absorptive dressing applied 05/05/23 1122      Wound Check / Follow-up:   Patient seen today for wound follow-up and dressing changes. Wound to left groin with moist red tissue and shallow wound base. Right groin open wounds track together with minimal tissue . Silver nitrate was used by general surgeon yesterday for hypergranulated areas. Wound bases red and moist. Superficial openings to medial abdomen. Cleansed all with normal saline and gauze. Applied silver impregnated hydrofiber and secured with silicone border dressings. Recommending to continue current care. Moisture and redness improving to periwound tissue surrounding perirectal and coccyx incisions. Cleansed and irrigated wounds with normal saline and gauze. Applied domeboro soaked pillow case to periwound tissue and allowed to sit for 15 minutes. After this was completed, placed calcium alginate to wound beds and secured with silicone border dressings and strips of hydrocolloid dressing. There is small amount of induration present to left gluteal aspect with scant amount of purulent drainage expressed. Overall wounds are improving and responding to current treatment.      Impression: Surgical wounds with delayed closure.         Short term goals:  Regain skin integrity, daily dressing changes, MWF dressing changes.      Lanette Cantu RN    5/5/2023    11:57 EDT

## 2023-05-08 ENCOUNTER — HOSPITAL ENCOUNTER (OUTPATIENT)
Dept: INFUSION THERAPY | Facility: HOSPITAL | Age: 53
Discharge: HOME | End: 2023-05-08
Admitting: SURGERY
Payer: COMMERCIAL

## 2023-05-08 VITALS
RESPIRATION RATE: 20 BRPM | HEART RATE: 107 BPM | SYSTOLIC BLOOD PRESSURE: 115 MMHG | OXYGEN SATURATION: 95 % | DIASTOLIC BLOOD PRESSURE: 78 MMHG | TEMPERATURE: 98.1 F

## 2023-05-08 DIAGNOSIS — L73.2 HIDRADENITIS: Primary | ICD-10-CM

## 2023-05-08 PROCEDURE — G0463 HOSPITAL OUTPT CLINIC VISIT: HCPCS

## 2023-05-08 RX ORDER — CALCIUM ACETATE MONOHYDRATE AND ALUMINUM SULFATE TETRADECAHYDRATE 952; 1347 MG/2299MG; MG/2299MG
1 POWDER, FOR SOLUTION TOPICAL AS NEEDED
Status: CANCELLED
Start: 2023-05-08

## 2023-05-08 RX ORDER — CALCIUM ACETATE MONOHYDRATE AND ALUMINUM SULFATE TETRADECAHYDRATE 952; 1347 MG/2299MG; MG/2299MG
1 POWDER, FOR SOLUTION TOPICAL DAILY
Status: DISCONTINUED | OUTPATIENT
Start: 2023-05-08 | End: 2023-05-10 | Stop reason: HOSPADM

## 2023-05-08 RX ORDER — CALCIUM ACETATE MONOHYDRATE AND ALUMINUM SULFATE TETRADECAHYDRATE 952; 1347 MG/2299MG; MG/2299MG
1 POWDER, FOR SOLUTION TOPICAL DAILY
Status: CANCELLED
Start: 2023-05-09

## 2023-05-08 NOTE — SIGNIFICANT NOTE
Wound Eval / Progress Noted     [facility]     Patient Name: Mariela Aldrich  : 1970  MRN: [id number]  Today's Date: 2023                 Admit Date: 2023    Visit Dx:    ICD-10-CM ICD-9-CM   1. Hidradenitis  L73.2 705.83       Patient Active Problem List   Diagnosis    Anxiety    Type 2 diabetes mellitus with hyperglycemia, with long-term current use of insulin    Hyperlipidemia    Hypothyroidism    Panic attack    RLS (restless legs syndrome)    Sinus trouble    OAB (overactive bladder)    Mild intermittent asthma without complication    Hidradenitis        Past Medical History:   Diagnosis Date    Anxiety     Asthma     PRN INHALER AND TAKES ALLERGY INJECTIONS    Diabetes mellitus type 2, controlled     AVERAGE     Hidradenitis     Hyperlipidemia     Hypothyroidism     Murmur     DIAGNOSED WHEN 17 BUT IS ASYMPTOMATIC AND IS FOLLOWED ONLY BY PCP    Panic attack     Pilonidal cyst     Rheumatic fever     AS A CHILD    RLS (restless legs syndrome)     Sinus trouble         Past Surgical History:   Procedure Laterality Date    COLONOSCOPY  2020    exernal hemorrhoids    EXCISION LESION Bilateral 3/20/2023    Procedure: Excision Hidradenitis of Bilateral Inguinal Areas;  Surgeon: Donato You MD;  Location: [facility];  Service: General;  Laterality: Bilateral;    GROIN ABSCESS INCISION AND DRAINAGE Right     hidradenitis    HYSTERECTOMY  2010    KNEE SURGERY Bilateral 2013    PILONIDAL CYSTECTOMY N/A 2023    Procedure: Excision Hidradenitis of Inguinal Area and Pilonidal Cystectomy;  Surgeon: Donato You MD;  Location: [facility];  Service: General;  Laterality: N/A;         Physical Assessment:  Wound 23 1333 medial gluteal Incision (Active)   Wound Image   23 1225   Dressing Appearance intact;moist drainage 23 1225   Closure None 23 1225   Base moist;red;granulating 23 1225   Periwound intact;pink;moist 23 1225   Periwound  Temperature warm 05/08/23 1225   Periwound Skin Turgor soft 05/08/23 1225   Edges open 05/08/23 1225   Wound Length (cm) 4 cm 05/08/23 1225   Wound Width (cm) 0.1 cm 05/08/23 1225   Wound Depth (cm) 0.3 cm 05/08/23 1225   Wound Surface Area (cm^2) 0.4 cm^2 05/08/23 1225   Wound Volume (cm^3) 0.12 cm^3 05/08/23 1225   Drainage Characteristics/Odor serosanguineous 05/08/23 1225   Drainage Amount small 05/08/23 1225   Care, Wound cleansed with;irrigated with;sterile normal saline 05/08/23 1225   Dressing Care dressing applied;border dressing;calcium alginate;silicone 05/08/23 1225   Periwound Care absorptive dressing applied 05/08/23 1225       Wound 03/22/23 1510 Bilateral distal abdomen Incision (Active)   Wound Image   05/08/23 1225   Dressing Appearance intact;dried drainage 05/08/23 1225   Closure None 05/08/23 1225   Base moist;pink;red 05/08/23 1225   Periwound intact;pink 05/08/23 1225   Periwound Temperature warm 05/08/23 1225   Periwound Skin Turgor soft 05/08/23 1225   Edges open;rolled/closed 05/08/23 1225   Drainage Characteristics/Odor serosanguineous 05/08/23 1225   Drainage Amount scant 05/08/23 1225   Care, Wound cleansed with;sterile normal saline 05/08/23 1225   Dressing Care dressing applied;calcium alginate;border dressing;silver impregnated 05/08/23 1225   Periwound Care absorptive dressing applied 05/08/23 1225       Wound 03/22/23 1515 Left anterior greater trochanter Incision (Active)   Wound Image   05/08/23 1225   Dressing Appearance intact;moist drainage 05/08/23 1225   Closure None 05/08/23 1225   Base moist;red;granulating 05/08/23 1225   Red (%), Wound Tissue Color 100 05/08/23 1225   Periwound intact;pink 05/08/23 1225   Periwound Temperature warm 05/08/23 1225   Periwound Skin Turgor soft 05/08/23 1225   Edges open 05/08/23 1225   Wound Length (cm) 2 cm 05/08/23 1225   Wound Width (cm) 0.5 cm 05/08/23 1225   Wound Depth (cm) 0.1 cm 05/08/23 1225   Wound Surface Area (cm^2) 1 cm^2  05/08/23 1225   Wound Volume (cm^3) 0.1 cm^3 05/08/23 1225   Drainage Characteristics/Odor serosanguineous 05/08/23 1225   Drainage Amount small 05/08/23 1225   Care, Wound cleansed with;irrigated with;sterile normal saline 05/08/23 1225   Dressing Care dressing applied;calcium alginate;border dressing;silver impregnated;silicone 05/08/23 1225   Periwound Care absorptive dressing applied 05/08/23 1225       Wound 03/22/23 1516 Right anterior greater trochanter Incision (Active)   Wound Image   05/08/23 1225   Dressing Appearance intact;moist drainage 05/08/23 1225   Closure None 05/08/23 1225   Base moist;red 05/08/23 1225   Red (%), Wound Tissue Color 100 05/08/23 1225   Periwound intact;pink 05/08/23 1225   Periwound Skin Turgor soft 05/08/23 1225   Edges open 05/08/23 1225   Wound Length (cm) 2 cm 05/08/23 1225   Wound Width (cm) 0.5 cm 05/08/23 1225   Wound Depth (cm) 0.1 cm 05/08/23 1225   Wound Surface Area (cm^2) 1 cm^2 05/08/23 1225   Wound Volume (cm^3) 0.1 cm^3 05/08/23 1225   Drainage Characteristics/Odor serosanguineous 05/08/23 1225   Drainage Amount small 05/08/23 1225   Care, Wound cleansed with;irrigated with;sterile normal saline 05/08/23 1225   Dressing Care dressing applied;border dressing;calcium alginate;silver impregnated;silicone 05/08/23 1225   Periwound Care absorptive dressing applied 05/08/23 1225       Wound 03/22/23 1518 medial coccyx Incision (Active)   Wound Image   05/08/23 1225   Dressing Appearance intact;moist drainage 05/08/23 1225   Closure None 05/08/23 1225   Base moist;red 05/08/23 1225   Red (%), Wound Tissue Color 100 05/08/23 1225   Periwound pink;moist 05/08/23 1225   Periwound Temperature warm 05/08/23 1225   Periwound Skin Turgor soft 05/08/23 1225   Edges open 05/08/23 1225   Wound Length (cm) 1.1 cm 05/08/23 1225   Wound Width (cm) 0.1 cm 05/08/23 1225   Wound Depth (cm) 0.2 cm 05/08/23 1225   Wound Surface Area (cm^2) 0.11 cm^2 05/08/23 1225   Wound Volume (cm^3)  0.022 cm^3 05/08/23 1225   Drainage Characteristics/Odor serosanguineous 05/08/23 1225   Drainage Amount scant 05/08/23 1225   Care, Wound cleansed with;irrigated with;sterile normal saline 05/08/23 1225   Dressing Care dressing applied;border dressing;calcium alginate;silver impregnated;silicone 05/08/23 1225   Periwound Care absorptive dressing applied 05/08/23 1225        Wound Check / Follow-up:  Patient seen today for wound follow-up / wound check.   Dressings to gluteal crease intact with small amount of drainage noted. Wound is responding nicely to calcium alginate dressing. Cleansed and irrigated wound beds with NS and gauze. Upper opening almost completely closed. Shallow opening noted. Lower gluteal incision no longer with tunnel, wound base is filling in and more shallow. Cleansed and irrigated with NS. Recommending to continue only calcium alginate at present time.   Patient with continued improvement to left groin wound. Tissue is red and moist and shallow. Cleansed with NS and gauze. Blotted dry. Recommending dressing changes with calcium alginate also.   Right groin where hypergranulation previously noted had silver nitrite applied by MD last week. Shallow openings visible. Recommending to continue dressing changes with calcium alginate and silicone dressing.  Abdominal crease with only shallow opening to center with no active drainage with cleansing or palpation. Recommending to keep covered with calcium alginate dressing and silicone border dressing at present time.     Impression: Surgical wounds with delayed closure and closure by secondary intent to gluteal crease. Left / right groin and abdominal crease with delayed wound healing.     Short term goals:  Recommending Dressing changes MWF and prn for drainage / saturation.     Miladys Lloyd RN    5/8/2023    13:17 EDT

## 2023-05-10 ENCOUNTER — HOSPITAL ENCOUNTER (OUTPATIENT)
Dept: INFUSION THERAPY | Facility: HOSPITAL | Age: 53
Discharge: HOME | End: 2023-05-10
Admitting: SURGERY
Payer: COMMERCIAL

## 2023-05-10 VITALS
SYSTOLIC BLOOD PRESSURE: 111 MMHG | RESPIRATION RATE: 20 BRPM | DIASTOLIC BLOOD PRESSURE: 79 MMHG | TEMPERATURE: 97.3 F | HEART RATE: 96 BPM | OXYGEN SATURATION: 100 %

## 2023-05-10 DIAGNOSIS — L73.2 HIDRADENITIS: Primary | ICD-10-CM

## 2023-05-10 PROCEDURE — G0463 HOSPITAL OUTPT CLINIC VISIT: HCPCS

## 2023-05-10 RX ORDER — CALCIUM ACETATE MONOHYDRATE AND ALUMINUM SULFATE TETRADECAHYDRATE 952; 1347 MG/2299MG; MG/2299MG
1 POWDER, FOR SOLUTION TOPICAL AS NEEDED
Status: CANCELLED
Start: 2023-05-10

## 2023-05-10 RX ORDER — CALCIUM ACETATE MONOHYDRATE AND ALUMINUM SULFATE TETRADECAHYDRATE 952; 1347 MG/2299MG; MG/2299MG
1 POWDER, FOR SOLUTION TOPICAL AS NEEDED
Status: DISCONTINUED | OUTPATIENT
Start: 2023-05-10 | End: 2023-05-12 | Stop reason: HOSPADM

## 2023-05-10 NOTE — SIGNIFICANT NOTE
Wound Eval / Progress Noted    [facility]     Patient Name: Mariela Aldrich  : 1970  MRN: [id number]  Today's Date: 5/10/2023                 Admit Date: 5/10/2023    Visit Dx:    ICD-10-CM ICD-9-CM   1. Hidradenitis  L73.2 705.83       Patient Active Problem List   Diagnosis   • Anxiety   • Type 2 diabetes mellitus with hyperglycemia, with long-term current use of insulin   • Hyperlipidemia   • Hypothyroidism   • Panic attack   • RLS (restless legs syndrome)   • Sinus trouble   • OAB (overactive bladder)   • Mild intermittent asthma without complication   • Hidradenitis        Past Medical History:   Diagnosis Date   • Anxiety    • Asthma     PRN INHALER AND TAKES ALLERGY INJECTIONS   • Diabetes mellitus type 2, controlled     AVERAGE    • Hidradenitis    • Hyperlipidemia    • Hypothyroidism    • Murmur     DIAGNOSED WHEN 17 BUT IS ASYMPTOMATIC AND IS FOLLOWED ONLY BY PCP   • Panic attack    • Pilonidal cyst    • Rheumatic fever     AS A CHILD   • RLS (restless legs syndrome)    • Sinus trouble         Past Surgical History:   Procedure Laterality Date   • COLONOSCOPY  2020    exernal hemorrhoids   • EXCISION LESION Bilateral 3/20/2023    Procedure: Excision Hidradenitis of Bilateral Inguinal Areas;  Surgeon: Donato You MD;  Location: [facility];  Service: General;  Laterality: Bilateral;   • GROIN ABSCESS INCISION AND DRAINAGE Right     hidradenitis   • HYSTERECTOMY     • KNEE SURGERY Bilateral    • PILONIDAL CYSTECTOMY N/A 2023    Procedure: Excision Hidradenitis of Inguinal Area and Pilonidal Cystectomy;  Surgeon: Donato You MD;  Location: [facility];  Service: General;  Laterality: N/A;         Physical Assessment:  Wound 23 1333 medial gluteal Incision (Active)   Dressing Appearance intact;dry 05/10/23 1206   Closure None 05/10/23 1206   Base moist;red;granulating 05/10/23 1206   Red (%), Wound Tissue Color 100 05/10/23 1206   Periwound  intact;pink;moist 05/10/23 1206   Periwound Temperature warm 05/10/23 1206   Periwound Skin Turgor soft 05/10/23 1206   Edges open 05/10/23 1206   Drainage Characteristics/Odor serosanguineous;yellow 05/10/23 1206   Drainage Amount small 05/10/23 1206   Care, Wound cleansed with;irrigated with;sterile normal saline 05/10/23 1206   Dressing Care dressing applied;calcium alginate;silver impregnated;silicone;border dressing;hydrocolloid 05/10/23 1206   Periwound Care absorptive dressing applied 05/10/23 1206       Wound 03/22/23 1510 Bilateral distal abdomen Incision (Active)   Dressing Appearance intact;dry 05/10/23 1220   Closure None 05/10/23 1220   Base pink;red;dry 05/10/23 1220   Periwound intact;pink 05/10/23 1220   Periwound Skin Turgor soft 05/10/23 1220   Edges rolled/closed 05/10/23 1220   Drainage Amount none 05/10/23 1220   Care, Wound cleansed with;sterile normal saline 05/10/23 1220   Dressing Care dressing applied;calcium alginate;skin barrier agent applied;silicone;border dressing 05/10/23 1220   Periwound Care absorptive dressing applied 05/10/23 1220       Wound 03/22/23 1515 Left anterior greater trochanter Incision (Active)   Dressing Appearance intact;dry 05/10/23 1206   Closure None 05/10/23 1206   Base moist;red;granulating;epithelialization 05/10/23 1206   Red (%), Wound Tissue Color 100 05/10/23 1206   Periwound intact;pink 05/10/23 1206   Periwound Temperature warm 05/10/23 1206   Periwound Skin Turgor soft 05/10/23 1206   Edges open 05/10/23 1206   Drainage Characteristics/Odor serosanguineous 05/10/23 1206   Drainage Amount small 05/10/23 1206   Care, Wound cleansed with;irrigated with;sterile normal saline 05/10/23 1206   Dressing Care dressing applied;calcium alginate;silver impregnated;silicone;border dressing 05/10/23 1206   Periwound Care absorptive dressing applied 05/10/23 1206       Wound 03/22/23 1516 Right anterior greater trochanter Incision (Active)   Dressing Appearance  intact;moist drainage 05/10/23 1206   Closure None 05/10/23 1206   Base moist;red 05/10/23 1206   Red (%), Wound Tissue Color 100 05/10/23 1206   Periwound intact;pink;dry 05/10/23 1206   Periwound Temperature warm 05/10/23 1206   Periwound Skin Turgor soft 05/10/23 1206   Edges open 05/10/23 1206   Drainage Characteristics/Odor serosanguineous;yellow 05/10/23 1206   Drainage Amount small 05/10/23 1206   Care, Wound cleansed with;irrigated with;sterile normal saline 05/10/23 1206   Dressing Care dressing applied;calcium alginate;silver impregnated;silicone;border dressing 05/10/23 1206   Periwound Care absorptive dressing applied 05/10/23 1206       Wound 03/22/23 1518 medial coccyx Incision (Active)   Dressing Appearance intact;dry 05/10/23 1206   Closure None 05/10/23 1206   Base red;dry 05/10/23 1206   Periwound pink;dry 05/10/23 1206   Periwound Temperature warm 05/10/23 1206   Periwound Skin Turgor soft 05/10/23 1206   Edges open 05/10/23 1206   Drainage Amount none 05/10/23 1206   Care, Wound cleansed with;sterile normal saline 05/10/23 1206   Dressing Care dressing applied;calcium alginate;silver impregnated;silicone;border dressing 05/10/23 1206   Periwound Care absorptive dressing applied 05/10/23 1206        Wound Check / Follow-up:  Patient seen today for a wound check and dressing change.  Wounds to the coccyx incision closed with no visible res wound base at this time. Lower gluteal incision with red moist tissue. No induration noted. Small amount of yellow drainage expressed during palpation. No odor detected. Bilateral groin incisions with red moist wound bases; small amount of yellow drainage noted to the right groin incision during cleansing. No odor present and resolved after cleansing with NS. Abdominal crease with area of red scabbing. No drainage at this time. Cleansed all wounds with NS. Recommending to continue the use of the calcium alginate at this time. Patient continues to respond to the  use of the calcium alginate dressing.     Impression: surgical wounds with delayed closure    Short term goals:  Regain skin integrity, dressing change EFRAIN Henriquez RN    5/10/2023    16:37 EDT

## 2023-05-12 ENCOUNTER — HOSPITAL ENCOUNTER (OUTPATIENT)
Dept: INFUSION THERAPY | Facility: HOSPITAL | Age: 53
Discharge: HOME | End: 2023-05-12
Payer: COMMERCIAL

## 2023-05-12 VITALS
SYSTOLIC BLOOD PRESSURE: 129 MMHG | RESPIRATION RATE: 20 BRPM | DIASTOLIC BLOOD PRESSURE: 77 MMHG | OXYGEN SATURATION: 98 % | TEMPERATURE: 98.3 F | HEART RATE: 96 BPM

## 2023-05-12 DIAGNOSIS — L73.2 HIDRADENITIS: Primary | ICD-10-CM

## 2023-05-12 PROCEDURE — G0463 HOSPITAL OUTPT CLINIC VISIT: HCPCS

## 2023-05-12 RX ORDER — CALCIUM ACETATE MONOHYDRATE AND ALUMINUM SULFATE TETRADECAHYDRATE 952; 1347 MG/2299MG; MG/2299MG
1 POWDER, FOR SOLUTION TOPICAL AS NEEDED
Status: CANCELLED
Start: 2023-05-12

## 2023-05-12 RX ORDER — CALCIUM ACETATE MONOHYDRATE AND ALUMINUM SULFATE TETRADECAHYDRATE 952; 1347 MG/2299MG; MG/2299MG
1 POWDER, FOR SOLUTION TOPICAL AS NEEDED
Status: DISCONTINUED | OUTPATIENT
Start: 2023-05-12 | End: 2023-05-14 | Stop reason: HOSPADM

## 2023-05-12 NOTE — SIGNIFICANT NOTE
Wound Eval / Progress Noted     [facility]     Patient Name: Mariela Aldrich  : 1970  MRN: [id number]  Today's Date: 2023                 Admit Date: 2023    Visit Dx:    ICD-10-CM ICD-9-CM   1. Hidradenitis  L73.2 705.83       Patient Active Problem List   Diagnosis    Anxiety    Type 2 diabetes mellitus with hyperglycemia, with long-term current use of insulin    Hyperlipidemia    Hypothyroidism    Panic attack    RLS (restless legs syndrome)    Sinus trouble    OAB (overactive bladder)    Mild intermittent asthma without complication    Hidradenitis        Past Medical History:   Diagnosis Date    Anxiety     Asthma     PRN INHALER AND TAKES ALLERGY INJECTIONS    Diabetes mellitus type 2, controlled     AVERAGE     Hidradenitis     Hyperlipidemia     Hypothyroidism     Murmur     DIAGNOSED WHEN 17 BUT IS ASYMPTOMATIC AND IS FOLLOWED ONLY BY PCP    Panic attack     Pilonidal cyst     Rheumatic fever     AS A CHILD    RLS (restless legs syndrome)     Sinus trouble         Past Surgical History:   Procedure Laterality Date    COLONOSCOPY  2020    exernal hemorrhoids    EXCISION LESION Bilateral 3/20/2023    Procedure: Excision Hidradenitis of Bilateral Inguinal Areas;  Surgeon: Donato You MD;  Location: [facility] [place];  Service: General;  Laterality: Bilateral;    GROIN ABSCESS INCISION AND DRAINAGE Right     hidradenitis    HYSTERECTOMY  2010    KNEE SURGERY Bilateral 2013    PILONIDAL CYSTECTOMY N/A 2023    Procedure: Excision Hidradenitis of Inguinal Area and Pilonidal Cystectomy;  Surgeon: Donato You MD;  Location: [facility] [place];  Service: General;  Laterality: N/A;         Physical Assessment:  Wound 23 1333 medial gluteal Incision (Active)   Wound Image   23 1230   Dressing Appearance intact;moist drainage 23 1230   Closure None 23 1230   Base moist;red;granulating 23 1230   Periwound intact;redness 23 1230   Periwound  Temperature warm 05/12/23 1230   Periwound Skin Turgor soft 05/12/23 1230   Edges open 05/12/23 1230   Drainage Characteristics/Odor serosanguineous;purulent 05/12/23 1230   Drainage Amount small 05/12/23 1230   Care, Wound cleansed with;irrigated with;sterile normal saline 05/12/23 1230   Dressing Care dressing applied;border dressing;calcium alginate;silicone;silver impregnated 05/12/23 1230   Periwound Care absorptive dressing applied 05/12/23 1230       Wound 03/22/23 1510 Bilateral distal abdomen Incision (Active)   Wound Image   05/12/23 1230   Dressing Appearance intact;dry 05/12/23 1230   Closure None 05/12/23 1230   Base pink;red;dry;scab 05/12/23 1230   Periwound intact;pink 05/12/23 1230   Periwound Temperature warm 05/12/23 1230   Periwound Skin Turgor soft 05/12/23 1230   Edges rolled/closed 05/12/23 1230   Drainage Amount none 05/12/23 1230   Care, Wound cleansed with;sterile normal saline 05/12/23 1230   Dressing Care open to air 05/12/23 1230       Wound 03/22/23 1515 Left anterior greater trochanter Incision (Active)   Wound Image   05/12/23 1230   Dressing Appearance intact;moist drainage 05/12/23 1230   Closure None 05/12/23 1230   Base moist;red;granulating 05/12/23 1230   Red (%), Wound Tissue Color 100 05/12/23 1230   Periwound intact;pink 05/12/23 1230   Periwound Temperature warm 05/12/23 1230   Periwound Skin Turgor soft 05/12/23 1230   Edges open 05/12/23 1230   Drainage Characteristics/Odor serosanguineous 05/12/23 1230   Drainage Amount small 05/12/23 1230   Care, Wound cleansed with;irrigated with;sterile normal saline 05/12/23 1230   Dressing Care dressing applied;calcium alginate;border dressing;silver impregnated;silicone 05/12/23 1230   Periwound Care absorptive dressing applied 05/12/23 1230       Wound 03/22/23 1516 Right anterior greater trochanter Incision (Active)   Wound Image   05/12/23 1230   Dressing Appearance intact;moist drainage 05/12/23 1230   Closure None 05/12/23 1230    Base moist;red;granulating 05/12/23 1230   Red (%), Wound Tissue Color 100 05/12/23 1230   Periwound intact;pink;redness 05/12/23 1230   Periwound Temperature warm 05/12/23 1230   Periwound Skin Turgor soft 05/12/23 1230   Edges open 05/12/23 1230   Drainage Characteristics/Odor serosanguineous 05/12/23 1230   Drainage Amount small 05/12/23 1230   Care, Wound cleansed with;sterile normal saline 05/12/23 1230   Dressing Care dressing applied;calcium alginate;border dressing;silver impregnated;silicone 05/12/23 1230   Periwound Care absorptive dressing applied 05/12/23 1230       Wound 03/22/23 1518 medial coccyx Incision (Active)   Wound Image   05/12/23 1230   Dressing Appearance intact;dried drainage 05/12/23 1230   Closure None 05/12/23 1230   Base moist;pink;red 05/12/23 1230   Periwound intact;redness 05/12/23 1230   Periwound Temperature warm 05/12/23 1230   Periwound Skin Turgor soft 05/12/23 1230   Edges rolled/closed;open 05/12/23 1230   Drainage Characteristics/Odor serosanguineous 05/12/23 1230   Drainage Amount scant 05/12/23 1230   Care, Wound cleansed with;sterile normal saline 05/12/23 1230   Dressing Care dressing applied;calcium alginate;border dressing;silver impregnated;silicone 05/12/23 1230   Periwound Care absorptive dressing applied 05/12/23 1230          Wound Check / Follow-up:  Patient seen today for wound dressing change. Patient with intact dressings. Dressings removed.   Left groin wound with shallow depth, red moist tissue with increasing epithelialization to wound margins. Recommending to continue current wound care.   Abdominal crease incision with closed tissue. No open wounds and no active drainage at this time. Recommending to keep open to air.  Right groin with red moist tissue to open areas with partial closure in between. Hypergranulation remains to upper portion but minimal. Cleansed with NS and gauze. Recommending to continue current wound care.   Patient with newly developed  reddened area with visible center opening with slough to base. Very small area. Patient believes it may be a hair follicle. No purulence or drainage to be expressed with palpation and cleansing. Induration palpated. Cleansed with NS and gauze as well as swab. Recommending to apply calcium alginate with silicone border dressing and encouraged warm compresses to promote drainage.   Upper gluteal wound with only superficial separation of tissue with visible red to pink wound bed. Cleansed with NS and gauze and will recommend continuing current wound care.   Lower gluteal incision continues to improve. Wound base more shallow, no tunneling. Induration or firm tissue remains present to bilateral aspect of lowest portion of wound, however only scant amount of yellow drainage noted with cleansing and assessment. Recommending to continue current wound care regimen.   Patient remains on antibiotics at this time. Discussed routine skin hygiene with patient. She is using antibacterial soap. Discussed possible use of H[place]lenz once wounds have closed more for prevention.     Impression: Surgical wounds with dehiscence and delayed closure    Short term goals:  Regain skin integrity. Dressing changes MWF and as needed for saturation / rolling.    Miladys Lloyd RN    5/12/2023    16:14 EDT

## 2023-05-15 ENCOUNTER — HOSPITAL ENCOUNTER (OUTPATIENT)
Dept: INFUSION THERAPY | Facility: HOSPITAL | Age: 53
Discharge: HOME | End: 2023-05-15
Admitting: SURGERY
Payer: COMMERCIAL

## 2023-05-15 VITALS
SYSTOLIC BLOOD PRESSURE: 126 MMHG | TEMPERATURE: 98 F | OXYGEN SATURATION: 99 % | DIASTOLIC BLOOD PRESSURE: 86 MMHG | HEART RATE: 101 BPM | RESPIRATION RATE: 20 BRPM

## 2023-05-15 DIAGNOSIS — L73.2 HIDRADENITIS: Primary | ICD-10-CM

## 2023-05-15 PROCEDURE — G0463 HOSPITAL OUTPT CLINIC VISIT: HCPCS

## 2023-05-15 RX ORDER — CALCIUM ACETATE MONOHYDRATE AND ALUMINUM SULFATE TETRADECAHYDRATE 952; 1347 MG/2299MG; MG/2299MG
1 POWDER, FOR SOLUTION TOPICAL AS NEEDED
Status: CANCELLED
Start: 2023-05-15

## 2023-05-15 RX ORDER — CALCIUM ACETATE MONOHYDRATE AND ALUMINUM SULFATE TETRADECAHYDRATE 952; 1347 MG/2299MG; MG/2299MG
1 POWDER, FOR SOLUTION TOPICAL AS NEEDED
Status: DISCONTINUED | OUTPATIENT
Start: 2023-05-15 | End: 2023-05-17 | Stop reason: HOSPADM

## 2023-05-15 NOTE — SIGNIFICANT NOTE
Wound Eval / Progress Noted     [facility]     Patient Name: Mariela Aldrich  : 1970  MRN: [id number]  Today's Date: 5/15/2023                 Admit Date: 5/15/2023    Visit Dx:    ICD-10-CM ICD-9-CM   1. Hidradenitis  L73.2 705.83       Patient Active Problem List   Diagnosis    Anxiety    Type 2 diabetes mellitus with hyperglycemia, with long-term current use of insulin    Hyperlipidemia    Hypothyroidism    Panic attack    RLS (restless legs syndrome)    Sinus trouble    OAB (overactive bladder)    Mild intermittent asthma without complication    Hidradenitis        Past Medical History:   Diagnosis Date    Anxiety     Asthma     PRN INHALER AND TAKES ALLERGY INJECTIONS    Diabetes mellitus type 2, controlled     AVERAGE     Hidradenitis     Hyperlipidemia     Hypothyroidism     Murmur     DIAGNOSED WHEN 17 BUT IS ASYMPTOMATIC AND IS FOLLOWED ONLY BY PCP    Panic attack     Pilonidal cyst     Rheumatic fever     AS A CHILD    RLS (restless legs syndrome)     Sinus trouble         Past Surgical History:   Procedure Laterality Date    COLONOSCOPY  2020    exernal hemorrhoids    EXCISION LESION Bilateral 3/20/2023    Procedure: Excision Hidradenitis of Bilateral Inguinal Areas;  Surgeon: Donato You MD;  Location: [facility];  Service: General;  Laterality: Bilateral;    GROIN ABSCESS INCISION AND DRAINAGE Right     hidradenitis    HYSTERECTOMY  2010    KNEE SURGERY Bilateral 2013    PILONIDAL CYSTECTOMY N/A 2023    Procedure: Excision Hidradenitis of Inguinal Area and Pilonidal Cystectomy;  Surgeon: Donato You MD;  Location: [facility];  Service: General;  Laterality: N/A;         Physical Assessment:  Wound 23 1333 medial gluteal Incision (Active)   Wound Image   05/15/23 1235   Dressing Appearance intact;moist drainage 05/15/23 1235   Closure None 05/15/23 1235   Base moist;red;granulating 05/15/23 1235   Red (%), Wound Tissue Color 100 05/15/23 1235    Periwound macerated;moist;redness 05/15/23 1235   Periwound Temperature warm 05/15/23 1235   Periwound Skin Turgor soft 05/15/23 1235   Edges open 05/15/23 1235   Wound Length (cm) 4.4 cm 05/15/23 1235   Wound Width (cm) 0.1 cm 05/15/23 1235   Wound Depth (cm) 0.3 cm 05/15/23 1235   Wound Surface Area (cm^2) 0.44 cm^2 05/15/23 1235   Wound Volume (cm^3) 0.132 cm^3 05/15/23 1235   Drainage Characteristics/Odor serosanguineous;purulent 05/15/23 1235   Drainage Amount small 05/15/23 1235   Care, Wound cleansed with;irrigated with;sterile normal saline 05/15/23 1235   Dressing Care dressing applied;calcium alginate;border dressing;silver impregnated;silicone 05/15/23 1235   Periwound Care absorptive dressing applied 05/15/23 1235       Wound 03/22/23 1510 Bilateral distal abdomen Incision (Active)   Dressing Appearance open to air 05/15/23 1235   Closure None 05/15/23 1235   Base dry;red;pink 05/15/23 1235   Periwound intact;pink 05/15/23 1235   Periwound Temperature warm 05/15/23 1235   Periwound Skin Turgor soft 05/15/23 1235   Edges rolled/closed 05/15/23 1235   Drainage Amount none 05/15/23 1235   Care, Wound cleansed with;sterile normal saline 05/15/23 1235   Dressing Care open to air 05/15/23 1235       Wound 03/22/23 1515 Left anterior greater trochanter Incision (Active)   Wound Image   05/15/23 1235   Dressing Appearance intact;moist drainage 05/15/23 1235   Closure None 05/15/23 1235   Base moist;pink;red 05/15/23 1235   Red (%), Wound Tissue Color 100 05/15/23 1235   Periwound intact;pink 05/15/23 1235   Periwound Temperature warm 05/15/23 1235   Periwound Skin Turgor soft 05/15/23 1235   Edges open 05/15/23 1235   Wound Length (cm) 1.4 cm 05/15/23 1235   Wound Width (cm) 0.4 cm 05/15/23 1235   Wound Depth (cm) 0.1 cm 05/15/23 1235   Wound Surface Area (cm^2) 0.56 cm^2 05/15/23 1235   Wound Volume (cm^3) 0.056 cm^3 05/15/23 1235   Drainage Characteristics/Odor serosanguineous 05/15/23 1235   Drainage Amount  small 05/15/23 1235   Care, Wound cleansed with;irrigated with;sterile normal saline 05/15/23 1235   Dressing Care dressing applied;calcium alginate;border dressing;silver impregnated;silicone 05/15/23 1235   Periwound Care absorptive dressing applied 05/15/23 1235       Wound 03/22/23 1516 Right anterior greater trochanter Incision (Active)   Wound Image   05/15/23 1235   Dressing Appearance intact;moist drainage 05/15/23 1235   Closure None 05/15/23 1235   Base moist;red 05/15/23 1235   Red (%), Wound Tissue Color 100 05/15/23 1235   Periwound intact;pink 05/15/23 1235   Periwound Temperature warm 05/15/23 1235   Periwound Skin Turgor soft 05/15/23 1235   Edges open 05/15/23 1235   Wound Length (cm) 4 cm 05/15/23 1235   Wound Width (cm) 0.3 cm 05/15/23 1235   Wound Depth (cm) 0.1 cm 05/15/23 1235   Wound Surface Area (cm^2) 1.2 cm^2 05/15/23 1235   Wound Volume (cm^3) 0.12 cm^3 05/15/23 1235   Drainage Characteristics/Odor serosanguineous 05/15/23 1235   Drainage Amount small 05/15/23 1235   Care, Wound cleansed with;irrigated with;sterile normal saline 05/15/23 1235   Dressing Care dressing applied;border dressing;calcium alginate;silver impregnated;silicone 05/15/23 1235   Periwound Care absorptive dressing applied 05/15/23 1235       Wound 03/22/23 1518 medial coccyx Incision (Active)   Wound Image   05/15/23 1235   Dressing Appearance intact;moist drainage 05/15/23 1235   Closure None 05/15/23 1235   Base moist;pink 05/15/23 1235   Periwound intact;pink 05/15/23 1235   Periwound Temperature warm 05/15/23 1235   Periwound Skin Turgor soft 05/15/23 1235   Edges rolled/closed 05/15/23 1235   Drainage Characteristics/Odor serosanguineous 05/15/23 1235   Drainage Amount scant 05/15/23 1235   Care, Wound cleansed with;irrigated with;sterile normal saline 05/15/23 1235   Dressing Care dressing applied;calcium alginate;border dressing;silver impregnated;silicone 05/15/23 1235   Periwound Care absorptive dressing  applied 05/15/23 1235        Wound Check / Follow-up:  Patient seen today for wound follow-up / dressing change.   Suprapubic area with small open area measuring 0.4cm x 0.3cm x 0.1cm. Serosanguinous and yellow drainage noted. Cleansed and irrigated with NS. Recommending to continue current wound care.   Left groin and right groin with minimal drainage and overall improvement. Cleansed and irrigated wound with NS and gauze. Recommending to continue current wound care with calcium alginate and silicone border dressings.   Abdominal incision is closed and no open wounds noted at this time. Recommending to continue site care.  Gluteal incision with closed opening to upper portion and opening to lower portion wound that is improving with shallow depth. Red moist tissue noted. Serosanguinous drainage also with scant amount of purulent drainage noted. Cleansed and irrigated with NS and gauze. Recommending to continue calcium alginate and silicone border dressings at this time.     Impression: Surgical incisions with closure by secondary intent.     Short term goals:  Regain skin integrity. Dressing changes JOHNNA Lloyd RN    5/15/2023    17:09 EDT

## 2023-05-17 ENCOUNTER — HOSPITAL ENCOUNTER (OUTPATIENT)
Dept: INFUSION THERAPY | Facility: HOSPITAL | Age: 53
Discharge: HOME | End: 2023-05-17
Admitting: SURGERY
Payer: COMMERCIAL

## 2023-05-17 VITALS
HEART RATE: 105 BPM | OXYGEN SATURATION: 98 % | SYSTOLIC BLOOD PRESSURE: 117 MMHG | RESPIRATION RATE: 20 BRPM | TEMPERATURE: 98.2 F | DIASTOLIC BLOOD PRESSURE: 73 MMHG

## 2023-05-17 DIAGNOSIS — L73.2 HIDRADENITIS: Primary | ICD-10-CM

## 2023-05-17 PROCEDURE — G0463 HOSPITAL OUTPT CLINIC VISIT: HCPCS

## 2023-05-17 RX ORDER — CALCIUM ACETATE MONOHYDRATE AND ALUMINUM SULFATE TETRADECAHYDRATE 952; 1347 MG/2299MG; MG/2299MG
1 POWDER, FOR SOLUTION TOPICAL AS NEEDED
Status: DISCONTINUED | OUTPATIENT
Start: 2023-05-17 | End: 2023-05-19 | Stop reason: HOSPADM

## 2023-05-17 RX ORDER — CALCIUM ACETATE MONOHYDRATE AND ALUMINUM SULFATE TETRADECAHYDRATE 952; 1347 MG/2299MG; MG/2299MG
1 POWDER, FOR SOLUTION TOPICAL AS NEEDED
Status: CANCELLED
Start: 2023-05-17

## 2023-05-17 NOTE — SIGNIFICANT NOTE
Wound Eval / Progress Noted     [facility]     Patient Name: Mariela Aldrich  : 1970  MRN: [id number]  Today's Date: 2023                 Admit Date: 2023    Visit Dx:    ICD-10-CM ICD-9-CM   1. Hidradenitis  L73.2 705.83       Patient Active Problem List   Diagnosis   • Anxiety   • Type 2 diabetes mellitus with hyperglycemia, with long-term current use of insulin   • Hyperlipidemia   • Hypothyroidism   • Panic attack   • RLS (restless legs syndrome)   • Sinus trouble   • OAB (overactive bladder)   • Mild intermittent asthma without complication   • Hidradenitis        Past Medical History:   Diagnosis Date   • Anxiety    • Asthma     PRN INHALER AND TAKES ALLERGY INJECTIONS   • Diabetes mellitus type 2, controlled     AVERAGE    • Hidradenitis    • Hyperlipidemia    • Hypothyroidism    • Murmur     DIAGNOSED WHEN 17 BUT IS ASYMPTOMATIC AND IS FOLLOWED ONLY BY PCP   • Panic attack    • Pilonidal cyst    • Rheumatic fever     AS A CHILD   • RLS (restless legs syndrome)    • Sinus trouble         Past Surgical History:   Procedure Laterality Date   • COLONOSCOPY  2020    exernal hemorrhoids   • EXCISION LESION Bilateral 3/20/2023    Procedure: Excision Hidradenitis of Bilateral Inguinal Areas;  Surgeon: Donato You MD;  Location: [facility];  Service: General;  Laterality: Bilateral;   • GROIN ABSCESS INCISION AND DRAINAGE Right     hidradenitis   • HYSTERECTOMY     • KNEE SURGERY Bilateral    • PILONIDAL CYSTECTOMY N/A 2023    Procedure: Excision Hidradenitis of Inguinal Area and Pilonidal Cystectomy;  Surgeon: Donato You MD;  Location: [facility];  Service: General;  Laterality: N/A;         Physical Assessment:  Wound 23 1333 medial gluteal Incision (Active)   Dressing Appearance intact;moist drainage 23 1225   Closure None 23 1225   Base moist;red;granulating 23 1225   Red (%), Wound Tissue Color 100 23 1225   Periwound  macerated;moist;redness 05/17/23 1225   Periwound Temperature warm 05/17/23 1225   Periwound Skin Turgor soft 05/17/23 1225   Edges open 05/17/23 1225   Drainage Characteristics/Odor serosanguineous 05/17/23 1225   Drainage Amount small 05/17/23 1225   Care, Wound cleansed with;irrigated with;sterile normal saline 05/17/23 1225   Dressing Care dressing applied;calcium alginate;silver impregnated;hydrofiber;silicone;border dressing 05/17/23 1225   Periwound Care absorptive dressing applied 05/17/23 1225       Wound 03/22/23 1515 Left anterior greater trochanter Incision (Active)   Wound Image   05/17/23 1225   Dressing Appearance intact;moist drainage 05/17/23 1225   Closure None 05/17/23 1225   Base moist;pink;red;epithelialization 05/17/23 1225   Red (%), Wound Tissue Color 100 05/17/23 1225   Periwound intact;pink 05/17/23 1225   Periwound Temperature warm 05/17/23 1225   Periwound Skin Turgor soft 05/17/23 1225   Edges open 05/17/23 1225   Drainage Characteristics/Odor serosanguineous 05/17/23 1225   Drainage Amount scant 05/17/23 1225   Care, Wound cleansed with;irrigated with;sterile normal saline 05/17/23 1225   Dressing Care dressing applied;calcium alginate;silver impregnated;hydrofiber;silicone;border dressing 05/17/23 1225   Periwound Care absorptive dressing applied 05/17/23 1225       Wound 03/22/23 1516 Right anterior greater trochanter Incision (Active)   Wound Image   05/17/23 1225   Dressing Appearance intact;moist drainage 05/17/23 1225   Closure None 05/17/23 1225   Base moist;red 05/17/23 1225   Red (%), Wound Tissue Color 100 05/17/23 1225   Periwound intact;pink 05/17/23 1225   Periwound Temperature warm 05/17/23 1225   Periwound Skin Turgor soft 05/17/23 1225   Edges open 05/17/23 1225   Drainage Characteristics/Odor serosanguineous 05/17/23 1225   Drainage Amount scant 05/17/23 1225   Care, Wound cleansed with;irrigated with;sterile normal saline 05/17/23 1225   Dressing Care dressing  applied;silver impregnated;calcium alginate;silicone;border dressing 05/17/23 1225   Periwound Care absorptive dressing applied 05/17/23 1225       Wound 03/22/23 1518 medial coccyx Incision (Active)   Dressing Appearance intact 05/17/23 1225   Closure None 05/17/23 1225   Base moist;pink 05/17/23 1225   Periwound intact;pink 05/17/23 1225   Periwound Temperature warm 05/17/23 1225   Periwound Skin Turgor soft 05/17/23 1225   Edges rolled/closed 05/17/23 1225   Drainage Amount none 05/17/23 1225   Care, Wound cleansed with;sterile normal saline 05/17/23 1225   Dressing Care dressing applied;calcium alginate;silver impregnated;hydrofiber;silicone;border dressing 05/17/23 1225   Periwound Care absorptive dressing applied 05/17/23 1225        Wound Check / Follow-up:  Patient seen today for a wound check and dressing change. No issues reported from patient.  Right and left groin incisions with no drainage noted to the dressing. Wound bases are red and moist with epithelialization noted to the periwound. Cleansed with NS. Recommending to continue current calcium alginate dressing as patient is responding to the current treatment.  Lower gluteal incision with red moist wound base. No induration felt to the periwound and no purulent drainage detected during cleansing. Upper gluteal wound with closure with a small area with red moist tissue. Cleansed both wounds with NS. Recommend to continue current calcium alginate dressings due to positive wound response.    Impression:surgical incisions with closure by secondary intent    Short term goals: regain skin integrity, MWF dressing changes    No Henriquez RN    5/17/2023    17:21 EDT

## 2023-05-18 ENCOUNTER — HOSPITAL ENCOUNTER (OUTPATIENT)
Dept: MAMMOGRAPHY | Facility: HOSPITAL | Age: 53
Discharge: HOME | End: 2023-05-18
Admitting: NURSE PRACTITIONER
Payer: COMMERCIAL

## 2023-05-18 DIAGNOSIS — Z12.31 VISIT FOR SCREENING MAMMOGRAM: ICD-10-CM

## 2023-05-18 PROCEDURE — 77067 SCR MAMMO BI INCL CAD: CPT

## 2023-05-18 PROCEDURE — 77063 BREAST TOMOSYNTHESIS BI: CPT

## 2023-05-19 ENCOUNTER — HOSPITAL ENCOUNTER (OUTPATIENT)
Dept: INFUSION THERAPY | Facility: HOSPITAL | Age: 53
Discharge: HOME | End: 2023-05-19
Payer: COMMERCIAL

## 2023-05-19 VITALS
DIASTOLIC BLOOD PRESSURE: 69 MMHG | RESPIRATION RATE: 20 BRPM | SYSTOLIC BLOOD PRESSURE: 116 MMHG | TEMPERATURE: 97.5 F | HEART RATE: 102 BPM | OXYGEN SATURATION: 98 %

## 2023-05-19 DIAGNOSIS — L73.2 HIDRADENITIS: Primary | ICD-10-CM

## 2023-05-19 PROCEDURE — G0463 HOSPITAL OUTPT CLINIC VISIT: HCPCS

## 2023-05-19 RX ORDER — CALCIUM ACETATE MONOHYDRATE AND ALUMINUM SULFATE TETRADECAHYDRATE 952; 1347 MG/2299MG; MG/2299MG
1 POWDER, FOR SOLUTION TOPICAL AS NEEDED
Status: CANCELLED
Start: 2023-05-19

## 2023-05-19 NOTE — SIGNIFICANT NOTE
Wound Eval / Progress Noted     [facility]     Patient Name: Mariela Aldrich  : 1970  MRN: [id number]  Today's Date: 2023                 Admit Date: 2023    Visit Dx:    ICD-10-CM ICD-9-CM   1. Hidradenitis  L73.2 705.83       Patient Active Problem List   Diagnosis    Anxiety    Type 2 diabetes mellitus with hyperglycemia, with long-term current use of insulin    Hyperlipidemia    Hypothyroidism    Panic attack    RLS (restless legs syndrome)    Sinus trouble    OAB (overactive bladder)    Mild intermittent asthma without complication    Hidradenitis        Past Medical History:   Diagnosis Date    Anxiety     Asthma     PRN INHALER AND TAKES ALLERGY INJECTIONS    Diabetes mellitus type 2, controlled     AVERAGE     Hidradenitis     Hyperlipidemia     Hypothyroidism     Murmur     DIAGNOSED WHEN 17 BUT IS ASYMPTOMATIC AND IS FOLLOWED ONLY BY PCP    Panic attack     Pilonidal cyst     Rheumatic fever     AS A CHILD    RLS (restless legs syndrome)     Sinus trouble         Past Surgical History:   Procedure Laterality Date    COLONOSCOPY  2020    exernal hemorrhoids    EXCISION LESION Bilateral 3/20/2023    Procedure: Excision Hidradenitis of Bilateral Inguinal Areas;  Surgeon: Donato You MD;  Location: [facility];  Service: General;  Laterality: Bilateral;    GROIN ABSCESS INCISION AND DRAINAGE Right     hidradenitis    HYSTERECTOMY  2010    KNEE SURGERY Bilateral 2013    PILONIDAL CYSTECTOMY N/A 2023    Procedure: Excision Hidradenitis of Inguinal Area and Pilonidal Cystectomy;  Surgeon: Donato You MD;  Location: [facility];  Service: General;  Laterality: N/A;         Physical Assessment:  Wound 23 1333 medial gluteal Incision (Active)   Wound Image   23 1225   Dressing Appearance intact;moist drainage 23 1225   Base moist;red 23 1225   Red (%), Wound Tissue Color 100 23 1225   Periwound redness;moist 23 1225   Edges  open 05/19/23 1225   Wound Length (cm) 4 cm 05/19/23 1225   Wound Width (cm) 0.1 cm 05/19/23 1225   Wound Depth (cm) 0.2 cm 05/19/23 1225   Wound Surface Area (cm^2) 0.4 cm^2 05/19/23 1225   Wound Volume (cm^3) 0.08 cm^3 05/19/23 1225   Drainage Characteristics/Odor serosanguineous 05/19/23 1225   Drainage Amount small 05/19/23 1225   Care, Wound cleansed with;irrigated with;sterile normal saline 05/19/23 1225       Wound 03/22/23 1510 Bilateral distal abdomen Incision (Active)   Wound Image   05/19/23 1225   Dressing Appearance open to air 05/19/23 1225   Base red;dry;moist;pink 05/19/23 1225   Periwound intact;pink 05/19/23 1225   Periwound Temperature warm 05/19/23 1225   Periwound Skin Turgor soft 05/19/23 1225   Edges open;rolled/closed 05/19/23 1225   Drainage Amount none 05/19/23 1225   Care, Wound cleansed with;sterile normal saline 05/19/23 1225   Periwound Care barrier film applied 05/19/23 1225       Wound 03/22/23 1515 Left anterior greater trochanter Incision (Active)   Wound Image   05/19/23 1225   Dressing Appearance intact;dried drainage 05/19/23 1225   Base pink;moist 05/19/23 1225   Periwound intact;pink 05/19/23 1225   Periwound Temperature warm 05/19/23 1225   Periwound Skin Turgor soft 05/19/23 1225   Edges rolled/closed 05/19/23 1225   Drainage Characteristics/Odor serosanguineous 05/19/23 1225   Drainage Amount scant 05/19/23 1225   Care, Wound cleansed with;sterile normal saline 05/19/23 1225   Dressing Care dressing applied;border dressing;calcium alginate;silver impregnated;silicone 05/19/23 1225   Periwound Care absorptive dressing applied 05/19/23 1225       Wound 03/22/23 1516 Right anterior greater trochanter Incision (Active)   Wound Image   05/19/23 1225   Dressing Appearance intact;moist drainage 05/19/23 1225   Base red;moist 05/19/23 1225   Periwound intact;pink 05/19/23 1225   Periwound Temperature warm 05/19/23 1225   Periwound Skin Turgor soft 05/19/23 1225   Edges  open;rolled/closed 05/19/23 1225   Drainage Characteristics/Odor serosanguineous 05/19/23 1225   Drainage Amount small 05/19/23 1225   Care, Wound cleansed with;sterile normal saline 05/19/23 1225   Dressing Care dressing applied;border dressing;calcium alginate;silver impregnated;silicone 05/19/23 1225   Periwound Care absorptive dressing applied 05/19/23 1225       Wound 03/22/23 1518 medial coccyx Incision (Active)   Wound Image   05/19/23 1225   Dressing Appearance intact;dried drainage 05/19/23 1225   Base pink;moist 05/19/23 1225   Periwound intact;pink 05/19/23 1225   Periwound Temperature warm 05/19/23 1225   Periwound Skin Turgor soft 05/19/23 1225   Edges rolled/closed 05/19/23 1225   Drainage Characteristics/Odor serous 05/19/23 1225   Drainage Amount scant 05/19/23 1225   Care, Wound cleansed with;sterile normal saline;other (see comments) 05/19/23 1225        Wound Check / Follow-up:  Patient seen today for wound dressing change / wound check. Patient with intact dressings upon arrival. Spoke with surgeon earlier this morning about possibly changing therapy plan. He is in agreement with change, pending patient assessment and agreement.   Dressings to bilateral groin removed. Left groin has new epithelialization across entire wound. Only very small area with moist red tissue visible. Recommending to continue dressings through the weekend for protection.   Abdominal incision line with no active drainage noted. Very small circular area of separation to medial aspect of incision and small closed area of irritation to right side of incision. Cleansed with NS and gauze. Will apply skin barrier vs. Dressing due to no drainage.  Right groin incision with very small area of red moist tissue to lower portion and one area of continued hypergranulation with red moist tissue to upper area. No active drainage and no depth noted. Recommending to continue dressings to site.   Gluteal incision dressing removed. Upper  portion of wound continues to have approximated wound margins with no visible opening. Dried drainage to gauze in that area but no active drainage. Lower portion of gluteal incision is continuing to fill with red moist tissue visible. No purulence expressed or visualized. Induration nearly completely resolved bilaterally.   Discussed with patient the thought of trying sitz baths over the weekend, along with Dakins irrigation and only loni-pad application for drainage management to see if the wound would begin to close more. Patient is agreeable to trying over the weekend. Instructed the patient that if this was not working for her to resume the wound care we have been doing and to let us know on Monday. She is agreeable.     Impression: Surgical incisions with delayed closure and dehiscence.     Short term goals:  Regain skin integrity. Topical treatments and daily dressing changes to anterior wounds within groin.    Miladys Lloyd RN    5/19/2023    14:56 EDT

## 2023-05-22 ENCOUNTER — HOSPITAL ENCOUNTER (OUTPATIENT)
Dept: INFUSION THERAPY | Facility: HOSPITAL | Age: 53
Discharge: HOME | End: 2023-05-22
Admitting: SURGERY
Payer: COMMERCIAL

## 2023-05-22 VITALS
RESPIRATION RATE: 18 BRPM | SYSTOLIC BLOOD PRESSURE: 122 MMHG | OXYGEN SATURATION: 97 % | TEMPERATURE: 98 F | DIASTOLIC BLOOD PRESSURE: 74 MMHG | HEART RATE: 105 BPM

## 2023-05-22 DIAGNOSIS — L73.2 HIDRADENITIS: Primary | ICD-10-CM

## 2023-05-22 PROCEDURE — G0463 HOSPITAL OUTPT CLINIC VISIT: HCPCS

## 2023-05-22 NOTE — SIGNIFICANT NOTE
Wound Eval / Progress Noted     [facility]     Patient Name: Mariela Aldrich  : 1970  MRN: [id number]  Today's Date: 2023                 Admit Date: 2023    Visit Dx:  No diagnosis found.    Patient Active Problem List   Diagnosis   • Anxiety   • Type 2 diabetes mellitus with hyperglycemia, with long-term current use of insulin   • Hyperlipidemia   • Hypothyroidism   • Panic attack   • RLS (restless legs syndrome)   • Sinus trouble   • OAB (overactive bladder)   • Mild intermittent asthma without complication   • Hidradenitis        Past Medical History:   Diagnosis Date   • Anxiety    • Asthma     PRN INHALER AND TAKES ALLERGY INJECTIONS   • Diabetes mellitus type 2, controlled     AVERAGE    • Hidradenitis    • Hyperlipidemia    • Hypothyroidism    • Murmur     DIAGNOSED WHEN 17 BUT IS ASYMPTOMATIC AND IS FOLLOWED ONLY BY PCP   • Panic attack    • Pilonidal cyst    • Rheumatic fever     AS A CHILD   • RLS (restless legs syndrome)    • Sinus trouble         Past Surgical History:   Procedure Laterality Date   • COLONOSCOPY  2020    exernal hemorrhoids   • EXCISION LESION Bilateral 3/20/2023    Procedure: Excision Hidradenitis of Bilateral Inguinal Areas;  Surgeon: Donato You MD;  Location: [facility];  Service: General;  Laterality: Bilateral;   • GROIN ABSCESS INCISION AND DRAINAGE Right     hidradenitis   • HYSTERECTOMY     • KNEE SURGERY Bilateral    • PILONIDAL CYSTECTOMY N/A 2023    Procedure: Excision Hidradenitis of Inguinal Area and Pilonidal Cystectomy;  Surgeon: Donato You MD;  Location: [facility];  Service: General;  Laterality: N/A;         Physical Assessment:  Wound 23 1333 medial gluteal Incision (Active)   Wound Image   23 1210   Dressing Appearance intact;moist drainage 23 1210   Closure None 23 1210   Base moist;red 23 1210   Red (%), Wound Tissue Color 100 23 1210   Periwound redness;moist  05/22/23 1210   Periwound Temperature warm 05/22/23 1210   Periwound Skin Turgor soft 05/22/23 1210   Edges open 05/22/23 1210   Wound Length (cm) 3.5 cm 05/22/23 1210   Wound Width (cm) 0.1 cm 05/22/23 1210   Wound Depth (cm) 0.2 cm 05/22/23 1210   Wound Surface Area (cm^2) 0.35 cm^2 05/22/23 1210   Wound Volume (cm^3) 0.07 cm^3 05/22/23 1210   Drainage Characteristics/Odor serosanguineous 05/22/23 1210   Drainage Amount small 05/22/23 1210   Care, Wound cleansed with;irrigated with;sterile normal saline;other (see comments) 05/22/23 1210   Dressing Care other (see comments) 05/22/23 1210   Periwound Care absorptive dressing applied 05/22/23 1210       Wound 03/22/23 1510 Bilateral distal abdomen Incision (Active)   Wound Image   05/22/23 1210   Dressing Appearance open to air 05/22/23 1210   Closure None 05/22/23 1210   Base red;dry;pink;epithelialization 05/22/23 1210   Periwound intact;pink 05/22/23 1210   Periwound Temperature warm 05/22/23 1210   Periwound Skin Turgor soft 05/22/23 1210   Edges rolled/closed 05/22/23 1210   Drainage Amount none 05/22/23 1210   Care, Wound cleansed with;sterile normal saline 05/22/23 1210   Dressing Care open to air 05/22/23 1210   Periwound Care barrier film applied 05/22/23 1210       Wound 03/22/23 1515 Left anterior greater trochanter Incision (Active)   Wound Image   05/22/23 1210   Dressing Appearance intact;dry 05/22/23 1210   Closure None 05/22/23 1210   Base epithelialization;red;dry 05/22/23 1210   Periwound intact;pink 05/22/23 1210   Periwound Temperature warm 05/22/23 1210   Periwound Skin Turgor soft 05/22/23 1210   Edges rolled/closed 05/22/23 1210   Drainage Amount none 05/22/23 1210   Care, Wound cleansed with;sterile normal saline 05/22/23 1210   Dressing Care dressing applied;calcium alginate;silver impregnated;hydrofiber;silicone;border dressing 05/22/23 1210   Periwound Care absorptive dressing applied 05/22/23 1210       Wound 03/22/23 1516 Right anterior  greater trochanter Incision (Active)   Wound Image   05/22/23 1210   Dressing Appearance intact;moist drainage 05/22/23 1210   Closure None 05/22/23 1210   Base red;moist;epithelialization 05/22/23 1210   Red (%), Wound Tissue Color 100 05/22/23 1210   Periwound intact;pink 05/22/23 1210   Periwound Temperature warm 05/22/23 1210   Periwound Skin Turgor soft 05/22/23 1210   Edges open;rolled/closed 05/22/23 1210   Drainage Characteristics/Odor serosanguineous 05/22/23 1210   Drainage Amount scant 05/22/23 1210   Care, Wound cleansed with;sterile normal saline 05/22/23 1210   Dressing Care dressing applied;calcium alginate;silver impregnated;hydrofiber;silicone;border dressing 05/22/23 1210   Periwound Care absorptive dressing applied 05/22/23 1210       Wound 03/22/23 1518 medial coccyx Incision (Active)   Wound Image   05/22/23 1210   Dressing Appearance open to air 05/22/23 1210   Closure None 05/22/23 1210   Base pink;dry;epithelialization 05/22/23 1210   Periwound intact;pink 05/22/23 1210   Periwound Temperature warm 05/22/23 1210   Periwound Skin Turgor soft 05/22/23 1210   Edges rolled/closed 05/22/23 1210   Drainage Amount none 05/22/23 1210   Care, Wound cleansed with;sterile normal saline;antimicrobial agent applied 05/22/23 1210   Dressing Care other (see comments) 05/22/23 1210   Periwound Care absorptive dressing applied 05/22/23 1210        Wound Check / Follow-up:  Patient seen today for a wound check and dressing change. All dressings intact and dry at arrival. Patient reporting increase of sanguineous drainage to the lower gluteal incision. Bleeding has since resolved; possibility that the bleeding is from trauma of wiping the area or prolonged sitting. Lower gluteal incision with red moist tissue. Periwound is soft and with some irritation from drainage. Upper portion of the wound with a very small area that remains open. Cleansed both wound with NS and 1/4 strength dakins. Recommend that the  patient continue current treatment at this time; if bleeding continue we will reevaluate the treatment.   Left groin wound with intact dry epithelium and full wound closure at this time. Instructed patient to keep tissue protected and continue current wound care.  Right froin with red mist tissue and intermittent closure present. Hypergranulation remains present but has improved. Cleansed and irrigated with NS. Patient continues to respond to current and was instructed to continue.   Abdominal incision with fill closure; area to the middle of the wound with some scabbing noted. Cleansed gently with NS. Applied skin barrier to tissue to protect tissue.      Impression: surgical dehiscence with delayed closure    Short term goals:  Regain skin integrity, daily dressing changes, topical treatment    No Henriquez RN    5/22/2023    12:15 EDT

## 2023-05-24 ENCOUNTER — HOSPITAL ENCOUNTER (OUTPATIENT)
Dept: INFUSION THERAPY | Facility: HOSPITAL | Age: 53
Discharge: HOME | End: 2023-05-24
Admitting: SURGERY
Payer: COMMERCIAL

## 2023-05-24 VITALS
TEMPERATURE: 98 F | HEART RATE: 104 BPM | SYSTOLIC BLOOD PRESSURE: 106 MMHG | DIASTOLIC BLOOD PRESSURE: 77 MMHG | RESPIRATION RATE: 20 BRPM | OXYGEN SATURATION: 99 %

## 2023-05-24 DIAGNOSIS — L73.2 HIDRADENITIS: Primary | ICD-10-CM

## 2023-05-24 PROCEDURE — G0463 HOSPITAL OUTPT CLINIC VISIT: HCPCS

## 2023-05-24 NOTE — ADDENDUM NOTE
Encounter addended by: No Henriquez RN on: 5/24/2023 4:53 PM   Actions taken: Charge Capture section accepted, Flowsheet data copied forward, Flowsheet accepted

## 2023-05-24 NOTE — SIGNIFICANT NOTE
Wound Eval / Progress Noted     [facility]     Patient Name: Mariela Aldrich  : 1970  MRN: [id number]  Today's Date: 2023                 Admit Date: 2023    Visit Dx:    ICD-10-CM ICD-9-CM   1. Hidradenitis  L73.2 705.83       Patient Active Problem List   Diagnosis   • Anxiety   • Type 2 diabetes mellitus with hyperglycemia, with long-term current use of insulin   • Hyperlipidemia   • Hypothyroidism   • Panic attack   • RLS (restless legs syndrome)   • Sinus trouble   • OAB (overactive bladder)   • Mild intermittent asthma without complication   • Hidradenitis        Past Medical History:   Diagnosis Date   • Anxiety    • Asthma     PRN INHALER AND TAKES ALLERGY INJECTIONS   • Diabetes mellitus type 2, controlled     AVERAGE    • Hidradenitis    • Hyperlipidemia    • Hypothyroidism    • Murmur     DIAGNOSED WHEN 17 BUT IS ASYMPTOMATIC AND IS FOLLOWED ONLY BY PCP   • Panic attack    • Pilonidal cyst    • Rheumatic fever     AS A CHILD   • RLS (restless legs syndrome)    • Sinus trouble         Past Surgical History:   Procedure Laterality Date   • COLONOSCOPY  2020    exernal hemorrhoids   • EXCISION LESION Bilateral 3/20/2023    Procedure: Excision Hidradenitis of Bilateral Inguinal Areas;  Surgeon: Donato You MD;  Location: [facility];  Service: General;  Laterality: Bilateral;   • GROIN ABSCESS INCISION AND DRAINAGE Right     hidradenitis   • HYSTERECTOMY     • KNEE SURGERY Bilateral    • PILONIDAL CYSTECTOMY N/A 2023    Procedure: Excision Hidradenitis of Inguinal Area and Pilonidal Cystectomy;  Surgeon: Donato You MD;  Location: [facility];  Service: General;  Laterality: N/A;         Physical Assessment:  Wound 23 1333 medial gluteal Incision (Active)   Dressing Appearance open to air;moist drainage 23 1245   Closure None 23 1245   Base moist;red 23 1245   Periwound pink;moist 23 1245   Periwound Temperature warm  05/24/23 1245   Periwound Skin Turgor soft 05/24/23 1245   Edges open 05/24/23 1245   Drainage Characteristics/Odor serosanguineous;yellow 05/24/23 1245   Drainage Amount scant 05/24/23 1245   Care, Wound cleansed with;irrigated with;sterile normal saline;antimicrobial agent applied;other (see comments) 05/24/23 1245   Dressing Care open to air;other (see comments) 05/24/23 1245   Periwound Care absorptive dressing applied 05/24/23 1245       Wound 03/22/23 1510 Bilateral distal abdomen Incision (Active)   Dressing Appearance open to air 05/24/23 1245   Closure None 05/24/23 1245   Base red;dry;pink;epithelialization 05/24/23 1245   Periwound intact;pink 05/24/23 1245   Periwound Temperature warm 05/24/23 1245   Periwound Skin Turgor soft 05/24/23 1245   Edges rolled/closed 05/24/23 1245   Drainage Amount none 05/24/23 1245   Care, Wound cleansed with;sterile normal saline 05/24/23 1245   Dressing Care open to air 05/24/23 1245   Periwound Care barrier film applied 05/24/23 1245       Wound 03/22/23 1515 Left anterior greater trochanter Incision (Active)   Dressing Appearance intact 05/24/23 1245   Closure None 05/24/23 1245   Base epithelialization;red;dry 05/24/23 1245   Periwound intact;pink 05/24/23 1245   Periwound Temperature warm 05/24/23 1245   Periwound Skin Turgor soft 05/24/23 1245   Edges rolled/closed 05/24/23 1245   Drainage Amount none 05/24/23 1245   Care, Wound cleansed with;sterile normal saline 05/24/23 1245   Dressing Care dressing applied;non-adherent;petroleum-based;gauze;silicone;border dressing 05/24/23 1245   Periwound Care absorptive dressing applied 05/24/23 1245       Wound 03/22/23 1516 Right anterior greater trochanter Incision (Active)   Dressing Appearance intact;dry 05/24/23 1245   Closure None 05/24/23 1245   Base red;moist;epithelialization 05/24/23 1245   Red (%), Wound Tissue Color 100 05/24/23 1245   Periwound intact;pink 05/24/23 1245   Periwound Temperature warm 05/24/23 1245    Periwound Skin Turgor soft 05/24/23 1245   Edges open;rolled/closed 05/24/23 1245   Drainage Characteristics/Odor serosanguineous 05/24/23 1245   Drainage Amount scant 05/24/23 1245   Care, Wound cleansed with;sterile normal saline 05/24/23 1245   Dressing Care dressing applied;calcium alginate;silver impregnated;hydrofiber;silicone;border dressing 05/24/23 1245   Periwound Care absorptive dressing applied 05/24/23 1245        Wound Check / Follow-up:  Patient seen today for a wound check and dressing change. Patient reports that she has not had any further sanguineous drainage from the per-rectal incision. Lower gluteal incision with red moist tissue; small area with yellow drainage during palpation. No induration felt. Cleansed with NS and then irrigated with Dakins. Upper wound with pink moist tissue unable to visualize a wound bed; drainage has been visualized. Cleansed with NS and irrigated with Dakins.   Left groin with full closure with epithelialization. Cleansed with NS. Applied non-adherent petroleum gauze to the wound. Recommend daily changes with non-adherent gauze.  Right groin with decreasing wound base; tissue is red and moist with pink epithelium present. Some hypergranulation present. Cleansed with NS. Recommend to continue current care at this time.     Impression: surgical dehiscence with delayed closure    Short term goals:  Regain skin integrity, daily dressing changes, topical treatment    No Henriquez RN    5/24/2023    16:47 EDT

## 2023-05-26 ENCOUNTER — HOSPITAL ENCOUNTER (OUTPATIENT)
Dept: INFUSION THERAPY | Facility: HOSPITAL | Age: 53
Discharge: HOME | End: 2023-05-26
Payer: COMMERCIAL

## 2023-05-26 DIAGNOSIS — Z51.89 VISIT FOR WOUND CARE: Primary | ICD-10-CM

## 2023-05-26 PROCEDURE — G0463 HOSPITAL OUTPT CLINIC VISIT: HCPCS

## 2023-05-26 NOTE — SIGNIFICANT NOTE
Wound Eval / Progress Noted     [facility]     Patient Name: Mariela Aldrich  : 1970  MRN: [id number]  Today's Date: 2023                 Admit Date: 2023    Visit Dx:  No diagnosis found.    Patient Active Problem List   Diagnosis   • Anxiety   • Type 2 diabetes mellitus with hyperglycemia, with long-term current use of insulin   • Hyperlipidemia   • Hypothyroidism   • Panic attack   • RLS (restless legs syndrome)   • Sinus trouble   • OAB (overactive bladder)   • Mild intermittent asthma without complication   • Hidradenitis        Past Medical History:   Diagnosis Date   • Anxiety    • Asthma     PRN INHALER AND TAKES ALLERGY INJECTIONS   • Diabetes mellitus type 2, controlled     AVERAGE    • Hidradenitis    • Hyperlipidemia    • Hypothyroidism    • Murmur     DIAGNOSED WHEN 17 BUT IS ASYMPTOMATIC AND IS FOLLOWED ONLY BY PCP   • Panic attack    • Pilonidal cyst    • Rheumatic fever     AS A CHILD   • RLS (restless legs syndrome)    • Sinus trouble         Past Surgical History:   Procedure Laterality Date   • COLONOSCOPY  2020    exernal hemorrhoids   • EXCISION LESION Bilateral 3/20/2023    Procedure: Excision Hidradenitis of Bilateral Inguinal Areas;  Surgeon: Donato You MD;  Location: [facility];  Service: General;  Laterality: Bilateral;   • GROIN ABSCESS INCISION AND DRAINAGE Right     hidradenitis   • HYSTERECTOMY     • KNEE SURGERY Bilateral    • PILONIDAL CYSTECTOMY N/A 2023    Procedure: Excision Hidradenitis of Inguinal Area and Pilonidal Cystectomy;  Surgeon: Donato You MD;  Location: [facility];  Service: General;  Laterality: N/A;         Physical Assessment:  Wound 23 1333 medial gluteal Incision (Active)   Wound Image   23 1220   Dressing Appearance intact;dry 23 1220   Closure None 23 1220   Base moist;red 23 1220   Periwound pink;moist;dry 23 1220   Periwound Temperature warm 23 1220    Periwound Skin Turgor soft 05/26/23 1220   Edges open 05/26/23 1220   Drainage Characteristics/Odor serosanguineous 05/26/23 1220   Drainage Amount small 05/26/23 1220   Care, Wound cleansed with;irrigated with;antimicrobial agent applied;sterile normal saline 05/26/23 1220   Dressing Care dressing applied;calcium alginate;silver impregnated;hydrofiber;silicone;border dressing;hydrocolloid 05/26/23 1220   Periwound Care absorptive dressing applied 05/26/23 1220       Wound 03/22/23 1515 Left anterior greater trochanter Incision (Active)   Wound Image   05/26/23 1220   Dressing Appearance intact;dry 05/26/23 1220   Closure None 05/26/23 1220   Base epithelialization;red;dry 05/26/23 1220   Periwound intact;pink 05/26/23 1220   Periwound Temperature warm 05/26/23 1220   Periwound Skin Turgor soft 05/26/23 1220   Edges rolled/closed 05/26/23 1220   Drainage Amount none 05/26/23 1220   Care, Wound cleansed with;sterile normal saline 05/26/23 1220   Dressing Care dressing applied;non-adherent;petroleum-based;gauze;silicone;border dressing 05/26/23 1220   Periwound Care absorptive dressing applied 05/26/23 1220       Wound 03/22/23 1516 Right anterior greater trochanter Incision (Active)   Wound Image   05/26/23 1220   Dressing Appearance intact;dry 05/26/23 1220   Closure None 05/26/23 1220   Base red;moist;epithelialization 05/26/23 1220   Red (%), Wound Tissue Color 100 05/26/23 1220   Periwound intact;pink 05/26/23 1220   Periwound Temperature warm 05/26/23 1220   Periwound Skin Turgor soft 05/26/23 1220   Edges open;rolled/closed 05/26/23 1220   Drainage Characteristics/Odor serosanguineous 05/26/23 1220   Drainage Amount scant 05/26/23 1220   Care, Wound cleansed with;sterile normal saline 05/26/23 1220   Dressing Care dressing applied;calcium alginate;silver impregnated;hydrofiber;silicone;border dressing 05/26/23 1220   Periwound Care absorptive dressing applied 05/26/23 1220       Wound 03/22/23 1518 medial  coccyx Incision (Active)   Dressing Appearance intact;dry 05/26/23 1220   Closure None 05/26/23 1220   Base pink;dry;epithelialization 05/26/23 1220   Periwound intact;pink 05/26/23 1220   Periwound Temperature warm 05/26/23 1220   Periwound Skin Turgor soft 05/26/23 1220   Edges rolled/closed 05/26/23 1220   Drainage Characteristics/Odor serosanguineous 05/26/23 1220   Drainage Amount small 05/26/23 1220   Care, Wound cleansed with;irrigated with;antimicrobial agent applied;sterile normal saline 05/26/23 1220   Dressing Care dressing applied;calcium alginate;silver impregnated;hydrofiber;silicone;border dressing 05/26/23 1220   Periwound Care absorptive dressing applied 05/26/23 1220        Wound Check / Follow-up: Patient seen today for a wound check and dressing change. Patient reports that she woke up around 0300 this morning to use the bathroom; she noticed that there was blood in the toilet from her perirectal wound. Patient denies any trauma or wiping too hard. Requesting to return to dressing changes at this time to protect tissue. Lower gluteal incision with red moist tissue; small area with sanguineous draiange. No induration felt. Cleansed with NS and then irrigated with Dakins. Upper wound with pink moist tissue unable to visualize a wound bed; drainage has been visualized. Cleansed with NS and irrigated with Dakins. Applied calcium alignate to the wound base and secured with a silicone border dressing. Recommend to use calcium alginate dressing to the wounds daily.   Left groin with full closure with epithelialization. Cleansed with NS. Applied non-adherent petroleum gauze to the wound. Recommend daily changes with non-adherent gauze.  Right groin with decreasing wound base; small area tissue is red and moist with pink epithelium present. Wound edges are ludy and depth filling in. Cleansed with NS. Recommend to continue current care at this time.     Impression: surgical incision with delayed  closure    Short term goals: regain skin integrity, pressure reduction, skin protection, daily dressing changes.    No Henriquez RN    5/26/2023    13:04 EDT

## 2023-05-31 ENCOUNTER — HOSPITAL ENCOUNTER (OUTPATIENT)
Dept: INFUSION THERAPY | Facility: HOSPITAL | Age: 53
Discharge: HOME | End: 2023-05-31
Admitting: NURSE PRACTITIONER

## 2023-05-31 VITALS
HEART RATE: 68 BPM | RESPIRATION RATE: 18 BRPM | DIASTOLIC BLOOD PRESSURE: 63 MMHG | OXYGEN SATURATION: 99 % | TEMPERATURE: 98 F | SYSTOLIC BLOOD PRESSURE: 102 MMHG

## 2023-05-31 DIAGNOSIS — Z51.89 VISIT FOR WOUND CHECK: Primary | ICD-10-CM

## 2023-05-31 PROCEDURE — G0463 HOSPITAL OUTPT CLINIC VISIT: HCPCS

## 2023-05-31 NOTE — SIGNIFICANT NOTE
Wound Eval / Progress Noted     [facility]     Patient Name: Mariela Aldrich  : 1970  MRN: [id number]  Today's Date: 2023                 Admit Date: 2023    Visit Dx:  No diagnosis found.    Patient Active Problem List   Diagnosis   • Anxiety   • Type 2 diabetes mellitus with hyperglycemia, with long-term current use of insulin   • Hyperlipidemia   • Hypothyroidism   • Panic attack   • RLS (restless legs syndrome)   • Sinus trouble   • OAB (overactive bladder)   • Mild intermittent asthma without complication   • Hidradenitis        Past Medical History:   Diagnosis Date   • Anxiety    • Asthma     PRN INHALER AND TAKES ALLERGY INJECTIONS   • Diabetes mellitus type 2, controlled     AVERAGE    • Hidradenitis    • Hyperlipidemia    • Hypothyroidism    • Murmur     DIAGNOSED WHEN 17 BUT IS ASYMPTOMATIC AND IS FOLLOWED ONLY BY PCP   • Panic attack    • Pilonidal cyst    • Rheumatic fever     AS A CHILD   • RLS (restless legs syndrome)    • Sinus trouble         Past Surgical History:   Procedure Laterality Date   • COLONOSCOPY  2020    exernal hemorrhoids   • EXCISION LESION Bilateral 3/20/2023    Procedure: Excision Hidradenitis of Bilateral Inguinal Areas;  Surgeon: Donato You MD;  Location: [facility];  Service: General;  Laterality: Bilateral;   • GROIN ABSCESS INCISION AND DRAINAGE Right     hidradenitis   • HYSTERECTOMY     • KNEE SURGERY Bilateral    • PILONIDAL CYSTECTOMY N/A 2023    Procedure: Excision Hidradenitis of Inguinal Area and Pilonidal Cystectomy;  Surgeon: Donato You MD;  Location: [facility];  Service: General;  Laterality: N/A;         Physical Assessment:  Wound 23 1333 medial gluteal Incision (Active)   Wound Image   23 1250   Dressing Appearance intact;dry 23 1250   Closure None 23 1250   Base moist;red 23 1250   Red (%), Wound Tissue Color 100 23 1250   Periwound pink;moist 23 1250    Periwound Temperature warm 05/31/23 1250   Periwound Skin Turgor soft 05/31/23 1250   Edges open 05/31/23 1250   Wound Length (cm) 2.8 cm 05/31/23 1250   Wound Width (cm) 0.1 cm 05/31/23 1250   Wound Depth (cm) 0.3 cm 05/31/23 1250   Wound Surface Area (cm^2) 0.28 cm^2 05/31/23 1250   Wound Volume (cm^3) 0.084 cm^3 05/31/23 1250   Drainage Characteristics/Odor serosanguineous 05/31/23 1250   Drainage Amount scant 05/31/23 1250   Care, Wound cleansed with;irrigated with;antimicrobial agent applied;sterile normal saline;other (see comments) 05/31/23 1250   Dressing Care dressing applied;calcium alginate;silver impregnated;hydrofiber;silicone;border dressing;hydrocolloid 05/31/23 1250   Periwound Care absorptive dressing applied 05/31/23 1250       Wound 03/22/23 1510 Bilateral distal abdomen Incision (Active)   Dressing Appearance open to air 05/31/23 1250   Closure None 05/31/23 1250   Base red;dry;pink;epithelialization 05/31/23 1250   Periwound intact;pink 05/31/23 1250   Periwound Temperature warm 05/31/23 1250   Periwound Skin Turgor soft 05/31/23 1250   Edges rolled/closed 05/31/23 1250   Drainage Amount none 05/31/23 1250   Care, Wound cleansed with;sterile normal saline 05/31/23 1250   Dressing Care open to air;skin barrier agent applied 05/31/23 1250   Periwound Care barrier film applied 05/31/23 1250       Wound 03/22/23 1515 Left anterior greater trochanter Incision (Active)   Wound Image   05/31/23 1250   Dressing Appearance intact;dry 05/31/23 1250   Closure None 05/31/23 1250   Base epithelialization;red;dry 05/31/23 1250   Periwound intact;pink 05/31/23 1250   Periwound Temperature warm 05/31/23 1250   Periwound Skin Turgor soft 05/31/23 1250   Edges rolled/closed 05/31/23 1250   Drainage Amount none 05/31/23 1250   Care, Wound cleansed with;sterile normal saline 05/31/23 1250   Dressing Care dressing applied;non-adherent;petroleum-based;gauze;silicone;border dressing 05/31/23 1250   Periwound Care  absorptive dressing applied 05/31/23 1250       Wound 03/22/23 1516 Right anterior greater trochanter Incision (Active)   Wound Image   05/31/23 1250   Dressing Appearance intact;dry 05/31/23 1250   Closure None 05/31/23 1250   Base red;epithelialization;dry 05/31/23 1250   Periwound intact;pink 05/31/23 1250   Periwound Temperature warm 05/31/23 1250   Periwound Skin Turgor soft 05/31/23 1250   Edges open;rolled/closed 05/31/23 1250   Drainage Amount none 05/31/23 1250   Care, Wound cleansed with;sterile normal saline 05/31/23 1250   Dressing Care dressing applied;calcium alginate;silver impregnated;silicone;border dressing 05/31/23 1250   Periwound Care absorptive dressing applied 05/31/23 1250       Wound 03/22/23 1518 medial coccyx Incision (Active)   Wound Image   05/31/23 1250   Dressing Appearance dry;intact 05/31/23 1250   Closure None 05/31/23 1250   Base dry;red;moist 05/31/23 1250   Red (%), Wound Tissue Color 100 05/31/23 1250   Periwound intact;redness;macerated 05/31/23 1250   Periwound Temperature warm 05/31/23 1250   Periwound Skin Turgor soft 05/31/23 1250   Edges open 05/31/23 1250   Wound Length (cm) 0.9 cm 05/31/23 1250   Wound Width (cm) 0.1 cm 05/31/23 1250   Wound Depth (cm) 1.9 cm 05/31/23 1250   Wound Surface Area (cm^2) 0.09 cm^2 05/31/23 1250   Wound Volume (cm^3) 0.171 cm^3 05/31/23 1250   Drainage Characteristics/Odor yellow;serosanguineous 05/31/23 1250   Drainage Amount small 05/31/23 1250   Care, Wound cleansed with;irrigated with;antimicrobial agent applied;sterile normal saline 05/31/23 1250   Dressing Care dressing applied;packed with;calcium alginate;silver impregnated;hydrofiber;silicone;border dressing;hydrocolloid 05/31/23 1250   Periwound Care absorptive dressing applied 05/31/23 1250        Wound Check / Follow-up:  Patient seen today for a wound check and dressing change. Lower gluteal incision with red moist tissue; small area with yellow drainage during palpation. No  induration felt. Cleansed with NS and then irrigated with Dakins. Upper wound with red moist tissue unable to visualize a wound bed, measurable depth noted; drainage has been visualized. Cleansed with NS and irrigated with Dakins.   Left groin with full closure with epithelialization. Cleansed with NS. Applied non-adherent petroleum gauze to the wound. Recommend daily changes with non-adherent gauze.  Right groin with decreasing wound base, with almost full wound closure; tissue is red and moist with pink epithelium present.  Cleansed with NS. Recommend to continue current care at this time. Recommending to continue current care at this time.    Impression: surgical dehiscence with delayed closure    Short term goals:  Regain skin integrity, daily dressing changes, topical treatment    No Henriquez RN    5/31/2023    13:15 EDT

## 2023-06-02 ENCOUNTER — HOSPITAL ENCOUNTER (OUTPATIENT)
Dept: INFUSION THERAPY | Facility: HOSPITAL | Age: 53
Discharge: HOME | End: 2023-06-02
Admitting: NURSE PRACTITIONER
Payer: COMMERCIAL

## 2023-06-02 VITALS
RESPIRATION RATE: 18 BRPM | TEMPERATURE: 98.1 F | OXYGEN SATURATION: 100 % | HEART RATE: 94 BPM | DIASTOLIC BLOOD PRESSURE: 72 MMHG | SYSTOLIC BLOOD PRESSURE: 124 MMHG

## 2023-06-02 DIAGNOSIS — L73.2 HIDRADENITIS: Primary | ICD-10-CM

## 2023-06-02 PROCEDURE — G0463 HOSPITAL OUTPT CLINIC VISIT: HCPCS

## 2023-06-02 NOTE — SIGNIFICANT NOTE
Wound Eval / Progress Noted     [facility]     Patient Name: Mariela Aldrich  : 1970  MRN: [id number]  Today's Date: 2023                 Admit Date: 2023    Visit Dx:    ICD-10-CM ICD-9-CM   1. Hidradenitis  L73.2 705.83       Patient Active Problem List   Diagnosis   • Anxiety   • Type 2 diabetes mellitus with hyperglycemia, with long-term current use of insulin   • Hyperlipidemia   • Hypothyroidism   • Panic attack   • RLS (restless legs syndrome)   • Sinus trouble   • OAB (overactive bladder)   • Mild intermittent asthma without complication   • Hidradenitis        Past Medical History:   Diagnosis Date   • Anxiety    • Asthma     PRN INHALER AND TAKES ALLERGY INJECTIONS   • Diabetes mellitus type 2, controlled     AVERAGE    • Hidradenitis    • Hyperlipidemia    • Hypothyroidism    • Murmur     DIAGNOSED WHEN 17 BUT IS ASYMPTOMATIC AND IS FOLLOWED ONLY BY PCP   • Panic attack    • Pilonidal cyst    • Rheumatic fever     AS A CHILD   • RLS (restless legs syndrome)    • Sinus trouble         Past Surgical History:   Procedure Laterality Date   • COLONOSCOPY  2020    exernal hemorrhoids   • EXCISION LESION Bilateral 3/20/2023    Procedure: Excision Hidradenitis of Bilateral Inguinal Areas;  Surgeon: Donato You MD;  Location: [facility]  Service: General;  Laterality: Bilateral;   • GROIN ABSCESS INCISION AND DRAINAGE Right     hidradenitis   • HYSTERECTOMY     • KNEE SURGERY Bilateral    • PILONIDAL CYSTECTOMY N/A 2023    Procedure: Excision Hidradenitis of Inguinal Area and Pilonidal Cystectomy;  Surgeon: Donato You MD;  Location: [facility]  Service: General;  Laterality: N/A;         Physical Assessment:  Wound 23 1333 medial gluteal Incision (Active)   Dressing Appearance intact;dry 23 1250   Closure None 23 1250   Base moist;red;epithelialization 23 1250   Red (%), Wound Tissue Color 100 23 1250   Periwound  pink;moist 06/02/23 1250   Periwound Temperature warm 06/02/23 1250   Periwound Skin Turgor soft 06/02/23 1250   Edges open 06/02/23 1250   Drainage Characteristics/Odor serosanguineous 06/02/23 1250   Drainage Amount small 06/02/23 1250   Care, Wound cleansed with;irrigated with;antimicrobial agent applied;sterile normal saline 06/02/23 1250   Dressing Care dressing applied;calcium alginate;silver impregnated;hydrofiber;silicone;border dressing;hydrocolloid 06/02/23 1250   Periwound Care absorptive dressing applied 06/02/23 1250       Wound 03/22/23 1515 Left anterior greater trochanter Incision (Active)   Dressing Appearance intact;dry 06/02/23 1240   Closure None 06/02/23 1240   Base epithelialization;red;dry 06/02/23 1240   Periwound intact;pink 06/02/23 1240   Periwound Temperature warm 06/02/23 1240   Periwound Skin Turgor soft 06/02/23 1240   Edges rolled/closed 06/02/23 1240   Drainage Amount none 06/02/23 1240   Care, Wound cleansed with;sterile normal saline 06/02/23 1240   Dressing Care dressing applied;non-adherent;petroleum-based;gauze;silicone;border dressing 06/02/23 1240   Periwound Care absorptive dressing applied 06/02/23 1240       Wound 03/22/23 1516 Right anterior greater trochanter Incision (Active)   Dressing Appearance intact 06/02/23 1240   Closure None 06/02/23 1240   Base red;moist 06/02/23 1240   Red (%), Wound Tissue Color 100 06/02/23 1240   Periwound intact;pink 06/02/23 1240   Periwound Temperature warm 06/02/23 1240   Periwound Skin Turgor soft 06/02/23 1240   Edges rolled/closed;open 06/02/23 1240   Drainage Characteristics/Odor serosanguineous;yellow 06/02/23 1240   Drainage Amount scant 06/02/23 1240   Care, Wound cleansed with;sterile normal saline 06/02/23 1240   Dressing Care dressing applied;calcium alginate;silver impregnated;hydrofiber;silicone;border dressing 06/02/23 1240   Periwound Care absorptive dressing applied 06/02/23 1240       Wound 03/22/23 1518 medial coccyx  Incision (Active)   Dressing Appearance intact;dry 06/02/23 1240   Closure None 06/02/23 1240   Base dry;red;moist 06/02/23 1240   Red (%), Wound Tissue Color 100 06/02/23 1240   Periwound intact;redness 06/02/23 1240   Periwound Temperature warm 06/02/23 1240   Periwound Skin Turgor soft 06/02/23 1240   Edges open 06/02/23 1240   Drainage Characteristics/Odor serosanguineous 06/02/23 1240   Drainage Amount small 06/02/23 1240   Care, Wound cleansed with;irrigated with;antimicrobial agent applied;sterile normal saline 06/02/23 1240   Dressing Care dressing applied;calcium alginate;silver impregnated;hydrofiber;silicone;border dressing;hydrocolloid 06/02/23 1240   Periwound Care absorptive dressing applied 06/02/23 1240        Wound Check / Follow-up:   Patient seen today for a wound check and dressing change. Lower gluteal incision with red moist tissue. Wound edges with some epithelialization noted. Wound base filling in and wound edges beginnging to contract. Cleansed with NS and then irrigated with Dakins. Upper wound with red moist tissue unable to visualize a wound bed, measurable depth noted; drainage has been visualized. Cleansed with NS and irrigated with Dakins.   Left groin with full closure with epithelialization. Cleansed with NS. Applied non-adherent petroleum gauze to the wound. Recommend daily changes with non-adherent gauze.  Right groin with decreasing wound base, with almost full wound closure; tissue is red and moist with pink epithelium present. Small amount of yellow drainage noted upon removal of dressing. No induration felt and once cleansed no further yellow drainage.  Cleansed with NS. Recommend to continue current care at this time. Recommending to continue current care at this time.    Impression: surgical dehiscence with delayed closure    Short term goals:  Regain skin integrity, daily dressing changes, topical treatment    No Henriquez RN    6/2/2023    12:51 EDT

## 2023-06-05 ENCOUNTER — HOSPITAL ENCOUNTER (OUTPATIENT)
Dept: INFUSION THERAPY | Facility: HOSPITAL | Age: 53
Discharge: HOME | End: 2023-06-05
Admitting: SURGERY
Payer: COMMERCIAL

## 2023-06-05 DIAGNOSIS — J45.20 MILD INTERMITTENT ASTHMA WITHOUT COMPLICATION: ICD-10-CM

## 2023-06-05 DIAGNOSIS — L73.2 HIDRADENITIS: Primary | ICD-10-CM

## 2023-06-05 PROCEDURE — G0463 HOSPITAL OUTPT CLINIC VISIT: HCPCS

## 2023-06-05 RX ORDER — FLUTICASONE PROPIONATE AND SALMETEROL 250; 50 UG/1; UG/1
POWDER RESPIRATORY (INHALATION)
Qty: 60 EACH | Refills: 0 | Status: SHIPPED | OUTPATIENT
Start: 2023-06-05

## 2023-06-05 RX ADMIN — Medication 473 ML: at 12:16

## 2023-06-05 NOTE — SIGNIFICANT NOTE
Wound Eval / Progress Noted     [facility]     Patient Name: Mariela Aldrich  : 1970  MRN: [id number]  Today's Date: 2023                 Admit Date: 2023    Visit Dx:    ICD-10-CM ICD-9-CM   1. Hidradenitis  L73.2 705.83       Patient Active Problem List   Diagnosis    Anxiety    Type 2 diabetes mellitus with hyperglycemia, with long-term current use of insulin    Hyperlipidemia    Hypothyroidism    Panic attack    RLS (restless legs syndrome)    Sinus trouble    OAB (overactive bladder)    Mild intermittent asthma without complication    Hidradenitis        Past Medical History:   Diagnosis Date    Anxiety     Asthma     PRN INHALER AND TAKES ALLERGY INJECTIONS    Diabetes mellitus type 2, controlled     AVERAGE     Hidradenitis     Hyperlipidemia     Hypothyroidism     Murmur     DIAGNOSED WHEN 17 BUT IS ASYMPTOMATIC AND IS FOLLOWED ONLY BY PCP    Panic attack     Pilonidal cyst     Rheumatic fever     AS A CHILD    RLS (restless legs syndrome)     Sinus trouble         Past Surgical History:   Procedure Laterality Date    COLONOSCOPY  2020    exernal hemorrhoids    EXCISION LESION Bilateral 3/20/2023    Procedure: Excision Hidradenitis of Bilateral Inguinal Areas;  Surgeon: Donato You MD;  Location: [facility];  Service: General;  Laterality: Bilateral;    GROIN ABSCESS INCISION AND DRAINAGE Right     hidradenitis    HYSTERECTOMY  2010    KNEE SURGERY Bilateral 2013    PILONIDAL CYSTECTOMY N/A 2023    Procedure: Excision Hidradenitis of Inguinal Area and Pilonidal Cystectomy;  Surgeon: Donato You MD;  Location: [facility];  Service: General;  Laterality: N/A;         Physical Assessment:  Wound 23 1333 medial gluteal Incision (Active)   Wound Image   23 1230   Dressing Appearance dry;intact 23 1230   Closure None 23 1230   Base moist;red;epithelialization 23 1230   Periwound moist;pink;intact 23 1230   Periwound  Temperature warm 06/05/23 1230   Periwound Skin Turgor soft 06/05/23 1230   Edges open 06/05/23 1230   Wound Length (cm) 3 cm 06/05/23 1230   Wound Width (cm) 0.5 cm 06/05/23 1230   Wound Depth (cm) 0.4 cm 06/05/23 1230   Wound Surface Area (cm^2) 1.5 cm^2 06/05/23 1230   Wound Volume (cm^3) 0.6 cm^3 06/05/23 1230   Drainage Characteristics/Odor serosanguineous 06/05/23 1230   Drainage Amount small 06/05/23 1230   Care, Wound cleansed with;irrigated with;sterile normal saline;antimicrobial agent applied 06/05/23 1230   Dressing Care dressing applied;calcium alginate;silicone;border dressing;hydrocolloid 06/05/23 1230   Periwound Care absorptive dressing applied 06/05/23 1230       Wound 03/22/23 1515 Left anterior greater trochanter Incision (Active)   Wound Image   06/05/23 1230   Dressing Appearance dry;intact 06/05/23 1230   Closure None 06/05/23 1230   Base dry;red;epithelialization 06/05/23 1230   Periwound intact;pink 06/05/23 1230   Periwound Temperature warm 06/05/23 1230   Periwound Skin Turgor soft 06/05/23 1230   Edges rolled/closed 06/05/23 1230   Drainage Amount none 06/05/23 1230   Care, Wound cleansed with;sterile normal saline 06/05/23 1230   Dressing Care dressing applied;petroleum-based;non-adherent;gauze;silicone;border dressing 06/05/23 1230   Periwound Care absorptive dressing applied 06/05/23 1230       Wound 03/22/23 1516 Right anterior greater trochanter Incision (Active)   Wound Image   06/05/23 1230   Dressing Appearance dry;intact 06/05/23 1230   Closure None 06/05/23 1230   Base red;moist 06/05/23 1230   Periwound intact;pink 06/05/23 1230   Periwound Temperature warm 06/05/23 1230   Periwound Skin Turgor soft 06/05/23 1230   Edges rolled/closed;open 06/05/23 1230   Wound Length (cm) 0.3 cm 06/05/23 1230   Wound Width (cm) 0.4 cm 06/05/23 1230   Wound Depth (cm) 0.2 cm 06/05/23 1230   Wound Surface Area (cm^2) 0.12 cm^2 06/05/23 1230   Wound Volume (cm^3) 0.024 cm^3 06/05/23 1230    Drainage Characteristics/Odor serosanguineous 06/05/23 1230   Drainage Amount scant 06/05/23 1230   Care, Wound cleansed with;sterile normal saline 06/05/23 1230   Dressing Care dressing applied;calcium alginate;silicone;border dressing 06/05/23 1230   Periwound Care absorptive dressing applied 06/05/23 1230       Wound 03/22/23 1518 medial coccyx Incision (Active)   Wound Image   06/05/23 1230   Dressing Appearance dry;intact 06/05/23 1230   Closure None 06/05/23 1230   Base red;moist 06/05/23 1230   Periwound intact;redness;pink 06/05/23 1230   Periwound Temperature warm 06/05/23 1230   Periwound Skin Turgor soft 06/05/23 1230   Edges open 06/05/23 1230   Wound Length (cm) 0.8 cm 06/05/23 1230   Wound Width (cm) 0.1 cm 06/05/23 1230   Wound Depth (cm) 1.6 cm 06/05/23 1230   Wound Surface Area (cm^2) 0.08 cm^2 06/05/23 1230   Wound Volume (cm^3) 0.128 cm^3 06/05/23 1230   Drainage Characteristics/Odor serosanguineous 06/05/23 1230   Drainage Amount small 06/05/23 1230   Care, Wound cleansed with;irrigated with;sterile normal saline;antimicrobial agent applied 06/05/23 1230   Dressing Care dressing applied;calcium alginate;silicone;border dressing 06/05/23 1230   Periwound Care absorptive dressing applied 06/05/23 1230      Wound Check / Follow-up:  Patient seen today for wound follow-up and dressing change. Dressings are dry and intact. Lower gluteal wound with moist red tissue and some pink epithelialization noted to margins. Wound base continues to fill in. Upper coccyx wound with some depth noted. Moist, red tissue present at base. Cleansed and irrigated with normal saline then with Dakin's. Right groin wound continues to contract and minimal depth is noted. Cleansed with normal saline and gauze. Recommending to continue current care with calcium alginate and silicone border dressing to all of these areas. Left groin with full wound closure and epithelialization. Cleansed with normal saline and gauze.  Recommending to continue current care with non-adherent petroleum based gauze and silicone border dressing.         Impression: Surgical incisions with delayed closure.      Short term goals: Regain skin integrity, daily dressing changes, skin protection.      Lanette Cantu RN    6/5/2023    12:46 EDT

## 2023-06-07 ENCOUNTER — HOSPITAL ENCOUNTER (OUTPATIENT)
Dept: INFUSION THERAPY | Facility: HOSPITAL | Age: 53
Discharge: HOME | End: 2023-06-07
Admitting: NURSE PRACTITIONER
Payer: COMMERCIAL

## 2023-06-07 VITALS
TEMPERATURE: 98 F | DIASTOLIC BLOOD PRESSURE: 72 MMHG | SYSTOLIC BLOOD PRESSURE: 122 MMHG | OXYGEN SATURATION: 96 % | RESPIRATION RATE: 20 BRPM | HEART RATE: 103 BPM

## 2023-06-07 DIAGNOSIS — L73.2 HIDRADENITIS: Primary | ICD-10-CM

## 2023-06-07 PROCEDURE — G0463 HOSPITAL OUTPT CLINIC VISIT: HCPCS

## 2023-06-07 NOTE — SIGNIFICANT NOTE
Wound Eval / Progress Noted     [facility]     Patient Name: Mariela Aldrich  : 1970  MRN: [id number]  Today's Date: 2023                 Admit Date: 2023    Visit Dx:    ICD-10-CM ICD-9-CM   1. Hidradenitis  L73.2 705.83       Patient Active Problem List   Diagnosis    Anxiety    Type 2 diabetes mellitus with hyperglycemia, with long-term current use of insulin    Hyperlipidemia    Hypothyroidism    Panic attack    RLS (restless legs syndrome)    Sinus trouble    OAB (overactive bladder)    Mild intermittent asthma without complication    Hidradenitis        Past Medical History:   Diagnosis Date    Anxiety     Asthma     PRN INHALER AND TAKES ALLERGY INJECTIONS    Diabetes mellitus type 2, controlled     AVERAGE     Hidradenitis     Hyperlipidemia     Hypothyroidism     Murmur     DIAGNOSED WHEN 17 BUT IS ASYMPTOMATIC AND IS FOLLOWED ONLY BY PCP    Panic attack     Pilonidal cyst     Rheumatic fever     AS A CHILD    RLS (restless legs syndrome)     Sinus trouble         Past Surgical History:   Procedure Laterality Date    COLONOSCOPY  2020    exernal hemorrhoids    EXCISION LESION Bilateral 3/20/2023    Procedure: Excision Hidradenitis of Bilateral Inguinal Areas;  Surgeon: Donato You MD;  Location: [facility];  Service: General;  Laterality: Bilateral;    GROIN ABSCESS INCISION AND DRAINAGE Right     hidradenitis    HYSTERECTOMY  2010    KNEE SURGERY Bilateral 2013    PILONIDAL CYSTECTOMY N/A 2023    Procedure: Excision Hidradenitis of Inguinal Area and Pilonidal Cystectomy;  Surgeon: Donato You MD;  Location: [facility];  Service: General;  Laterality: N/A;         Physical Assessment:  Wound 23 1333 medial gluteal Incision (Active)   Dressing Appearance moist drainage;intact 23 1220   Closure None 23 1220   Base moist;red;epithelialization 23 1220   Periwound moist;pink;intact 23 1220   Periwound Temperature warm 23  1220   Periwound Skin Turgor soft 06/07/23 1220   Edges open 06/07/23 1220   Drainage Characteristics/Odor serosanguineous;yellow 06/07/23 1220   Drainage Amount small 06/07/23 1220   Care, Wound cleansed with;irrigated with;sterile normal saline;antimicrobial agent applied 06/07/23 1220   Dressing Care dressing applied;calcium alginate;silver impregnated;silicone;border dressing;hydrocolloid 06/07/23 1220   Periwound Care absorptive dressing applied 06/07/23 1220       Wound 03/22/23 1515 Left anterior greater trochanter Incision (Active)   Dressing Appearance dry;intact 06/07/23 1220   Closure None 06/07/23 1220   Base dry;red;epithelialization 06/07/23 1220   Periwound intact;pink 06/07/23 1220   Periwound Temperature warm 06/07/23 1220   Periwound Skin Turgor soft 06/07/23 1220   Edges rolled/closed 06/07/23 1220   Drainage Amount none 06/07/23 1220   Care, Wound cleansed with;sterile normal saline 06/07/23 1220   Dressing Care dressing applied;petroleum-based;non-adherent;gauze;silicone;border dressing 06/07/23 1220   Periwound Care absorptive dressing applied 06/07/23 1220       Wound 03/22/23 1516 Right anterior greater trochanter Incision (Active)   Dressing Appearance dry;intact 06/07/23 1220   Closure None 06/07/23 1220   Base red;moist 06/07/23 1220   Periwound intact;pink 06/07/23 1220   Periwound Temperature warm 06/07/23 1220   Periwound Skin Turgor soft 06/07/23 1220   Edges rolled/closed;open 06/07/23 1220   Drainage Characteristics/Odor serosanguineous 06/07/23 1220   Drainage Amount scant 06/07/23 1220   Care, Wound cleansed with;sterile normal saline 06/07/23 1220   Dressing Care dressing applied;calcium alginate;silver impregnated;silicone;border dressing 06/07/23 1220   Periwound Care absorptive dressing applied 06/07/23 1220       Wound 03/22/23 1518 medial coccyx Incision (Active)   Dressing Appearance dry;intact 06/07/23 1220   Closure None 06/07/23 1220   Base red;moist 06/07/23 1220    Periwound intact;redness;pink 06/07/23 1220   Periwound Temperature warm 06/07/23 1220   Periwound Skin Turgor soft 06/07/23 1220   Edges open 06/07/23 1220   Drainage Characteristics/Odor serosanguineous 06/07/23 1220   Drainage Amount scant 06/07/23 1220   Care, Wound cleansed with;irrigated with;sterile normal saline;antimicrobial agent applied 06/07/23 1220   Dressing Care dressing applied;calcium alginate;silver impregnated;silicone;border dressing 06/07/23 1220   Periwound Care absorptive dressing applied 06/07/23 1220        Wound Check / Follow-up: Patient seen today for wound follow-up and dressing change. Dressings are dry and intact. Lower gluteal wound with moist red tissue and some pink epithelialization noted to margins. Wound base continues to fill in. Small amount of serosanguineous and yellow drainage noted. Upper coccyx wound with some depth noted. Moist, red tissue present at base. Cleansed and irrigated with normal saline then with Dakin's. Right groin wound continues to contract and minimal depth is filling in. Cleansed with normal saline and gauze. Recommending to continue current care with calcium alginate and silicone border dressing to all of these areas. Left groin with full wound closure and epithelialization. Cleansed with normal saline and gauze. Recommending to continue current care with non-adherent petroleum based gauze and silicone border dressing. Patient expresses no needs or concerns.          Impression:  Surgical incisions with delayed closure.       Short term goals:  Regain skin integrity, daily dressing changes, skin protection.         Lanette Cantu RN    6/7/2023    12:49 EDT

## 2023-06-09 ENCOUNTER — HOSPITAL ENCOUNTER (OUTPATIENT)
Dept: INFUSION THERAPY | Facility: HOSPITAL | Age: 53
Discharge: HOME | End: 2023-06-09
Payer: COMMERCIAL

## 2023-06-09 VITALS
TEMPERATURE: 97.9 F | HEART RATE: 92 BPM | OXYGEN SATURATION: 99 % | SYSTOLIC BLOOD PRESSURE: 119 MMHG | DIASTOLIC BLOOD PRESSURE: 73 MMHG | RESPIRATION RATE: 20 BRPM

## 2023-06-09 DIAGNOSIS — L73.2 HIDRADENITIS: Primary | ICD-10-CM

## 2023-06-09 PROCEDURE — G0463 HOSPITAL OUTPT CLINIC VISIT: HCPCS

## 2023-06-09 RX ADMIN — Medication 473 ML: at 12:18

## 2023-06-12 ENCOUNTER — HOSPITAL ENCOUNTER (OUTPATIENT)
Dept: INFUSION THERAPY | Facility: HOSPITAL | Age: 53
Discharge: HOME | End: 2023-06-12
Admitting: NURSE PRACTITIONER
Payer: COMMERCIAL

## 2023-06-12 VITALS
DIASTOLIC BLOOD PRESSURE: 83 MMHG | HEART RATE: 91 BPM | RESPIRATION RATE: 18 BRPM | TEMPERATURE: 98 F | SYSTOLIC BLOOD PRESSURE: 108 MMHG | OXYGEN SATURATION: 99 %

## 2023-06-12 DIAGNOSIS — L73.2 HIDRADENITIS: Primary | ICD-10-CM

## 2023-06-12 PROCEDURE — G0463 HOSPITAL OUTPT CLINIC VISIT: HCPCS

## 2023-06-12 NOTE — SIGNIFICANT NOTE
Wound Eval / Progress Noted     [facility]     Patient Name: Mariela Aldrich  : 1970  MRN: [id number]  Today's Date: 2023                 Admit Date: 2023    Visit Dx:    ICD-10-CM ICD-9-CM   1. Hidradenitis  L73.2 705.83       Patient Active Problem List   Diagnosis    Anxiety    Type 2 diabetes mellitus with hyperglycemia, with long-term current use of insulin    Hyperlipidemia    Hypothyroidism    Panic attack    RLS (restless legs syndrome)    Sinus trouble    OAB (overactive bladder)    Mild intermittent asthma without complication    Hidradenitis        Past Medical History:   Diagnosis Date    Anxiety     Asthma     PRN INHALER AND TAKES ALLERGY INJECTIONS    Diabetes mellitus type 2, controlled     AVERAGE     Hidradenitis     Hyperlipidemia     Hypothyroidism     Murmur     DIAGNOSED WHEN 17 BUT IS ASYMPTOMATIC AND IS FOLLOWED ONLY BY PCP    Panic attack     Pilonidal cyst     Rheumatic fever     AS A CHILD    RLS (restless legs syndrome)     Sinus trouble         Past Surgical History:   Procedure Laterality Date    COLONOSCOPY  2020    exernal hemorrhoids    EXCISION LESION Bilateral 3/20/2023    Procedure: Excision Hidradenitis of Bilateral Inguinal Areas;  Surgeon: Donato You MD;  Location: [facility];  Service: General;  Laterality: Bilateral;    GROIN ABSCESS INCISION AND DRAINAGE Right     hidradenitis    HYSTERECTOMY  2010    KNEE SURGERY Bilateral 2013    PILONIDAL CYSTECTOMY N/A 2023    Procedure: Excision Hidradenitis of Inguinal Area and Pilonidal Cystectomy;  Surgeon: Donato You MD;  Location: [facility];  Service: General;  Laterality: N/A;         Physical Assessment:  Wound 23 1333 medial gluteal Incision (Active)   Wound Image   23 1225   Dressing Appearance moist drainage;intact 23 1225   Closure None 23 1225   Base moist;red;epithelialization 23 1225   Periwound moist;pink;intact 23 1225    Periwound Temperature warm 06/12/23 1225   Periwound Skin Turgor soft 06/12/23 1225   Edges open 06/12/23 1225   Wound Length (cm) 2.9 cm 06/12/23 1225   Wound Width (cm) 0.5 cm 06/12/23 1225   Wound Depth (cm) 0.3 cm 06/12/23 1225   Wound Surface Area (cm^2) 1.45 cm^2 06/12/23 1225   Wound Volume (cm^3) 0.435 cm^3 06/12/23 1225   Drainage Characteristics/Odor serosanguineous;yellow 06/12/23 1225   Drainage Amount small 06/12/23 1225   Care, Wound cleansed with;irrigated with;antimicrobial agent applied 06/12/23 1225   Dressing Care dressing applied;calcium alginate;silver impregnated;silicone;border dressing;hydrocolloid 06/12/23 1225   Periwound Care absorptive dressing applied 06/12/23 1225       Wound 03/22/23 1515 Left anterior greater trochanter Incision (Active)   Wound Image   06/12/23 1225   Dressing Appearance dry;intact 06/12/23 1225   Closure None 06/12/23 1225   Base dry;red;epithelialization 06/12/23 1225   Periwound intact;pink;dry 06/12/23 1225   Periwound Temperature warm 06/12/23 1225   Periwound Skin Turgor soft 06/12/23 1225   Edges rolled/closed 06/12/23 1225   Drainage Amount none 06/12/23 1225   Care, Wound cleansed with;sterile normal saline 06/12/23 1225   Dressing Care dressing applied;petroleum-based;non-adherent;gauze;silicone;border dressing 06/12/23 1225   Periwound Care absorptive dressing applied 06/12/23 1225       Wound 03/22/23 1516 Right anterior greater trochanter Incision (Active)   Wound Image   06/12/23 1225   Dressing Appearance dry;intact 06/12/23 1225   Closure None 06/12/23 1225   Base red;moist 06/12/23 1225   Periwound intact;pink;dry 06/12/23 1225   Periwound Temperature warm 06/12/23 1225   Periwound Skin Turgor soft 06/12/23 1225   Edges open;rolled/closed 06/12/23 1225   Drainage Characteristics/Odor serosanguineous 06/12/23 1225   Drainage Amount scant 06/12/23 1225   Care, Wound cleansed with;sterile normal saline 06/12/23 1225   Dressing Care dressing  applied;calcium alginate;silver impregnated;silicone;border dressing 06/12/23 1225   Periwound Care absorptive dressing applied 06/12/23 1225       Wound 03/22/23 1518 medial coccyx Incision (Active)   Wound Image   06/12/23 1225   Dressing Appearance dry;intact 06/12/23 1225   Closure None 06/12/23 1225   Base red;moist 06/12/23 1225   Periwound intact;pink;dry 06/12/23 1225   Periwound Temperature warm 06/12/23 1225   Periwound Skin Turgor soft 06/12/23 1225   Edges open 06/12/23 1225   Wound Length (cm) 0.5 cm 06/12/23 1225   Wound Width (cm) 0.1 cm 06/12/23 1225   Wound Depth (cm) 1.5 cm 06/12/23 1225   Wound Surface Area (cm^2) 0.05 cm^2 06/12/23 1225   Wound Volume (cm^3) 0.075 cm^3 06/12/23 1225   Drainage Characteristics/Odor serosanguineous 06/12/23 1225   Drainage Amount scant 06/12/23 1225   Care, Wound cleansed with;irrigated with;antimicrobial agent applied 06/12/23 1225   Dressing Care dressing applied;calcium alginate;silver impregnated;silicone;border dressing 06/12/23 1225   Periwound Care absorptive dressing applied 06/12/23 1225      Wound Check / Follow-up:  Patient seen today for wound follow-up and dressing change. Dressings are dry and intact. Lower gluteal wound with moist red tissue and some pink epithelialization noted to margins. Wound base continues to fill in. Small amount of serosanguineous and yellow drainage noted. Upper coccyx wound with some depth noted. Moist, red tissue present at base. Cleansed and irrigated with normal saline then with Dakin's. Right groin wound with no measurable depth and ludy margins. Cleansed with normal saline and gauze. Recommending to continue current care with calcium alginate and silicone border dressing to all of these areas. Left groin with full wound closure and epithelialization. Cleansed with normal saline and gauze. Recommending to continue current care with non-adherent petroleum based gauze and silicone border dressing.      Impression:  Surgical incisions with delayed closure.        Short term goals: Regain skin integrity, daily dressing changes, skin protection.       Lanette Cantu RN    6/12/2023    16:25 EDT

## 2023-06-14 ENCOUNTER — HOSPITAL ENCOUNTER (OUTPATIENT)
Dept: INFUSION THERAPY | Facility: HOSPITAL | Age: 53
Discharge: HOME | End: 2023-06-14
Admitting: SURGERY
Payer: COMMERCIAL

## 2023-06-14 VITALS
OXYGEN SATURATION: 100 % | RESPIRATION RATE: 20 BRPM | SYSTOLIC BLOOD PRESSURE: 124 MMHG | DIASTOLIC BLOOD PRESSURE: 86 MMHG | HEART RATE: 98 BPM | TEMPERATURE: 98 F

## 2023-06-14 DIAGNOSIS — L73.2 HIDRADENITIS: Primary | ICD-10-CM

## 2023-06-14 PROCEDURE — G0463 HOSPITAL OUTPT CLINIC VISIT: HCPCS

## 2023-06-14 NOTE — SIGNIFICANT NOTE
Wound Eval / Progress Noted     [facility]     Patient Name: Mariela Aldrich  : 1970  MRN: [id number]  Today's Date: 2023                 Admit Date: 2023    Visit Dx:    ICD-10-CM ICD-9-CM   1. Hidradenitis  L73.2 705.83       Patient Active Problem List   Diagnosis    Anxiety    Type 2 diabetes mellitus with hyperglycemia, with long-term current use of insulin    Hyperlipidemia    Hypothyroidism    Panic attack    RLS (restless legs syndrome)    Sinus trouble    OAB (overactive bladder)    Mild intermittent asthma without complication    Hidradenitis        Past Medical History:   Diagnosis Date    Anxiety     Asthma     PRN INHALER AND TAKES ALLERGY INJECTIONS    Diabetes mellitus type 2, controlled     AVERAGE     Hidradenitis     Hyperlipidemia     Hypothyroidism     Murmur     DIAGNOSED WHEN 17 BUT IS ASYMPTOMATIC AND IS FOLLOWED ONLY BY PCP    Panic attack     Pilonidal cyst     Rheumatic fever     AS A CHILD    RLS (restless legs syndrome)     Sinus trouble         Past Surgical History:   Procedure Laterality Date    COLONOSCOPY  2020    exernal hemorrhoids    EXCISION LESION Bilateral 3/20/2023    Procedure: Excision Hidradenitis of Bilateral Inguinal Areas;  Surgeon: Donato You MD;  Location: [facility];  Service: General;  Laterality: Bilateral;    GROIN ABSCESS INCISION AND DRAINAGE Right     hidradenitis    HYSTERECTOMY  2010    KNEE SURGERY Bilateral 2013    PILONIDAL CYSTECTOMY N/A 2023    Procedure: Excision Hidradenitis of Inguinal Area and Pilonidal Cystectomy;  Surgeon: Donato You MD;  Location: [facility];  Service: General;  Laterality: N/A;         Physical Assessment:  Wound 23 1333 medial gluteal Incision (Active)   Dressing Appearance dry;intact 23 1220   Closure None 23 1220   Base moist;red;epithelialization 23 1220   Periwound moist;pink;intact 23 1220   Periwound Temperature warm 23 1220    Periwound Skin Turgor soft 06/14/23 1220   Edges open 06/14/23 1220   Drainage Characteristics/Odor serosanguineous 06/14/23 1220   Drainage Amount small 06/14/23 1220   Care, Wound cleansed with;irrigated with;antimicrobial agent applied 06/14/23 1220   Dressing Care dressing applied;collagen;calcium alginate;silver impregnated;silicone;border dressing;hydrocolloid 06/14/23 1220   Periwound Care absorptive dressing applied 06/14/23 1220       Wound 03/22/23 1515 Left anterior greater trochanter Incision (Active)   Dressing Appearance dry;intact 06/14/23 1220   Closure None 06/14/23 1220   Base dry;red;epithelialization 06/14/23 1220   Periwound intact;pink;dry 06/14/23 1220   Periwound Temperature warm 06/14/23 1220   Periwound Skin Turgor soft 06/14/23 1220   Edges rolled/closed 06/14/23 1220   Drainage Amount none 06/14/23 1220   Care, Wound cleansed with;sterile normal saline 06/14/23 1220   Dressing Care dressing applied;non-adherent;petroleum-based;gauze;silicone;border dressing 06/14/23 1220   Periwound Care absorptive dressing applied 06/14/23 1220       Wound 03/22/23 1516 Right anterior greater trochanter Incision (Active)   Dressing Appearance dry;intact 06/14/23 1220   Closure None 06/14/23 1220   Base red;moist;pink 06/14/23 1220   Periwound intact;pink;dry 06/14/23 1220   Periwound Temperature warm 06/14/23 1220   Periwound Skin Turgor soft 06/14/23 1220   Edges open;rolled/closed 06/14/23 1220   Drainage Characteristics/Odor serosanguineous 06/14/23 1220   Drainage Amount scant 06/14/23 1220   Care, Wound cleansed with;sterile normal saline 06/14/23 1220   Dressing Care dressing applied;collagen;calcium alginate;silver impregnated;silicone;border dressing 06/14/23 1220   Periwound Care absorptive dressing applied 06/14/23 1220       Wound 03/22/23 1518 medial coccyx Incision (Active)   Dressing Appearance dry;intact 06/14/23 1220   Closure None 06/14/23 1220   Base red;moist 06/14/23 1220    Periwound intact;pink;dry 06/14/23 1220   Periwound Temperature warm 06/14/23 1220   Periwound Skin Turgor soft 06/14/23 1220   Edges open 06/14/23 1220   Drainage Characteristics/Odor serosanguineous 06/14/23 1220   Drainage Amount scant 06/14/23 1220   Care, Wound cleansed with;irrigated with;antimicrobial agent applied 06/14/23 1220   Dressing Care dressing applied;collagen;calcium alginate;silver impregnated;silicone;border dressing 06/14/23 1220   Periwound Care absorptive dressing applied 06/14/23 1220      Wound Check / Follow-up:   Patient seen today for wound follow-up and dressing change. Dressings are dry and intact. Lower gluteal wound with moist red tissue and some pink epithelialization noted to margins. Wound base continues to fill in. Small amount of serosanguineous and yellow drainage noted. Upper coccyx wound with some depth noted. Moist, red tissue present at base. Cleansed and irrigated with Dakin's. Applied collagen today prior to application of calcium alginate. Right groin wound with no measurable depth and ludy margins. Cleansed with normal saline and gauze. Recommending to continue current care with calcium alginate and silicone border dressing to all of these areas. Left groin with full wound closure and epithelialization. Cleansed with normal saline and gauze. Recommending to continue current care with non-adherent petroleum based gauze and silicone border dressing.   Patient expresses no needs or concerns at this time.    Impression: Surgical incisions with delayed closure.      Short term goals:  Regain skin integrity, daily dressing changes, skin protection.      Lanette Cantu RN    6/14/2023    12:35 EDT

## 2023-06-16 ENCOUNTER — HOSPITAL ENCOUNTER (OUTPATIENT)
Dept: INFUSION THERAPY | Facility: HOSPITAL | Age: 53
Discharge: HOME | End: 2023-06-16
Payer: COMMERCIAL

## 2023-06-16 VITALS — TEMPERATURE: 97.8 F | RESPIRATION RATE: 20 BRPM | OXYGEN SATURATION: 98 %

## 2023-06-16 DIAGNOSIS — L73.2 HIDRADENITIS: Primary | ICD-10-CM

## 2023-06-16 PROCEDURE — G0463 HOSPITAL OUTPT CLINIC VISIT: HCPCS

## 2023-07-31 ENCOUNTER — HOSPITAL ENCOUNTER (OUTPATIENT)
Dept: INFUSION THERAPY | Facility: HOSPITAL | Age: 53
Discharge: HOME | End: 2023-07-31
Admitting: NURSE PRACTITIONER
Payer: COMMERCIAL

## 2023-07-31 VITALS
HEART RATE: 88 BPM | RESPIRATION RATE: 18 BRPM | OXYGEN SATURATION: 99 % | SYSTOLIC BLOOD PRESSURE: 113 MMHG | TEMPERATURE: 98.5 F | DIASTOLIC BLOOD PRESSURE: 72 MMHG

## 2023-07-31 DIAGNOSIS — L73.2 HIDRADENITIS: Primary | ICD-10-CM

## 2023-07-31 PROCEDURE — G0463 HOSPITAL OUTPT CLINIC VISIT: HCPCS

## 2023-08-02 ENCOUNTER — HOSPITAL ENCOUNTER (OUTPATIENT)
Dept: INFUSION THERAPY | Facility: HOSPITAL | Age: 53
Discharge: HOME | End: 2023-08-02
Admitting: NURSE PRACTITIONER
Payer: COMMERCIAL

## 2023-08-02 VITALS
RESPIRATION RATE: 20 BRPM | HEART RATE: 88 BPM | TEMPERATURE: 98.3 F | OXYGEN SATURATION: 100 % | DIASTOLIC BLOOD PRESSURE: 66 MMHG | SYSTOLIC BLOOD PRESSURE: 111 MMHG

## 2023-08-02 DIAGNOSIS — L73.2 HIDRADENITIS: Primary | ICD-10-CM

## 2023-08-02 PROCEDURE — G0463 HOSPITAL OUTPT CLINIC VISIT: HCPCS

## 2023-08-02 RX ORDER — ALBUTEROL SULFATE 90 UG/1
2 AEROSOL, METERED RESPIRATORY (INHALATION) EVERY 4 HOURS PRN
Qty: 18 G | Refills: 1 | Status: SHIPPED | OUTPATIENT
Start: 2023-08-02

## 2023-08-04 ENCOUNTER — TELEPHONE (OUTPATIENT)
Dept: SURGERY | Facility: CLINIC | Age: 53
End: 2023-08-04
Payer: COMMERCIAL

## 2023-08-04 ENCOUNTER — HOSPITAL ENCOUNTER (OUTPATIENT)
Dept: INFUSION THERAPY | Facility: HOSPITAL | Age: 53
Discharge: HOME | End: 2023-08-04
Admitting: NURSE PRACTITIONER
Payer: COMMERCIAL

## 2023-08-04 VITALS
OXYGEN SATURATION: 98 % | HEART RATE: 97 BPM | SYSTOLIC BLOOD PRESSURE: 116 MMHG | RESPIRATION RATE: 20 BRPM | DIASTOLIC BLOOD PRESSURE: 81 MMHG | TEMPERATURE: 98 F

## 2023-08-04 DIAGNOSIS — L73.2 HIDRADENITIS: Primary | ICD-10-CM

## 2023-08-04 PROCEDURE — G0463 HOSPITAL OUTPT CLINIC VISIT: HCPCS

## 2023-08-07 ENCOUNTER — HOSPITAL ENCOUNTER (OUTPATIENT)
Dept: INFUSION THERAPY | Facility: HOSPITAL | Age: 53
Discharge: HOME | End: 2023-08-07
Admitting: NURSE PRACTITIONER
Payer: COMMERCIAL

## 2023-08-07 VITALS
RESPIRATION RATE: 20 BRPM | OXYGEN SATURATION: 98 % | HEART RATE: 103 BPM | DIASTOLIC BLOOD PRESSURE: 76 MMHG | TEMPERATURE: 98.4 F | SYSTOLIC BLOOD PRESSURE: 107 MMHG

## 2023-08-07 DIAGNOSIS — L73.2 HIDRADENITIS: Primary | ICD-10-CM

## 2023-08-07 PROCEDURE — G0463 HOSPITAL OUTPT CLINIC VISIT: HCPCS

## 2023-08-07 NOTE — SIGNIFICANT NOTE
Wound Eval / Progress Noted    [facility]     Patient Name: Mariela Aldrich  : 1970  MRN: [id number]  Today's Date: 2023                 Admit Date: 2023    Visit Dx:    ICD-10-CM ICD-9-CM   1. Hidradenitis  L73.2 705.83         * No active hospital problems. *        Past Medical History:   Diagnosis Date    Anxiety     Asthma     PRN INHALER AND TAKES ALLERGY INJECTIONS    Diabetes mellitus type 2, controlled     AVERAGE     Hidradenitis     Hyperlipidemia     Hypothyroidism     Murmur     DIAGNOSED WHEN 17 BUT IS ASYMPTOMATIC AND IS FOLLOWED ONLY BY PCP    Panic attack     Pilonidal cyst     Rheumatic fever     AS A CHILD    RLS (restless legs syndrome)     Sinus trouble         Past Surgical History:   Procedure Laterality Date    COLONOSCOPY  2020    exernal hemorrhoids    EXCISION LESION Bilateral 3/20/2023    Procedure: Excision Hidradenitis of Bilateral Inguinal Areas;  Surgeon: Donato You MD;  Location: [facility];  Service: General;  Laterality: Bilateral;    GROIN ABSCESS INCISION AND DRAINAGE Right     hidradenitis    HYSTERECTOMY  2010    KNEE SURGERY Bilateral 2013    PILONIDAL CYSTECTOMY N/A 2023    Procedure: Excision Hidradenitis of Inguinal Area and Pilonidal Cystectomy;  Surgeon: Donato You MD;  Location: [facility];  Service: General;  Laterality: N/A;         Physical Assessment:  Wound 23 1333 medial gluteal Incision (Active)   Wound Image   23 1230   Dressing Appearance intact;moist drainage 23 1230   Closure None 23 1230   Base moist;red;granulating 23 1230   Red (%), Wound Tissue Color 100 23 1230   Periwound pink;moist 23 1230   Periwound Temperature warm 23 1230   Periwound Skin Turgor soft 23 1230   Edges open 23 1230   Wound Length (cm) 3 cm 23 1230   Wound Width (cm) 2 cm 23 1230   Wound Depth (cm) 0.2 cm 23 1230   Wound Surface Area (cm^2) 6 cm^2  08/07/23 1230   Wound Volume (cm^3) 1.2 cm^3 08/07/23 1230   Drainage Characteristics/Odor serosanguineous 08/07/23 1230   Drainage Amount small 08/07/23 1230   Care, Wound cleansed with;irrigated with;sterile normal saline 08/07/23 1230   Dressing Care dressing applied;foam;hydrocolloid 08/07/23 1230   Periwound Care absorptive dressing applied 08/07/23 1230       Wound 03/22/23 1518 medial coccyx Incision (Active)   Wound Image   08/07/23 1230   Dressing Appearance intact;moist drainage 08/07/23 1230   Closure None 08/07/23 1230   Base red;moist 08/07/23 1230   Red (%), Wound Tissue Color 100 08/07/23 1230   Periwound pink;intact 08/07/23 1230   Periwound Temperature warm 08/07/23 1230   Periwound Skin Turgor soft 08/07/23 1230   Edges open 08/07/23 1230   Wound Length (cm) 0.4 cm 08/07/23 1230   Wound Width (cm) 0.2 cm 08/07/23 1230   Wound Depth (cm) 1.1 cm 08/07/23 1230   Wound Surface Area (cm^2) 0.08 cm^2 08/07/23 1230   Wound Volume (cm^3) 0.088 cm^3 08/07/23 1230   Drainage Characteristics/Odor serosanguineous 08/07/23 1230   Drainage Amount small 08/07/23 1230   Care, Wound cleansed with;irrigated with;sterile normal saline 08/07/23 1230   Dressing Care dressing applied;border dressing;packed with;packing strip;gauze, iodoform;silicone 08/07/23 1230   Periwound Care absorptive dressing applied 08/07/23 1230        Wound Check / Follow-up:  Patient seen today for wound check / wound follow-up. Patient with newly developed area to left pubic area. With cleansing with NS, thick purulent drainage expressed. Induration was noted surrounding tissue. No open wound to pack. Patient is currently on antibiotics. Recommending warm compresses and to perform local site care with topical antibiotic ointment, cover for drainage management as needed.   Patient also with ongoing wounds within gluteal crease. Upper area of gluteal crease as measured above with serosanguinous drainage today. Depth remains. Recommending to  continue packing area with iodoform packing strip.   Lower gluteal wound continues to demonstrate improvement. Recommending to continue current wound care with iodoplex foam and hydrocolloid dressings.   Patient to follow-up on Friday. Spouse to change dressings in between visits. Likely to transition to just weekly checks at this point.       Impression: Surgical incision with delayed closure; Recurrence of skin abscesses    Short term goals:  Regain skin integrity. Dressing changes every other day. Topical site care.    Miladys Dockery RN    8/7/2023    19:16 EDT

## 2023-08-11 ENCOUNTER — HOSPITAL ENCOUNTER (OUTPATIENT)
Dept: INFUSION THERAPY | Facility: HOSPITAL | Age: 53
Discharge: HOME | End: 2023-08-11
Payer: COMMERCIAL

## 2023-08-11 DIAGNOSIS — L73.2 HIDRADENITIS: Primary | ICD-10-CM

## 2023-08-11 PROCEDURE — G0463 HOSPITAL OUTPT CLINIC VISIT: HCPCS

## 2023-08-11 NOTE — SIGNIFICANT NOTE
Wound Eval / Progress Noted     [facility]     Patient Name: Mariela Aldrich  : 1970  MRN: [id number]  Today's Date: 2023                 Admit Date: 2023    Visit Dx:  No diagnosis found.      * No active hospital problems. *        Past Medical History:   Diagnosis Date    Anxiety     Asthma     PRN INHALER AND TAKES ALLERGY INJECTIONS    Diabetes mellitus type 2, controlled     AVERAGE     Hidradenitis     Hyperlipidemia     Hypothyroidism     Murmur     DIAGNOSED WHEN 17 BUT IS ASYMPTOMATIC AND IS FOLLOWED ONLY BY PCP    Panic attack     Pilonidal cyst     Rheumatic fever     AS A CHILD    RLS (restless legs syndrome)     Sinus trouble         Past Surgical History:   Procedure Laterality Date    COLONOSCOPY  2020    exernal hemorrhoids    EXCISION LESION Bilateral 3/20/2023    Procedure: Excision Hidradenitis of Bilateral Inguinal Areas;  Surgeon: Donato You MD;  Location: [facility];  Service: General;  Laterality: Bilateral;    GROIN ABSCESS INCISION AND DRAINAGE Right     hidradenitis    HYSTERECTOMY  2010    KNEE SURGERY Bilateral     PILONIDAL CYSTECTOMY N/A 2023    Procedure: Excision Hidradenitis of Inguinal Area and Pilonidal Cystectomy;  Surgeon: Donato You MD;  Location: [facility];  Service: General;  Laterality: N/A;         Physical Assessment:  Wound 23 1333 medial gluteal Incision (Active)   Wound Image   23 1220   Dressing Appearance intact;moist drainage 23 1220   Closure None 23 1220   Base moist;red;granulating 23 1220   Red (%), Wound Tissue Color 100 23 1220   Periwound pink;moist 23 1220   Periwound Temperature warm 23 1220   Periwound Skin Turgor soft 23 1220   Edges open 23 1220   Wound Length (cm) 2.7 cm 23 1220   Wound Width (cm) 0.2 cm 23 1220   Wound Depth (cm) 0.2 cm 23 1220   Wound Surface Area (cm^2) 0.54 cm^2 23 1220   Wound Volume  (cm^3) 0.108 cm^3 08/11/23 1220   Drainage Characteristics/Odor serosanguineous 08/11/23 1220   Drainage Amount small 08/11/23 1220   Care, Wound cleansed with;irrigated with;sterile normal saline 08/11/23 1220   Dressing Care dressing applied;foam;hydrocolloid;collagen 08/11/23 1220   Periwound Care absorptive dressing applied 08/11/23 1220       Wound 03/22/23 1518 medial coccyx Incision (Active)   Wound Image   08/11/23 1220   Dressing Appearance intact;moist drainage 08/11/23 1220   Closure None 08/11/23 1220   Base moist;red 08/11/23 1220   Red (%), Wound Tissue Color 100 08/11/23 1220   Periwound pink;moist 08/11/23 1220   Periwound Temperature warm 08/11/23 1220   Periwound Skin Turgor soft 08/11/23 1220   Edges open 08/11/23 1220   Wound Length (cm) 0.5 cm 08/11/23 1220   Wound Width (cm) 0.2 cm 08/11/23 1220   Wound Depth (cm) 1.6 cm 08/11/23 1220   Wound Surface Area (cm^2) 0.1 cm^2 08/11/23 1220   Wound Volume (cm^3) 0.16 cm^3 08/11/23 1220   Drainage Characteristics/Odor serosanguineous;purulent 08/11/23 1220   Drainage Amount small 08/11/23 1220   Care, Wound cleansed with;irrigated with;sterile normal saline 08/11/23 1220   Dressing Care dressing applied;border dressing;packing strip;packed with;gauze, iodoform;silicone 08/11/23 1220   Periwound Care absorptive dressing applied 08/11/23 1220       Wound 08/11/23 1220 Left pubis Soft Tissue Necrosis (Active)   Wound Image   08/11/23 1220   Dressing Appearance intact;moist drainage 08/11/23 1220   Closure None 08/11/23 1220   Base moist;slough;red 08/11/23 1220   Periwound intact 08/11/23 1220   Periwound Temperature warm 08/11/23 1220   Periwound Skin Turgor soft 08/11/23 1220   Edges open 08/11/23 1220   Wound Length (cm) 0.8 cm 08/11/23 1220   Wound Width (cm) 0.6 cm 08/11/23 1220   Wound Depth (cm) 0.1 cm 08/11/23 1220   Wound Surface Area (cm^2) 0.48 cm^2 08/11/23 1220   Wound Volume (cm^3) 0.048 cm^3 08/11/23 1220   Drainage Characteristics/Odor  serous 08/11/23 1220   Drainage Amount small 08/11/23 1220   Care, Wound cleansed with;sterile normal saline 08/11/23 1220   Dressing Care dressing applied;border dressing;hydrofiber;silver impregnated;silicone 08/11/23 1220   Periwound Care absorptive dressing applied 08/11/23 1220        Wound Check / Follow-up:  Patient seen today for wound follow-up and wound check. Patient with intact dressings to lower and upper gluteal incisions.   Upper gluteal incision with serosanguinous and purulent drainage. Lower gluteal incision with only small amount of serosanguinous drainage. Cleansed and irrigated both areas. Lower gluteal incision is demonstrating some signs of improvement with increased intermittent wound margin ludy and increased epithelialization. Upper gluteal incision area has open up more with larger cavity evident. Recommending to continue packing to upper gluteal incision as well as collagen / iodoplex to lower gluteal incision    Left pubis with superficial tissue necrosis. Small area of induration remains palpable but erythema, firmness and tenderness have decreased. Slough to base now visible. No purulent drainage expressed with palpation or assessment. Cleansed with NS and gauze. Recommending to continue topical dressing while draining or at work for protection to clothing.       Impression: Surgical incisions to gluteal crease with closure by secondary intent with delayed closure. Superficial tissue necrosis to left pubis.    Short term goals:  Regain skin integrity. Dressing changes MWF. Patient to have dressing changed by family in absence of wound nurse.    Miladys Dockery RN    8/11/2023    13:00 EDT

## 2023-08-16 ENCOUNTER — HOSPITAL ENCOUNTER (OUTPATIENT)
Dept: INFUSION THERAPY | Facility: HOSPITAL | Age: 53
Discharge: HOME | End: 2023-08-16
Admitting: NURSE PRACTITIONER
Payer: COMMERCIAL

## 2023-08-16 VITALS
TEMPERATURE: 98.1 F | OXYGEN SATURATION: 99 % | RESPIRATION RATE: 20 BRPM | DIASTOLIC BLOOD PRESSURE: 80 MMHG | SYSTOLIC BLOOD PRESSURE: 127 MMHG | HEART RATE: 99 BPM

## 2023-08-16 DIAGNOSIS — L73.2 HIDRADENITIS: Primary | ICD-10-CM

## 2023-08-16 PROCEDURE — G0463 HOSPITAL OUTPT CLINIC VISIT: HCPCS

## 2023-08-16 NOTE — SIGNIFICANT NOTE
Wound Eval / Progress Noted     [facility]     Patient Name: Mariela Aldrich  : 1970  MRN: [id number]  Today's Date: 2023                 Admit Date: 2023    Visit Dx:  No diagnosis found.      * No active hospital problems. *        Past Medical History:   Diagnosis Date    Anxiety     Asthma     PRN INHALER AND TAKES ALLERGY INJECTIONS    Diabetes mellitus type 2, controlled     AVERAGE     Hidradenitis     Hyperlipidemia     Hypothyroidism     Murmur     DIAGNOSED WHEN 17 BUT IS ASYMPTOMATIC AND IS FOLLOWED ONLY BY PCP    Panic attack     Pilonidal cyst     Rheumatic fever     AS A CHILD    RLS (restless legs syndrome)     Sinus trouble         Past Surgical History:   Procedure Laterality Date    COLONOSCOPY  2020    exernal hemorrhoids    EXCISION LESION Bilateral 3/20/2023    Procedure: Excision Hidradenitis of Bilateral Inguinal Areas;  Surgeon: Donato You MD;  Location: [facility];  Service: General;  Laterality: Bilateral;    GROIN ABSCESS INCISION AND DRAINAGE Right     hidradenitis    HYSTERECTOMY  2010    KNEE SURGERY Bilateral     PILONIDAL CYSTECTOMY N/A 2023    Procedure: Excision Hidradenitis of Inguinal Area and Pilonidal Cystectomy;  Surgeon: Donato You MD;  Location: [facility];  Service: General;  Laterality: N/A;         Physical Assessment:  Wound 23 1333 medial gluteal Incision (Active)   Wound Image   23 1235   Dressing Appearance intact;moist drainage 23 1235   Closure None 23 1235   Base moist;red;epithelialization;granulating 23 1235   Red (%), Wound Tissue Color 100 23 1235   Periwound redness;macerated 23 1235   Periwound Temperature warm 23 1235   Periwound Skin Turgor soft 23 1235   Edges open 23 1235   Wound Length (cm) 2.8 cm 23 1235   Wound Width (cm) 0.1 cm 23 1235   Wound Depth (cm) 0.1 cm 23 1235   Wound Surface Area (cm^2) 0.28 cm^2  08/16/23 1235   Wound Volume (cm^3) 0.028 cm^3 08/16/23 1235   Drainage Characteristics/Odor serosanguineous 08/16/23 1235   Drainage Amount small 08/16/23 1235   Care, Wound cleansed with;irrigated with;sterile normal saline 08/16/23 1235   Dressing Care dressing applied;cotton;other (see comments);foam;border dressing;silicone 08/16/23 1235   Periwound Care absorptive dressing applied 08/16/23 1235       Wound 03/22/23 1518 medial coccyx Incision (Active)   Wound Image   08/16/23 1235   Dressing Appearance intact;moist drainage 08/16/23 1235   Closure None 08/16/23 1235   Base red;moist;granulating 08/16/23 1235   Red (%), Wound Tissue Color 100 08/16/23 1235   Periwound redness;macerated 08/16/23 1235   Periwound Temperature warm 08/16/23 1235   Edges open 08/16/23 1235   Wound Length (cm) 1 cm 08/16/23 1235   Wound Width (cm) 0.1 cm 08/16/23 1235   Wound Depth (cm) 1.3 cm 08/16/23 1235   Wound Surface Area (cm^2) 0.1 cm^2 08/16/23 1235   Wound Volume (cm^3) 0.13 cm^3 08/16/23 1235   Drainage Characteristics/Odor serosanguineous 08/16/23 1235   Drainage Amount moderate;small 08/16/23 1235   Care, Wound cleansed with;irrigated with;sterile normal saline 08/16/23 1235   Dressing Care dressing applied;packed with;packing strip;gauze, iodoform;collagen;border dressing;silicone 08/16/23 1235   Periwound Care absorptive dressing applied 08/16/23 1235        Wound Check / Follow-up:  Patient seen today for wound check / follow-up. Patient presents with intact dressings to gluteal incision sites.   Area to left pubis has resolved.   Upper gluteal incision continues to drain moderate amounts of serosanguinous drainage. Cleansed and irrigated wound copiously. Will recommend addition of collagen to base under packing strip as tissue has a health red moist granulating appearance. There is some maceration and irritation to periwound tissue from drainage. Recommending skin protection with absorbent dressing and use of skin  barrier.   Lower gluteal incision responding well to treatment. Increased epithelialization with contraction of wound margins. Open wound base is red and moist. Cleansed and irrigated with NS. Recommending to continue collagen and ioplex dressings at present time. Patient also with some loni-wound irritation from adhesive of duoderm, will give break with use of silicone border dressing but may resume back to duoderm after break.       Impression: Surgical incision to gluteal crease with delayed closure.     Short term goals:  Regain skin integrity. Dressing changes 3 x weekly. Patient's spouse to perform dressing changes in absence of wound nurse. Weekly checks.    Miladys Dockery RN    8/16/2023    12:51 EDT

## 2023-08-18 DIAGNOSIS — Z79.4 TYPE 2 DIABETES MELLITUS WITH HYPERGLYCEMIA, WITH LONG-TERM CURRENT USE OF INSULIN: ICD-10-CM

## 2023-08-18 DIAGNOSIS — E11.65 TYPE 2 DIABETES MELLITUS WITH HYPERGLYCEMIA, WITH LONG-TERM CURRENT USE OF INSULIN: ICD-10-CM

## 2023-08-18 RX ORDER — INSULIN DEGLUDEC 200 U/ML
50 INJECTION, SOLUTION SUBCUTANEOUS NIGHTLY
Qty: 9 ML | Refills: 1 | Status: SHIPPED | OUTPATIENT
Start: 2023-08-18

## 2023-08-23 ENCOUNTER — HOSPITAL ENCOUNTER (OUTPATIENT)
Dept: INFUSION THERAPY | Facility: HOSPITAL | Age: 53
Discharge: HOME | End: 2023-08-23
Admitting: NURSE PRACTITIONER
Payer: COMMERCIAL

## 2023-08-23 VITALS
TEMPERATURE: 98 F | RESPIRATION RATE: 20 BRPM | SYSTOLIC BLOOD PRESSURE: 131 MMHG | HEART RATE: 91 BPM | OXYGEN SATURATION: 100 % | DIASTOLIC BLOOD PRESSURE: 73 MMHG

## 2023-08-23 DIAGNOSIS — L73.2 HIDRADENITIS: Primary | ICD-10-CM

## 2023-08-23 PROCEDURE — G0463 HOSPITAL OUTPT CLINIC VISIT: HCPCS

## 2023-08-23 NOTE — SIGNIFICANT NOTE
Wound Eval / Progress Noted    [facility]     Patient Name: Mariela Aldrich  : 1970  MRN: [id number]  Today's Date: 2023                 Admit Date: 2023    Visit Dx:    ICD-10-CM ICD-9-CM   1. Hidradenitis  L73.2 705.83         * No active hospital problems. *        Past Medical History:   Diagnosis Date    Anxiety     Asthma     PRN INHALER AND TAKES ALLERGY INJECTIONS    Diabetes mellitus type 2, controlled     AVERAGE     Hidradenitis     Hyperlipidemia     Hypothyroidism     Murmur     DIAGNOSED WHEN 17 BUT IS ASYMPTOMATIC AND IS FOLLOWED ONLY BY PCP    Panic attack     Pilonidal cyst     Rheumatic fever     AS A CHILD    RLS (restless legs syndrome)     Sinus trouble         Past Surgical History:   Procedure Laterality Date    COLONOSCOPY  2020    exernal hemorrhoids    EXCISION LESION Bilateral 3/20/2023    Procedure: Excision Hidradenitis of Bilateral Inguinal Areas;  Surgeon: Donato You MD;  Location: [facility];  Service: General;  Laterality: Bilateral;    GROIN ABSCESS INCISION AND DRAINAGE Right     hidradenitis    HYSTERECTOMY  2010    KNEE SURGERY Bilateral 2013    PILONIDAL CYSTECTOMY N/A 2023    Procedure: Excision Hidradenitis of Inguinal Area and Pilonidal Cystectomy;  Surgeon: Donato You MD;  Location: [facility];  Service: General;  Laterality: N/A;         Physical Assessment:  Wound 23 1333 medial gluteal Incision (Active)   Wound Image   23 1230   Dressing Appearance intact;moist drainage 23 1230   Closure Tape 23 1230   Base granulating;moist;red 23 1230   Red (%), Wound Tissue Color 100 23 1230   Periwound macerated;moist;redness 23 1230   Periwound Temperature warm 23 1230   Periwound Skin Turgor soft 23 1230   Edges open 23 1230   Wound Length (cm) 2 cm 23 1230   Wound Width (cm) 0.1 cm 23 1230   Wound Depth (cm) 0.2 cm 23 1230   Wound Surface Area  (cm^2) 0.2 cm^2 08/23/23 1230   Wound Volume (cm^3) 0.04 cm^3 08/23/23 1230   Drainage Characteristics/Odor serosanguineous 08/23/23 1230   Drainage Amount small 08/23/23 1230   Care, Wound cleansed with;irrigated with;sterile normal saline 08/23/23 1230   Dressing Care dressing applied;border dressing;collagen;calcium alginate;silver impregnated 08/23/23 1230   Periwound Care absorptive dressing applied 08/23/23 1230       Wound 03/22/23 1518 medial coccyx Incision (Active)   Wound Image   08/23/23 1230   Dressing Appearance intact;moist drainage 08/23/23 1230   Closure None 08/23/23 1230   Base red;moist 08/23/23 1230   Red (%), Wound Tissue Color 100 08/23/23 1230   Periwound redness;macerated 08/23/23 1230   Periwound Temperature warm 08/23/23 1230   Edges open 08/23/23 1230   Wound Length (cm) 0.6 cm 08/23/23 1230   Wound Width (cm) 0.2 cm 08/23/23 1230   Wound Depth (cm) 0.5 cm 08/23/23 1230   Wound Surface Area (cm^2) 0.12 cm^2 08/23/23 1230   Wound Volume (cm^3) 0.06 cm^3 08/23/23 1230   Drainage Characteristics/Odor serosanguineous 08/23/23 1230   Drainage Amount small;moderate 08/23/23 1230   Care, Wound cleansed with;irrigated with;sterile normal saline 08/23/23 1230   Dressing Care dressing applied;collagen;border dressing;calcium alginate;silver impregnated 08/23/23 1230   Periwound Care absorptive dressing applied;barrier film applied 08/23/23 1230        Wound Check / Follow-up:  Patient seen today for wound follow-up and wound check. Patient is complaining of irritation to skin surrounding wounds.   Dressings removed. Upper gluteal incision with serosanguinous drainage noted. Packing strip had fallen out. Cleansed and irrigated wound. Open wound with red moist tissue. Wound base filling in nicely. Wound edges with some exaggerated tissue along right side . Recommending to continue collagen to wound base but will recommend covering with calcium alginate to decrease moisture to skin.   Lower gluteal  wound continues to respond but very slowly. Wound base is red and moist and shallow. Exaggerated tissue also present to these wound margins. Cleansed and irrigated with NS. Maceration and redness to loni-wound tissue spreading to right gluteal wound.   Recommending to continue collagen to base and then to apply calcium alginate over wound for drainage absorption.   Will recommend daily dressing changes to provide skin care and protection with each dressing change. Skin barrier recommended to be applied to loni-wound tissue after cleansing for added protection.     Impression: Gluteal incision wounds with delayed wound closure and closure by secondary intent    Short term goals:  Regain skin integrity. Daily dressing changes.     Miladys Dockery RN    8/23/2023    14:59 EDT

## 2023-08-23 NOTE — ADDENDUM NOTE
Encounter addended by: Miladys Dockery, RN on: 8/23/2023 3:10 PM   Actions taken: Clinical Note Signed, Flowsheet accepted, Charge Capture section accepted, Order list changed, Diagnosis association updated, Therapy plan modified

## 2023-08-30 ENCOUNTER — HOSPITAL ENCOUNTER (OUTPATIENT)
Dept: INFUSION THERAPY | Facility: HOSPITAL | Age: 53
Discharge: HOME | End: 2023-08-30
Admitting: NURSE PRACTITIONER
Payer: COMMERCIAL

## 2023-08-30 VITALS
SYSTOLIC BLOOD PRESSURE: 133 MMHG | OXYGEN SATURATION: 99 % | RESPIRATION RATE: 20 BRPM | DIASTOLIC BLOOD PRESSURE: 78 MMHG | TEMPERATURE: 98.1 F | HEART RATE: 109 BPM

## 2023-08-30 DIAGNOSIS — L73.2 HIDRADENITIS: Primary | ICD-10-CM

## 2023-08-30 PROCEDURE — G0463 HOSPITAL OUTPT CLINIC VISIT: HCPCS

## 2023-08-30 NOTE — SIGNIFICANT NOTE
Wound Eval / Progress Noted    [facility]     Patient Name: Mariela Aldrich  : 1970  MRN: [id number]  Today's Date: 2023                 Admit Date: 2023    Visit Dx:    ICD-10-CM ICD-9-CM   1. Hidradenitis  L73.2 705.83         * No active hospital problems. *        Past Medical History:   Diagnosis Date    Anxiety     Asthma     PRN INHALER AND TAKES ALLERGY INJECTIONS    Diabetes mellitus type 2, controlled     AVERAGE     Hidradenitis     Hyperlipidemia     Hypothyroidism     Murmur     DIAGNOSED WHEN 17 BUT IS ASYMPTOMATIC AND IS FOLLOWED ONLY BY PCP    Panic attack     Pilonidal cyst     Rheumatic fever     AS A CHILD    RLS (restless legs syndrome)     Sinus trouble         Past Surgical History:   Procedure Laterality Date    COLONOSCOPY  2020    exernal hemorrhoids    EXCISION LESION Bilateral 3/20/2023    Procedure: Excision Hidradenitis of Bilateral Inguinal Areas;  Surgeon: Donato You MD;  Location: [facility];  Service: General;  Laterality: Bilateral;    GROIN ABSCESS INCISION AND DRAINAGE Right     hidradenitis    HYSTERECTOMY  2010    KNEE SURGERY Bilateral 2013    PILONIDAL CYSTECTOMY N/A 2023    Procedure: Excision Hidradenitis of Inguinal Area and Pilonidal Cystectomy;  Surgeon: Donato You MD;  Location: [facility];  Service: General;  Laterality: N/A;         Physical Assessment:  Wound 23 1333 medial gluteal Incision (Active)   Wound Image   23 1220   Dressing Appearance intact;dry 23 1220   Closure None 23 1220   Base granulating;moist;red 23 1220   Red (%), Wound Tissue Color 100 23 1220   Periwound macerated;moist;redness 23 1220   Periwound Temperature warm 23 1220   Periwound Skin Turgor soft 23 1220   Edges open 23 1220   Wound Length (cm) 2.5 cm 23 1220   Wound Width (cm) 0.2 cm 23 1220   Wound Depth (cm) 0.4 cm 23 1220   Wound Surface Area (cm^2) 0.5  cm^2 08/30/23 1220   Wound Volume (cm^3) 0.2 cm^3 08/30/23 1220   Drainage Characteristics/Odor serosanguineous 08/30/23 1220   Drainage Amount small 08/30/23 1220   Care, Wound cleansed with;irrigated with;sterile normal saline 08/30/23 1220   Dressing Care dressing applied;calcium alginate;border dressing;silver impregnated;silicone 08/30/23 1220   Periwound Care absorptive dressing applied 08/30/23 1220       Wound 03/22/23 1518 medial coccyx Incision (Active)   Wound Image   08/30/23 1220   Dressing Appearance moist drainage;intact 08/30/23 1220   Closure None 08/30/23 1220   Base red;moist 08/30/23 1220   Red (%), Wound Tissue Color 100 08/30/23 1220   Periwound redness;macerated 08/30/23 1220   Periwound Temperature warm 08/30/23 1220   Periwound Skin Turgor soft 08/30/23 1220   Edges open 08/30/23 1220   Wound Length (cm) 0.5 cm 08/30/23 1220   Wound Width (cm) 0.2 cm 08/30/23 1220   Wound Depth (cm) 1.7 cm 08/30/23 1220   Wound Surface Area (cm^2) 0.1 cm^2 08/30/23 1220   Wound Volume (cm^3) 0.17 cm^3 08/30/23 1220   Care, Wound cleansed with;irrigated with;sterile normal saline 08/30/23 1220   Dressing Care dressing applied;collagen;calcium alginate;border dressing;silver impregnated;silicone 08/30/23 1220   Periwound Care absorptive dressing applied;barrier film applied 08/30/23 1220      Wound Check / Follow-up:  Patient seen today for a wound check and dressing change. Dressing is clean dry and intact; moderate amount of tan drainage to the upper gluteal wound. Cleansed and irrigated both wounds with NS. Wound bases are red and moist; periwound with redness and irritation due drainage. Lower wound with hypergranulation to the wound edges. Filled wound bases with collagen and silver impregnated calcium alginate.   Will recommend to continue current care at this time; wounds are responding to treatment, slow healing response.    Impression: gluteal incisions with delayed wound healing with secondary  closure    Short term goals:  regain skin integrity, daily dressing changes    No Henriquez RN    8/30/2023    12:43 EDT

## 2023-09-06 ENCOUNTER — HOSPITAL ENCOUNTER (OUTPATIENT)
Dept: INFUSION THERAPY | Facility: HOSPITAL | Age: 53
Discharge: HOME | End: 2023-09-06
Admitting: NURSE PRACTITIONER
Payer: COMMERCIAL

## 2023-09-06 VITALS
SYSTOLIC BLOOD PRESSURE: 118 MMHG | OXYGEN SATURATION: 96 % | RESPIRATION RATE: 20 BRPM | DIASTOLIC BLOOD PRESSURE: 85 MMHG | TEMPERATURE: 98.2 F | HEART RATE: 98 BPM

## 2023-09-06 DIAGNOSIS — L73.2 HIDRADENITIS: Primary | ICD-10-CM

## 2023-09-06 PROCEDURE — G0463 HOSPITAL OUTPT CLINIC VISIT: HCPCS

## 2023-09-06 NOTE — SIGNIFICANT NOTE
Wound Eval / Progress Noted     [facility]     Patient Name: Mariela Aldrich  : 1970  MRN: [id number]  Today's Date: 2023                 Admit Date: 2023    Visit Dx:    ICD-10-CM ICD-9-CM   1. Hidradenitis  L73.2 705.83         * No active hospital problems. *        Past Medical History:   Diagnosis Date    Anxiety     Asthma     PRN INHALER AND TAKES ALLERGY INJECTIONS    Diabetes mellitus type 2, controlled     AVERAGE     Hidradenitis     Hyperlipidemia     Hypothyroidism     Murmur     DIAGNOSED WHEN 17 BUT IS ASYMPTOMATIC AND IS FOLLOWED ONLY BY PCP    Panic attack     Pilonidal cyst     Rheumatic fever     AS A CHILD    RLS (restless legs syndrome)     Sinus trouble         Past Surgical History:   Procedure Laterality Date    COLONOSCOPY  2020    exernal hemorrhoids    EXCISION LESION Bilateral 3/20/2023    Procedure: Excision Hidradenitis of Bilateral Inguinal Areas;  Surgeon: Donato You MD;  Location: [facility];  Service: General;  Laterality: Bilateral;    GROIN ABSCESS INCISION AND DRAINAGE Right     hidradenitis    HYSTERECTOMY  2010    KNEE SURGERY Bilateral 2013    PILONIDAL CYSTECTOMY N/A 2023    Procedure: Excision Hidradenitis of Inguinal Area and Pilonidal Cystectomy;  Surgeon: Donato You MD;  Location: [facility];  Service: General;  Laterality: N/A;         Physical Assessment:  Wound 23 1333 medial gluteal Incision (Active)   Wound Image   23 1207   Dressing Appearance dry;intact 23 1207   Closure None 23 1207   Base moist;red 23 1207   Periwound intact;redness 23 1207   Periwound Temperature warm 23 1207   Periwound Skin Turgor soft 23 1207   Edges open 23 1207   Wound Length (cm) 1.9 cm 23 1207   Wound Width (cm) 0.4 cm 23 1207   Wound Depth (cm) 0.5 cm 23 1207   Wound Surface Area (cm^2) 0.76 cm^2 23 1207   Wound Volume (cm^3) 0.38 cm^3 23 1207    Drainage Characteristics/Odor serosanguineous 09/06/23 1207   Drainage Amount small 09/06/23 1207   Care, Wound cleansed with;irrigated with;sterile normal saline 09/06/23 1207   Dressing Care dressing applied;collagen;calcium alginate;silicone;border dressing;hydrocolloid 09/06/23 1207   Periwound Care absorptive dressing applied 09/06/23 1207       Wound 03/22/23 1518 medial coccyx Incision (Active)   Wound Image   09/06/23 1207   Dressing Appearance moist drainage;intact 09/06/23 1207   Closure None 09/06/23 1207   Base moist;pink;red 09/06/23 1207   Periwound dry;intact;redness 09/06/23 1207   Periwound Temperature warm 09/06/23 1207   Periwound Skin Turgor soft 09/06/23 1207   Edges open 09/06/23 1207   Wound Length (cm) 0.3 cm 09/06/23 1207   Wound Width (cm) 0.2 cm 09/06/23 1207   Wound Depth (cm) 0.4 cm 09/06/23 1207   Wound Surface Area (cm^2) 0.06 cm^2 09/06/23 1207   Wound Volume (cm^3) 0.024 cm^3 09/06/23 1207   Drainage Characteristics/Odor serosanguineous;serous 09/06/23 1207   Drainage Amount small 09/06/23 1207   Care, Wound cleansed with;irrigated with;sterile normal saline 09/06/23 1207   Dressing Care dressing applied;collagen;calcium alginate;silicone;border dressing;hydrocolloid 09/06/23 1207   Periwound Care absorptive dressing applied 09/06/23 1207      Wound Check / Follow-up:  Patient seen today for wound follow-up and dressing change. Dressing clean, dry and intact. Cleansed and irrigated both wounds with normal saline. Wound bases red and moist with some redness noted to periwound tissue likely from drainage. Filled wounds with collagen then applied silver impregnated calcium alginate. Secured with silicone border dressing and strips of hydrocolloid dressing to aid in seal/securement. Recommending to continue current treatment at this time.      Impression: Gluteal incisions with delayed wound healing.      Short term goals: Regain skin integrity, daily dressing changes.      Lanette  SONIA Cantu    9/6/2023    13:13 EDT

## 2023-09-20 ENCOUNTER — HOSPITAL ENCOUNTER (OUTPATIENT)
Dept: INFUSION THERAPY | Facility: HOSPITAL | Age: 53
Discharge: HOME | End: 2023-09-20
Admitting: NURSE PRACTITIONER
Payer: COMMERCIAL

## 2023-09-20 VITALS
SYSTOLIC BLOOD PRESSURE: 112 MMHG | RESPIRATION RATE: 20 BRPM | DIASTOLIC BLOOD PRESSURE: 77 MMHG | HEART RATE: 96 BPM | OXYGEN SATURATION: 97 % | TEMPERATURE: 98.3 F

## 2023-09-20 DIAGNOSIS — L73.2 HIDRADENITIS: Primary | ICD-10-CM

## 2023-09-20 PROCEDURE — G0463 HOSPITAL OUTPT CLINIC VISIT: HCPCS

## 2023-09-20 NOTE — SIGNIFICANT NOTE
Wound Eval / Progress Noted    [facility]     Patient Name: Mariela Aldrich  : 1970  MRN: [id number]  Today's Date: 2023                 Admit Date: 2023    Visit Dx:    ICD-10-CM ICD-9-CM   1. Hidradenitis  L73.2 705.83         * No active hospital problems. *        Past Medical History:   Diagnosis Date    Anxiety     Asthma     PRN INHALER AND TAKES ALLERGY INJECTIONS    Diabetes mellitus type 2, controlled     AVERAGE     Hidradenitis     Hyperlipidemia     Hypothyroidism     Murmur     DIAGNOSED WHEN 17 BUT IS ASYMPTOMATIC AND IS FOLLOWED ONLY BY PCP    Panic attack     Pilonidal cyst     Rheumatic fever     AS A CHILD    RLS (restless legs syndrome)     Sinus trouble         Past Surgical History:   Procedure Laterality Date    COLONOSCOPY  2020    exernal hemorrhoids    EXCISION LESION Bilateral 3/20/2023    Procedure: Excision Hidradenitis of Bilateral Inguinal Areas;  Surgeon: Donato You MD;  Location: [facility];  Service: General;  Laterality: Bilateral;    GROIN ABSCESS INCISION AND DRAINAGE Right     hidradenitis    HYSTERECTOMY  2010    KNEE SURGERY Bilateral 2013    PILONIDAL CYSTECTOMY N/A 2023    Procedure: Excision Hidradenitis of Inguinal Area and Pilonidal Cystectomy;  Surgeon: Donato You MD;  Location: [facility];  Service: General;  Laterality: N/A;         Physical Assessment:  Wound 23 1333 medial gluteal Incision (Active)   Wound Image   23 1200   Dressing Appearance moist drainage;intact 23 1200   Closure None 23 1200   Base moist;red 23 1200   Periwound dry;intact;pink 23 1200   Periwound Temperature warm 23 1200   Periwound Skin Turgor soft 23 1200   Edges open 23 1200   Wound Length (cm) 1.8 cm 23 1200   Wound Width (cm) 0.3 cm 23 1200   Wound Depth (cm) 0.2 cm 23 1200   Wound Surface Area (cm^2) 0.54 cm^2 23 1200   Wound Volume (cm^3) 0.108 cm^3  09/20/23 1200   Drainage Characteristics/Odor serosanguineous;yellow 09/20/23 1200   Drainage Amount small 09/20/23 1200   Care, Wound cleansed with;irrigated with;sterile normal saline 09/20/23 1200   Dressing Care dressing applied;collagen;calcium alginate;silver impregnated;silicone;border dressing;hydrocolloid 09/20/23 1200   Periwound Care absorptive dressing applied 09/20/23 1200       Wound 03/22/23 1518 medial coccyx Incision (Active)   Wound Image   09/20/23 1200   Dressing Appearance moist drainage;intact 09/20/23 1200   Closure None 09/20/23 1200   Base dry;pink 09/20/23 1200   Periwound dry;intact 09/20/23 1200   Periwound Temperature warm 09/20/23 1200   Periwound Skin Turgor soft 09/20/23 1200   Edges rolled/closed 09/20/23 1200   Drainage Characteristics/Odor serous;yellow 09/20/23 1200   Drainage Amount small 09/20/23 1200   Care, Wound cleansed with;sterile normal saline 09/20/23 1200   Dressing Care dressing applied;calcium alginate;silver impregnated;silicone;border dressing 09/20/23 1200   Periwound Care absorptive dressing applied 09/20/23 1200      Wound Check / Follow-up:  Patient seen today for wound follow-up and dresing change. Dressing intact with moist drainage. No wound detected to coccyx area but there is drainage. Cleansed both areas with normal saline. Applied collagen and silver impregnated calcium alginate to lower gluteal wound and applied silver impregnated calcium alginate to coccyx area for drainage absorption. Secured with silicone border dressing and strips of hydrocolloid dressing. Recommending to continue current treatment.      Impression: Gluteal incisions with delayed wound healing.       Short term goals: Regain skin integrity, daily dressing changes.        Lanette Cantu RN    9/20/2023    14:48 EDT

## 2023-09-22 DIAGNOSIS — J45.20 MILD INTERMITTENT ASTHMA WITHOUT COMPLICATION: ICD-10-CM

## 2023-09-22 RX ORDER — FLUTICASONE PROPIONATE AND SALMETEROL 250; 50 UG/1; UG/1
POWDER RESPIRATORY (INHALATION)
Qty: 60 EACH | Refills: 0 | Status: SHIPPED | OUTPATIENT
Start: 2023-09-22

## 2023-09-27 ENCOUNTER — HOSPITAL ENCOUNTER (OUTPATIENT)
Dept: INFUSION THERAPY | Facility: HOSPITAL | Age: 53
Discharge: HOME | End: 2023-09-27
Admitting: NURSE PRACTITIONER
Payer: COMMERCIAL

## 2023-09-27 VITALS
SYSTOLIC BLOOD PRESSURE: 126 MMHG | DIASTOLIC BLOOD PRESSURE: 78 MMHG | RESPIRATION RATE: 20 BRPM | TEMPERATURE: 97.6 F | OXYGEN SATURATION: 99 % | HEART RATE: 87 BPM

## 2023-09-27 DIAGNOSIS — L73.2 HIDRADENITIS: Primary | ICD-10-CM

## 2023-09-27 PROCEDURE — G0463 HOSPITAL OUTPT CLINIC VISIT: HCPCS

## 2023-09-27 NOTE — SIGNIFICANT NOTE
Wound Eval / Progress Noted    [facility]     Patient Name: Mariela Aldrich  : 1970  MRN: [id number]  Today's Date: 2023                 Admit Date: 2023    Visit Dx:    ICD-10-CM ICD-9-CM   1. Hidradenitis  L73.2 705.83         * No active hospital problems. *        Past Medical History:   Diagnosis Date    Anxiety     Asthma     PRN INHALER AND TAKES ALLERGY INJECTIONS    Diabetes mellitus type 2, controlled     AVERAGE     Hidradenitis     Hyperlipidemia     Hypothyroidism     Murmur     DIAGNOSED WHEN 17 BUT IS ASYMPTOMATIC AND IS FOLLOWED ONLY BY PCP    Panic attack     Pilonidal cyst     Rheumatic fever     AS A CHILD    RLS (restless legs syndrome)     Sinus trouble         Past Surgical History:   Procedure Laterality Date    COLONOSCOPY  2020    exernal hemorrhoids    EXCISION LESION Bilateral 3/20/2023    Procedure: Excision Hidradenitis of Bilateral Inguinal Areas;  Surgeon: Donato You MD;  Location: [facility];  Service: General;  Laterality: Bilateral;    GROIN ABSCESS INCISION AND DRAINAGE Right     hidradenitis    HYSTERECTOMY  2010    KNEE SURGERY Bilateral 2013    PILONIDAL CYSTECTOMY N/A 2023    Procedure: Excision Hidradenitis of Inguinal Area and Pilonidal Cystectomy;  Surgeon: Donato You MD;  Location: [facility];  Service: General;  Laterality: N/A;         Physical Assessment:  Wound 23 1333 medial gluteal Incision (Active)   Wound Image   23 1215   Dressing Appearance moist drainage;intact 23 1215   Closure None 23 1215   Base moist;red 23 1215   Red (%), Wound Tissue Color 100 23 1215   Periwound dry;intact;pink 23 1215   Periwound Temperature warm 23 1215   Periwound Skin Turgor soft 23 1215   Edges open 23 1215   Wound Length (cm) 2.3 cm 23 1215   Wound Width (cm) 0.3 cm 23 1215   Wound Depth (cm) 0.4 cm 23 1215   Wound Surface Area (cm^2) 0.69 cm^2  09/27/23 1215   Wound Volume (cm^3) 0.276 cm^3 09/27/23 1215   Drainage Characteristics/Odor serosanguineous 09/27/23 1215   Drainage Amount small 09/27/23 1215   Care, Wound cleansed with;irrigated with;sterile normal saline 09/27/23 1215   Dressing Care dressing applied;collagen;calcium alginate;silver impregnated;silicone;border dressing 09/27/23 1215   Periwound Care absorptive dressing applied 09/27/23 1215       Wound 03/22/23 1518 medial coccyx Incision (Active)   Wound Image   09/27/23 1215   Dressing Appearance intact;moist drainage 09/27/23 1215   Closure None 09/27/23 1215   Base moist;red 09/27/23 1215   Red (%), Wound Tissue Color 100 09/27/23 1215   Periwound dry;intact 09/27/23 1215   Periwound Temperature warm 09/27/23 1215   Edges open 09/27/23 1215   Wound Length (cm) 0.1 cm 09/27/23 1215   Wound Width (cm) 0.4 cm 09/27/23 1215   Wound Depth (cm) 1.5 cm 09/27/23 1215   Wound Surface Area (cm^2) 0.04 cm^2 09/27/23 1215   Wound Volume (cm^3) 0.06 cm^3 09/27/23 1215   Drainage Characteristics/Odor serosanguineous 09/27/23 1215   Drainage Amount small 09/27/23 1215   Care, Wound cleansed with;sterile normal saline 09/27/23 1215   Dressing Care dressing applied;packed with;collagen;silver impregnated;calcium alginate;hydrofiber;silicone;border dressing;hydrocolloid 09/27/23 1215   Periwound Care absorptive dressing applied 09/27/23 1215        Wound Check / Follow-up:  Patient seen today for a wound check and dressing. Dressing is clean dry and intact; minimal amount of drainage noted to the lower incision. Yellow drainage to the sacral wound with a red moist wound base; measurable depth noted. Cleansed and irrigated both wounds with NS and gauze. Applied collagen to the wound bases and secured with a silicone border dressing / hydrocolloid. Recommend to continue current care at this time; patient is responding to current treatment    Impression: gluteal incision with delayed healing    Short term  goals:  regain skin integrity, daily dressing changes    No Henriquez RN    9/27/2023    13:12 EDT

## 2023-09-28 ENCOUNTER — OFFICE VISIT (OUTPATIENT)
Dept: FAMILY MEDICINE CLINIC | Facility: CLINIC | Age: 53
End: 2023-09-28
Payer: COMMERCIAL

## 2023-09-28 ENCOUNTER — LAB (OUTPATIENT)
Dept: LAB | Facility: HOSPITAL | Age: 53
End: 2023-09-28
Payer: COMMERCIAL

## 2023-09-28 VITALS
TEMPERATURE: 97.8 F | WEIGHT: 197.2 LBS | DIASTOLIC BLOOD PRESSURE: 76 MMHG | SYSTOLIC BLOOD PRESSURE: 114 MMHG | HEART RATE: 97 BPM | BODY MASS INDEX: 33.85 KG/M2 | OXYGEN SATURATION: 96 %

## 2023-09-28 DIAGNOSIS — F41.9 ANXIETY: ICD-10-CM

## 2023-09-28 DIAGNOSIS — H66.92 LEFT OTITIS MEDIA, UNSPECIFIED OTITIS MEDIA TYPE: ICD-10-CM

## 2023-09-28 DIAGNOSIS — Z79.4 TYPE 2 DIABETES MELLITUS WITH HYPERGLYCEMIA, WITH LONG-TERM CURRENT USE OF INSULIN: Primary | ICD-10-CM

## 2023-09-28 DIAGNOSIS — E11.65 TYPE 2 DIABETES MELLITUS WITH HYPERGLYCEMIA, WITH LONG-TERM CURRENT USE OF INSULIN: Primary | ICD-10-CM

## 2023-09-28 DIAGNOSIS — Z79.4 TYPE 2 DIABETES MELLITUS WITH HYPERGLYCEMIA, WITH LONG-TERM CURRENT USE OF INSULIN: ICD-10-CM

## 2023-09-28 DIAGNOSIS — Z23 NEED FOR PNEUMOCOCCAL VACCINATION: ICD-10-CM

## 2023-09-28 DIAGNOSIS — E78.2 MIXED HYPERLIPIDEMIA: ICD-10-CM

## 2023-09-28 DIAGNOSIS — E03.8 OTHER SPECIFIED HYPOTHYROIDISM: ICD-10-CM

## 2023-09-28 DIAGNOSIS — Z23 NEED FOR INFLUENZA VACCINATION: ICD-10-CM

## 2023-09-28 DIAGNOSIS — J01.90 ACUTE NON-RECURRENT SINUSITIS, UNSPECIFIED LOCATION: ICD-10-CM

## 2023-09-28 DIAGNOSIS — E11.65 TYPE 2 DIABETES MELLITUS WITH HYPERGLYCEMIA, WITH LONG-TERM CURRENT USE OF INSULIN: ICD-10-CM

## 2023-09-28 LAB
ALBUMIN SERPL-MCNC: 4.2 G/DL (ref 3.5–5.2)
ALBUMIN UR-MCNC: <1.2 MG/DL
ALBUMIN/GLOB SERPL: 1 G/DL
ALP SERPL-CCNC: 142 U/L (ref 39–117)
ALT SERPL W P-5'-P-CCNC: 24 U/L (ref 1–33)
ANION GAP SERPL CALCULATED.3IONS-SCNC: 9 MMOL/L (ref 5–15)
AST SERPL-CCNC: 25 U/L (ref 1–32)
BILIRUB SERPL-MCNC: 0.2 MG/DL (ref 0–1.2)
BUN SERPL-MCNC: 18 MG/DL (ref 6–20)
BUN/CREAT SERPL: 22 (ref 7–25)
CALCIUM SPEC-SCNC: 9.6 MG/DL (ref 8.6–10.5)
CHLORIDE SERPL-SCNC: 103 MMOL/L (ref 98–107)
CHOLEST SERPL-MCNC: 169 MG/DL (ref 0–200)
CO2 SERPL-SCNC: 24 MMOL/L (ref 22–29)
CREAT SERPL-MCNC: 0.82 MG/DL (ref 0.57–1)
CREAT UR-MCNC: 65.2 MG/DL
EGFRCR SERPLBLD CKD-EPI 2021: 85.1 ML/MIN/1.73
GLOBULIN UR ELPH-MCNC: 4.3 GM/DL
GLUCOSE SERPL-MCNC: 146 MG/DL (ref 65–99)
HBA1C MFR BLD: 9.4 % (ref 4.8–5.6)
HDLC SERPL-MCNC: 61 MG/DL (ref 40–60)
LDLC SERPL CALC-MCNC: 90 MG/DL (ref 0–100)
LDLC/HDLC SERPL: 1.44 {RATIO}
MICROALBUMIN/CREAT UR: NORMAL MG/G{CREAT}
POTASSIUM SERPL-SCNC: 4.5 MMOL/L (ref 3.5–5.2)
PROT SERPL-MCNC: 8.5 G/DL (ref 6–8.5)
SODIUM SERPL-SCNC: 136 MMOL/L (ref 136–145)
TRIGL SERPL-MCNC: 100 MG/DL (ref 0–150)
VLDLC SERPL-MCNC: 18 MG/DL (ref 5–40)

## 2023-09-28 PROCEDURE — 82043 UR ALBUMIN QUANTITATIVE: CPT

## 2023-09-28 PROCEDURE — 36415 COLL VENOUS BLD VENIPUNCTURE: CPT

## 2023-09-28 PROCEDURE — 80061 LIPID PANEL: CPT

## 2023-09-28 PROCEDURE — 82570 ASSAY OF URINE CREATININE: CPT

## 2023-09-28 PROCEDURE — 80053 COMPREHEN METABOLIC PANEL: CPT

## 2023-09-28 PROCEDURE — 83036 HEMOGLOBIN GLYCOSYLATED A1C: CPT

## 2023-09-28 RX ORDER — LISINOPRIL 5 MG/1
5 TABLET ORAL NIGHTLY
Qty: 90 TABLET | Refills: 1 | Status: SHIPPED | OUTPATIENT
Start: 2023-09-28

## 2023-09-28 RX ORDER — SEMAGLUTIDE 1.34 MG/ML
1 INJECTION, SOLUTION SUBCUTANEOUS WEEKLY
COMMUNITY
Start: 2023-09-18 | End: 2023-09-28 | Stop reason: SDUPTHER

## 2023-09-28 RX ORDER — SERTRALINE HYDROCHLORIDE 100 MG/1
150 TABLET, FILM COATED ORAL DAILY
Qty: 135 TABLET | Refills: 1 | Status: SHIPPED | OUTPATIENT
Start: 2023-09-28

## 2023-09-28 RX ORDER — FLUTICASONE PROPIONATE 50 MCG
2 SPRAY, SUSPENSION (ML) NASAL DAILY
Qty: 16 G | Refills: 5 | Status: SHIPPED | OUTPATIENT
Start: 2023-09-28

## 2023-09-28 RX ORDER — LEVOTHYROXINE SODIUM 88 MCG
88 TABLET ORAL DAILY
Qty: 90 TABLET | Refills: 1 | Status: SHIPPED | OUTPATIENT
Start: 2023-09-28

## 2023-09-28 RX ORDER — CEFDINIR 300 MG/1
300 CAPSULE ORAL 2 TIMES DAILY
Qty: 20 CAPSULE | Refills: 0 | Status: SHIPPED | OUTPATIENT
Start: 2023-09-28 | End: 2023-10-08

## 2023-09-28 RX ORDER — EMPAGLIFLOZIN, METFORMIN HYDROCHLORIDE 12.5; 1 MG/1; MG/1
1 TABLET, EXTENDED RELEASE ORAL 2 TIMES DAILY
Qty: 180 TABLET | Refills: 1 | Status: SHIPPED | OUTPATIENT
Start: 2023-09-28

## 2023-09-28 NOTE — PROGRESS NOTES
"Chief Complaint  Diabetes (Follow up), Hoarse (For a month), Cough (For a month), and Sinus Problem (Drainage for a month)    Subjective          Mariela Aldrich is a 53 y.o. female who presents to [facility]    History of Present Illness    Dm - last hgba1c was 8.1. Patient states that her blood sugars have been running high because she has been sick. States she has currently been eating too much and isn't watching her sugars how she should.     Cough, hoarse and drainage for 1 month - patient went to [facility] about 3 weeks ago and was diagnosed with laryngitis and sinusitis, given tessalon Perles and doxycycline. Got some relief after taking those but hasn't been able to kick the symptoms. States it is worse at nighttime, will wake up coughing. Hasn't had to use albuterol inhaler. Patient occasionally has a bronchospastic cough. Patient takes zyrtec and gets allergy shots once a week for allergies.     HTN - b/p wnl.     Hypothyroid - Patient states that her energy levels are about the same \"normal for her\".     Patient states that she has a spot on the top of her left foot that has been going on for the past couple of months will swell every time she wears shoes no matter what shoes she is wearing. It isn't painful, just bothersome.       Review of Systems   Constitutional:  Negative for fatigue.   HENT:  Positive for congestion, ear pain, postnasal drip, sinus pressure, sneezing, sore throat and voice change.    Respiratory:  Positive for cough. Negative for shortness of breath.    Cardiovascular:  Negative for chest pain and palpitations.   Gastrointestinal:  Negative for nausea and vomiting.   Endocrine: Negative for cold intolerance and heat intolerance.   Genitourinary:  Negative for difficulty urinating and dysuria.   Musculoskeletal:  Negative for arthralgias, gait problem and joint swelling.   Neurological:  Positive for headaches. Negative for dizziness and " seizures.   Hematological:  Negative for adenopathy. Does not bruise/bleed easily.   Psychiatric/Behavioral:  Negative for confusion, dysphoric mood and sleep disturbance. The patient is not nervous/anxious.         Medical History: has a past medical history of Anxiety, Asthma, Diabetes mellitus type 2, controlled, Hidradenitis, Hyperlipidemia, Hypothyroidism, Murmur, Panic attack, Pilonidal cyst, Rheumatic fever, RLS (restless legs syndrome), and Sinus trouble.     Surgical History: has a past surgical history that includes Colonoscopy (01/21/2020); Groin Abscess Incision and Drainage (Right); Hysterectomy (2010); Knee surgery (Bilateral, 2013); Pilonidal Cystectomy (N/A, 2/8/2023); and Excision Lesion (Bilateral, 3/20/2023).     Family History: family history includes Diabetes in her brother, maternal grandmother, and sister; Thyroid disease in her sister; Thyroid disease (age of onset: 43) in her brother; Thyroid disease (age of onset: 70) in her father.     Social History: reports that she has never smoked. She has never used smokeless tobacco. She reports that she does not drink alcohol and does not use drugs.    Allergies: Etodolac, Penicillins, Statins, Atorvastatin, Crestor [rosuvastatin], and Pravastatin      Health Maintenance Due   Topic Date Due    ZOSTER VACCINE (1 of 2) Never done    DIABETIC EYE EXAM  07/13/2023    ANNUAL PHYSICAL  09/07/2023    DIABETIC FOOT EXAM  09/07/2023            Current Outpatient Medications:     albuterol sulfate  (90 Base) MCG/ACT inhaler, Inhale 2 puffs Every 4 (Four) Hours As Needed for Shortness of Air., Disp: 18 g, Rfl: 1    cetirizine (zyrTEC) 10 MG tablet, Zyrtec 10 mg oral tablet take 1 tablet (10 mg) by oral route once daily   Active, Disp: , Rfl:     Continuous Blood Gluc Sensor (Dexcom G6 Sensor), Every 10 (Ten) Days., Disp: 9 each, Rfl: 1    Continuous Blood Gluc Transmit (Dexcom G6 Transmitter) misc, 1 Device Every 3 (Three) Months., Disp: 1 each, Rfl:  3    Empagliflozin-metFORMIN HCl ER (Synjardy XR) 12.5-1000 MG tablet sustained-release 24 hour, Take 1 tablet by mouth 2 (Two) Times a Day., Disp: 180 tablet, Rfl: 1    Fluticasone-Salmeterol (ADVAIR/WIXELA) 250-50 MCG/ACT DISKUS, INHALE 1 DOSE BY MOUTH TWICE DAILY, Disp: 60 each, Rfl: 0    glipizide (GLUCOTROL) 10 MG tablet, Take 1 tablet by mouth 2 (Two) Times a Day Before Meals., Disp: 180 tablet, Rfl: 1    glucose blood test strip, Use as instructed Dx: DM E11, Disp: 100 each, Rfl: 3    Insulin Degludec (Tresiba FlexTouch) 200 UNIT/ML solution pen-injector pen injection, Inject 50 Units under the skin into the appropriate area as directed Every Night., Disp: 9 mL, Rfl: 1    Insulin Pen Needle (B-D ULTRAFINE III SHORT PEN) 31G X 8 MM misc, Inject 1 each under the skin into the appropriate area as directed Daily., Disp: 100 each, Rfl: 3    lisinopril (PRINIVIL,ZESTRIL) 5 MG tablet, Take 1 tablet by mouth Every Night., Disp: 90 tablet, Rfl: 1    multivitamin (THERAGRAN) tablet tablet, , Disp: , Rfl:     ONE TOUCH CLUB LANCETS misc, 90 each 3 (Three) Times a Day As Needed (check blood sugar)., Disp: 100 each, Rfl: 3    pitavastatin calcium (Livalo) 2 MG tablet tablet, Take 1 tablet by mouth Every Night., Disp: 90 tablet, Rfl: 1    rOPINIRole (REQUIP) 5 MG tablet, Take 1 tablet by mouth Every Night., Disp: 90 tablet, Rfl: 1    Semaglutide, 1 MG/DOSE, (OZEMPIC) 2 MG/1.5ML solution pen-injector, Inject 1 mg under the skin into the appropriate area as directed 1 (One) Time Per Week., Disp: 6 mL, Rfl: 5    sertraline (ZOLOFT) 100 MG tablet, Take 1.5 tablets by mouth Daily., Disp: 135 tablet, Rfl: 1    Synthroid 88 MCG tablet, Take 1 tablet by mouth Daily., Disp: 90 tablet, Rfl: 1    Zinc 50 MG tablet, zinc 50 mg oral tablet take 1 tablet by oral route daily   Active, Disp: , Rfl:     adalimumab (Humira) 40 MG/0.8ML Prefilled Syringe Kit injection, 0.8 mL. Currently on hold due to surgies (Patient not taking: Reported on  9/28/2023), Disp: , Rfl:     cefdinir (OMNICEF) 300 MG capsule, Take 1 capsule by mouth 2 (Two) Times a Day for 10 days., Disp: 20 capsule, Rfl: 0    fluticasone (FLONASE) 50 MCG/ACT nasal spray, 2 sprays into the nostril(s) as directed by provider Daily., Disp: 16 g, Rfl: 5      Immunization History   Administered Date(s) Administered    Fluzone (or Fluarix & Flulaval for VFC) >6mos 09/28/2023    Fluzone Quad >6mos (Multi-dose) 10/01/2020    Pneumococcal Conjugate 20-Valent (PCV20) 09/28/2023    Pneumococcal Polysaccharide (PPSV23) 07/20/2019    Td (TDVAX) 05/08/1998    Tdap 03/16/2020         Objective       Vitals:    09/28/23 0702   BP: 114/76   Pulse: 97   Temp: 97.8 °F (36.6 °C)   TempSrc: Temporal   SpO2: 96%   Weight: 89.4 kg (197 lb 3.2 oz)      Body mass index is 33.85 kg/m².   Wt Readings from Last 3 Encounters:   09/28/23 89.4 kg (197 lb 3.2 oz)   06/29/23 88.5 kg (195 lb)   05/04/23 89.8 kg (198 lb)      BP Readings from Last 3 Encounters:   09/28/23 114/76   09/27/23 126/78   09/20/23 112/77                Physical Exam  Constitutional:       General: She is not in acute distress.     Appearance: Normal appearance.   HENT:      Right Ear: A middle ear effusion is present.      Left Ear: A middle ear effusion is present.      Ears:      Comments: Cloudy fluid on left ear      Mouth/Throat:      Pharynx: Oropharyngeal exudate and posterior oropharyngeal erythema present.   Eyes:      General: Lids are normal. No scleral icterus.     Conjunctiva/sclera: Conjunctivae normal.      Pupils: Pupils are equal, round, and reactive to light.   Neck:      Thyroid: No thyroid mass, thyromegaly or thyroid tenderness.      Vascular: No carotid bruit.   Cardiovascular:      Rate and Rhythm: Normal rate.      Pulses: Normal pulses.      Heart sounds: Normal heart sounds. No murmur heard.    No friction rub. No gallop.   Pulmonary:      Effort: Pulmonary effort is normal. No retractions.      Breath sounds: Normal  breath sounds.   Abdominal:      General: Abdomen is flat. Bowel sounds are normal. There is no distension.      Palpations: Abdomen is soft.      Tenderness: There is no abdominal tenderness. There is no guarding.   Musculoskeletal:      Cervical back: Full passive range of motion without pain, normal range of motion and neck supple. No rigidity or tenderness.      Right lower leg: No edema.      Left lower leg: No edema.   Lymphadenopathy:      Cervical: No cervical adenopathy.   Skin:     General: Skin is warm and dry.      Findings: No lesion or rash.      Nails: There is no clubbing.   Neurological:      General: No focal deficit present.      Mental Status: She is alert and oriented to person, place, and time.      Coordination: Coordination is intact.      Gait: Gait is intact.   Psychiatric:         Attention and Perception: Attention normal.         Mood and Affect: Mood and affect normal.         Speech: Speech normal.         Behavior: Behavior normal. Behavior is cooperative.         Thought Content: Thought content normal.         Cognition and Memory: Cognition normal.         Judgment: Judgment normal.           Result Review :       Common labs          2/8/2023    10:38 3/14/2023    21:57 3/29/2023    08:21 3/29/2023    08:25   Common Labs   Glucose 151  146  172     BUN 14  17  12     Creatinine 0.81  0.72  0.78     Sodium 136  133  136     Potassium 4.8  4.1  4.4     Chloride 104  98  103     Calcium 9.3  9.4  9.0     Albumin  3.5  3.7     Total Bilirubin  0.3  <0.2     Alkaline Phosphatase  99  99     AST (SGOT)  16  21     ALT (SGPT)  14  15     WBC  15.36      Hemoglobin  12.2      Hematocrit  36.9      Platelets  354      Total Cholesterol   150     Triglycerides   89     HDL Cholesterol   49     LDL Cholesterol    84     Hemoglobin A1C   8.10     Microalbumin, Urine    <1.2                       Assessment and Plan        Diagnoses and all orders for this visit:    1. Type 2 diabetes mellitus  with hyperglycemia, with long-term current use of insulin (Primary)  -     lisinopril (PRINIVIL,ZESTRIL) 5 MG tablet; Take 1 tablet by mouth Every Night.  Dispense: 90 tablet; Refill: 1  -     Empagliflozin-metFORMIN HCl ER (Synjardy XR) 12.5-1000 MG tablet sustained-release 24 hour; Take 1 tablet by mouth 2 (Two) Times a Day.  Dispense: 180 tablet; Refill: 1    2. Need for pneumococcal vaccination  -     Pneumococcal Conjugate Vaccine 20-Valent All    3. Need for influenza vaccination  -     Cancel: Fluzone High-Dose 65+yrs  -     Fluzone (or Fluarix & Flulaval for VFC) >6 Mos (2066-5517)    4. Acute non-recurrent sinusitis, unspecified location  -     fluticasone (FLONASE) 50 MCG/ACT nasal spray; 2 sprays into the nostril(s) as directed by provider Daily.  Dispense: 16 g; Refill: 5  -     cefdinir (OMNICEF) 300 MG capsule; Take 1 capsule by mouth 2 (Two) Times a Day for 10 days.  Dispense: 20 capsule; Refill: 0    5. Left otitis media, unspecified otitis media type  -     cefdinir (OMNICEF) 300 MG capsule; Take 1 capsule by mouth 2 (Two) Times a Day for 10 days.  Dispense: 20 capsule; Refill: 0    6. Other specified hypothyroidism  -     Synthroid 88 MCG tablet; Take 1 tablet by mouth Daily.  Dispense: 90 tablet; Refill: 1    7. Anxiety  -     sertraline (ZOLOFT) 100 MG tablet; Take 1.5 tablets by mouth Daily.  Dispense: 135 tablet; Refill: 1    8. Mixed hyperlipidemia  -     pitavastatin calcium (Livalo) 2 MG tablet tablet; Take 1 tablet by mouth Every Night.  Dispense: 90 tablet; Refill: 1      Continue blood sugar monitoring daily and record.  Bring your log to office visits.  Call the office for readings below 70 and above 250 or any complications.    Daily foot care.  Avoid walking barefoot.    Annual dilated eye exam.    Discussed with patient blood pressure monitoring, hemoglobin A1c levels need to be below 7, and LDL goals below 70.      Follow Up     Return in about 3 months (around 12/28/2023) for Next  scheduled follow up.    Patient was given instructions and counseling regarding her condition or for health maintenance advice. Please see specific information pulled into the AVS if appropriate.     WILLIAM Hickman

## 2023-09-28 NOTE — PATIENT INSTRUCTIONS
"Diabetes Mellitus and Exercise  Exercising regularly is important for overall health, especially for people who have diabetes mellitus. Exercising is not only about losing weight. It has many other health benefits, such as increasing muscle strength and bone density and reducing body fat and stress. This leads to improved fitness, flexibility, and endurance, all of which result in better overall health.  What are the benefits of exercise if I have diabetes?  Exercise has many benefits for people with diabetes. They include:  Helping to lower and control blood sugar (glucose).  Helping the body to respond better to the hormone insulin by improving insulin sensitivity.  Reducing how much insulin the body needs.  Lowering the risk for heart disease by:  Lowering \"bad\" cholesterol and triglyceride levels.  Increasing \"good\" cholesterol levels.  Lowering blood pressure.  Lowering blood glucose levels.  What is my activity plan?  Your health care provider or certified diabetes educator can help you make a plan for the type and frequency of exercise that works for you. This is called your activity plan. Be sure to:  Get at least 150 minutes of medium-intensity or high-intensity exercise each week. Exercises may include brisk walking, biking, or water aerobics.  Do stretching and strengthening exercises, such as yoga or weight lifting, at least 2 times a week.  Spread out your activity over at least 3 days of the week.  Get some form of physical activity each day.  Do not go more than 2 days in a row without some kind of physical activity.  Avoid being inactive for more than 90 minutes at a time. Take frequent breaks to walk or stretch.  Choose exercises or activities that you enjoy. Set realistic goals.  Start slowly and gradually increase your exercise intensity over time.  How do I manage my diabetes during exercise?    Monitor your blood glucose  Check your blood glucose before and after exercising. If your blood " glucose is:  240 mg/dL (13.3 mmol/L) or higher before you exercise, check your urine for ketones. These are chemicals created by the liver. If you have ketones in your urine, do not exercise until your blood glucose returns to normal.  100 mg/dL (5.6 mmol/L) or lower, eat a snack containing 15-20 grams of carbohydrate. Check your blood glucose 15 minutes after the snack to make sure that your glucose level is above 100 mg/dL (5.6 mmol/L) before you start your exercise.  Know the symptoms of low blood glucose (hypoglycemia) and how to treat it. Your risk for hypoglycemia increases during and after exercise.  Follow these tips and your health care provider's instructions  Keep a carbohydrate snack that is fast-acting for use before, during, and after exercise to help prevent or treat hypoglycemia.  Avoid injecting insulin into areas of the body that are going to be exercised. For example, avoid injecting insulin into:  Your arms, when you are about to play tennis.  Your legs, when you are about to go jogging.  Keep records of your exercise habits. Doing this can help you and your health care provider adjust your diabetes management plan as needed. Write down:  Food that you eat before and after you exercise.  Blood glucose levels before and after you exercise.  The type and amount of exercise you have done.  Work with your health care provider when you start a new exercise or activity. He or she may need to:  Make sure that the activity is safe for you.  Adjust your insulin, other medicines, and food that you eat.  Drink plenty of water while you exercise. This prevents loss of water (dehydration) and problems caused by a lot of heat in the body (heat stroke).  Where to find more information  American Diabetes Association: www.diabetes.org  Summary  Exercising regularly is important for overall health, especially for people who have diabetes mellitus.  Exercising has many health benefits. It increases muscle strength  and bone density and reduces body fat and stress. It also lowers and controls blood glucose.  Your health care provider or certified diabetes educator can help you make an activity plan for the type and frequency of exercise that works for you.  Work with your health care provider to make sure any new activity is safe for you. Also work with your health care provider to adjust your insulin, other medicines, and the food you eat.  This information is not intended to replace advice given to you by your health care provider. Make sure you discuss any questions you have with your health care provider.  Document Revised: 09/14/2020 Document Reviewed: 09/14/2020  [employer] Patient Education © 2023 [employer]  Diabetes Mellitus and Nutrition, Adult  When you have diabetes, or diabetes mellitus, it is very important to have healthy eating habits because your blood sugar (glucose) levels are greatly affected by what you eat and drink. Eating healthy foods in the right amounts, at about the same times every day, can help you:  Manage your blood glucose.  Lower your risk of heart disease.  Improve your blood pressure.  Reach or maintain a healthy weight.  What can affect my meal plan?  Every person with diabetes is different, and each person has different needs for a meal plan. Your health care provider may recommend that you work with a dietitian to make a meal plan that is best for you. Your meal plan may vary depending on factors such as:  The calories you need.  The medicines you take.  Your weight.  Your blood glucose, blood pressure, and cholesterol levels.  Your activity level.  Other health conditions you have, such as heart or kidney disease.  How do carbohydrates affect me?  Carbohydrates, also called carbs, affect your blood glucose level more than any other type of food. Eating carbs raises the amount of glucose in your blood.  It is important to know how many carbs you can safely have in each meal. This is  "different for every person. Your dietitian can help you calculate how many carbs you should have at each meal and for each snack.  How does alcohol affect me?  Alcohol can cause a decrease in blood glucose (hypoglycemia), especially if you use insulin or take certain diabetes medicines by mouth. Hypoglycemia can be a life-threatening condition. Symptoms of hypoglycemia, such as sleepiness, dizziness, and confusion, are similar to symptoms of having too much alcohol.  Do not drink alcohol if:  Your health care provider tells you not to drink.  You are pregnant, may be pregnant, or are planning to become pregnant.  If you drink alcohol:  Limit how much you have to:  0-1 drink a day for women.  0-2 drinks a day for men.  Know how much alcohol is in your drink. In the [place] one drink equals one 12 oz bottle of beer (355 mL), one 5 oz glass of wine (148 mL), or one 1½ oz glass of hard liquor (44 mL).  Keep yourself hydrated with water, diet soda, or unsweetened iced tea. Keep in mind that regular soda, juice, and other mixers may contain a lot of sugar and must be counted as carbs.  What are tips for following this plan?    Reading food labels  Start by checking the serving size on the Nutrition Facts label of packaged foods and drinks. The number of calories and the amount of carbs, fats, and other nutrients listed on the label are based on one serving of the item. Many items contain more than one serving per package.  Check the total grams (g) of carbs in one serving.  Check the number of grams of saturated fats and trans fats in one serving. Choose foods that have a low amount or none of these fats.  Check the number of milligrams (mg) of salt (sodium) in one serving. Most people should limit total sodium intake to less than 2,300 mg per day.  Always check the nutrition information of foods labeled as \"low-fat\" or \"nonfat.\" These foods may be higher in added sugar or refined carbs and should be avoided.  Talk to your " dietitian to identify your daily goals for nutrients listed on the label.  Shopping  Avoid buying canned, pre-made, or processed foods. These foods tend to be high in fat, sodium, and added sugar.  Shop around the outside edge of the grocery store. This is where you will most often find fresh fruits and vegetables, bulk grains, fresh meats, and fresh dairy products.  Cooking  Use low-heat cooking methods, such as baking, instead of high-heat cooking methods, such as deep frying.  Cook using healthy oils, such as olive, canola, or sunflower oil.  Avoid cooking with butter, cream, or high-fat meats.  Meal planning  Eat meals and snacks regularly, preferably at the same times every day. Avoid going long periods of time without eating.  Eat foods that are high in fiber, such as fresh fruits, vegetables, beans, and whole grains.  Eat 4-6 oz (112-168 g) of lean protein each day, such as lean meat, chicken, fish, eggs, or tofu. One ounce (oz) (28 g) of lean protein is equal to:  1 oz (28 g) of meat, chicken, or fish.  1 egg.  ¼ cup (62 g) of tofu.  Eat some foods each day that contain healthy fats, such as avocado, nuts, seeds, and fish.  What foods should I eat?  Fruits  Berries. Apples. Oranges. Peaches. Apricots. Plums. Grapes. Mangoes. Papayas. Pomegranates. Kiwi. Cherries.  Vegetables  Leafy greens, including lettuce, spinach, kale, chard, guru greens, mustard greens, and cabbage. Beets. Cauliflower. Broccoli. Carrots. Green beans. Tomatoes. Peppers. Onions. Cucumbers. [place] sprouts.  Grains  Whole grains, such as whole-wheat or whole-grain bread, crackers, tortillas, cereal, and pasta. Unsweetened oatmeal. Quinoa. Brown or wild rice.  Meats and other proteins  Seafood. Poultry without skin. Lean cuts of poultry and beef. Tofu. Nuts. Seeds.  Dairy  Low-fat or fat-free dairy products such as milk, yogurt, and cheese.  The items listed above may not be a complete list of foods and beverages you can eat and drink.  Contact a dietitian for more information.  What foods should I avoid?  Fruits  Fruits canned with syrup.  Vegetables  Canned vegetables. Frozen vegetables with butter or cream sauce.  Grains  Refined white flour and flour products such as bread, pasta, snack foods, and cereals. Avoid all processed foods.  Meats and other proteins  Fatty cuts of meat. Poultry with skin. Breaded or fried meats. Processed meat. Avoid saturated fats.  Dairy  Full-fat yogurt, cheese, or milk.  Beverages  Sweetened drinks, such as soda or iced tea.  The items listed above may not be a complete list of foods and beverages you should avoid. Contact a dietitian for more information.  Questions to ask a health care provider  Do I need to meet with a certified diabetes care and ?  Do I need to meet with a dietitian?  What number can I call if I have questions?  When are the best times to check my blood glucose?  Where to find more information:  American Diabetes Association: diabetes.org  Academy of Nutrition and Dietetics: eatright.org  National [place] of Diabetes and Digestive and Kidney Diseases: niddk.nih.gov  Association of Diabetes Care & Education Specialists: diabeteseducator.org  Summary  It is important to have healthy eating habits because your blood sugar (glucose) levels are greatly affected by what you eat and drink. It is important to use alcohol carefully.  A healthy meal plan will help you manage your blood glucose and lower your risk of heart disease.  Your health care provider may recommend that you work with a dietitian to make a meal plan that is best for you.  This information is not intended to replace advice given to you by your health care provider. Make sure you discuss any questions you have with your health care provider.  Document Revised: 07/21/2021 Document Reviewed: 07/21/2021  [employer]vier Patient Education © 2023 [employer] Inc.

## 2023-10-04 ENCOUNTER — TELEPHONE (OUTPATIENT)
Dept: SURGERY | Facility: CLINIC | Age: 53
End: 2023-10-04
Payer: COMMERCIAL

## 2023-10-04 ENCOUNTER — HOSPITAL ENCOUNTER (OUTPATIENT)
Dept: INFUSION THERAPY | Facility: HOSPITAL | Age: 53
Discharge: HOME | End: 2023-10-04
Admitting: NURSE PRACTITIONER
Payer: COMMERCIAL

## 2023-10-04 VITALS
DIASTOLIC BLOOD PRESSURE: 72 MMHG | HEART RATE: 101 BPM | OXYGEN SATURATION: 95 % | RESPIRATION RATE: 20 BRPM | SYSTOLIC BLOOD PRESSURE: 118 MMHG | TEMPERATURE: 98.2 F

## 2023-10-04 DIAGNOSIS — L73.2 HIDRADENITIS: Primary | ICD-10-CM

## 2023-10-04 DIAGNOSIS — Z98.890 S/P SURGICAL REMOVAL OF PILONIDAL CYST: Primary | ICD-10-CM

## 2023-10-04 PROCEDURE — G0463 HOSPITAL OUTPT CLINIC VISIT: HCPCS

## 2023-10-04 NOTE — SIGNIFICANT NOTE
Wound Eval / Progress Noted     [facility]     Patient Name: Mariela Aldrich  : 1970  MRN: [id number]  Today's Date: 10/4/2023                 Admit Date: 10/4/2023    Visit Dx:    ICD-10-CM ICD-9-CM   1. Hidradenitis  L73.2 705.83         * No active hospital problems. *        Past Medical History:   Diagnosis Date    Anxiety     Asthma     PRN INHALER AND TAKES ALLERGY INJECTIONS    Diabetes mellitus type 2, controlled     AVERAGE     Hidradenitis     Hyperlipidemia     Hypothyroidism     Murmur     DIAGNOSED WHEN 17 BUT IS ASYMPTOMATIC AND IS FOLLOWED ONLY BY PCP    Panic attack     Pilonidal cyst     Rheumatic fever     AS A CHILD    RLS (restless legs syndrome)     Sinus trouble         Past Surgical History:   Procedure Laterality Date    COLONOSCOPY  2020    exernal hemorrhoids    EXCISION LESION Bilateral 3/20/2023    Procedure: Excision Hidradenitis of Bilateral Inguinal Areas;  Surgeon: Donato You MD;  Location: [facility];  Service: General;  Laterality: Bilateral;    GROIN ABSCESS INCISION AND DRAINAGE Right     hidradenitis    HYSTERECTOMY  2010    KNEE SURGERY Bilateral 2013    PILONIDAL CYSTECTOMY N/A 2023    Procedure: Excision Hidradenitis of Inguinal Area and Pilonidal Cystectomy;  Surgeon: Donato You MD;  Location: [facility];  Service: General;  Laterality: N/A;         Physical Assessment:  Wound 23 1333 medial gluteal Incision (Active)   Wound Image   10/04/23 1220   Dressing Appearance intact;dry 10/04/23 1220   Closure None 10/04/23 1220   Base moist;red;epithelialization 10/04/23 1220   Red (%), Wound Tissue Color 100 10/04/23 1220   Periwound dry;intact;pink 10/04/23 1220   Periwound Temperature warm 10/04/23 1220   Edges open 10/04/23 1220   Wound Length (cm) 2.3 cm 10/04/23 1220   Wound Width (cm) 0.2 cm 10/04/23 1220   Wound Depth (cm) 0.3 cm 10/04/23 1220   Wound Surface Area (cm^2) 0.46 cm^2 10/04/23 1220   Wound Volume (cm^3)  0.138 cm^3 10/04/23 1220   Drainage Characteristics/Odor serosanguineous 10/04/23 1220   Drainage Amount scant 10/04/23 1220   Care, Wound cleansed with;irrigated with;sterile normal saline 10/04/23 1220   Dressing Care dressing applied;collagen;silver impregnated;calcium alginate;silicone;border dressing;hydrocolloid 10/04/23 1220   Periwound Care absorptive dressing applied 10/04/23 1220       Wound 03/22/23 1518 medial coccyx Incision (Active)   Wound Image   10/04/23 1220   Dressing Appearance moist drainage;intact 10/04/23 1220   Closure None 10/04/23 1220   Base moist;red 10/04/23 1220   Red (%), Wound Tissue Color 100 10/04/23 1220   Periwound dry;intact 10/04/23 1220   Periwound Temperature warm 10/04/23 1220   Periwound Skin Turgor soft 10/04/23 1220   Edges open 10/04/23 1220   Wound Length (cm) 0.1 cm 10/04/23 1220   Wound Width (cm) 0.2 cm 10/04/23 1220   Wound Depth (cm) 2.1 cm 10/04/23 1220   Wound Surface Area (cm^2) 0.02 cm^2 10/04/23 1220   Wound Volume (cm^3) 0.042 cm^3 10/04/23 1220   Drainage Characteristics/Odor serosanguineous;yellow 10/04/23 1220   Drainage Amount small 10/04/23 1220   Care, Wound cleansed with;irrigated with;sterile normal saline 10/04/23 1220   Dressing Care dressing applied;collagen;silver impregnated;calcium alginate;silicone;border dressing 10/04/23 1220   Periwound Care absorptive dressing applied 10/04/23 1220        Wound Check / Follow-up:  Patient seen today for a wound check and dressing. Dressing is clean dry and intact; minimal amount of drainage noted to the lower incision. Medial gluteal incision with red moist wound base. Wound base filling in and wound edges ludy.   Yellow drainage to the sacral wound with a red moist wound base; depth remains present. Cleansed and irrigated both wounds with NS and gauze. Applied collagen to the wound bases and secured with a silicone border dressing / hydrocolloid. Recommend to continue current care at this time;  patient is responding to current treatment.    Will discuss current findings with surgeon or APRN     Impression: gluteal incision with delayed healing    Short term goals:  regain skin integrity, every other day dressing changes    No Henriquez RN    10/4/2023    13:52 EDT

## 2023-10-04 NOTE — TELEPHONE ENCOUNTER
Pt is aware that she is scheduled on 10/11/23 with a 9:45 am arrival at [facility] for her CT Pelvis. Pt will come by the office to get the oral prep.

## 2023-10-06 ENCOUNTER — TELEPHONE (OUTPATIENT)
Dept: SURGERY | Facility: CLINIC | Age: 53
End: 2023-10-06
Payer: COMMERCIAL

## 2023-10-06 NOTE — TELEPHONE ENCOUNTER
DEE DEE WITH [facility] CALLED TO REPORT THAT THE PATIENTS INSURANCE WANTS HER CT DONE AT A FREESTANDING FACILITY SUCH AS [facility].  CAN YOU GET THIS SCHEDULED FOR THE PATEINT AND LET DEE DEE AT [facility] KNOW THE PLAN. PATIENT IS CURRENTLY SCHEDULED AT [facility] ON 10/11 BUT IS WAS DENIED TO BE DONE THERE.

## 2023-10-06 NOTE — TELEPHONE ENCOUNTER
[facility] for [facility] @ 9:45.  Faxed office note and ct order.  Called and informed Kya with the [facility].  Called scheduling and canceled appt with [facility].  Informed Mariela of change of venue for Ct scan and slight time change.  Gave pt address for [facility].  Pt v/u.

## 2023-10-11 ENCOUNTER — HOSPITAL ENCOUNTER (OUTPATIENT)
Dept: INFUSION THERAPY | Facility: HOSPITAL | Age: 53
Discharge: HOME | End: 2023-10-11
Admitting: NURSE PRACTITIONER
Payer: COMMERCIAL

## 2023-10-11 ENCOUNTER — APPOINTMENT (OUTPATIENT)
Dept: CT IMAGING | Facility: HOSPITAL | Age: 53
End: 2023-10-11
Payer: COMMERCIAL

## 2023-10-11 VITALS
SYSTOLIC BLOOD PRESSURE: 105 MMHG | DIASTOLIC BLOOD PRESSURE: 70 MMHG | OXYGEN SATURATION: 99 % | TEMPERATURE: 98.1 F | RESPIRATION RATE: 20 BRPM | HEART RATE: 90 BPM

## 2023-10-11 DIAGNOSIS — L73.2 HIDRADENITIS: Primary | ICD-10-CM

## 2023-10-11 PROCEDURE — G0463 HOSPITAL OUTPT CLINIC VISIT: HCPCS

## 2023-10-11 NOTE — SIGNIFICANT NOTE
Wound Eval / Progress Noted     [facility]     Patient Name: Mariela Aldrich  : 1970  MRN: [id number]  Today's Date: 10/11/2023                 Admit Date: 10/11/2023    Visit Dx:    ICD-10-CM ICD-9-CM   1. Hidradenitis  L73.2 705.83         * No active hospital problems. *        Past Medical History:   Diagnosis Date    Anxiety     Asthma     PRN INHALER AND TAKES ALLERGY INJECTIONS    Diabetes mellitus type 2, controlled     AVERAGE     Hidradenitis     Hyperlipidemia     Hypothyroidism     Murmur     DIAGNOSED WHEN 17 BUT IS ASYMPTOMATIC AND IS FOLLOWED ONLY BY PCP    Panic attack     Pilonidal cyst     Rheumatic fever     AS A CHILD    RLS (restless legs syndrome)     Sinus trouble         Past Surgical History:   Procedure Laterality Date    COLONOSCOPY  2020    exernal hemorrhoids    EXCISION LESION Bilateral 3/20/2023    Procedure: Excision Hidradenitis of Bilateral Inguinal Areas;  Surgeon: Donato You MD;  Location: [facility];  Service: General;  Laterality: Bilateral;    GROIN ABSCESS INCISION AND DRAINAGE Right     hidradenitis    HYSTERECTOMY  2010    KNEE SURGERY Bilateral 2013    PILONIDAL CYSTECTOMY N/A 2023    Procedure: Excision Hidradenitis of Inguinal Area and Pilonidal Cystectomy;  Surgeon: Donato You MD;  Location: [facility];  Service: General;  Laterality: N/A;         Physical Assessment:  Wound 23 1333 medial gluteal Incision (Active)   Wound Image   10/11/23 1245   Dressing Appearance intact;dry 10/11/23 1245   Closure None 10/11/23 1245   Base moist;red;epithelialization 10/11/23 1245   Red (%), Wound Tissue Color 100 10/11/23 1245   Periwound dry;intact;pink 10/11/23 1245   Periwound Temperature warm 10/11/23 1245   Periwound Skin Turgor soft 10/11/23 1245   Edges open 10/11/23 1245   Wound Length (cm) 2 cm 10/11/23 1245   Wound Width (cm) 0.2 cm 10/11/23 1245   Wound Depth (cm) 0.2 cm 10/11/23 1245   Wound Surface Area (cm^2) 0.4  cm^2 10/11/23 1245   Wound Volume (cm^3) 0.08 cm^3 10/11/23 1245   Drainage Characteristics/Odor serosanguineous 10/11/23 1245   Drainage Amount scant 10/11/23 1245   Care, Wound cleansed with;irrigated with;sterile normal saline 10/11/23 1245   Dressing Care dressing applied;collagen;calcium alginate;hydrofiber;silicone;border dressing;hydrocolloid 10/11/23 1245   Periwound Care absorptive dressing applied 10/11/23 1245       Wound 03/22/23 1518 medial coccyx Incision (Active)   Wound Image   10/11/23 1245   Dressing Appearance intact;dry 10/11/23 1245   Closure None 10/11/23 1245   Base epithelialization;pink;dry 10/11/23 1245   Periwound dry;intact 10/11/23 1245   Periwound Temperature warm 10/11/23 1245   Periwound Skin Turgor soft 10/11/23 1245   Edges rolled/closed 10/11/23 1245   Drainage Amount none 10/11/23 1245   Care, Wound cleansed with;sterile normal saline 10/11/23 1245   Dressing Care dressing applied;silicone;border dressing 10/11/23 1245   Periwound Care absorptive dressing applied 10/11/23 1245        Wound Check / Follow-up:  Patient seen today for a wound check and dressing change. Informed patient that she will start receiving medical supplies delivered to her home for wound care. Patient had MRI done today of her pelvis to rule out abscess to the sacral wound..  Dressing is clean dry and intact. No drainage to the sacral wound with complete closure noted. Will continue to monitor wound as it has closed a reopened previously; MD aware.  Perirectal wound continues to respond to the current wound care with decreased wound depth and ludy wound margins. Wound base remains red and moist with pink periwound. Cleansed and irrigated with NS. Applied collagen and calcium alginate to the wound base. Recommending to continue current care at this time.    Impression: chronic wounds with delayed wound healing    Short term goals:  regain skin integrity, skin protection, moisture management, every  other day dressing changes    No Henriquez RN    10/11/2023    12:49 EDT

## 2023-10-12 DIAGNOSIS — Z98.890 S/P SURGICAL REMOVAL OF PILONIDAL CYST: ICD-10-CM

## 2023-10-18 ENCOUNTER — HOSPITAL ENCOUNTER (OUTPATIENT)
Dept: INFUSION THERAPY | Facility: HOSPITAL | Age: 53
Discharge: HOME | End: 2023-10-18
Admitting: NURSE PRACTITIONER
Payer: COMMERCIAL

## 2023-10-18 VITALS
OXYGEN SATURATION: 98 % | SYSTOLIC BLOOD PRESSURE: 126 MMHG | TEMPERATURE: 98.1 F | DIASTOLIC BLOOD PRESSURE: 73 MMHG | RESPIRATION RATE: 20 BRPM | HEART RATE: 105 BPM

## 2023-10-18 DIAGNOSIS — L73.2 HIDRADENITIS: Primary | ICD-10-CM

## 2023-10-18 PROCEDURE — G0463 HOSPITAL OUTPT CLINIC VISIT: HCPCS

## 2023-10-18 NOTE — SIGNIFICANT NOTE
Wound Eval / Progress Noted     [facility]     Patient Name: Mariela Aldrich  : 1970  MRN: [id number]  Today's Date: 10/18/2023                 Admit Date: 10/18/2023    Visit Dx:    ICD-10-CM ICD-9-CM   1. Hidradenitis  L73.2 705.83         * No active hospital problems. *        Past Medical History:   Diagnosis Date    Anxiety     Asthma     PRN INHALER AND TAKES ALLERGY INJECTIONS    Diabetes mellitus type 2, controlled     AVERAGE     Hidradenitis     Hyperlipidemia     Hypothyroidism     Murmur     DIAGNOSED WHEN 17 BUT IS ASYMPTOMATIC AND IS FOLLOWED ONLY BY PCP    Panic attack     Pilonidal cyst     Rheumatic fever     AS A CHILD    RLS (restless legs syndrome)     Sinus trouble         Past Surgical History:   Procedure Laterality Date    COLONOSCOPY  2020    exernal hemorrhoids    EXCISION LESION Bilateral 3/20/2023    Procedure: Excision Hidradenitis of Bilateral Inguinal Areas;  Surgeon: Donato You MD;  Location: [facility];  Service: General;  Laterality: Bilateral;    GROIN ABSCESS INCISION AND DRAINAGE Right     hidradenitis    HYSTERECTOMY  2010    KNEE SURGERY Bilateral 2013    PILONIDAL CYSTECTOMY N/A 2023    Procedure: Excision Hidradenitis of Inguinal Area and Pilonidal Cystectomy;  Surgeon: Donato You MD;  Location: [facility];  Service: General;  Laterality: N/A;         Physical Assessment:  Wound 23 1333 medial gluteal Incision (Active)   Wound Image   10/18/23 1212   Dressing Appearance dry;intact 10/18/23 1212   Closure None 10/18/23 1212   Base moist;red;epithelialization 10/18/23 1212   Periwound dry;intact;pink 10/18/23 1212   Periwound Temperature warm 10/18/23 1212   Periwound Skin Turgor soft 10/18/23 1212   Edges open 10/18/23 1212   Wound Length (cm) 1.7 cm 10/18/23 1212   Wound Width (cm) 0.3 cm 10/18/23 1212   Wound Depth (cm) 0.2 cm 10/18/23 1212   Wound Surface Area (cm^2) 0.51 cm^2 10/18/23 1212   Wound Volume (cm^3) 0.102  cm^3 10/18/23 1212   Drainage Characteristics/Odor serosanguineous 10/18/23 1212   Drainage Amount small 10/18/23 1212   Care, Wound cleansed with;irrigated with;sterile normal saline 10/18/23 1212   Dressing Care dressing applied;collagen;calcium alginate;silver impregnated;silicone;border dressing;hydrocolloid 10/18/23 1212   Periwound Care absorptive dressing applied 10/18/23 1212       Wound 03/22/23 1518 medial coccyx Incision (Active)   Dressing Appearance dry;intact 10/18/23 1212   Closure None 10/18/23 1212   Base epithelialization;pink;dry 10/18/23 1212   Periwound dry;intact 10/18/23 1212   Periwound Temperature warm 10/18/23 1212   Periwound Skin Turgor soft 10/18/23 1212   Edges rolled/closed 10/18/23 1212   Drainage Amount none 10/18/23 1212   Care, Wound cleansed with;sterile normal saline 10/18/23 1212   Dressing Care dressing applied;silicone;border dressing 10/18/23 1212   Periwound Care absorptive dressing applied 10/18/23 1212      Wound Check / Follow-up:  Patient seen today for wound follow-up and dressing change. Dressings are clean, dry, and intact. Sacral wound with complete closure noted at this time. Pink, dry intact tissue present. Perirectal wound showing response to treatment, wound margins ludy. Wound base is red and moist. Periwound tissue is dry and intact. Cleansed and irrigated wound with normal saline. Applied collagen then calcium alginate and secured with silicone border dressing. Applied strips of hydrocolloid dressing to edges to aid in seal. Recommending to continue current care.      Impression: Chronic wound with delayed healing.      Short term goals: Regain skin integrity, skin protection, every other day dressing changes.        Lanette Cantu RN    10/18/2023    18:19 EDT

## 2023-10-25 ENCOUNTER — HOSPITAL ENCOUNTER (OUTPATIENT)
Dept: INFUSION THERAPY | Facility: HOSPITAL | Age: 53
Discharge: HOME | End: 2023-10-25
Admitting: NURSE PRACTITIONER
Payer: COMMERCIAL

## 2023-10-25 VITALS
HEART RATE: 99 BPM | OXYGEN SATURATION: 95 % | SYSTOLIC BLOOD PRESSURE: 121 MMHG | RESPIRATION RATE: 20 BRPM | DIASTOLIC BLOOD PRESSURE: 73 MMHG | TEMPERATURE: 98.1 F

## 2023-10-25 DIAGNOSIS — L73.2 HIDRADENITIS: Primary | ICD-10-CM

## 2023-10-25 PROCEDURE — G0463 HOSPITAL OUTPT CLINIC VISIT: HCPCS

## 2023-10-25 NOTE — SIGNIFICANT NOTE
Wound Eval / Progress Noted     [facility]     Patient Name: Mariela Aldrich  : 1970  MRN: [id number]  Today's Date: 10/25/2023                 Admit Date: 10/25/2023    Visit Dx:    ICD-10-CM ICD-9-CM   1. Hidradenitis  L73.2 705.83         * No active hospital problems. *        Past Medical History:   Diagnosis Date    Anxiety     Asthma     PRN INHALER AND TAKES ALLERGY INJECTIONS    Diabetes mellitus type 2, controlled     AVERAGE     Hidradenitis     Hyperlipidemia     Hypothyroidism     Murmur     DIAGNOSED WHEN 17 BUT IS ASYMPTOMATIC AND IS FOLLOWED ONLY BY PCP    Panic attack     Pilonidal cyst     Rheumatic fever     AS A CHILD    RLS (restless legs syndrome)     Sinus trouble         Past Surgical History:   Procedure Laterality Date    COLONOSCOPY  2020    exernal hemorrhoids    EXCISION LESION Bilateral 3/20/2023    Procedure: Excision Hidradenitis of Bilateral Inguinal Areas;  Surgeon: Donato You MD;  Location: [facility];  Service: General;  Laterality: Bilateral;    GROIN ABSCESS INCISION AND DRAINAGE Right     hidradenitis    HYSTERECTOMY  2010    KNEE SURGERY Bilateral 2013    PILONIDAL CYSTECTOMY N/A 2023    Procedure: Excision Hidradenitis of Inguinal Area and Pilonidal Cystectomy;  Surgeon: Donato You MD;  Location: [facility];  Service: General;  Laterality: N/A;         Physical Assessment:  Wound 23 1333 medial gluteal Incision (Active)   Wound Image   10/25/23 1220   Dressing Appearance moist drainage;intact 10/25/23 1220   Closure None 10/25/23 1220   Base moist;red;epithelialization 10/25/23 1220   Periwound dry;intact;pink;redness 10/25/23 1220   Periwound Temperature warm 10/25/23 1220   Periwound Skin Turgor soft 10/25/23 1220   Edges open 10/25/23 1220   Wound Length (cm) 2 cm 10/25/23 1220   Wound Width (cm) 0.3 cm 10/25/23 1220   Wound Depth (cm) 2.4 cm 10/25/23 1220   Wound Surface Area (cm^2) 0.6 cm^2 10/25/23 1220   Wound  Volume (cm^3) 1.44 cm^3 10/25/23 1220   Drainage Characteristics/Odor serosanguineous 10/25/23 1220   Drainage Amount small 10/25/23 1220   Care, Wound cleansed with;irrigated with;sterile normal saline 10/25/23 1220   Dressing Care dressing applied;packed with;silver impregnated;calcium alginate;silicone;border dressing;hydrocolloid 10/25/23 1220   Periwound Care absorptive dressing applied 10/25/23 1220       Wound 03/22/23 1518 medial coccyx Incision (Active)   Wound Image   10/25/23 1220   Dressing Appearance moist drainage;intact 10/25/23 1220   Closure None 10/25/23 1220   Base moist;red 10/25/23 1220   Periwound dry;intact;pink 10/25/23 1220   Periwound Temperature warm 10/25/23 1220   Periwound Skin Turgor soft 10/25/23 1220   Edges open 10/25/23 1220   Wound Length (cm) 0.3 cm 10/25/23 1220   Wound Width (cm) 0.2 cm 10/25/23 1220   Wound Depth (cm) 1.6 cm 10/25/23 1220   Wound Surface Area (cm^2) 0.06 cm^2 10/25/23 1220   Wound Volume (cm^3) 0.096 cm^3 10/25/23 1220   Drainage Characteristics/Odor serosanguineous;yellow 10/25/23 1220   Drainage Amount small 10/25/23 1220   Care, Wound cleansed with;irrigated with;sterile normal saline 10/25/23 1220   Dressing Care dressing applied;packed with;silver impregnated;calcium alginate;silicone;border dressing;hydrocolloid 10/25/23 1220   Periwound Care absorptive dressing applied 10/25/23 1220      Wound Check / Follow-up: Patient seen today for wound follow-up and dressing change. Dressings are intact with some moist drainage noted. Sacral wound has now reopened and has 1.6cm of depth. No tunneling present. Moist, red wound base present with intact periwound tissue. Wound base to perirectal wound is red and moist. New depth of 2.4cm is noted upon assessment of this wound today. Cleansed and irrigated wounds with normal saline. Filled wounds with silver impregnated calcium alginate. Recommending to continue current care at this time and requested patient follow-up  prior to next Wednesday. Patient verbalized understanding and states she will make an appointment.       Impression: Chronic wounds with delayed healing.      Short term goals: Regain skin integrity, skin protection, every other day dressing changes.          Lanette Cantu RN    10/25/2023    12:52 EDT

## 2023-10-30 ENCOUNTER — HOSPITAL ENCOUNTER (OUTPATIENT)
Dept: INFUSION THERAPY | Facility: HOSPITAL | Age: 53
Discharge: HOME | End: 2023-10-30
Admitting: NURSE PRACTITIONER
Payer: COMMERCIAL

## 2023-10-30 VITALS
RESPIRATION RATE: 20 BRPM | OXYGEN SATURATION: 98 % | DIASTOLIC BLOOD PRESSURE: 75 MMHG | SYSTOLIC BLOOD PRESSURE: 126 MMHG | HEART RATE: 98 BPM | TEMPERATURE: 98.4 F

## 2023-10-30 DIAGNOSIS — L73.2 HIDRADENITIS: Primary | ICD-10-CM

## 2023-10-30 PROCEDURE — G0463 HOSPITAL OUTPT CLINIC VISIT: HCPCS

## 2023-10-30 NOTE — SIGNIFICANT NOTE
Wound Eval / Progress Noted    [facility]     Patient Name: Mariela Aldrich  : 1970  MRN: [id number]  Today's Date: 10/30/2023                 Admit Date: 10/30/2023    Visit Dx:    ICD-10-CM ICD-9-CM   1. Hidradenitis  L73.2 705.83         * No active hospital problems. *        Past Medical History:   Diagnosis Date    Anxiety     Asthma     PRN INHALER AND TAKES ALLERGY INJECTIONS    Diabetes mellitus type 2, controlled     AVERAGE     Hidradenitis     Hyperlipidemia     Hypothyroidism     Murmur     DIAGNOSED WHEN 17 BUT IS ASYMPTOMATIC AND IS FOLLOWED ONLY BY PCP    Panic attack     Pilonidal cyst     Rheumatic fever     AS A CHILD    RLS (restless legs syndrome)     Sinus trouble         Past Surgical History:   Procedure Laterality Date    COLONOSCOPY  2020    exernal hemorrhoids    EXCISION LESION Bilateral 3/20/2023    Procedure: Excision Hidradenitis of Bilateral Inguinal Areas;  Surgeon: Donato You MD;  Location: [facility];  Service: General;  Laterality: Bilateral;    GROIN ABSCESS INCISION AND DRAINAGE Right     hidradenitis    HYSTERECTOMY  2010    KNEE SURGERY Bilateral 2013    PILONIDAL CYSTECTOMY N/A 2023    Procedure: Excision Hidradenitis of Inguinal Area and Pilonidal Cystectomy;  Surgeon: Donato You MD;  Location: [facility];  Service: General;  Laterality: N/A;         Physical Assessment:  Wound 23 1333 medial gluteal Incision (Active)   Wound Image   10/30/23 1150   Dressing Appearance intact;moist drainage 10/30/23 1150   Closure None 10/30/23 1150   Base moist;red 10/30/23 1150   Red (%), Wound Tissue Color 100 10/30/23 1150   Periwound macerated;redness;pink 10/30/23 1150   Periwound Temperature warm 10/30/23 1150   Periwound Skin Turgor soft 10/30/23 1150   Edges open 10/30/23 1150   Wound Length (cm) 1.9 cm 10/30/23 1150   Wound Width (cm) 0.1 cm 10/30/23 1150   Wound Depth (cm) 0.1 cm 10/30/23 1150   Wound Surface Area (cm^2) 0.19  cm^2 10/30/23 1150   Wound Volume (cm^3) 0.019 cm^3 10/30/23 1150   Drainage Characteristics/Odor serosanguineous 10/30/23 1150   Drainage Amount small 10/30/23 1150   Care, Wound cleansed with;irrigated with;sterile normal saline 10/30/23 1150   Dressing Care dressing applied;collagen;border dressing;calcium alginate;silicone;silver impregnated 10/30/23 1150   Periwound Care absorptive dressing applied 10/30/23 1150       Wound 03/22/23 1518 medial coccyx Incision (Active)   Wound Image   10/30/23 1150   Dressing Appearance intact;moist drainage 10/30/23 1150   Closure None 10/30/23 1150   Base red;moist 10/30/23 1150   Red (%), Wound Tissue Color 100 10/30/23 1150   Periwound macerated;pink;redness 10/30/23 1150   Periwound Temperature warm 10/30/23 1150   Periwound Skin Turgor soft 10/30/23 1150   Edges open 10/30/23 1150   Wound Length (cm) 0.4 cm 10/30/23 1150   Wound Width (cm) 0.2 cm 10/30/23 1150   Wound Depth (cm) 0.7 cm 10/30/23 1150   Wound Surface Area (cm^2) 0.08 cm^2 10/30/23 1150   Wound Volume (cm^3) 0.056 cm^3 10/30/23 1150   Drainage Characteristics/Odor serosanguineous 10/30/23 1150   Drainage Amount small 10/30/23 1150   Care, Wound cleansed with;irrigated with;sterile normal saline 10/30/23 1150   Dressing Care dressing applied;collagen;border dressing;calcium alginate;silver impregnated;silicone 10/30/23 1150   Periwound Care absorptive dressing applied 10/30/23 1150        Wound Check / Follow-up:  Patient seen today for wound follow-up. Patient's wounds continue to fluctuate with healing / closure.   Upper gluteal crease with opening with depth but much improved from last measurements. Cleansed and irrigated. Red moist tissue visible. No excessive drainage or signs of increasing infection noted. Recommending to continue current dressings with collagen, calcium alginate and dressing.   Lower gluteal wound with red moist tissue noted. There is also an additional small open are with red moist  tissue that is more superficial. Only very minimal depth noted to lower gluteal wound with palpation with swab and assessment. Cleansed and irrigated with NS. Recommending to continue current wound care with collagen and calcium alginate dressings.     Patient will return on Wednesday for check and then resume weekly wound checks at this time.     Impression: Gluteal incision with opening at upper and lower gluteal crease. Delayed closure with fluctuating healing.     Short term goals:  Regain skin integrity. Dressing changes every 2 - 3 days.     Miladys Dockery RN    10/30/2023    13:06 EDT

## 2023-11-01 ENCOUNTER — HOSPITAL ENCOUNTER (OUTPATIENT)
Dept: INFUSION THERAPY | Facility: HOSPITAL | Age: 53
Discharge: HOME | End: 2023-11-01
Admitting: NURSE PRACTITIONER
Payer: COMMERCIAL

## 2023-11-01 VITALS
HEART RATE: 105 BPM | TEMPERATURE: 98.3 F | OXYGEN SATURATION: 96 % | RESPIRATION RATE: 20 BRPM | DIASTOLIC BLOOD PRESSURE: 70 MMHG | SYSTOLIC BLOOD PRESSURE: 111 MMHG

## 2023-11-01 DIAGNOSIS — L73.2 HIDRADENITIS: Primary | ICD-10-CM

## 2023-11-01 PROCEDURE — G0463 HOSPITAL OUTPT CLINIC VISIT: HCPCS

## 2023-11-01 NOTE — SIGNIFICANT NOTE
Wound Eval / Progress Noted     [facility]     Patient Name: Mariela Aldrich  : 1970  MRN: [id number]  Today's Date: 2023                 Admit Date: 2023    Visit Dx:    ICD-10-CM ICD-9-CM   1. Hidradenitis  L73.2 705.83         * No active hospital problems. *        Past Medical History:   Diagnosis Date    Anxiety     Asthma     PRN INHALER AND TAKES ALLERGY INJECTIONS    Diabetes mellitus type 2, controlled     AVERAGE     Hidradenitis     Hyperlipidemia     Hypothyroidism     Murmur     DIAGNOSED WHEN 17 BUT IS ASYMPTOMATIC AND IS FOLLOWED ONLY BY PCP    Panic attack     Pilonidal cyst     Rheumatic fever     AS A CHILD    RLS (restless legs syndrome)     Sinus trouble         Past Surgical History:   Procedure Laterality Date    COLONOSCOPY  2020    exernal hemorrhoids    EXCISION LESION Bilateral 3/20/2023    Procedure: Excision Hidradenitis of Bilateral Inguinal Areas;  Surgeon: Donato You MD;  Location: [facility];  Service: General;  Laterality: Bilateral;    GROIN ABSCESS INCISION AND DRAINAGE Right     hidradenitis    HYSTERECTOMY  2010    KNEE SURGERY Bilateral 2013    PILONIDAL CYSTECTOMY N/A 2023    Procedure: Excision Hidradenitis of Inguinal Area and Pilonidal Cystectomy;  Surgeon: Donato You MD;  Location: [facility];  Service: General;  Laterality: N/A;         Physical Assessment:  Wound 23 1333 medial gluteal Incision (Active)   Dressing Appearance intact;moist drainage 23 1209   Closure None 23 1209   Base moist;red 23 1209   Periwound pink;redness;intact 23 1209   Periwound Temperature warm 23 1209   Periwound Skin Turgor soft 23 1209   Edges open 23 1209   Drainage Characteristics/Odor serosanguineous 23 1209   Drainage Amount small 23 1209   Care, Wound cleansed with;irrigated with;sterile normal saline 23 1209   Dressing Care dressing applied;calcium alginate;silver  impregnated;silicone;border dressing;hydrocolloid 11/01/23 1209   Periwound Care absorptive dressing applied 11/01/23 1209       Wound 03/22/23 1518 medial coccyx Incision (Active)   Dressing Appearance intact;moist drainage 11/01/23 1209   Closure None 11/01/23 1209   Base moist;red 11/01/23 1209   Periwound pink;redness;intact 11/01/23 1209   Periwound Temperature warm 11/01/23 1209   Periwound Skin Turgor soft 11/01/23 1209   Edges open 11/01/23 1209   Drainage Characteristics/Odor serosanguineous 11/01/23 1209   Drainage Amount small 11/01/23 1209   Care, Wound cleansed with;irrigated with;sterile normal saline 11/01/23 1209   Dressing Care dressing applied;calcium alginate;silver impregnated;silicone;border dressing;hydrocolloid 11/01/23 1209   Periwound Care absorptive dressing applied 11/01/23 1209      Wound Check / Follow-up: Patient seen today for wound follow-up and dressing change. Coccyx wound depth filling in. Red moist tissue present within wound. Cleansed and irrigated with normal saline. Lower gluteal wound with moist red tissue. Cleansed and irrigated with normal saline. Minimal depth noted upon this assessment. Recommending to continue current treatment with collagen and calcium alginate.       Impression: Surgical wounds with delayed closure.      Short term goals: Regain skin integrity, weekly wound checks.        Lanette Cantu RN    11/1/2023    12:17 EDT

## 2023-11-06 DIAGNOSIS — J45.20 MILD INTERMITTENT ASTHMA WITHOUT COMPLICATION: ICD-10-CM

## 2023-11-07 RX ORDER — FLUTICASONE PROPIONATE AND SALMETEROL 250; 50 UG/1; UG/1
POWDER RESPIRATORY (INHALATION)
Qty: 60 EACH | Refills: 0 | Status: SHIPPED | OUTPATIENT
Start: 2023-11-07

## 2023-11-08 ENCOUNTER — HOSPITAL ENCOUNTER (OUTPATIENT)
Dept: INFUSION THERAPY | Facility: HOSPITAL | Age: 53
Discharge: HOME | End: 2023-11-08
Admitting: SURGERY
Payer: COMMERCIAL

## 2023-11-08 VITALS
SYSTOLIC BLOOD PRESSURE: 118 MMHG | HEART RATE: 96 BPM | TEMPERATURE: 98.2 F | OXYGEN SATURATION: 98 % | DIASTOLIC BLOOD PRESSURE: 70 MMHG | RESPIRATION RATE: 20 BRPM

## 2023-11-08 DIAGNOSIS — L73.2 HIDRADENITIS: Primary | ICD-10-CM

## 2023-11-08 PROCEDURE — G0463 HOSPITAL OUTPT CLINIC VISIT: HCPCS

## 2023-11-08 NOTE — SIGNIFICANT NOTE
Wound Eval / Progress Noted     [facility]     Patient Name: Mariela Aldrich  : 1970  MRN: [id number]  Today's Date: 2023                 Admit Date: 2023    Visit Dx:  No diagnosis found.      * No active hospital problems. *        Past Medical History:   Diagnosis Date    Anxiety     Asthma     PRN INHALER AND TAKES ALLERGY INJECTIONS    Diabetes mellitus type 2, controlled     AVERAGE     Hidradenitis     Hyperlipidemia     Hypothyroidism     Murmur     DIAGNOSED WHEN 17 BUT IS ASYMPTOMATIC AND IS FOLLOWED ONLY BY PCP    Panic attack     Pilonidal cyst     Rheumatic fever     AS A CHILD    RLS (restless legs syndrome)     Sinus trouble         Past Surgical History:   Procedure Laterality Date    COLONOSCOPY  2020    exernal hemorrhoids    EXCISION LESION Bilateral 3/20/2023    Procedure: Excision Hidradenitis of Bilateral Inguinal Areas;  Surgeon: Donato You MD;  Location: [facility];  Service: General;  Laterality: Bilateral;    GROIN ABSCESS INCISION AND DRAINAGE Right     hidradenitis    HYSTERECTOMY  2010    KNEE SURGERY Bilateral     PILONIDAL CYSTECTOMY N/A 2023    Procedure: Excision Hidradenitis of Inguinal Area and Pilonidal Cystectomy;  Surgeon: Donato You MD;  Location: [facility];  Service: General;  Laterality: N/A;         Physical Assessment:  Wound 23 1333 medial gluteal Incision (Active)   Wound Image    23 1240   Dressing Appearance intact;moist drainage 23 1240   Closure None 23 1240   Base moist;granulating;red 23 1240   Red (%), Wound Tissue Color 100 23 1240   Periwound macerated;redness 23 1240   Periwound Temperature warm 23 1240   Periwound Skin Turgor soft 23 1240   Edges open 23 1240   Wound Length (cm) 1.9 cm 23 1240   Wound Width (cm) 0.1 cm 23 1240   Wound Depth (cm) 0.1 cm 23 1240   Wound Surface Area (cm^2) 0.19 cm^2 23 1240   Wound  Volume (cm^3) 0.019 cm^3 11/08/23 1240   Drainage Characteristics/Odor serosanguineous 11/08/23 1240   Drainage Amount small 11/08/23 1240   Care, Wound cleansed with;sterile normal saline 11/08/23 1240   Dressing Care dressing applied;border dressing;collagen;calcium alginate;silicone;silver impregnated;hydrocolloid 11/08/23 1240   Periwound Care absorptive dressing applied 11/08/23 1240       Wound 03/22/23 1518 medial coccyx Incision (Active)   Wound Image   11/08/23 1240   Dressing Appearance intact;moist drainage 11/08/23 1240   Closure None 11/08/23 1240   Base red;moist;granulating 11/08/23 1240   Periwound intact;pink 11/08/23 1240   Periwound Temperature warm 11/08/23 1240   Periwound Skin Turgor soft 11/08/23 1240   Edges open 11/08/23 1240   Wound Length (cm) 0.3 cm 11/08/23 1240   Wound Width (cm) 0.2 cm 11/08/23 1240   Wound Depth (cm) 0.5 cm 11/08/23 1240   Wound Surface Area (cm^2) 0.06 cm^2 11/08/23 1240   Wound Volume (cm^3) 0.03 cm^3 11/08/23 1240   Drainage Characteristics/Odor serosanguineous 11/08/23 1240   Drainage Amount small 11/08/23 1240   Care, Wound cleansed with;sterile normal saline 11/08/23 1240   Dressing Care dressing applied;collagen;calcium alginate;hydrocolloid;silicone;border dressing;silver impregnated 11/08/23 1240   Periwound Care absorptive dressing applied 11/08/23 1240        Wound Check / Follow-up:  Patient seen today for wound check / dressing change.   Patient with intact dressing upon arrival with moist drainage.   Dressing removed. Wounds and loni-wound tissue cleansed with NS and gauze.   Upper wound with significant improvement. There is a very small opening with decreasing depth and also a very superficial opening below initial opening. Cleansed both with NS and gauze. Recommending to continue current wound care.   Lower wound also improving. Decreased depth. There are also two separate wounds both with shallow depth and red moist granulating tissue. Cleansed with  NS and gauze. Recommending to continue current wound care with collagen and calcium alginate at this time.     Impression: Surgical wounds with delayed closure and recurrent opening    Short term goals:  Regain skin integrity. Dressing changes every 2 - 3 days with weekly wound checks.     Miladys Dockery RN    11/8/2023    13:34 EST

## 2023-11-15 ENCOUNTER — OFFICE VISIT (OUTPATIENT)
Dept: FAMILY MEDICINE CLINIC | Facility: CLINIC | Age: 53
End: 2023-11-15
Payer: COMMERCIAL

## 2023-11-15 ENCOUNTER — HOSPITAL ENCOUNTER (OUTPATIENT)
Dept: INFUSION THERAPY | Facility: HOSPITAL | Age: 53
Discharge: HOME | End: 2023-11-15
Admitting: NURSE PRACTITIONER
Payer: COMMERCIAL

## 2023-11-15 VITALS
SYSTOLIC BLOOD PRESSURE: 130 MMHG | DIASTOLIC BLOOD PRESSURE: 79 MMHG | RESPIRATION RATE: 20 BRPM | OXYGEN SATURATION: 100 % | TEMPERATURE: 98.2 F | HEART RATE: 97 BPM

## 2023-11-15 VITALS
BODY MASS INDEX: 34.11 KG/M2 | WEIGHT: 199.8 LBS | HEART RATE: 99 BPM | DIASTOLIC BLOOD PRESSURE: 79 MMHG | HEIGHT: 64 IN | TEMPERATURE: 97.6 F | OXYGEN SATURATION: 96 % | SYSTOLIC BLOOD PRESSURE: 120 MMHG

## 2023-11-15 DIAGNOSIS — L73.2 HIDRADENITIS: Primary | ICD-10-CM

## 2023-11-15 DIAGNOSIS — G25.81 RESTLESS LEG: ICD-10-CM

## 2023-11-15 DIAGNOSIS — M79.641 RIGHT HAND PAIN: Primary | ICD-10-CM

## 2023-11-15 PROCEDURE — G0463 HOSPITAL OUTPT CLINIC VISIT: HCPCS

## 2023-11-15 RX ORDER — GABAPENTIN 300 MG/1
300 CAPSULE ORAL NIGHTLY
Qty: 30 CAPSULE | Refills: 2 | Status: SHIPPED | OUTPATIENT
Start: 2023-11-15

## 2023-11-15 RX ORDER — METHYLPREDNISOLONE ACETATE 40 MG/ML
20 INJECTION, SUSPENSION INTRA-ARTICULAR; INTRALESIONAL; INTRAMUSCULAR; SOFT TISSUE ONCE
Status: SHIPPED | OUTPATIENT
Start: 2023-11-15

## 2023-11-15 NOTE — PROGRESS NOTES
Chief Complaint  Hand Pain (Right; x1 month) and Restless Legs Syndrome (Has restless legs)    Subjective          Mariela Aldrich is a 53 y.o. female who presents to [facility]    History of Present Illness    Patient complaining of right hand pain for a month. She is wearing a wrist brace with some relief. Hasn't tried anything OTC. The right thumb clicks when she moves it. Tender to palpation.     Patient is complaining of her restless legs. States the Requip isn't relieving her symptoms. Pt has cut back on caffeine, admits to high sugar intake.      Review of Systems   Constitutional:  Negative for chills, fatigue and fever.   Respiratory:  Negative for cough and shortness of breath.    Cardiovascular:  Negative for chest pain and palpitations.   Gastrointestinal:  Negative for constipation, diarrhea, nausea and vomiting.   Musculoskeletal:  Positive for arthralgias. Negative for back pain and neck pain.   Skin:  Negative for rash.   Neurological:  Negative for dizziness and headaches.      Medical History: has a past medical history of Anxiety, Asthma, Diabetes mellitus type 2, controlled, Hidradenitis, Hyperlipidemia, Hypothyroidism, Murmur, Panic attack, Pilonidal cyst, Rheumatic fever, RLS (restless legs syndrome), and Sinus trouble.     Surgical History: has a past surgical history that includes Colonoscopy (01/21/2020); Groin Abscess Incision and Drainage (Right); Hysterectomy (2010); Knee surgery (Bilateral, 2013); Pilonidal Cystectomy (N/A, 2/8/2023); and Excision Lesion (Bilateral, 3/20/2023).     Family History: family history includes Diabetes in her brother, maternal grandmother, and sister; Thyroid disease in her sister; Thyroid disease (age of onset: 43) in her brother; Thyroid disease (age of onset: 70) in her father.     Social History: reports that she has never smoked. She has never used smokeless tobacco. She reports that she does not drink alcohol and does  not use drugs.    Allergies: Etodolac, Penicillins, Statins, Atorvastatin, Crestor [rosuvastatin], and Pravastatin      Health Maintenance Due   Topic Date Due    ZOSTER VACCINE (1 of 2) Never done    DIABETIC EYE EXAM  07/13/2023    ANNUAL PHYSICAL  09/07/2023    DIABETIC FOOT EXAM  09/07/2023            Current Outpatient Medications:     albuterol sulfate  (90 Base) MCG/ACT inhaler, Inhale 2 puffs Every 4 (Four) Hours As Needed for Shortness of Air., Disp: 18 g, Rfl: 1    cetirizine (zyrTEC) 10 MG tablet, Zyrtec 10 mg oral tablet take 1 tablet (10 mg) by oral route once daily   Active, Disp: , Rfl:     Continuous Blood Gluc Sensor (Dexcom G6 Sensor), Every 10 (Ten) Days., Disp: 9 each, Rfl: 1    Continuous Blood Gluc Transmit (Dexcom G6 Transmitter) misc, 1 Device Every 3 (Three) Months., Disp: 1 each, Rfl: 3    Empagliflozin-metFORMIN HCl ER (Synjardy XR) 12.5-1000 MG tablet sustained-release 24 hour, Take 1 tablet by mouth 2 (Two) Times a Day., Disp: 180 tablet, Rfl: 1    Fluticasone-Salmeterol (ADVAIR/WIXELA) 250-50 MCG/ACT DISKUS, INHALE 1 DOSE BY MOUTH TWICE DAILY, Disp: 60 each, Rfl: 0    glipizide (GLUCOTROL) 10 MG tablet, Take 1 tablet by mouth 2 (Two) Times a Day Before Meals., Disp: 180 tablet, Rfl: 1    glucose blood test strip, Use as instructed Dx: DM E11, Disp: 100 each, Rfl: 3    Insulin Degludec (Tresiba FlexTouch) 200 UNIT/ML solution pen-injector pen injection, Inject 50 Units under the skin into the appropriate area as directed Every Night., Disp: 9 mL, Rfl: 1    Insulin Pen Needle (B-D ULTRAFINE III SHORT PEN) 31G X 8 MM misc, Inject 1 each under the skin into the appropriate area as directed Daily., Disp: 100 each, Rfl: 3    lisinopril (PRINIVIL,ZESTRIL) 5 MG tablet, Take 1 tablet by mouth Every Night., Disp: 90 tablet, Rfl: 1    multivitamin (THERAGRAN) tablet tablet, , Disp: , Rfl:     ONE TOUCH CLUB LANCETS misc, 90 each 3 (Three) Times a Day As Needed (check blood sugar)., Disp:  100 each, Rfl: 3    pitavastatin calcium (Livalo) 2 MG tablet tablet, Take 1 tablet by mouth Every Night., Disp: 90 tablet, Rfl: 1    rOPINIRole (REQUIP) 5 MG tablet, Take 1 tablet by mouth Every Night., Disp: 90 tablet, Rfl: 1    Semaglutide, 1 MG/DOSE, (OZEMPIC) 2 MG/1.5ML solution pen-injector, Inject 1 mg under the skin into the appropriate area as directed 1 (One) Time Per Week., Disp: 6 mL, Rfl: 5    sertraline (ZOLOFT) 100 MG tablet, Take 1.5 tablets by mouth Daily., Disp: 135 tablet, Rfl: 1    Synthroid 88 MCG tablet, Take 1 tablet by mouth Daily., Disp: 90 tablet, Rfl: 1    Zinc 50 MG tablet, zinc 50 mg oral tablet take 1 tablet by oral route daily   Active, Disp: , Rfl:     adalimumab (Humira) 40 MG/0.8ML Prefilled Syringe Kit injection, 0.8 mL. Currently on hold due to surgies (Patient not taking: Reported on 9/28/2023), Disp: , Rfl:     Diclofenac Sodium (VOLTAREN) 1 % gel gel, Apply 4 g topically to the appropriate area as directed 4 (Four) Times a Day As Needed (pain)., Disp: 100 g, Rfl: 0    fluticasone (FLONASE) 50 MCG/ACT nasal spray, 2 sprays into the nostril(s) as directed by provider Daily. (Patient not taking: Reported on 11/15/2023), Disp: 16 g, Rfl: 5    gabapentin (NEURONTIN) 300 MG capsule, Take 1 capsule by mouth Every Night., Disp: 30 capsule, Rfl: 2    Current Facility-Administered Medications:     methylPREDNISolone acetate (DEPO-medrol) injection 20 mg, 20 mg, Intra-articular, Once, Berenice Avelar, WILLIAM      Immunization History   Administered Date(s) Administered    Fluzone (or Fluarix & Flulaval for VFC) >6mos 09/28/2023    Fluzone Quad >6mos (Multi-dose) 10/01/2020    Pneumococcal Conjugate 20-Valent (PCV20) 09/28/2023    Pneumococcal Polysaccharide (PPSV23) 07/20/2019    Td (TDVAX) 05/08/1998    Tdap 03/16/2020         Objective       Vitals:    11/15/23 0831   BP: 120/79   Pulse: 99   Temp: 97.6 °F (36.4 °C)   TempSrc: Temporal   SpO2: 96%   Weight: 90.6 kg (199 lb 12.8 oz)  "  Height: 162.6 cm (64.02\")      Body mass index is 34.28 kg/m².   Wt Readings from Last 3 Encounters:   11/15/23 90.6 kg (199 lb 12.8 oz)   09/28/23 89.4 kg (197 lb 3.2 oz)   06/29/23 88.5 kg (195 lb)      BP Readings from Last 3 Encounters:   11/15/23 120/79   11/08/23 118/70   11/01/23 111/70                Physical Exam  Vitals reviewed.   Constitutional:       Appearance: Normal appearance. She is well-developed.   HENT:      Head: Normocephalic and atraumatic.   Eyes:      Conjunctiva/sclera: Conjunctivae normal.      Pupils: Pupils are equal, round, and reactive to light.   Cardiovascular:      Rate and Rhythm: Normal rate and regular rhythm.      Heart sounds: Normal heart sounds. No murmur heard.  Pulmonary:      Effort: Pulmonary effort is normal.      Breath sounds: Normal breath sounds. No wheezing or rhonchi.   Abdominal:      General: Bowel sounds are normal. There is no distension.      Palpations: Abdomen is soft.      Tenderness: There is no abdominal tenderness.   Skin:     General: Skin is warm and dry.   Neurological:      Mental Status: She is alert and oriented to person, place, and time.   Psychiatric:         Mood and Affect: Mood and affect normal.         Behavior: Behavior normal.         Thought Content: Thought content normal.         Judgment: Judgment normal.             Result Review :       Common labs          3/14/2023    21:57 3/29/2023    08:21 3/29/2023    08:25 9/28/2023    07:50 9/28/2023    07:53   Common Labs   Glucose 146  172   146     BUN 17  12   18     Creatinine 0.72  0.78   0.82     Sodium 133  136   136     Potassium 4.1  4.4   4.5     Chloride 98  103   103     Calcium 9.4  9.0   9.6     Albumin 3.5  3.7   4.2     Total Bilirubin 0.3  <0.2   0.2     Alkaline Phosphatase 99  99   142     AST (SGOT) 16  21   25     ALT (SGPT) 14  15   24     WBC 15.36        Hemoglobin 12.2        Hematocrit 36.9        Platelets 354        Total Cholesterol  150   169   "   Triglycerides  89   100     HDL Cholesterol  49   61     LDL Cholesterol   84   90     Hemoglobin A1C  8.10   9.40     Microalbumin, Urine   <1.2   <1.2        The skin was cleansed with alcohol. The needle was inserted at tender point of the right metacarpal thumb joint. The needle was advanced and the injection was given via a 30-gauge 0.5 inch needle filled with 20 mg of methylprednisolone.  Pt tolerated procedure well. Full ROM noted after procedure. Site wiped with alcohol and band-aid applied.      Methylprednisolone [id number] Lot: [id number] EXP:                  Assessment and Plan        Diagnoses and all orders for this visit:    1. Right hand pain (Primary)  -     Diclofenac Sodium (VOLTAREN) 1 % gel gel; Apply 4 g topically to the appropriate area as directed 4 (Four) Times a Day As Needed (pain).  Dispense: 100 g; Refill: 0  -     methylPREDNISolone acetate (DEPO-medrol) injection 20 mg    2. Restless leg  -     gabapentin (NEURONTIN) 300 MG capsule; Take 1 capsule by mouth Every Night.  Dispense: 30 capsule; Refill: 2        Obtained a written consent for UZMA query. Discussed the risks and benefits of the use of controlled substances with the patient, including the risk of tolerance and drug dependence.  The patient has been counseled on the need to have an exit strategy, including potentially discontinuing the use of controlled substances.  UZMA has or will be reviewed as soon as it becomes available.      Follow Up     Return in about 3 months (around 2/15/2024) for Next scheduled follow up.    Patient was given instructions and counseling regarding her condition or for health maintenance advice. Please see specific information pulled into the AVS if appropriate.     WILLIAM Hickman

## 2023-11-15 NOTE — SIGNIFICANT NOTE
Wound Eval / Progress Noted    [facility]     Patient Name: Mariela Aldrich  : 1970  MRN: [id number]  Today's Date: 11/15/2023                 Admit Date: 11/15/2023    Visit Dx:    ICD-10-CM ICD-9-CM   1. Hidradenitis  L73.2 705.83         * No active hospital problems. *        Past Medical History:   Diagnosis Date    Anxiety     Asthma     PRN INHALER AND TAKES ALLERGY INJECTIONS    Diabetes mellitus type 2, controlled     AVERAGE     Hidradenitis     Hyperlipidemia     Hypothyroidism     Murmur     DIAGNOSED WHEN 17 BUT IS ASYMPTOMATIC AND IS FOLLOWED ONLY BY PCP    Panic attack     Pilonidal cyst     Rheumatic fever     AS A CHILD    RLS (restless legs syndrome)     Sinus trouble         Past Surgical History:   Procedure Laterality Date    COLONOSCOPY  2020    exernal hemorrhoids    EXCISION LESION Bilateral 3/20/2023    Procedure: Excision Hidradenitis of Bilateral Inguinal Areas;  Surgeon: Donato You MD;  Location: [facility];  Service: General;  Laterality: Bilateral;    GROIN ABSCESS INCISION AND DRAINAGE Right     hidradenitis    HYSTERECTOMY  2010    KNEE SURGERY Bilateral 2013    PILONIDAL CYSTECTOMY N/A 2023    Procedure: Excision Hidradenitis of Inguinal Area and Pilonidal Cystectomy;  Surgeon: Donato You MD;  Location: [facility];  Service: General;  Laterality: N/A;         Physical Assessment:  Wound 23 1333 medial gluteal Incision (Active)   Wound Image   11/15/23 1230   Dressing Appearance moist drainage;intact;dry 11/15/23 1230   Closure None 11/15/23 1230   Base moist;granulating;red 11/15/23 1230   Red (%), Wound Tissue Color 100 11/15/23 1230   Periwound macerated;redness 11/15/23 1230   Periwound Temperature warm 11/15/23 1230   Periwound Skin Turgor soft 11/15/23 1230   Edges open 11/15/23 1230   Wound Length (cm) 2 cm 11/15/23 1230   Wound Width (cm) 0.5 cm 11/15/23 1230   Wound Depth (cm) 2.4 cm 11/15/23 1230   Wound Surface Area  (cm^2) 1 cm^2 11/15/23 1230   Wound Volume (cm^3) 2.4 cm^3 11/15/23 1230   Drainage Characteristics/Odor yellow 11/15/23 1230   Drainage Amount small 11/15/23 1230   Care, Wound cleansed with;irrigated with;sterile normal saline 11/15/23 1230   Dressing Care dressing applied;packed with;calcium alginate;collagen;silicone;border dressing 11/15/23 1230   Periwound Care absorptive dressing applied 11/15/23 1230       Wound 03/22/23 1518 medial coccyx Incision (Active)   Wound Image   11/15/23 1230   Dressing Appearance moist drainage;intact;dry 11/15/23 1230   Closure None 11/15/23 1230   Base red;moist;granulating 11/15/23 1230   Red (%), Wound Tissue Color 100 11/15/23 1230   Periwound intact;pink 11/15/23 1230   Periwound Temperature warm 11/15/23 1230   Periwound Skin Turgor soft 11/15/23 1230   Edges open 11/15/23 1230   Wound Length (cm) 0.1 cm 11/15/23 1230   Wound Width (cm) 0.1 cm 11/15/23 1230   Wound Depth (cm) 1.5 cm 11/15/23 1230   Wound Surface Area (cm^2) 0.01 cm^2 11/15/23 1230   Wound Volume (cm^3) 0.015 cm^3 11/15/23 1230   Drainage Characteristics/Odor serosanguineous;yellow 11/15/23 1230   Drainage Amount small 11/15/23 1230   Care, Wound cleansed with;irrigated with;sterile normal saline 11/15/23 1230   Dressing Care dressing applied;collagen;calcium alginate;packed with;silicone;hydrocolloid 11/15/23 1230   Periwound Care absorptive dressing applied 11/15/23 1230        Wound Check / Follow-up:  Patient seen today for a wound check and dressing change. Dressing clean and intact upon arrival with moist drainage.   Periwound's are reddened and and intact, likely from drainage. Wound and periwound cleansed with NS.   Upper sacral wound with measurable depth, opening is small. Cleansed and irrigated with NS. Packed the wound with collagen and a strip of calcium alginate.   Lower wound with red moist wound base with some measurable depth noted at 7 o'clock. Filled depth with collagen and a strip of  calcium alginate. Recommending to continue current wound care at this time.    Impression: surgical wound with delayed closure and recurrent opening / closure    Short term goals:  regain skin integrity, skin protection, dressing changes every 2-3 days with weekly wound checks    No Henriquez RN    11/15/2023    14:50 EST

## 2023-11-18 NOTE — PROGRESS NOTES
CLINICAL NUTRITION SERVICES - ASSESSMENT NOTE     Nutrition Prescription    RECOMMENDATIONS FOR MDs/PROVIDERS TO ORDER:  Encourage oral intakes, use of supplements.    Malnutrition Status:    Severe malnutrition in the context of acute illness    Recommendations already ordered by Registered Dietitian (RD):  Changed boost soothe to Expedite daily  Added gelatein and magic cup BID  Discussed importance of adequate intakes    Future/Additional Recommendations:  Monitor labs, intakes, and weight trends.     REASON FOR ASSESSMENT  Alfredo Burnham is a/an 69 year old male assessed by the dietitian for Provider Order - assess/optimize nutrition for wound healing     NUTRITION/MEDICAL HISTORY  History of hypertension, TIA, blindness 2/2 macular degeneration, thrombocytopenia, and tobacco use disorder who was admitted on 9/24/23 with ruptured pararenal abdominal aortic aneurysm s/p open repair complicated by shock and colonic ischemic requiring multiple returns to OR for exploratory laparotomy, washout, and delayed closure with hospital course complicated by LLE ischemia ultimately requiring AKA on 10/17, hematochezia with concern for possible bowel ischemia (no intramural ischemia on ex lap), MAYA requiring CRRT/HD (now off), acute hypoxic respiratory failure requiring 2 separate intubations, PE/DVT, aspiration event, bilateral pleural effusions, acute blood loss anemia/thrombocytopenia (requiring multiple transfusions), multifocal embolic strokes, left psoas muscle and iliac hematomas, peripancreatic fluid collection and concern for possible pancreatitis, 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  He is now admitted to ARU on 11/17/23 for multidisciplinary rehabilitation and ongoing medical management.     TF removed on 11/13 per  Chief Complaint  Diabetes (FOLLOW UP)    Subjective          Mariela Aldrich is a 52 y.o. female who presents to [facility]    History of Present Illness    Diabetes-patient's last hemoglobin A1c was 10.4.  Patient does admit to having a few low blood sugars however she attributes that to not eating well during having some surgery for hidradenitis suppurativa.    Hypertension-blood pressure is a little low this morning however patient did have pain medications last night.    Hyperlipidemia denies any myalgias.  Previous LDL was at goal at 75.    Hypothyroid -patient's energy level is normal for her her last TSH was 0.6.    Restless legs has flared the last couple of nights but patient is trying to walk more to help with that.    Anxiety-little increased with the recent surgery but patient is coping well.      PHQ-2 Total Score: 0   PHQ-9 Total Score: 0        Review of Systems   Constitutional: Negative for chills, fatigue and fever.   Respiratory: Negative for cough and shortness of breath.    Cardiovascular: Negative for chest pain and palpitations.   Gastrointestinal: Negative for constipation, diarrhea, nausea and vomiting.   Musculoskeletal: Negative for back pain and neck pain.   Skin: Negative for rash.   Neurological: Negative for dizziness and headaches.   Psychiatric/Behavioral: Negative for self-injury, sleep disturbance and suicidal ideas. The patient is nervous/anxious.           Medical History: has a past medical history of Anxiety, Asthma, Diabetes mellitus type 2, controlled (HCC), Hidradenitis, Hyperlipidemia, Hypothyroidism, Murmur, Panic attack, Pilonidal cyst, Rheumatic fever, RLS (restless legs syndrome), and Sinus trouble.     Surgical History: has a past surgical history that includes Colonoscopy (01/21/2020); Groin Abscess Incision and Drainage (Right); Hysterectomy (2010); Knee surgery (Bilateral, 2013); Pilonidal Cystectomy (N/A, 2/8/2023); and Excision  "pt/family request. Patient is not meeting over 50% needs orally.     FINDINGS  RD met with pt at bedside. Patient reports his appetite to be so/so. Discussed importance of adequate protein intake for healing and rehab. Discussed supplement order, patient agreeable to supplements previously ordered by the RD yesterday in the hospital. Patient had lunch on table in room, about 25% eaten. Patient states his appetite is better in the morning. Patient with no questions or pain chewing/swallowing.     CURRENT NUTRITION ORDERS  Diet: Level 6: Soft & Bite-Sized Dysphagia Diet  and Moderately Thick  Thickened Liquids   Snacks/supplements: Boost soothe ordered by provider, Pt had gelatein and magic cup ordered yesterday per RD note.     Intake/Tolerance: 0-25% of documented meals over last week in hospital.    LABS   11/13/23 05:46 11/14/23 06:08 11/15/23 05:48 11/16/23 07:04 11/17/23 05:57   Sodium 134 (L) 134 (L) 132 (L) 130 (L) 130 (L)   Potassium 4.8 5.2 5.1 5.2 4.8   Urea Nitrogen 58.1 (H) 58.4 (H) 56.5 (H) 61.4 (H) 58.2 (H)   Creatinine 1.85 (H) 1.89 (H) 1.93 (H) 2.09 (H) 1.97 (H)   Magnesium 2.0 2.1 2.1 2.1 2.0   Phosphorus 5.3 (H) 4.8 (H) 4.5 4.5 4.1   Glucose 119 (H) 108 (H) 132 (H) 114 (H) 118 (H)      11/13/23 02:41   Elastase Fecal 358.0     MEDICATIONS  Medications reviewed: creon with meals discontinued yesterday, protonix, metamucil, senna, vitamin D, expedite daily, TUMS PRN    ANTHROPOMETRICS  Height: 170.2 cm (5' 7\")  Most Recent Weight: 64.4 kg (141 lb 15.6 oz)    IBW: 60.6 kg (106% IBW)  BMI: Normal BMI   Weight History: Pt with limited weight history prior to admission,with 34 lb (19%) weight loss over 1 month since  AKA on 10/17. Expect ~ 10% weight loss. If 10-11% loss, pt with 8-9% weight loss in 1 month.   Wt Readings from Last Encounters:   11/17/23 64.4 kg (141 lb 15.6 oz)   11/17/23 64.6 kg (142 lb 6.7 oz)   11/17/23 0854  64.6 kg (142 lb 6.7 oz)  11/13/23 0248  67.7 kg (149 lb 4 oz)  11/12/23 " Lesion (Bilateral, 3/20/2023).     Family History: family history includes Diabetes in her brother, maternal grandmother, and sister; Thyroid disease in her sister; Thyroid disease (age of onset: 43) in her brother; Thyroid disease (age of onset: 70) in her father.     Social History: reports that she has never smoked. She has never used smokeless tobacco. She reports that she does not drink alcohol and does not use drugs.    Allergies: Etodolac, Penicillins, Statins, Atorvastatin, Crestor [rosuvastatin], and Pravastatin      Health Maintenance Due   Topic Date Due   • ZOSTER VACCINE (1 of 2) Never done            Current Outpatient Medications:   •  albuterol sulfate  (90 Base) MCG/ACT inhaler, Inhale 2 puffs Every 4 (Four) Hours As Needed., Disp: , Rfl:   •  cetirizine (zyrTEC) 10 MG tablet, Zyrtec 10 mg oral tablet take 1 tablet (10 mg) by oral route once daily   Active, Disp: , Rfl:   •  Continuous Blood Gluc Sensor (Dexcom G6 Sensor), Every 10 (Ten) Days., Disp: 9 each, Rfl: 1  •  Continuous Blood Gluc Transmit (Dexcom G6 Transmitter) misc, 1 Device Every 3 (Three) Months., Disp: 1 each, Rfl: 3  •  docusate sodium (Colace) 100 MG capsule, Take 1 capsule by mouth Daily As Needed for Constipation., Disp: 30 capsule, Rfl: 1  •  Empagliflozin-metFORMIN HCl ER (Synjardy XR) 12.5-1000 MG tablet sustained-release 24 hour, Take 1 tablet by mouth 2 (Two) Times a Day., Disp: 180 tablet, Rfl: 1  •  Fluticasone-Salmeterol (ADVAIR/WIXELA) 250-50 MCG/ACT DISKUS, Inhale 1 puff 2 (Two) Times a Day., Disp: 60 each, Rfl: 2  •  glipizide (GLUCOTROL) 10 MG tablet, Take 1 tablet by mouth 2 (Two) Times a Day Before Meals., Disp: 180 tablet, Rfl: 1  •  glucose blood test strip, Use as instructed Dx: DM E11, Disp: 100 each, Rfl: 3  •  HYDROcodone-acetaminophen (NORCO) 5-325 MG per tablet, Take 1-2 tablets by mouth Every 4 (Four) Hours As Needed (Pain)., Disp: 20 tablet, Rfl: 0  •  Insulin Degludec (Tresiba FlexTouch) 200  0500  68.8 kg (151 lb 10.8 oz)  11/10/23 0645  70.1 kg (154 lb 8.7 oz)  11/09/23 0207  70.4 kg (155 lb 3.3 oz)  11/08/23 0035  69 kg (152 lb 1.9 oz)  11/05/23 0628  68.4 kg (150 lb 12.7 oz)  11/04/23 0335  68.5 kg (151 lb 0.2 oz)  11/03/23 0628  70.2 kg (154 lb 12.2 oz)  10/30/23 0400  71.8 kg (158 lb 4.6 oz)  10/26/23 0000  72.9 kg (160 lb 11.5 oz)  10/23/23 0000  74.4 kg (164 lb 0.4 oz)  10/21/23 0000  76.1 kg (167 lb 12.3 oz)  10/20/23 0400  81 kg (178 lb 9.2 oz)  10/17/23 0025  79.8 kg (175 lb 14.8 oz) - AKA  10/16/23 0400  78.9 kg (173 lb 15.1 oz)  10/13/23 0200  76.7 kg (169 lb 1.5 oz)  10/12/23 0000  77.2 kg (170 lb 3.1 oz)  10/09/23 0500  79.8 kg (175 lb 14.8 oz)  10/04/23 0400  89.1 kg (196 lb 6.9 oz)  09/28/23 0000  95.4 kg (210 lb 5.1 oz)  09/26/23 2200  87.5 kg (192 lb 14.4 oz)  09/25/23 2000  83.2 kg (183 lb 6.8 oz)  09/25/23 0350  79.4 kg (175 lb 0.7 oz)  01/26/23 1300  73.4 kg (161 lb 11.3 oz)    Dosing Weight: 64 kg - most recent weight    ASSESSED NUTRITION NEEDS  Estimated Energy Needs: 0609-5855 kcals/day (25 - 30 kcals/kg)  Justification: Maintenance  Estimated Protein Needs: 77-96 grams protein/day (1.2 - 1.5 grams of pro/kg)  Justification: Increased needs  Estimated Fluid Needs: 1 ml/kcal or per provider pending fluid status    PHYSICAL FINDINGS  See malnutrition section below.  Surgical incision, pressure wound    MALNUTRITION  % Intake: </= 50% for >/= 5 days (severe)  % Weight Loss: >5% in 1 month (severe)   Subcutaneous Fat Loss: Upper Arm moderate  Muscle Loss: Temporal moderate, Thoracic region (clavical, acromium bone, deltoid, trapezius, pectoral) mild, Upper arm moderate, and Posterior calf moderate  Fluid Accumulation/Edema: None noted  Malnutrition Diagnosis: Severe malnutrition in the context of acute illness    NUTRITION DIAGNOSIS  Inadequate oral intake related to poor appetite and restrictive modified diet as evidenced by documented intakes, pt report.   UNIT/ML solution pen-injector pen injection, Inject 50 Units under the skin into the appropriate area as directed Every Night., Disp: 9 mL, Rfl: 1  •  Insulin Pen Needle (B-D ULTRAFINE III SHORT PEN) 31G X 8 MM misc, Inject 1 each under the skin into the appropriate area as directed Daily., Disp: 100 each, Rfl: 3  •  lisinopril (PRINIVIL,ZESTRIL) 5 MG tablet, Take 1 tablet by mouth Every Night., Disp: 90 tablet, Rfl: 1  •  Mirabegron ER (Myrbetriq) 50 MG tablet sustained-release 24 hour 24 hr tablet, Take 50 mg by mouth Daily., Disp: 30 tablet, Rfl: 5  •  multivitamin (THERAGRAN) tablet tablet, , Disp: , Rfl:   •  ONE TOUCH CLUB LANCETS misc, 90 each 3 (Three) Times a Day As Needed (check blood sugar)., Disp: 100 each, Rfl: 3  •  pitavastatin calcium (Livalo) 2 MG tablet tablet, Take 1 tablet by mouth Every Night., Disp: 90 tablet, Rfl: 1  •  rOPINIRole (REQUIP) 4 MG tablet, Take 1 tablet by mouth Every Night., Disp: 90 tablet, Rfl: 1  •  sertraline (ZOLOFT) 100 MG tablet, Take 1.5 tablets by mouth Daily., Disp: 135 tablet, Rfl: 1  •  Synthroid 88 MCG tablet, Take 1 tablet by mouth Daily., Disp: 90 tablet, Rfl: 1  •  Zinc 50 MG tablet, zinc 50 mg oral tablet take 1 tablet by oral route daily   Active, Disp: , Rfl:   •  adalimumab (Humira) 40 MG/0.8ML Prefilled Syringe Kit injection, 0.8 mL. Currently on hold due to surgies (Patient not taking: Reported on 3/29/2023), Disp: , Rfl:   •  HYDROcodone-acetaminophen (Norco) 5-325 MG per tablet, Take 1 tablet by mouth Every 6 (Six) Hours As Needed for Moderate Pain., Disp: 20 tablet, Rfl: 0  •  Semaglutide,0.25 or 0.5MG/DOS, (Ozempic, 0.25 or 0.5 MG/DOSE,) 2 MG/1.5ML solution pen-injector, Inject 0.5 mg under the skin into the appropriate area as directed 1 (One) Time Per Week., Disp: 4.5 mL, Rfl: 1      Immunization History   Administered Date(s) Administered   • Fluzone Quad >6mos (Multi-dose) 10/01/2020   • Pneumococcal Polysaccharide (PPSV23) 07/20/2019   • Td      INTERVENTIONS  Implementation  Nutrition Education: Provided education on importance of adequate intakes for healing   Medical food supplement therapy - added magic cup and gelatein BID    Goals  Patient to consume % of nutritionally adequate meal trays TID, or the equivalent with supplements/snacks.     Monitoring/Evaluation  Progress toward goals will be monitored and evaluated per protocol.    Andie Mukherjee MS, RDN, LD  RD pager: [phone], or [employer] [place]  [facility] Weekend/Holiday Pager: [phone]      05/08/1998   • Tdap 03/16/2020         Objective       Vitals:    03/29/23 0720   BP: 99/71   Pulse: 93   Temp: 97.5 °F (36.4 °C)   TempSrc: Temporal   SpO2: 97%   Weight: 87.7 kg (193 lb 6.4 oz)      Body mass index is 33.2 kg/m².   Wt Readings from Last 3 Encounters:   03/29/23 87.7 kg (193 lb 6.4 oz)   03/20/23 88.5 kg (195 lb 1.7 oz)   03/16/23 88.9 kg (196 lb)      BP Readings from Last 3 Encounters:   03/29/23 127/71   03/29/23 99/71   03/27/23 111/71        Physical Exam  Vitals reviewed.   Constitutional:       Appearance: Normal appearance. She is well-developed.   HENT:      Head: Normocephalic and atraumatic.   Eyes:      Conjunctiva/sclera: Conjunctivae normal.      Pupils: Pupils are equal, round, and reactive to light.   Cardiovascular:      Rate and Rhythm: Normal rate and regular rhythm.      Heart sounds: Normal heart sounds. No murmur heard.  Pulmonary:      Effort: Pulmonary effort is normal.      Breath sounds: Normal breath sounds. No wheezing or rhonchi.   Abdominal:      General: Bowel sounds are normal. There is no distension.      Palpations: Abdomen is soft.      Tenderness: There is no abdominal tenderness.   Skin:     General: Skin is warm and dry.   Neurological:      Mental Status: She is alert and oriented to person, place, and time.   Psychiatric:         Mood and Affect: Mood and affect normal.         Behavior: Behavior normal.         Thought Content: Thought content normal.         Judgment: Judgment normal.             Result Review :       Common labs    Common Labs 12/7/22 12/7/22 12/7/22 2/8/23 3/14/23 3/14/23    0905 0905 0905  2157 2157   Glucose  119 (A)  151 (A)  146 (A)   BUN  17  14  17   Creatinine  1.00  0.81  0.72   Sodium  136  136  133 (A)   Potassium  4.6  4.8  4.1   Chloride  101  104  98   Calcium  9.5  9.3  9.4   Albumin  3.80    3.5   Total Bilirubin  0.3    0.3   Alkaline Phosphatase  100    99   AST (SGOT)  22    16   ALT (SGPT)  17    14   WBC     15.36 (A)     Hemoglobin     12.2    Hematocrit     36.9    Platelets     354    Total Cholesterol 150        Triglycerides 77        HDL Cholesterol 60        LDL Cholesterol  75        Hemoglobin A1C   10.40 (A)      (A) Abnormal value                             Assessment and Plan        Diagnoses and all orders for this visit:    1. Type 2 diabetes mellitus with hyperglycemia, with long-term current use of insulin (HCC)  -     Insulin Pen Needle (B-D ULTRAFINE III SHORT PEN) 31G X 8 MM misc; Inject 1 each under the skin into the appropriate area as directed Daily.  Dispense: 100 each; Refill: 3  -     Continuous Blood Gluc Sensor (Dexcom G6 Sensor); Every 10 (Ten) Days.  Dispense: 9 each; Refill: 1  -     Continuous Blood Gluc Transmit (Dexcom G6 Transmitter) misc; 1 Device Every 3 (Three) Months.  Dispense: 1 each; Refill: 3  -     Empagliflozin-metFORMIN HCl ER (Synjardy XR) 12.5-1000 MG tablet sustained-release 24 hour; Take 1 tablet by mouth 2 (Two) Times a Day.  Dispense: 180 tablet; Refill: 1  -     glipizide (GLUCOTROL) 10 MG tablet; Take 1 tablet by mouth 2 (Two) Times a Day Before Meals.  Dispense: 180 tablet; Refill: 1  -     Insulin Degludec (Tresiba FlexTouch) 200 UNIT/ML solution pen-injector pen injection; Inject 50 Units under the skin into the appropriate area as directed Every Night.  Dispense: 9 mL; Refill: 1  -     lisinopril (PRINIVIL,ZESTRIL) 5 MG tablet; Take 1 tablet by mouth Every Night.  Dispense: 90 tablet; Refill: 1  -     ONE TOUCH CLUB LANCETS misc; 90 each 3 (Three) Times a Day As Needed (check blood sugar).  Dispense: 100 each; Refill: 3  -     Semaglutide,0.25 or 0.5MG/DOS, (Ozempic, 0.25 or 0.5 MG/DOSE,) 2 MG/1.5ML solution pen-injector; Inject 0.5 mg under the skin into the appropriate area as directed 1 (One) Time Per Week.  Dispense: 4.5 mL; Refill: 1  -     Comprehensive Metabolic Panel  -     Lipid Panel  -     Hemoglobin A1c  -     Microalbumin / Creatinine Urine Ratio - Urine, Clean  Catch    2. Mixed hyperlipidemia  -     pitavastatin calcium (Livalo) 2 MG tablet tablet; Take 1 tablet by mouth Every Night.  Dispense: 90 tablet; Refill: 1    3. RLS (restless legs syndrome)  -     rOPINIRole (REQUIP) 4 MG tablet; Take 1 tablet by mouth Every Night.  Dispense: 90 tablet; Refill: 1    4. Anxiety  -     sertraline (ZOLOFT) 100 MG tablet; Take 1.5 tablets by mouth Daily.  Dispense: 135 tablet; Refill: 1    5. Other specified hypothyroidism  -     Synthroid 88 MCG tablet; Take 1 tablet by mouth Daily.  Dispense: 90 tablet; Refill: 1  -     TSH+Free T4    Continue blood sugar monitoring daily and record.  Bring your log to office visits.  Call the office for readings below 70 and above 250 or any complications.    Daily foot care.  Avoid walking barefoot.    Annual dilated eye exam.    Discussed with patient blood pressure monitoring, hemoglobin A1c levels need to be below 7, and LDL goals below 70.        Follow Up     Return in about 3 months (around 6/29/2023) for Next scheduled follow up.    Patient was given instructions and counseling regarding her condition or for health maintenance advice. Please see specific information pulled into the AVS if appropriate.     WILLIAM Hickman

## 2023-11-21 ENCOUNTER — HOSPITAL ENCOUNTER (OUTPATIENT)
Dept: INFUSION THERAPY | Facility: HOSPITAL | Age: 53
Discharge: HOME | End: 2023-11-21
Admitting: NURSE PRACTITIONER
Payer: COMMERCIAL

## 2023-11-21 VITALS
HEART RATE: 96 BPM | DIASTOLIC BLOOD PRESSURE: 62 MMHG | OXYGEN SATURATION: 99 % | SYSTOLIC BLOOD PRESSURE: 114 MMHG | TEMPERATURE: 98.4 F | RESPIRATION RATE: 20 BRPM

## 2023-11-21 DIAGNOSIS — Z51.89 VISIT FOR WOUND CHECK: Primary | ICD-10-CM

## 2023-11-21 PROCEDURE — G0463 HOSPITAL OUTPT CLINIC VISIT: HCPCS

## 2023-11-21 NOTE — SIGNIFICANT NOTE
Wound Eval / Progress Noted     [facility]     Patient Name: Mariela Aldrich  : 1970  MRN: [id number]  Today's Date: 2023                 Admit Date: 2023    Visit Dx:  No diagnosis found.      * No active hospital problems. *        Past Medical History:   Diagnosis Date    Anxiety     Asthma     PRN INHALER AND TAKES ALLERGY INJECTIONS    Diabetes mellitus type 2, controlled     AVERAGE     Hidradenitis     Hyperlipidemia     Hypothyroidism     Murmur     DIAGNOSED WHEN 17 BUT IS ASYMPTOMATIC AND IS FOLLOWED ONLY BY PCP    Panic attack     Pilonidal cyst     Rheumatic fever     AS A CHILD    RLS (restless legs syndrome)     Sinus trouble         Past Surgical History:   Procedure Laterality Date    COLONOSCOPY  2020    exernal hemorrhoids    EXCISION LESION Bilateral 3/20/2023    Procedure: Excision Hidradenitis of Bilateral Inguinal Areas;  Surgeon: Donato You MD;  Location: [facility];  Service: General;  Laterality: Bilateral;    GROIN ABSCESS INCISION AND DRAINAGE Right     hidradenitis    HYSTERECTOMY  2010    KNEE SURGERY Bilateral     PILONIDAL CYSTECTOMY N/A 2023    Procedure: Excision Hidradenitis of Inguinal Area and Pilonidal Cystectomy;  Surgeon: Donato You MD;  Location: [facility];  Service: General;  Laterality: N/A;         Physical Assessment:  Wound 23 1333 medial gluteal Incision (Active)   Dressing Appearance intact;moist drainage 23 1225   Closure None 23 1225   Base moist;granulating;red 23 1225   Red (%), Wound Tissue Color 100 23 1225   Periwound macerated;redness 23 1225   Periwound Temperature warm 23 1225   Periwound Skin Turgor soft 23 1225   Edges open 23 1225   Wound Length (cm) 2 cm 23 1225   Wound Width (cm) 0.3 cm 23 1225   Wound Depth (cm) 0.4 cm 23 1225   Wound Surface Area (cm^2) 0.6 cm^2 23 1225   Wound Volume (cm^3) 0.24 cm^3 23  1225   Drainage Characteristics/Odor serosanguineous 11/21/23 1225   Drainage Amount scant 11/21/23 1225   Care, Wound cleansed with;irrigated with;sterile normal saline 11/21/23 1225   Dressing Care dressing applied;collagen;calcium alginate;silver impregnated;silicone;border dressing;hydrocolloid 11/21/23 1225   Periwound Care absorptive dressing applied 11/21/23 1225       Wound 03/22/23 1518 medial coccyx Incision (Active)   Dressing Appearance moist drainage;intact 11/21/23 1225   Closure None 11/21/23 1225   Base red;moist;granulating 11/21/23 1225   Red (%), Wound Tissue Color 100 11/21/23 1225   Periwound intact;pink 11/21/23 1225   Periwound Temperature warm 11/21/23 1225   Periwound Skin Turgor soft 11/21/23 1225   Edges open 11/21/23 1225   Wound Length (cm) 0.2 cm 11/21/23 1225   Wound Width (cm) 0.2 cm 11/21/23 1225   Wound Depth (cm) 2 cm 11/21/23 1225   Wound Surface Area (cm^2) 0.04 cm^2 11/21/23 1225   Wound Volume (cm^3) 0.08 cm^3 11/21/23 1225   Drainage Characteristics/Odor serosanguineous 11/21/23 1225   Drainage Amount scant 11/21/23 1225   Care, Wound cleansed with;irrigated with;sterile normal saline 11/21/23 1225   Dressing Care dressing applied;collagen;calcium alginate;silver impregnated;silicone;border dressing;hydrocolloid 11/21/23 1225   Periwound Care absorptive dressing applied 11/21/23 1225        Wound Check / Follow-up:  Patient seen today for a wound check and dressing change. Dressing with moist drainage, intact. Periwound's are reddened and and intact, likely from drainage. Wound and periwound cleansed with NS.   Upper sacral wound with measurable depth, opening is small. Cleansed and irrigated with NS. During irrigation of the top wounds it was noted that the saline drained from the lower gluteal wound. No tunnel present to connect the wounds. Packed the wound with collagen and a strip of calcium alginate.   Lower wound with red moist wound base with minimal depth noted at 7  o'clock. Filled depth with collagen and a strip of calcium alginate. Recommending to continue current wound care at this time. Will recommend to continue current treatment at this time    Impression: surgical incision with delayed closure and recurrent open / closure    Short term goals:  regain skin integrity, every other day dressing changes    No Henriquez RN    11/21/2023    12:31 EST

## 2023-11-28 ENCOUNTER — TELEPHONE (OUTPATIENT)
Dept: WOUND CARE | Facility: HOSPITAL | Age: 53
End: 2023-11-28
Payer: COMMERCIAL

## 2023-11-28 ENCOUNTER — HOSPITAL ENCOUNTER (OUTPATIENT)
Dept: INFUSION THERAPY | Facility: HOSPITAL | Age: 53
Discharge: HOME | End: 2023-11-28
Admitting: NURSE PRACTITIONER
Payer: COMMERCIAL

## 2023-11-28 VITALS
HEART RATE: 101 BPM | DIASTOLIC BLOOD PRESSURE: 70 MMHG | RESPIRATION RATE: 20 BRPM | TEMPERATURE: 97.8 F | SYSTOLIC BLOOD PRESSURE: 121 MMHG | OXYGEN SATURATION: 100 %

## 2023-11-28 DIAGNOSIS — L73.2 HIDRADENITIS: Primary | ICD-10-CM

## 2023-11-28 PROCEDURE — G0463 HOSPITAL OUTPT CLINIC VISIT: HCPCS

## 2023-11-28 NOTE — TELEPHONE ENCOUNTER
Call placed to [employer] concerning dressing patient has received. During visit today, patient stated that her outer dressings are too small and she is having difficulties getting them to cover her wounds.   Spoke with Adam at [facility]. Explained that  patient needs larger outer dressings and that when she called to inquire, she was told a new order would be needed. Explained that our order did not specify a wound dressing size so not certain if we need a new order or not. Adam stated that he would notate on the record about needing larger dressings. I requested 6 x 6. Patient is due to have a shipment sent today. He is going to try and call and see if he can get the shipment changed to include larger dressings. If this isn't possible, he stated the patient could call when she receives this shipment and request a pick-up / exchange for the larger dressings.     He states that going forward we can specify specific dressing sizes if needed.     Call placed to patient and informed of the above. She verbalized understanding.   Miladys Dockery RN

## 2023-11-28 NOTE — SIGNIFICANT NOTE
Wound Eval / Progress Noted     [facility]     Patient Name: Mariela Aldrich  : 1970  MRN: [id number]  Today's Date: 2023                 Admit Date: 2023    Visit Dx:  No diagnosis found.      * No active hospital problems. *        Past Medical History:   Diagnosis Date    Anxiety     Asthma     PRN INHALER AND TAKES ALLERGY INJECTIONS    Diabetes mellitus type 2, controlled     AVERAGE     Hidradenitis     Hyperlipidemia     Hypothyroidism     Murmur     DIAGNOSED WHEN 17 BUT IS ASYMPTOMATIC AND IS FOLLOWED ONLY BY PCP    Panic attack     Pilonidal cyst     Rheumatic fever     AS A CHILD    RLS (restless legs syndrome)     Sinus trouble         Past Surgical History:   Procedure Laterality Date    COLONOSCOPY  2020    exernal hemorrhoids    EXCISION LESION Bilateral 3/20/2023    Procedure: Excision Hidradenitis of Bilateral Inguinal Areas;  Surgeon: Donato You MD;  Location: [facility];  Service: General;  Laterality: Bilateral;    GROIN ABSCESS INCISION AND DRAINAGE Right     hidradenitis    HYSTERECTOMY  2010    KNEE SURGERY Bilateral     PILONIDAL CYSTECTOMY N/A 2023    Procedure: Excision Hidradenitis of Inguinal Area and Pilonidal Cystectomy;  Surgeon: Donato You MD;  Location: [facility];  Service: General;  Laterality: N/A;         Physical Assessment:  Wound 23 1333 medial gluteal Incision (Active)   Wound Image   23 1236   Dressing Appearance intact;moist drainage 23 1236   Closure None 23 1236   Base moist;red 23 1236   Red (%), Wound Tissue Color 100 23 1236   Periwound macerated;moist;redness 23 1236   Periwound Temperature warm 23 1236   Periwound Skin Turgor soft 23 1236   Edges open 23 1236   Wound Length (cm) 3 cm 23 1236   Wound Width (cm) 0.3 cm 23 1236   Wound Depth (cm) 0.2 cm 23 1236   Wound Surface Area (cm^2) 0.9 cm^2 23 1236   Wound Volume  (cm^3) 0.18 cm^3 11/28/23 1236   Drainage Characteristics/Odor serosanguineous 11/28/23 1236   Drainage Amount small 11/28/23 1236   Care, Wound cleansed with;irrigated with;sterile normal saline 11/28/23 1236   Dressing Care dressing applied;border dressing;silicone;hydrocolloid;gauze;non-adherent 11/28/23 1236   Periwound Care absorptive dressing applied 11/28/23 1236       Wound 03/22/23 1518 medial coccyx Incision (Active)   Wound Image   11/28/23 1236   Dressing Appearance intact;moist drainage 11/28/23 1236   Closure None 11/28/23 1236   Base moist;red;pink 11/28/23 1236   Periwound redness;macerated;moist 11/28/23 1236   Periwound Temperature warm 11/28/23 1236   Periwound Skin Turgor soft 11/28/23 1236   Edges open;rolled/closed 11/28/23 1236   Wound Length (cm) 0.4 cm 11/28/23 1236   Wound Width (cm) 0.2 cm 11/28/23 1236   Wound Depth (cm) 1.4 cm 11/28/23 1236   Wound Surface Area (cm^2) 0.08 cm^2 11/28/23 1236   Wound Volume (cm^3) 0.112 cm^3 11/28/23 1236   Drainage Characteristics/Odor yellow;serosanguineous 11/28/23 1236   Drainage Amount small 11/28/23 1236   Care, Wound cleansed with;irrigated with;sterile normal saline 11/28/23 1236   Dressing Care dressing applied;gauze;non-adherent;border dressing;silicone;hydrocolloid 11/28/23 1236   Periwound Care absorptive dressing applied 11/28/23 1236        Wound Check / Follow-up:  Patient seen today for wound check. Patient with intact dressing upon arrival.    Dressing removed. Upper gluteal with red moist tissue visible, with minor erosion noted below along crease. With further assessment / palpation, small opening within upper gluteal wound base tissue noted that had depth. Cleansed and irrigated with NS and gauze. Blotted dry. Recommending transition to hydrophilic topical wound dressing with non-adherent gauze and silicone border dressing.     Lower gluteal wound with hypergranulation along wound edge. Wound base is red and moist with no significant  depth noted. No tunnels or cavities noted. Cleansed and irrigated with NS and gauze. Recommending transition to topical hydrophilic wound dressing with non-adherent gauze and silicone border dressing.     Recommending dressing changes / wound care MWF.     Impression: Chronic wound to gluteal crease from surgical intervention with delayed closure    Short term goals:  Regain skin integrity. Dressing changes MWF with weekly wound check by wound nurse. Spouse to perform wound care in absence of wound nurse.     Miladys Dockery RN    11/28/2023    12:42 EST

## 2023-11-30 DIAGNOSIS — E11.65 TYPE 2 DIABETES MELLITUS WITH HYPERGLYCEMIA, WITH LONG-TERM CURRENT USE OF INSULIN: ICD-10-CM

## 2023-11-30 DIAGNOSIS — Z79.4 TYPE 2 DIABETES MELLITUS WITH HYPERGLYCEMIA, WITH LONG-TERM CURRENT USE OF INSULIN: ICD-10-CM

## 2023-12-01 RX ORDER — PROCHLORPERAZINE 25 MG/1
SUPPOSITORY RECTAL
Qty: 9 EACH | Refills: 0 | Status: SHIPPED | OUTPATIENT
Start: 2023-12-01

## 2023-12-02 DIAGNOSIS — J45.20 MILD INTERMITTENT ASTHMA WITHOUT COMPLICATION: ICD-10-CM

## 2023-12-03 DIAGNOSIS — M79.641 RIGHT HAND PAIN: ICD-10-CM

## 2023-12-04 RX ORDER — FLUTICASONE PROPIONATE AND SALMETEROL 250; 50 UG/1; UG/1
POWDER RESPIRATORY (INHALATION)
Qty: 60 EACH | Refills: 2 | Status: SHIPPED | OUTPATIENT
Start: 2023-12-04

## 2023-12-06 ENCOUNTER — HOSPITAL ENCOUNTER (OUTPATIENT)
Dept: INFUSION THERAPY | Facility: HOSPITAL | Age: 53
Discharge: HOME | End: 2023-12-06
Admitting: NURSE PRACTITIONER
Payer: COMMERCIAL

## 2023-12-06 VITALS
DIASTOLIC BLOOD PRESSURE: 80 MMHG | HEART RATE: 114 BPM | SYSTOLIC BLOOD PRESSURE: 122 MMHG | OXYGEN SATURATION: 98 % | TEMPERATURE: 98.3 F | RESPIRATION RATE: 20 BRPM

## 2023-12-06 DIAGNOSIS — L73.2 HIDRADENITIS: Primary | ICD-10-CM

## 2023-12-06 PROCEDURE — G0463 HOSPITAL OUTPT CLINIC VISIT: HCPCS

## 2023-12-06 RX ORDER — SULFAMETHOXAZOLE AND TRIMETHOPRIM 800; 160 MG/1; MG/1
1 TABLET ORAL 2 TIMES DAILY
Qty: 20 TABLET | Refills: 0 | Status: SHIPPED | OUTPATIENT
Start: 2023-12-06

## 2023-12-06 NOTE — SIGNIFICANT NOTE
Wound Eval / Progress Noted     [facility]     Patient Name: Mariela Aldrich  : 1970  MRN: [id number]  Today's Date: 2023                 Admit Date: 2023    Visit Dx:    ICD-10-CM ICD-9-CM   1. Hidradenitis  L73.2 705.83         * No active hospital problems. *        Past Medical History:   Diagnosis Date    Anxiety     Asthma     PRN INHALER AND TAKES ALLERGY INJECTIONS    Diabetes mellitus type 2, controlled     AVERAGE     Hidradenitis     Hyperlipidemia     Hypothyroidism     Murmur     DIAGNOSED WHEN 17 BUT IS ASYMPTOMATIC AND IS FOLLOWED ONLY BY PCP    Panic attack     Pilonidal cyst     Rheumatic fever     AS A CHILD    RLS (restless legs syndrome)     Sinus trouble         Past Surgical History:   Procedure Laterality Date    COLONOSCOPY  2020    exernal hemorrhoids    EXCISION LESION Bilateral 3/20/2023    Procedure: Excision Hidradenitis of Bilateral Inguinal Areas;  Surgeon: Donato You MD;  Location: [facility];  Service: General;  Laterality: Bilateral;    GROIN ABSCESS INCISION AND DRAINAGE Right     hidradenitis    HYSTERECTOMY  2010    KNEE SURGERY Bilateral 2013    PILONIDAL CYSTECTOMY N/A 2023    Procedure: Excision Hidradenitis of Inguinal Area and Pilonidal Cystectomy;  Surgeon: Donato You MD;  Location: [facility];  Service: General;  Laterality: N/A;         Physical Assessment:  Wound 23 1333 medial gluteal Incision (Active)   Wound Image   23 1225   Dressing Appearance intact;moist drainage 23 1225   Closure None 23 1225   Base moist;red 23 1225   Red (%), Wound Tissue Color 100 23 1225   Periwound moist;macerated 23 1225   Periwound Temperature warm 23 1225   Periwound Skin Turgor soft 23 1225   Edges open 23 1225   Wound Length (cm) 2.8 cm 23 1225   Wound Width (cm) 0.2 cm 23 1225   Wound Depth (cm) 0.2 cm 23 1225   Wound Surface Area (cm^2) 0.56 cm^2  12/06/23 1225   Wound Volume (cm^3) 0.112 cm^3 12/06/23 1225   Drainage Characteristics/Odor serosanguineous 12/06/23 1225   Drainage Amount small 12/06/23 1225   Care, Wound cleansed with;irrigated with;sterile normal saline 12/06/23 1225   Dressing Care dressing applied;border dressing;non-adherent;silicone 12/06/23 1225   Periwound Care absorptive dressing applied 12/06/23 1225       Wound 03/22/23 1518 medial coccyx Incision (Active)   Wound Image   12/06/23 1225   Dressing Appearance intact;moist drainage 12/06/23 1225   Closure None 12/06/23 1225   Base pink;moist;red 12/06/23 1225   Periwound macerated 12/06/23 1225   Periwound Temperature warm 12/06/23 1225   Periwound Skin Turgor soft 12/06/23 1225   Edges open 12/06/23 1225   Wound Length (cm) 0.3 cm 12/06/23 1225   Wound Width (cm) 0.2 cm 12/06/23 1225   Wound Depth (cm) 0.7 cm 12/06/23 1225   Wound Surface Area (cm^2) 0.06 cm^2 12/06/23 1225   Wound Volume (cm^3) 0.042 cm^3 12/06/23 1225   Drainage Characteristics/Odor serosanguineous;yellow 12/06/23 1225   Drainage Amount small 12/06/23 1225   Care, Wound cleansed with;sterile normal saline 12/06/23 1225   Dressing Care dressing applied;border dressing;silicone;non-adherent 12/06/23 1225   Periwound Care absorptive dressing applied 12/06/23 1225        Wound Check / Follow-up:  Patient seen today for wound follow-up / wound check. Patient with intact dressing to gluteal crease. She states the dressing changes have been going well at home.     She does state that she has a new area that just developed to her left suprapubic area that is bothering her. To that area there is an area of induration measuring approximately 1.5cm x 1.5cm with a small opening that is draining thick yellow purulent drainage. Area is tender to touch. Patient is wondering if she could get some antibiotics as she is concerned about progression. Will notify Surgeon. Cleansed area and applied dressing for protection at this time.  Encouraged warm compresses to area for comfort and to promote drainage.     Upper gluteal area with very very small opening that has decreased in depth from last measurements. Patient also with small superficial erosion of tissue distal to opening measuring 0.4cmx 0.3cmx 0.1cm.. Recommending to continue use of hydrophilic wound dressing to area and then covered with dressing.     Lower gluteal wound is beefy red and moist. Increased redness to wound base this visit. Cleansed and irrigated with NS. Blotted dry. Continues to have thickened wound margin on right side. Recommending to continue with hydrophilic wound dressing and topical dressing at this time. Will discuss with surgeon option of applying silver nitrite to thickened wound margin on right side of wound for possible acceleration of wound closure.     Impression: Surgical wound with closure by secondary intent, delayed wound healing, Newly developing wound to left suprapubic area.     Short term goals:  Regain skin integrity. Dressing changes MWF.     Miladys Dockery RN    12/6/2023    14:05 EST

## 2023-12-06 NOTE — NURSING NOTE
Spoke with surgeon to discuss patient's presentation today and the recommendation for silver nitrite.   Surgeon is calling in antibiotics for patient for newly developing wound and is also agreeable to silver nitrite as needed to promote wound healing.   Miladys Dockery RN

## 2023-12-13 ENCOUNTER — HOSPITAL ENCOUNTER (OUTPATIENT)
Dept: INFUSION THERAPY | Facility: HOSPITAL | Age: 53
Discharge: HOME | End: 2023-12-13
Admitting: NURSE PRACTITIONER
Payer: COMMERCIAL

## 2023-12-13 VITALS
HEART RATE: 108 BPM | RESPIRATION RATE: 20 BRPM | SYSTOLIC BLOOD PRESSURE: 107 MMHG | OXYGEN SATURATION: 99 % | TEMPERATURE: 98 F | DIASTOLIC BLOOD PRESSURE: 75 MMHG

## 2023-12-13 DIAGNOSIS — L73.2 HIDRADENITIS: Primary | ICD-10-CM

## 2023-12-13 PROCEDURE — G0463 HOSPITAL OUTPT CLINIC VISIT: HCPCS

## 2023-12-13 NOTE — SIGNIFICANT NOTE
Wound Eval / Progress Noted     [facility]     Patient Name: Mariela Aldrich  : 1970  MRN: [id number]  Today's Date: 2023                 Admit Date: 2023    Visit Dx:    ICD-10-CM ICD-9-CM   1. Hidradenitis  L73.2 705.83         * No active hospital problems. *        Past Medical History:   Diagnosis Date    Anxiety     Asthma     PRN INHALER AND TAKES ALLERGY INJECTIONS    Diabetes mellitus type 2, controlled     AVERAGE     Hidradenitis     Hyperlipidemia     Hypothyroidism     Murmur     DIAGNOSED WHEN 17 BUT IS ASYMPTOMATIC AND IS FOLLOWED ONLY BY PCP    Panic attack     Pilonidal cyst     Rheumatic fever     AS A CHILD    RLS (restless legs syndrome)     Sinus trouble         Past Surgical History:   Procedure Laterality Date    COLONOSCOPY  2020    exernal hemorrhoids    EXCISION LESION Bilateral 3/20/2023    Procedure: Excision Hidradenitis of Bilateral Inguinal Areas;  Surgeon: Donato You MD;  Location: [facility];  Service: General;  Laterality: Bilateral;    GROIN ABSCESS INCISION AND DRAINAGE Right     hidradenitis    HYSTERECTOMY  2010    KNEE SURGERY Bilateral 2013    PILONIDAL CYSTECTOMY N/A 2023    Procedure: Excision Hidradenitis of Inguinal Area and Pilonidal Cystectomy;  Surgeon: Donato You MD;  Location: [facility];  Service: General;  Laterality: N/A;         Physical Assessment:  Wound 23 1333 medial gluteal Incision (Active)   Wound Image    23 1235   Dressing Appearance intact;moist drainage 23 1235   Closure None 23 1235   Base moist;red 23 1235   Red (%), Wound Tissue Color 100 23 1235   Periwound moist;macerated 23 1235   Periwound Temperature warm 23 1235   Periwound Skin Turgor soft 23 1235   Edges open 23 1235   Wound Length (cm) 3.3 cm 23 1235   Wound Width (cm) 0.2 cm 23 1235   Wound Depth (cm) 1.3 cm 23 1235   Wound Surface Area (cm^2) 0.66 cm^2  12/13/23 1235   Wound Volume (cm^3) 0.858 cm^3 12/13/23 1235   Drainage Characteristics/Odor serosanguineous 12/13/23 1235   Drainage Amount small 12/13/23 1235   Care, Wound cleansed with;irrigated with;sterile normal saline;silver agent applied 12/13/23 1235   Dressing Care dressing applied;border dressing;hydrofiber;silver impregnated;silicone 12/13/23 1235   Periwound Care absorptive dressing applied 12/13/23 1235       Wound 03/22/23 1518 medial coccyx Incision (Active)   Wound Image   12/13/23 1235   Dressing Appearance intact;moist drainage 12/13/23 1235   Closure None 12/13/23 1235   Base moist;red 12/13/23 1235   Periwound redness;macerated 12/13/23 1235   Periwound Temperature warm 12/13/23 1235   Periwound Skin Turgor soft 12/13/23 1235   Edges open 12/13/23 1235   Wound Length (cm) 0.3 cm 12/13/23 1235   Wound Width (cm) 0.1 cm 12/13/23 1235   Wound Depth (cm) 1.9 cm 12/13/23 1235   Wound Surface Area (cm^2) 0.03 cm^2 12/13/23 1235   Wound Volume (cm^3) 0.057 cm^3 12/13/23 1235   Drainage Characteristics/Odor tan;serosanguineous 12/13/23 1235   Drainage Amount moderate 12/13/23 1235   Care, Wound cleansed with;irrigated with;sterile normal saline 12/13/23 1235   Dressing Care dressing applied;border dressing;silver impregnated;hydrofiber;silicone 12/13/23 1235   Periwound Care absorptive dressing applied 12/13/23 1235      Left pubic / groin area   Wound Check / Follow-up:  Patient seen today for wound check and dressing change. After speaking with Surgeon last week, also planning to silver nitrite wound margins / wound to lower aspect of gluteal crease.     Dressing intact upon arrival. Dressing removed. Moderate amount of tan drainage noted. Increased from previous visits. Small area of red moist tissue noted. Initial appearance is that there is a solid wound base, however with further assessment increased wound depth noted. Cleansed with NS and gauze. Small areas of erosion distal to wound opening.  Maceration also noted to gluteal aspects.     Lower gluteal aspect with red moist tissue noted. Wound margins remain raised and rolled. To center of wound base there is small opening with depth noted this assessment. Cleansed and irrigated wound with NS and gauze. Blotted dry. Silver nitrite applied to wound margins and wound base. Recommending not to use hydrophillic wound dressing while silver nitrite has been applied.     Recommending to patient to apply silver impregnated calcium alginate at home for next week until follow-up to prevent over hydration of tissue that silver nitrite was applied to.     Left groin / pubic wound noted last week is improving. Patient is taking antibiotics and continues to have a few more days. She states wound is draining. With cleansing small amount of seropurlent drainage noted. Cleansed with NS and gauze. Small dressing with siliver impregnated hydrofiber and silicone border dressing applied for patient to return to work.     Impression: Surgical wound to upper / lower gluteal crease with delayed wound healing. Small abscess to left groin / pubic area    Short term goals:  Regain skin integrity. Dressing changes EFRAIN Dockery RN    12/13/2023    15:50 EST

## 2023-12-14 ENCOUNTER — OFFICE VISIT (OUTPATIENT)
Dept: FAMILY MEDICINE CLINIC | Facility: CLINIC | Age: 53
End: 2023-12-14
Payer: COMMERCIAL

## 2023-12-14 ENCOUNTER — REFERRAL TRIAGE (OUTPATIENT)
Dept: CASE MANAGEMENT | Facility: OTHER | Age: 53
End: 2023-12-14
Payer: COMMERCIAL

## 2023-12-14 VITALS
OXYGEN SATURATION: 97 % | BODY MASS INDEX: 34.52 KG/M2 | WEIGHT: 202.2 LBS | HEIGHT: 64 IN | HEART RATE: 103 BPM | TEMPERATURE: 98.7 F | DIASTOLIC BLOOD PRESSURE: 73 MMHG | SYSTOLIC BLOOD PRESSURE: 111 MMHG

## 2023-12-14 DIAGNOSIS — E11.65 TYPE 2 DIABETES MELLITUS WITH HYPERGLYCEMIA, WITH LONG-TERM CURRENT USE OF INSULIN: ICD-10-CM

## 2023-12-14 DIAGNOSIS — Z00.00 ANNUAL PHYSICAL EXAM: Primary | ICD-10-CM

## 2023-12-14 DIAGNOSIS — Z12.31 VISIT FOR SCREENING MAMMOGRAM: ICD-10-CM

## 2023-12-14 DIAGNOSIS — Z79.4 TYPE 2 DIABETES MELLITUS WITH HYPERGLYCEMIA, WITH LONG-TERM CURRENT USE OF INSULIN: ICD-10-CM

## 2023-12-14 DIAGNOSIS — E78.2 MIXED HYPERLIPIDEMIA: ICD-10-CM

## 2023-12-14 DIAGNOSIS — E03.8 OTHER SPECIFIED HYPOTHYROIDISM: ICD-10-CM

## 2023-12-14 LAB
ALBUMIN SERPL-MCNC: 4.3 G/DL (ref 3.5–5.2)
ALBUMIN/GLOB SERPL: 1 G/DL
ALP SERPL-CCNC: 132 U/L (ref 39–117)
ALT SERPL W P-5'-P-CCNC: 20 U/L (ref 1–33)
ANION GAP SERPL CALCULATED.3IONS-SCNC: 9 MMOL/L (ref 5–15)
AST SERPL-CCNC: 18 U/L (ref 1–32)
BILIRUB SERPL-MCNC: 0.2 MG/DL (ref 0–1.2)
BUN SERPL-MCNC: 16 MG/DL (ref 6–20)
BUN/CREAT SERPL: 15.5 (ref 7–25)
CALCIUM SPEC-SCNC: 9.4 MG/DL (ref 8.6–10.5)
CHLORIDE SERPL-SCNC: 101 MMOL/L (ref 98–107)
CHOLEST SERPL-MCNC: 165 MG/DL (ref 0–200)
CO2 SERPL-SCNC: 23 MMOL/L (ref 22–29)
CREAT SERPL-MCNC: 1.03 MG/DL (ref 0.57–1)
DEPRECATED RDW RBC AUTO: 37 FL (ref 37–54)
EGFRCR SERPLBLD CKD-EPI 2021: 64.7 ML/MIN/1.73
ERYTHROCYTE [DISTWIDTH] IN BLOOD BY AUTOMATED COUNT: 12.3 % (ref 12.3–15.4)
GLOBULIN UR ELPH-MCNC: 4.4 GM/DL
GLUCOSE SERPL-MCNC: 191 MG/DL (ref 65–99)
HBA1C MFR BLD: 9.6 % (ref 4.8–5.6)
HCT VFR BLD AUTO: 43.1 % (ref 34–46.6)
HDLC SERPL-MCNC: 57 MG/DL (ref 40–60)
HGB BLD-MCNC: 13.5 G/DL (ref 12–15.9)
LDLC SERPL CALC-MCNC: 93 MG/DL (ref 0–100)
LDLC/HDLC SERPL: 1.61 {RATIO}
MCH RBC QN AUTO: 26.2 PG (ref 26.6–33)
MCHC RBC AUTO-ENTMCNC: 31.3 G/DL (ref 31.5–35.7)
MCV RBC AUTO: 83.5 FL (ref 79–97)
PLATELET # BLD AUTO: 320 10*3/MM3 (ref 140–450)
PMV BLD AUTO: 10.4 FL (ref 6–12)
POTASSIUM SERPL-SCNC: 4.8 MMOL/L (ref 3.5–5.2)
PROT SERPL-MCNC: 8.7 G/DL (ref 6–8.5)
RBC # BLD AUTO: 5.16 10*6/MM3 (ref 3.77–5.28)
SODIUM SERPL-SCNC: 133 MMOL/L (ref 136–145)
TRIGL SERPL-MCNC: 80 MG/DL (ref 0–150)
TSH SERPL DL<=0.05 MIU/L-ACNC: 1.48 UIU/ML (ref 0.27–4.2)
VLDLC SERPL-MCNC: 15 MG/DL (ref 5–40)
WBC NRBC COR # BLD AUTO: 9.31 10*3/MM3 (ref 3.4–10.8)

## 2023-12-14 PROCEDURE — 80061 LIPID PANEL: CPT | Performed by: NURSE PRACTITIONER

## 2023-12-14 PROCEDURE — 83036 HEMOGLOBIN GLYCOSYLATED A1C: CPT | Performed by: NURSE PRACTITIONER

## 2023-12-14 PROCEDURE — 80050 GENERAL HEALTH PANEL: CPT | Performed by: NURSE PRACTITIONER

## 2023-12-14 RX ORDER — SEMAGLUTIDE 1.34 MG/ML
INJECTION, SOLUTION SUBCUTANEOUS
COMMUNITY
Start: 2023-12-11 | End: 2023-12-14 | Stop reason: SINTOL

## 2023-12-14 RX ORDER — INSULIN DEGLUDEC 200 U/ML
56 INJECTION, SOLUTION SUBCUTANEOUS NIGHTLY
Qty: 27 ML | Refills: 1 | Status: SHIPPED | OUTPATIENT
Start: 2023-12-14

## 2023-12-14 RX ORDER — GLIPIZIDE 10 MG/1
10 TABLET ORAL
Qty: 180 TABLET | Refills: 1 | Status: SHIPPED | OUTPATIENT
Start: 2023-12-14

## 2023-12-18 RX ORDER — DULAGLUTIDE 0.75 MG/.5ML
0.75 INJECTION, SOLUTION SUBCUTANEOUS WEEKLY
Qty: 2 ML | Refills: 2 | Status: SHIPPED | OUTPATIENT
Start: 2023-12-18

## 2023-12-19 ENCOUNTER — PATIENT OUTREACH (OUTPATIENT)
Dept: CASE MANAGEMENT | Facility: OTHER | Age: 53
End: 2023-12-19
Payer: COMMERCIAL

## 2023-12-19 ENCOUNTER — TELEPHONE (OUTPATIENT)
Dept: CASE MANAGEMENT | Facility: OTHER | Age: 53
End: 2023-12-19
Payer: COMMERCIAL

## 2023-12-19 DIAGNOSIS — Z79.4 TYPE 2 DIABETES MELLITUS WITH HYPERGLYCEMIA, WITH LONG-TERM CURRENT USE OF INSULIN: Primary | ICD-10-CM

## 2023-12-19 DIAGNOSIS — E11.65 TYPE 2 DIABETES MELLITUS WITH HYPERGLYCEMIA, WITH LONG-TERM CURRENT USE OF INSULIN: Primary | ICD-10-CM

## 2023-12-19 NOTE — OUTREACH NOTE
Patient Outreach    [employer] sent. Diabetic education info sent via mail    Name and Relationship of Patient/Support Person: Mariela Aldrich - Self  Self        Education Documentation  No documentation found.        Tenisha OROPEZA  Ambulatory Case Management    12/19/2023, 09:47 EST

## 2023-12-20 ENCOUNTER — HOSPITAL ENCOUNTER (OUTPATIENT)
Dept: INFUSION THERAPY | Facility: HOSPITAL | Age: 53
Discharge: HOME | End: 2023-12-20
Admitting: SURGERY
Payer: COMMERCIAL

## 2023-12-20 ENCOUNTER — TELEPHONE (OUTPATIENT)
Dept: FAMILY MEDICINE CLINIC | Facility: CLINIC | Age: 53
End: 2023-12-20
Payer: COMMERCIAL

## 2023-12-20 VITALS
RESPIRATION RATE: 20 BRPM | HEART RATE: 102 BPM | DIASTOLIC BLOOD PRESSURE: 72 MMHG | OXYGEN SATURATION: 98 % | TEMPERATURE: 97.9 F | SYSTOLIC BLOOD PRESSURE: 115 MMHG

## 2023-12-20 DIAGNOSIS — L73.2 HIDRADENITIS: Primary | ICD-10-CM

## 2023-12-20 PROCEDURE — G0463 HOSPITAL OUTPT CLINIC VISIT: HCPCS

## 2023-12-20 RX ADMIN — SILVER NITRATE APPLICATORS 1 APPLICATION: 25; 75 STICK TOPICAL at 12:38

## 2023-12-20 NOTE — SIGNIFICANT NOTE
Wound Eval / Progress Noted     [facility]     Patient Name: Mariela Aldrich  : 1970  MRN: [id number]  Today's Date: 2023                 Admit Date: 2023    Visit Dx:    ICD-10-CM ICD-9-CM   1. Hidradenitis  L73.2 705.83         * No active hospital problems. *        Past Medical History:   Diagnosis Date    Anxiety     Asthma     PRN INHALER AND TAKES ALLERGY INJECTIONS    Diabetes mellitus type 2, controlled     AVERAGE     Hidradenitis     Hyperlipidemia     Hypothyroidism     Murmur     DIAGNOSED WHEN 17 BUT IS ASYMPTOMATIC AND IS FOLLOWED ONLY BY PCP    Panic attack     Pilonidal cyst     Rheumatic fever     AS A CHILD    RLS (restless legs syndrome)     Sinus trouble         Past Surgical History:   Procedure Laterality Date    COLONOSCOPY  2020    exernal hemorrhoids    EXCISION LESION Bilateral 3/20/2023    Procedure: Excision Hidradenitis of Bilateral Inguinal Areas;  Surgeon: Donato You MD;  Location: [facility];  Service: General;  Laterality: Bilateral;    GROIN ABSCESS INCISION AND DRAINAGE Right     hidradenitis    HYSTERECTOMY  2010    KNEE SURGERY Bilateral 2013    PILONIDAL CYSTECTOMY N/A 2023    Procedure: Excision Hidradenitis of Inguinal Area and Pilonidal Cystectomy;  Surgeon: Donato You MD;  Location: [facility];  Service: General;  Laterality: N/A;         Physical Assessment:  Wound 23 1333 medial gluteal Incision (Active)   Wound Image   23 1238   Dressing Appearance moist drainage;intact 23 1238   Closure None 23 1238   Base moist;red 23 1238   Periwound moist;redness;pink 23 1238   Periwound Temperature warm 23 1238   Periwound Skin Turgor soft 23 1238   Edges open 23 1238   Wound Length (cm) 2.8 cm 23 1238   Wound Width (cm) 0.4 cm 23 1238   Wound Depth (cm) 0.8 cm 23 1238   Wound Surface Area (cm^2) 1.12 cm^2 23 1238   Wound Volume (cm^3) 0.896 cm^3  12/20/23 1238   Drainage Characteristics/Odor serosanguineous 12/20/23 1238   Drainage Amount small 12/20/23 1238   Care, Wound cleansed with;irrigated with;sterile normal saline;silver agent applied 12/20/23 1238   Dressing Care dressing applied;calcium alginate;silver impregnated;silicone;border dressing 12/20/23 1238   Periwound Care absorptive dressing applied 12/20/23 1238       Wound 03/22/23 1518 medial coccyx Incision (Active)   Wound Image   12/20/23 1238   Dressing Appearance dry;intact 12/20/23 1238   Closure None 12/20/23 1238   Base moist;red 12/20/23 1238   Periwound dry;intact;redness 12/20/23 1238   Periwound Temperature warm 12/20/23 1238   Periwound Skin Turgor soft 12/20/23 1238   Edges open 12/20/23 1238   Wound Length (cm) 0.4 cm 12/20/23 1238   Wound Width (cm) 0.3 cm 12/20/23 1238   Wound Depth (cm) 0.2 cm 12/20/23 1238   Wound Surface Area (cm^2) 0.12 cm^2 12/20/23 1238   Wound Volume (cm^3) 0.024 cm^3 12/20/23 1238   Drainage Characteristics/Odor serosanguineous 12/20/23 1238   Drainage Amount small 12/20/23 1238   Care, Wound cleansed with;irrigated with;sterile normal saline 12/20/23 1238   Dressing Care dressing applied;calcium alginate;silver impregnated;silicone;border dressing 12/20/23 1238   Periwound Care absorptive dressing applied 12/20/23 1238      Wound Check / Follow-up: Patient seen today for wound follow-up and dressing change. Dressings intact upon assessment. Removed dressings. Coccyx wound with minimal depth noted at this time. There is a small open area beneath original incision with moist red tissue and minimal depth. Cleansed with normal saline and gauze. Applied silver impregnated calcium alginate and secured with silicone border dressing.    Lower gluteal wound with moist red tissue. Wound margins remain rolled. Minimal depth noted to wound upon assessment. Cleansed and irrigated with normal saline. Silver nitrate applied to wound margins. Applied silver impregnated  calcium alginate and secured with silicone border dressing.    Recommending to continue application of silver impregnated calcium alginate dressings at home until follow-up next week. Patient verbalized understanding.       Impression: Surgical wounds with delayed closure.      Short term goals: Regain skin integrity, skin protection, dressings changes on MWF.       Lanette Cantu RN    12/20/2023    16:07 EST

## 2023-12-20 NOTE — TELEPHONE ENCOUNTER
Patient is aware [facility]ulicity has been approved pharmacy notified of approval 12-20-23 to 12-19-24 [place] CASE: [id number]

## 2023-12-24 DIAGNOSIS — G25.81 RLS (RESTLESS LEGS SYNDROME): ICD-10-CM

## 2023-12-26 RX ORDER — ROPINIROLE 5 MG/1
5 TABLET, FILM COATED ORAL NIGHTLY
Qty: 90 TABLET | Refills: 0 | Status: SHIPPED | OUTPATIENT
Start: 2023-12-26

## 2023-12-27 ENCOUNTER — HOSPITAL ENCOUNTER (OUTPATIENT)
Dept: INFUSION THERAPY | Facility: HOSPITAL | Age: 53
Discharge: HOME | End: 2023-12-27
Admitting: SURGERY
Payer: COMMERCIAL

## 2023-12-27 VITALS
SYSTOLIC BLOOD PRESSURE: 114 MMHG | DIASTOLIC BLOOD PRESSURE: 77 MMHG | OXYGEN SATURATION: 96 % | TEMPERATURE: 98 F | HEART RATE: 99 BPM | RESPIRATION RATE: 20 BRPM

## 2023-12-27 DIAGNOSIS — E11.65 TYPE 2 DIABETES MELLITUS WITH HYPERGLYCEMIA, WITH LONG-TERM CURRENT USE OF INSULIN: ICD-10-CM

## 2023-12-27 DIAGNOSIS — Z79.4 TYPE 2 DIABETES MELLITUS WITH HYPERGLYCEMIA, WITH LONG-TERM CURRENT USE OF INSULIN: ICD-10-CM

## 2023-12-27 DIAGNOSIS — L73.2 HIDRADENITIS: Primary | ICD-10-CM

## 2023-12-27 PROCEDURE — G0463 HOSPITAL OUTPT CLINIC VISIT: HCPCS

## 2023-12-27 RX ORDER — BLOOD SUGAR DIAGNOSTIC
STRIP MISCELLANEOUS
Qty: 100 EACH | Refills: 0 | Status: SHIPPED | OUTPATIENT
Start: 2023-12-27

## 2023-12-27 RX ADMIN — SILVER NITRATE APPLICATORS 1 APPLICATION: 25; 75 STICK TOPICAL at 12:17

## 2023-12-27 NOTE — PROGRESS NOTES
Chief Complaint  Diabetes (FOLLOW UP)    Subjective          Mariela Aldrich is a 53 y.o. female who presents to [facility]    Diabetes  Pertinent negatives for hypoglycemia include no confusion, dizziness, headaches, nervousness/anxiousness or seizures. Pertinent negatives for diabetes include no chest pain and no fatigue.     History of Present Illness  The patient presents for evaluation of multiple medical concerns.    Her diabetes has been fluctuating, but it is better. Her lowest reading was 58 when she started using a new glucometer, but it was 102 when she poked her finger. She was not symptomatic when the reading was 58. Her blood sugar has been in the 80s, but it has been high depending on what she eats. She has noticed that pastas and breads make her blood sugar more high than ice cream. Her blood sugar has been as high as 400. She is taking Tresiba 56 units nightly, but Mounjaro seems to level it out quicker.    She denies any chest pain or shortness of breath. Her bowel and bladder are working well, but for the last 3 days, she has had some burning with urination. She has not had much water at all.    She denies any dizziness. She is taking lisinopril.She has a dry cough, so her allergist put her on Singulair. The allergist thought it may be her asthma at night, but sometimes it wakes her up coughing. She is not using Flonase. She is getting injections every other week now. She has noticed a big difference in her allergies with sneezing.    She is taking Livalo every day at night for her hyperlipidemia. She denies any muscle cramps.    Her restless legs are better. She is taking gabapentin every night for her restless legs.    Her anxiety has been good.     She denies any dizziness.            Review of Systems   Constitutional:  Negative for fatigue.   Respiratory:  Positive for cough (dry). Negative for shortness of breath.    Cardiovascular:  Negative for chest  pain and palpitations.   Gastrointestinal:  Negative for nausea and vomiting.   Endocrine: Negative for cold intolerance and heat intolerance.   Genitourinary:  Negative for difficulty urinating and dysuria.   Musculoskeletal:  Negative for arthralgias, gait problem and joint swelling.   Neurological:  Negative for dizziness, seizures and headaches.   Hematological:  Negative for adenopathy. Does not bruise/bleed easily.   Psychiatric/Behavioral:  Negative for confusion, dysphoric mood and sleep disturbance. The patient is not nervous/anxious.           Medical History: has a past medical history of Anxiety, Asthma, Diabetes mellitus type 2, controlled, Hidradenitis, Hyperlipidemia, Hypothyroidism, Murmur, Panic attack, Pilonidal cyst, Rheumatic fever, RLS (restless legs syndrome), and Sinus trouble.     Surgical History: has a past surgical history that includes Colonoscopy (01/21/2020); Groin Abscess Incision and Drainage (Right); Hysterectomy (2010); Knee surgery (Bilateral, 2013); Pilonidal Cystectomy (N/A, 2/8/2023); and Excision Lesion (Bilateral, 3/20/2023).     Family History: family history includes Diabetes in her brother, maternal grandmother, and sister; Thyroid disease in her sister; Thyroid disease (age of onset: 43) in her brother; Thyroid disease (age of onset: 70) in her father.     Social History: reports that she has never smoked. She has never used smokeless tobacco. She reports that she does not drink alcohol and does not use drugs.    Allergies: Etodolac, Penicillins, Statins, Atorvastatin, Crestor [rosuvastatin], and Pravastatin      Health Maintenance Due   Topic Date Due    DIABETIC FOOT EXAM  09/07/2023            Current Outpatient Medications:     albuterol sulfate  (90 Base) MCG/ACT inhaler, Inhale 2 puffs Every 4 (Four) Hours As Needed for Shortness of Air., Disp: 18 g, Rfl: 1    cetirizine (zyrTEC) 10 MG tablet, Zyrtec 10 mg oral tablet take 1 tablet (10 mg) by oral route once  daily   Active, Disp: , Rfl:     Continuous Blood Gluc Sensor (Dexcom G7 Sensor) misc, Use 1 each Every 14 (Fourteen) Days., Disp: 6 each, Rfl: 1    Continuous Blood Gluc Transmit (Dexcom G6 Transmitter) misc, 1 Device Every 3 (Three) Months., Disp: 1 each, Rfl: 3    Empagliflozin-metFORMIN HCl ER (Synjardy XR) 12.5-1000 MG tablet sustained-release 24 hour, Take 1 tablet by mouth 2 (Two) Times a Day., Disp: 180 tablet, Rfl: 1    Fluticasone-Salmeterol (ADVAIR/WIXELA) 250-50 MCG/ACT DISKUS, INHALE 1 DOSE BY MOUTH TWICE DAILY, Disp: 60 each, Rfl: 0    gabapentin (NEURONTIN) 300 MG capsule, Take 1 capsule by mouth Every Night., Disp: 90 capsule, Rfl: 1    glipizide (GLUCOTROL) 10 MG tablet, Take 1 tablet by mouth Daily With Breakfast., Disp: 180 tablet, Rfl: 1    glucose blood (OneTouch Verio) test strip, USE 1 STRIP TO CHECK GLUCOSE THREE TIMES DAILY, Disp: 100 each, Rfl: 0    Insulin Degludec (Tresiba FlexTouch) 200 UNIT/ML solution pen-injector pen injection, Inject 56 Units under the skin into the appropriate area as directed Every Night., Disp: 27 mL, Rfl: 1    Insulin Pen Needle (B-D ULTRAFINE III SHORT PEN) 31G X 8 MM misc, Inject 1 each under the skin into the appropriate area as directed Daily., Disp: 100 each, Rfl: 3    lisinopril (PRINIVIL,ZESTRIL) 5 MG tablet, Take 1 tablet by mouth Every Night., Disp: 90 tablet, Rfl: 1    montelukast (SINGULAIR) 10 MG tablet, Take 1 tablet by mouth every night at bedtime., Disp: , Rfl:     multivitamin (THERAGRAN) tablet tablet, , Disp: , Rfl:     ONE TOUCH CLUB LANC[place] [facility] [place] 90 each 3 (Three) Times a Day As Needed (check blood sugar)., Disp: 100 each, Rfl: 3    pitavastatin calcium (Livalo) 2 MG tablet tablet, Take 1 tablet by mouth Every Night., Disp: 90 tablet, Rfl: 1    rOPINIRole (REQUIP) 5 MG tablet, TAKE 1 TABLET BY MOUTH ONCE DAILY AT NIGHT, Disp: 90 tablet, Rfl: 0    sertraline (ZOLOFT) 100 MG tablet, Take 1.5 tablets by mouth Daily., Disp: 135 tablet, Rfl: 1     "Synthroid 88 MCG tablet, Take 1 tablet by mouth Daily., Disp: 90 tablet, Rfl: 1    Zinc 50 MG tablet, zinc 50 mg oral tablet take 1 tablet by oral route daily   Active, Disp: , Rfl:     Diclofenac Sodium (VOLTAREN) 1 % gel gel, APPLY 4 GRAMS TOPICALLY TO THE APPROPRIATE AREA 4 TIMES A DAY AS NEEDED FOR PAIN (Patient not taking: Reported on 3/13/2024), Disp: 100 g, Rfl: 0    fluticasone (FLONASE) 50 MCG/ACT nasal spray, 2 sprays into the nostril(s) as directed by provider Daily. (Patient not taking: Reported on 11/15/2023), Disp: 16 g, Rfl: 5    Tirzepatide (Mounjaro) 7.5 MG/0.5ML solution pen-injector pen, Inject 0.5 mL under the skin into the appropriate area as directed 1 (One) Time Per Week., Disp: 2 mL, Rfl: 0    Current Facility-Administered Medications:     methylPREDNISolone acetate (DEPO-medrol) injection 20 mg, 20 mg, Intra-articular, Once, Berenice Avelar, WILLIAM      Immunization History   Administered Date(s) Administered    Fluzone (or Fluarix & Flulaval for VFC) >6mos 09/28/2023    Fluzone Quad >6mos (Multi-dose) 10/01/2020    Pneumococcal Conjugate 20-Valent (PCV20) 09/28/2023    Pneumococcal Polysaccharide (PPSV23) 07/20/2019    Td (TDVAX) 05/08/1998    Tdap 03/16/2020         Objective       Vitals:    03/13/24 0709   BP: 106/71   Pulse: 98   Temp: 98.5 °F (36.9 °C)   TempSrc: Temporal   SpO2: 96%   Weight: 90.9 kg (200 lb 6.4 oz)   Height: 162.6 cm (64.02\")      Body mass index is 34.38 kg/m².   Wt Readings from Last 3 Encounters:   03/13/24 90.9 kg (200 lb 6.4 oz)   12/14/23 91.7 kg (202 lb 3.2 oz)   11/15/23 90.6 kg (199 lb 12.8 oz)      BP Readings from Last 3 Encounters:   03/13/24 106/71   03/06/24 114/74   02/28/24 116/74             Physical Exam  Vitals reviewed.   Constitutional:       Appearance: Normal appearance. She is well-developed.   HENT:      Head: Normocephalic and atraumatic.   Eyes:      Conjunctiva/sclera: Conjunctivae normal.      Pupils: Pupils are equal, round, and reactive " to light.   Cardiovascular:      Rate and Rhythm: Normal rate and regular rhythm.      Heart sounds: Normal heart sounds. No murmur heard.  Pulmonary:      Effort: Pulmonary effort is normal.      Breath sounds: Normal breath sounds. No wheezing or rhonchi.   Abdominal:      General: Bowel sounds are normal. There is no distension.      Palpations: Abdomen is soft.      Tenderness: There is no abdominal tenderness.   Skin:     General: Skin is warm and dry.   Neurological:      Mental Status: She is alert and oriented to person, place, and time.   Psychiatric:         Mood and Affect: Mood and affect normal.         Behavior: Behavior normal.         Thought Content: Thought content normal.         Judgment: Judgment normal.             Result Review :   Results         Common labs          3/29/2023    08:21 3/29/2023    08:25 9/28/2023    07:50 9/28/2023    07:53 12/14/2023    07:55   Common Labs   Glucose 172   146   191    BUN 12   18   16    Creatinine 0.78   0.82   1.03    Sodium 136   136   133    Potassium 4.4   4.5   4.8    Chloride 103   103   101    Calcium 9.0   9.6   9.4    Albumin 3.7   4.2   4.3    Total Bilirubin <0.2   0.2   0.2    Alkaline Phosphatase 99   142   132    AST (SGOT) 21   25   18    ALT (SGPT) 15   24   20    WBC     9.31    Hemoglobin     13.5    Hematocrit     43.1    Platelets     320    Total Cholesterol 150   169   165    Triglycerides 89   100   80    HDL Cholesterol 49   61   57    LDL Cholesterol  84   90   93    Hemoglobin A1C 8.10   9.40   9.60    Microalbumin, Urine  <1.2   <1.2                      Assessment and Plan        Diagnoses and all orders for this visit:    1. Type 2 diabetes mellitus with hyperglycemia, with long-term current use of insulin (Primary)  -     Hemoglobin A1c  -     Tirzepatide (Mounjaro) 7.5 MG/0.5ML solution pen-injector pen; Inject 0.5 mL under the skin into the appropriate area as directed 1 (One) Time Per Week.  Dispense: 2 mL; Refill: 0  -      Empagliflozin-metFORMIN HCl ER (Synjardy XR) 12.5-1000 MG tablet sustained-release 24 hour; Take 1 tablet by mouth 2 (Two) Times a Day.  Dispense: 180 tablet; Refill: 1    2. Mixed hyperlipidemia  -     Comprehensive Metabolic Panel  -     Lipid Panel  -     pitavastatin calcium (Livalo) 2 MG tablet tablet; Take 1 tablet by mouth Every Night.  Dispense: 90 tablet; Refill: 1    3. Dysuria  -     POC Urinalysis Dipstick, Automated    4. Anxiety  -     sertraline (ZOLOFT) 100 MG tablet; Take 1.5 tablets by mouth Daily.  Dispense: 135 tablet; Refill: 1    5. Other specified hypothyroidism  -     Synthroid 88 MCG tablet; Take 1 tablet by mouth Daily.  Dispense: 90 tablet; Refill: 1    6. Restless leg  -     gabapentin (NEURONTIN) 300 MG capsule; Take 1 capsule by mouth Every Night.  Dispense: 90 capsule; Refill: 1    7. Dry cough    8. RLS (restless legs syndrome)  Assessment & Plan:  Well controlled with gabapentin and requip, will continue.          Assessment & Plan  1. Diabetes.  I will increase her Mounjaro to 7.5mg weekly. With her blood sugar is in the 400s, we will plan on increasing her Mounjaro to 10.    2. Dysuria.  This could be due to Synjardy. I will check her urine today to rule out UTI.    3. Dry cough.  She will hold her lisinopril for 1 month. If her cough does resolve, we will know it is due to the medication.    4. Anxiety.  Well controlled with medication.    Follow-up  I will get some blood work on her this morning.    Follow Up     Return in about 3 months (around 6/13/2024) for Next scheduled follow up.    Patient was given instructions and counseling regarding her condition or for health maintenance advice. Please see specific information pulled into the AVS if appropriate.     WILLIAM Hickman    Patient or patient representative verbalized consent for the use of Ambient Listening during the visit with  WILLIAM Hickman for chart documentation. 3/13/2024  07:36 EDT       DISCHARGE

## 2023-12-27 NOTE — SIGNIFICANT NOTE
Wound Eval / Progress Noted     [facility]     Patient Name: Mariela Aldrich  : 1970  MRN: [id number]  Today's Date: 2023                 Admit Date: 2023    Visit Dx:    ICD-10-CM ICD-9-CM   1. Hidradenitis  L73.2 705.83         * No active hospital problems. *        Past Medical History:   Diagnosis Date    Anxiety     Asthma     PRN INHALER AND TAKES ALLERGY INJECTIONS    Diabetes mellitus type 2, controlled     AVERAGE     Hidradenitis     Hyperlipidemia     Hypothyroidism     Murmur     DIAGNOSED WHEN 17 BUT IS ASYMPTOMATIC AND IS FOLLOWED ONLY BY PCP    Panic attack     Pilonidal cyst     Rheumatic fever     AS A CHILD    RLS (restless legs syndrome)     Sinus trouble         Past Surgical History:   Procedure Laterality Date    COLONOSCOPY  2020    exernal hemorrhoids    EXCISION LESION Bilateral 3/20/2023    Procedure: Excision Hidradenitis of Bilateral Inguinal Areas;  Surgeon: Donato You MD;  Location: [facility];  Service: General;  Laterality: Bilateral;    GROIN ABSCESS INCISION AND DRAINAGE Right     hidradenitis    HYSTERECTOMY  2010    KNEE SURGERY Bilateral 2013    PILONIDAL CYSTECTOMY N/A 2023    Procedure: Excision Hidradenitis of Inguinal Area and Pilonidal Cystectomy;  Surgeon: Donato You MD;  Location: [facility];  Service: General;  Laterality: N/A;         Physical Assessment:  Wound 23 1333 medial gluteal Incision (Active)   Wound Image   23 1226   Dressing Appearance moist drainage;intact 23 1226   Closure None 23 1226   Base moist;red 23 1226   Periwound intact;pink;redness 23 1226   Periwound Temperature warm 23 1226   Periwound Skin Turgor soft 23 1226   Edges open 23 1226   Wound Length (cm) 1.5 cm 23 1226   Wound Width (cm) 0.6 cm 23 1226   Wound Depth (cm) 0.4 cm 23 1226   Wound Surface Area (cm^2) 0.9 cm^2 23 1226   Wound Volume (cm^3) 0.36 cm^3  12/27/23 1226   Drainage Characteristics/Odor serosanguineous 12/27/23 1226   Drainage Amount small 12/27/23 1226   Care, Wound cleansed with;irrigated with;sterile normal saline;silver agent applied 12/27/23 1226   Dressing Care dressing applied;calcium alginate;silver impregnated;silicone;border dressing;hydrocolloid 12/27/23 1226   Periwound Care absorptive dressing applied 12/27/23 1226       Wound 03/22/23 1518 medial coccyx Incision (Active)   Wound Image   12/27/23 1226   Dressing Appearance moist drainage;intact 12/27/23 1226   Closure None 12/27/23 1226   Base moist;red 12/27/23 1226   Periwound dry;intact;pink 12/27/23 1226   Periwound Temperature warm 12/27/23 1226   Periwound Skin Turgor soft 12/27/23 1226   Edges open 12/27/23 1226   Wound Length (cm) 0.5 cm 12/27/23 1226   Wound Width (cm) 0.3 cm 12/27/23 1226   Wound Depth (cm) 0.3 cm 12/27/23 1226   Wound Surface Area (cm^2) 0.15 cm^2 12/27/23 1226   Wound Volume (cm^3) 0.045 cm^3 12/27/23 1226   Drainage Characteristics/Odor serosanguineous 12/27/23 1226   Drainage Amount small 12/27/23 1226   Care, Wound cleansed with;irrigated with;sterile normal saline;silver agent applied 12/27/23 1226   Dressing Care dressing applied;calcium alginate;silver impregnated;silicone;border dressing 12/27/23 1226   Periwound Care absorptive dressing applied 12/27/23 1226      Wound Check / Follow-up: Patient seen today for wound follow-up and dressing change. Dressings intact upon assessment with some moist drainage noted. Removed dressings. Coccyx wound with no measurable depth noted. Small open area beneath original incision remains open with moist red tissue and raised wound margins. Cleansed with normal saline and gauze. Silver nitrate applied to wound and wound margins. Applied silver impregnated calcium alginate and secured with silicone border dressing. Lower gluteal wound with moist red tissue. Wound showing response to current treatment. Some rolled tissue  remains present to wound margins. Cleansed with normal saline and gauze. Applied silver nitrate to wound and wound margins. Applied silver impregnated calcium alginate and secured with silicone border dressing. Applied two strips of hydrocolloid dressing to aid in seal. Recommending to continue application of silver impregnated calcium alginate dressings at home until follow-up next week. Patient verbalized understanding.      Impression: Surgical wounds with delayed closure.         Short term goals: Regain skin integrity, skin protection, dressings changes on MWF.        Lanette Cantu RN    12/27/2023    12:32 EST

## 2024-01-02 ENCOUNTER — PATIENT OUTREACH (OUTPATIENT)
Dept: CASE MANAGEMENT | Facility: OTHER | Age: 54
End: 2024-01-02
Payer: COMMERCIAL

## 2024-01-02 ENCOUNTER — TELEPHONE (OUTPATIENT)
Dept: CASE MANAGEMENT | Facility: OTHER | Age: 54
End: 2024-01-02
Payer: COMMERCIAL

## 2024-01-02 DIAGNOSIS — E78.2 MIXED HYPERLIPIDEMIA: ICD-10-CM

## 2024-01-02 DIAGNOSIS — Z79.4 TYPE 2 DIABETES MELLITUS WITH HYPERGLYCEMIA, WITH LONG-TERM CURRENT USE OF INSULIN: Primary | ICD-10-CM

## 2024-01-02 DIAGNOSIS — E11.65 TYPE 2 DIABETES MELLITUS WITH HYPERGLYCEMIA, WITH LONG-TERM CURRENT USE OF INSULIN: Primary | ICD-10-CM

## 2024-01-02 NOTE — OUTREACH NOTE
AMBULATORY CASE MANAGEMENT NOTE    Name and Relationship of Patient/Support Person: Mariela Aldrich - Self    Patient Outreach    Called patient to inform of PCP recommendation of starting the Mounjaro samples and she verbalized agreement and understanding of 2.5 mg weekly injections and to follow up with ALESIA Steven next week with another set of blood sugar logs and to call sooner for any issues or side effects she may have.     Education Documentation  No documentation found.        Christel ZULETA  Ambulatory Case Management    1/2/2024, 14:07 EST

## 2024-01-02 NOTE — TELEPHONE ENCOUNTER
Message from patient on her blood sugar log to Tenisha who is off today - please advise and I will call patient with recommendations.         I have attached my fasting blood sugars from Monday 12/26/2023. I took the Trulicity shot on the 25th and have had some stomach cramps every day. I had diarrhea one night. I didn’t log and fasting blood sugar Saturday because I was too sick. I had bad stomach cramps and nauseous. I wasn’t even able to take my other medication that night. Also my sugar did not come down like it did with the Qzempic. Then this morning the 31th my sugar dropped to 69 probably because I couldn’t really eat much. I am supposed to take another shot tomorrow, but will not be taking it due to how sick I have been. Do you want me to start the samples of the Mounjaro shot?

## 2024-01-02 NOTE — TELEPHONE ENCOUNTER
Patient informed of new plan of care and will start the Mounjaro 2.5 mg weekly and follow up with Tenisha for a new log next week. Already stopped Trulicity.

## 2024-01-03 ENCOUNTER — HOSPITAL ENCOUNTER (OUTPATIENT)
Dept: INFUSION THERAPY | Facility: HOSPITAL | Age: 54
Discharge: HOME | End: 2024-01-03
Admitting: NURSE PRACTITIONER
Payer: COMMERCIAL

## 2024-01-03 VITALS
RESPIRATION RATE: 18 BRPM | OXYGEN SATURATION: 99 % | TEMPERATURE: 97.9 F | SYSTOLIC BLOOD PRESSURE: 114 MMHG | DIASTOLIC BLOOD PRESSURE: 76 MMHG | HEART RATE: 95 BPM

## 2024-01-03 DIAGNOSIS — L73.2 HIDRADENITIS: Primary | ICD-10-CM

## 2024-01-03 PROCEDURE — G0463 HOSPITAL OUTPT CLINIC VISIT: HCPCS

## 2024-01-03 NOTE — SIGNIFICANT NOTE
Wound Eval / Progress Noted    [facility]     Patient Name: Mariela Aldrich  : 1970  MRN: [id number]  Today's Date: 1/3/2024                 Admit Date: 1/3/2024    Visit Dx:  No diagnosis found.      * No active hospital problems. *        Past Medical History:   Diagnosis Date    Anxiety     Asthma     PRN INHALER AND TAKES ALLERGY INJECTIONS    Diabetes mellitus type 2, controlled     AVERAGE     Hidradenitis     Hyperlipidemia     Hypothyroidism     Murmur     DIAGNOSED WHEN 17 BUT IS ASYMPTOMATIC AND IS FOLLOWED ONLY BY PCP    Panic attack     Pilonidal cyst     Rheumatic fever     AS A CHILD    RLS (restless legs syndrome)     Sinus trouble         Past Surgical History:   Procedure Laterality Date    COLONOSCOPY  2020    exernal hemorrhoids    EXCISION LESION Bilateral 3/20/2023    Procedure: Excision Hidradenitis of Bilateral Inguinal Areas;  Surgeon: Donato You MD;  Location: [facility];  Service: General;  Laterality: Bilateral;    GROIN ABSCESS INCISION AND DRAINAGE Right     hidradenitis    HYSTERECTOMY  2010    KNEE SURGERY Bilateral     PILONIDAL CYSTECTOMY N/A 2023    Procedure: Excision Hidradenitis of Inguinal Area and Pilonidal Cystectomy;  Surgeon: Donato You MD;  Location: [facility];  Service: General;  Laterality: N/A;         Physical Assessment:  Wound 23 1333 medial gluteal Incision (Active)   Wound Image   24 1240   Dressing Appearance intact;dry 24 1240   Closure None 24 1240   Base granulating;moist;red 24 1240   Red (%), Wound Tissue Color 100 24 1240   Periwound intact;dry;pink 24 1240   Periwound Temperature warm 24 1240   Periwound Skin Turgor soft 24 1240   Edges open 24 1240   Drainage Characteristics/Odor serosanguineous 24 1240   Drainage Amount scant 24 1240   Care, Wound cleansed with;sterile normal saline 24 1240   Dressing Care dressing  applied;silicone;border dressing 01/03/24 1240   Periwound Care topical treatment applied 01/03/24 1240       Wound 03/22/23 1518 medial coccyx Incision (Active)   Wound Image   01/03/24 1240   Dressing Appearance intact;dry 01/03/24 1240   Closure None 01/03/24 1240   Base red;moist;granulating 01/03/24 1240   Red (%), Wound Tissue Color 100 01/03/24 1240   Periwound intact;dry;pink 01/03/24 1240   Periwound Temperature warm 01/03/24 1240   Periwound Skin Turgor soft 01/03/24 1240   Edges open 01/03/24 1240   Drainage Characteristics/Odor serosanguineous;yellow;creamy 01/03/24 1240   Drainage Amount small 01/03/24 1240   Care, Wound cleansed with;sterile normal saline 01/03/24 1240   Dressing Care dressing applied;silicone;border dressing 01/03/24 1240   Periwound Care topical treatment applied 01/03/24 1240        Wound Check / Follow-up:  Patient seen today for a wound check and dressing change. Dressings are clean dry and intact; no drainage noted upon removal. Coccyx with no measurable depth noted. Open red moist tissue at 6 o'clock from the initial wound. No depth noted. Scant amount of yellow creamy drainage noted; no other drainage expressed during palpation. Cleansed with NS and gauze. Applied a dime thick layer of triad to the open tissue. Lower perirectal wound had responded well to the silver nitrate; wound base is red and moist. Small area of hypergranulation noted to the periwound. Wound depth filling in and wound edges decreasing. Wound base is red moist and granular. Cleansed with NS. Applied and dime thick layer of triad to the wound. Covered both wounds with a silicone border dressing. Patient has been instructed to apply the triad topical treatment to the wound base on M-W-F.    Impression: surgical wounds with delayed closure    Short term goals:  regain skin integrity, skin protection, dressing changes on [facility]    No Henriquez RN    1/3/2024    13:58 EST

## 2024-01-05 ENCOUNTER — TELEPHONE (OUTPATIENT)
Dept: CASE MANAGEMENT | Facility: OTHER | Age: 54
End: 2024-01-05
Payer: COMMERCIAL

## 2024-01-05 ENCOUNTER — PATIENT OUTREACH (OUTPATIENT)
Dept: CASE MANAGEMENT | Facility: OTHER | Age: 54
End: 2024-01-05
Payer: COMMERCIAL

## 2024-01-05 DIAGNOSIS — Z79.4 TYPE 2 DIABETES MELLITUS WITH HYPERGLYCEMIA, WITH LONG-TERM CURRENT USE OF INSULIN: Primary | ICD-10-CM

## 2024-01-05 DIAGNOSIS — E11.65 TYPE 2 DIABETES MELLITUS WITH HYPERGLYCEMIA, WITH LONG-TERM CURRENT USE OF INSULIN: Primary | ICD-10-CM

## 2024-01-05 NOTE — TELEPHONE ENCOUNTER
Contacted for glucose log. Started Mounjaro 1/2/24. Reports tolerating well.      date fbs 2 hr after lunch   1/2 93     1/2 159     1/3 143 89   1/4 206        Elevated readings are due to diet/sweets. She will continue to log fbs and also after lunch. I will call back in one week for another log.     Reports has 3 sample pens left. Will you please send in script for Mounjaro to her pharmacy. PA may need to be submitted to show failure with Ozempic and Trulicity.

## 2024-01-05 NOTE — TELEPHONE ENCOUNTER
[employer] message sent informing of dosage increase to 5 mg after completing 4 weeks of 2.5 mg. She is to let me know if any problems getting this filled.

## 2024-01-05 NOTE — OUTREACH NOTE
Patient Outreach    Contacted for glucose log. Started Mounjaro 1/2/24. Reports tolerating well.     date fbs 2 hr after lunch   1/2 93    1/2 159    1/3 143 89   1/4 206      Elevated readings are due to diet/sweets. She will continue to log fbs and also after lunch. I will call back in one week for another log.    Reports has 3 sample pens left. I will request script sent to pharmacy as she may need another PA submitted to show failure with Ozempic and Trulicity.        Name and Relationship of Patient/Support Person: Mariela Aldrich - Self        Education Documentation  No documentation found.        Tenisha OROPEZA  Ambulatory Case Management    1/5/2024, 09:58 EST

## 2024-01-10 ENCOUNTER — HOSPITAL ENCOUNTER (OUTPATIENT)
Dept: INFUSION THERAPY | Facility: HOSPITAL | Age: 54
Discharge: HOME | End: 2024-01-10
Admitting: NURSE PRACTITIONER
Payer: COMMERCIAL

## 2024-01-10 VITALS
RESPIRATION RATE: 20 BRPM | HEART RATE: 93 BPM | TEMPERATURE: 98.3 F | SYSTOLIC BLOOD PRESSURE: 123 MMHG | OXYGEN SATURATION: 96 % | DIASTOLIC BLOOD PRESSURE: 74 MMHG

## 2024-01-10 DIAGNOSIS — L73.2 HIDRADENITIS: Primary | ICD-10-CM

## 2024-01-10 PROCEDURE — G0463 HOSPITAL OUTPT CLINIC VISIT: HCPCS

## 2024-01-10 NOTE — SIGNIFICANT NOTE
Wound Eval / Progress Noted     [facility]     Patient Name: Mariela Aldrich  : 1970  MRN: [id number]  Today's Date: 1/10/2024                 Admit Date: 1/10/2024    Visit Dx:    ICD-10-CM ICD-9-CM   1. Hidradenitis  L73.2 705.83         * No active hospital problems. *        Past Medical History:   Diagnosis Date    Anxiety     Asthma     PRN INHALER AND TAKES ALLERGY INJECTIONS    Diabetes mellitus type 2, controlled     AVERAGE     Hidradenitis     Hyperlipidemia     Hypothyroidism     Murmur     DIAGNOSED WHEN 17 BUT IS ASYMPTOMATIC AND IS FOLLOWED ONLY BY PCP    Panic attack     Pilonidal cyst     Rheumatic fever     AS A CHILD    RLS (restless legs syndrome)     Sinus trouble         Past Surgical History:   Procedure Laterality Date    COLONOSCOPY  2020    exernal hemorrhoids    EXCISION LESION Bilateral 3/20/2023    Procedure: Excision Hidradenitis of Bilateral Inguinal Areas;  Surgeon: Donato You MD;  Location: [facility];  Service: General;  Laterality: Bilateral;    GROIN ABSCESS INCISION AND DRAINAGE Right     hidradenitis    HYSTERECTOMY  2010    KNEE SURGERY Bilateral 2013    PILONIDAL CYSTECTOMY N/A 2023    Procedure: Excision Hidradenitis of Inguinal Area and Pilonidal Cystectomy;  Surgeon: Donato You MD;  Location: [facility];  Service: General;  Laterality: N/A;         Physical Assessment:     01/10/24 1259   Wound 23 1333 medial gluteal Incision   Placement Date/Time: 23 1333   Present on Hospital Admission: No  Orientation: medial  Location: gluteal  Primary Wound Type: Incision   Wound Image    Dressing Appearance intact;moist drainage   Closure None   Base granulating;red;moist   Red (%), Wound Tissue Color 100   Periwound macerated;redness   Periwound Temperature warm   Periwound Skin Turgor soft   Edges open   Wound Length (cm) 2.1 cm   Wound Width (cm) 0.1 cm   Wound Depth (cm) 0.2 cm   Wound Surface Area (cm^2) 0.21 cm^2    Wound Volume (cm^3) 0.042 cm^3   Drainage Characteristics/Odor serosanguineous   Drainage Amount small   Care, Wound cleansed with;irrigated with;sterile normal saline   Dressing Care dressing applied;border dressing;hydrofiber;silver impregnated;silicone  (silver nitrite applied to tissue)   Periwound Care absorptive dressing applied   Wound 03/22/23 1518 medial coccyx Incision   Placement Date/Time: 03/22/23 1518   Present on Hospital Admission: Yes  Orientation: medial  Location: coccyx  Primary Wound Type: Incision   Wound Image    Dressing Appearance intact;moist drainage   Closure None   Base moist;red   Red (%), Wound Tissue Color 100   Periwound dry;pink   Periwound Temperature warm   Periwound Skin Turgor soft   Edges open   Wound Length (cm) 0.1 cm  (second area 0.4cm)   Wound Width (cm) 0.1 cm  (second area 0.2cm)   Wound Depth (cm) 0.1 cm  (second area 0.1cm)   Wound Surface Area (cm^2) 0.01 cm^2   Wound Volume (cm^3) 0.001 cm^3   Drainage Characteristics/Odor serosanguineous;yellow   Drainage Amount small   Care, Wound cleansed with;irrigated with;sterile normal saline   Dressing Care dressing applied;hydrofiber;silver impregnated;silicone;border dressing  (silver nitrite applied to site)   Periwound Care absorptive dressing applied          Wound Check / Follow-up:  Patient seen today for wound care / wound check. Patient has been doing wound care at home in between visits at [facility]. Patient has been responding well to silver nitrite treatments in the past. Triad being trialed intermittently to assist with wound closure. Intact dressing removed.     Cleansed with NS and gauze. Upper gluteal crease with initial opening that is almost completely closed. There is no visible depth or tunnel noted. There is only minimal interruption of tissue but there does still remain yellow drainage coming from wound opening. There is an additional small opening to gluteal crease distal to this opening that measures 0.4cm  x 0.2cm x 0.1cm. silver nitrite applied to those areas and then recommending silver impregnated hydrofiber and silicone border dressing.     Lower gluteal crease with open red moist tissue. There is some hypergranulation remaining with minimal maceration to periwound tissue. Cleansed with NS and gauze. Silver nitrite applied and recommending silver impregnated hydrofiber and silicone border dressing to allow for drying of tissue to promote closure.       Impression: Surgical wounds to gluteal crease with delayed healing    Short term goals:  Regain skin integrity. Dressing changes 2 - 3 x week.     Miladys Dockery RN    1/10/2024    14:34 EST

## 2024-01-17 ENCOUNTER — TELEPHONE (OUTPATIENT)
Dept: SURGERY | Facility: CLINIC | Age: 54
End: 2024-01-17
Payer: COMMERCIAL

## 2024-01-17 ENCOUNTER — HOSPITAL ENCOUNTER (OUTPATIENT)
Dept: INFUSION THERAPY | Facility: HOSPITAL | Age: 54
Discharge: HOME | End: 2024-01-17
Admitting: NURSE PRACTITIONER
Payer: COMMERCIAL

## 2024-01-17 VITALS
HEART RATE: 68 BPM | OXYGEN SATURATION: 99 % | RESPIRATION RATE: 18 BRPM | DIASTOLIC BLOOD PRESSURE: 80 MMHG | TEMPERATURE: 97.6 F | SYSTOLIC BLOOD PRESSURE: 116 MMHG

## 2024-01-17 DIAGNOSIS — L73.2 HIDRADENITIS: Primary | ICD-10-CM

## 2024-01-17 PROCEDURE — G0463 HOSPITAL OUTPT CLINIC VISIT: HCPCS

## 2024-01-17 NOTE — TELEPHONE ENCOUNTER
PT CALLED AND ASKED FOR A NEW RX FOR DRESSING SUPPLIES, SHE SAID THAT [employer] NEEDED A NEW PRESCRIPTION. SHE USES A 4X6 DRESSING. YOU CAN CALL HER FOR MORE INFO.

## 2024-01-17 NOTE — SIGNIFICANT NOTE
Wound Eval / Progress Noted     [facility]     Patient Name: Mariela Aldrich  : 1970  MRN: [id number]  Today's Date: 2024                 Admit Date: 2024    Visit Dx:  No diagnosis found.      * No active hospital problems. *        Past Medical History:   Diagnosis Date    Anxiety     Asthma     PRN INHALER AND TAKES ALLERGY INJECTIONS    Diabetes mellitus type 2, controlled     AVERAGE     Hidradenitis     Hyperlipidemia     Hypothyroidism     Murmur     DIAGNOSED WHEN 17 BUT IS ASYMPTOMATIC AND IS FOLLOWED ONLY BY PCP    Panic attack     Pilonidal cyst     Rheumatic fever     AS A CHILD    RLS (restless legs syndrome)     Sinus trouble         Past Surgical History:   Procedure Laterality Date    COLONOSCOPY  2020    exernal hemorrhoids    EXCISION LESION Bilateral 3/20/2023    Procedure: Excision Hidradenitis of Bilateral Inguinal Areas;  Surgeon: Donato You MD;  Location: [facility];  Service: General;  Laterality: Bilateral;    GROIN ABSCESS INCISION AND DRAINAGE Right     hidradenitis    HYSTERECTOMY  2010    KNEE SURGERY Bilateral 2013    PILONIDAL CYSTECTOMY N/A 2023    Procedure: Excision Hidradenitis of Inguinal Area and Pilonidal Cystectomy;  Surgeon: Donato You MD;  Location: [facility];  Service: General;  Laterality: N/A;         Physical Assessment:  Wound 23 1333 medial gluteal Incision (Active)   Wound Image   24 1225   Dressing Appearance intact;moist drainage 24 1225   Closure None 24 1225   Base moist;scab;granulating 24 1225   Red (%), Wound Tissue Color 100 24 1225   Periwound redness;moist;macerated 24 1225   Periwound Temperature warm 24 1225   Periwound Skin Turgor soft 24 1225   Edges open 24 1225   Wound Length (cm) 2 cm 24 1225   Wound Width (cm) 0.1 cm 24 1225   Wound Depth (cm) 0.2 cm 24 1225   Wound Surface Area (cm^2) 0.2 cm^2 24 1225   Wound  Volume (cm^3) 0.04 cm^3 01/17/24 1225   Drainage Characteristics/Odor serosanguineous;yellow 01/17/24 1225   Drainage Amount small 01/17/24 1225   Care, Wound cleansed with;irrigated with;sterile normal saline;silver agent applied 01/17/24 1225   Dressing Care dressing applied;border dressing;hydrofiber;hydrocolloid;silver impregnated;silicone 01/17/24 1225   Periwound Care absorptive dressing applied 01/17/24 1225       Wound 03/22/23 1518 medial coccyx Incision (Active)   Wound Image   01/17/24 1225   Dressing Appearance intact;moist drainage 01/17/24 1225   Closure None 01/17/24 1225   Base red;moist;pink 01/17/24 1225   Periwound macerated;redness 01/17/24 1225   Periwound Temperature warm 01/17/24 1225   Periwound Skin Turgor soft 01/17/24 1225   Edges open 01/17/24 1225   Wound Length (cm) 0.4 cm 01/17/24 1225   Wound Width (cm) 0.1 cm 01/17/24 1225   Wound Depth (cm) 0.1 cm 01/17/24 1225   Wound Surface Area (cm^2) 0.04 cm^2 01/17/24 1225   Wound Volume (cm^3) 0.004 cm^3 01/17/24 1225   Drainage Characteristics/Odor yellow 01/17/24 1225   Drainage Amount scant 01/17/24 1225   Care, Wound cleansed with;sterile normal saline;silver agent applied 01/17/24 1225   Dressing Care dressing applied;silver impregnated;silicone;border dressing;hydrofiber;hydrocolloid 01/17/24 1225   Periwound Care absorptive dressing applied 01/17/24 1225        Wound Check / Follow-up:  Patient seen today for wound follow-up / wound dressing change. Patient presents today with intact dressing.     Patient with two open wounds to upper gluteal crease. There is increased drainage noted to dressing with this change that is yellow. There is increased maceration noted to loni-wound tissue at this time. Cleansed and irrigated wound with NS and gauze. Blotted dry.  Silver nitrite applied to wound base and then covered with silver impregnated hydrofiber and secured with silicone border dressing.     Lower gluteal crease with open red moist  tissue. Wound drainage also noted to dressing but this drainage is serosanguinous and also a small amount of yellow drainage. Cleansed and irrigated wound with NS and gauze. Blotted dry. Silver nitrite applied to wound base and thickened wound margins. Silver impregnated hydrofiber applied to wound base and then secured with silicone border dressing. Hydrocolloid strips applied to lower silicone border dressing.     Recommending to continue silver nitrite applications to assist with drying out wound bases.       Impression: Surgical wounds with delayed closure     Short term goals:  Regain skin integrity. Dressing changes every 2 - 3 days and as home for increased drainage.     Miladys Dockery RN    1/17/2024    13:02 EST

## 2024-01-18 ENCOUNTER — TELEPHONE (OUTPATIENT)
Dept: CASE MANAGEMENT | Facility: OTHER | Age: 54
End: 2024-01-18
Payer: COMMERCIAL

## 2024-01-18 ENCOUNTER — PATIENT OUTREACH (OUTPATIENT)
Dept: CASE MANAGEMENT | Facility: OTHER | Age: 54
End: 2024-01-18
Payer: COMMERCIAL

## 2024-01-18 DIAGNOSIS — E11.65 TYPE 2 DIABETES MELLITUS WITH HYPERGLYCEMIA, WITH LONG-TERM CURRENT USE OF INSULIN: Primary | ICD-10-CM

## 2024-01-18 DIAGNOSIS — Z79.4 TYPE 2 DIABETES MELLITUS WITH HYPERGLYCEMIA, WITH LONG-TERM CURRENT USE OF INSULIN: Primary | ICD-10-CM

## 2024-01-18 NOTE — OUTREACH NOTE
Patient Outreach    [employer] message sent.    I called [facility] to see if Mounjaro 5 mg can be filled. They report it needs PA. I have submitted PA request via CoverMyMeds.     Name and Relationship of Patient/Support Person: Mariela Aldrich - Self        Education Documentation  No documentation found.        Tenisha OROPEZA  Ambulatory Case Management    1/18/2024, 12:04 EST

## 2024-01-18 NOTE — TELEPHONE ENCOUNTER
I have called and spoke with Nicki in wound care and she was unaware that the patient needed more supplies. Nicki is going to fax me the order. I will sign ad return so the patient can get her supplies.

## 2024-01-19 ENCOUNTER — PATIENT OUTREACH (OUTPATIENT)
Dept: CASE MANAGEMENT | Facility: OTHER | Age: 54
End: 2024-01-19
Payer: COMMERCIAL

## 2024-01-19 DIAGNOSIS — Z79.4 TYPE 2 DIABETES MELLITUS WITH HYPERGLYCEMIA, WITH LONG-TERM CURRENT USE OF INSULIN: Primary | ICD-10-CM

## 2024-01-19 DIAGNOSIS — E11.65 TYPE 2 DIABETES MELLITUS WITH HYPERGLYCEMIA, WITH LONG-TERM CURRENT USE OF INSULIN: Primary | ICD-10-CM

## 2024-01-19 NOTE — OUTREACH NOTE
Patient Outreach    Mounjaro 5 mg PA was approved. Notified pt via [employer] message to call her pharmacy to have med filled.     Name and Relationship of Patient/Support Person:  -         Education Documentation  No documentation found.        Tenisha OROPEZA  Ambulatory Case Management    1/19/2024, 11:56 EST

## 2024-01-22 ENCOUNTER — PATIENT OUTREACH (OUTPATIENT)
Dept: CASE MANAGEMENT | Facility: OTHER | Age: 54
End: 2024-01-22
Payer: COMMERCIAL

## 2024-01-22 ENCOUNTER — TELEPHONE (OUTPATIENT)
Dept: CASE MANAGEMENT | Facility: OTHER | Age: 54
End: 2024-01-22
Payer: COMMERCIAL

## 2024-01-22 DIAGNOSIS — E11.65 TYPE 2 DIABETES MELLITUS WITH HYPERGLYCEMIA, WITH LONG-TERM CURRENT USE OF INSULIN: Primary | ICD-10-CM

## 2024-01-22 DIAGNOSIS — Z79.4 TYPE 2 DIABETES MELLITUS WITH HYPERGLYCEMIA, WITH LONG-TERM CURRENT USE OF INSULIN: Primary | ICD-10-CM

## 2024-01-22 NOTE — TELEPHONE ENCOUNTER
Glucose log obtained:    1/11 541 am 141     1/12 543 am 161 1150 am 117   1/13 945 am 187     1/14 1132 am 160     1/15 1132 am 158     1/16 7 am 151 103 pm 184   1/17 549 am 185     1/18 451 am 172 1210 pm 122     Taking:    Mounjaro 2.5 mg weekly  Tresiba 56 u pm  Glipizide 10 mg with breakfast  Synjardy XR 12.5/1000 mg bid    Mounjaro 5 mg just now approved. She has 2 more 2.5 mg doses left and wants to finish those before starting the 5 mg. I will contact her next week for another log.

## 2024-01-22 NOTE — OUTREACH NOTE
Patient Outreach    Contacted via [employer]    Name and Relationship of Patient/Support Person:  -         Education Documentation  No documentation found.        Tenisha OROPEZA  Ambulatory Case Management    1/22/2024, 08:59 EST

## 2024-01-24 ENCOUNTER — HOSPITAL ENCOUNTER (OUTPATIENT)
Dept: INFUSION THERAPY | Facility: HOSPITAL | Age: 54
Discharge: HOME | End: 2024-01-24
Admitting: NURSE PRACTITIONER
Payer: COMMERCIAL

## 2024-01-24 VITALS
DIASTOLIC BLOOD PRESSURE: 80 MMHG | SYSTOLIC BLOOD PRESSURE: 116 MMHG | TEMPERATURE: 97.6 F | RESPIRATION RATE: 18 BRPM | HEART RATE: 68 BPM | OXYGEN SATURATION: 99 %

## 2024-01-24 DIAGNOSIS — L73.2 HIDRADENITIS: Primary | ICD-10-CM

## 2024-01-24 PROCEDURE — G0463 HOSPITAL OUTPT CLINIC VISIT: HCPCS

## 2024-01-24 NOTE — SIGNIFICANT NOTE
Wound Eval / Progress Noted    [facility]     Patient Name: Mariela Aldrich  : 1970  MRN: [id number]  Today's Date: 2024                 Admit Date: 2024    Visit Dx:  No diagnosis found.      * No active hospital problems. *        Past Medical History:   Diagnosis Date    Anxiety     Asthma     PRN INHALER AND TAKES ALLERGY INJECTIONS    Diabetes mellitus type 2, controlled     AVERAGE     Hidradenitis     Hyperlipidemia     Hypothyroidism     Murmur     DIAGNOSED WHEN 17 BUT IS ASYMPTOMATIC AND IS FOLLOWED ONLY BY PCP    Panic attack     Pilonidal cyst     Rheumatic fever     AS A CHILD    RLS (restless legs syndrome)     Sinus trouble         Past Surgical History:   Procedure Laterality Date    COLONOSCOPY  2020    exernal hemorrhoids    EXCISION LESION Bilateral 3/20/2023    Procedure: Excision Hidradenitis of Bilateral Inguinal Areas;  Surgeon: Donato You MD;  Location: [facility];  Service: General;  Laterality: Bilateral;    GROIN ABSCESS INCISION AND DRAINAGE Right     hidradenitis    HYSTERECTOMY  2010    KNEE SURGERY Bilateral     PILONIDAL CYSTECTOMY N/A 2023    Procedure: Excision Hidradenitis of Inguinal Area and Pilonidal Cystectomy;  Surgeon: Donato You MD;  Location: [facility];  Service: General;  Laterality: N/A;         Physical Assessment:  Wound 23 1333 medial gluteal Incision (Active)   Wound Image   24 1230   Dressing Appearance intact;moist drainage 24 1230   Closure None 24 1230   Base moist;red 24 1230   Red (%), Wound Tissue Color 100 24 1230   Periwound redness;macerated 24 1230   Periwound Temperature warm 24 1230   Periwound Skin Turgor soft 24 1230   Edges open 24 1230   Wound Length (cm) 2 cm 24 1230   Wound Width (cm) 0.1 cm 24 1230   Wound Depth (cm) 0.2 cm 24 1230   Wound Surface Area (cm^2) 0.2 cm^2 24 1230   Wound Volume (cm^3) 0.04  cm^3 01/24/24 1230   Drainage Characteristics/Odor serosanguineous 01/24/24 1230   Drainage Amount small 01/24/24 1230   Care, Wound cleansed with;sterile normal saline;silver agent applied 01/24/24 1230   Dressing Care dressing applied;calcium alginate;silver impregnated;border dressing;silicone 01/24/24 1230   Periwound Care absorptive dressing applied 01/24/24 1230       Wound 03/22/23 1518 medial coccyx Incision (Active)   Wound Image   01/24/24 1230   Dressing Appearance intact;moist drainage 01/24/24 1230   Closure None 01/24/24 1230   Base moist;red 01/24/24 1230   Red (%), Wound Tissue Color 100 01/24/24 1230   Periwound moist;macerated 01/24/24 1230   Periwound Temperature warm 01/24/24 1230   Periwound Skin Turgor soft 01/24/24 1230   Edges open 01/24/24 1230   Wound Length (cm) 0.4 cm 01/24/24 1230   Wound Width (cm) 0.3 cm 01/24/24 1230   Wound Depth (cm) 0.5 cm 01/24/24 1230   Wound Surface Area (cm^2) 0.12 cm^2 01/24/24 1230   Wound Volume (cm^3) 0.06 cm^3 01/24/24 1230   Drainage Characteristics/Odor serosanguineous 01/24/24 1230   Drainage Amount small 01/24/24 1230   Care, Wound cleansed with;irrigated with;sterile normal saline 01/24/24 1230   Dressing Care dressing applied;calcium alginate;silver impregnated;silicone;border dressing 01/24/24 1230   Periwound Care absorptive dressing applied 01/24/24 1230        Wound Check / Follow-up:  Patient seen today for wound check / dressing change. Patient presents with intact dressing. She denies any new complaints or concerns.     Dressing removed. Dressing with small amount of serosanguinous and darker more purulent draiange. With cleansing and irrigation, no purulence noted. Serosanguinous drainage remains present.     Lower gluteal crease with overall improvement. Wound margins are thinning and wound base remains red and moist. Wound overall is attempting to contract with increased narrowing to center of wound base. Cleansed and irrigated with NS and  gauze. Silver nitrite applied. Recommending to continue calcium alginate dressings for wound care to decrease drainage and keep wound base dry.     Upper gluteal crease continues to have two small open areas. Most proximal gluteal crease with minimal opening that is difficult to visualize and very small. Again no active drainage observed after cleansing. A little distal to initial opening there is now an obvious opening with small cavity. Opening has always been present, however it has now opened revealing opening cavity. Possible cause of drainage noted to dressing. Wound base is red and moist. Cleansed with NS and gauze. Blotted dry. Recommending application of calcium alginate dressing and also tucked small piece into open cavity to promote drainage absorption and wound closure.     During visit, patient states that her sugar is beginning to drop per her own glucose meter. Provided patient with orange juice. She states she is ok and will be eating as soon as she gets back to work. She denied any further needs or assistance at this time.     Impression: Upper and lower gluteal crease openings from surgical incision with dehiscence and delayed closure.     Short term goals:  Regain skin integrity. Dressing changes every 2 - 3 days and as needed. Skin protection. Weekly wound checks    Miladys Dockery RN    1/24/2024    13:48 EST

## 2024-01-31 ENCOUNTER — HOSPITAL ENCOUNTER (OUTPATIENT)
Dept: INFUSION THERAPY | Facility: HOSPITAL | Age: 54
Discharge: HOME | End: 2024-01-31
Admitting: SURGERY
Payer: COMMERCIAL

## 2024-01-31 VITALS
SYSTOLIC BLOOD PRESSURE: 130 MMHG | RESPIRATION RATE: 20 BRPM | TEMPERATURE: 97.9 F | DIASTOLIC BLOOD PRESSURE: 76 MMHG | OXYGEN SATURATION: 96 % | HEART RATE: 99 BPM

## 2024-01-31 DIAGNOSIS — L73.2 HIDRADENITIS: Primary | ICD-10-CM

## 2024-01-31 PROCEDURE — G0463 HOSPITAL OUTPT CLINIC VISIT: HCPCS

## 2024-01-31 RX ADMIN — SILVER NITRATE APPLICATORS 1 APPLICATION: 25; 75 STICK TOPICAL at 12:07

## 2024-01-31 NOTE — SIGNIFICANT NOTE
Wound Eval / Progress Noted     [facility]     Patient Name: Mariela Aldrich  : 1970  MRN: [id number]  Today's Date: 2024                 Admit Date: 2024    Visit Dx:    ICD-10-CM ICD-9-CM   1. Hidradenitis  L73.2 705.83         * No active hospital problems. *        Past Medical History:   Diagnosis Date    Anxiety     Asthma     PRN INHALER AND TAKES ALLERGY INJECTIONS    Diabetes mellitus type 2, controlled     AVERAGE     Hidradenitis     Hyperlipidemia     Hypothyroidism     Murmur     DIAGNOSED WHEN 17 BUT IS ASYMPTOMATIC AND IS FOLLOWED ONLY BY PCP    Panic attack     Pilonidal cyst     Rheumatic fever     AS A CHILD    RLS (restless legs syndrome)     Sinus trouble         Past Surgical History:   Procedure Laterality Date    COLONOSCOPY  2020    exernal hemorrhoids    EXCISION LESION Bilateral 3/20/2023    Procedure: Excision Hidradenitis of Bilateral Inguinal Areas;  Surgeon: Donato You MD;  Location: [facility];  Service: General;  Laterality: Bilateral;    GROIN ABSCESS INCISION AND DRAINAGE Right     hidradenitis    HYSTERECTOMY  2010    KNEE SURGERY Bilateral 2013    PILONIDAL CYSTECTOMY N/A 2023    Procedure: Excision Hidradenitis of Inguinal Area and Pilonidal Cystectomy;  Surgeon: Donato You MD;  Location: [facility];  Service: General;  Laterality: N/A;         Physical Assessment:  Wound 23 1333 medial gluteal Incision (Active)   Wound Image   24 1213   Dressing Appearance moist drainage;intact 24 1213   Closure None 24 1213   Base moist;red 24 1213   Periwound redness;macerated 24 1213   Periwound Temperature warm 24 1213   Periwound Skin Turgor soft 24 1213   Edges open 24 1213   Wound Length (cm) 1.6 cm 24 1213   Wound Width (cm) 0.2 cm 24 1213   Wound Depth (cm) 0.1 cm 24 1213   Wound Surface Area (cm^2) 0.32 cm^2 24 1213   Wound Volume (cm^3) 0.032 cm^3  01/31/24 1213   Drainage Characteristics/Odor serosanguineous 01/31/24 1213   Drainage Amount small 01/31/24 1213   Care, Wound cleansed with;sterile normal saline;silver agent applied 01/31/24 1213   Dressing Care dressing applied;calcium alginate;silver impregnated;silicone;border dressing;hydrocolloid 01/31/24 1213   Periwound Care absorptive dressing applied 01/31/24 1213       Wound 03/22/23 1518 medial coccyx Incision (Active)   Wound Image   01/31/24 1213   Dressing Appearance moist drainage;intact 01/31/24 1213   Closure None 01/31/24 1213   Base moist;red 01/31/24 1213   Periwound moist;macerated;redness 01/31/24 1213   Periwound Temperature warm 01/31/24 1213   Periwound Skin Turgor soft 01/31/24 1213   Edges open 01/31/24 1213   Wound Length (cm) 0.3 cm 01/31/24 1213   Wound Width (cm) 0.2 cm 01/31/24 1213   Wound Depth (cm) 0.3 cm 01/31/24 1213   Wound Surface Area (cm^2) 0.06 cm^2 01/31/24 1213   Wound Volume (cm^3) 0.018 cm^3 01/31/24 1213   Drainage Characteristics/Odor serosanguineous 01/31/24 1213   Drainage Amount small 01/31/24 1213   Care, Wound cleansed with;sterile normal saline 01/31/24 1213   Dressing Care dressing applied;calcium alginate;silver impregnated;silicone;border dressing 01/31/24 1213   Periwound Care absorptive dressing applied 01/31/24 1213      Wound Check / Follow-up: Patient seen today for wound follow-up and dressing change. Dressing intact to gluteal area. Patient reports no issues or concerns.    Lower gluteal wound continues to show improvement with ludy wound margins and minimal depth remaining. Cleansed with normal saline and gauze. Wound base is red and moist. Silver nitrate applied to this wound then covered with silver impregnated calcium alginate for drainage absorption. Recommending to continue current treatment.    Upper gluteal wound remains with moist, red wound base. Cleansed with normal saline and gauze. Wound decreasing in depth. Applied silver  impregnated calcium alginate. Covered all areas with silicone border dressing and strips of hydrocolloid dressing. Recommending to continue current treatment at this time.       Impression: Surgical wounds with delayed closure.      Short term goals: Regain skin integrity, skin protection, dressing changes every 2-3 days and PRN, 1x weekly wound checks / follow-ups.      Lanette Cantu RN    1/31/2024    13:02 EST

## 2024-02-07 ENCOUNTER — HOSPITAL ENCOUNTER (OUTPATIENT)
Dept: INFUSION THERAPY | Facility: HOSPITAL | Age: 54
Discharge: HOME | End: 2024-02-07
Admitting: NURSE PRACTITIONER
Payer: COMMERCIAL

## 2024-02-07 VITALS
HEART RATE: 102 BPM | SYSTOLIC BLOOD PRESSURE: 122 MMHG | RESPIRATION RATE: 20 BRPM | DIASTOLIC BLOOD PRESSURE: 75 MMHG | OXYGEN SATURATION: 98 % | TEMPERATURE: 98 F

## 2024-02-07 DIAGNOSIS — L73.2 HIDRADENITIS: Primary | ICD-10-CM

## 2024-02-07 PROCEDURE — G0463 HOSPITAL OUTPT CLINIC VISIT: HCPCS

## 2024-02-07 NOTE — SIGNIFICANT NOTE
Wound Eval / Progress Noted    [facility]     Patient Name: Mariela Aldrich  : 1970  MRN: [id number]  Today's Date: 2024                 Admit Date: 2024    Visit Dx:    ICD-10-CM ICD-9-CM   1. Hidradenitis  L73.2 705.83         * No active hospital problems. *        Past Medical History:   Diagnosis Date    Anxiety     Asthma     PRN INHALER AND TAKES ALLERGY INJECTIONS    Diabetes mellitus type 2, controlled     AVERAGE     Hidradenitis     Hyperlipidemia     Hypothyroidism     Murmur     DIAGNOSED WHEN 17 BUT IS ASYMPTOMATIC AND IS FOLLOWED ONLY BY PCP    Panic attack     Pilonidal cyst     Rheumatic fever     AS A CHILD    RLS (restless legs syndrome)     Sinus trouble         Past Surgical History:   Procedure Laterality Date    COLONOSCOPY  2020    exernal hemorrhoids    EXCISION LESION Bilateral 3/20/2023    Procedure: Excision Hidradenitis of Bilateral Inguinal Areas;  Surgeon: Donato You MD;  Location: [facility];  Service: General;  Laterality: Bilateral;    GROIN ABSCESS INCISION AND DRAINAGE Right     hidradenitis    HYSTERECTOMY  2010    KNEE SURGERY Bilateral 2013    PILONIDAL CYSTECTOMY N/A 2023    Procedure: Excision Hidradenitis of Inguinal Area and Pilonidal Cystectomy;  Surgeon: Donato You MD;  Location: [facility];  Service: General;  Laterality: N/A;         Physical Assessment:  Wound 23 1333 medial gluteal Incision (Active)   Wound Image   24 1145   Dressing Appearance intact;dry 24 1145   Closure None 24 1145   Base moist;red 24 1145   Red (%), Wound Tissue Color 100 24 1145   Periwound redness;macerated 24 1145   Periwound Temperature warm 24 1145   Periwound Skin Turgor soft 24 1145   Edges open 24 1145   Wound Length (cm) 1 cm 24 1145   Wound Width (cm) 0.3 cm 24 1145   Wound Depth (cm) 0.1 cm 24 1145   Wound Surface Area (cm^2) 0.3 cm^2 24 1145    Wound Volume (cm^3) 0.03 cm^3 02/07/24 1145   Drainage Characteristics/Odor serosanguineous 02/07/24 1145   Drainage Amount scant 02/07/24 1145   Care, Wound cleansed with;sterile normal saline;silver agent applied 02/07/24 1145   Dressing Care dressing applied;calcium alginate;silver impregnated;hydrofiber;silicone;border dressing;hydrocolloid 02/07/24 1145   Periwound Care absorptive dressing applied 02/07/24 1145       Wound 03/22/23 1518 medial coccyx Incision (Active)   Wound Image   02/07/24 1145   Dressing Appearance intact;dry 02/07/24 1145   Closure None 02/07/24 1145   Base dry;red;closed/resurfaced 02/07/24 1145   Periwound redness;dry 02/07/24 1145   Periwound Temperature warm 02/07/24 1145   Periwound Skin Turgor soft 02/07/24 1145   Edges rolled/closed 02/07/24 1145   Drainage Amount none 02/07/24 1145   Care, Wound cleansed with;sterile normal saline 02/07/24 1145   Dressing Care dressing applied;calcium alginate;silver impregnated;hydrofiber;silicone;border dressing 02/07/24 1145   Periwound Care absorptive dressing applied 02/07/24 1145      Wound Check / Follow-up:  Patient seen today for a wound check follow-up and dressing change. Dressing is clean dry and intact; no issue or concerns reported.    Lower gluteal wound with small area remaining open with red moist tissue present; wound edges and depth continue to contract and respond to the silver nitrate. Periwound is reddened with some irritation, likely from drainage. Cleansed and irrigated with NS. Silver nitrate applied to the red moist tissue and then covered with silver impregnated calcium alginate. Secured with a silicone border dressing and hydrocolloid    Upper gluteal wound with full closure at this time; tissue is red and dry. No measurable depth present currently. Cleansed with NS and applied silver impregnated calcium alginate to the tissue. Secured with a silicone border dressing and hydrocolloid.    Will recommend to continue  current wound care at this time due to wound bed response to treatment    Impression: surgical wounds with delayed closure    Short term goals:  regain skin integrity, skin protection, dressing changes every 2-3 days and PRN. 1x weekly checks with YOSELIN Henriquez RN    2/7/2024    12:19 EST

## 2024-02-14 ENCOUNTER — HOSPITAL ENCOUNTER (OUTPATIENT)
Dept: INFUSION THERAPY | Facility: HOSPITAL | Age: 54
Discharge: HOME | End: 2024-02-14
Admitting: NURSE PRACTITIONER
Payer: COMMERCIAL

## 2024-02-14 VITALS
HEART RATE: 91 BPM | DIASTOLIC BLOOD PRESSURE: 72 MMHG | SYSTOLIC BLOOD PRESSURE: 110 MMHG | OXYGEN SATURATION: 100 % | TEMPERATURE: 97.8 F | RESPIRATION RATE: 20 BRPM

## 2024-02-14 DIAGNOSIS — L73.2 HIDRADENITIS: Primary | ICD-10-CM

## 2024-02-14 PROCEDURE — G0463 HOSPITAL OUTPT CLINIC VISIT: HCPCS

## 2024-02-16 ENCOUNTER — TELEPHONE (OUTPATIENT)
Dept: CASE MANAGEMENT | Facility: OTHER | Age: 54
End: 2024-02-16
Payer: COMMERCIAL

## 2024-02-16 ENCOUNTER — PATIENT OUTREACH (OUTPATIENT)
Dept: CASE MANAGEMENT | Facility: OTHER | Age: 54
End: 2024-02-16
Payer: COMMERCIAL

## 2024-02-16 DIAGNOSIS — Z79.4 TYPE 2 DIABETES MELLITUS WITH HYPERGLYCEMIA, WITH LONG-TERM CURRENT USE OF INSULIN: Primary | ICD-10-CM

## 2024-02-16 DIAGNOSIS — E78.2 MIXED HYPERLIPIDEMIA: ICD-10-CM

## 2024-02-16 DIAGNOSIS — E11.65 TYPE 2 DIABETES MELLITUS WITH HYPERGLYCEMIA, WITH LONG-TERM CURRENT USE OF INSULIN: Primary | ICD-10-CM

## 2024-02-18 DIAGNOSIS — G25.81 RESTLESS LEG: ICD-10-CM

## 2024-02-19 RX ORDER — GABAPENTIN 300 MG/1
300 CAPSULE ORAL NIGHTLY
Qty: 30 CAPSULE | Refills: 0 | Status: SHIPPED | OUTPATIENT
Start: 2024-02-19

## 2024-02-21 ENCOUNTER — HOSPITAL ENCOUNTER (OUTPATIENT)
Dept: INFUSION THERAPY | Facility: HOSPITAL | Age: 54
Discharge: HOME | End: 2024-02-21
Admitting: NURSE PRACTITIONER
Payer: COMMERCIAL

## 2024-02-21 VITALS
SYSTOLIC BLOOD PRESSURE: 116 MMHG | HEART RATE: 102 BPM | RESPIRATION RATE: 20 BRPM | OXYGEN SATURATION: 99 % | TEMPERATURE: 98.2 F | DIASTOLIC BLOOD PRESSURE: 71 MMHG

## 2024-02-21 DIAGNOSIS — L73.2 HIDRADENITIS: Primary | ICD-10-CM

## 2024-02-21 PROCEDURE — G0463 HOSPITAL OUTPT CLINIC VISIT: HCPCS

## 2024-02-21 NOTE — ADDENDUM NOTE
Encounter addended by: Christel Bustos RN on: 2/21/2024 5:12 PM   Actions taken: Clinical Note Signed

## 2024-02-21 NOTE — SIGNIFICANT NOTE
Wound Eval / Progress Noted     [facility]     Patient Name: Mariela Aldrich  : 1970  MRN: [id number]  Today's Date: 2024                 Admit Date: 2024    Visit Dx:    ICD-10-CM ICD-9-CM   1. Hidradenitis  L73.2 705.83         * No active hospital problems. *        Past Medical History:   Diagnosis Date    Anxiety     Asthma     PRN INHALER AND TAKES ALLERGY INJECTIONS    Diabetes mellitus type 2, controlled     AVERAGE     Hidradenitis     Hyperlipidemia     Hypothyroidism     Murmur     DIAGNOSED WHEN 17 BUT IS ASYMPTOMATIC AND IS FOLLOWED ONLY BY PCP    Panic attack     Pilonidal cyst     Rheumatic fever     AS A CHILD    RLS (restless legs syndrome)     Sinus trouble         Past Surgical History:   Procedure Laterality Date    COLONOSCOPY  2020    exernal hemorrhoids    EXCISION LESION Bilateral 3/20/2023    Procedure: Excision Hidradenitis of Bilateral Inguinal Areas;  Surgeon: Donato You MD;  Location: [facility];  Service: General;  Laterality: Bilateral;    GROIN ABSCESS INCISION AND DRAINAGE Right     hidradenitis    HYSTERECTOMY  2010    KNEE SURGERY Bilateral 2013    PILONIDAL CYSTECTOMY N/A 2023    Procedure: Excision Hidradenitis of Inguinal Area and Pilonidal Cystectomy;  Surgeon: Donato You MD;  Location: [facility];  Service: General;  Laterality: N/A;         Physical Assessment:  Wound 23 1333 medial gluteal Incision (Active)   Wound Image   24 1223   Dressing Appearance moist drainage;intact 24 1223   Closure None 24 1223   Base moist;granulating;red 24 1223   Periwound intact;moist;redness 24 1223   Periwound Temperature warm 24 1223   Periwound Skin Turgor soft 24 1223   Edges open 24 1223   Wound Length (cm) 0.9 cm 24 1223   Wound Width (cm) 0.3 cm 24 1223   Wound Depth (cm) 0 cm 24 1223   Wound Surface Area (cm^2) 0.27 cm^2 24 1223   Wound Volume (cm^3)  0 cm^3 02/21/24 1223   Drainage Characteristics/Odor serosanguineous 02/21/24 1223   Drainage Amount scant 02/21/24 1223   Care, Wound cleansed with;sterile normal saline 02/21/24 1223   Dressing Care dressing applied;calcium alginate;border dressing;silver impregnated;silicone 02/21/24 1223   Periwound Care absorptive dressing applied;hydrocolloid barrier applied 02/21/24 1223       Wound 03/22/23 1518 medial coccyx Incision (Active)   Wound Image   02/21/24 1223   Dressing Appearance intact;moist drainage 02/21/24 1223   Closure None 02/21/24 1223   Base moist;red 02/21/24 1223   Periwound intact;moist;redness 02/21/24 1223   Periwound Temperature warm 02/21/24 1223   Periwound Skin Turgor soft 02/21/24 1223   Edges open 02/21/24 1223   Wound Length (cm) 0.4 cm 02/21/24 1223   Wound Width (cm) 0.2 cm 02/21/24 1223   Wound Depth (cm) 0.3 cm 02/21/24 1223   Wound Surface Area (cm^2) 0.08 cm^2 02/21/24 1223   Wound Volume (cm^3) 0.024 cm^3 02/21/24 1223   Drainage Characteristics/Odor serous 02/21/24 1223   Drainage Amount small 02/21/24 1223   Care, Wound cleansed with;sterile normal saline 02/21/24 1223   Dressing Care dressing applied;calcium alginate;silver impregnated;packed with;silicone;border dressing 02/21/24 1223   Periwound Care absorptive dressing applied 02/21/24 1223        Wound Check / Follow-up:  Patient seen today for her wound care follow up visit. Patient stated that her  continues to assist her with wound dressing changes at home as needed. Patient's wound dressing was intact and with a small amount of yellow serous drainage noted.     Patient's medial gluteal incision remains open but is very superficial. Wound bed is moist and red. Patient does have some irritation noted to her periwound skin. RN cleansed the wound with NS, pat dry and obtained new measurements and pictures. Silver nitrate was applied to the red moist tissue and then covered with silver impregnated calcium alginate,  covered with a silicone border dressing and secured with hydrocolloid strip.     Patient's medical coccyx wound is draining a small amount of cloudy serous drainage. Wound remains open with a red and moist wound bed. RN cleansed wound with NS, pat dry, measured and obtained new pictures. RN packed wound with silver impregnated calcium alginate, covered with a silicone border dressing and secured with hydrocolloid strip.     Recommending to continue current wound care orders at this time to promote wound healing.     Impression: Medial gluteal wound and medical coccyx wound with delayed closure.     Short term goals:  Regain skin integrity, skin protection, MWF dressing changes and weekly wound nurse checks.     Christel Bustos RN    2/21/2024    16:58 EST

## 2024-02-28 ENCOUNTER — HOSPITAL ENCOUNTER (OUTPATIENT)
Dept: INFUSION THERAPY | Facility: HOSPITAL | Age: 54
Discharge: HOME | End: 2024-02-28
Admitting: NURSE PRACTITIONER
Payer: COMMERCIAL

## 2024-02-28 VITALS
TEMPERATURE: 97.4 F | RESPIRATION RATE: 20 BRPM | HEART RATE: 100 BPM | DIASTOLIC BLOOD PRESSURE: 74 MMHG | OXYGEN SATURATION: 97 % | SYSTOLIC BLOOD PRESSURE: 116 MMHG

## 2024-02-28 DIAGNOSIS — L73.2 HIDRADENITIS: Primary | ICD-10-CM

## 2024-02-28 PROCEDURE — G0463 HOSPITAL OUTPT CLINIC VISIT: HCPCS

## 2024-02-28 NOTE — SIGNIFICANT NOTE
Wound Eval / Progress Noted     [facility]     Patient Name: Mariela Aldrich  : 1970  MRN: [id number]  Today's Date: 2024                 Admit Date: 2024    Visit Dx:    ICD-10-CM ICD-9-CM   1. Hidradenitis  L73.2 705.83         * No active hospital problems. *        Past Medical History:   Diagnosis Date    Anxiety     Asthma     PRN INHALER AND TAKES ALLERGY INJECTIONS    Diabetes mellitus type 2, controlled     AVERAGE     Hidradenitis     Hyperlipidemia     Hypothyroidism     Murmur     DIAGNOSED WHEN 17 BUT IS ASYMPTOMATIC AND IS FOLLOWED ONLY BY PCP    Panic attack     Pilonidal cyst     Rheumatic fever     AS A CHILD    RLS (restless legs syndrome)     Sinus trouble         Past Surgical History:   Procedure Laterality Date    COLONOSCOPY  2020    exernal hemorrhoids    EXCISION LESION Bilateral 3/20/2023    Procedure: Excision Hidradenitis of Bilateral Inguinal Areas;  Surgeon: Donato You MD;  Location: [facility];  Service: General;  Laterality: Bilateral;    GROIN ABSCESS INCISION AND DRAINAGE Right     hidradenitis    HYSTERECTOMY  2010    KNEE SURGERY Bilateral 2013    PILONIDAL CYSTECTOMY N/A 2023    Procedure: Excision Hidradenitis of Inguinal Area and Pilonidal Cystectomy;  Surgeon: Donato You MD;  Location: [facility];  Service: General;  Laterality: N/A;         Physical Assessment:  Wound 23 1333 medial gluteal Incision (Active)   Wound Image   24 1219   Dressing Appearance intact;moist drainage 24 1219   Closure None 24 1219   Base moist;red 24 1219   Periwound intact;moist;redness 24 1219   Periwound Temperature warm 24 1219   Periwound Skin Turgor soft 24 1219   Edges open 24 1219   Wound Length (cm) 0.7 cm 24 1219   Wound Width (cm) 0.2 cm 24 1219   Wound Depth (cm) 0 cm 24 1219   Wound Surface Area (cm^2) 0.14 cm^2 24 1219   Wound Volume (cm^3) 0 cm^3  02/28/24 1219   Drainage Characteristics/Odor serosanguineous 02/28/24 1219   Drainage Amount scant 02/28/24 1219   Care, Wound cleansed with;sterile normal saline 02/28/24 1219   Dressing Care dressing applied;calcium alginate;border dressing;hydrofiber;silicone;silver impregnated 02/28/24 1219   Periwound Care absorptive dressing applied 02/28/24 1219       Wound 03/22/23 1518 medial coccyx Incision (Active)   Wound Image   02/28/24 1219   Dressing Appearance intact;moist drainage 02/28/24 1219   Closure None 02/28/24 1219   Base moist;red;granulating 02/28/24 1219   Periwound excoriated;moist;redness 02/28/24 1219   Periwound Temperature warm 02/28/24 1219   Periwound Skin Turgor soft 02/28/24 1219   Edges open 02/28/24 1219   Wound Length (cm) 0.8 cm 02/28/24 1219   Wound Width (cm) 0.4 cm 02/28/24 1219   Wound Depth (cm) 0.6 cm 02/28/24 1219   Wound Surface Area (cm^2) 0.32 cm^2 02/28/24 1219   Wound Volume (cm^3) 0.192 cm^3 02/28/24 1219   Drainage Characteristics/Odor serosanguineous 02/28/24 1219   Drainage Amount small 02/28/24 1219   Care, Wound cleansed with;sterile normal saline 02/28/24 1219   Dressing Care dressing applied;border dressing;calcium alginate;silver impregnated;silicone;hydrocolloid 02/28/24 1219   Periwound Care absorptive dressing applied 02/28/24 1219        Wound Check / Follow-up:  Patient seen today in [facility] for her wound care follow up appointment. Patient states that her  has continued to perform her wound dressing changes at home. Patient denies any increased drainage from either wound. Patient arrived with her wound dressing intact.     RN removed patient's current wound dressing. RN cleansed and irrigated wounds with NS and pat dry. Patient's medial coccyx wound base is red, moist and draining a pink serosanguinous fluid. Patient's periwound skin is reddened and excoriated. Patient denies any pain or discomfort. RN measured wound and obtained new pictures. RN silver  nitrated an area of hypergranulation from 12-6 o'clock and packed wound with calcium alginate. Wound dressing secured with a silicone border dressing and hydrocolloid.     Patient's medial gluteal incision remains open but has decreased in size. RN cleansed wound with NS and blotted dry. RN measured wound and obtained new pictures. Wound bed is reddened, moist and open. Periwound is pink, moist and intact.  RN silver nitrated the small wound opening and edges. Wound continues to respond well to silver nitrate treatment. RN covered wound with silver impregnated calcium alginate and secured with a silicone border dressing and hydrocolloid.     Patient tolerated dressing changes without issue. Patient was given wound care supplies for her  to perform wound care at home in between her weekly follow up appointment.     Impression: Coccyx and gluteal crease surgical wound with delayed closure.     Short term goals:  Regain skin integrity, skin protection, dressing changes every 2-3 days and PRN. Once weekly wound visits with YOSELIN.     Christel Bustos RN    2/28/2024    12:55 EST

## 2024-02-29 DIAGNOSIS — E11.65 TYPE 2 DIABETES MELLITUS WITH HYPERGLYCEMIA, WITH LONG-TERM CURRENT USE OF INSULIN: ICD-10-CM

## 2024-02-29 DIAGNOSIS — Z79.4 TYPE 2 DIABETES MELLITUS WITH HYPERGLYCEMIA, WITH LONG-TERM CURRENT USE OF INSULIN: ICD-10-CM

## 2024-02-29 RX ORDER — PROCHLORPERAZINE 25 MG/1
SUPPOSITORY RECTAL
Qty: 9 EACH | Refills: 0 | Status: SHIPPED | OUTPATIENT
Start: 2024-02-29 | End: 2024-03-04

## 2024-03-03 ENCOUNTER — PATIENT MESSAGE (OUTPATIENT)
Dept: FAMILY MEDICINE CLINIC | Facility: CLINIC | Age: 54
End: 2024-03-03
Payer: COMMERCIAL

## 2024-03-04 RX ORDER — ACYCLOVIR 400 MG/1
1 TABLET ORAL
Qty: 6 EACH | Refills: 1 | Status: SHIPPED | OUTPATIENT
Start: 2024-03-04

## 2024-03-04 NOTE — TELEPHONE ENCOUNTER
From: Mariela Aldrich  To: Berenice Avelar  Sent: 3/3/2024 10:27 AM EST  Subject: DexcomG7     Berenice,  I was wondering if we could switch the DexcomG6 to DexcomG7 because the transmitter and sensors have gone up. At the Pharmacy the transmitter was going to cost me almost $60 with a discount card, but the sensors were $120. I believe the DexcomG7 has made the transmitter and sensors together, and I thought it would be cheaper being as one prescription.   Thanks,  Mariela

## 2024-03-05 ENCOUNTER — TELEPHONE (OUTPATIENT)
Dept: CASE MANAGEMENT | Facility: OTHER | Age: 54
End: 2024-03-05
Payer: COMMERCIAL

## 2024-03-05 NOTE — TELEPHONE ENCOUNTER
Just ARMAND    See attached glucose log. Continues on Mounjaro 5 mg. Mariela has upcoming follow up with you on 3/13/24.    [id number]

## 2024-03-06 ENCOUNTER — HOSPITAL ENCOUNTER (OUTPATIENT)
Dept: INFUSION THERAPY | Facility: HOSPITAL | Age: 54
Discharge: HOME | End: 2024-03-06
Admitting: SURGERY
Payer: COMMERCIAL

## 2024-03-06 VITALS
HEART RATE: 105 BPM | SYSTOLIC BLOOD PRESSURE: 114 MMHG | DIASTOLIC BLOOD PRESSURE: 74 MMHG | OXYGEN SATURATION: 100 % | TEMPERATURE: 98.2 F | RESPIRATION RATE: 20 BRPM

## 2024-03-06 DIAGNOSIS — L73.2 HIDRADENITIS: Primary | ICD-10-CM

## 2024-03-06 DIAGNOSIS — J45.20 MILD INTERMITTENT ASTHMA WITHOUT COMPLICATION: ICD-10-CM

## 2024-03-06 PROCEDURE — G0463 HOSPITAL OUTPT CLINIC VISIT: HCPCS

## 2024-03-06 RX ORDER — FLUTICASONE PROPIONATE AND SALMETEROL 250; 50 UG/1; UG/1
POWDER RESPIRATORY (INHALATION)
Qty: 60 EACH | Refills: 0 | Status: SHIPPED | OUTPATIENT
Start: 2024-03-06

## 2024-03-06 RX ADMIN — SILVER NITRATE APPLICATORS 1 APPLICATION: 25; 75 STICK TOPICAL at 12:04

## 2024-03-06 NOTE — SIGNIFICANT NOTE
Wound Eval / Progress Noted    [facility]     Patient Name: Mariela Aldrich  : 1970  MRN: [id number]  Today's Date: 3/6/2024                 Admit Date: 3/6/2024    Visit Dx:    ICD-10-CM ICD-9-CM   1. Hidradenitis  L73.2 705.83         * No active hospital problems. *        Past Medical History:   Diagnosis Date    Anxiety     Asthma     PRN INHALER AND TAKES ALLERGY INJECTIONS    Diabetes mellitus type 2, controlled     AVERAGE     Hidradenitis     Hyperlipidemia     Hypothyroidism     Murmur     DIAGNOSED WHEN 17 BUT IS ASYMPTOMATIC AND IS FOLLOWED ONLY BY PCP    Panic attack     Pilonidal cyst     Rheumatic fever     AS A CHILD    RLS (restless legs syndrome)     Sinus trouble         Past Surgical History:   Procedure Laterality Date    COLONOSCOPY  2020    exernal hemorrhoids    EXCISION LESION Bilateral 3/20/2023    Procedure: Excision Hidradenitis of Bilateral Inguinal Areas;  Surgeon: Donato You MD;  Location: [facility];  Service: General;  Laterality: Bilateral;    GROIN ABSCESS INCISION AND DRAINAGE Right     hidradenitis    HYSTERECTOMY  2010    KNEE SURGERY Bilateral 2013    PILONIDAL CYSTECTOMY N/A 2023    Procedure: Excision Hidradenitis of Inguinal Area and Pilonidal Cystectomy;  Surgeon: Donato You MD;  Location: [facility];  Service: General;  Laterality: N/A;         Physical Assessment:  Wound 23 1333 medial gluteal Incision (Active)   Wound Image   24 1215   Dressing Appearance moist drainage;intact 24 1215   Closure None 24 1215   Base moist;red 24 1215   Periwound dry;intact;pink;redness 24 1215   Periwound Temperature warm 24 1215   Periwound Skin Turgor soft 24 1215   Edges open 24 1215   Wound Length (cm) 0.5 cm 24 1215   Wound Width (cm) 0.4 cm 24 1215   Wound Depth (cm) 0.1 cm 24 1215   Wound Surface Area (cm^2) 0.2 cm^2 24 1215   Wound Volume (cm^3) 0.02 cm^3  03/06/24 1215   Drainage Characteristics/Odor serosanguineous 03/06/24 1215   Drainage Amount small 03/06/24 1215   Care, Wound cleansed with;irrigated with;sterile normal saline;silver agent applied 03/06/24 1215   Dressing Care dressing applied;calcium alginate;silver impregnated;silicone;border dressing;hydrocolloid 03/06/24 1215   Periwound Care absorptive dressing applied 03/06/24 1215       Wound 03/22/23 1518 medial coccyx Incision (Active)   Wound Image   03/06/24 1215   Dressing Appearance moist drainage;intact 03/06/24 1215   Closure None 03/06/24 1215   Base moist;red 03/06/24 1215   Periwound dry;intact;pink 03/06/24 1215   Periwound Temperature warm 03/06/24 1215   Periwound Skin Turgor soft 03/06/24 1215   Edges open 03/06/24 1215   Wound Length (cm) 0.5 cm 03/06/24 1215   Wound Width (cm) 0.3 cm 03/06/24 1215   Wound Depth (cm) 0.3 cm 03/06/24 1215   Wound Surface Area (cm^2) 0.15 cm^2 03/06/24 1215   Wound Volume (cm^3) 0.045 cm^3 03/06/24 1215   Drainage Characteristics/Odor serosanguineous 03/06/24 1215   Drainage Amount small 03/06/24 1215   Care, Wound cleansed with;irrigated with;sterile normal saline;silver agent applied 03/06/24 1215   Dressing Care dressing applied;calcium alginate;silver impregnated;silicone;border dressing;hydrocolloid 03/06/24 1215   Periwound Care absorptive dressing applied 03/06/24 1215      Wound Check / Follow-up: Patient seen today for wound follow-up and dressing change in outpatient nursing services. Patient's  continues to change dressings / perform wound care in absence of wound care nurse. Patient denies any issues with this.    Removed current dressing. Upper and lower gluteal wounds with moist, red wound bases. Two small areas of hypergranulation noted on wound margins to both upper and lower gluteal wounds. Periwound tissue improved and dry, intact, and pink today. Silver nitrate applied to areas of hypergranulation and to lower gluteal wound base.  Then applied silver impregnated calcium alginate and secured with silicone border dressing. Strips of hydrocolloid added to aid in seal. Recommending to continue current treatment at this time.       Impression: Surgical wounds with delayed closure.      Short term goals: Regain skin integrity, skin protection, dressing changes every 2-3 days and PRN, 1x weekly wound checks / follow-ups.      Lanette Cantu RN    3/6/2024    17:02 EST

## 2024-03-10 DIAGNOSIS — Z79.4 TYPE 2 DIABETES MELLITUS WITH HYPERGLYCEMIA, WITH LONG-TERM CURRENT USE OF INSULIN: ICD-10-CM

## 2024-03-10 DIAGNOSIS — E11.65 TYPE 2 DIABETES MELLITUS WITH HYPERGLYCEMIA, WITH LONG-TERM CURRENT USE OF INSULIN: ICD-10-CM

## 2024-03-11 RX ORDER — TIRZEPATIDE 5 MG/.5ML
INJECTION, SOLUTION SUBCUTANEOUS
Qty: 2 ML | Refills: 2 | Status: SHIPPED | OUTPATIENT
Start: 2024-03-11 | End: 2024-03-13 | Stop reason: DRUGHIGH

## 2024-03-13 ENCOUNTER — OFFICE VISIT (OUTPATIENT)
Dept: FAMILY MEDICINE CLINIC | Facility: CLINIC | Age: 54
End: 2024-03-13
Payer: COMMERCIAL

## 2024-03-13 ENCOUNTER — HOSPITAL ENCOUNTER (OUTPATIENT)
Dept: INFUSION THERAPY | Facility: HOSPITAL | Age: 54
Discharge: HOME | End: 2024-03-13
Admitting: SURGERY
Payer: COMMERCIAL

## 2024-03-13 ENCOUNTER — PATIENT OUTREACH (OUTPATIENT)
Dept: CASE MANAGEMENT | Facility: OTHER | Age: 54
End: 2024-03-13
Payer: COMMERCIAL

## 2024-03-13 VITALS
WEIGHT: 200.4 LBS | OXYGEN SATURATION: 96 % | HEIGHT: 64 IN | TEMPERATURE: 98.5 F | DIASTOLIC BLOOD PRESSURE: 71 MMHG | BODY MASS INDEX: 34.21 KG/M2 | SYSTOLIC BLOOD PRESSURE: 106 MMHG | HEART RATE: 98 BPM

## 2024-03-13 VITALS
SYSTOLIC BLOOD PRESSURE: 116 MMHG | HEART RATE: 99 BPM | TEMPERATURE: 97.7 F | DIASTOLIC BLOOD PRESSURE: 73 MMHG | OXYGEN SATURATION: 98 % | RESPIRATION RATE: 20 BRPM

## 2024-03-13 DIAGNOSIS — E11.65 TYPE 2 DIABETES MELLITUS WITH HYPERGLYCEMIA, WITH LONG-TERM CURRENT USE OF INSULIN: Primary | ICD-10-CM

## 2024-03-13 DIAGNOSIS — G25.81 RESTLESS LEG: ICD-10-CM

## 2024-03-13 DIAGNOSIS — R05.8 DRY COUGH: ICD-10-CM

## 2024-03-13 DIAGNOSIS — R30.0 DYSURIA: ICD-10-CM

## 2024-03-13 DIAGNOSIS — E78.2 MIXED HYPERLIPIDEMIA: ICD-10-CM

## 2024-03-13 DIAGNOSIS — Z79.4 TYPE 2 DIABETES MELLITUS WITH HYPERGLYCEMIA, WITH LONG-TERM CURRENT USE OF INSULIN: Primary | ICD-10-CM

## 2024-03-13 DIAGNOSIS — L73.2 HIDRADENITIS: Primary | ICD-10-CM

## 2024-03-13 DIAGNOSIS — E03.8 OTHER SPECIFIED HYPOTHYROIDISM: ICD-10-CM

## 2024-03-13 DIAGNOSIS — F41.9 ANXIETY: ICD-10-CM

## 2024-03-13 DIAGNOSIS — G25.81 RLS (RESTLESS LEGS SYNDROME): ICD-10-CM

## 2024-03-13 LAB
ALBUMIN SERPL-MCNC: 4 G/DL (ref 3.5–5.2)
ALBUMIN/GLOB SERPL: 0.9 G/DL
ALP SERPL-CCNC: 109 U/L (ref 39–117)
ALT SERPL W P-5'-P-CCNC: 21 U/L (ref 1–33)
ANION GAP SERPL CALCULATED.3IONS-SCNC: 10.9 MMOL/L (ref 5–15)
AST SERPL-CCNC: 21 U/L (ref 1–32)
BILIRUB BLD-MCNC: NEGATIVE MG/DL
BILIRUB SERPL-MCNC: 0.3 MG/DL (ref 0–1.2)
BUN SERPL-MCNC: 15 MG/DL (ref 6–20)
BUN/CREAT SERPL: 17.2 (ref 7–25)
CALCIUM SPEC-SCNC: 9.4 MG/DL (ref 8.6–10.5)
CHLORIDE SERPL-SCNC: 104 MMOL/L (ref 98–107)
CHOLEST SERPL-MCNC: 145 MG/DL (ref 0–200)
CLARITY, POC: ABNORMAL
CO2 SERPL-SCNC: 24.1 MMOL/L (ref 22–29)
COLOR UR: ABNORMAL
CREAT SERPL-MCNC: 0.87 MG/DL (ref 0.57–1)
EGFRCR SERPLBLD CKD-EPI 2021: 79.3 ML/MIN/1.73
EXPIRATION DATE: ABNORMAL
GLOBULIN UR ELPH-MCNC: 4.6 GM/DL
GLUCOSE SERPL-MCNC: 118 MG/DL (ref 65–99)
GLUCOSE UR STRIP-MCNC: ABNORMAL MG/DL
HBA1C MFR BLD: 8.5 % (ref 4.8–5.6)
HDLC SERPL-MCNC: 53 MG/DL (ref 40–60)
KETONES UR QL: NEGATIVE
LDLC SERPL CALC-MCNC: 77 MG/DL (ref 0–100)
LDLC/HDLC SERPL: 1.46 {RATIO}
LEUKOCYTE EST, POC: ABNORMAL
Lab: ABNORMAL
NITRITE UR-MCNC: NEGATIVE MG/ML
PH UR: 5 [PH] (ref 5–8)
POTASSIUM SERPL-SCNC: 4.4 MMOL/L (ref 3.5–5.2)
PROT SERPL-MCNC: 8.6 G/DL (ref 6–8.5)
PROT UR STRIP-MCNC: ABNORMAL MG/DL
RBC # UR STRIP: ABNORMAL /UL
SODIUM SERPL-SCNC: 139 MMOL/L (ref 136–145)
SP GR UR: 1.02 (ref 1–1.03)
TRIGL SERPL-MCNC: 74 MG/DL (ref 0–150)
UROBILINOGEN UR QL: ABNORMAL
VLDLC SERPL-MCNC: 15 MG/DL (ref 5–40)

## 2024-03-13 PROCEDURE — 87086 URINE CULTURE/COLONY COUNT: CPT | Performed by: NURSE PRACTITIONER

## 2024-03-13 PROCEDURE — 80053 COMPREHEN METABOLIC PANEL: CPT | Performed by: NURSE PRACTITIONER

## 2024-03-13 PROCEDURE — 80061 LIPID PANEL: CPT | Performed by: NURSE PRACTITIONER

## 2024-03-13 PROCEDURE — 83036 HEMOGLOBIN GLYCOSYLATED A1C: CPT | Performed by: NURSE PRACTITIONER

## 2024-03-13 PROCEDURE — G0463 HOSPITAL OUTPT CLINIC VISIT: HCPCS

## 2024-03-13 RX ORDER — LEVOTHYROXINE SODIUM 88 MCG
88 TABLET ORAL DAILY
Qty: 90 TABLET | Refills: 1 | Status: SHIPPED | OUTPATIENT
Start: 2024-03-13

## 2024-03-13 RX ORDER — SERTRALINE HYDROCHLORIDE 100 MG/1
150 TABLET, FILM COATED ORAL DAILY
Qty: 135 TABLET | Refills: 1 | Status: SHIPPED | OUTPATIENT
Start: 2024-03-13

## 2024-03-13 RX ORDER — EMPAGLIFLOZIN, METFORMIN HYDROCHLORIDE 12.5; 1 MG/1; MG/1
1 TABLET, EXTENDED RELEASE ORAL 2 TIMES DAILY
Qty: 180 TABLET | Refills: 1 | Status: SHIPPED | OUTPATIENT
Start: 2024-03-13

## 2024-03-13 RX ORDER — MONTELUKAST SODIUM 10 MG/1
10 TABLET ORAL
COMMUNITY
Start: 2024-02-15

## 2024-03-13 RX ORDER — GABAPENTIN 300 MG/1
300 CAPSULE ORAL NIGHTLY
Qty: 90 CAPSULE | Refills: 1 | Status: SHIPPED | OUTPATIENT
Start: 2024-03-13

## 2024-03-13 RX ORDER — PITAVASTATIN CALCIUM 2.09 MG/1
2 TABLET, FILM COATED ORAL NIGHTLY
Qty: 90 TABLET | Refills: 1 | Status: SHIPPED | OUTPATIENT
Start: 2024-03-13

## 2024-03-13 RX ADMIN — SILVER NITRATE APPLICATORS 1 APPLICATION: 25; 75 STICK TOPICAL at 12:20

## 2024-03-13 NOTE — PATIENT INSTRUCTIONS
Diabetes Mellitus and Nutrition, Adult  When you have diabetes, or diabetes mellitus, it is very important to have healthy eating habits because your blood sugar (glucose) levels are greatly affected by what you eat and drink. Eating healthy foods in the right amounts, at about the same times every day, can help you:  Manage your blood glucose.  Lower your risk of heart disease.  Improve your blood pressure.  Reach or maintain a healthy weight.  What can affect my meal plan?  Every person with diabetes is different, and each person has different needs for a meal plan. Your health care provider may recommend that you work with a dietitian to make a meal plan that is best for you. Your meal plan may vary depending on factors such as:  The calories you need.  The medicines you take.  Your weight.  Your blood glucose, blood pressure, and cholesterol levels.  Your activity level.  Other health conditions you have, such as heart or kidney disease.  How do carbohydrates affect me?  Carbohydrates, also called carbs, affect your blood glucose level more than any other type of food. Eating carbs raises the amount of glucose in your blood.  It is important to know how many carbs you can safely have in each meal. This is different for every person. Your dietitian can help you calculate how many carbs you should have at each meal and for each snack.  How does alcohol affect me?  Alcohol can cause a decrease in blood glucose (hypoglycemia), especially if you use insulin or take certain diabetes medicines by mouth. Hypoglycemia can be a life-threatening condition. Symptoms of hypoglycemia, such as sleepiness, dizziness, and confusion, are similar to symptoms of having too much alcohol.  Do not drink alcohol if:  Your health care provider tells you not to drink.  You are pregnant, may be pregnant, or are planning to become pregnant.  If you drink alcohol:  Limit how much you have to:  0-1 drink a day for women.  0-2 drinks a day  "for men.  Know how much alcohol is in your drink. In the [place] one drink equals one 12 oz bottle of beer (355 mL), one 5 oz glass of wine (148 mL), or one 1½ oz glass of hard liquor (44 mL).  Keep yourself hydrated with water, diet soda, or unsweetened iced tea. Keep in mind that regular soda, juice, and other mixers may contain a lot of sugar and must be counted as carbs.  What are tips for following this plan?    Reading food labels  Start by checking the serving size on the Nutrition Facts label of packaged foods and drinks. The number of calories and the amount of carbs, fats, and other nutrients listed on the label are based on one serving of the item. Many items contain more than one serving per package.  Check the total grams (g) of carbs in one serving.  Check the number of grams of saturated fats and trans fats in one serving. Choose foods that have a low amount or none of these fats.  Check the number of milligrams (mg) of salt (sodium) in one serving. Most people should limit total sodium intake to less than 2,300 mg per day.  Always check the nutrition information of foods labeled as \"low-fat\" or \"nonfat.\" These foods may be higher in added sugar or refined carbs and should be avoided.  Talk to your dietitian to identify your daily goals for nutrients listed on the label.  Shopping  Avoid buying canned, pre-made, or processed foods. These foods tend to be high in fat, sodium, and added sugar.  Shop around the outside edge of the grocery store. This is where you will most often find fresh fruits and vegetables, bulk grains, fresh meats, and fresh dairy products.  Cooking  Use low-heat cooking methods, such as baking, instead of high-heat cooking methods, such as deep frying.  Cook using healthy oils, such as olive, canola, or sunflower oil.  Avoid cooking with butter, cream, or high-fat meats.  Meal planning  Eat meals and snacks regularly, preferably at the same times every day. Avoid going long periods " of time without eating.  Eat foods that are high in fiber, such as fresh fruits, vegetables, beans, and whole grains.  Eat 4-6 oz (112-168 g) of lean protein each day, such as lean meat, chicken, fish, eggs, or tofu. One ounce (oz) (28 g) of lean protein is equal to:  1 oz (28 g) of meat, chicken, or fish.  1 egg.  ¼ cup (62 g) of tofu.  Eat some foods each day that contain healthy fats, such as avocado, nuts, seeds, and fish.  What foods should I eat?  Fruits  Berries. Apples. Oranges. Peaches. Apricots. Plums. Grapes. Mangoes. Papayas. Pomegranates. Kiwi. Cherries.  Vegetables  Leafy greens, including lettuce, spinach, kale, chard, gruu greens, mustard greens, and cabbage. Beets. Cauliflower. Broccoli. Carrots. Green beans. Tomatoes. Peppers. Onions. Cucumbers. [place] sprouts.  Grains  Whole grains, such as whole-wheat or whole-grain bread, crackers, tortillas, cereal, and pasta. Unsweetened oatmeal. Quinoa. Brown or wild rice.  Meats and other proteins  Seafood. Poultry without skin. Lean cuts of poultry and beef. Tofu. Nuts. Seeds.  Dairy  Low-fat or fat-free dairy products such as milk, yogurt, and cheese.  The items listed above may not be a complete list of foods and beverages you can eat and drink. Contact a dietitian for more information.  What foods should I avoid?  Fruits  Fruits canned with syrup.  Vegetables  Canned vegetables. Frozen vegetables with butter or cream sauce.  Grains  Refined white flour and flour products such as bread, pasta, snack foods, and cereals. Avoid all processed foods.  Meats and other proteins  Fatty cuts of meat. Poultry with skin. Breaded or fried meats. Processed meat. Avoid saturated fats.  Dairy  Full-fat yogurt, cheese, or milk.  Beverages  Sweetened drinks, such as soda or iced tea.  The items listed above may not be a complete list of foods and beverages you should avoid. Contact a dietitian for more information.  Questions to ask a health care provider  Do I need  to meet with a certified diabetes care and ?  Do I need to meet with a dietitian?  What number can I call if I have questions?  When are the best times to check my blood glucose?  Where to find more information:  American Diabetes Association: diabetes.org  Academy of Nutrition and Dietetics: eatright.org  National [place] of Diabetes and Digestive and Kidney Diseases: niddk.nih.gov  Association of Diabetes Care & Education Specialists: diabeteseducator.org  Summary  It is important to have healthy eating habits because your blood sugar (glucose) levels are greatly affected by what you eat and drink. It is important to use alcohol carefully.  A healthy meal plan will help you manage your blood glucose and lower your risk of heart disease.  Your health care provider may recommend that you work with a dietitian to make a meal plan that is best for you.  This information is not intended to replace advice given to you by your health care provider. Make sure you discuss any questions you have with your health care provider.  Document Revised: 07/21/2021 Document Reviewed: 07/21/2021  Elsevier Patient Education © 2023 Elsevier Inc.

## 2024-03-13 NOTE — SIGNIFICANT NOTE
Wound Eval / Progress Noted     [facility]     Patient Name: Mariela Aldrich  : 1970  MRN: [id number]  Today's Date: 3/13/2024                 Admit Date: 3/13/2024    Visit Dx:    ICD-10-CM ICD-9-CM   1. Hidradenitis  L73.2 705.83         * No active hospital problems. *        Past Medical History:   Diagnosis Date    Anxiety     Asthma     PRN INHALER AND TAKES ALLERGY INJECTIONS    Diabetes mellitus type 2, controlled     AVERAGE     Hidradenitis     Hyperlipidemia     Hypothyroidism     Murmur     DIAGNOSED WHEN 17 BUT IS ASYMPTOMATIC AND IS FOLLOWED ONLY BY PCP    Panic attack     Pilonidal cyst     Rheumatic fever     AS A CHILD    RLS (restless legs syndrome)     Sinus trouble         Past Surgical History:   Procedure Laterality Date    COLONOSCOPY  2020    exernal hemorrhoids    EXCISION LESION Bilateral 3/20/2023    Procedure: Excision Hidradenitis of Bilateral Inguinal Areas;  Surgeon: Donato You MD;  Location: [facility];  Service: General;  Laterality: Bilateral;    GROIN ABSCESS INCISION AND DRAINAGE Right     hidradenitis    HYSTERECTOMY  2010    KNEE SURGERY Bilateral 2013    PILONIDAL CYSTECTOMY N/A 2023    Procedure: Excision Hidradenitis of Inguinal Area and Pilonidal Cystectomy;  Surgeon: Donato You MD;  Location: [facility];  Service: General;  Laterality: N/A;         Physical Assessment:  Wound 23 1333 medial gluteal Incision (Active)   Wound Image   24 1220   Dressing Appearance moist drainage;intact 24 1220   Closure None 24 1220   Base moist;red 24 1220   Periwound dry;intact;pink 24 1220   Periwound Temperature warm 24 1220   Periwound Skin Turgor soft 24 1220   Edges open 24 1220   Wound Length (cm) 0.4 cm 24 1220   Wound Width (cm) 0.3 cm 24 1220   Wound Depth (cm) 0.1 cm 24 1220   Wound Surface Area (cm^2) 0.12 cm^2 24 1220   Wound Volume (cm^3) 0.012 cm^3  03/13/24 1220   Drainage Characteristics/Odor serosanguineous 03/13/24 1220   Drainage Amount small 03/13/24 1220   Care, Wound cleansed with;irrigated with;sterile normal saline;silver agent applied 03/13/24 1220   Dressing Care dressing applied;calcium alginate;silver impregnated;silicone;border dressing;hydrocolloid 03/13/24 1220   Periwound Care absorptive dressing applied 03/13/24 1220       Wound 03/22/23 1518 medial coccyx Incision (Active)   Wound Image   03/13/24 1220   Dressing Appearance moist drainage;intact 03/13/24 1220   Closure None 03/13/24 1220   Base moist;red 03/13/24 1220   Periwound dry;intact;pink 03/13/24 1220   Periwound Temperature warm 03/13/24 1220   Periwound Skin Turgor soft 03/13/24 1220   Edges open 03/13/24 1220   Wound Length (cm) 0.4 cm 03/13/24 1220   Wound Width (cm) 0.2 cm 03/13/24 1220   Wound Depth (cm) 0.1 cm 03/13/24 1220   Wound Surface Area (cm^2) 0.08 cm^2 03/13/24 1220   Wound Volume (cm^3) 0.008 cm^3 03/13/24 1220   Drainage Characteristics/Odor serosanguineous 03/13/24 1220   Drainage Amount small 03/13/24 1220   Care, Wound cleansed with;irrigated with;sterile normal saline;silver agent applied 03/13/24 1220   Dressing Care dressing applied;calcium alginate;silver impregnated;silicone;border dressing;hydrocolloid 03/13/24 1220   Periwound Care absorptive dressing applied 03/13/24 1220      Wound Check / Follow-up: Patient seen today for wound follow-up and dressing change in outpatient nursing services. Patient's  continues to change dressings / perform wound care in absence of wound care nurse. Patient denies any issues / concerns with this.     Dressing in place is intact. Removed dressing. Upper and lower gluteal wounds remain with moist red tissue to wound bases. Wounds overall showing improvement and response to treatment with ludy wound margins and decreasing depth. Hypergranulation remains to two wound margins on both upper and lower gluteal  wounds but has improved. Cleansed and irrigated wounds with normal saline. Silver nitrate applied to areas of hypergranulation and to lower gluteal wound bases. Silver impregnated calcium alginate applied to all wounds and secured with silicone border dressing. Strips of hydrocolloid dressing added to aid in seal. Recommending to continue current treatment.      Impression: Surgical wounds with delayed closure.      Short term goals: Regain skin integrity, skin protection, dressing changes every 2-3 days and PRN, 1x weekly wound checks / follow-ups.      Lanette Cantu RN    3/13/2024    15:24 EDT

## 2024-03-13 NOTE — OUTREACH NOTE
Patient Outreach    Mariela had office visit this am. Face to face with PCP. She is having some glucose readings high as 400. Mounjaro increased to 7.5 mg. Tresiba continues at 56 u each night.    Having dry cough. Lisinopril held for one month to see if this is the cause.     Lab work pending.    Follow up phone call scheduled.     Name and Relationship of Patient/Support Person: Mariela Aldrich - Self        Education Documentation  No documentation found.        Tenisha OROPEZA  Ambulatory Case Management    3/13/2024, 08:10 EDT

## 2024-03-14 ENCOUNTER — CLINICAL SUPPORT (OUTPATIENT)
Dept: FAMILY MEDICINE CLINIC | Facility: CLINIC | Age: 54
End: 2024-03-14
Payer: COMMERCIAL

## 2024-03-14 DIAGNOSIS — R30.0 DYSURIA: ICD-10-CM

## 2024-03-14 DIAGNOSIS — R30.0 DYSURIA: Primary | ICD-10-CM

## 2024-03-14 LAB — BACTERIA SPEC AEROBE CULT: NORMAL

## 2024-03-14 PROCEDURE — 87086 URINE CULTURE/COLONY COUNT: CPT | Performed by: NURSE PRACTITIONER

## 2024-03-15 LAB — BACTERIA SPEC AEROBE CULT: NORMAL

## 2024-03-20 ENCOUNTER — HOSPITAL ENCOUNTER (OUTPATIENT)
Dept: INFUSION THERAPY | Facility: HOSPITAL | Age: 54
Discharge: HOME | End: 2024-03-20
Admitting: SURGERY
Payer: COMMERCIAL

## 2024-03-20 VITALS
TEMPERATURE: 98.2 F | HEART RATE: 95 BPM | DIASTOLIC BLOOD PRESSURE: 79 MMHG | OXYGEN SATURATION: 97 % | SYSTOLIC BLOOD PRESSURE: 123 MMHG | RESPIRATION RATE: 20 BRPM

## 2024-03-20 DIAGNOSIS — L73.2 HIDRADENITIS: Primary | ICD-10-CM

## 2024-03-20 PROCEDURE — G0463 HOSPITAL OUTPT CLINIC VISIT: HCPCS

## 2024-03-20 RX ADMIN — SILVER NITRATE APPLICATORS 1 APPLICATION: 25; 75 STICK TOPICAL at 12:27

## 2024-03-20 NOTE — SIGNIFICANT NOTE
Wound Eval / Progress Noted    [facility]     Patient Name: Mariela Aldrich  : 1970  MRN: [id number]  Today's Date: 3/20/2024                 Admit Date: 3/20/2024    Visit Dx:    ICD-10-CM ICD-9-CM   1. Hidradenitis  L73.2 705.83         * No active hospital problems. *        Past Medical History:   Diagnosis Date    Anxiety     Asthma     PRN INHALER AND TAKES ALLERGY INJECTIONS    Diabetes mellitus type 2, controlled     AVERAGE     Hidradenitis     Hyperlipidemia     Hypothyroidism     Murmur     DIAGNOSED WHEN 17 BUT IS ASYMPTOMATIC AND IS FOLLOWED ONLY BY PCP    Panic attack     Pilonidal cyst     Rheumatic fever     AS A CHILD    RLS (restless legs syndrome)     Sinus trouble         Past Surgical History:   Procedure Laterality Date    COLONOSCOPY  2020    exernal hemorrhoids    EXCISION LESION Bilateral 3/20/2023    Procedure: Excision Hidradenitis of Bilateral Inguinal Areas;  Surgeon: Donato You MD;  Location: [facility];  Service: General;  Laterality: Bilateral;    GROIN ABSCESS INCISION AND DRAINAGE Right     hidradenitis    HYSTERECTOMY  2010    KNEE SURGERY Bilateral 2013    PILONIDAL CYSTECTOMY N/A 2023    Procedure: Excision Hidradenitis of Inguinal Area and Pilonidal Cystectomy;  Surgeon: Donato You MD;  Location: [facility];  Service: General;  Laterality: N/A;         Physical Assessment:  Wound 23 1333 medial gluteal Incision (Active)   Wound Image   24 1231   Dressing Appearance intact;moist drainage 24 1231   Closure None 24 1231   Base moist;red 24 1231   Periwound intact;dry;pink 24 1231   Periwound Temperature warm 24 1231   Periwound Skin Turgor soft 24 1231   Edges open 24 1231   Drainage Characteristics/Odor serosanguineous;tan 24 1231   Drainage Amount small 24 1231   Care, Wound cleansed with;irrigated with;sterile normal saline;silver agent applied 24 1231    Dressing Care dressing applied;border dressing;calcium alginate;silver impregnated;silicone;hydrocolloid 03/20/24 1231   Periwound Care absorptive dressing applied 03/20/24 1231       Wound 03/22/23 1518 medial coccyx Incision (Active)   Wound Image    03/20/24 1231   Dressing Appearance intact;moist drainage 03/20/24 1231   Closure None 03/20/24 1231   Base moist;red 03/20/24 1231   Periwound redness;warm 03/20/24 1231   Periwound Temperature warm 03/20/24 1231   Periwound Skin Turgor soft 03/20/24 1231   Edges open 03/20/24 1231   Drainage Characteristics/Odor serosanguineous;tan 03/20/24 1231   Drainage Amount small 03/20/24 1231   Care, Wound cleansed with;irrigated with;sterile normal saline 03/20/24 1231   Dressing Care dressing applied;calcium alginate;silver impregnated;border dressing;silicone;hydrocolloid 03/20/24 1231   Periwound Care absorptive dressing applied 03/20/24 1231        Wound Check / Follow-up:  Patient seen today for wound follow-up / wound check. Patient presents with intact dressing.     Dressing removed. There is yellow / tan and serosanguinous drainage noted to dressing. Cleansed and irrigated wound with NS and gauze. There are three small openings noted within upper gluteal crease measuring 0.4cm x 0.1cm x 0.1cm; 0.2cm x 0.1cm x 0.1cm; 0.3cm x 0.1cm x 0.1cm. The wound bases are red / pink and moist. There is tan drainage noted to lower upper gluteal wound. Recommending to continue application of silver nitrite along wound margins and then to cover with calcium alginate. In absence of calcium alginate may utilize silver impregnated hydrofiber.     Lower gluteal crease with two separate wounds with shallow wound base. Wound bases are red and moist and measuring 0.4 cm x 0.2cm x 0.1cm; and 0.4cm x 0.1cm x 0.1cm. Cleansed and irrigated with NS and gauze. Blotted dry. Recommending to continue silver nitrite applications as well as application of calcium alginate and silicone border  dressing. In absences of calcium alginate may use silver impregnated hydrofiber.     Impression: Surgical incision with delayed closure and delayed wound healing.     Short term goals:  Regain skin integrity. Dressing changes every 2 - 3 days. Family to perform in absence of wound nurse.     Miladys Dockery RN    3/20/2024    13:31 EDT

## 2024-03-27 ENCOUNTER — HOSPITAL ENCOUNTER (OUTPATIENT)
Dept: INFUSION THERAPY | Facility: HOSPITAL | Age: 54
Discharge: HOME | End: 2024-03-27
Admitting: SURGERY
Payer: COMMERCIAL

## 2024-03-27 VITALS
RESPIRATION RATE: 20 BRPM | TEMPERATURE: 98.3 F | HEART RATE: 95 BPM | OXYGEN SATURATION: 100 % | DIASTOLIC BLOOD PRESSURE: 64 MMHG | SYSTOLIC BLOOD PRESSURE: 105 MMHG

## 2024-03-27 DIAGNOSIS — L73.2 HIDRADENITIS: Primary | ICD-10-CM

## 2024-03-27 PROCEDURE — G0463 HOSPITAL OUTPT CLINIC VISIT: HCPCS

## 2024-03-27 RX ADMIN — SILVER NITRATE APPLICATORS 1 APPLICATION: 25; 75 STICK TOPICAL at 12:15

## 2024-03-27 NOTE — SIGNIFICANT NOTE
Wound Eval / Progress Noted    [facility]     Patient Name: Mariela Aldrich  : 1970  MRN: [id number]  Today's Date: 3/27/2024                 Admit Date: 3/27/2024    Visit Dx:    ICD-10-CM ICD-9-CM   1. Hidradenitis  L73.2 705.83         * No active hospital problems. *        Past Medical History:   Diagnosis Date    Anxiety     Asthma     PRN INHALER AND TAKES ALLERGY INJECTIONS    Diabetes mellitus type 2, controlled     AVERAGE     Hidradenitis     Hyperlipidemia     Hypothyroidism     Murmur     DIAGNOSED WHEN 17 BUT IS ASYMPTOMATIC AND IS FOLLOWED ONLY BY PCP    Panic attack     Pilonidal cyst     Rheumatic fever     AS A CHILD    RLS (restless legs syndrome)     Sinus trouble         Past Surgical History:   Procedure Laterality Date    COLONOSCOPY  2020    exernal hemorrhoids    EXCISION LESION Bilateral 3/20/2023    Procedure: Excision Hidradenitis of Bilateral Inguinal Areas;  Surgeon: Donato You MD;  Location: [facility];  Service: General;  Laterality: Bilateral;    GROIN ABSCESS INCISION AND DRAINAGE Right     hidradenitis    HYSTERECTOMY  2010    KNEE SURGERY Bilateral 2013    PILONIDAL CYSTECTOMY N/A 2023    Procedure: Excision Hidradenitis of Inguinal Area and Pilonidal Cystectomy;  Surgeon: Donato You MD;  Location: [facility];  Service: General;  Laterality: N/A;         Physical Assessment:  Wound 23 1333 medial gluteal Incision (Active)   Wound Image   24 1215   Dressing Appearance moist drainage;intact 24 1215   Closure None 24 1215   Base moist;red 24 1215   Periwound dry;intact;pink 24 1215   Periwound Temperature warm 24 1215   Periwound Skin Turgor soft 24 1215   Edges open 24 1215   Drainage Characteristics/Odor serosanguineous;tan 24 1215   Drainage Amount small 24 1215   Care, Wound cleansed with;irrigated with;sterile normal saline;silver agent applied 24 1215    Dressing Care dressing applied;silver impregnated;calcium alginate;silicone;border dressing;hydrocolloid 03/27/24 1215   Periwound Care absorptive dressing applied 03/27/24 1215       Wound 03/22/23 1518 medial coccyx Incision (Active)   Wound Image   03/27/24 1215   Dressing Appearance moist drainage;intact 03/27/24 1215   Closure None 03/27/24 1215   Base moist;red 03/27/24 1215   Periwound dry;intact;pink 03/27/24 1215   Periwound Temperature warm 03/27/24 1215   Periwound Skin Turgor soft 03/27/24 1215   Edges open 03/27/24 1215   Drainage Characteristics/Odor serosanguineous;tan 03/27/24 1215   Drainage Amount small 03/27/24 1215   Care, Wound cleansed with;irrigated with;sterile normal saline 03/27/24 1215   Dressing Care dressing applied;silver impregnated;calcium alginate;silicone;border dressing;hydrocolloid 03/27/24 1215   Periwound Care absorptive dressing applied 03/27/24 1215      Wound Check / Follow-up: Patient seen today for wound follow-up and dr workman change in outpatient nursing services. Patient reports no issues managing dressing changes at home in absence of wound care nurse.    Dressing intact with some moist serosanguineous and tan drainage noted. Removed dressing. Cleansed and irrigated wounds with normal saline. Blotted dry with gauze. Two small openings remain to upper gluteal crease measuring 0.3 cm x 0.2 cm x 0.1 cm and 0.4 cm x 0.2 cm x 0.1 cm. Wound bases are red and moist. Periwound tissue is pink, dry, and intact. Applied silver nitrate to wound margins then applied silver impregnated calcium alginate and secured with silicone border dressing. Strips of hydrocolloid dressing added to aid in seal. Recommending to continue current treatment.    Two lower gluteal wounds remain with moist, red tissue to wound bases. Periwound tissue is pink, dry, and intact. Wounds measure 0.3 cm x 0.2 cm x 0.1 cm and 0.4 cm x 0.2 cm x 0.1 cm. Cleansed and irrigated wounds with normal saline. Applied  silver nitrate then applied silver impregnated calcium alginate and secured with silicone border dressing. Strips of hydrocolloid dressing added to aid in seal. Recommending to continue current treatment.      Impression: Surgical wounds with delayed closure.      Short term goals: Regain skin integrity, skin protection, dressing changes every 2-3 days and PRN, 1x weekly wound checks / follow-ups.    Lanette Cantu RN    3/27/2024    13:43 EDT

## 2024-04-08 DIAGNOSIS — J45.20 MILD INTERMITTENT ASTHMA WITHOUT COMPLICATION: ICD-10-CM

## 2024-04-09 RX ORDER — FLUTICASONE PROPIONATE AND SALMETEROL 250; 50 UG/1; UG/1
POWDER RESPIRATORY (INHALATION)
Qty: 60 EACH | Refills: 2 | Status: SHIPPED | OUTPATIENT
Start: 2024-04-09

## 2024-04-10 ENCOUNTER — HOSPITAL ENCOUNTER (OUTPATIENT)
Dept: INFUSION THERAPY | Facility: HOSPITAL | Age: 54
Discharge: HOME | End: 2024-04-10
Admitting: SURGERY
Payer: COMMERCIAL

## 2024-04-10 VITALS
RESPIRATION RATE: 20 BRPM | OXYGEN SATURATION: 99 % | DIASTOLIC BLOOD PRESSURE: 76 MMHG | SYSTOLIC BLOOD PRESSURE: 127 MMHG | HEART RATE: 96 BPM | TEMPERATURE: 98.3 F

## 2024-04-10 DIAGNOSIS — L73.2 HIDRADENITIS: Primary | ICD-10-CM

## 2024-04-10 PROCEDURE — G0463 HOSPITAL OUTPT CLINIC VISIT: HCPCS

## 2024-04-10 RX ADMIN — SILVER NITRATE APPLICATORS 1 APPLICATION: 25; 75 STICK TOPICAL at 13:26

## 2024-04-10 NOTE — SIGNIFICANT NOTE
Wound Eval / Progress Noted    [facility]     Patient Name: Mariela Aldrich  : 1970  MRN: [id number]  Today's Date: 4/10/2024                 Admit Date: 4/10/2024    Visit Dx:    ICD-10-CM ICD-9-CM   1. Hidradenitis  L73.2 705.83         * No active hospital problems. *        Past Medical History:   Diagnosis Date    Anxiety     Asthma     PRN INHALER AND TAKES ALLERGY INJECTIONS    Diabetes mellitus type 2, controlled     AVERAGE     Hidradenitis     Hyperlipidemia     Hypothyroidism     Murmur     DIAGNOSED WHEN 17 BUT IS ASYMPTOMATIC AND IS FOLLOWED ONLY BY PCP    Panic attack     Pilonidal cyst     Rheumatic fever     AS A CHILD    RLS (restless legs syndrome)     Sinus trouble         Past Surgical History:   Procedure Laterality Date    COLONOSCOPY  2020    exernal hemorrhoids    EXCISION LESION Bilateral 3/20/2023    Procedure: Excision Hidradenitis of Bilateral Inguinal Areas;  Surgeon: Donato You MD;  Location: [facility] (formerly [facility]);  Service: General;  Laterality: Bilateral;    GROIN ABSCESS INCISION AND DRAINAGE Right     hidradenitis    HYSTERECTOMY  2010    KNEE SURGERY Bilateral 2013    PILONIDAL CYSTECTOMY N/A 2023    Procedure: Excision Hidradenitis of Inguinal Area and Pilonidal Cystectomy;  Surgeon: Donato You MD;  Location: [facility] (formerly [facility]);  Service: General;  Laterality: N/A;         Physical Assessment:  Wound 23 1333 medial gluteal Incision (Active)   Wound Image   04/10/24 1325   Dressing Appearance intact;moist drainage 04/10/24 1325   Closure None 04/10/24 1325   Base red;moist 04/10/24 1325   Red (%), Wound Tissue Color 100 04/10/24 1325   Periwound redness;warm;moist;macerated 04/10/24 1325   Periwound Temperature warm 04/10/24 1325   Periwound Skin Turgor soft 04/10/24 1325   Edges open 04/10/24 1325   Wound Length (cm) 2 cm 04/10/24 1325   Wound Width (cm) 0.2 cm 04/10/24 1325   Wound Depth (cm) 0.1 cm 04/10/24 1325   Wound Surface Area (cm^2)  0.4 cm^2 04/10/24 1325   Wound Volume (cm^3) 0.04 cm^3 04/10/24 1325   Drainage Characteristics/Odor serosanguineous;serous 04/10/24 1325   Drainage Amount small 04/10/24 1325   Care, Wound cleansed with;irrigated with;sterile normal saline;silver agent applied 04/10/24 1325   Dressing Care dressing applied;calcium alginate;hydrocolloid;silver impregnated 04/10/24 1325   Periwound Care absorptive dressing applied 04/10/24 1325       Wound 03/22/23 1518 medial coccyx Incision (Active)   Wound Image   04/10/24 1325   Dressing Appearance intact;moist drainage 04/10/24 1325   Closure None 04/10/24 1325   Base red;moist 04/10/24 1325   Red (%), Wound Tissue Color 100 04/10/24 1325   Periwound moist;macerated;redness 04/10/24 1325   Periwound Temperature warm 04/10/24 1325   Periwound Skin Turgor soft 04/10/24 1325   Edges open 04/10/24 1325   Wound Length (cm) 0.2 cm 04/10/24 1325   Wound Width (cm) 0.2 cm 04/10/24 1325   Wound Depth (cm) 0.2 cm 04/10/24 1325   Wound Surface Area (cm^2) 0.04 cm^2 04/10/24 1325   Wound Volume (cm^3) 0.008 cm^3 04/10/24 1325   Drainage Characteristics/Odor serous;serosanguineous 04/10/24 1325   Drainage Amount small 04/10/24 1325   Care, Wound cleansed with;irrigated with;sterile normal saline;silver agent applied 04/10/24 1325   Dressing Care dressing applied;calcium alginate;hydrocolloid;silver impregnated 04/10/24 1325   Periwound Care absorptive dressing applied 04/10/24 1325        Wound Check / Follow-up:  Patient seen today for wound follow-up / wound check. Patient presents today with intact dressing. She states dressing was last changed on Monday evening. Patient with small amount of serous / serosanguinous drainage noted to dressing.     Dressing removed and site cleansed with NS and gauze. There is skin irritation noted to gluteal aspects along with trauma from adhesive.     Upper gluteal crease with erosion along cleft where previously open tissue has been present, currently  tissue only superficially open. Immediately distal to the cleft there is a small circular open area with red moist tissue and active serous and serosanguinous drainage. Site blotted dry. Silver agent applied. Recommending to continue silver impregnated calcium alginate and to secure with hydrocolloid dressing.     Lower gluteal crease with open wound moist tissue. Cleansed with Ns and gauze. Blotted dry. Red moist tissue noted with wound base. Silver agent applied. Recommending to continue silver impregnated calcium alginate and to secure with hydrocolloid dressing.     Impression: Surgical incision to gluteal crease with delayed wound healing.     Short term goals:  Regain skin integrity. Dressing changes every other day.     Miladys Dockery RN    4/10/2024    16:45 EDT

## 2024-04-14 DIAGNOSIS — E11.65 TYPE 2 DIABETES MELLITUS WITH HYPERGLYCEMIA, WITH LONG-TERM CURRENT USE OF INSULIN: ICD-10-CM

## 2024-04-14 DIAGNOSIS — Z79.4 TYPE 2 DIABETES MELLITUS WITH HYPERGLYCEMIA, WITH LONG-TERM CURRENT USE OF INSULIN: ICD-10-CM

## 2024-04-15 RX ORDER — TIRZEPATIDE 7.5 MG/.5ML
INJECTION, SOLUTION SUBCUTANEOUS
Qty: 4 ML | Refills: 0 | Status: SHIPPED | OUTPATIENT
Start: 2024-04-15

## 2024-04-17 ENCOUNTER — HOSPITAL ENCOUNTER (OUTPATIENT)
Dept: INFUSION THERAPY | Facility: HOSPITAL | Age: 54
Discharge: HOME | End: 2024-04-17
Admitting: SURGERY
Payer: COMMERCIAL

## 2024-04-17 VITALS
HEART RATE: 112 BPM | TEMPERATURE: 98.2 F | DIASTOLIC BLOOD PRESSURE: 77 MMHG | RESPIRATION RATE: 20 BRPM | OXYGEN SATURATION: 99 % | SYSTOLIC BLOOD PRESSURE: 110 MMHG

## 2024-04-17 DIAGNOSIS — L73.2 HIDRADENITIS: Primary | ICD-10-CM

## 2024-04-17 PROCEDURE — G0463 HOSPITAL OUTPT CLINIC VISIT: HCPCS

## 2024-04-17 RX ADMIN — SILVER NITRATE APPLICATORS 1 APPLICATION: 25; 75 STICK TOPICAL at 12:14

## 2024-04-17 NOTE — SIGNIFICANT NOTE
Wound Eval / Progress Noted     [facility]     Patient Name: Mariela Aldrich  : 1970  MRN: [id number]  Today's Date: 2024                 Admit Date: 2024    Visit Dx:    ICD-10-CM ICD-9-CM   1. Hidradenitis  L73.2 705.83         * No active hospital problems. *        Past Medical History:   Diagnosis Date    Anxiety     Asthma     PRN INHALER AND TAKES ALLERGY INJECTIONS    Diabetes mellitus type 2, controlled     AVERAGE     Hidradenitis     Hyperlipidemia     Hypothyroidism     Murmur     DIAGNOSED WHEN 17 BUT IS ASYMPTOMATIC AND IS FOLLOWED ONLY BY PCP    Panic attack     Pilonidal cyst     Rheumatic fever     AS A CHILD    RLS (restless legs syndrome)     Sinus trouble         Past Surgical History:   Procedure Laterality Date    COLONOSCOPY  2020    exernal hemorrhoids    EXCISION LESION Bilateral 3/20/2023    Procedure: Excision Hidradenitis of Bilateral Inguinal Areas;  Surgeon: Donato You MD;  Location: [facility];  Service: General;  Laterality: Bilateral;    GROIN ABSCESS INCISION AND DRAINAGE Right     hidradenitis    HYSTERECTOMY  2010    KNEE SURGERY Bilateral 2013    PILONIDAL CYSTECTOMY N/A 2023    Procedure: Excision Hidradenitis of Inguinal Area and Pilonidal Cystectomy;  Surgeon: Donato You MD;  Location: [facility];  Service: General;  Laterality: N/A;         Physical Assessment:     24 1220   Wound 23 1333 medial gluteal Incision   Placement Date/Time: 23 1333   Present on Hospital Admission: No  Orientation: medial  Location: gluteal  Primary Wound Type: Incision   Wound Image    Dressing Appearance moist drainage;intact   Closure None   Base moist;red   Red (%), Wound Tissue Color 100   Periwound redness;warm;moist;macerated   Periwound Temperature warm   Periwound Skin Turgor soft   Edges open   Wound Length (cm) 0.6 cm  (2 individually open areas)   Wound Width (cm) 0.1 cm   Wound Depth (cm) 0.1 cm   Wound Surface  Area (cm^2) 0.06 cm^2   Wound Volume (cm^3) 0.006 cm^3   Drainage Characteristics/Odor serosanguineous   Drainage Amount small   Care, Wound cleansed with;irrigated with;sterile normal saline;silver agent applied   Dressing Care dressing applied;border dressing;calcium alginate;hydrocolloid;silver impregnated;silicone   Periwound Care absorptive dressing applied   Wound 03/22/23 1518 medial coccyx Incision   Placement Date/Time: 03/22/23 1518   Present on Hospital Admission: Yes  Orientation: medial  Location: coccyx  Primary Wound Type: Incision   Wound Image    Dressing Appearance intact;moist drainage   Closure None   Base moist;red;pink   Periwound redness;warm;macerated;moist   Periwound Temperature warm   Periwound Skin Turgor soft   Edges open   Wound Length (cm) 0.1 cm  (2 seperate areas that measure the same)   Wound Width (cm) 0.1 cm   Wound Depth (cm) 0.1 cm   Wound Surface Area (cm^2) 0.01 cm^2   Wound Volume (cm^3) 0.001 cm^3   Drainage Characteristics/Odor serous;serosanguineous;yellow   Drainage Amount small   Care, Wound cleansed with;irrigated with;sterile normal saline;silver agent applied   Dressing Care dressing applied;calcium alginate;border dressing;hydrocolloid;silver impregnated;silicone   Periwound Care absorptive dressing applied          Wound Check / Follow-up:  Patient seen today for wound follow-up / wound check. Patient presents with intact dressing along gluteal crease. She states it was applied two days ago.     Dressing removed. Small amount of serosanguinous and yellow drainage noted to dressing. Site cleansed with NS and gauze. Tracie-wound maceration and redness noted from moisture. Open red moist tissue noted to lower gluteal crease. There is new epithelialization creating two separate openings vs. One solid wound. Cleansed and irrigated with NS and gauze. Silver nitrite applied and recommending to continue calcium alginate dressings every other day.     Upper gluteal crease with  two small openings There are very small. The lower of the two small openings with yellow drainage expressed with cleansing and assessment. Cleansed and irrigated site well. Tracie-wound maceration and irrigation also noted to this area. Silver nitrite applied along wound margins and recommending to continue dressing changes with silver impregnated calcium alginate every other day.     Impression: Surgical wounds with closure by secondary intent.     Short term goals:  Regain skin integrity. Dressing changes every other day prn for rolling / soilage.     Miladys Dockery RN    4/17/2024    15:50 EDT

## 2024-04-23 ENCOUNTER — TELEPHONE (OUTPATIENT)
Dept: CASE MANAGEMENT | Facility: OTHER | Age: 54
End: 2024-04-23
Payer: COMMERCIAL

## 2024-04-24 ENCOUNTER — HOSPITAL ENCOUNTER (OUTPATIENT)
Dept: INFUSION THERAPY | Facility: HOSPITAL | Age: 54
Discharge: HOME | End: 2024-04-24
Admitting: SURGERY
Payer: COMMERCIAL

## 2024-04-24 VITALS
TEMPERATURE: 98 F | HEART RATE: 100 BPM | RESPIRATION RATE: 18 BRPM | SYSTOLIC BLOOD PRESSURE: 114 MMHG | DIASTOLIC BLOOD PRESSURE: 70 MMHG

## 2024-04-24 DIAGNOSIS — L73.2 HIDRADENITIS: Primary | ICD-10-CM

## 2024-04-24 PROCEDURE — G0463 HOSPITAL OUTPT CLINIC VISIT: HCPCS

## 2024-04-24 RX ADMIN — SILVER NITRATE APPLICATORS 1 APPLICATION: 25; 75 STICK TOPICAL at 12:29

## 2024-04-24 NOTE — SIGNIFICANT NOTE
Wound Eval / Progress Noted    [facility]     Patient Name: Mariela Aldrich  : 1970  MRN: [id number]  Today's Date: 2024                 Admit Date: 2024    Visit Dx:    ICD-10-CM ICD-9-CM   1. Hidradenitis  L73.2 705.83         * No active hospital problems. *        Past Medical History:   Diagnosis Date    Anxiety     Asthma     PRN INHALER AND TAKES ALLERGY INJECTIONS    Diabetes mellitus type 2, controlled     AVERAGE     Hidradenitis     Hyperlipidemia     Hypothyroidism     Murmur     DIAGNOSED WHEN 17 BUT IS ASYMPTOMATIC AND IS FOLLOWED ONLY BY PCP    Panic attack     Pilonidal cyst     Rheumatic fever     AS A CHILD    RLS (restless legs syndrome)     Sinus trouble         Past Surgical History:   Procedure Laterality Date    COLONOSCOPY  2020    exernal hemorrhoids    EXCISION LESION Bilateral 3/20/2023    Procedure: Excision Hidradenitis of Bilateral Inguinal Areas;  Surgeon: Donato You MD;  Location: [facility];  Service: General;  Laterality: Bilateral;    GROIN ABSCESS INCISION AND DRAINAGE Right     hidradenitis    HYSTERECTOMY  2010    KNEE SURGERY Bilateral 2013    PILONIDAL CYSTECTOMY N/A 2023    Procedure: Excision Hidradenitis of Inguinal Area and Pilonidal Cystectomy;  Surgeon: Donato You MD;  Location: [facility];  Service: General;  Laterality: N/A;         Physical Assessment:  Wound 23 1333 medial gluteal Incision (Active)   Wound Image   24 1228   Dressing Appearance moist drainage;intact 24 1228   Closure None 24 1228   Base moist;red 24 1228   Periwound dry;intact;redness 24 1228   Periwound Temperature warm 24 1228   Periwound Skin Turgor soft 24 1228   Edges open 24 1228   Drainage Characteristics/Odor serosanguineous 24 1228   Drainage Amount small 24 1228   Care, Wound cleansed with;sterile normal saline;silver agent applied 24 1228   Dressing Care  dressing applied;calcium alginate;silver impregnated;silicone;border dressing;hydrocolloid 04/24/24 1228   Periwound Care absorptive dressing applied 04/24/24 1228       Wound 03/22/23 1518 medial coccyx Incision (Active)   Wound Image   04/24/24 1228   Dressing Appearance moist drainage;intact 04/24/24 1228   Closure None 04/24/24 1228   Base moist;red 04/24/24 1228   Periwound dry;intact;pink;redness 04/24/24 1228   Periwound Temperature warm 04/24/24 1228   Periwound Skin Turgor soft 04/24/24 1228   Edges open 04/24/24 1228   Drainage Characteristics/Odor serosanguineous;yellow 04/24/24 1228   Drainage Amount small 04/24/24 1228   Care, Wound cleansed with;sterile normal saline;silver agent applied 04/24/24 1228   Dressing Care dressing applied;calcium alginate;silver impregnated;silicone;border dressing;hydrocolloid 04/24/24 1228   Periwound Care absorptive dressing applied 04/24/24 1228      Wound Check / Follow-up: Patient seen today for wound follow-up / wound dressing change.  Dressing to gluteal crease intact at time of visit.  Patient reports no issues with managing wound care at home.    Remove dressing.  Small amount of serosanguineous and yellow drainage noted to dressing.  Cleansed the lower and upper gluteal wounds with normal saline and gauze.  Wound bases are red and moist.  Periwound tissue to all areas intact with pink and red tissue noted.  Silver nitrate applied to wounds then applied silver impregnated calcium alginate and secured with silicone border dressing.  Strips of hydrocolloid dressing were added to aid in seal.  Recommended to continue current care at this time.      Impression: Surgical wounds with delayed closure.      Short term goals:  Regain skin integrity, skin protection, every other day dressing changes, weekly wound check / follow-up.    Lanette Cantu RN    4/24/2024    17:19 EDT

## 2024-05-01 ENCOUNTER — HOSPITAL ENCOUNTER (OUTPATIENT)
Dept: INFUSION THERAPY | Facility: HOSPITAL | Age: 54
Discharge: HOME | End: 2024-05-01
Admitting: SURGERY
Payer: COMMERCIAL

## 2024-05-01 ENCOUNTER — TELEPHONE (OUTPATIENT)
Dept: CASE MANAGEMENT | Facility: OTHER | Age: 54
End: 2024-05-01
Payer: COMMERCIAL

## 2024-05-01 VITALS
DIASTOLIC BLOOD PRESSURE: 76 MMHG | RESPIRATION RATE: 20 BRPM | TEMPERATURE: 98.2 F | OXYGEN SATURATION: 99 % | HEART RATE: 93 BPM | SYSTOLIC BLOOD PRESSURE: 109 MMHG

## 2024-05-01 DIAGNOSIS — L73.2 HIDRADENITIS: Primary | ICD-10-CM

## 2024-05-01 PROCEDURE — G0463 HOSPITAL OUTPT CLINIC VISIT: HCPCS

## 2024-05-01 RX ADMIN — SILVER NITRATE APPLICATORS 1 APPLICATION: 25; 75 STICK TOPICAL at 12:21

## 2024-05-01 NOTE — SIGNIFICANT NOTE
Wound Eval / Progress Noted    [facility]     Patient Name: Mariela Aldrich  : 1970  MRN: [id number]  Today's Date: 2024                 Admit Date: 2024    Visit Dx:    ICD-10-CM ICD-9-CM   1. Hidradenitis  L73.2 705.83         * No active hospital problems. *        Past Medical History:   Diagnosis Date    Anxiety     Asthma     PRN INHALER AND TAKES ALLERGY INJECTIONS    Diabetes mellitus type 2, controlled     AVERAGE     Hidradenitis     Hyperlipidemia     Hypothyroidism     Murmur     DIAGNOSED WHEN 17 BUT IS ASYMPTOMATIC AND IS FOLLOWED ONLY BY PCP    Panic attack     Pilonidal cyst     Rheumatic fever     AS A CHILD    RLS (restless legs syndrome)     Sinus trouble         Past Surgical History:   Procedure Laterality Date    COLONOSCOPY  2020    exernal hemorrhoids    EXCISION LESION Bilateral 3/20/2023    Procedure: Excision Hidradenitis of Bilateral Inguinal Areas;  Surgeon: Donato You MD;  Location: [facility];  Service: General;  Laterality: Bilateral;    GROIN ABSCESS INCISION AND DRAINAGE Right     hidradenitis    HYSTERECTOMY  2010    KNEE SURGERY Bilateral 2013    PILONIDAL CYSTECTOMY N/A 2023    Procedure: Excision Hidradenitis of Inguinal Area and Pilonidal Cystectomy;  Surgeon: Donato You MD;  Location: [facility];  Service: General;  Laterality: N/A;         Physical Assessment:  Wound 23 1333 medial gluteal Incision (Active)   Wound Image   24 1225   Dressing Appearance intact;dry 24 1225   Closure None 24 1225   Base moist;red 24 1225   Red (%), Wound Tissue Color 100 24 1225   Periwound dry;intact;redness 24 1225   Periwound Temperature warm 24 1225   Periwound Skin Turgor soft 24 1225   Edges open 24 1225   Wound Length (cm) 0.5 cm 24 1225   Wound Width (cm) 0.1 cm 24 1225   Wound Depth (cm) 0.1 cm 24 1225   Wound Surface Area (cm^2) 0.05 cm^2 24 1225    Wound Volume (cm^3) 0.005 cm^3 05/01/24 1225   Drainage Characteristics/Odor serosanguineous 05/01/24 1225   Drainage Amount scant 05/01/24 1225   Care, Wound cleansed with;sterile normal saline;silver agent applied 05/01/24 1225   Dressing Care dressing applied;calcium alginate;silver impregnated;hydrofiber;silicone;border dressing 05/01/24 1225   Periwound Care absorptive dressing applied 05/01/24 1225       Wound 03/22/23 1518 medial coccyx Incision (Active)   Dressing Appearance intact;dry 05/01/24 1225   Closure None 05/01/24 1225   Base pink;dry 05/01/24 1225   Periwound dry;intact;pink;redness 05/01/24 1225   Periwound Temperature warm 05/01/24 1225   Periwound Skin Turgor soft 05/01/24 1225   Edges rolled/closed 05/01/24 1225   Drainage Amount none 05/01/24 1225   Care, Wound cleansed with;sterile normal saline 05/01/24 1225   Dressing Care dressing applied;calcium alginate;silver impregnated;hydrofiber;silicone;border dressing 05/01/24 1225   Periwound Care absorptive dressing applied 05/01/24 1225        Wound Check / Follow-up:  Patient seen today for a wound check and dressing change. Patient presents with intact dry dressings.     Dressing removed with no difficulty; wound base is red and moist. Small amount of serosanguinous drainage present to the dressing. Wound cleansed with NS. Periwound is pink and dry. Pink epithelium remains present at this time. Applied silver nitrate to the wound base and then covered with calcium alginate silver impregnated hydrofiber.     Upper gluteal crease wound with closure at this time; no drainage present to the dressing. Tissue is pink and dry, some redness and irritation noted. Cleansed with NS. Applied calcium alginate silver impregnated hydrofiber.  Recommending to continue all wound care at this time with the calcium alginate every other day.    Impression: surgical wounds with closure by secondary intent   Short term goals:  regain skin integrity, dressing  changes every other day    No Henriquez RN    5/1/2024    16:18 EDT

## 2024-05-08 ENCOUNTER — HOSPITAL ENCOUNTER (OUTPATIENT)
Dept: INFUSION THERAPY | Facility: HOSPITAL | Age: 54
Discharge: HOME | End: 2024-05-08
Admitting: SURGERY
Payer: COMMERCIAL

## 2024-05-08 VITALS
OXYGEN SATURATION: 98 % | DIASTOLIC BLOOD PRESSURE: 73 MMHG | TEMPERATURE: 98.2 F | RESPIRATION RATE: 20 BRPM | HEART RATE: 99 BPM | SYSTOLIC BLOOD PRESSURE: 128 MMHG

## 2024-05-08 DIAGNOSIS — L73.2 HIDRADENITIS: Primary | ICD-10-CM

## 2024-05-08 PROCEDURE — 96365 THER/PROPH/DIAG IV INF INIT: CPT

## 2024-05-08 PROCEDURE — 96366 THER/PROPH/DIAG IV INF ADDON: CPT

## 2024-05-08 PROCEDURE — G0463 HOSPITAL OUTPT CLINIC VISIT: HCPCS

## 2024-05-08 RX ADMIN — SILVER NITRATE APPLICATORS 1 APPLICATION: 25; 75 STICK TOPICAL at 12:16

## 2024-05-12 DIAGNOSIS — G25.81 RLS (RESTLESS LEGS SYNDROME): ICD-10-CM

## 2024-05-13 RX ORDER — ROPINIROLE 5 MG/1
5 TABLET, FILM COATED ORAL NIGHTLY
Qty: 90 TABLET | Refills: 0 | Status: SHIPPED | OUTPATIENT
Start: 2024-05-13

## 2024-05-14 DIAGNOSIS — E11.65 TYPE 2 DIABETES MELLITUS WITH HYPERGLYCEMIA, WITH LONG-TERM CURRENT USE OF INSULIN: ICD-10-CM

## 2024-05-14 DIAGNOSIS — Z79.4 TYPE 2 DIABETES MELLITUS WITH HYPERGLYCEMIA, WITH LONG-TERM CURRENT USE OF INSULIN: ICD-10-CM

## 2024-05-15 ENCOUNTER — HOSPITAL ENCOUNTER (OUTPATIENT)
Dept: INFUSION THERAPY | Facility: HOSPITAL | Age: 54
Discharge: HOME | End: 2024-05-15
Admitting: SURGERY
Payer: COMMERCIAL

## 2024-05-15 VITALS
RESPIRATION RATE: 20 BRPM | OXYGEN SATURATION: 97 % | TEMPERATURE: 98.2 F | SYSTOLIC BLOOD PRESSURE: 125 MMHG | HEART RATE: 102 BPM | DIASTOLIC BLOOD PRESSURE: 73 MMHG

## 2024-05-15 DIAGNOSIS — L73.2 HIDRADENITIS: Primary | ICD-10-CM

## 2024-05-15 PROCEDURE — G0463 HOSPITAL OUTPT CLINIC VISIT: HCPCS

## 2024-05-15 RX ORDER — TIRZEPATIDE 7.5 MG/.5ML
INJECTION, SOLUTION SUBCUTANEOUS
Qty: 4 ML | Refills: 0 | Status: SHIPPED | OUTPATIENT
Start: 2024-05-15

## 2024-05-15 RX ADMIN — SILVER NITRATE APPLICATORS 1 APPLICATION: 25; 75 STICK TOPICAL at 12:28

## 2024-05-15 NOTE — ADDENDUM NOTE
Encounter addended by: No Henriquez RN on: 5/15/2024 3:44 PM   Actions taken: Flowsheet data copied forward, Flowsheet accepted, Clinical Note Signed, Charge Capture section accepted

## 2024-05-15 NOTE — SIGNIFICANT NOTE
Wound Eval / Progress Noted    [facility]     Patient Name: Mariela Aldrich  : 1970  MRN: [id number]  Today's Date: 5/15/2024                 Admit Date: 5/15/2024    Visit Dx:    ICD-10-CM ICD-9-CM   1. Hidradenitis  L73.2 705.83         * No active hospital problems. *        Past Medical History:   Diagnosis Date    Anxiety     Asthma     PRN INHALER AND TAKES ALLERGY INJECTIONS    Diabetes mellitus type 2, controlled     AVERAGE     Hidradenitis     Hyperlipidemia     Hypothyroidism     Murmur     DIAGNOSED WHEN 17 BUT IS ASYMPTOMATIC AND IS FOLLOWED ONLY BY PCP    Panic attack     Pilonidal cyst     Rheumatic fever     AS A CHILD    RLS (restless legs syndrome)     Sinus trouble         Past Surgical History:   Procedure Laterality Date    COLONOSCOPY  2020    exernal hemorrhoids    EXCISION LESION Bilateral 3/20/2023    Procedure: Excision Hidradenitis of Bilateral Inguinal Areas;  Surgeon: Donato You MD;  Location: [facility];  Service: General;  Laterality: Bilateral;    GROIN ABSCESS INCISION AND DRAINAGE Right     hidradenitis    HYSTERECTOMY  2010    KNEE SURGERY Bilateral 2013    PILONIDAL CYSTECTOMY N/A 2023    Procedure: Excision Hidradenitis of Inguinal Area and Pilonidal Cystectomy;  Surgeon: Donato You MD;  Location: [facility];  Service: General;  Laterality: N/A;         Physical Assessment:  Wound 23 1333 medial gluteal Incision (Active)   Wound Image   05/15/24 1230   Dressing Appearance intact;dry 05/15/24 1230   Closure None 05/15/24 1230   Base moist;red 05/15/24 1230   Red (%), Wound Tissue Color 100 05/15/24 1230   Periwound dry;warm;redness 05/15/24 1230   Periwound Temperature warm 05/15/24 1230   Periwound Skin Turgor soft 05/15/24 1230   Edges open 05/15/24 1230   Wound Length (cm) 0.3 cm 05/15/24 1230   Wound Width (cm) 0.1 cm 05/15/24 1230   Wound Depth (cm) 0.1 cm 05/15/24 1230   Wound Surface Area (cm^2) 0.03 cm^2 05/15/24 1230    Wound Volume (cm^3) 0.003 cm^3 05/15/24 1230   Drainage Characteristics/Odor serosanguineous 05/15/24 1230   Drainage Amount scant 05/15/24 1230   Care, Wound cleansed with;sterile normal saline;silver agent applied 05/15/24 1230   Dressing Care dressing applied;silver impregnated;calcium alginate;hydrofiber;silicone;border dressing;hydrocolloid 05/15/24 1230   Periwound Care absorptive dressing applied 05/15/24 1230       Wound 03/22/23 1518 medial coccyx Incision (Active)   Wound Image   05/15/24 1230   Dressing Appearance intact;dry 05/15/24 1230   Closure None 05/15/24 1230   Base red;moist 05/15/24 1230   Red (%), Wound Tissue Color 100 05/15/24 1230   Periwound intact;dry;pink 05/15/24 1230   Periwound Temperature warm 05/15/24 1230   Periwound Skin Turgor soft 05/15/24 1230   Edges open 05/15/24 1230   Wound Length (cm) 0.3 cm 05/15/24 1230   Wound Width (cm) 0.3 cm 05/15/24 1230   Wound Depth (cm) 0.1 cm 05/15/24 1230   Wound Surface Area (cm^2) 0.09 cm^2 05/15/24 1230   Wound Volume (cm^3) 0.009 cm^3 05/15/24 1230   Drainage Characteristics/Odor serosanguineous 05/15/24 1230   Drainage Amount scant 05/15/24 1230   Care, Wound cleansed with;sterile normal saline;silver agent applied 05/15/24 1230   Dressing Care dressing applied;silver impregnated;calcium alginate;hydrofiber;silicone;border dressing;hydrocolloid 05/15/24 1230   Periwound Care absorptive dressing applied 05/15/24 1230        Wound Check / Follow-up: Patient seen today for wound check.    Dressings are clean dry and intact, removed with no difficulties.  Small amount of yellow drainage noted to the dressings from the upper wound.  Upper sacral wound with small amount of red moist tissue present no measurable depth.  Perirectal wound continues to respond slowly to current treatment with the silver nitrate applications.  Wound base remains red and moist; wound edges are ludy in wound depth and is filling in.  Periwound is pink and soft.   Cleanse with normal saline and gauze to both wounds.  Silver nitrate applied to both upper and lower gluteal wounds and then covered with silver impregnated calcium alginate secured with a silicone border dressing.    Patient tolerated dressing change with no complaints; patient does report with spouse continues to do dressing changes every other day at this time    Impression: Surgical incision by closure of secondary intent, delayed wound healing     Short term goals:  regain skin integrity, skin protection, every other day dressing changes    No Henriquez RN    5/15/2024    15:40 EDT

## 2024-05-20 ENCOUNTER — HOSPITAL ENCOUNTER (OUTPATIENT)
Dept: MAMMOGRAPHY | Facility: HOSPITAL | Age: 54
Discharge: HOME | End: 2024-05-20
Admitting: NURSE PRACTITIONER
Payer: COMMERCIAL

## 2024-05-20 DIAGNOSIS — Z12.31 VISIT FOR SCREENING MAMMOGRAM: ICD-10-CM

## 2024-05-20 PROCEDURE — 77067 SCR MAMMO BI INCL CAD: CPT

## 2024-05-20 PROCEDURE — 77063 BREAST TOMOSYNTHESIS BI: CPT

## 2024-05-22 ENCOUNTER — HOSPITAL ENCOUNTER (OUTPATIENT)
Dept: INFUSION THERAPY | Facility: HOSPITAL | Age: 54
Discharge: HOME | End: 2024-05-22
Admitting: SURGERY
Payer: COMMERCIAL

## 2024-05-22 VITALS
HEART RATE: 107 BPM | RESPIRATION RATE: 20 BRPM | DIASTOLIC BLOOD PRESSURE: 77 MMHG | SYSTOLIC BLOOD PRESSURE: 120 MMHG | TEMPERATURE: 98.3 F | OXYGEN SATURATION: 99 %

## 2024-05-22 DIAGNOSIS — L73.2 HIDRADENITIS: Primary | ICD-10-CM

## 2024-05-22 PROCEDURE — G0463 HOSPITAL OUTPT CLINIC VISIT: HCPCS

## 2024-05-22 RX ADMIN — SILVER NITRATE APPLICATORS 1 APPLICATION: 25; 75 STICK TOPICAL at 12:18

## 2024-05-22 NOTE — ADDENDUM NOTE
Encounter addended by: Miladys Dockery RN on: 5/22/2024 6:09 PM   Actions taken: Clinical Note Signed, Order list changed, Flowsheet accepted, Charge Capture section accepted, Therapy plan modified

## 2024-05-22 NOTE — SIGNIFICANT NOTE
Wound Eval / Progress Noted    [facility]     Patient Name: Mariela Aldrich  : 1970  MRN: [id number]  Today's Date: 2024                 Admit Date: 2024    Visit Dx:    ICD-10-CM ICD-9-CM   1. Hidradenitis  L73.2 705.83         * No active hospital problems. *        Past Medical History:   Diagnosis Date    Anxiety     Asthma     PRN INHALER AND TAKES ALLERGY INJECTIONS    Diabetes mellitus type 2, controlled     AVERAGE     Hidradenitis     Hyperlipidemia     Hypothyroidism     Murmur     DIAGNOSED WHEN 17 BUT IS ASYMPTOMATIC AND IS FOLLOWED ONLY BY PCP    Panic attack     Pilonidal cyst     Rheumatic fever     AS A CHILD    RLS (restless legs syndrome)     Sinus trouble         Past Surgical History:   Procedure Laterality Date    COLONOSCOPY  2020    exernal hemorrhoids    EXCISION LESION Bilateral 3/20/2023    Procedure: Excision Hidradenitis of Bilateral Inguinal Areas;  Surgeon: Donato You MD;  Location: [facility];  Service: General;  Laterality: Bilateral;    GROIN ABSCESS INCISION AND DRAINAGE Right     hidradenitis    HYSTERECTOMY  2010    KNEE SURGERY Bilateral 2013    PILONIDAL CYSTECTOMY N/A 2023    Procedure: Excision Hidradenitis of Inguinal Area and Pilonidal Cystectomy;  Surgeon: Donato You MD;  Location: [facility];  Service: General;  Laterality: N/A;         Physical Assessment:  Wound 23 1333 medial gluteal Incision (Active)   Wound Image   24 1220   Dressing Appearance intact;moist drainage 24 1220   Closure None 24 1220   Base moist;red 24 1220   Periwound macerated;redness;warm;moist 24 1220   Periwound Temperature warm 24 1220   Periwound Skin Turgor soft 24 1220   Edges open 24 1220   Wound Length (cm) 1.5 cm 24 1220   Wound Width (cm) 0.2 cm 24 1220   Wound Depth (cm) 0.1 cm 24 1220   Wound Surface Area (cm^2) 0.3 cm^2 24 1220   Wound Volume (cm^3)  0.03 cm^3 05/22/24 1220   Drainage Characteristics/Odor serosanguineous 05/22/24 1220   Drainage Amount small 05/22/24 1220   Care, Wound cleansed with;irrigated with;sterile normal saline;silver agent applied 05/22/24 1220   Dressing Care dressing applied;collagen;border dressing;silver impregnated;silicone;calcium alginate 05/22/24 1220   Periwound Care absorptive dressing applied 05/22/24 1220       Wound 03/22/23 1518 medial coccyx Incision (Active)   Wound Image   05/22/24 1220   Dressing Appearance intact;moist drainage 05/22/24 1220   Closure None 05/22/24 1220   Base moist;red;pink 05/22/24 1220   Periwound redness;macerated;moist 05/22/24 1220   Periwound Temperature warm 05/22/24 1220   Periwound Skin Turgor soft 05/22/24 1220   Edges open;rolled/closed 05/22/24 1220   Drainage Characteristics/Odor serosanguineous 05/22/24 1220   Drainage Amount scant 05/22/24 1220   Care, Wound cleansed with;sterile normal saline;silver agent applied 05/22/24 1220   Dressing Care dressing applied;collagen;calcium alginate;silver impregnated;silicone;border dressing;hydrocolloid 05/22/24 1220   Periwound Care absorptive dressing applied 05/22/24 1220        Wound Check / Follow-up: Patient seen today for wound check/follow-up.  Patient states that she went to a baseball game and it was very hot.  She states she has had a lot of skin irritation.  Patient presents with intact dressing along gluteal crease.  Dressing has been in place since Monday.    Dressing removed.  Skin cleansed with normal saline and gauze.  Wound irrigated with normal saline and gauze.  There is maceration and redness noted along the gluteal crease to bilateral aspects.  There are some superficial tissue separation along the upper gluteal crease with red moist tissue visible scant amount of serosanguineous  Drainage present.  To lower gluteal crease there is an increase to the size of the wound.  There is red moist tissue visible.  Serosanguineous  drainage is noted.  Again periwound maceration and irritation noted.  At this time questioning if the moisture and maceration is causing additional skin breakdown with intact dressings.  For today's dressing we will continue to implement silver nitrate to wounds, add collagen to base, continue calcium alginate and silicone border dressing.    Will discuss with surgeon possible change in treatment to include a topical irrigation with a skin barrier skin protectant versus dressing.    Impression: Surgical incisions with dehiscence and delayed closure.    Short term goals: Daily dressing changes    Miladys Dockery RN    5/22/2024    18:02 EDT

## 2024-05-29 ENCOUNTER — HOSPITAL ENCOUNTER (OUTPATIENT)
Dept: INFUSION THERAPY | Facility: HOSPITAL | Age: 54
Discharge: HOME | End: 2024-05-29
Admitting: SURGERY
Payer: COMMERCIAL

## 2024-05-29 VITALS
RESPIRATION RATE: 20 BRPM | TEMPERATURE: 97.8 F | DIASTOLIC BLOOD PRESSURE: 78 MMHG | HEART RATE: 91 BPM | OXYGEN SATURATION: 99 % | SYSTOLIC BLOOD PRESSURE: 126 MMHG

## 2024-05-29 DIAGNOSIS — L73.2 HIDRADENITIS: Primary | ICD-10-CM

## 2024-05-29 PROCEDURE — G0463 HOSPITAL OUTPT CLINIC VISIT: HCPCS

## 2024-05-29 RX ADMIN — SILVER NITRATE APPLICATORS 1 APPLICATION: 25; 75 STICK TOPICAL at 12:27

## 2024-05-29 NOTE — SIGNIFICANT NOTE
Wound Eval / Progress Noted    [facility]     Patient Name: Mariela Aldrich  : 1970  MRN: [id number]  Today's Date: 2024                 Admit Date: 2024    Visit Dx:    ICD-10-CM ICD-9-CM   1. Hidradenitis  L73.2 705.83         * No active hospital problems. *        Past Medical History:   Diagnosis Date    Anxiety     Asthma     PRN INHALER AND TAKES ALLERGY INJECTIONS    Diabetes mellitus type 2, controlled     AVERAGE     Hidradenitis     Hyperlipidemia     Hypothyroidism     Murmur     DIAGNOSED WHEN 17 BUT IS ASYMPTOMATIC AND IS FOLLOWED ONLY BY PCP    Panic attack     Pilonidal cyst     Rheumatic fever     AS A CHILD    RLS (restless legs syndrome)     Sinus trouble         Past Surgical History:   Procedure Laterality Date    COLONOSCOPY  2020    exernal hemorrhoids    EXCISION LESION Bilateral 3/20/2023    Procedure: Excision Hidradenitis of Bilateral Inguinal Areas;  Surgeon: Donato You MD;  Location: [facility];  Service: General;  Laterality: Bilateral;    GROIN ABSCESS INCISION AND DRAINAGE Right     hidradenitis    HYSTERECTOMY  2010    KNEE SURGERY Bilateral 2013    PILONIDAL CYSTECTOMY N/A 2023    Procedure: Excision Hidradenitis of Inguinal Area and Pilonidal Cystectomy;  Surgeon: Donato You MD;  Location: [facility];  Service: General;  Laterality: N/A;         Physical Assessment:  Wound 23 1333 medial gluteal Incision (Active)   Wound Image   24 1230   Dressing Appearance intact;moist drainage 24 1230   Closure None 24 1230   Base moist;red 24 1230   Periwound macerated;moist;warm 24 1230   Periwound Temperature warm 24 1230   Periwound Skin Turgor soft 24 1230   Edges open 24 1230   Wound Length (cm) 2 cm 24 1230   Wound Width (cm) 0.1 cm 24 1230   Wound Depth (cm) 0.1 cm 24 1230   Wound Surface Area (cm^2) 0.2 cm^2 24 1230   Wound Volume (cm^3) 0.02 cm^3  05/29/24 1230   Drainage Characteristics/Odor serosanguineous 05/29/24 1230   Drainage Amount small 05/29/24 1230   Care, Wound cleansed with;irrigated with;sterile normal saline;silver agent applied 05/29/24 1230   Dressing Care dressing applied;calcium alginate;silver impregnated;silicone;border dressing;hydrocolloid 05/29/24 1230   Periwound Care absorptive dressing applied 05/29/24 1230       Wound 03/22/23 1518 medial coccyx Incision (Active)   Wound Image   05/29/24 1230   Dressing Appearance intact;moist drainage 05/29/24 1230   Closure None 05/29/24 1230   Base pink;moist;red 05/29/24 1230   Periwound intact;macerated;moist;pink 05/29/24 1230   Periwound Temperature warm 05/29/24 1230   Periwound Skin Turgor soft 05/29/24 1230   Edges rolled/closed;open 05/29/24 1230   Wound Length (cm) 0.4 cm 05/29/24 1230   Wound Width (cm) 0.2 cm 05/29/24 1230   Wound Depth (cm) 0.1 cm 05/29/24 1230   Wound Surface Area (cm^2) 0.08 cm^2 05/29/24 1230   Wound Volume (cm^3) 0.008 cm^3 05/29/24 1230   Drainage Characteristics/Odor serosanguineous 05/29/24 1230   Drainage Amount scant 05/29/24 1230   Care, Wound cleansed with;irrigated with;sterile normal saline;silver agent applied 05/29/24 1230   Dressing Care dressing applied;calcium alginate;silver impregnated;silicone;border dressing;hydrocolloid 05/29/24 1230   Periwound Care absorptive dressing applied 05/29/24 1230        Wound Check / Follow-up: Patient seen today for wound follow-up/wound check.  Patient presents today with intact dressing to gluteal crease.    Patient's dressing removed.  There is only scant to minimal drainage noted to the existing dressing.  Patient does continue to have maceration and irritation to periwound tissue from adhesives and moisture and intermittent drainage.  To upper gluteal crease there is an area of hypergranulation noted with a shallow opening adjacent to it.  There is an additional shallow superficial tissue loss area with red  moist tissue as measured above.  Cleansed area with normal saline and gauze also cleansed periwound with normal saline and gauze.  To lower gluteal crease there is an opening as measured above with red moist tissue.  This wound is also shallow.  Wound cleansed with normal saline and gauze as well as periwound.  While the maceration and irritation is improving overall with the daily dressing changes.  Wounds are responding minimally to current treatment.    Discussion was had with surgeon last week by this wound nurse concerning potential treatment options/recommendations for the patient.  Options discussed with patient included topical irrigations with Dakin solution and no dressing and only a peripad within the undergarments to absorb any potential drainage.  This would be a twice daily irrigation and as needed and changing the loni-pad as frequently as needed for the drainage.  Another option is to potentially place a maritza dressing along the gluteal incision to allow negative pressure wound therapy for a week with 1 dressing change in between to monitor response and treatment.  Another treatment option is for patient to come more frequently during the week to allow for silver nitrate to be applied more frequently.  Patient can also opt to make an appointment with surgeon to discuss potential repeat surgical procedure to allow for potential closure and any possible irrigation within the area.  After all options were discussed with the patient, patient is agreeable to trying topical irrigation with Dakin solution and will monitor for response to treatment.  Patient to follow-up next week to discuss response to current wound care patient encouraged to reach out with any concerns or issues pertaining to the treatment prior to the visit next week if needed.      Impression: Surgical incision to gluteal crease with dehiscence and closure by secondary intent.  Delayed wound healing.    Short term goals: Regain skin  integrity.  Topical irrigations twice daily and as needed loni-pad for drainage of distortion within undergarments.  Follow-up weekly with wound care for wound checks or sooner if needed.    Miladys Dockery RN    5/29/2024    18:34 EDT

## 2024-06-05 ENCOUNTER — HOSPITAL ENCOUNTER (OUTPATIENT)
Dept: INFUSION THERAPY | Facility: HOSPITAL | Age: 54
Discharge: HOME | End: 2024-06-05
Payer: COMMERCIAL

## 2024-06-05 VITALS
DIASTOLIC BLOOD PRESSURE: 75 MMHG | SYSTOLIC BLOOD PRESSURE: 115 MMHG | HEART RATE: 94 BPM | TEMPERATURE: 97.8 F | RESPIRATION RATE: 20 BRPM | OXYGEN SATURATION: 99 %

## 2024-06-05 DIAGNOSIS — L73.2 HIDRADENITIS: Primary | ICD-10-CM

## 2024-06-05 PROCEDURE — G0463 HOSPITAL OUTPT CLINIC VISIT: HCPCS

## 2024-06-05 RX ADMIN — SILVER NITRATE APPLICATORS 4 APPLICATION: 25; 75 STICK TOPICAL at 12:19

## 2024-06-05 NOTE — SIGNIFICANT NOTE
Wound Eval / Progress Noted     [facility]     Patient Name: Mariela Aldrich  : 1970  MRN: [id number]  Today's Date: 2024                 Admit Date: 2024    Visit Dx:    ICD-10-CM ICD-9-CM   1. Hidradenitis  L73.2 705.83         * No active hospital problems. *        Past Medical History:   Diagnosis Date    Anxiety     Asthma     PRN INHALER AND TAKES ALLERGY INJECTIONS    Diabetes mellitus type 2, controlled     AVERAGE     Hidradenitis     Hyperlipidemia     Hypothyroidism     Murmur     DIAGNOSED WHEN 17 BUT IS ASYMPTOMATIC AND IS FOLLOWED ONLY BY PCP    Panic attack     Pilonidal cyst     Rheumatic fever     AS A CHILD    RLS (restless legs syndrome)     Sinus trouble         Past Surgical History:   Procedure Laterality Date    COLONOSCOPY  2020    exernal hemorrhoids    EXCISION LESION Bilateral 3/20/2023    Procedure: Excision Hidradenitis of Bilateral Inguinal Areas;  Surgeon: Donato You MD;  Location: [facility];  Service: General;  Laterality: Bilateral;    GROIN ABSCESS INCISION AND DRAINAGE Right     hidradenitis    HYSTERECTOMY  2010    KNEE SURGERY Bilateral 2013    PILONIDAL CYSTECTOMY N/A 2023    Procedure: Excision Hidradenitis of Inguinal Area and Pilonidal Cystectomy;  Surgeon: Donato You MD;  Location: [facility];  Service: General;  Laterality: N/A;         Physical Assessment:  Wound 23 1333 medial gluteal Incision (Active)   Wound Image   24 1223   Dressing Appearance open to air 24 1223   Closure None 24 1223   Base moist;red 24 1223   Periwound dry;intact;pink 24 1223   Periwound Temperature warm 24 1223   Periwound Skin Turgor soft 24 1223   Edges open 24 1223   Wound Length (cm) 2 cm 24 1223   Wound Width (cm) 0.2 cm 24 1223   Wound Depth (cm) 0.2 cm 24 1223   Wound Surface Area (cm^2) 0.4 cm^2 24 1223   Wound Volume (cm^3) 0.08 cm^3 24 1223    Drainage Characteristics/Odor serosanguineous 06/05/24 1223   Drainage Amount scant 06/05/24 1223   Care, Wound cleansed with;irrigated with;silver agent applied 06/05/24 1223   Dressing Care open to air 06/05/24 1223       Wound 03/22/23 1518 medial coccyx Incision (Active)   Wound Image   06/05/24 1223   Dressing Appearance open to air 06/05/24 1223   Closure None 06/05/24 1223   Base moist;pink;red 06/05/24 1223   Periwound intact;pink 06/05/24 1223   Periwound Temperature warm 06/05/24 1223   Periwound Skin Turgor soft 06/05/24 1223   Edges open 06/05/24 1223   Wound Length (cm) 0.4 cm 06/05/24 1223   Wound Width (cm) 0.2 cm 06/05/24 1223   Wound Depth (cm) 0.1 cm 06/05/24 1223   Wound Surface Area (cm^2) 0.08 cm^2 06/05/24 1223   Wound Volume (cm^3) 0.008 cm^3 06/05/24 1223   Drainage Characteristics/Odor serosanguineous 06/05/24 1223   Drainage Amount small 06/05/24 1223   Care, Wound cleansed with;irrigated with;sterile normal saline;silver agent applied 06/05/24 1223   Dressing Care open to air 06/05/24 1223        Wound Check / Follow-up: Patient seen today for wound follow-up/wound check.  Patient has been doing topical irrigations and using a loni-pad or liner for drainage management.  Patient states that she does not usually have a lot of drainage however if she is outside and getting hot then the drainage does increase.  She states that her skin irritation and maceration has improved overall.  She does state that it feels better without having the dressing intact.    Upper gluteal wound and lower gluteal wounds present very similarly to last assessment.  There is no progression but also no improvement.  The upper gluteal aspect has an area of open tissue that is red and moist and measured as above.  There is an area where drainage is noted proximally to the open tissue however there is no measurable wound opening.  The lower gluteal aspect also has an area of open red moist tissue.  This tissue is  consistent with prior assessments.  The periwound maceration and irritation is significantly improved overall.  Cleansed and irrigated all areas with normal saline copiously.  Silver nitrate applied to the open wound bases in the wound margins.  Dried with gauze.  Patient to continue topical irrigations and treatments.    Impression: Gluteal crease incision was dehiscence and closure by secondary intent.  Delayed wound closure.    Short term goals: Regain skin integrity.  Topical irrigations and drainage management.  Silver nitrate applications with wound follow-up visits.    Miladys Dockery RN    6/5/2024    14:40 EDT    Picato Counseling:  I discussed with the patient the risks of Picato including but not limited to erythema, scaling, itching, weeping, crusting, and pain.

## 2024-06-07 NOTE — PROGRESS NOTES
Chief Complaint  Diabetes (Follow up)    Subjective          Mariela Aldrich is a 53 y.o. female who presents to [facility]    History of Present Illness  The patient presents for evaluation of multiple medical concerns.    The patient reports experiencing symptoms indicative of hypoglycemia, despite not having a true hypoglycemic episode. Her blood glucose levels typically range from the 70s to 80s. If her blood glucose levels reach 90, she resorts to a snack. Her current medication regimen includes glipizide, taken with meals, and Synjardy, taken around 10 AM. Her current blood glucose level is 89. She has not experienced any hypoglycemic episodes in the past 2 weeks. Her diabetic eye examinations are current, with the next one scheduled for the following month.     She denies experiencing chest pain or shortness of breath. Her bowel and bladder functions are normal.     The patient's blood pressure is well-regulated. She has discontinued the use of LISINOPRIL, which has resulted in a reduction in her cough.    The patient adheres to her thyroid medication regimen, taken on an empty stomach each morning. Her energy levels are within the normal range. She denies experiencing any muscle cramps associated with Levaquin.    The patient's restless legs syndrome is well-managed with Requip. The severity of her restless legs syndrome appears to fluctuate depending on her activity level.     She is allergic to LISINOPRIL.    The patient exhibits no signs of depression.    PHQ-2 Total Score: 0   PHQ-9 Total Score: 0        Review of Systems   Constitutional:  Negative for fatigue.   Respiratory:  Negative for cough and shortness of breath.    Cardiovascular:  Negative for chest pain and palpitations.   Gastrointestinal:  Negative for nausea and vomiting.   Endocrine: Negative for cold intolerance and heat intolerance.   Genitourinary:  Negative for difficulty urinating and dysuria.    Musculoskeletal:  Negative for arthralgias, gait problem and joint swelling.   Neurological:  Negative for dizziness, seizures and headaches.   Hematological:  Negative for adenopathy. Does not bruise/bleed easily.   Psychiatric/Behavioral:  Negative for confusion, dysphoric mood and sleep disturbance. The patient is not nervous/anxious.           Medical History: has a past medical history of Anxiety, Asthma, Diabetes mellitus type 2, controlled, Hidradenitis, Hyperlipidemia, Hypothyroidism, Murmur, Panic attack, Pilonidal cyst, Rheumatic fever, RLS (restless legs syndrome), and Sinus trouble.     Surgical History: has a past surgical history that includes Colonoscopy (01/21/2020); Groin Abscess Incision and Drainage (Right); Hysterectomy (2010); Knee surgery (Bilateral, 2013); Pilonidal Cystectomy (N/A, 2/8/2023); and Excision Lesion (Bilateral, 3/20/2023).     Family History: family history includes Diabetes in her brother, maternal grandmother, and sister; Thyroid disease in her sister; Thyroid disease (age of onset: 43) in her brother; Thyroid disease (age of onset: 70) in her father.     Social History: reports that she has never smoked. She has never used smokeless tobacco. She reports that she does not drink alcohol and does not use drugs.    Allergies: Etodolac, Penicillins, Statins, Lisinopril, Atorvastatin, Crestor [rosuvastatin], and Pravastatin      Health Maintenance Due   Topic Date Due    DIABETIC FOOT EXAM  09/07/2023    DIABETIC EYE EXAM  07/12/2024    HEMOGLOBIN A1C  09/13/2024            Current Outpatient Medications:     albuterol sulfate  (90 Base) MCG/ACT inhaler, Inhale 2 puffs Every 4 (Four) Hours As Needed for Shortness of Air., Disp: 18 g, Rfl: 1    cetirizine (zyrTEC) 10 MG tablet, Zyrtec 10 mg oral tablet take 1 tablet (10 mg) by oral route once daily   Active, Disp: , Rfl:     Continuous Blood Gluc Sensor (Dexcom G7 Sensor) misc, Use 1 each Every 14 (Fourteen) Days., Disp: 6  each, Rfl: 1    Continuous Blood Gluc Transmit (Dexcom G6 Transmitter) misc, 1 Device Every 3 (Three) Months., Disp: 1 each, Rfl: 3    Empagliflozin-metFORMIN HCl ER (Synjardy XR) 12.5-1000 MG tablet sustained-release 24 hour, Take 1 tablet by mouth 2 (Two) Times a Day., Disp: 180 tablet, Rfl: 1    Fluticasone-Salmeterol (ADVAIR/WIXELA) 250-50 MCG/ACT DISKUS, INHALE 1 DOSE BY MOUTH TWICE DAILY, Disp: 60 each, Rfl: 2    gabapentin (NEURONTIN) 300 MG capsule, Take 1 capsule by mouth Every Night., Disp: 90 capsule, Rfl: 1    glipizide (GLUCOTROL) 10 MG tablet, Take 1 tablet by mouth Daily With Breakfast., Disp: 180 tablet, Rfl: 1    glucose blood (OneTouch Verio) test strip, USE 1 STRIP TO CHECK GLUCOSE THREE TIMES DAILY, Disp: 100 each, Rfl: 0    Insulin Degludec (Tresiba FlexTouch) 200 UNIT/ML solution pen-injector pen injection, Inject 56 Units under the skin into the appropriate area as directed Every Night., Disp: 27 mL, Rfl: 1    Insulin Pen Needle (B-D ULTRAFINE III SHORT PEN) 31G X 8 MM misc, Inject 1 each under the skin into the appropriate area as directed Daily., Disp: 100 each, Rfl: 3    montelukast (SINGULAIR) 10 MG tablet, Take 1 tablet by mouth every night at bedtime., Disp: , Rfl:     Mounjaro 7.5 MG/0.5ML solution pen-injector pen, INJECT 1/2 (ONE-HALF) ML SUBCUTANEOUSLY  ONCE A WEEK, Disp: 4 mL, Rfl: 0    multivitamin (THERAGRAN) tablet tablet, , Disp: , Rfl:     ONE TOUCH CLUB LANCETS misc, 90 each 3 (Three) Times a Day As Needed (check blood sugar)., Disp: 100 each, Rfl: 3    pitavastatin calcium (Livalo) 2 MG tablet tablet, Take 1 tablet by mouth Every Night., Disp: 90 tablet, Rfl: 1    rOPINIRole (REQUIP) 5 MG tablet, Take 1 tablet by mouth Every Night., Disp: 90 tablet, Rfl: 1    sertraline (ZOLOFT) 100 MG tablet, Take 1.5 tablets by mouth Daily., Disp: 135 tablet, Rfl: 1    Synthroid 88 MCG tablet, Take 1 tablet by mouth Daily., Disp: 90 tablet, Rfl: 1    Zinc 50 MG tablet, zinc 50 mg oral  "tablet take 1 tablet by oral route daily   Active, Disp: , Rfl:     Diclofenac Sodium (VOLTAREN) 1 % gel gel, APPLY 4 GRAMS TOPICALLY TO THE APPROPRIATE AREA 4 TIMES A DAY AS NEEDED FOR PAIN (Patient not taking: Reported on 3/13/2024), Disp: 100 g, Rfl: 0    fluticasone (FLONASE) 50 MCG/ACT nasal spray, 2 sprays into the nostril(s) as directed by provider Daily. (Patient not taking: Reported on 11/15/2023), Disp: 16 g, Rfl: 5    losartan (Cozaar) 25 MG tablet, Take 0.5 tablets by mouth Daily., Disp: 90 tablet, Rfl: 1    Current Facility-Administered Medications:     methylPREDNISolone acetate (DEPO-medrol) injection 20 mg, 20 mg, Intra-articular, Once, Berenice Avelar APRN      Immunization History   Administered Date(s) Administered    Fluzone (or Fluarix & Flulaval for VFC) >6mos 09/28/2023    Fluzone Quad >6mos (Multi-dose) 10/01/2020    Pneumococcal Conjugate 20-Valent (PCV20) 09/28/2023    Pneumococcal Polysaccharide (PPSV23) 07/20/2019    Td (TDVAX) 05/08/1998    Tdap 03/16/2020         Objective       Vitals:    06/17/24 0715   BP: 110/72   Pulse: 96   Temp: 98.2 °F (36.8 °C)   TempSrc: Temporal   SpO2: 99%   Weight: 89.4 kg (197 lb)   Height: 162.6 cm (64.02\")      Body mass index is 33.8 kg/m².   Wt Readings from Last 3 Encounters:   06/17/24 89.4 kg (197 lb)   03/13/24 90.9 kg (200 lb 6.4 oz)   12/14/23 91.7 kg (202 lb 3.2 oz)      BP Readings from Last 3 Encounters:   06/17/24 110/72   06/05/24 115/75   05/29/24 126/78             Physical Exam  Vitals reviewed.   Constitutional:       Appearance: Normal appearance. She is well-developed.   HENT:      Head: Normocephalic and atraumatic.   Eyes:      Conjunctiva/sclera: Conjunctivae normal.      Pupils: Pupils are equal, round, and reactive to light.   Cardiovascular:      Rate and Rhythm: Normal rate and regular rhythm.      Heart sounds: Normal heart sounds. No murmur heard.  Pulmonary:      Effort: Pulmonary effort is normal.      Breath sounds: Normal " breath sounds. No wheezing or rhonchi.   Abdominal:      General: Bowel sounds are normal. There is no distension.      Palpations: Abdomen is soft.      Tenderness: There is no abdominal tenderness.   Skin:     General: Skin is warm and dry.   Neurological:      Mental Status: She is alert and oriented to person, place, and time.   Psychiatric:         Mood and Affect: Mood and affect normal.         Behavior: Behavior normal.         Thought Content: Thought content normal.         Judgment: Judgment normal.         Physical Exam        Result Review :   Results  Laboratory Studies  Average sugar is 196, 49% in range, 1% low per pt monitor.       Common labs          9/28/2023    07:50 9/28/2023    07:53 12/14/2023    07:55 3/13/2024    07:38   Common Labs   Glucose 146   191  118    BUN 18   16  15    Creatinine 0.82   1.03  0.87    Sodium 136   133  139    Potassium 4.5   4.8  4.4    Chloride 103   101  104    Calcium 9.6   9.4  9.4    Albumin 4.2   4.3  4.0    Total Bilirubin 0.2   0.2  0.3    Alkaline Phosphatase 142   132  109    AST (SGOT) 25   18  21    ALT (SGPT) 24   20  21    WBC   9.31     Hemoglobin   13.5     Hematocrit   43.1     Platelets   320     Total Cholesterol 169   165  145    Triglycerides 100   80  74    HDL Cholesterol 61   57  53    LDL Cholesterol  90   93  77    Hemoglobin A1C 9.40   9.60  8.50    Microalbumin, Urine  <1.2                       Assessment and Plan        Diagnoses and all orders for this visit:    1. Type 2 diabetes mellitus with hyperglycemia, with long-term current use of insulin (Primary)  -     Hemoglobin A1c  -     Microalbumin / Creatinine Urine Ratio - Urine, Clean Catch  -     losartan (Cozaar) 25 MG tablet; Take 0.5 tablets by mouth Daily.  Dispense: 90 tablet; Refill: 1  -     Insulin Degludec (Tresiba FlexTouch) 200 UNIT/ML solution pen-injector pen injection; Inject 56 Units under the skin into the appropriate area as directed Every Night.  Dispense: 27 mL;  Refill: 1  -     glipizide (GLUCOTROL) 10 MG tablet; Take 1 tablet by mouth Daily With Breakfast.  Dispense: 180 tablet; Refill: 1    2. Mixed hyperlipidemia  -     Comprehensive Metabolic Panel  -     Lipid Panel    3. Class 1 obesity due to excess calories with serious comorbidity and body mass index (BMI) of 33.0 to 33.9 in adult    4. RLS (restless legs syndrome)  -     rOPINIRole (REQUIP) 5 MG tablet; Take 1 tablet by mouth Every Night.  Dispense: 90 tablet; Refill: 1      The patient is asked to make an attempt to improve diet and exercise patterns to aid in medical management of these diagnoses.  Assessment & Plan  1. Diabetes Mellitus.  The patient was counseled to forward her diabetic eye examination results to us. A prescription refill for Tresiba and glipizide was issued.    2. Hypertension.  The patient's blood pressure is well-regulated. Given her diabetic condition, it is recommended to take medication to protect her renal function. However, due to its uncertainty regarding the efficacy of LISINOPRIL, a regimen of half a tablet of losartan will be initiated, to be taken in the evening when well-hydrated, rather than in the morning.    3. Hypothyroidism.  Continue synthroid.    4. Restless Leg Syndrome.  The patient's symptoms are well-managed with Requip.    5. Nerve Pain.  Gabapentin has been effective in managing her nerve pain.    Follow-up  The patient is scheduled for a follow-up visit in 3 months, during which labs will be conducted.    Follow Up     Return in about 3 months (around 9/17/2024).    Patient was given instructions and counseling regarding her condition or for health maintenance advice. Please see specific information pulled into the AVS if appropriate.     WILLIAM Hickman    Patient or patient representative verbalized consent for the use of Ambient Listening during the visit with  WILLIAM Hickman for chart documentation. 6/17/2024  07:35 EDT

## 2024-06-17 ENCOUNTER — OFFICE VISIT (OUTPATIENT)
Dept: FAMILY MEDICINE CLINIC | Facility: CLINIC | Age: 54
End: 2024-06-17
Payer: COMMERCIAL

## 2024-06-17 VITALS
HEART RATE: 96 BPM | HEIGHT: 64 IN | DIASTOLIC BLOOD PRESSURE: 72 MMHG | OXYGEN SATURATION: 99 % | SYSTOLIC BLOOD PRESSURE: 110 MMHG | BODY MASS INDEX: 33.63 KG/M2 | WEIGHT: 197 LBS | TEMPERATURE: 98.2 F

## 2024-06-17 DIAGNOSIS — E11.65 TYPE 2 DIABETES MELLITUS WITH HYPERGLYCEMIA, WITH LONG-TERM CURRENT USE OF INSULIN: Primary | ICD-10-CM

## 2024-06-17 DIAGNOSIS — E78.2 MIXED HYPERLIPIDEMIA: ICD-10-CM

## 2024-06-17 DIAGNOSIS — E66.09 CLASS 1 OBESITY DUE TO EXCESS CALORIES WITH SERIOUS COMORBIDITY AND BODY MASS INDEX (BMI) OF 33.0 TO 33.9 IN ADULT: ICD-10-CM

## 2024-06-17 DIAGNOSIS — Z79.4 TYPE 2 DIABETES MELLITUS WITH HYPERGLYCEMIA, WITH LONG-TERM CURRENT USE OF INSULIN: Primary | ICD-10-CM

## 2024-06-17 DIAGNOSIS — G25.81 RLS (RESTLESS LEGS SYNDROME): ICD-10-CM

## 2024-06-17 LAB
ALBUMIN SERPL-MCNC: 3.7 G/DL (ref 3.5–5.2)
ALBUMIN UR-MCNC: 1.8 MG/DL
ALBUMIN/GLOB SERPL: 0.9 G/DL
ALP SERPL-CCNC: 107 U/L (ref 39–117)
ALT SERPL W P-5'-P-CCNC: 15 U/L (ref 1–33)
ANION GAP SERPL CALCULATED.3IONS-SCNC: 9.1 MMOL/L (ref 5–15)
AST SERPL-CCNC: 21 U/L (ref 1–32)
BILIRUB SERPL-MCNC: 0.4 MG/DL (ref 0–1.2)
BUN SERPL-MCNC: 13 MG/DL (ref 6–20)
BUN/CREAT SERPL: 15.9 (ref 7–25)
CALCIUM SPEC-SCNC: 9 MG/DL (ref 8.6–10.5)
CHLORIDE SERPL-SCNC: 103 MMOL/L (ref 98–107)
CHOLEST SERPL-MCNC: 129 MG/DL (ref 0–200)
CO2 SERPL-SCNC: 24.9 MMOL/L (ref 22–29)
CREAT SERPL-MCNC: 0.82 MG/DL (ref 0.57–1)
CREAT UR-MCNC: 128.9 MG/DL
EGFRCR SERPLBLD CKD-EPI 2021: 85.1 ML/MIN/1.73
GLOBULIN UR ELPH-MCNC: 3.9 GM/DL
GLUCOSE SERPL-MCNC: 80 MG/DL (ref 65–99)
HBA1C MFR BLD: 7.6 % (ref 4.8–5.6)
HDLC SERPL-MCNC: 51 MG/DL (ref 40–60)
LDLC SERPL CALC-MCNC: 64 MG/DL (ref 0–100)
LDLC/HDLC SERPL: 1.26 {RATIO}
MICROALBUMIN/CREAT UR: 14 MG/G (ref 0–29)
POTASSIUM SERPL-SCNC: 3.9 MMOL/L (ref 3.5–5.2)
PROT SERPL-MCNC: 7.6 G/DL (ref 6–8.5)
SODIUM SERPL-SCNC: 137 MMOL/L (ref 136–145)
TRIGL SERPL-MCNC: 69 MG/DL (ref 0–150)
VLDLC SERPL-MCNC: 14 MG/DL (ref 5–40)

## 2024-06-17 PROCEDURE — 99214 OFFICE O/P EST MOD 30 MIN: CPT | Performed by: NURSE PRACTITIONER

## 2024-06-17 PROCEDURE — 82043 UR ALBUMIN QUANTITATIVE: CPT | Performed by: NURSE PRACTITIONER

## 2024-06-17 PROCEDURE — 80053 COMPREHEN METABOLIC PANEL: CPT | Performed by: NURSE PRACTITIONER

## 2024-06-17 PROCEDURE — 80061 LIPID PANEL: CPT | Performed by: NURSE PRACTITIONER

## 2024-06-17 PROCEDURE — 82570 ASSAY OF URINE CREATININE: CPT | Performed by: NURSE PRACTITIONER

## 2024-06-17 PROCEDURE — 83036 HEMOGLOBIN GLYCOSYLATED A1C: CPT | Performed by: NURSE PRACTITIONER

## 2024-06-17 RX ORDER — ROPINIROLE 5 MG/1
5 TABLET, FILM COATED ORAL NIGHTLY
Qty: 90 TABLET | Refills: 1 | Status: SHIPPED | OUTPATIENT
Start: 2024-06-17

## 2024-06-17 RX ORDER — GLIPIZIDE 10 MG/1
10 TABLET ORAL
Qty: 180 TABLET | Refills: 1 | Status: SHIPPED | OUTPATIENT
Start: 2024-06-17

## 2024-06-17 RX ORDER — LOSARTAN POTASSIUM 25 MG/1
12.5 TABLET ORAL DAILY
Qty: 90 TABLET | Refills: 1 | Status: SHIPPED | OUTPATIENT
Start: 2024-06-17

## 2024-06-17 RX ORDER — INSULIN DEGLUDEC 200 U/ML
56 INJECTION, SOLUTION SUBCUTANEOUS NIGHTLY
Qty: 27 ML | Refills: 1 | Status: SHIPPED | OUTPATIENT
Start: 2024-06-17

## 2024-06-17 NOTE — PATIENT INSTRUCTIONS
Diabetes Mellitus and Nutrition, Adult  When you have diabetes, or diabetes mellitus, it is very important to have healthy eating habits because your blood sugar (glucose) levels are greatly affected by what you eat and drink. Eating healthy foods in the right amounts, at about the same times every day, can help you:  Manage your blood glucose.  Lower your risk of heart disease.  Improve your blood pressure.  Reach or maintain a healthy weight.  What can affect my meal plan?  Every person with diabetes is different, and each person has different needs for a meal plan. Your health care provider may recommend that you work with a dietitian to make a meal plan that is best for you. Your meal plan may vary depending on factors such as:  The calories you need.  The medicines you take.  Your weight.  Your blood glucose, blood pressure, and cholesterol levels.  Your activity level.  Other health conditions you have, such as heart or kidney disease.  How do carbohydrates affect me?  Carbohydrates, also called carbs, affect your blood glucose level more than any other type of food. Eating carbs raises the amount of glucose in your blood.  It is important to know how many carbs you can safely have in each meal. This is different for every person. Your dietitian can help you calculate how many carbs you should have at each meal and for each snack.  How does alcohol affect me?  Alcohol can cause a decrease in blood glucose (hypoglycemia), especially if you use insulin or take certain diabetes medicines by mouth. Hypoglycemia can be a life-threatening condition. Symptoms of hypoglycemia, such as sleepiness, dizziness, and confusion, are similar to symptoms of having too much alcohol.  Do not drink alcohol if:  Your health care provider tells you not to drink.  You are pregnant, may be pregnant, or are planning to become pregnant.  If you drink alcohol:  Limit how much you have to:  0-1 drink a day for women.  0-2 drinks a day  "for men.  Know how much alcohol is in your drink. In the [place] one drink equals one 12 oz bottle of beer (355 mL), one 5 oz glass of wine (148 mL), or one 1½ oz glass of hard liquor (44 mL).  Keep yourself hydrated with water, diet soda, or unsweetened iced tea. Keep in mind that regular soda, juice, and other mixers may contain a lot of sugar and must be counted as carbs.  What are tips for following this plan?    Reading food labels  Start by checking the serving size on the Nutrition Facts label of packaged foods and drinks. The number of calories and the amount of carbs, fats, and other nutrients listed on the label are based on one serving of the item. Many items contain more than one serving per package.  Check the total grams (g) of carbs in one serving.  Check the number of grams of saturated fats and trans fats in one serving. Choose foods that have a low amount or none of these fats.  Check the number of milligrams (mg) of salt (sodium) in one serving. Most people should limit total sodium intake to less than 2,300 mg per day.  Always check the nutrition information of foods labeled as \"low-fat\" or \"nonfat.\" These foods may be higher in added sugar or refined carbs and should be avoided.  Talk to your dietitian to identify your daily goals for nutrients listed on the label.  Shopping  Avoid buying canned, pre-made, or processed foods. These foods tend to be high in fat, sodium, and added sugar.  Shop around the outside edge of the grocery store. This is where you will most often find fresh fruits and vegetables, bulk grains, fresh meats, and fresh dairy products.  Cooking  Use low-heat cooking methods, such as baking, instead of high-heat cooking methods, such as deep frying.  Cook using healthy oils, such as olive, canola, or sunflower oil.  Avoid cooking with butter, cream, or high-fat meats.  Meal planning  Eat meals and snacks regularly, preferably at the same times every day. Avoid going long periods " of time without eating.  Eat foods that are high in fiber, such as fresh fruits, vegetables, beans, and whole grains.  Eat 4-6 oz (112-168 g) of lean protein each day, such as lean meat, chicken, fish, eggs, or tofu. One ounce (oz) (28 g) of lean protein is equal to:  1 oz (28 g) of meat, chicken, or fish.  1 egg.  ¼ cup (62 g) of tofu.  Eat some foods each day that contain healthy fats, such as avocado, nuts, seeds, and fish.  What foods should I eat?  Fruits  Berries. Apples. Oranges. Peaches. Apricots. Plums. Grapes. Mangoes. Papayas. Pomegranates. Kiwi. Cherries.  Vegetables  Leafy greens, including lettuce, spinach, kale, chard, guru greens, mustard greens, and cabbage. Beets. Cauliflower. Broccoli. Carrots. Green beans. Tomatoes. Peppers. Onions. Cucumbers. [place] sprouts.  Grains  Whole grains, such as whole-wheat or whole-grain bread, crackers, tortillas, cereal, and pasta. Unsweetened oatmeal. Quinoa. Brown or wild rice.  Meats and other proteins  Seafood. Poultry without skin. Lean cuts of poultry and beef. Tofu. Nuts. Seeds.  Dairy  Low-fat or fat-free dairy products such as milk, yogurt, and cheese.  The items listed above may not be a complete list of foods and beverages you can eat and drink. Contact a dietitian for more information.  What foods should I avoid?  Fruits  Fruits canned with syrup.  Vegetables  Canned vegetables. Frozen vegetables with butter or cream sauce.  Grains  Refined white flour and flour products such as bread, pasta, snack foods, and cereals. Avoid all processed foods.  Meats and other proteins  Fatty cuts of meat. Poultry with skin. Breaded or fried meats. Processed meat. Avoid saturated fats.  Dairy  Full-fat yogurt, cheese, or milk.  Beverages  Sweetened drinks, such as soda or iced tea.  The items listed above may not be a complete list of foods and beverages you should avoid. Contact a dietitian for more information.  Questions to ask a health care provider  Do I need  to meet with a certified diabetes care and ?  Do I need to meet with a dietitian?  What number can I call if I have questions?  When are the best times to check my blood glucose?  Where to find more information:  American Diabetes Association: diabetes.org  Academy of Nutrition and Dietetics: eatright.org  National [place] of Diabetes and Digestive and Kidney Diseases: niddk.nih.gov  Association of Diabetes Care & Education Specialists: diabeteseducator.org  Summary  It is important to have healthy eating habits because your blood sugar (glucose) levels are greatly affected by what you eat and drink. It is important to use alcohol carefully.  A healthy meal plan will help you manage your blood glucose and lower your risk of heart disease.  Your health care provider may recommend that you work with a dietitian to make a meal plan that is best for you.  This information is not intended to replace advice given to you by your health care provider. Make sure you discuss any questions you have with your health care provider.  Document Revised: 07/21/2021 Document Reviewed: 07/21/2021  Elsevier Patient Education © 2024 Elsevier Inc.

## 2024-06-18 ENCOUNTER — HOSPITAL ENCOUNTER (OUTPATIENT)
Dept: INFUSION THERAPY | Facility: HOSPITAL | Age: 54
Discharge: HOME | End: 2024-06-18
Admitting: SURGERY
Payer: COMMERCIAL

## 2024-06-18 DIAGNOSIS — L73.2 HIDRADENITIS: Primary | ICD-10-CM

## 2024-06-18 PROCEDURE — G0463 HOSPITAL OUTPT CLINIC VISIT: HCPCS

## 2024-06-18 RX ADMIN — SILVER NITRATE APPLICATORS 1 APPLICATION: 25; 75 STICK TOPICAL at 12:35

## 2024-06-18 NOTE — SIGNIFICANT NOTE
Wound Eval / Progress Noted    [facility]     Patient Name: Mariela Aldrich  : 1970  MRN: [id number]  Today's Date: 2024                 Admit Date: 2024    Visit Dx:    ICD-10-CM ICD-9-CM   1. Hidradenitis  L73.2 705.83         * No active hospital problems. *        Past Medical History:   Diagnosis Date    Anxiety     Asthma     PRN INHALER AND TAKES ALLERGY INJECTIONS    Diabetes mellitus type 2, controlled     AVERAGE     Hidradenitis     Hyperlipidemia     Hypothyroidism     Murmur     DIAGNOSED WHEN 17 BUT IS ASYMPTOMATIC AND IS FOLLOWED ONLY BY PCP    Panic attack     Pilonidal cyst     Rheumatic fever     AS A CHILD    RLS (restless legs syndrome)     Sinus trouble         Past Surgical History:   Procedure Laterality Date    COLONOSCOPY  2020    exernal hemorrhoids    EXCISION LESION Bilateral 3/20/2023    Procedure: Excision Hidradenitis of Bilateral Inguinal Areas;  Surgeon: Donato You MD;  Location: [facility];  Service: General;  Laterality: Bilateral;    GROIN ABSCESS INCISION AND DRAINAGE Right     hidradenitis    HYSTERECTOMY  2010    KNEE SURGERY Bilateral 2013    PILONIDAL CYSTECTOMY N/A 2023    Procedure: Excision Hidradenitis of Inguinal Area and Pilonidal Cystectomy;  Surgeon: Donato You MD;  Location: [facility];  Service: General;  Laterality: N/A;         Physical Assessment:  Wound 23 1333 medial gluteal Incision (Active)   Wound Image   24 1235   Dressing Appearance open to air 24 1235   Closure None 24 1235   Base red;moist 24 1235   Red (%), Wound Tissue Color 100 24 1235   Periwound intact;moist;pink 24 1235   Periwound Temperature warm 24 1235   Periwound Skin Turgor soft 24 1235   Edges open 24 1235   Wound Length (cm) 1.7 cm 24 1235   Wound Width (cm) 0.2 cm 24 1235   Wound Depth (cm) 0.4 cm 24 1235   Wound Surface Area (cm^2) 0.34 cm^2 24  1235   Wound Volume (cm^3) 0.136 cm^3 06/18/24 1235   Drainage Characteristics/Odor serosanguineous;yellow 06/18/24 1235   Drainage Amount small 06/18/24 1235   Care, Wound cleansed with;irrigated with;sterile normal saline;antimicrobial agent applied;silver agent applied 06/18/24 1235   Dressing Care dressing applied;collagen;calcium alginate;hydrocolloid;silver impregnated 06/18/24 1235   Periwound Care absorptive dressing applied 06/18/24 1235       Wound 03/22/23 1518 medial coccyx Incision (Active)   Wound Image   06/18/24 1235   Dressing Appearance open to air 06/18/24 1235   Closure None 06/18/24 1235   Base pink;moist 06/18/24 1235   Periwound intact;dry;redness 06/18/24 1235   Periwound Temperature warm 06/18/24 1235   Periwound Skin Turgor soft 06/18/24 1235   Edges open 06/18/24 1235   Wound Length (cm) 0.3 cm 06/18/24 1235   Wound Width (cm) 0.1 cm 06/18/24 1235   Wound Depth (cm) 0.1 cm 06/18/24 1235   Wound Surface Area (cm^2) 0.03 cm^2 06/18/24 1235   Wound Volume (cm^3) 0.003 cm^3 06/18/24 1235   Drainage Characteristics/Odor serous;yellow 06/18/24 1235   Drainage Amount small 06/18/24 1235   Care, Wound cleansed with;irrigated with;sterile normal saline;silver agent applied;antimicrobial agent applied 06/18/24 1235   Dressing Care dressing applied;calcium alginate;hydrocolloid;silver impregnated 06/18/24 1235        Wound Check / Follow-up: Patient seen today for wound follow-up and wound check.  Patient presents with site that is open to air.  They have been continuing to do irrigations with Dakin's.  She states they have not noticed much improvement.  Patient did report that 1 time last week she had significant pain to the lower gluteal wound area.  Her spouse did not notice any abnormalities and after she went to sleep that 1 night it was improved.    Upper gluteal portion with 1 closed area and 1 superficial open area.  The wound opening is minimal with pink to red moist tissue.  There is  drainage noted actively pulling from this area.  The drainage is serosanguineous as well as yellow in color.  Cleansed with normal saline and gauze.  Then cleansed with Betadine.  Will recommend continuing application of silver nitrate and then to cover with calcium alginate and hydrocolloid dressing.    Lower gluteal crease wound seems to have worsened.  And now presents as an open cavity with red moist tissue.  With cleansing and palpation there is a small amount of yellow purulent drainage that was expressed.  After this initial expression no further purulence was obtained.  Cleansed and irrigated with normal saline and gauze then cleansed with Betadine.  Recommending cleansing with Betadine application of collagen to the lower gluteal wound then covering with silver impregnated calcium alginate and hydrocolloid dressings.    Discussed with patient that if wound has not made any further progress or demonstrating any response to treatment by next week that I would recommend follow-up with surgeon for further evaluation.    Impression: Surgical incision to gluteal crease with dehiscence and delayed wound healing.    Short term goals: Regain skin integrity.  Dressing changes every other day and as needed.    Miladys Dockery RN    6/18/2024    14:33 EDT

## 2024-06-26 ENCOUNTER — HOSPITAL ENCOUNTER (OUTPATIENT)
Dept: INFUSION THERAPY | Facility: HOSPITAL | Age: 54
Discharge: HOME | End: 2024-06-26
Admitting: SURGERY
Payer: COMMERCIAL

## 2024-06-26 VITALS
HEART RATE: 100 BPM | DIASTOLIC BLOOD PRESSURE: 85 MMHG | RESPIRATION RATE: 20 BRPM | OXYGEN SATURATION: 99 % | SYSTOLIC BLOOD PRESSURE: 115 MMHG | TEMPERATURE: 98.2 F

## 2024-06-26 DIAGNOSIS — L73.2 HIDRADENITIS: Primary | ICD-10-CM

## 2024-06-26 PROCEDURE — G0463 HOSPITAL OUTPT CLINIC VISIT: HCPCS

## 2024-06-26 NOTE — ADDENDUM NOTE
Encounter addended by: Miladys Dockery, RN on: 6/26/2024 3:39 PM   Actions taken: Pend clinical note, Clinical Note Signed, Flowsheet accepted, Order list changed, Charge Capture section accepted, Therapy plan modified

## 2024-06-26 NOTE — SIGNIFICANT NOTE
Wound Eval / Progress Noted    [facility]     Patient Name: Mariela Aldrich  : 1970  MRN: [id number]  Today's Date: 2024                 Admit Date: 2024    Visit Dx:    ICD-10-CM ICD-9-CM   1. Hidradenitis  L73.2 705.83         * No active hospital problems. *        Past Medical History:   Diagnosis Date    Anxiety     Asthma     PRN INHALER AND TAKES ALLERGY INJECTIONS    Diabetes mellitus type 2, controlled     AVERAGE     Hidradenitis     Hyperlipidemia     Hypothyroidism     Murmur     DIAGNOSED WHEN 17 BUT IS ASYMPTOMATIC AND IS FOLLOWED ONLY BY PCP    Panic attack     Pilonidal cyst     Rheumatic fever     AS A CHILD    RLS (restless legs syndrome)     Sinus trouble         Past Surgical History:   Procedure Laterality Date    COLONOSCOPY  2020    exernal hemorrhoids    EXCISION LESION Bilateral 3/20/2023    Procedure: Excision Hidradenitis of Bilateral Inguinal Areas;  Surgeon: Donato You MD;  Location: [facility]  Service: General;  Laterality: Bilateral;    GROIN ABSCESS INCISION AND DRAINAGE Right     hidradenitis    HYSTERECTOMY  2010    KNEE SURGERY Bilateral 2013    PILONIDAL CYSTECTOMY N/A 2023    Procedure: Excision Hidradenitis of Inguinal Area and Pilonidal Cystectomy;  Surgeon: Donato You MD;  Location: [facility]  Service: General;  Laterality: N/A;         Physical Assessment:  Wound 23 1333 medial gluteal Incision (Active)   Wound Image   24 1230   Dressing Appearance intact;moist drainage 24 1230   Closure None 24 1230   Base moist;red 24 1230   Red (%), Wound Tissue Color 100 24 1230   Periwound intact;dry;pink 24 1230   Periwound Temperature warm 24 1230   Periwound Skin Turgor soft 24 1230   Edges open 24 1230   Wound Length (cm) 1.4 cm 24 1230   Wound Width (cm) 0.1 cm 24 1230   Wound Depth (cm) 0.2 cm 24 1230   Wound Surface Area (cm^2) 0.14 cm^2  06/26/24 1230   Wound Volume (cm^3) 0.028 cm^3 06/26/24 1230   Drainage Characteristics/Odor serosanguineous;yellow 06/26/24 1230   Drainage Amount small 06/26/24 1230   Care, Wound cleansed with;irrigated with;antimicrobial agent applied;sterile normal saline;silver agent applied 06/26/24 1230   Dressing Care dressing applied;border dressing;collagen;calcium alginate;silver impregnated;silicone 06/26/24 1230   Periwound Care absorptive dressing applied 06/26/24 1230       Wound 03/22/23 1518 medial coccyx Incision (Active)   Wound Image   06/26/24 1230   Dressing Appearance intact;moist drainage 06/26/24 1230   Closure None 06/26/24 1230   Base pink;moist 06/26/24 1230   Periwound dry;moist;intact 06/26/24 1230   Periwound Temperature warm 06/26/24 1230   Periwound Skin Turgor soft 06/26/24 1230   Edges rolled/closed 06/26/24 1230   Drainage Characteristics/Odor serosanguineous;yellow 06/26/24 1230   Drainage Amount small 06/26/24 1230   Care, Wound sterile normal saline;antimicrobial agent applied;silver agent applied 06/26/24 1230   Dressing Care dressing applied;calcium alginate;collagen;border dressing;silicone;silver impregnated 06/26/24 1230   Periwound Care absorptive dressing applied 06/26/24 1230       Right perirectal tissue with induration / erythema - purulent drainage     Wound Check / Follow-up: Patient seen today for wound follow-up/dressing change.  Patient presents with intact dressing.     Patient states that her spouse has reported an increase in yellow to white drainage.  Patient states that she has had some issues with not feeling well recently and just noted a new area to her right perirectal tissue that has been draining large amounts per her spouse.  This area was hard and reddened.  With palpation to the area yellow purulent drainage is noted.  Patient states that she did find some Bactrim that had previously been prescribed by one of her physicians.  So she began taking this medication and  since that time has felt a little bit better but continues to not feel well.    Wound at the upper gluteal crease is no longer visible.  While there is drainage coming from this area there is no visible wound base or open moist tissue.  Cleansed with normal saline and gauze.  Cleansed with Betadine.  Silver nitrate applied to this area.  Covered with silver impregnated calcium alginate and then will secure with silicone border dressing.  Lower gluteal wound is actually improved from last visit.  Wound margins are all more contracted and the wound base has filled in some.  Only minimal purulent drainage is expressed from the wound base.  Cleansed and irrigated with normal saline and gauze.  Cleansed with Betadine.  Silver applied to the wound base and margins.  Filled with collagen then filled with calcium alginate secured with silicone border dressing and hydrocolloid along the lower margins above the rectum.    Due to the slow progression in her wound healing and the continued delay in wound healing recommending the patient to follow-up with surgeon for additional recommendations and options.  Additionally recommending that she follow-up due to recurrent infections that she is experiencing.    Impression: Chronic wounds to gluteal crease.  Delayed wound healing.  Newly formed induration and erythema with purulent drainage.    Short term goals: Regain skin integrity.  Dressing changes every day.    Miladys Dockery RN    6/26/2024    15:19 EDT

## 2024-07-03 ENCOUNTER — OFFICE VISIT (OUTPATIENT)
Dept: SURGERY | Facility: CLINIC | Age: 54
End: 2024-07-03
Payer: COMMERCIAL

## 2024-07-03 ENCOUNTER — PREP FOR SURGERY (OUTPATIENT)
Dept: OTHER | Facility: HOSPITAL | Age: 54
End: 2024-07-03
Payer: COMMERCIAL

## 2024-07-03 VITALS
SYSTOLIC BLOOD PRESSURE: 126 MMHG | HEIGHT: 64 IN | BODY MASS INDEX: 33.63 KG/M2 | DIASTOLIC BLOOD PRESSURE: 79 MMHG | WEIGHT: 197 LBS | HEART RATE: 101 BPM

## 2024-07-03 DIAGNOSIS — L73.2 HIDRADENITIS: Primary | ICD-10-CM

## 2024-07-03 RX ORDER — SODIUM CHLORIDE 0.9 % (FLUSH) 0.9 %
10 SYRINGE (ML) INJECTION EVERY 12 HOURS SCHEDULED
OUTPATIENT
Start: 2024-07-03

## 2024-07-03 RX ORDER — SODIUM CHLORIDE 0.9 % (FLUSH) 0.9 %
10 SYRINGE (ML) INJECTION AS NEEDED
OUTPATIENT
Start: 2024-07-03

## 2024-07-03 RX ORDER — CLINDAMYCIN PHOSPHATE 900 MG/50ML
900 INJECTION, SOLUTION INTRAVENOUS ONCE
OUTPATIENT
Start: 2024-07-03 | End: 2024-07-03

## 2024-07-03 RX ORDER — SODIUM CHLORIDE 9 MG/ML
40 INJECTION, SOLUTION INTRAVENOUS AS NEEDED
OUTPATIENT
Start: 2024-07-03

## 2024-07-03 NOTE — PROGRESS NOTES
Chief Complaint: Follow-up (Hidradenitis )    Subjective         History of Present Illness      Mariela Aldrich is a 53 y.o. female presents to [facility] GENERAL SURGERY     History of Present Illness  The patient presents for evaluation of a non-healing pilonidal wound.    The patient has been under the care of a wound care nurse. She reports a non-healing wound on the bottom of her gluteal cleft, which she occasionally expels from a milky substance upon cleaning and bandaging. Additionally, she has additional spots located in the gluteal cleft and pilonidal area, with an additional spot located between her buttocks and genital area. The accompanying adult male notes that the wound has increased in size, approximately the size of a golf ball, causing significant discomfort. Despite the application of hot compresses, the wound began to ooze. The accompanying adult male sanitized a safety pin and poked the wound with gauze, but the wound continues to fill and drain. The patient has been prescribed Bactrim, which has provided some relief, but the drainage persists. She has previously tried Humira, but found it ineffective, leading her to undergo surgery. At one point, when she was changing bandages and cleaning the wound with saline, she could squirt saline at the bottom of the incision when it came out at the top, but it did not do it the other night. She is unable to get in the pool or take a bath due to the healing process.    Objective     Past Medical History:   Diagnosis Date    Anxiety     Asthma     PRN INHALER AND TAKES ALLERGY INJECTIONS    Diabetes mellitus type 2, controlled     AVERAGE     Hidradenitis     Hyperlipidemia     Hypothyroidism     Murmur     DIAGNOSED WHEN 17 BUT IS ASYMPTOMATIC AND IS FOLLOWED ONLY BY PCP    Panic attack     Pilonidal cyst     Rheumatic fever     AS A CHILD    RLS (restless legs syndrome)     Sinus trouble        Past Surgical History:   Procedure  Laterality Date    COLONOSCOPY  01/21/2020    exernal hemorrhoids    EXCISION LESION Bilateral 3/20/2023    Procedure: Excision Hidradenitis of Bilateral Inguinal Areas;  Surgeon: Donato You MD;  Location: [facility] MAIN OR;  Service: General;  Laterality: Bilateral;    GROIN ABSCESS INCISION AND DRAINAGE Right     hidradenitis    HYSTERECTOMY  2010    KNEE SURGERY Bilateral 2013    PILONIDAL CYSTECTOMY N/A 2/8/2023    Procedure: Excision Hidradenitis of Inguinal Area and Pilonidal Cystectomy;  Surgeon: Donato You MD;  Location: [facility] MAIN OR;  Service: General;  Laterality: N/A;         Current Outpatient Medications:     albuterol sulfate  (90 Base) MCG/ACT inhaler, Inhale 2 puffs Every 4 (Four) Hours As Needed for Shortness of Air., Disp: 18 g, Rfl: 1    cetirizine (zyrTEC) 10 MG tablet, Zyrtec 10 mg oral tablet take 1 tablet (10 mg) by oral route once daily   Active, Disp: , Rfl:     Continuous Blood Gluc Sensor (Dexcom G7 Sensor) misc, Use 1 each Every 14 (Fourteen) Days., Disp: 6 each, Rfl: 1    Continuous Blood Gluc Transmit (Dexcom G6 Transmitter) misc, 1 Device Every 3 (Three) Months., Disp: 1 each, Rfl: 3    Empagliflozin-metFORMIN HCl ER (Synjardy XR) 12.5-1000 MG tablet sustained-release 24 hour, Take 1 tablet by mouth 2 (Two) Times a Day., Disp: 180 tablet, Rfl: 1    Fluticasone-Salmeterol (ADVAIR/WIXELA) 250-50 MCG/ACT DISKUS, INHALE 1 DOSE BY MOUTH TWICE DAILY, Disp: 60 each, Rfl: 2    gabapentin (NEURONTIN) 300 MG capsule, Take 1 capsule by mouth Every Night., Disp: 90 capsule, Rfl: 1    glipizide (GLUCOTROL) 10 MG tablet, Take 1 tablet by mouth Daily With Breakfast., Disp: 180 tablet, Rfl: 1    glucose blood (OneTouch Verio) test strip, USE 1 STRIP TO CHECK GLUCOSE THREE TIMES DAILY, Disp: 100 each, Rfl: 0    Insulin Degludec (Tresiba FlexTouch) 200 UNIT/ML solution pen-injector pen injection, Inject 56 Units under the skin into the appropriate area as directed Every Night.,  Disp: 27 mL, Rfl: 1    Insulin Pen Needle (B-D ULTRAFINE III SHORT PEN) 31G X 8 MM misc, Inject 1 each under the skin into the appropriate area as directed Daily., Disp: 100 each, Rfl: 3    losartan (Cozaar) 25 MG tablet, Take 0.5 tablets by mouth Daily., Disp: 90 tablet, Rfl: 1    montelukast (SINGULAIR) 10 MG tablet, Take 1 tablet by mouth every night at bedtime., Disp: , Rfl:     Mounjaro 7.5 MG/0.5ML solution pen-injector pen, INJECT 1/2 (ONE-HALF) ML SUBCUTANEOUSLY  ONCE A WEEK, Disp: 4 mL, Rfl: 0    multivitamin (THERAGRAN) tablet tablet, , Disp: , Rfl:     ONE TOUCH CLUB LANCETS mis, 90 each 3 (Three) Times a Day As Needed (check blood sugar)., Disp: 100 each, Rfl: 3    pitavastatin calcium (Livalo) 2 MG tablet tablet, Take 1 tablet by mouth Every Night., Disp: 90 tablet, Rfl: 1    rOPINIRole (REQUIP) 5 MG tablet, Take 1 tablet by mouth Every Night., Disp: 90 tablet, Rfl: 1    sertraline (ZOLOFT) 100 MG tablet, Take 1.5 tablets by mouth Daily., Disp: 135 tablet, Rfl: 1    Synthroid 88 MCG tablet, Take 1 tablet by mouth Daily., Disp: 90 tablet, Rfl: 1    Zinc 50 MG tablet, zinc 50 mg oral tablet take 1 tablet by oral route daily   Active, Disp: , Rfl:     Diclofenac Sodium (VOLTAREN) 1 % gel gel, APPLY 4 GRAMS TOPICALLY TO THE APPROPRIATE AREA 4 TIMES A DAY AS NEEDED FOR PAIN (Patient not taking: Reported on 3/13/2024), Disp: 100 g, Rfl: 0    fluticasone (FLONASE) 50 MCG/ACT nasal spray, 2 sprays into the nostril(s) as directed by provider Daily. (Patient not taking: Reported on 11/15/2023), Disp: 16 g, Rfl: 5    Current Facility-Administered Medications:     methylPREDNISolone acetate (DEPO-medrol) injection 20 mg, 20 mg, Intra-articular, Once, Berenice Avelar APRN    Allergies   Allergen Reactions    Etodolac Unknown - High Severity     HEAVINESS ON CHEST    Penicillins Swelling    Statins Myalgia    Lisinopril Cough    Atorvastatin Myalgia    Crestor [Rosuvastatin] Other (See Comments)     Legs cramps      Pravastatin Myalgia        Family History   Problem Relation Age of Onset    Thyroid disease Father 70    Thyroid disease Sister     Diabetes Sister     Thyroid disease Brother 43        thyroid cancer    Diabetes Brother     Diabetes Maternal Grandmother     Malig Hyperthermia Neg Hx        Social History     Socioeconomic History    Marital status:    Tobacco Use    Smoking status: Never    Smokeless tobacco: Never   Vaping Use    Vaping status: Never Used   Substance and Sexual Activity    Alcohol use: Never    Drug use: Never    Sexual activity: Defer        Physical Exam   Physical Exam  No acute distress.  Lungs show nonlabored breathing.  Abdomen is soft.  On the top of the gluteal area, there is an open wound that is packed.        Result Review :            Results           Assessment and Plan    Diagnoses and all orders for this visit:    1. Hidradenitis (Primary)          Assessment & Plan  1. Non-healing pilonidal wound area, likely secondary to hidradenitis.  A re-excision is planned in the operating room. The risks, benefits, and alternatives of the procedure were thoroughly discussed. All her queries were addressed. She expressed understanding and agreed to proceed with the above plan.      Follow Up   No follow-ups on file.  Patient was given instructions and counseling regarding her condition or for health maintenance advice. Please see specific information pulled into the AVS if appropriate.     Patient or patient representative verbalized consent for the use of Ambient Listening during the visit with  Donato You MD for chart documentation. 7/3/2024  16:01 EDT    Donato You MD

## 2024-07-03 NOTE — H&P (VIEW-ONLY)
Chief Complaint: Follow-up (Hidradenitis )    Subjective         History of Present Illness      Mariela Aldrich is a 53 y.o. female presents to [facility] GENERAL SURGERY     History of Present Illness  The patient presents for evaluation of a non-healing pilonidal wound.    The patient has been under the care of a wound care nurse. She reports a non-healing wound on the bottom of her gluteal cleft, which she occasionally expels from a milky substance upon cleaning and bandaging. Additionally, she has additional spots located in the gluteal cleft and pilonidal area, with an additional spot located between her buttocks and genital area. The accompanying adult male notes that the wound has increased in size, approximately the size of a golf ball, causing significant discomfort. Despite the application of hot compresses, the wound began to ooze. The accompanying adult male sanitized a safety pin and poked the wound with gauze, but the wound continues to fill and drain. The patient has been prescribed Bactrim, which has provided some relief, but the drainage persists. She has previously tried Humira, but found it ineffective, leading her to undergo surgery. At one point, when she was changing bandages and cleaning the wound with saline, she could squirt saline at the bottom of the incision when it came out at the top, but it did not do it the other night. She is unable to get in the pool or take a bath due to the healing process.    Objective     Past Medical History:   Diagnosis Date    Anxiety     Asthma     PRN INHALER AND TAKES ALLERGY INJECTIONS    Diabetes mellitus type 2, controlled     AVERAGE     Hidradenitis     Hyperlipidemia     Hypothyroidism     Murmur     DIAGNOSED WHEN 17 BUT IS ASYMPTOMATIC AND IS FOLLOWED ONLY BY PCP    Panic attack     Pilonidal cyst     Rheumatic fever     AS A CHILD    RLS (restless legs syndrome)     Sinus trouble        Past Surgical History:   Procedure  Laterality Date    COLONOSCOPY  01/21/2020    exernal hemorrhoids    EXCISION LESION Bilateral 3/20/2023    Procedure: Excision Hidradenitis of Bilateral Inguinal Areas;  Surgeon: Donato You MD;  Location: [facility] MAIN OR;  Service: General;  Laterality: Bilateral;    GROIN ABSCESS INCISION AND DRAINAGE Right     hidradenitis    HYSTERECTOMY  2010    KNEE SURGERY Bilateral 2013    PILONIDAL CYSTECTOMY N/A 2/8/2023    Procedure: Excision Hidradenitis of Inguinal Area and Pilonidal Cystectomy;  Surgeon: Donato You MD;  Location: [facility] MAIN OR;  Service: General;  Laterality: N/A;         Current Outpatient Medications:     albuterol sulfate  (90 Base) MCG/ACT inhaler, Inhale 2 puffs Every 4 (Four) Hours As Needed for Shortness of Air., Disp: 18 g, Rfl: 1    cetirizine (zyrTEC) 10 MG tablet, Zyrtec 10 mg oral tablet take 1 tablet (10 mg) by oral route once daily   Active, Disp: , Rfl:     Continuous Blood Gluc Sensor (Dexcom G7 Sensor) misc, Use 1 each Every 14 (Fourteen) Days., Disp: 6 each, Rfl: 1    Continuous Blood Gluc Transmit (Dexcom G6 Transmitter) misc, 1 Device Every 3 (Three) Months., Disp: 1 each, Rfl: 3    Empagliflozin-metFORMIN HCl ER (Synjardy XR) 12.5-1000 MG tablet sustained-release 24 hour, Take 1 tablet by mouth 2 (Two) Times a Day., Disp: 180 tablet, Rfl: 1    Fluticasone-Salmeterol (ADVAIR/WIXELA) 250-50 MCG/ACT DISKUS, INHALE 1 DOSE BY MOUTH TWICE DAILY, Disp: 60 each, Rfl: 2    gabapentin (NEURONTIN) 300 MG capsule, Take 1 capsule by mouth Every Night., Disp: 90 capsule, Rfl: 1    glipizide (GLUCOTROL) 10 MG tablet, Take 1 tablet by mouth Daily With Breakfast., Disp: 180 tablet, Rfl: 1    glucose blood (OneTouch Verio) test strip, USE 1 STRIP TO CHECK GLUCOSE THREE TIMES DAILY, Disp: 100 each, Rfl: 0    Insulin Degludec (Tresiba FlexTouch) 200 UNIT/ML solution pen-injector pen injection, Inject 56 Units under the skin into the appropriate area as directed Every Night.,  Disp: 27 mL, Rfl: 1    Insulin Pen Needle (B-D ULTRAFINE III SHORT PEN) 31G X 8 MM misc, Inject 1 each under the skin into the appropriate area as directed Daily., Disp: 100 each, Rfl: 3    losartan (Cozaar) 25 MG tablet, Take 0.5 tablets by mouth Daily., Disp: 90 tablet, Rfl: 1    montelukast (SINGULAIR) 10 MG tablet, Take 1 tablet by mouth every night at bedtime., Disp: , Rfl:     Mounjaro 7.5 MG/0.5ML solution pen-injector pen, INJECT 1/2 (ONE-HALF) ML SUBCUTANEOUSLY  ONCE A WEEK, Disp: 4 mL, Rfl: 0    multivitamin (THERAGRAN) tablet tablet, , Disp: , Rfl:     ONE TOUCH CLUB LANCETS mis, 90 each 3 (Three) Times a Day As Needed (check blood sugar)., Disp: 100 each, Rfl: 3    pitavastatin calcium (Livalo) 2 MG tablet tablet, Take 1 tablet by mouth Every Night., Disp: 90 tablet, Rfl: 1    rOPINIRole (REQUIP) 5 MG tablet, Take 1 tablet by mouth Every Night., Disp: 90 tablet, Rfl: 1    sertraline (ZOLOFT) 100 MG tablet, Take 1.5 tablets by mouth Daily., Disp: 135 tablet, Rfl: 1    Synthroid 88 MCG tablet, Take 1 tablet by mouth Daily., Disp: 90 tablet, Rfl: 1    Zinc 50 MG tablet, zinc 50 mg oral tablet take 1 tablet by oral route daily   Active, Disp: , Rfl:     Diclofenac Sodium (VOLTAREN) 1 % gel gel, APPLY 4 GRAMS TOPICALLY TO THE APPROPRIATE AREA 4 TIMES A DAY AS NEEDED FOR PAIN (Patient not taking: Reported on 3/13/2024), Disp: 100 g, Rfl: 0    fluticasone (FLONASE) 50 MCG/ACT nasal spray, 2 sprays into the nostril(s) as directed by provider Daily. (Patient not taking: Reported on 11/15/2023), Disp: 16 g, Rfl: 5    Current Facility-Administered Medications:     methylPREDNISolone acetate (DEPO-medrol) injection 20 mg, 20 mg, Intra-articular, Once, Berenice Avelar APRN    Allergies   Allergen Reactions    Etodolac Unknown - High Severity     HEAVINESS ON CHEST    Penicillins Swelling    Statins Myalgia    Lisinopril Cough    Atorvastatin Myalgia    Crestor [Rosuvastatin] Other (See Comments)     Legs cramps      Pravastatin Myalgia        Family History   Problem Relation Age of Onset    Thyroid disease Father 70    Thyroid disease Sister     Diabetes Sister     Thyroid disease Brother 43        thyroid cancer    Diabetes Brother     Diabetes Maternal Grandmother     Malig Hyperthermia Neg Hx        Social History     Socioeconomic History    Marital status:    Tobacco Use    Smoking status: Never    Smokeless tobacco: Never   Vaping Use    Vaping status: Never Used   Substance and Sexual Activity    Alcohol use: Never    Drug use: Never    Sexual activity: Defer        Physical Exam   Physical Exam  No acute distress.  Lungs show nonlabored breathing.  Abdomen is soft.  On the top of the gluteal area, there is an open wound that is packed.        Result Review :            Results           Assessment and Plan    Diagnoses and all orders for this visit:    1. Hidradenitis (Primary)          Assessment & Plan  1. Non-healing pilonidal wound area, likely secondary to hidradenitis.  A re-excision is planned in the operating room. The risks, benefits, and alternatives of the procedure were thoroughly discussed. All her queries were addressed. She expressed understanding and agreed to proceed with the above plan.      Follow Up   No follow-ups on file.  Patient was given instructions and counseling regarding her condition or for health maintenance advice. Please see specific information pulled into the AVS if appropriate.     Patient or patient representative verbalized consent for the use of Ambient Listening during the visit with  Donato You MD for chart documentation. 7/3/2024  16:01 EDT    Donato You MD

## 2024-07-07 DIAGNOSIS — Z79.4 TYPE 2 DIABETES MELLITUS WITH HYPERGLYCEMIA, WITH LONG-TERM CURRENT USE OF INSULIN: ICD-10-CM

## 2024-07-07 DIAGNOSIS — J45.20 MILD INTERMITTENT ASTHMA WITHOUT COMPLICATION: ICD-10-CM

## 2024-07-07 DIAGNOSIS — E11.65 TYPE 2 DIABETES MELLITUS WITH HYPERGLYCEMIA, WITH LONG-TERM CURRENT USE OF INSULIN: ICD-10-CM

## 2024-07-08 RX ORDER — INSULIN DEGLUDEC 200 U/ML
56 INJECTION, SOLUTION SUBCUTANEOUS NIGHTLY
Qty: 18 ML | Refills: 2 | Status: SHIPPED | OUTPATIENT
Start: 2024-07-08

## 2024-07-08 RX ORDER — FLUTICASONE PROPIONATE AND SALMETEROL 250; 50 UG/1; UG/1
POWDER RESPIRATORY (INHALATION)
Qty: 60 EACH | Refills: 2 | Status: SHIPPED | OUTPATIENT
Start: 2024-07-08

## 2024-07-15 DIAGNOSIS — Z79.4 TYPE 2 DIABETES MELLITUS WITH HYPERGLYCEMIA, WITH LONG-TERM CURRENT USE OF INSULIN: ICD-10-CM

## 2024-07-15 DIAGNOSIS — E11.65 TYPE 2 DIABETES MELLITUS WITH HYPERGLYCEMIA, WITH LONG-TERM CURRENT USE OF INSULIN: ICD-10-CM

## 2024-07-15 RX ORDER — PEN NEEDLE, DIABETIC 29 G X1/2"
NEEDLE, DISPOSABLE MISCELLANEOUS
Qty: 100 EACH | Refills: 0 | Status: SHIPPED | OUTPATIENT
Start: 2024-07-15

## 2024-07-23 NOTE — PRE-PROCEDURE INSTRUCTIONS
ATIENT INSTRUCTED TO BE:    - NOTHING TO EAT AFTER MIDNIGHT OR CHEW, EXCEPT CAN HAVE CLEAR LIQUIDS 2 HOURS PRIOR TO SURGERY ARRIVAL TIME , NO MORE THAN 8 OZ. (NOTHING RED)     - TO HOLD ALL VITAMINS, SUPPLEMENTS, NSAIDS FOR ONE WEEK PRIOR TO THEIR SURGICAL PROCEDURE    - DO NOT TAKE ________MOUNJARO LD 7/9/24______________ 7 DAYS PRIOR TO PROCEDURE PER ANESTHESIA RECOMMENDATIONS/INSTRUCTIONS     - INSTRUCTED PT TO USE SURGICAL SOAP 1 TIME THE NIGHT PRIOR TO SURGERY ___________ OR THE AM OF SURGERY _____________   USE THE SOAP FROM NECK TO TOES, AVOID THEIR FACE, HAIR, AND PRIVATE PARTS. IF USE THE SOAP THE NIGHT PRIOR TO SURGERY, CHANGE BED LINENS AND NO PETS IN THE BED.     INSTRUCTED NO LOTIONS, JEWELRY, PIERCINGS,  NAIL POLISH, OR DEODORANT DAY OF SURGERY    - IF DIABETIC, CHECK BLOOD GLUCOSE IF LESS THAN 70 OR HAVING SYMPTOMS CALL THE PREOP AREA FOR INSTRUCTIONS ON AM OF SURGERY ([phone])    -INSTRUCTED TO TAKE THE FOLLOWING MEDICATIONS THE DAY OF SURGERY WITH SIPS OF WATER: ALUTEROL AS NEEDED. ZYRTEC, ADVAIR INHALER, TRESHIBA INSULIN TAKE 1/2 AM DOS FOR BS >70, SYNTHROID          - DO NOT BRING ANY MEDICATIONS WITH YOU TO THE HOSPITAL THE DAY OF SURGERY, EXCEPT IF USE INHALERS. BRING INHALERS DAY OF SURGERY       - BRING CPAP OR BIPAP TO THE HOSPITAL ONLY IF YOU ARE SPENDING THE NIGHT  NA  - DO NOT SMOKE OR VAPE 24 HOURS PRIOR TO PROCEDURE PER ANESTHESIA REQUEST   NA  -MAKE SURE YOU HAVE A RIDE HOME OR SOMEONE TO STAY WITH YOU THE DAY OF THE PROCEDURE AFTER YOU GO HOME     - FOLLOW ANY OTHER INSTRUCTIONS GIVEN TO YOU BY YOUR SURGEON'S OFFICE.     - DAY OF SURGERY ____________, COME TO ELEVATOR A, THIRD FLOOR, CHECK IN AT THE DESK FOR REGISTRATION/SURGERY  - YOU WILL RECEIVE A PHONE CALL THE DAY PRIOR TO SURGERY BETWEEN 1PM AND 4 PM WITH ARRIVAL TIME, IF YOUR SURGERY IS ON A MONDAY YOU WILL RECEIVE A CALL THE FRIDAY PRIOR TO SURGERY DATE    - BRING CASH OR CREDIT CARD FOR COPAYMENT OF MEDICATIONS AFTER  SURGERY IF YOU USE THE HOSPITAL PHARMACY (MEDS TO BED)    - PREADMISSION TESTING NURSE SONIA STAPLES [phone] IF HAVE ANY QUESTIONS     -PATIENT PROVIDED THE NUMBER FOR PRE[facility] SURGICAL DEPT IF HAD QUESTIONS AFTER HOURS PRIOR TO SURGERY ([phone]).  INFORMED PT IF NO ANSWER, LEAVE A MESSAGE AND SOMEONE WILL RETURN THEIR CALL       PATIENT VERBALIZED UNDERSTANDING

## 2024-07-24 ENCOUNTER — ANESTHESIA EVENT (OUTPATIENT)
Dept: PERIOP | Facility: HOSPITAL | Age: 54
End: 2024-07-24
Payer: COMMERCIAL

## 2024-07-24 ENCOUNTER — ANESTHESIA (OUTPATIENT)
Dept: PERIOP | Facility: HOSPITAL | Age: 54
End: 2024-07-24
Payer: COMMERCIAL

## 2024-07-24 ENCOUNTER — HOSPITAL ENCOUNTER (OUTPATIENT)
Facility: HOSPITAL | Age: 54
Discharge: HOME | End: 2024-07-25
Attending: SURGERY | Admitting: SURGERY
Payer: COMMERCIAL

## 2024-07-24 DIAGNOSIS — L05.91 PILONIDAL CYST: Primary | ICD-10-CM

## 2024-07-24 DIAGNOSIS — L73.2 HIDRADENITIS: ICD-10-CM

## 2024-07-24 LAB
GLUCOSE BLDC GLUCOMTR-MCNC: 190 MG/DL (ref 70–99)
GLUCOSE BLDC GLUCOMTR-MCNC: 191 MG/DL (ref 70–99)
GLUCOSE BLDC GLUCOMTR-MCNC: 233 MG/DL (ref 70–99)
GLUCOSE BLDC GLUCOMTR-MCNC: 380 MG/DL (ref 70–99)

## 2024-07-24 PROCEDURE — 94640 AIRWAY INHALATION TREATMENT: CPT

## 2024-07-24 PROCEDURE — 11470 EXC SKN H/P/P/U SMPL/NTRM: CPT | Performed by: SURGERY

## 2024-07-24 PROCEDURE — G0378 HOSPITAL OBSERVATION PER HR: HCPCS

## 2024-07-24 PROCEDURE — 82948 REAGENT STRIP/BLOOD GLUCOSE: CPT

## 2024-07-24 PROCEDURE — 82948 REAGENT STRIP/BLOOD GLUCOSE: CPT | Performed by: SURGERY

## 2024-07-24 PROCEDURE — 82948 REAGENT STRIP/BLOOD GLUCOSE: CPT | Performed by: NURSE ANESTHETIST, CERTIFIED REGISTERED

## 2024-07-24 PROCEDURE — 25810000003 LACTATED RINGERS PER 1000 ML: Performed by: ANESTHESIOLOGY

## 2024-07-24 PROCEDURE — 88304 TISSUE EXAM BY PATHOLOGIST: CPT | Performed by: SURGERY

## 2024-07-24 PROCEDURE — 94799 UNLISTED PULMONARY SVC/PX: CPT

## 2024-07-24 PROCEDURE — 11772 EXC PILONIDAL CYST COMP: CPT | Performed by: SURGERY

## 2024-07-24 PROCEDURE — 25010000002 DEXAMETHASONE PER 1 MG: Performed by: NURSE ANESTHETIST, CERTIFIED REGISTERED

## 2024-07-24 PROCEDURE — 25010000002 CLINDAMYCIN 900 MG/50ML SOLUTION: Performed by: SURGERY

## 2024-07-24 PROCEDURE — 25010000002 PROPOFOL 10 MG/ML EMULSION: Performed by: NURSE ANESTHETIST, CERTIFIED REGISTERED

## 2024-07-24 PROCEDURE — 63710000001 INSULIN GLARGINE PER 5 UNITS: Performed by: SURGERY

## 2024-07-24 PROCEDURE — 94760 N-INVAS EAR/PLS OXIMETRY 1: CPT

## 2024-07-24 PROCEDURE — 25010000002 MIDAZOLAM PER 1MG: Performed by: ANESTHESIOLOGY

## 2024-07-24 PROCEDURE — 25010000002 ONDANSETRON PER 1 MG: Performed by: NURSE ANESTHETIST, CERTIFIED REGISTERED

## 2024-07-24 PROCEDURE — 25010000002 SUGAMMADEX 200 MG/2ML SOLUTION: Performed by: NURSE ANESTHETIST, CERTIFIED REGISTERED

## 2024-07-24 PROCEDURE — 25010000002 FENTANYL CITRATE (PF) 50 MCG/ML SOLUTION: Performed by: NURSE ANESTHETIST, CERTIFIED REGISTERED

## 2024-07-24 PROCEDURE — 63710000001 INSULIN LISPRO (HUMAN) PER 5 UNITS: Performed by: SURGERY

## 2024-07-24 PROCEDURE — 25010000002 METOCLOPRAMIDE PER 10 MG: Performed by: ANESTHESIOLOGY

## 2024-07-24 RX ORDER — PROMETHAZINE HYDROCHLORIDE 25 MG/1
25 SUPPOSITORY RECTAL ONCE AS NEEDED
Status: DISCONTINUED | OUTPATIENT
Start: 2024-07-24 | End: 2024-07-24 | Stop reason: HOSPADM

## 2024-07-24 RX ORDER — DEXMEDETOMIDINE HYDROCHLORIDE 100 UG/ML
INJECTION, SOLUTION INTRAVENOUS AS NEEDED
Status: DISCONTINUED | OUTPATIENT
Start: 2024-07-24 | End: 2024-07-24 | Stop reason: SURG

## 2024-07-24 RX ORDER — NALOXONE HCL 0.4 MG/ML
0.1 VIAL (ML) INJECTION
Status: DISCONTINUED | OUTPATIENT
Start: 2024-07-24 | End: 2024-07-25 | Stop reason: HOSPADM

## 2024-07-24 RX ORDER — HEPARIN SODIUM 5000 [USP'U]/ML
5000 INJECTION, SOLUTION INTRAVENOUS; SUBCUTANEOUS EVERY 12 HOURS SCHEDULED
Status: DISCONTINUED | OUTPATIENT
Start: 2024-07-25 | End: 2024-07-25 | Stop reason: HOSPADM

## 2024-07-24 RX ORDER — HYDROCODONE BITARTRATE AND ACETAMINOPHEN 5; 325 MG/1; MG/1
1 TABLET ORAL EVERY 4 HOURS PRN
Status: DISCONTINUED | OUTPATIENT
Start: 2024-07-24 | End: 2024-07-25 | Stop reason: HOSPADM

## 2024-07-24 RX ORDER — FLUTICASONE PROPIONATE 50 MCG
2 SPRAY, SUSPENSION (ML) NASAL DAILY
Status: DISCONTINUED | OUTPATIENT
Start: 2024-07-24 | End: 2024-07-25 | Stop reason: HOSPADM

## 2024-07-24 RX ORDER — DEXAMETHASONE SODIUM PHOSPHATE 4 MG/ML
INJECTION, SOLUTION INTRA-ARTICULAR; INTRALESIONAL; INTRAMUSCULAR; INTRAVENOUS; SOFT TISSUE AS NEEDED
Status: DISCONTINUED | OUTPATIENT
Start: 2024-07-24 | End: 2024-07-24 | Stop reason: SURG

## 2024-07-24 RX ORDER — SODIUM CHLORIDE 0.9 % (FLUSH) 0.9 %
10 SYRINGE (ML) INJECTION AS NEEDED
Status: DISCONTINUED | OUTPATIENT
Start: 2024-07-24 | End: 2024-07-24 | Stop reason: HOSPADM

## 2024-07-24 RX ORDER — PROMETHAZINE HYDROCHLORIDE 12.5 MG/1
25 TABLET ORAL ONCE AS NEEDED
Status: DISCONTINUED | OUTPATIENT
Start: 2024-07-24 | End: 2024-07-24 | Stop reason: HOSPADM

## 2024-07-24 RX ORDER — OXYCODONE HYDROCHLORIDE 5 MG/1
5 TABLET ORAL
Status: DISCONTINUED | OUTPATIENT
Start: 2024-07-24 | End: 2024-07-24 | Stop reason: HOSPADM

## 2024-07-24 RX ORDER — METOCLOPRAMIDE HYDROCHLORIDE 5 MG/ML
10 INJECTION INTRAMUSCULAR; INTRAVENOUS
Status: DISCONTINUED | OUTPATIENT
Start: 2024-07-25 | End: 2024-07-24

## 2024-07-24 RX ORDER — CLINDAMYCIN PHOSPHATE 900 MG/50ML
900 INJECTION, SOLUTION INTRAVENOUS ONCE
Status: COMPLETED | OUTPATIENT
Start: 2024-07-24 | End: 2024-07-24

## 2024-07-24 RX ORDER — GLIPIZIDE 10 MG/1
10 TABLET ORAL
Status: DISCONTINUED | OUTPATIENT
Start: 2024-07-25 | End: 2024-07-25 | Stop reason: HOSPADM

## 2024-07-24 RX ORDER — METOCLOPRAMIDE HYDROCHLORIDE 5 MG/ML
10 INJECTION INTRAMUSCULAR; INTRAVENOUS
Status: COMPLETED | OUTPATIENT
Start: 2024-07-24 | End: 2024-07-24

## 2024-07-24 RX ORDER — BUDESONIDE AND FORMOTEROL FUMARATE DIHYDRATE 160; 4.5 UG/1; UG/1
1 AEROSOL RESPIRATORY (INHALATION)
Status: DISCONTINUED | OUTPATIENT
Start: 2024-07-24 | End: 2024-07-25 | Stop reason: HOSPADM

## 2024-07-24 RX ORDER — MORPHINE SULFATE 2 MG/ML
2 INJECTION, SOLUTION INTRAMUSCULAR; INTRAVENOUS
Status: DISCONTINUED | OUTPATIENT
Start: 2024-07-24 | End: 2024-07-25 | Stop reason: HOSPADM

## 2024-07-24 RX ORDER — MONTELUKAST SODIUM 10 MG/1
10 TABLET ORAL NIGHTLY
Status: DISCONTINUED | OUTPATIENT
Start: 2024-07-24 | End: 2024-07-25 | Stop reason: HOSPADM

## 2024-07-24 RX ORDER — LIDOCAINE HYDROCHLORIDE 20 MG/ML
INJECTION, SOLUTION EPIDURAL; INFILTRATION; INTRACAUDAL; PERINEURAL AS NEEDED
Status: DISCONTINUED | OUTPATIENT
Start: 2024-07-24 | End: 2024-07-24 | Stop reason: SURG

## 2024-07-24 RX ORDER — MEPERIDINE HYDROCHLORIDE 25 MG/ML
12.5 INJECTION INTRAMUSCULAR; INTRAVENOUS; SUBCUTANEOUS
Status: DISCONTINUED | OUTPATIENT
Start: 2024-07-24 | End: 2024-07-24 | Stop reason: HOSPADM

## 2024-07-24 RX ORDER — FENTANYL CITRATE 50 UG/ML
INJECTION, SOLUTION INTRAMUSCULAR; INTRAVENOUS AS NEEDED
Status: DISCONTINUED | OUTPATIENT
Start: 2024-07-24 | End: 2024-07-24 | Stop reason: SURG

## 2024-07-24 RX ORDER — MIDAZOLAM HYDROCHLORIDE 2 MG/2ML
2 INJECTION, SOLUTION INTRAMUSCULAR; INTRAVENOUS ONCE
Status: COMPLETED | OUTPATIENT
Start: 2024-07-24 | End: 2024-07-24

## 2024-07-24 RX ORDER — LOSARTAN POTASSIUM 25 MG/1
12.5 TABLET ORAL NIGHTLY
Status: DISCONTINUED | OUTPATIENT
Start: 2024-07-24 | End: 2024-07-25 | Stop reason: HOSPADM

## 2024-07-24 RX ORDER — ONDANSETRON 4 MG/1
4 TABLET, ORALLY DISINTEGRATING ORAL EVERY 6 HOURS PRN
Status: DISCONTINUED | OUTPATIENT
Start: 2024-07-24 | End: 2024-07-25 | Stop reason: HOSPADM

## 2024-07-24 RX ORDER — PROPOFOL 10 MG/ML
VIAL (ML) INTRAVENOUS AS NEEDED
Status: DISCONTINUED | OUTPATIENT
Start: 2024-07-24 | End: 2024-07-24 | Stop reason: SURG

## 2024-07-24 RX ORDER — ONDANSETRON 2 MG/ML
4 INJECTION INTRAMUSCULAR; INTRAVENOUS ONCE AS NEEDED
Status: DISCONTINUED | OUTPATIENT
Start: 2024-07-24 | End: 2024-07-24 | Stop reason: HOSPADM

## 2024-07-24 RX ORDER — GABAPENTIN 300 MG/1
300 CAPSULE ORAL NIGHTLY
Status: DISCONTINUED | OUTPATIENT
Start: 2024-07-24 | End: 2024-07-25 | Stop reason: HOSPADM

## 2024-07-24 RX ORDER — ROPINIROLE 1 MG/1
1 TABLET, FILM COATED ORAL NIGHTLY
Status: DISCONTINUED | OUTPATIENT
Start: 2024-07-24 | End: 2024-07-25 | Stop reason: HOSPADM

## 2024-07-24 RX ORDER — NICOTINE POLACRILEX 4 MG
15 LOZENGE BUCCAL
Status: DISCONTINUED | OUTPATIENT
Start: 2024-07-24 | End: 2024-07-25 | Stop reason: SDUPTHER

## 2024-07-24 RX ORDER — ROCURONIUM BROMIDE 10 MG/ML
INJECTION, SOLUTION INTRAVENOUS AS NEEDED
Status: DISCONTINUED | OUTPATIENT
Start: 2024-07-24 | End: 2024-07-24 | Stop reason: SURG

## 2024-07-24 RX ORDER — LEVOTHYROXINE SODIUM 88 UG/1
88 TABLET ORAL
Status: DISCONTINUED | OUTPATIENT
Start: 2024-07-25 | End: 2024-07-25 | Stop reason: HOSPADM

## 2024-07-24 RX ORDER — DEXTROSE MONOHYDRATE 25 G/50ML
25 INJECTION, SOLUTION INTRAVENOUS
Status: DISCONTINUED | OUTPATIENT
Start: 2024-07-24 | End: 2024-07-25 | Stop reason: SDUPTHER

## 2024-07-24 RX ORDER — ONDANSETRON 2 MG/ML
INJECTION INTRAMUSCULAR; INTRAVENOUS AS NEEDED
Status: DISCONTINUED | OUTPATIENT
Start: 2024-07-24 | End: 2024-07-24 | Stop reason: SURG

## 2024-07-24 RX ORDER — IBUPROFEN 600 MG/1
1 TABLET ORAL
Status: DISCONTINUED | OUTPATIENT
Start: 2024-07-24 | End: 2024-07-25 | Stop reason: SDUPTHER

## 2024-07-24 RX ORDER — ONDANSETRON 2 MG/ML
4 INJECTION INTRAMUSCULAR; INTRAVENOUS EVERY 6 HOURS PRN
Status: DISCONTINUED | OUTPATIENT
Start: 2024-07-24 | End: 2024-07-25 | Stop reason: HOSPADM

## 2024-07-24 RX ORDER — HYDROCODONE BITARTRATE AND ACETAMINOPHEN 10; 325 MG/1; MG/1
1 TABLET ORAL EVERY 4 HOURS PRN
Status: DISCONTINUED | OUTPATIENT
Start: 2024-07-24 | End: 2024-07-25 | Stop reason: HOSPADM

## 2024-07-24 RX ORDER — SODIUM CHLORIDE, SODIUM LACTATE, POTASSIUM CHLORIDE, CALCIUM CHLORIDE 600; 310; 30; 20 MG/100ML; MG/100ML; MG/100ML; MG/100ML
9 INJECTION, SOLUTION INTRAVENOUS CONTINUOUS PRN
Status: DISCONTINUED | OUTPATIENT
Start: 2024-07-24 | End: 2024-07-24 | Stop reason: HOSPADM

## 2024-07-24 RX ORDER — EPHEDRINE SULFATE 50 MG/ML
INJECTION INTRAVENOUS AS NEEDED
Status: DISCONTINUED | OUTPATIENT
Start: 2024-07-24 | End: 2024-07-24 | Stop reason: SURG

## 2024-07-24 RX ORDER — INSULIN LISPRO 100 [IU]/ML
2-7 INJECTION, SOLUTION INTRAVENOUS; SUBCUTANEOUS
Status: DISCONTINUED | OUTPATIENT
Start: 2024-07-24 | End: 2024-07-25 | Stop reason: HOSPADM

## 2024-07-24 RX ORDER — MAGNESIUM HYDROXIDE 1200 MG/15ML
LIQUID ORAL AS NEEDED
Status: DISCONTINUED | OUTPATIENT
Start: 2024-07-24 | End: 2024-07-24 | Stop reason: HOSPADM

## 2024-07-24 RX ORDER — NALOXONE HCL 0.4 MG/ML
0.4 VIAL (ML) INJECTION
Status: DISCONTINUED | OUTPATIENT
Start: 2024-07-24 | End: 2024-07-25 | Stop reason: HOSPADM

## 2024-07-24 RX ORDER — CETIRIZINE HYDROCHLORIDE 10 MG/1
10 TABLET ORAL DAILY
Status: DISCONTINUED | OUTPATIENT
Start: 2024-07-25 | End: 2024-07-25 | Stop reason: HOSPADM

## 2024-07-24 RX ORDER — PHENYLEPHRINE HCL IN 0.9% NACL 1 MG/10 ML
SYRINGE (ML) INTRAVENOUS AS NEEDED
Status: DISCONTINUED | OUTPATIENT
Start: 2024-07-24 | End: 2024-07-24 | Stop reason: SURG

## 2024-07-24 RX ORDER — SODIUM CHLORIDE 0.9 % (FLUSH) 0.9 %
10 SYRINGE (ML) INJECTION EVERY 12 HOURS SCHEDULED
Status: DISCONTINUED | OUTPATIENT
Start: 2024-07-24 | End: 2024-07-24 | Stop reason: HOSPADM

## 2024-07-24 RX ORDER — ALBUTEROL SULFATE 90 UG/1
2 AEROSOL, METERED RESPIRATORY (INHALATION) EVERY 4 HOURS PRN
Status: DISCONTINUED | OUTPATIENT
Start: 2024-07-24 | End: 2024-07-25 | Stop reason: HOSPADM

## 2024-07-24 RX ORDER — ACETAMINOPHEN 500 MG
1000 TABLET ORAL ONCE
Status: COMPLETED | OUTPATIENT
Start: 2024-07-24 | End: 2024-07-24

## 2024-07-24 RX ORDER — SODIUM CHLORIDE 9 MG/ML
40 INJECTION, SOLUTION INTRAVENOUS AS NEEDED
Status: DISCONTINUED | OUTPATIENT
Start: 2024-07-24 | End: 2024-07-24 | Stop reason: HOSPADM

## 2024-07-24 RX ADMIN — PROPOFOL 150 MG: 10 INJECTION, EMULSION INTRAVENOUS at 13:35

## 2024-07-24 RX ADMIN — METOCLOPRAMIDE HYDROCHLORIDE 10 MG: 5 INJECTION INTRAMUSCULAR; INTRAVENOUS at 13:21

## 2024-07-24 RX ADMIN — OXYCODONE HYDROCHLORIDE 5 MG: 5 TABLET ORAL at 15:44

## 2024-07-24 RX ADMIN — HYDROCODONE BITARTRATE AND ACETAMINOPHEN 1 TABLET: 10; 325 TABLET ORAL at 21:03

## 2024-07-24 RX ADMIN — SUGAMMADEX 100 MG: 100 INJECTION, SOLUTION INTRAVENOUS at 14:58

## 2024-07-24 RX ADMIN — BUDESONIDE AND FORMOTEROL FUMARATE DIHYDRATE 1 PUFF: 160; 4.5 AEROSOL RESPIRATORY (INHALATION) at 18:33

## 2024-07-24 RX ADMIN — Medication 100 MCG: at 13:49

## 2024-07-24 RX ADMIN — EPHEDRINE SULFATE 10 MG: 50 INJECTION INTRAVENOUS at 14:46

## 2024-07-24 RX ADMIN — FENTANYL CITRATE 50 MCG: 50 INJECTION, SOLUTION INTRAMUSCULAR; INTRAVENOUS at 13:43

## 2024-07-24 RX ADMIN — DEXMEDETOMIDINE HYDROCHLORIDE 10 MCG: 100 INJECTION, SOLUTION, CONCENTRATE INTRAVENOUS at 14:16

## 2024-07-24 RX ADMIN — INSULIN GLARGINE 20 UNITS: 100 INJECTION, SOLUTION SUBCUTANEOUS at 21:02

## 2024-07-24 RX ADMIN — Medication 100 MCG: at 14:22

## 2024-07-24 RX ADMIN — INSULIN LISPRO 6 UNITS: 100 INJECTION, SOLUTION INTRAVENOUS; SUBCUTANEOUS at 21:02

## 2024-07-24 RX ADMIN — MIDAZOLAM HYDROCHLORIDE 2 MG: 1 INJECTION, SOLUTION INTRAMUSCULAR; INTRAVENOUS at 13:13

## 2024-07-24 RX ADMIN — EPHEDRINE SULFATE 5 MG: 50 INJECTION INTRAVENOUS at 14:30

## 2024-07-24 RX ADMIN — ACETAMINOPHEN 1000 MG: 500 TABLET ORAL at 12:40

## 2024-07-24 RX ADMIN — SERTRALINE HYDROCHLORIDE 150 MG: 100 TABLET ORAL at 21:03

## 2024-07-24 RX ADMIN — FLUTICASONE PROPIONATE 2 SPRAY: 50 SPRAY, METERED NASAL at 17:19

## 2024-07-24 RX ADMIN — MONTELUKAST 10 MG: 10 TABLET, FILM COATED ORAL at 21:03

## 2024-07-24 RX ADMIN — DEXMEDETOMIDINE HYDROCHLORIDE 10 MCG: 100 INJECTION, SOLUTION, CONCENTRATE INTRAVENOUS at 14:10

## 2024-07-24 RX ADMIN — Medication 100 MCG: at 14:25

## 2024-07-24 RX ADMIN — LIDOCAINE HYDROCHLORIDE 100 MG: 20 INJECTION, SOLUTION INTRAVENOUS at 13:35

## 2024-07-24 RX ADMIN — CLINDAMYCIN PHOSPHATE 900 MG: 900 INJECTION, SOLUTION INTRAVENOUS at 13:28

## 2024-07-24 RX ADMIN — ROPINIROLE HYDROCHLORIDE 1 MG: 1 TABLET, FILM COATED ORAL at 21:03

## 2024-07-24 RX ADMIN — GABAPENTIN 300 MG: 300 CAPSULE ORAL at 21:03

## 2024-07-24 RX ADMIN — ROCURONIUM BROMIDE 50 MG: 10 INJECTION, SOLUTION INTRAVENOUS at 13:35

## 2024-07-24 RX ADMIN — EPHEDRINE SULFATE 5 MG: 50 INJECTION INTRAVENOUS at 14:24

## 2024-07-24 RX ADMIN — Medication 100 MCG: at 14:46

## 2024-07-24 RX ADMIN — ONDANSETRON HYDROCHLORIDE 4 MG: 2 SOLUTION INTRAMUSCULAR; INTRAVENOUS at 13:49

## 2024-07-24 RX ADMIN — DEXMEDETOMIDINE HYDROCHLORIDE 10 MCG: 100 INJECTION, SOLUTION, CONCENTRATE INTRAVENOUS at 14:19

## 2024-07-24 RX ADMIN — SODIUM CHLORIDE, POTASSIUM CHLORIDE, SODIUM LACTATE AND CALCIUM CHLORIDE 9 ML/HR: 600; 310; 30; 20 INJECTION, SOLUTION INTRAVENOUS at 12:10

## 2024-07-24 RX ADMIN — DEXMEDETOMIDINE HYDROCHLORIDE 10 MCG: 100 INJECTION, SOLUTION, CONCENTRATE INTRAVENOUS at 14:13

## 2024-07-24 RX ADMIN — INSULIN LISPRO 3 UNITS: 100 INJECTION, SOLUTION INTRAVENOUS; SUBCUTANEOUS at 18:01

## 2024-07-24 RX ADMIN — EPHEDRINE SULFATE 5 MG: 50 INJECTION INTRAVENOUS at 14:26

## 2024-07-24 RX ADMIN — FENTANYL CITRATE 50 MCG: 50 INJECTION, SOLUTION INTRAMUSCULAR; INTRAVENOUS at 13:35

## 2024-07-24 RX ADMIN — LOSARTAN POTASSIUM 12.5 MG: 25 TABLET, FILM COATED ORAL at 21:03

## 2024-07-24 RX ADMIN — DEXAMETHASONE SODIUM PHOSPHATE 4 MG: 4 INJECTION, SOLUTION INTRAMUSCULAR; INTRAVENOUS at 13:49

## 2024-07-24 RX ADMIN — SUGAMMADEX 100 MG: 100 INJECTION, SOLUTION INTRAVENOUS at 14:51

## 2024-07-24 RX ADMIN — Medication 100 MCG: at 14:30

## 2024-07-24 NOTE — PLAN OF CARE
Goal Outcome Evaluation:           Progress: no change  Outcome Evaluation: VSS. Patient is alert and oriented x4. New admit, admission completed. Patient acclimated to unit. Continue plan of care.

## 2024-07-24 NOTE — OP NOTE
Preoperative diagnosis: Nonhealing pilonidal wound with additional gluteal wound    Postoperative diagnosis: Same    Findings: Nonhealing pilonidal wound with extensive tunneling.  Likely hidradenitis has recurred in this area.  Wound area is 15 x 6 cm taken down to the presacral fascia without obvious tunneling areas excised.  I also had a 1.5 x 1.5 area in her gluteal cleft which appeared to be a hidradenitis lesion taken from the deep subcutaneous tissue.    Procedure: Reexcision of pilonidal area and excision of hidradenitis wound of the gluteal cleft with application of PuraPly    Surgeon: Donato You MD    Anesthesia: General    Assistant: Irma BLACKWELL CSA    EBL: 17 mL    Specimens: Pilonidal lesion and gluteal cleft lesion    Complications: None    Indications: 53-year-old female who has had multiple issues with hidradenitis.  Had a previous excision of a pilonidal lesion.  Over the course of the last few months has had inability to heal this wound.  She presents today for recurrent debridement.    PROCEDURE    Patient was seen in the preoperative holding area.  Risks, benefits, alternatives of the operation were again discussed in detail.  All of the patient and family's questions were answered.  They voiced understanding, and agreed to proceed.    Patient was brought to the operating room.  Monitoring devices and Pepe stockings were placed.  Anesthesia was administered.  Patient was prepped and draped in standard surgical fashion.  After prepping and draping a timeout was performed to verify both the correct patient and correct procedure.    Patient had 2 areas that were continuing to drain in the pilonidal region.  We began by an elliptical incision around the most superior area.  I dissected down finding hidradenitis appearing lesions.  I tried to excise this completely and then explored the wound bed.  An exploration of the wound bed I was able to clearly get a DeBakey forceps to connect to the  inferior wound indicating that it tunneled and the entire pilonidal region had extensive tunneling and hidradenitis wounds.    I went ahead and excised completely this area by making elliptical incision around both wounds.  Dissected down until I found healthy appearing tissue laterally as well as to the presacral fascia.  I excised until I found healthy appearing tissue.  She did have some areas in the presacral fascia that appeared to have some old scar tissue and I fulgurated these areas.    Then turned my attention to the gluteal cleft wound.  Medical elliptical incision around this wound.  We did have return of purulent material.  Explored the wound.  I placed a speculum in the anorectal canal.  I injected peroxide into the wound to make sure we did not have any obvious signs of a rectocutaneous fistula.  I saw no evidence of peroxide in the anorectal canal.  I also explored the pilonidal wound to see if it look like this gluteal wound would connect to the pilonidal wound and did not see any peroxide in the wound not a wound.  We also put some peroxide into the pilonidal wound and saw no evidence of peroxide into the gluteal cleft wound.  I excised all of the gluteal cleft material and fulgurated it.  I did place a piece of the plura ply here and closed it with an interrupted 3-0 nylon.    I then copiously irrigated the pilonidal wound.  We closed it deeply with some interrupted 3-0 Vicryl sutures.  I did leave the wound mostly open.  I sewed PuraPly on top of the wound where it was open.  We then placed a nonadhesive dressing over the PuraPly and placed a wound VAC connected to 100 mmHg suction.    A Primapore dressing was placed over the gluteal cleft wound.    The patient was then aroused from anesthesia, and taken off the OR table, and taken to PACU in stable condition.  Sponge, needle, and instrument counts were correct x2.  Irma BLACKWELL CSA, was present for the entire procedure and helped in all portions of  the case.    The operative note was dictated with the help of the dragon dictation system.

## 2024-07-24 NOTE — ANESTHESIA PREPROCEDURE EVALUATION
Anesthesia Evaluation     Patient summary reviewed and Nursing notes reviewed   no history of anesthetic complications:   NPO Solid Status: > 8 hours  NPO Liquid Status: > 2 hours           Airway   Mallampati: III  TM distance: >3 FB  Neck ROM: full  No difficulty expected  Dental      Pulmonary - normal exam    breath sounds clear to auscultation  (+) asthma,  Cardiovascular - normal exam  Exercise tolerance: good (4-7 METS)    ECG reviewed  Rhythm: regular  Rate: normal    (+) valvular problems/murmurs murmur, hyperlipidemia      Neuro/Psych  (+) psychiatric history Anxiety  GI/Hepatic/Renal/Endo    (+) diabetes mellitus type 2, thyroid problem hypothyroidism    Musculoskeletal (-) negative ROS    Abdominal    Substance History - negative use     OB/GYN negative ob/gyn ROS         Other - negative ROS       ROS/Med Hx Other: LAST DOSE OF DYNJARDY (METFORMIN/EMPAGLIFOZIN) 7/23/24. KT     Last dose of mounjaro 7/9/24       NORMAL ECG -  Sinus rhythm  No previous ECG available for comparison         Anesthesia Plan    ASA 2     general     (Patient understands anesthesia not responsible for dental damage.)  intravenous induction     Anesthetic plan, risks, benefits, and alternatives have been provided, discussed and informed consent has been obtained with: patient.    Use of blood products discussed with patient .    Plan discussed with CRNA.      CODE STATUS:

## 2024-07-24 NOTE — ANESTHESIA POSTPROCEDURE EVALUATION
30 Patient: Mariela Bills Elinor    Procedure Summary       Date: 07/24/24 Room / Location: [facility] OR 02 / [facility] MAIN OR    Anesthesia Start: 1327 Anesthesia Stop: 1522    Procedure: DEBRIDEMENT OF ISCHIAL ULCER/BUTTOCKS WOUND (Buttocks) Diagnosis:       Hidradenitis      (Hidradenitis [L73.2])    Surgeons: Donato You MD Provider: Conchis Israel MD    Anesthesia Type: general ASA Status: 2            Anesthesia Type: general    Vitals  Vitals Value Taken Time   /63 07/24/24 1612   Temp 36.8 °C (98.3 °F) 07/24/24 1611   Pulse 84 07/24/24 1613   Resp 16 07/24/24 1611   SpO2 98 % 07/24/24 1613   Vitals shown include unfiled device data.        Post Anesthesia Care and Evaluation    Patient location during evaluation: bedside  Patient participation: complete - patient participated  Level of consciousness: awake  Pain management: adequate    Airway patency: patent  PONV Status: none  Cardiovascular status: acceptable and stable  Respiratory status: acceptable  Hydration status: acceptable     20

## 2024-07-25 ENCOUNTER — READMISSION MANAGEMENT (OUTPATIENT)
Dept: CALL CENTER | Facility: HOSPITAL | Age: 54
End: 2024-07-25
Payer: COMMERCIAL

## 2024-07-25 VITALS
HEIGHT: 64 IN | HEART RATE: 89 BPM | OXYGEN SATURATION: 94 % | RESPIRATION RATE: 18 BRPM | TEMPERATURE: 98.1 F | BODY MASS INDEX: 33.69 KG/M2 | DIASTOLIC BLOOD PRESSURE: 73 MMHG | WEIGHT: 197.31 LBS | SYSTOLIC BLOOD PRESSURE: 119 MMHG

## 2024-07-25 DIAGNOSIS — L73.2 HIDRADENITIS: Primary | ICD-10-CM

## 2024-07-25 LAB
GLUCOSE BLDC GLUCOMTR-MCNC: 107 MG/DL (ref 70–99)
GLUCOSE BLDC GLUCOMTR-MCNC: 178 MG/DL (ref 70–99)

## 2024-07-25 PROCEDURE — 94799 UNLISTED PULMONARY SVC/PX: CPT

## 2024-07-25 PROCEDURE — 82948 REAGENT STRIP/BLOOD GLUCOSE: CPT | Performed by: NURSE PRACTITIONER

## 2024-07-25 PROCEDURE — 63710000001 INSULIN LISPRO (HUMAN) PER 5 UNITS: Performed by: SURGERY

## 2024-07-25 PROCEDURE — 82948 REAGENT STRIP/BLOOD GLUCOSE: CPT | Performed by: SURGERY

## 2024-07-25 PROCEDURE — G0378 HOSPITAL OBSERVATION PER HR: HCPCS

## 2024-07-25 PROCEDURE — 25010000002 HEPARIN (PORCINE) PER 1000 UNITS: Performed by: SURGERY

## 2024-07-25 PROCEDURE — 94664 DEMO&/EVAL PT USE INHALER: CPT

## 2024-07-25 PROCEDURE — 99024 POSTOP FOLLOW-UP VISIT: CPT | Performed by: NURSE PRACTITIONER

## 2024-07-25 RX ORDER — IBUPROFEN 600 MG/1
1 TABLET ORAL
Status: DISCONTINUED | OUTPATIENT
Start: 2024-07-25 | End: 2024-07-25 | Stop reason: HOSPADM

## 2024-07-25 RX ORDER — HYDROCODONE BITARTRATE AND ACETAMINOPHEN 5; 325 MG/1; MG/1
1 TABLET ORAL EVERY 4 HOURS PRN
Qty: 18 TABLET | Refills: 0 | Status: SHIPPED | OUTPATIENT
Start: 2024-07-25 | End: 2024-07-28

## 2024-07-25 RX ORDER — NICOTINE POLACRILEX 4 MG
15 LOZENGE BUCCAL
Status: DISCONTINUED | OUTPATIENT
Start: 2024-07-25 | End: 2024-07-25 | Stop reason: HOSPADM

## 2024-07-25 RX ORDER — DOCUSATE SODIUM 100 MG/1
100 CAPSULE, LIQUID FILLED ORAL 2 TIMES DAILY
Qty: 10 CAPSULE | Refills: 0 | Status: SHIPPED | OUTPATIENT
Start: 2024-07-25 | End: 2024-07-30

## 2024-07-25 RX ORDER — DEXTROSE MONOHYDRATE 25 G/50ML
25 INJECTION, SOLUTION INTRAVENOUS
Status: DISCONTINUED | OUTPATIENT
Start: 2024-07-25 | End: 2024-07-25 | Stop reason: HOSPADM

## 2024-07-25 RX ADMIN — BUDESONIDE AND FORMOTEROL FUMARATE DIHYDRATE 1 PUFF: 160; 4.5 AEROSOL RESPIRATORY (INHALATION) at 07:21

## 2024-07-25 RX ADMIN — HYDROCODONE BITARTRATE AND ACETAMINOPHEN 1 TABLET: 10; 325 TABLET ORAL at 07:52

## 2024-07-25 RX ADMIN — HEPARIN SODIUM 5000 UNITS: 5000 INJECTION INTRAVENOUS; SUBCUTANEOUS at 08:13

## 2024-07-25 RX ADMIN — GLIPIZIDE 10 MG: 10 TABLET ORAL at 08:13

## 2024-07-25 RX ADMIN — CETIRIZINE HYDROCHLORIDE 10 MG: 10 TABLET, FILM COATED ORAL at 08:13

## 2024-07-25 RX ADMIN — LEVOTHYROXINE SODIUM 88 MCG: 88 TABLET ORAL at 05:43

## 2024-07-25 RX ADMIN — FLUTICASONE PROPIONATE 2 SPRAY: 50 SPRAY, METERED NASAL at 08:13

## 2024-07-25 RX ADMIN — INSULIN LISPRO 2 UNITS: 100 INJECTION, SOLUTION INTRAVENOUS; SUBCUTANEOUS at 08:13

## 2024-07-25 RX ADMIN — HYDROCODONE BITARTRATE AND ACETAMINOPHEN 1 TABLET: 10; 325 TABLET ORAL at 14:09

## 2024-07-25 NOTE — PLAN OF CARE
Problem: Adult Inpatient Plan of Care  Goal: Plan of Care Review  Outcome: Ongoing, Progressing  Flowsheets (Taken 7/25/2024 0559)  Progress: no change  Plan of Care Reviewed With: patient  Goal: Patient-Specific Goal (Individualized)  Outcome: Ongoing, Progressing  Goal: Absence of Hospital-Acquired Illness or Injury  Outcome: Ongoing, Progressing  Intervention: Identify and Manage Fall Risk  Recent Flowsheet Documentation  Taken 7/25/2024 0503 by Lorena Richey LPN  Safety Promotion/Fall Prevention: safety round/check completed  Taken 7/25/2024 0320 by Lorena Richey LPN  Safety Promotion/Fall Prevention: safety round/check completed  Taken 7/25/2024 0101 by Lorena Richey LPN  Safety Promotion/Fall Prevention: safety round/check completed  Taken 7/24/2024 2315 by Lorena Richey LPN  Safety Promotion/Fall Prevention: safety round/check completed  Taken 7/24/2024 2149 by Lorena Richey LPN  Safety Promotion/Fall Prevention: safety round/check completed  Taken 7/24/2024 1915 by Lorena Richey LPN  Safety Promotion/Fall Prevention: safety round/check completed  Intervention: Prevent Skin Injury  Recent Flowsheet Documentation  Taken 7/24/2024 2200 by Lorena Richey LPN  Skin Protection:   adhesive use limited   incontinence pads utilized   transparent dressing maintained  Intervention: Prevent and Manage VTE (Venous Thromboembolism) Risk  Recent Flowsheet Documentation  Taken 7/24/2024 2200 by Lorena Richey LPN  VTE Prevention/Management: (heparin subq) other (see comments)  Range of Motion: active ROM (range of motion) encouraged  Intervention: Prevent Infection  Recent Flowsheet Documentation  Taken 7/24/2024 2200 by Lorena Richey LPN  Infection Prevention:   equipment surfaces disinfected   hand hygiene promoted  Goal: Optimal Comfort and Wellbeing  Outcome: Ongoing, Progressing  Intervention: Provide Person-Centered Care  Recent Flowsheet Documentation  Taken 7/24/2024 2200 by Lorena Richey  LPN  Trust Relationship/Rapport:   care explained   choices provided   questions answered   reassurance provided   thoughts/feelings acknowledged  Goal: Readiness for Transition of Care  Outcome: Ongoing, Progressing   Goal Outcome Evaluation:  Plan of Care Reviewed With: patient        Progress: no change   Vitals stable. Minimal pain this shift.  at bedside. Wound vac in place. Possible discharge home today.

## 2024-07-25 NOTE — NURSING NOTE
Patient's Home wound vac unit approved. Patient provided with Home Wound vac unit and connected to current dressing as dressing is to remain intact for one week.   Patient and spouse familiar with home wound vac units, however teaching was provided and reviewed.   With connection of Home Wound Vac unit, no leaks or alarms identified and home foam is well compressed without leaks or alarms.   Patient to return to [facility] on 07/31/24 for dressing change.   Miladys Dockery RN

## 2024-07-25 NOTE — OUTREACH NOTE
Prep Survey      Flowsheet Row Responses   Religious facility patient discharged from? [facility]   Is LACE score < 7 ? Yes   Eligibility [facility]   Date of Admission 07/24/24   Date of Discharge 07/25/24   Discharge Disposition Home or Self Care   Discharge diagnosis Hidradenitis-extubation protocol   Does the patient have one of the following disease processes/diagnoses(primary or secondary)? General Surgery   Does the patient have Home health ordered? No   Is there a DME ordered? Yes   What DME was ordered? [facility]   Prep survey completed? Yes            CRISTIANE VICENTE - Registered Nurse

## 2024-07-25 NOTE — NURSING NOTE
Wound Eval / Progress Noted     [facility]     Patient Name: Mariela Aldrich  : 1970  MRN: [id number]  Today's Date: 2024                 Admit Date: 2024    Visit Dx:    ICD-10-CM ICD-9-CM   1. Pilonidal cyst  L05.91 685.1   2. Hidradenitis  L73.2 705.83         Hidradenitis    Pilonidal cyst        Past Medical History:   Diagnosis Date    Anxiety     Asthma     PRN INHALER AND TAKES ALLERGY INJECTIONS    Diabetes mellitus type 2, controlled     AVERAGE     Hidradenitis     Hyperlipidemia     Hypothyroidism     Murmur     DIAGNOSED WHEN 17 BUT IS ASYMPTOMATIC AND IS FOLLOWED ONLY BY PCP    Panic attack     Pilonidal cyst     Rheumatic fever     AS A CHILD    RLS (restless legs syndrome)     Sinus trouble         Past Surgical History:   Procedure Laterality Date    COLONOSCOPY  2020    exernal hemorrhoids    EXCISION LESION Bilateral 2023    Procedure: Excision Hidradenitis of Bilateral Inguinal Areas;  Surgeon: Donato You MD;  Location: [facility];  Service: General;  Laterality: Bilateral;    GROIN ABSCESS INCISION AND DRAINAGE Right     hidradenitis    HYSTERECTOMY  2010    KNEE SURGERY Bilateral 2013    ARTHROSCOPY    PILONIDAL CYSTECTOMY N/A 2023    Procedure: Excision Hidradenitis of Inguinal Area and Pilonidal Cystectomy;  Surgeon: Donato You MD;  Location: [facility];  Service: General;  Laterality: N/A;         Physical Assessment:  Wound 24 1410 coccyx Incision (Active)   Dressing Appearance dry;intact 24 1120   Closure Unable to assess 24 1120   Base dressing in place, unable to visualize 24 1120   Periwound other (see comments) 24 1120   Periwound Temperature other (see comments) 24 1120   Periwound Skin Turgor other (see comments) 24 1120   Edges other (see comments) 24 1120   Drainage Characteristics/Odor sanguineous 24 1120   Drainage Amount small 24 1120   Care, Wound  negative pressure wound therapy 07/25/24 1120   Periwound Care dry periwound area maintained 07/25/24 1120       Wound 07/24/24 1406 gluteal Incision (Active)   Wound Image   07/25/24 1120   Dressing Appearance moist drainage;intact 07/25/24 1120   Closure Sutures 07/25/24 1120   Base closed/resurfaced 07/25/24 1120   Periwound redness;moist;dry;other (see comments) 07/25/24 1120   Periwound Temperature warm 07/25/24 1120   Periwound Skin Turgor soft 07/25/24 1120   Edges rolled/closed 07/25/24 1120   Drainage Characteristics/Odor serosanguineous 07/25/24 1120   Drainage Amount small 07/25/24 1120   Care, Wound cleansed with;sterile normal saline 07/25/24 1120   Dressing Care dressing removed;dressing applied;gauze, dry;border dressing;silicone 07/25/24 1120   Periwound Care absorptive dressing applied 07/25/24 1120       NPWT (Negative Pressure Wound Therapy) 07/24/24 1520 Gluteal Crease (Active)   Therapy Setting continuous therapy 07/25/24 1120   Dressing foam, black 07/25/24 1120   Pressure Setting other (see comments) 07/25/24 1120        Wound Check / Follow-up:  Patient seen today for wound consult. Patient is awake, alert and oriented with  present at bedside at time of assessment. Patient is known to wound care for long-term treatment of wounds to sacral/gluteal area. Patient underwent a reexcision of pilonidal area and excision of hidradenitis wound of the gluteal cleft with application of PuraPly on 7/24/24 with Dr. You. Patient had a wound VAC placed over pilonidal wound at conclusion of surgery. Patient is awaiting approval for home wound VAC, process has been started. Patient has experience with home wound VACs. Provided a review of wound VAC education and supplied patient's  with transparent dressings to troubleshoot issues at home. No signs of lifting or leaking at this time. Small amount of sanguineous drainage is noted to wound VAC tubing and canister. Patient's wound VAC  pressure setting was noted to be at 150 mmHg at time of assessment and was decreased to 125 mmHg prior to leaving room. Dr. You was then contacted and clarified that pressure setting is to be at 100 mmHg. Wound RN returned to room and decreased patient's pressure setting to 100 mmHg. Patient is to follow up at [facility] on 7/31/24 for wound VAC dressing change with Wound Nurse.    Primary RN contacted wound nurse prior to rounding regarding gluteal incision surgical dressing being saturated. Primary RN was instructed to remove soiled dressing and place an ABD pad over site. At time of assessment gluteal incision dressing has a small amount of serosanguineous drainage noted. Incision has a drain noted with a majority of edges closed with sutures. Cleansed incision with normal saline and gauze, blot dry, applied dry gauze and covered with silicone border dressing. Recommending twice a day dressing changes with dry gauze and silicone border dressing. Educated  on dressing change regimen and provided patient with supplies. Instructed  that if wound drainage does not allow silicone border dressing to stick that gauze may be taped over suture site with paper tape.     Impression: Wound VAC over pilonidal wound, Gluteal incision with drain and suture closure    Short term goals:  Regain skin integrity, skin protection, moisture prevention, negative pressure wound therapy, twice a day dressing changes    Deanna Campbell RN    7/25/2024    17:14 EDT

## 2024-07-25 NOTE — PROGRESS NOTES
"POST OP PROGRESS NOTE     Patient Name:  Mariela Aldrich  YOB: 1970  [id number]   LOS: 0 days   1 Day Post-Op  Patient Care Team:  Berenice Avelar APRN as PCP - General (Nurse Practitioner)  Donato You MD as Consulting Physician (General Surgery)          Subjective     Interval History:   VSS, afebrile, pain controlled    Review of Systems:    A complete review of systems was performed and all are negative except what is documented in the HPI.       Objective     Constitutional:  well nourished, no acute distress, appears stated age /73 (BP Location: Left arm, Patient Position: Lying)   Pulse 78   Temp 97.7 °F (36.5 °C) (Oral)   Resp 16   Ht 162.6 cm (64\")   Wt 89.5 kg (197 lb 5 oz)   SpO2 94%   BMI 33.87 kg/m²    Eyes:  anicteric sclerae, moist conjunctivae, no lid lag, PERRLA  ENMT:  oropharynx clear, moist mucous membranes, good dentition  Neck:   full ROM, trachea midline, no thyromegaly  Cardiovascular: RRR, S1 and S2 present, no MRG, heart rate 78, no pedal edema  Respiratory: lungs CTA, respirations even and unlabored  GI:  Abdomen soft, nontender, nondistended, no HSM     Skin:  warm and dry, normal turgor, no rashes, wound vac in place  Psychiatric:  alert and oriented x3, intact judgment and insight, cooperative          Results Review:       I reviewed the patient's new clinical results including  no new labs.     No results found for: \"WBC\", \"RBC\", \"HGB\", \"HCT\", \"MCV\", \"MCH\", \"MCHC\", \"RDW\", \"RDWSD\", \"MPV\", \"PLT\", \"NEUTRORELPCT\", \"LYMPHORELPCT\", \"MONORELPCT\", \"EOSRELPCT\", \"BASORELPCT\", \"AUTOIGPER\", \"NEUTROABS\", \"LYMPHSABS\", \"MONOSABS\", \"EOSABS\", \"BASOSABS\", \"AUTOIGNUM\", \"NRBC\"      Basic Metabolic Panel    Sodium No results found for: \"NA\"   Potassium No results found for: \"K\"   Chloride No results found for: \"CL\"   Bicarbonate No results found for: \"PLASMABICARB\"   BUN No results found for: \"BUN\"   Creatinine No results found for: \"CREATININE\"   Calcium No results " "found for: \"CALCIUM\"   Glucose      No components found for: \"GLUCOSE.*\"       Lab Results   Component Value Date    GLUCOSE 80 06/17/2024    BUN 13 06/17/2024    CREATININE 0.82 06/17/2024    EGFR 85.1 06/17/2024    BCR 15.9 06/17/2024    K 3.9 06/17/2024    CO2 24.9 06/17/2024    CALCIUM 9.0 06/17/2024    ALBUMIN 3.7 06/17/2024    BILITOT 0.4 06/17/2024    AST 21 06/17/2024    ALT 15 06/17/2024       IMAGING:  Imaging Results (Last 72 Hours)       ** No results found for the last 72 hours. **            Medications:    Current Facility-Administered Medications:     albuterol sulfate HFA (PROVENTIL HFA;VENTOLIN HFA;PROAIR HFA) inhaler 2 puff, 2 puff, Inhalation, Q4H PRN, Donato You MD    budesonide-formoterol (SYMBICORT) 160-4.5 MCG/ACT inhaler 1 puff, 1 puff, Inhalation, BID - RT, Donato You MD, 1 puff at 07/25/24 0721    cetirizine (zyrTEC) tablet 10 mg, 10 mg, Oral, Daily, Donato You MD, 10 mg at 07/25/24 0813    dextrose (D50W) (25 g/50 mL) IV injection 25 g, 25 g, Intravenous, Q15 Min PRN, Ryanne Pa, WILLIAM    dextrose (GLUTOSE) oral gel 15 g, 15 g, Oral, Q15 Min PRN, Ryanne Pa, WILLIAM    Diclofenac Sodium (VOLTAREN) 1 % gel 4 g, 4 g, Topical, 4x Daily PRN, Donato You MD    fluticasone (FLONASE) 50 MCG/ACT nasal spray 2 spray, 2 spray, Nasal, Daily, Donato You MD, 2 spray at 07/25/24 0813    gabapentin (NEURONTIN) capsule 300 mg, 300 mg, Oral, Nightly, Donato You MD, 300 mg at 07/24/24 2103    glipizide (GLUCOTROL) tablet 10 mg, 10 mg, Oral, Daily With Breakfast, Donato You MD, 10 mg at 07/25/24 0813    glucagon (GLUCAGEN) injection 1 mg, 1 mg, Intramuscular, Q15 Min PRN, Ryanne Pa, WILLIAM    heparin (porcine) 5000 UNIT/ML injection 5,000 Units, 5,000 Units, Subcutaneous, Q12H, Donato You MD, 5,000 Units at 07/25/24 0813    HYDROcodone-acetaminophen (NORCO)  MG per tablet 1 tablet, 1 tablet, Oral, Q4H PRN, Avelino, " Donato ESCOBAR MD, 1 tablet at 07/25/24 0752    HYDROcodone-acetaminophen (NORCO) 5-325 MG per tablet 1 tablet, 1 tablet, Oral, Q4H PRN, Donato You MD    HYDROmorphone (DILAUDID) injection 0.5 mg, 0.5 mg, Intravenous, Q2H PRN **AND** naloxone (NARCAN) injection 0.1 mg, 0.1 mg, Intravenous, Q5 Min PRN, Donato You MD    insulin glargine (LANTUS, SEMGLEE) injection 20 Units, 20 Units, Subcutaneous, Nightly, Donato You MD, 20 Units at 07/24/24 2102    Insulin Lispro (humaLOG) injection 2-7 Units, 2-7 Units, Subcutaneous, 4x Daily AC & at Bedtime, Donato You MD, 2 Units at 07/25/24 0813    levothyroxine (SYNTHROID, LEVOTHROID) tablet 88 mcg, 88 mcg, Oral, Q AM, Donato You MD, 88 mcg at 07/25/24 0543    losartan (COZAAR) tablet 12.5 mg, 12.5 mg, Oral, Nightly, Donato You MD, 12.5 mg at 07/24/24 2103    montelukast (SINGULAIR) tablet 10 mg, 10 mg, Oral, Nightly, Donato You MD, 10 mg at 07/24/24 2103    morphine injection 2 mg, 2 mg, Intravenous, Q2H PRN **AND** naloxone (NARCAN) injection 0.4 mg, 0.4 mg, Intravenous, Q5 Min PRN, Donato You MD    ondansetron ODT (ZOFRAN-ODT) disintegrating tablet 4 mg, 4 mg, Oral, Q6H PRN **OR** ondansetron (ZOFRAN) injection 4 mg, 4 mg, Intravenous, Q6H PRN, Donato You MD    rOPINIRole (REQUIP) tablet 1 mg, 1 mg, Oral, Nightly, Donato You MD, 1 mg at 07/24/24 2103    sertraline (ZOLOFT) tablet 150 mg, 150 mg, Oral, Nightly, Donato You MD, 150 mg at 07/24/24 2386    Assessment & Plan       Hidradenitis    Pilonidal cyst  S/P Reexcision of pilonidal area and excision of hidradenitis wound of the gluteal cleft with application of PuraPly    DC  home today        Electronically signed by WILLIAM Peace, 07/25/24, 11:33 AM EDT.      Seen and agree

## 2024-07-26 ENCOUNTER — TRANSITIONAL CARE MANAGEMENT TELEPHONE ENCOUNTER (OUTPATIENT)
Dept: CALL CENTER | Facility: HOSPITAL | Age: 54
End: 2024-07-26
Payer: COMMERCIAL

## 2024-07-26 NOTE — DISCHARGE SUMMARY
Date of Admission: 7/24/2024    Date of Discharge:  7/25/2024    Discharge Diagnosis:   Hidradenitis [L73.2]  Pilonidal cyst [L05.91]  Post-Op Diagnosis Codes:     * Hidradenitis [L73.2]       Presenting Problem/History of Present Illness  Active Hospital Problems    Diagnosis  POA    **Hidradenitis [L73.2]  Unknown    Pilonidal cyst [L05.91]  Yes      Resolved Hospital Problems   No resolved problems to display.          Hospital Course  Mariela Aldrich is a 53 y.o. female presented to [facility] on 7/24/2024  For a planned reexcision of pilonidal area and excision of hidradenitis wound of the gluteal cleft with application of PuraPly.  She was admitted after the procedure for routine postoperative care.  Upon discharge to home she is tolerating a regular diet and her pain is controlled.     Procedures Performed    Procedure(s):  DEBRIDEMENT OF ISCHIAL ULCER/BUTTOCKS WOUND  -------------------       Consults:   Consults       No orders found from 6/25/2024 to 7/25/2024.            Condition on Discharge:  stable    Vital Signs  Temp:  [98.1 °F (36.7 °C)-99 °F (37.2 °C)] 98.1 °F (36.7 °C)  Heart Rate:  [84-89] 89  Resp:  [18] 18  BP: (119-120)/(73-78) 119/73       Discharge Disposition  Home or Self Care    Discharge Medications     Discharge Medications        New Medications        Instructions Start Date   docusate sodium 100 MG capsule  Commonly known as: Colace   100 mg, Oral, 2 Times Daily      HYDROcodone-acetaminophen 5-325 MG per tablet  Commonly known as: NORCO   1 tablet, Oral, Every 4 Hours PRN             Changes to Medications        Instructions Start Date   losartan 25 MG tablet  Commonly known as: Cozaar  What changed: when to take this   12.5 mg, Oral, Daily      Mounjaro 7.5 MG/0.5ML solution pen-injector pen  Generic drug: Tirzepatide  What changed: See the new instructions.   INJECT 1/2 (ONE-HALF) ML SUBCUTANEOUSLY  ONCE A WEEK      sertraline 100 MG tablet  Commonly known as: ZOLOFT  What changed:  when to take this   150 mg, Oral, Daily             Continue These Medications        Instructions Start Date   albuterol sulfate  (90 Base) MCG/ACT inhaler  Commonly known as: PROVENTIL HFA;VENTOLIN HFA;PROAIR HFA   2 puffs, Inhalation, Every 4 Hours PRN      cetirizine 10 MG tablet  Commonly known as: zyrTEC   Take 1 tablet by mouth Daily.      Dexcom G6 Transmitter misc   1 Device, Does not apply, Every 3 Months      Dexcom G7 Sensor misc   1 each, Does not apply, Every 14 Days      Fluticasone-Salmeterol 250-50 MCG/ACT DISKUS  Commonly known as: ADVAIR/WIXELA   INHALE 1 DOSE BY MOUTH TWICE DAILY      gabapentin 300 MG capsule  Commonly known as: NEURONTIN   300 mg, Oral, Nightly      glipizide 10 MG tablet  Commonly known as: GLUCOTROL   10 mg, Oral, Daily With Breakfast      montelukast 10 MG tablet  Commonly known as: SINGULAIR   10 mg, Oral, Every Night at Bedtime      multivitamin tablet tablet   No dose, route, or frequency recorded.      ONE TOUCH CLUB LANCETS misc   90 each, Does not apply, 3 Times Daily PRN      OneTouch Verio test strip  Generic drug: glucose blood   USE 1 STRIP TO CHECK GLUCOSE THREE TIMES DAILY      pitavastatin calcium 2 MG tablet tablet  Commonly known as: Livalo   2 mg, Oral, Nightly      rOPINIRole 5 MG tablet  Commonly known as: REQUIP   5 mg, Oral, Nightly      Synjardy XR 12.5-1000 MG tablet sustained-release 24 hour  Generic drug: Empagliflozin-metFORMIN HCl ER   1 tablet, Oral, 2 Times Daily      Synthroid 88 MCG tablet  Generic drug: levothyroxine   88 mcg, Oral, Daily      Tresiba FlexTouch 200 UNIT/ML solution pen-injector pen injection  Generic drug: Insulin Degludec   56 Units, Subcutaneous, Nightly      Zinc 50 MG tablet   Take 1 tablet by mouth Daily.               Discharge Diet:   Diet Instructions       Diet: Regular/House Diet; Regular (IDDSI 7); Thin (IDDSI 0)      Discharge Diet: Regular/House Diet    Texture: Regular (IDDSI 7)    Fluid Consistency: Thin  (IDDSI 0)            Activity at Discharge:   Activity Instructions       Activity as Tolerated      Bathing Restrictions      Type of Restriction: Bathing    Bathing Restrictions:  No Tub Bath  No Shower  Sponge Bath Only       Driving Restrictions      Type of Restriction: Driving    Driving Restrictions: No Driving While Taking Narcotics            Follow-up Appointments  Future Appointments   Date Time Provider Department Center   7/31/2024 11:00 AM WOUND OSTOMY/EVAL [facility]   8/1/2024  9:30 AM Berenice Avelar APRN [facility]   8/8/2024 10:00 AM Donato You MD [facility]   9/16/2024  7:30 AM Berenice Avelar APRN [facility]     Additional Instructions for the Follow-ups that You Need to Schedule       Call MD With Problems / Concerns   As directed      Instructions: call with any problems or concerns    Order Comments: Instructions: call with any problems or concerns         Discharge Follow-up with Specialty: Dr You in 2 weeks   As directed      Specialty: Dr You in 2 weeks                Test Results Pending at Discharge  Pending Labs       Order Current Status    Tissue Pathology Exam In process             WILLIAM Peace  07/26/24  09:26 EDT        Electronically signed by WILLIAM Peace, 07/26/24, 9:26 AM EDT.

## 2024-07-26 NOTE — OUTREACH NOTE
Call Center TCM Note      Flowsheet Row Responses   [facility] patient discharged from? [facility]   Does the patient have one of the following disease processes/diagnoses(primary or secondary)? General Surgery   TCM attempt successful? Yes   Call start time 1145   Call end time 1147   Discharge diagnosis Planned reexcision of pilonidal area and excision of hidradenitis wound of gluteal cleft   Meds reviewed with patient/caregiver? Yes   Is the patient having any side effects they believe may be caused by any medication additions or changes? No   Does the patient have all medications related to this admission filled (includes all antibiotics, pain medications, etc.) Yes   Is the patient taking all medications as directed (includes completed medication regime)? Yes   Comments Hospital follow up appt with PCP on 8/1   Does the patient have an appointment with their PCP within 7-14 days of discharge? Yes   Has home health visited the patient within 72 hours of discharge? N/A   Psychosocial issues? No   Did the patient receive a copy of their discharge instructions? Yes   Nursing interventions Reviewed instructions with patient   What is the patient's perception of their health status since discharge? Improving   Nursing interventions Nurse provided patient education   Is the patient/caregiver able to teach back signs and symptoms of incisional infection? Fever, Pus or odor from incision, Incisional warmth, Increased drainage or bleeding, Increased redness, swelling or pain at the incisonal site   Is the patient/caregiver able to teach back the hierarchy of who to call/visit for symptoms/problems? PCP, Specialist, Home health nurse, Urgent Care, ED, 911 Yes   TCM call completed? Yes   Wrap up additional comments Doing well, all questions addressed, confirmed upcoming follow up appts.   Call end time 1147   Would this patient benefit from a Referral to [facility] Social Work? No   Is the patient interested in additional calls  from an ambulatory ? No            Rocio Calhoun RN    7/26/2024, 11:48 EDT

## 2024-07-29 ENCOUNTER — HOSPITAL ENCOUNTER (OUTPATIENT)
Dept: INFUSION THERAPY | Facility: HOSPITAL | Age: 54
Discharge: HOME | End: 2024-07-29
Admitting: NURSE PRACTITIONER
Payer: COMMERCIAL

## 2024-07-29 VITALS
TEMPERATURE: 97.9 F | DIASTOLIC BLOOD PRESSURE: 78 MMHG | OXYGEN SATURATION: 100 % | RESPIRATION RATE: 16 BRPM | SYSTOLIC BLOOD PRESSURE: 116 MMHG | HEART RATE: 104 BPM

## 2024-07-29 DIAGNOSIS — L73.2 HIDRADENITIS: Primary | ICD-10-CM

## 2024-07-29 PROCEDURE — 97605 NEG PRS WND THER DME<=50SQCM: CPT

## 2024-07-29 NOTE — SIGNIFICANT NOTE
Wound Eval / Progress Noted    [facility]     Patient Name: Mariela Aldrich  : 1970  MRN: [id number]  Today's Date: 2024                 Admit Date: 2024    Visit Dx:  No diagnosis found.      * No active hospital problems. *        Past Medical History:   Diagnosis Date    Anxiety     Asthma     PRN INHALER AND TAKES ALLERGY INJECTIONS    Diabetes mellitus type 2, controlled     AVERAGE     Hidradenitis     Hyperlipidemia     Hypothyroidism     Murmur     DIAGNOSED WHEN 17 BUT IS ASYMPTOMATIC AND IS FOLLOWED ONLY BY PCP    Panic attack     Pilonidal cyst     Rheumatic fever     AS A CHILD    RLS (restless legs syndrome)     Sinus trouble         Past Surgical History:   Procedure Laterality Date    COLONOSCOPY  2020    exernal hemorrhoids    EXCISION LESION Bilateral 2023    Procedure: Excision Hidradenitis of Bilateral Inguinal Areas;  Surgeon: Donato You MD;  Location: [facility];  Service: General;  Laterality: Bilateral;    EXCISION LESION N/A 2024    Procedure: DEBRIDEMENT OF ISCHIAL ULCER/BUTTOCKS WOUND;  Surgeon: Donato You MD;  Location: [facility] OR;  Service: General;  Laterality: N/A;    GROIN ABSCESS INCISION AND DRAINAGE Right     hidradenitis    HYSTERECTOMY  2010    KNEE SURGERY Bilateral 2013    ARTHROSCOPY    PILONIDAL CYSTECTOMY N/A 2023    Procedure: Excision Hidradenitis of Inguinal Area and Pilonidal Cystectomy;  Surgeon: Donato You MD;  Location: [facility];  Service: General;  Laterality: N/A;         Physical Assessment:  Wound 24 1410 coccyx Incision (Active)   Wound Image    24 1350   Dressing Appearance intact;dry 24 1350   Closure None 24 1350   Base exposed structure;moist;other (see comments);yellow 24 1350   Periwound intact;dry;redness;moist;macerated 24 1350   Periwound Temperature warm 24 1350   Periwound Skin Turgor soft 24 1350   Edges  open;rolled/closed 07/29/24 1350   Drainage Characteristics/Odor yellow;serosanguineous 07/29/24 1350   Drainage Amount small 07/29/24 1350   Care, Wound cleansed with;irrigated with;sterile normal saline;negative pressure wound therapy 07/29/24 1350   Dressing Care dressing applied;foam;transparent film;hydrocolloid;other (see comments) 07/29/24 1350   Periwound Care absorptive dressing applied;barrier film applied 07/29/24 1350       Wound 07/24/24 1406 gluteal Incision (Active)   Wound Image   07/29/24 1350   Dressing Appearance intact;dry 07/29/24 1350   Closure None 07/29/24 1350   Base red;moist;slough;yellow 07/29/24 1350   Red (%), Wound Tissue Color 50 07/29/24 1350   Yellow (%), Wound Tissue Color 50 07/29/24 1350   Periwound intact;dry;redness 07/29/24 1350   Periwound Temperature warm 07/29/24 1350   Periwound Skin Turgor soft 07/29/24 1350   Edges open 07/29/24 1350   Wound Length (cm) 2 cm 07/29/24 1350   Wound Width (cm) 1 cm 07/29/24 1350   Wound Surface Area (cm^2) 2 cm^2 07/29/24 1350   Undermining [Depth (cm)/Location] 12-3 o'clock, deepest point at 12 with 2.5 cm 07/29/24 1350   Drainage Characteristics/Odor serosanguineous 07/29/24 1350   Drainage Amount small 07/29/24 1350   Care, Wound cleansed with;irrigated with;sterile normal saline 07/29/24 1350   Dressing Care dressing applied;packed with;gauze, iodoform;packing strip;silicone;border dressing 07/29/24 1350   Periwound Care absorptive dressing applied 07/29/24 1350       NPWT (Negative Pressure Wound Therapy) 07/24/24 1520 Gluteal Crease (Active)   Therapy Setting continuous therapy 07/29/24 1350   Dressing foam, black 07/29/24 1350   Pressure Setting 100 mmHg 07/29/24 1350   Sponges Inserted 1 07/29/24 1350   Sponges Removed 1 07/29/24 1350   Finger sweep complete Yes 07/29/24 1350        Wound Check / Follow-up: Patient was seen today for wound eval and treat following a surgical wound debridement of her ischium and buttocks with the  application of PuraPly.  Spouse present at bedside.  Patient had surgery on July 24 for non-healing pilonidal wound.  Patient reports a great amount of discomfort to the bilateral gluteal aspects due to the wound VAC as well as issues with leaking to the distal aspect of the drape / dressing.    Wound VAC functioning appropriately, no alarms noted.  Foam was well compressed upon arrival to room.  Remove dressing with no issues with adhesive remover.  Large area around the gluteal incision with red moist tissue from where the wound VAC was placed on healthy tissue causing erosion and skin breakdown.  Cleansed with normal saline.  Applied silver impregnated Hydrofiber to all areas of open red tissue and secured with transparent drape.  Surgical incision with PuraPly present, distal aspect of incision is open with some absorption noted.  Open tissue that is visible with red moist wound base.  Cleansed all tissue very gently with normal saline.  Applied black foam to the PuraPly and secured with transparent drape.  Applied transparent drape of the right hip and a second black foam and secured with with transparent drape for track pad.  Wound VAC connected, small leak detected at the distal aspect.  Applied Caty's ring to the distal aspect of the incision and drape and covered with hydrocolloid.  Foam is well compressed at this point with no alarms noted.  Surgeon is to see patient in [facility] on Wednesday for wound assessment and possible trimming of the skin graft. Discussed all findings with surgeon.    Surgical incision with wound dehiscence noted to the right lower gluteal aspect with some yellow sloughing tissue present to the wound base as well as red moist tissue.  Periwound is reddened and soft.  Cleansed and irrigated with normal saline.  Filled the wound with iodoform packing strip and secured with a silicone border dressing.   is very familiar with providing wound care for spouse and was instructed to  change packing twice daily and for any soilage.  Patient and spouse verbalized understanding.    Impression: Surgical incision with PuraPly to the wound with secondary closure.  Surgical dehiscence with packing.    Short term goals: Regain skin integrity, skin protection, moisture prevention, pressure reduction, 3 times weekly dressing change with negative pressure wound VAC therapy.    No Henriquez RN    7/29/2024    15:04 EDT

## 2024-07-31 ENCOUNTER — HOSPITAL ENCOUNTER (OUTPATIENT)
Dept: INFUSION THERAPY | Facility: HOSPITAL | Age: 54
Discharge: HOME | End: 2024-07-31
Admitting: NURSE PRACTITIONER
Payer: COMMERCIAL

## 2024-07-31 VITALS
SYSTOLIC BLOOD PRESSURE: 108 MMHG | TEMPERATURE: 97.4 F | DIASTOLIC BLOOD PRESSURE: 85 MMHG | HEART RATE: 106 BPM | OXYGEN SATURATION: 100 % | RESPIRATION RATE: 20 BRPM

## 2024-07-31 DIAGNOSIS — L73.2 HIDRADENITIS: Primary | ICD-10-CM

## 2024-07-31 PROCEDURE — 97605 NEG PRS WND THER DME<=50SQCM: CPT

## 2024-07-31 NOTE — SIGNIFICANT NOTE
Wound Eval / Progress Noted     [facility]     Patient Name: Mariela Aldrich  : 1970  MRN: [id number]  Today's Date: 2024                 Admit Date: 2024    Visit Dx:  No diagnosis found.      * No active hospital problems. *        Past Medical History:   Diagnosis Date    Anxiety     Asthma     PRN INHALER AND TAKES ALLERGY INJECTIONS    Diabetes mellitus type 2, controlled     AVERAGE     Hidradenitis     Hyperlipidemia     Hypothyroidism     Murmur     DIAGNOSED WHEN 17 BUT IS ASYMPTOMATIC AND IS FOLLOWED ONLY BY PCP    Panic attack     Pilonidal cyst     Rheumatic fever     AS A CHILD    RLS (restless legs syndrome)     Sinus trouble         Past Surgical History:   Procedure Laterality Date    COLONOSCOPY  2020    exernal hemorrhoids    EXCISION LESION Bilateral 2023    Procedure: Excision Hidradenitis of Bilateral Inguinal Areas;  Surgeon: Donato You MD;  Location: [facility];  Service: General;  Laterality: Bilateral;    EXCISION LESION N/A 2024    Procedure: DEBRIDEMENT OF ISCHIAL ULCER/BUTTOCKS WOUND;  Surgeon: Donato You MD;  Location: [facility] OR;  Service: General;  Laterality: N/A;    GROIN ABSCESS INCISION AND DRAINAGE Right     hidradenitis    HYSTERECTOMY  2010    KNEE SURGERY Bilateral 2013    ARTHROSCOPY    PILONIDAL CYSTECTOMY N/A 2023    Procedure: Excision Hidradenitis of Inguinal Area and Pilonidal Cystectomy;  Surgeon: Donato You MD;  Location: [facility];  Service: General;  Laterality: N/A;         Physical Assessment:  Wound 24 1410 coccyx Incision (Active)   Wound Image   24 1400   Dressing Appearance intact;dry 24 1400   Closure None 24 1400   Base red;moist;granulating 24 1400   Red (%), Wound Tissue Color 100 24 1400   Periwound intact;dry;redness 24 1400   Periwound Temperature warm 24 1400   Periwound Skin Turgor soft 24 1400   Edges open 24  1400   Wound Length (cm) 6.3 cm 07/31/24 1400   Wound Width (cm) 2 cm 07/31/24 1400   Wound Depth (cm) 6 cm 07/31/24 1400   Wound Surface Area (cm^2) 12.6 cm^2 07/31/24 1400   Wound Volume (cm^3) 75.6 cm^3 07/31/24 1400   Drainage Characteristics/Odor serosanguineous 07/31/24 1400   Drainage Amount large 07/31/24 1400   Care, Wound cleansed with;irrigated with;sterile normal saline;negative pressure wound therapy 07/31/24 1400   Dressing Care dressing applied;foam;transparent film;hydrocolloid;other (see comments) 07/31/24 1400       Wound 07/24/24 1406 gluteal Incision (Active)   Wound Image   07/31/24 1400   Dressing Appearance intact;moist drainage 07/31/24 1400   Closure None 07/31/24 1400   Base red;moist 07/31/24 1400   Red (%), Wound Tissue Color 100 07/31/24 1400   Periwound intact;dry;redness 07/31/24 1400   Periwound Temperature warm 07/31/24 1400   Periwound Skin Turgor soft 07/31/24 1400   Edges open 07/31/24 1400   Drainage Characteristics/Odor serosanguineous 07/31/24 1400   Drainage Amount moderate 07/31/24 1400   Care, Wound cleansed with;irrigated with;sterile normal saline 07/31/24 1400   Dressing Care dressing applied;packed with;gauze, iodoform;packing strip;silicone;border dressing 07/31/24 1400   Periwound Care absorptive dressing applied 07/31/24 1400       Wound 07/31/24 1457 gluteal Traumatic (Active)   Wound Image   07/31/24 1400   Dressing Appearance moist drainage;intact 07/31/24 1400   Closure None 07/31/24 1400   Base blanchable;moist;slough;red 07/31/24 1400   Periwound intact;dry;redness 07/31/24 1400   Periwound Temperature warm 07/31/24 1400   Periwound Skin Turgor soft 07/31/24 1400   Edges open;rolled/closed 07/31/24 1400   Drainage Characteristics/Odor serosanguineous 07/31/24 1400   Drainage Amount moderate 07/31/24 1400   Care, Wound cleansed with;sterile normal saline 07/31/24 1400   Dressing Care dressing applied;silver impregnated;hydrofiber;silicone;border  dressing;transparent film 07/31/24 1400   Periwound Care absorptive dressing applied 07/31/24 1400      Wound Check / Follow-up: Patient seen today for wound check and wound VAC dressing change.  Patient is awake alert oriented simple  present at bedside.  Denies any complications with the wound VAC since last visit, patient does complain of pain and discomfort to the lower incision.    General surgeon present at bedside removed PuraPly to the incision; also removed sutures that were located to the periwound.    Wound VAC functioning appropriately, no alarms noted.  Foam is well compressed upon arrival to the room; removed dressing with adhesive remover.  Tissue to bilateral gluteal aspects now appear to be open with sloughing tissue present as well as superficial tissue loss.  There is red moist tissue present to the bilateral gluteal aspects.  Cleansed with normal saline.  Applied silver impregnated Hydrofiber to the open tissue and secured with silicone border dressings.  Lower gluteal incision with red moist tissue.  Small amount of yellow drainage pooled during assessment; no evidence of odor or continued drainage once cleansed and irrigated with normal saline.  Filled the wound base with 1 black foam and secured with transparent drape applied Caty's ring to the distal aspect of the dressing.  Applied a second black foam of the right hip as a track pad and secured with drape.  Wound VAC connected and foam compressed.  Patient is unsure if she will continue wound VAC therapy at this time due to increased discomfort; encourage patient to consider continue with the wound VAC for assistance with granulation and wound healing.  Patient discussed with spouse and is agreeable to continue treatment with wound VAC therapy at this time.    Incision to the right lower gluteal aspect with red moist tissue there is a small amount of sloughing tissue present to the wound base but overall remains unchanged.  Periwound  is soft and intact.  Cleansed and irrigated with normal saline.  Filled the wound base with iodoform packing strip and secured with silicone border dressing.      Patient is to return on Monday, Wednesday, and Friday for dressing changes.    Impression: Surgical incision with PuraPly to the wound with secondary closure.  Surgical dehiscence with packing.     Short term goals: Regain skin integrity, skin protection, moisture prevention, pressure reduction, 3 times weekly dressing change with negative pressure wound VAC therapy.    No Henriquez RN    7/31/2024    15:01 EDT

## 2024-08-02 ENCOUNTER — HOSPITAL ENCOUNTER (OUTPATIENT)
Dept: INFUSION THERAPY | Facility: HOSPITAL | Age: 54
Discharge: HOME | End: 2024-08-02
Payer: COMMERCIAL

## 2024-08-02 VITALS
HEART RATE: 101 BPM | SYSTOLIC BLOOD PRESSURE: 138 MMHG | RESPIRATION RATE: 20 BRPM | TEMPERATURE: 98.4 F | OXYGEN SATURATION: 100 % | DIASTOLIC BLOOD PRESSURE: 74 MMHG

## 2024-08-02 DIAGNOSIS — L73.2 HIDRADENITIS: Primary | ICD-10-CM

## 2024-08-02 PROCEDURE — 97605 NEG PRS WND THER DME<=50SQCM: CPT

## 2024-08-02 NOTE — SIGNIFICANT NOTE
Wound Eval / Progress Noted    [facility]     Patient Name: Mariela Aldrich  : 1970  MRN: [id number]  Today's Date: 2024                 Admit Date: 2024    Visit Dx:  No diagnosis found.      * No active hospital problems. *        Past Medical History:   Diagnosis Date    Anxiety     Asthma     PRN INHALER AND TAKES ALLERGY INJECTIONS    Diabetes mellitus type 2, controlled     AVERAGE     Hidradenitis     Hyperlipidemia     Hypothyroidism     Murmur     DIAGNOSED WHEN 17 BUT IS ASYMPTOMATIC AND IS FOLLOWED ONLY BY PCP    Panic attack     Pilonidal cyst     Rheumatic fever     AS A CHILD    RLS (restless legs syndrome)     Sinus trouble         Past Surgical History:   Procedure Laterality Date    COLONOSCOPY  2020    exernal hemorrhoids    EXCISION LESION Bilateral 2023    Procedure: Excision Hidradenitis of Bilateral Inguinal Areas;  Surgeon: Donato You MD;  Location: [facility];  Service: General;  Laterality: Bilateral;    EXCISION LESION N/A 2024    Procedure: DEBRIDEMENT OF ISCHIAL ULCER/BUTTOCKS WOUND;  Surgeon: Donato You MD;  Location: [facility] OR;  Service: General;  Laterality: N/A;    GROIN ABSCESS INCISION AND DRAINAGE Right     hidradenitis    HYSTERECTOMY  2010    KNEE SURGERY Bilateral 2013    ARTHROSCOPY    PILONIDAL CYSTECTOMY N/A 2023    Procedure: Excision Hidradenitis of Inguinal Area and Pilonidal Cystectomy;  Surgeon: Donato You MD;  Location: [facility];  Service: General;  Laterality: N/A;         Physical Assessment:  Wound 24 1410 coccyx Incision (Active)   Wound Image   24 1255   Dressing Appearance intact;dry 24 1255   Closure None 24 1255   Base red;moist;granulating 24 1255   Red (%), Wound Tissue Color 100 24 1255   Periwound intact;dry;redness 24 1255   Periwound Temperature warm 24 1255   Periwound Skin Turgor soft 24 1255   Edges open 24  1255   Wound Length (cm) 6.9 cm 08/02/24 1255   Wound Width (cm) 1.3 cm 08/02/24 1255   Wound Depth (cm) 4.4 cm 08/02/24 1255   Wound Surface Area (cm^2) 8.97 cm^2 08/02/24 1255   Wound Volume (cm^3) 39.468 cm^3 08/02/24 1255   Drainage Characteristics/Odor serosanguineous 08/02/24 1255   Drainage Amount moderate 08/02/24 1255   Care, Wound cleansed with;irrigated with;sterile normal saline;negative pressure wound therapy 08/02/24 1255   Dressing Care dressing applied;foam;transparent film 08/02/24 1255   Periwound Care absorptive dressing applied 08/02/24 1255       Wound 07/24/24 1406 gluteal Incision (Active)   Wound Image   08/02/24 1255   Dressing Appearance intact;dry 08/02/24 1255   Closure None 08/02/24 1255   Base red;moist 08/02/24 1255   Red (%), Wound Tissue Color 100 08/02/24 1255   Periwound intact;dry;redness 08/02/24 1255   Periwound Temperature warm 08/02/24 1255   Periwound Skin Turgor soft 08/02/24 1255   Edges open 08/02/24 1255   Drainage Characteristics/Odor serosanguineous 08/02/24 1255   Drainage Amount small 08/02/24 1255   Care, Wound cleansed with;irrigated with;sterile normal saline 08/02/24 1255   Dressing Care dressing applied;packed with;gauze, iodoform;silicone;calcium alginate;border dressing 08/02/24 1255   Periwound Care absorptive dressing applied 08/02/24 1255       Wound 07/31/24 1457 Bilateral gluteal Traumatic (Active)   Wound Image   08/02/24 1255   Dressing Appearance intact;dry 08/02/24 1255   Closure None 08/02/24 1255   Base red;dry;epithelialization;yellow 08/02/24 1255   Periwound intact;dry;redness 08/02/24 1255   Periwound Temperature warm 08/02/24 1255   Periwound Skin Turgor soft 08/02/24 1255   Edges open;rolled/closed 08/02/24 1255   Drainage Characteristics/Odor serosanguineous 08/02/24 1255   Drainage Amount scant 08/02/24 1255   Care, Wound cleansed with;sterile normal saline 08/02/24 1255   Dressing Care dressing applied;silver  impregnated;hydrofiber;transparent film 08/02/24 1255   Periwound Care absorptive dressing applied 08/02/24 1255       Wound 08/02/24 1421 sacral spine Incision (Active)   Wound Image   08/02/24 1255   Dressing Appearance intact;dry 08/02/24 1255   Closure None 08/02/24 1255   Base red;moist;slough 08/02/24 1255   Red (%), Wound Tissue Color 95 08/02/24 1255   Yellow (%), Wound Tissue Color 5 08/02/24 1255   Periwound intact;dry;redness 08/02/24 1255   Periwound Temperature warm 08/02/24 1255   Periwound Skin Turgor soft 08/02/24 1255   Edges open 08/02/24 1255   Wound Length (cm) 3 cm 08/02/24 1255   Wound Width (cm) 0.3 cm 08/02/24 1255   Wound Depth (cm) 4.6 cm 08/02/24 1255   Wound Surface Area (cm^2) 0.9 cm^2 08/02/24 1255   Wound Volume (cm^3) 4.14 cm^3 08/02/24 1255   Drainage Characteristics/Odor serosanguineous;yellow 08/02/24 1255   Drainage Amount small 08/02/24 1255   Care, Wound cleansed with;irrigated with;sterile normal saline;negative pressure wound therapy 08/02/24 1255   Dressing Care dressing applied;foam;transparent film 08/02/24 1255   Periwound Care barrier film applied 08/02/24 1255       NPWT (Negative Pressure Wound Therapy) 07/24/24 1520 Gluteal Crease (Active)   Therapy Setting continuous therapy 08/02/24 1255   Dressing foam, black 08/02/24 1255   Pressure Setting 100 mmHg 08/02/24 1255   Sponges Inserted 4 08/02/24 1255   Sponges Removed 2 08/02/24 1255   Finger sweep complete Yes 08/02/24 1255      Wound Check / Follow-up: Patient seen today for wound check and wound VAC dressing change.  Patient reports discomfort has improved since last visit, denies any issues with wound VAC.    There is some improvement to the bilateral gluteal aspects with areas of red dry tissue as well as scattered scabs; there is also presence of pink epithelium scattered throughout the wound bases. and secured with cleansed all tissue with normal saline.  Applied silver impregnated Hydrofiber to the tissue  transparent drape.    Gluteal incision with presence of dehiscence noted to the upper aspect of the incision located at the sacrum.  Wound base is red and moist with some sloughing tissue present in the wound bed.  Periwound  does have erosion from injury from the wound VAC foam, but otherwise soft.  Cleansed and irrigated with normal saline.  Filled with 1 black foam secured with transparent drape.    Lower gluteal incision with beefy red moist granular tissue.  Wound appears to be responding nicely to the wound VAC with the appearance of the tissue as well as wound base is filling in slightly.  Periwound does have some remaining erosion from wound VAC foam injury.  Cleansed and irrigated with copious amount of normal saline.  Filled the wound base with black foam and secured with transparent drape. Applied a third black foam to connect the 2 wound beds  and secured with transparent drape.  Applied Caty's to the distal aspect of the incision and draped closest to the rectum to assist with seal.  Applied drape to the right hip and fourth black foam as a track pad.  Wound VAC connected, foam compressed.  No alarms noted.  Discussed all findings with general surgeon, no new orders given.    Incision to the right lower gluteal aspect with red moist tissue there is a small amount of sloughing tissue present to the wound base but overall remains unchanged. May consider application of the wound vac to the incision to assist with granulation and wound healing. Periwound is soft and intact. Cleansed and irrigated with normal saline. Filled the wound base with iodoform packing strip and secured with silicone border dressing.     Impression: Surgical incision with PuraPly to the wound with secondary closure.  Surgical dehiscence with packing.     Short term goals: Regain skin integrity, skin protection, moisture prevention, pressure reduction, 3 times weekly dressing change with negative pressure wound VAC  therapy    No Henriquez RN    8/2/2024    14:27 EDT

## 2024-08-05 ENCOUNTER — HOSPITAL ENCOUNTER (OUTPATIENT)
Dept: INFUSION THERAPY | Facility: HOSPITAL | Age: 54
Discharge: HOME | End: 2024-08-05
Admitting: NURSE PRACTITIONER
Payer: COMMERCIAL

## 2024-08-05 VITALS
DIASTOLIC BLOOD PRESSURE: 72 MMHG | RESPIRATION RATE: 20 BRPM | OXYGEN SATURATION: 100 % | SYSTOLIC BLOOD PRESSURE: 127 MMHG | TEMPERATURE: 98.2 F | HEART RATE: 101 BPM

## 2024-08-05 DIAGNOSIS — L73.2 HIDRADENITIS: Primary | ICD-10-CM

## 2024-08-05 PROCEDURE — G0463 HOSPITAL OUTPT CLINIC VISIT: HCPCS

## 2024-08-05 PROCEDURE — 97605 NEG PRS WND THER DME<=50SQCM: CPT

## 2024-08-06 DIAGNOSIS — Z79.4 TYPE 2 DIABETES MELLITUS WITH HYPERGLYCEMIA, WITH LONG-TERM CURRENT USE OF INSULIN: ICD-10-CM

## 2024-08-06 DIAGNOSIS — E11.65 TYPE 2 DIABETES MELLITUS WITH HYPERGLYCEMIA, WITH LONG-TERM CURRENT USE OF INSULIN: ICD-10-CM

## 2024-08-06 RX ORDER — TIRZEPATIDE 7.5 MG/.5ML
INJECTION, SOLUTION SUBCUTANEOUS
Qty: 8 ML | Refills: 0 | Status: SHIPPED | OUTPATIENT
Start: 2024-08-06

## 2024-08-07 ENCOUNTER — HOSPITAL ENCOUNTER (OUTPATIENT)
Dept: INFUSION THERAPY | Facility: HOSPITAL | Age: 54
Discharge: HOME | End: 2024-08-07
Admitting: NURSE PRACTITIONER
Payer: COMMERCIAL

## 2024-08-07 VITALS
RESPIRATION RATE: 20 BRPM | OXYGEN SATURATION: 98 % | DIASTOLIC BLOOD PRESSURE: 87 MMHG | HEART RATE: 92 BPM | SYSTOLIC BLOOD PRESSURE: 131 MMHG | TEMPERATURE: 98 F

## 2024-08-07 DIAGNOSIS — E11.65 TYPE 2 DIABETES MELLITUS WITH HYPERGLYCEMIA, WITH LONG-TERM CURRENT USE OF INSULIN: ICD-10-CM

## 2024-08-07 DIAGNOSIS — Z79.4 TYPE 2 DIABETES MELLITUS WITH HYPERGLYCEMIA, WITH LONG-TERM CURRENT USE OF INSULIN: ICD-10-CM

## 2024-08-07 DIAGNOSIS — L73.2 HIDRADENITIS: Primary | ICD-10-CM

## 2024-08-07 PROCEDURE — 97605 NEG PRS WND THER DME<=50SQCM: CPT

## 2024-08-07 PROCEDURE — 97606 NEG PRS WND THER DME>50 SQCM: CPT

## 2024-08-07 NOTE — SIGNIFICANT NOTE
Wound Eval / Progress Noted     [facility]     Patient Name: Mariela Aldrich  : 1970  MRN: [id number]  Today's Date: 2024                 Admit Date: 2024    Visit Dx:    ICD-10-CM ICD-9-CM   1. Hidradenitis  L73.2 705.83         * No active hospital problems. *        Past Medical History:   Diagnosis Date    Anxiety     Asthma     PRN INHALER AND TAKES ALLERGY INJECTIONS    Diabetes mellitus type 2, controlled     AVERAGE     Hidradenitis     Hyperlipidemia     Hypothyroidism     Murmur     DIAGNOSED WHEN 17 BUT IS ASYMPTOMATIC AND IS FOLLOWED ONLY BY PCP    Panic attack     Pilonidal cyst     Rheumatic fever     AS A CHILD    RLS (restless legs syndrome)     Sinus trouble         Past Surgical History:   Procedure Laterality Date    COLONOSCOPY  2020    exernal hemorrhoids    EXCISION LESION Bilateral 2023    Procedure: Excision Hidradenitis of Bilateral Inguinal Areas;  Surgeon: Donato You MD;  Location: [facility];  Service: General;  Laterality: Bilateral;    EXCISION LESION N/A 2024    Procedure: DEBRIDEMENT OF ISCHIAL ULCER/BUTTOCKS WOUND;  Surgeon: Donato You MD;  Location: [facility] OR;  Service: General;  Laterality: N/A;    GROIN ABSCESS INCISION AND DRAINAGE Right     hidradenitis    HYSTERECTOMY  2010    KNEE SURGERY Bilateral 2013    ARTHROSCOPY    PILONIDAL CYSTECTOMY N/A 2023    Procedure: Excision Hidradenitis of Inguinal Area and Pilonidal Cystectomy;  Surgeon: Donato You MD;  Location: [facility];  Service: General;  Laterality: N/A;         Physical Assessment:  Wound 24 1410 coccyx Incision (Active)   Wound Image   24 1330   Dressing Appearance intact;moist drainage 24 1330   Closure None 24 1330   Base red;moist;bleeding 24 1330   Red (%), Wound Tissue Color 100 24 1330   Periwound intact;redness;dry 24 1330   Periwound Temperature warm 24 1330   Periwound Skin Turgor  soft 08/07/24 1330   Edges open 08/07/24 1330   Wound Length (cm) 6.4 cm 08/07/24 1330   Wound Width (cm) 1 cm 08/07/24 1330   Wound Depth (cm) 3.6 cm 08/07/24 1330   Wound Surface Area (cm^2) 6.4 cm^2 08/07/24 1330   Wound Volume (cm^3) 23.04 cm^3 08/07/24 1330   Drainage Characteristics/Odor sanguineous;bleeding controlled 08/07/24 1330   Drainage Amount moderate 08/07/24 1330   Care, Wound cleansed with;irrigated with;sterile normal saline;negative pressure wound therapy 08/07/24 1330   Dressing Care dressing applied;foam;transparent film;hydrocolloid 08/07/24 1330   Periwound Care barrier film applied 08/07/24 1330       Wound 07/24/24 1406 gluteal Incision (Active)   Wound Image   08/07/24 1330   Dressing Appearance intact;moist drainage 08/07/24 1330   Closure None 08/07/24 1330   Base red;moist 08/07/24 1330   Red (%), Wound Tissue Color 100 08/07/24 1330   Periwound intact;redness 08/07/24 1330   Periwound Temperature warm 08/07/24 1330   Periwound Skin Turgor soft 08/07/24 1330   Edges open 08/07/24 1330   Wound Length (cm) 2 cm 08/07/24 1330   Wound Width (cm) 1 cm 08/07/24 1330   Wound Depth (cm) 1 cm 08/07/24 1330   Wound Surface Area (cm^2) 2 cm^2 08/07/24 1330   Wound Volume (cm^3) 2 cm^3 08/07/24 1330   Drainage Characteristics/Odor serosanguineous 08/07/24 1330   Drainage Amount small 08/07/24 1330   Care, Wound cleansed with;irrigated with;sterile normal saline 08/07/24 1330   Dressing Care dressing applied;packed with;gauze, iodoform;packing strip;silicone;border dressing 08/07/24 1330   Periwound Care absorptive dressing applied 08/07/24 1330       Wound 07/31/24 1457 Bilateral gluteal Traumatic (Active)   Wound Image   08/07/24 1330   Dressing Appearance intact;dry 08/07/24 1330   Closure None 08/07/24 1330   Base red;scab;epithelialization 08/07/24 1330   Periwound intact;dry;redness 08/07/24 1330   Periwound Temperature warm 08/07/24 1330   Periwound Skin Turgor soft 08/07/24 1330   Edges open  08/07/24 1330   Drainage Amount none 08/07/24 1330   Care, Wound cleansed with;sterile normal saline 08/07/24 1330   Dressing Care dressing applied;silver impregnated;hydrofiber;transparent film 08/07/24 1330   Periwound Care barrier film applied 08/07/24 1330       Wound 08/02/24 1421 sacral spine Incision (Active)   Wound Image   08/07/24 1330   Dressing Appearance moist drainage;intact 08/07/24 1330   Closure None 08/07/24 1330   Base red;moist 08/07/24 1330   Red (%), Wound Tissue Color 100 08/07/24 1330   Periwound intact;redness;dry 08/07/24 1330   Periwound Temperature warm 08/07/24 1330   Periwound Skin Turgor soft 08/07/24 1330   Edges open 08/07/24 1330   Wound Length (cm) 2.3 cm 08/07/24 1330   Wound Width (cm) 0.4 cm 08/07/24 1330   Wound Depth (cm) 4.3 cm 08/07/24 1330   Wound Surface Area (cm^2) 0.92 cm^2 08/07/24 1330   Wound Volume (cm^3) 3.956 cm^3 08/07/24 1330   Drainage Characteristics/Odor serosanguineous 08/07/24 1330   Drainage Amount small 08/07/24 1330   Care, Wound cleansed with;irrigated with;sterile normal saline;negative pressure wound therapy 08/07/24 1330   Dressing Care dressing applied;foam;transparent film 08/07/24 1330   Periwound Care barrier film applied 08/07/24 1330       NPWT (Negative Pressure Wound Therapy) 07/24/24 1520 Gluteal Crease (Active)   Therapy Setting continuous therapy 08/07/24 1330   Dressing foam, black 08/07/24 1330   Pressure Setting 100 mmHg 08/07/24 1330   Sponges Inserted 4 08/07/24 1330   Sponges Removed 4 08/07/24 1330   Finger sweep complete Yes 08/07/24 1330      Wound Check / Follow-up:  Patient seen today for a wound check and wound vac dressing change. No issues reported with the wound vac.    Bilateral gluteal aspects have improved. Scabbing does remains scattered throughout the bilateral gluteal aspects with epithelization present. Redness is present to the periwound but is also improving. Cleansed all tissue and gauze. Covered the entire tissue  with silver impregnated hydrofiber and secured with transparent drape. Recommending to continue current wound care as the wounds are beginning to resolve and are responding.    Window paned the entire incision with transparent drape to protect tissue from the wound vac foam. Upper gluteal incision with red moist tissue present to the wound base; there is intermittent closure and a second opening present, both wound beds connect. Cleansed and irrigated with NS and gauze. Filled each opening with a piece of black foam and secured with transparent drape.     Lower gluteal incision with granular red moist. There is a small amount of oozing sanguinous drainage present but bleeding has ina controlled and stopped with cleansing with NS. Periwound remains soft and slightly reddened. Cleansed and irrigated with NS and gauze. Filled the wound with one black foam and secured with transparent drape, applied a hydrocolloid to the distal aspect of the wound for better seal. Connect the two incisions with black foam and secured with drape. Applied drape to the right hip to protect tissue and black foam as a track pad. Wound vac connected, foam well compressed.    Incision to right lower gluteal aspect with red moist tissue. Periwound is soft and dry. Wound base is starting to respond to the current treatment. Cleansed and irrigated with NS and gauze. Filled the wound base with iodoform packing strip and secured with a silicone border dressing.    Impression: surgical incision with closure by the secondary intent. Surgical dehiscence with packing.     Short term goals: Regain skin integrity, skin protection, moisture prevention, pressure reduction, 3 times weekly dressing change with negative pressure wound VAC therapy    No Henriquez RN    8/7/2024    13:39 EDT

## 2024-08-08 ENCOUNTER — OFFICE VISIT (OUTPATIENT)
Dept: SURGERY | Facility: CLINIC | Age: 54
End: 2024-08-08
Payer: COMMERCIAL

## 2024-08-08 VITALS
DIASTOLIC BLOOD PRESSURE: 75 MMHG | RESPIRATION RATE: 16 BRPM | WEIGHT: 195 LBS | SYSTOLIC BLOOD PRESSURE: 132 MMHG | HEART RATE: 106 BPM | HEIGHT: 64 IN | BODY MASS INDEX: 33.29 KG/M2

## 2024-08-08 DIAGNOSIS — L73.2 HIDRADENITIS: Primary | ICD-10-CM

## 2024-08-08 DIAGNOSIS — L05.91 PILONIDAL CYST: ICD-10-CM

## 2024-08-08 PROCEDURE — 99024 POSTOP FOLLOW-UP VISIT: CPT | Performed by: SURGERY

## 2024-08-08 RX ORDER — GLIPIZIDE 10 MG/1
10 TABLET ORAL
Qty: 180 TABLET | Refills: 0 | Status: SHIPPED | OUTPATIENT
Start: 2024-08-08

## 2024-08-08 NOTE — LETTER
August 8, 2024     Patient: Mariela Aldrich   YOB: 1970   Date of Visit: 8/8/2024       To Whom It May Concern:    Mariela Aldrich was seen in my office today 8/8/24 for a follow up on a surgical procedure done on 7/24/24. She can return to work on 8/12/24 with a 5 lb lifting restriction until 8/30/24.           Sincerely,        Donato You MD    CC: No Recipients

## 2024-08-08 NOTE — PROGRESS NOTES
Chief Complaint: Follow-up (HIDRADENITIS )    Subjective         History of Present Illness      Mariela Aldrich is a 53 y.o. female presents to [facility] GENERAL SURGERY     History of Present Illness  The patient presents for follow-up.    She reports an improvement in her back condition. She is eager to return to work on Monday and is seeking a note for the same. She has not been taking any pain medication.    Objective     Past Medical History:   Diagnosis Date    Anxiety     Asthma     PRN INHALER AND TAKES ALLERGY INJECTIONS    Diabetes mellitus type 2, controlled     AVERAGE     Hidradenitis     Hyperlipidemia     Hypothyroidism     Murmur     DIAGNOSED WHEN 17 BUT IS ASYMPTOMATIC AND IS FOLLOWED ONLY BY PCP    Panic attack     Pilonidal cyst     Rheumatic fever     AS A CHILD    RLS (restless legs syndrome)     Sinus trouble        Past Surgical History:   Procedure Laterality Date    COLONOSCOPY  01/21/2020    exernal hemorrhoids    EXCISION LESION Bilateral 03/20/2023    Procedure: Excision Hidradenitis of Bilateral Inguinal Areas;  Surgeon: Donato You MD;  Location: [facility] OR;  Service: General;  Laterality: Bilateral;    EXCISION LESION N/A 7/24/2024    Procedure: DEBRIDEMENT OF ISCHIAL ULCER/BUTTOCKS WOUND;  Surgeon: Donato You MD;  Location: [facility] OR;  Service: General;  Laterality: N/A;    GROIN ABSCESS INCISION AND DRAINAGE Right     hidradenitis    HYSTERECTOMY  2010    KNEE SURGERY Bilateral 2013    ARTHROSCOPY    PILONIDAL CYSTECTOMY N/A 02/08/2023    Procedure: Excision Hidradenitis of Inguinal Area and Pilonidal Cystectomy;  Surgeon: Donato You MD;  Location: [facility] OR;  Service: General;  Laterality: N/A;         Current Outpatient Medications:     albuterol sulfate  (90 Base) MCG/ACT inhaler, Inhale 2 puffs Every 4 (Four) Hours As Needed for Shortness of Air., Disp: 18 g, Rfl: 1    cetirizine (zyrTEC) 10 MG tablet, Take  1 tablet by mouth Daily., Disp: , Rfl:     Continuous Blood Gluc Sensor (Dexcom G7 Sensor) misc, Use 1 each Every 14 (Fourteen) Days., Disp: 6 each, Rfl: 1    Continuous Blood Gluc Transmit (Dexcom G6 Transmitter) misc, 1 Device Every 3 (Three) Months., Disp: 1 each, Rfl: 3    Empagliflozin-metFORMIN HCl ER (Synjardy XR) 12.5-1000 MG tablet sustained-release 24 hour, Take 1 tablet by mouth 2 (Two) Times a Day., Disp: 180 tablet, Rfl: 1    Fluticasone-Salmeterol (ADVAIR/WIXELA) 250-50 MCG/ACT DISKUS, INHALE 1 DOSE BY MOUTH TWICE DAILY, Disp: 60 each, Rfl: 2    gabapentin (NEURONTIN) 300 MG capsule, Take 1 capsule by mouth Every Night., Disp: 90 capsule, Rfl: 1    glipizide (GLUCOTROL) 10 MG tablet, TAKE 1 TABLET BY MOUTH TWICE DAILY BEFORE MEAL(S), Disp: 180 tablet, Rfl: 0    glucose blood (OneTouch Verio) test strip, USE 1 STRIP TO CHECK GLUCOSE THREE TIMES DAILY, Disp: 100 each, Rfl: 0    Insulin Degludec (Tresiba FlexTouch) 200 UNIT/ML solution pen-injector pen injection, INJECT 56 UNITS UNDER THE SKIN INTO THE APPROPRIATE AREA AS DIRECTED EVERY NIGHT., Disp: 18 mL, Rfl: 2    losartan (Cozaar) 25 MG tablet, Take 0.5 tablets by mouth Daily. (Patient taking differently: Take 0.5 tablets by mouth Every Night.), Disp: 90 tablet, Rfl: 1    montelukast (SINGULAIR) 10 MG tablet, Take 1 tablet by mouth every night at bedtime., Disp: , Rfl:     Mounjaro 7.5 MG/0.5ML solution pen-injector pen, INJECT 1/2 (ONE-HALF) ML SUBCUTANEOUSLY  ONCE A WEEK, Disp: 8 mL, Rfl: 0    multivitamin (THERAGRAN) tablet tablet, , Disp: , Rfl:     ONE TOUCH CLUB LANCETS [facility], 90 each 3 (Three) Times a Day As Needed (check blood sugar)., Disp: 100 each, Rfl: 3    pitavastatin calcium (Livalo) 2 MG tablet tablet, Take 1 tablet by mouth Every Night., Disp: 90 tablet, Rfl: 1    rOPINIRole (REQUIP) 5 MG tablet, Take 1 tablet by mouth Every Night., Disp: 90 tablet, Rfl: 1    sertraline (ZOLOFT) 100 MG tablet, Take 1.5 tablets by mouth Daily. (Patient  taking differently: Take 1.5 tablets by mouth Every Night.), Disp: 135 tablet, Rfl: 1    Synthroid 88 MCG tablet, Take 1 tablet by mouth Daily., Disp: 90 tablet, Rfl: 1    Zinc 50 MG tablet, Take 1 tablet by mouth Daily., Disp: , Rfl:     Allergies   Allergen Reactions    Etodolac Shortness Of Breath     HEAVINESS ON CHEST    Penicillins Swelling     Beta lactam allergy details  Antibiotic reaction: other (lips swelled and broke out in welps)  Age at reaction: adult  Dose to reaction time: (!) hours  Reason for antibiotic: sore throat (strep)  Epinephrine required for reaction?: unknown (came ER)  Tolerated antibiotics: amoxicillin, augmentin, cephalexin       Statins Myalgia    Atorvastatin Myalgia    Crestor [Rosuvastatin] Myalgia     Legs cramps     Lisinopril Cough    Pravastatin Myalgia        Family History   Problem Relation Age of Onset    Thyroid disease Father 70    Thyroid disease Sister     Diabetes Sister     Thyroid disease Brother 43        thyroid cancer    Diabetes Brother     Diabetes Maternal Grandmother     Malig Hyperthermia Neg Hx        Social History     Socioeconomic History    Marital status:    Tobacco Use    Smoking status: Never    Smokeless tobacco: Never   Vaping Use    Vaping status: Never Used   Substance and Sexual Activity    Alcohol use: Never    Drug use: Never    Sexual activity: Defer        Physical Exam   Physical Exam  Patient is in no acute distress, with nonlabored breathing.  Abdomen is soft.        Result Review :            Results  Laboratory Studies  Pathology report indicates a pilonidal cyst and a right buttock lesion, compatible with hidradenitis.         Assessment and Plan    Diagnoses and all orders for this visit:    1. Hidradenitis (Primary)    2. Pilonidal cyst          Assessment & Plan  1. Status post excision of a cystic area in the pilonidal area.  Pathology results confirmed the presence of a pilonidal cyst and a right buttock lesion, indicative  of hidradenitis lesion. She is doing well as expected, and I am pleased with her overall progress. At this point in time, she can follow up with me as needed, and I will keep tabs on her through the wound care nurses. She should continue her wound VAC and wound care therapy as described. She does want to return to work on Monday, so we will get her a return to work note, and otherwise, she can call me with any questions or concerns, concerns, or other issues. This was discussed with the patient, and all her questions were answered. She voiced understanding and agreed to proceed with the above plan.      Follow Up   No follow-ups on file.  Patient was given instructions and counseling regarding her condition or for health maintenance advice. Please see specific information pulled into the AVS if appropriate.     Patient or patient representative verbalized consent for the use of Ambient Listening during the visit with  Donato You MD for chart documentation. 8/8/2024  15:32 EDT    Donato You MD

## 2024-08-09 ENCOUNTER — HOSPITAL ENCOUNTER (OUTPATIENT)
Dept: INFUSION THERAPY | Facility: HOSPITAL | Age: 54
Discharge: HOME | End: 2024-08-09
Payer: COMMERCIAL

## 2024-08-09 VITALS
SYSTOLIC BLOOD PRESSURE: 109 MMHG | RESPIRATION RATE: 20 BRPM | OXYGEN SATURATION: 96 % | TEMPERATURE: 98 F | DIASTOLIC BLOOD PRESSURE: 72 MMHG | HEART RATE: 108 BPM

## 2024-08-09 DIAGNOSIS — L73.2 HIDRADENITIS: Primary | ICD-10-CM

## 2024-08-09 PROCEDURE — 97606 NEG PRS WND THER DME>50 SQCM: CPT

## 2024-08-09 PROCEDURE — 97605 NEG PRS WND THER DME<=50SQCM: CPT

## 2024-08-09 NOTE — SIGNIFICANT NOTE
Wound Eval / Progress Noted     [facility]     Patient Name: Mariela Aldrich  : 1970  MRN: [id number]  Today's Date: 2024                 Admit Date: 2024    Visit Dx:  No diagnosis found.      * No active hospital problems. *        Past Medical History:   Diagnosis Date    Anxiety     Asthma     PRN INHALER AND TAKES ALLERGY INJECTIONS    Diabetes mellitus type 2, controlled     AVERAGE     Hidradenitis     Hyperlipidemia     Hypothyroidism     Murmur     DIAGNOSED WHEN 17 BUT IS ASYMPTOMATIC AND IS FOLLOWED ONLY BY PCP    Panic attack     Pilonidal cyst     Rheumatic fever     AS A CHILD    RLS (restless legs syndrome)     Sinus trouble         Past Surgical History:   Procedure Laterality Date    COLONOSCOPY  2020    exernal hemorrhoids    EXCISION LESION Bilateral 2023    Procedure: Excision Hidradenitis of Bilateral Inguinal Areas;  Surgeon: Donato You MD;  Location: [facility];  Service: General;  Laterality: Bilateral;    EXCISION LESION N/A 2024    Procedure: DEBRIDEMENT OF ISCHIAL ULCER/BUTTOCKS WOUND;  Surgeon: Donato You MD;  Location: [facility] OR;  Service: General;  Laterality: N/A;    GROIN ABSCESS INCISION AND DRAINAGE Right     hidradenitis    HYSTERECTOMY  2010    KNEE SURGERY Bilateral 2013    ARTHROSCOPY    PILONIDAL CYSTECTOMY N/A 2023    Procedure: Excision Hidradenitis of Inguinal Area and Pilonidal Cystectomy;  Surgeon: Donato You MD;  Location: [facility];  Service: General;  Laterality: N/A;         Physical Assessment:  Wound 24 1410 coccyx Incision (Active)   Wound Image   24 1340   Dressing Appearance moist drainage;intact 24 1340   Closure None 24 1340   Base granulating;red;moist 24 1340   Red (%), Wound Tissue Color 100 24 1340   Periwound intact;redness;dry 24 1340   Periwound Temperature warm 24 1340   Periwound Skin Turgor soft 24 1340   Edges open  08/09/24 1340   Wound Length (cm) 6.5 cm 08/09/24 1340   Wound Width (cm) 1 cm 08/09/24 1340   Wound Depth (cm) 3.3 cm 08/09/24 1340   Wound Surface Area (cm^2) 6.5 cm^2 08/09/24 1340   Wound Volume (cm^3) 21.45 cm^3 08/09/24 1340   Drainage Characteristics/Odor serosanguineous 08/09/24 1340   Drainage Amount small 08/09/24 1340   Care, Wound cleansed with;irrigated with;sterile normal saline;negative pressure wound therapy 08/09/24 1340   Dressing Care dressing applied;foam;transparent film;hydrocolloid 08/09/24 1340   Periwound Care barrier film applied 08/09/24 1340       Wound 07/24/24 1406 gluteal Incision (Active)   Dressing Appearance intact;moist drainage 08/09/24 1340   Closure None 08/09/24 1340   Base red;moist 08/09/24 1340   Red (%), Wound Tissue Color 100 08/09/24 1340   Periwound intact;redness 08/09/24 1340   Periwound Temperature warm 08/09/24 1340   Periwound Skin Turgor soft 08/09/24 1340   Edges open 08/09/24 1340   Drainage Characteristics/Odor serosanguineous 08/09/24 1340   Drainage Amount small 08/09/24 1340   Care, Wound cleansed with;irrigated with;sterile normal saline 08/09/24 1340   Dressing Care dressing applied;packed with;gauze, iodoform;packing strip;silicone;border dressing 08/09/24 1340   Periwound Care absorptive dressing applied 08/09/24 1340       Wound 07/31/24 1457 Bilateral gluteal Traumatic (Active)   Wound Image   08/09/24 1340   Dressing Appearance intact;dry 08/09/24 1340   Closure None 08/09/24 1340   Base scab;epithelialization;pink 08/09/24 1340   Periwound intact;dry;redness 08/09/24 1340   Periwound Temperature warm 08/09/24 1340   Periwound Skin Turgor soft 08/09/24 1340   Edges rolled/closed 08/09/24 1340   Drainage Amount none 08/09/24 1340   Care, Wound cleansed with;sterile normal saline 08/09/24 1340   Dressing Care dressing applied;silver impregnated;hydrofiber;transparent film 08/09/24 1340   Periwound Care barrier film applied 08/09/24 1340       Wound  08/02/24 1421 sacral spine Incision (Active)   Wound Image   08/09/24 1340   Dressing Appearance moist drainage;intact 08/09/24 1340   Closure None 08/09/24 1340   Base red;moist;granulating 08/09/24 1340   Red (%), Wound Tissue Color 100 08/09/24 1340   Periwound intact;redness;dry 08/09/24 1340   Periwound Temperature warm 08/09/24 1340   Periwound Skin Turgor soft 08/09/24 1340   Edges open 08/09/24 1340   Wound Length (cm) 2.8 cm 08/09/24 1340   Wound Width (cm) 0.4 cm 08/09/24 1340   Wound Depth (cm) 3.5 cm 08/09/24 1340   Wound Surface Area (cm^2) 1.12 cm^2 08/09/24 1340   Wound Volume (cm^3) 3.92 cm^3 08/09/24 1340   Drainage Characteristics/Odor serosanguineous 08/09/24 1340   Drainage Amount small 08/09/24 1340   Care, Wound cleansed with;irrigated with;sterile normal saline;negative pressure wound therapy 08/09/24 1340   Dressing Care dressing applied;foam;transparent film 08/09/24 1340   Periwound Care barrier film applied 08/09/24 1340       NPWT (Negative Pressure Wound Therapy) 07/24/24 1520 Gluteal Crease (Active)   Therapy Setting continuous therapy 08/09/24 1340   Dressing foam, black 08/09/24 1340   Pressure Setting 100 mmHg 08/09/24 1340   Sponges Inserted 5 08/09/24 1340   Sponges Removed 4 08/09/24 1340   Finger sweep complete Yes 08/09/24 1340        Wound Check / Follow-up: Patient seen today for wound check and wound VAC dressing change.  Spouse present at bedside.  Patient reports that she is returning back to work on Monday.  Patient was encouraged to use offloading pillow such as a waffle cushion to displace pressure to prevent disrupting wound VAC therapy.    Bilateral gluteal aspects continue to improve with the use of the silver impregnated hydrofiber. There is pink epithelium present along with dry crusted tissue present as well. Cleansed with NS. Applied skin barrier and silver impregnated hydrofiber to the affected tissue. Secured with transparent drape.     Window paned entire  surgical wound to prevent any further injury / skin breakdown. Wound bases to the bilateral surgical incisions with granular red moist tissue. Wound bases are responding to the wound vac therapy with the evidence of granular tissue as well as the wound depth is beginning to decrease. Periwound is soft and intact. Cleansed with NS. Filled each wound base with jayne foam and secured with transparent drape. Connected the two incisions with black foam and then created and track pad up the right hip and secured with drape. Wound vac connected, foam well compressed. A strip of hydrocolloid dressing was added to the distal aspect of the wound to assist with the seal.    Right posterior thigh / gluteal incision with red moist wound base. Wound base has been slower to respond to the iodoform packing strip but the wound base remains red and moist. Cleansed and irrigated with NS and gauze. Filled the wound with iodoform packing strip and secured with a silicone border dressing.    Impression: surgical incisions with secondary closure, NPWT    Short term goals:  regain skin integrity, skin protection, negative pressure wound vac therapy on Monday, Wednesday, Fridays.    No Henriquez RN    8/9/2024    17:04 EDT

## 2024-08-12 ENCOUNTER — HOSPITAL ENCOUNTER (OUTPATIENT)
Dept: INFUSION THERAPY | Facility: HOSPITAL | Age: 54
Discharge: HOME | End: 2024-08-12
Admitting: NURSE PRACTITIONER
Payer: COMMERCIAL

## 2024-08-12 VITALS
TEMPERATURE: 97.9 F | SYSTOLIC BLOOD PRESSURE: 118 MMHG | DIASTOLIC BLOOD PRESSURE: 75 MMHG | HEART RATE: 103 BPM | OXYGEN SATURATION: 98 % | RESPIRATION RATE: 20 BRPM

## 2024-08-12 DIAGNOSIS — L73.2 HIDRADENITIS: Primary | ICD-10-CM

## 2024-08-12 PROCEDURE — 97605 NEG PRS WND THER DME<=50SQCM: CPT

## 2024-08-12 PROCEDURE — 97606 NEG PRS WND THER DME>50 SQCM: CPT

## 2024-08-14 ENCOUNTER — HOSPITAL ENCOUNTER (OUTPATIENT)
Dept: INFUSION THERAPY | Facility: HOSPITAL | Age: 54
Discharge: HOME | End: 2024-08-14
Admitting: NURSE PRACTITIONER
Payer: COMMERCIAL

## 2024-08-14 ENCOUNTER — TELEPHONE (OUTPATIENT)
Dept: SURGERY | Facility: CLINIC | Age: 54
End: 2024-08-14
Payer: COMMERCIAL

## 2024-08-14 VITALS
TEMPERATURE: 98.2 F | DIASTOLIC BLOOD PRESSURE: 88 MMHG | RESPIRATION RATE: 20 BRPM | SYSTOLIC BLOOD PRESSURE: 140 MMHG | OXYGEN SATURATION: 97 % | HEART RATE: 101 BPM

## 2024-08-14 DIAGNOSIS — L73.2 HIDRADENITIS: Primary | ICD-10-CM

## 2024-08-14 PROCEDURE — 97605 NEG PRS WND THER DME<=50SQCM: CPT

## 2024-08-14 PROCEDURE — 97606 NEG PRS WND THER DME>50 SQCM: CPT

## 2024-08-14 NOTE — TELEPHONE ENCOUNTER
[facility] AT [facility] ORDER FOR PATIENT IS BEING DELIVERED TODAY AND NOTHING ELSE NEEDS TO BE DONE ABOUT THIS ORDER.

## 2024-08-14 NOTE — TELEPHONE ENCOUNTER
SHERMAN CALLED FROM [facility].  SHE SAID THEY SUPPLIED A WOUND VAC FOR THE PATIENT.  SHE WANTS TO KNOW IF EPIFANIO KATZ RECEIVED THE PRESCRIPTION FOR 6 CANISTERS THAT WAS FAXED TODAY [phone].    SHERMAN DOES NOT HAVE A DIRECT EXTENSION.  SHE SAID THE [facility] SUPPLY CHAIN PHONE NUMBER [phone].    SHERMAN GODFREY CALLED HERE AT [facility].  IF THIS SHOULDN'T GO TO YOU, PLEASE LET ME KNOW.  MY EXTENSION IS [phone].

## 2024-08-16 ENCOUNTER — HOSPITAL ENCOUNTER (OUTPATIENT)
Dept: INFUSION THERAPY | Facility: HOSPITAL | Age: 54
Discharge: HOME | End: 2024-08-16
Payer: COMMERCIAL

## 2024-08-16 VITALS
RESPIRATION RATE: 20 BRPM | OXYGEN SATURATION: 99 % | TEMPERATURE: 98.2 F | SYSTOLIC BLOOD PRESSURE: 119 MMHG | DIASTOLIC BLOOD PRESSURE: 79 MMHG | HEART RATE: 108 BPM

## 2024-08-16 DIAGNOSIS — L73.2 HIDRADENITIS: Primary | ICD-10-CM

## 2024-08-16 PROCEDURE — 97605 NEG PRS WND THER DME<=50SQCM: CPT

## 2024-08-16 NOTE — SIGNIFICANT NOTE
Wound Eval / Progress Noted     [facility]     Patient Name: Mariela Aldrich  : 1970  MRN: [id number]  Today's Date: 2024                 Admit Date: 2024    Visit Dx:    ICD-10-CM ICD-9-CM   1. Hidradenitis  L73.2 705.83         * No active hospital problems. *        Past Medical History:   Diagnosis Date    Anxiety     Asthma     PRN INHALER AND TAKES ALLERGY INJECTIONS    Diabetes mellitus type 2, controlled     AVERAGE     Hidradenitis     Hyperlipidemia     Hypothyroidism     Murmur     DIAGNOSED WHEN 17 BUT IS ASYMPTOMATIC AND IS FOLLOWED ONLY BY PCP    Panic attack     Pilonidal cyst     Rheumatic fever     AS A CHILD    RLS (restless legs syndrome)     Sinus trouble         Past Surgical History:   Procedure Laterality Date    COLONOSCOPY  2020    exernal hemorrhoids    EXCISION LESION Bilateral 2023    Procedure: Excision Hidradenitis of Bilateral Inguinal Areas;  Surgeon: Donato You MD;  Location: [facility];  Service: General;  Laterality: Bilateral;    EXCISION LESION N/A 2024    Procedure: DEBRIDEMENT OF ISCHIAL ULCER/BUTTOCKS WOUND;  Surgeon: Donato You MD;  Location: [facility] OR;  Service: General;  Laterality: N/A;    GROIN ABSCESS INCISION AND DRAINAGE Right     hidradenitis    HYSTERECTOMY  2010    KNEE SURGERY Bilateral 2013    ARTHROSCOPY    PILONIDAL CYSTECTOMY N/A 2023    Procedure: Excision Hidradenitis of Inguinal Area and Pilonidal Cystectomy;  Surgeon: Donato You MD;  Location: [facility];  Service: General;  Laterality: N/A;         Physical Assessment:  Wound 24 1410 coccyx Incision (Active)   Wound Image   24 1402   Dressing Appearance dry;intact 24 1402   Closure None 24 1402   Base moist;red 24 1402   Periwound dry;intact;redness 24 1402   Periwound Temperature warm 24 1402   Periwound Skin Turgor soft 24 1402   Edges open 24 1402   Wound Length (cm)  5.5 cm 08/16/24 1402   Wound Width (cm) 0.9 cm 08/16/24 1402   Wound Depth (cm) 1.8 cm 08/16/24 1402   Wound Surface Area (cm^2) 4.95 cm^2 08/16/24 1402   Wound Volume (cm^3) 8.91 cm^3 08/16/24 1402   Drainage Characteristics/Odor serosanguineous 08/16/24 1402   Drainage Amount moderate 08/16/24 1402   Care, Wound cleansed with;irrigated with;sterile normal saline;negative pressure wound therapy 08/16/24 1402   Dressing Care dressing applied;foam;transparent film 08/16/24 1402   Periwound Care absorptive dressing applied;barrier film applied 08/16/24 1402       Wound 07/24/24 1406 gluteal Incision (Active)   Wound Image   08/16/24 1402   Dressing Appearance dry;intact 08/16/24 1402   Closure None 08/16/24 1402   Base red;moist 08/16/24 1402   Periwound dry;intact 08/16/24 1402   Periwound Temperature warm 08/16/24 1402   Periwound Skin Turgor soft 08/16/24 1402   Edges open 08/16/24 1402   Wound Length (cm) 1.8 cm 08/16/24 1402   Wound Width (cm) 0.7 cm 08/16/24 1402   Wound Depth (cm) 1 cm 08/16/24 1402   Wound Surface Area (cm^2) 1.26 cm^2 08/16/24 1402   Wound Volume (cm^3) 1.26 cm^3 08/16/24 1402   Drainage Characteristics/Odor serosanguineous 08/16/24 1402   Drainage Amount small 08/16/24 1402   Care, Wound cleansed with;irrigated with;sterile normal saline 08/16/24 1402   Dressing Care dressing applied;packed with;packing strip;gauze, iodoform;silicone;border dressing 08/16/24 1402   Periwound Care absorptive dressing applied 08/16/24 1402       Wound 07/31/24 1457 Bilateral gluteal Traumatic (Active)   Wound Image   08/16/24 1402   Dressing Appearance dry;intact 08/16/24 1402   Closure None 08/16/24 1402   Base dry;red;pink;epithelialization 08/16/24 1402   Periwound dry;intact;redness 08/16/24 1402   Periwound Temperature warm 08/16/24 1402   Periwound Skin Turgor soft 08/16/24 1402   Edges rolled/closed 08/16/24 1402   Drainage Amount none 08/16/24 1402   Care, Wound cleansed with;sterile normal saline  08/16/24 1402   Dressing Care dressing applied;hydrofiber;silver impregnated;silicone;border dressing 08/16/24 1402   Periwound Care absorptive dressing applied 08/16/24 1402       Wound 08/02/24 1421 sacral spine Incision (Active)   Wound Image   08/16/24 1402   Dressing Appearance dry;intact 08/16/24 1402   Closure None 08/16/24 1402   Base red;moist 08/16/24 1402   Periwound intact;redness;dry 08/16/24 1402   Periwound Temperature warm 08/16/24 1402   Periwound Skin Turgor soft 08/16/24 1402   Edges open 08/16/24 1402   Wound Length (cm) 2.6 cm 08/16/24 1402   Wound Width (cm) 0.5 cm 08/16/24 1402   Wound Depth (cm) 1.2 cm 08/16/24 1402   Wound Surface Area (cm^2) 1.3 cm^2 08/16/24 1402   Wound Volume (cm^3) 1.56 cm^3 08/16/24 1402   Drainage Characteristics/Odor serosanguineous 08/16/24 1402   Drainage Amount small 08/16/24 1402   Care, Wound cleansed with;irrigated with;sterile normal saline;negative pressure wound therapy 08/16/24 1402   Dressing Care dressing applied;foam;transparent film 08/16/24 1402   Periwound Care absorptive dressing applied;barrier film applied 08/16/24 1402       NPWT (Negative Pressure Wound Therapy) 07/24/24 1520 Gluteal Crease (Active)   Therapy Setting continuous therapy 08/16/24 1402   Dressing foam, black;transparent dressing 08/16/24 1402   Pressure Setting 125 mmHg 08/16/24 1402   Sponges Inserted 3 08/16/24 1402   Sponges Removed 3 08/16/24 1402   Finger sweep complete Yes 08/16/24 1402      Wound Check / Follow-up: Patient seen today for wound follow-up and wound VAC dressing change in outpatient nursing services.  Patient denies any issues or concerns with wound VAC.  Patient's spouse continues to change right lower gluteal incision dressings with iodoform packing and silicone border dressing in absence of the wound care nurse.  Patient reports no issues or concerns managing wound care.    Wound VAC is currently functioning properly without alarms at time of visit.  Dressing  is clean, dry, and intact with no signs of lifting or leaking.  Removed all dressings with use of adhesive remover.    Bilateral gluteal aspects much improved.  Dry, red tissue is present as well as pink epithelial tissue.  Cleansed with normal saline and gauze then applied a silver impregnated Hydrofiber and secured with silicone border dressings.  Recommending to continue current treatment at this time for added protection.    Upper and medial gluteal incisions remain with red, moist granular tissue to wound bases.  Periwound tissue is dry and intact with minimal redness noted.  Cleansed and irrigated wounds with normal saline.  Applied a skin barrier wipe to periwound tissue then filled each wound with black foam and secured with transparent drape.  Both wounds were connected with additional piece of black foam used as a bridge to right hip then secured with transparent drape.  VAC attached, foam compressed, seal obtained with no signs of lifting or leaking.  Strip of Caty's barrier ring was applied to distal portion of dressing then covered with a strip of hydrocolloid dressing to aid in seal.    Right lower gluteal incision with red, moist tissue to wound base.  Periwound tissue is dry and intact.  Cleansed and irrigated wound with normal saline.  Filled wound with iodoform packing strip and secured with silicone border dressing.    Patient tolerated dressing changes well.  Recommending to continue all current care at this time.      Impression: Surgical incisions with closure by secondary intent.      Short term goals: Regain skin integrity, skin protection, negative pressure wound therapy, 3 times weekly dressing changes on MW.    Lanette Cantu RN    8/16/2024    15:28 EDT

## 2024-08-19 ENCOUNTER — HOSPITAL ENCOUNTER (OUTPATIENT)
Dept: INFUSION THERAPY | Facility: HOSPITAL | Age: 54
Discharge: HOME | End: 2024-08-19
Admitting: NURSE PRACTITIONER
Payer: COMMERCIAL

## 2024-08-19 VITALS
DIASTOLIC BLOOD PRESSURE: 82 MMHG | SYSTOLIC BLOOD PRESSURE: 120 MMHG | HEART RATE: 115 BPM | OXYGEN SATURATION: 98 % | RESPIRATION RATE: 20 BRPM | TEMPERATURE: 98.1 F

## 2024-08-19 DIAGNOSIS — L73.2 HIDRADENITIS: Primary | ICD-10-CM

## 2024-08-19 PROCEDURE — 97605 NEG PRS WND THER DME<=50SQCM: CPT

## 2024-08-21 ENCOUNTER — HOSPITAL ENCOUNTER (OUTPATIENT)
Dept: INFUSION THERAPY | Facility: HOSPITAL | Age: 54
Discharge: HOME | End: 2024-08-21
Admitting: NURSE PRACTITIONER
Payer: COMMERCIAL

## 2024-08-21 VITALS
TEMPERATURE: 98 F | DIASTOLIC BLOOD PRESSURE: 72 MMHG | RESPIRATION RATE: 20 BRPM | OXYGEN SATURATION: 97 % | HEART RATE: 105 BPM | SYSTOLIC BLOOD PRESSURE: 131 MMHG

## 2024-08-21 DIAGNOSIS — L73.2 HIDRADENITIS: Primary | ICD-10-CM

## 2024-08-21 PROCEDURE — 97605 NEG PRS WND THER DME<=50SQCM: CPT

## 2024-08-23 ENCOUNTER — HOSPITAL ENCOUNTER (OUTPATIENT)
Dept: INFUSION THERAPY | Facility: HOSPITAL | Age: 54
Discharge: HOME | End: 2024-08-23
Payer: COMMERCIAL

## 2024-08-23 VITALS
DIASTOLIC BLOOD PRESSURE: 73 MMHG | HEART RATE: 110 BPM | OXYGEN SATURATION: 100 % | RESPIRATION RATE: 20 BRPM | SYSTOLIC BLOOD PRESSURE: 111 MMHG | TEMPERATURE: 98.2 F

## 2024-08-23 DIAGNOSIS — L73.2 HIDRADENITIS: Primary | ICD-10-CM

## 2024-08-23 PROCEDURE — 97606 NEG PRS WND THER DME>50 SQCM: CPT

## 2024-08-26 ENCOUNTER — HOSPITAL ENCOUNTER (OUTPATIENT)
Dept: INFUSION THERAPY | Facility: HOSPITAL | Age: 54
Discharge: HOME | End: 2024-08-26
Admitting: NURSE PRACTITIONER
Payer: COMMERCIAL

## 2024-08-26 DIAGNOSIS — L73.2 HIDRADENITIS: Primary | ICD-10-CM

## 2024-08-26 PROCEDURE — 97606 NEG PRS WND THER DME>50 SQCM: CPT

## 2024-08-26 PROCEDURE — 97605 NEG PRS WND THER DME<=50SQCM: CPT

## 2024-08-26 NOTE — SIGNIFICANT NOTE
Wound Eval / Progress Noted     [facility]     Patient Name: Mariela Aldrich  : 1970  MRN: [id number]  Today's Date: 2024                 Admit Date: 2024    Visit Dx:    ICD-10-CM ICD-9-CM   1. Hidradenitis  L73.2 705.83         * No active hospital problems. *        Past Medical History:   Diagnosis Date    Anxiety     Asthma     PRN INHALER AND TAKES ALLERGY INJECTIONS    Diabetes mellitus type 2, controlled     AVERAGE     Hidradenitis     Hyperlipidemia     Hypothyroidism     Murmur     DIAGNOSED WHEN 17 BUT IS ASYMPTOMATIC AND IS FOLLOWED ONLY BY PCP    Panic attack     Pilonidal cyst     Rheumatic fever     AS A CHILD    RLS (restless legs syndrome)     Sinus trouble         Past Surgical History:   Procedure Laterality Date    COLONOSCOPY  2020    exernal hemorrhoids    EXCISION LESION Bilateral 2023    Procedure: Excision Hidradenitis of Bilateral Inguinal Areas;  Surgeon: Donato You MD;  Location: [facility];  Service: General;  Laterality: Bilateral;    EXCISION LESION N/A 2024    Procedure: DEBRIDEMENT OF ISCHIAL ULCER/BUTTOCKS WOUND;  Surgeon: Donato You MD;  Location: [facility] OR;  Service: General;  Laterality: N/A;    GROIN ABSCESS INCISION AND DRAINAGE Right     hidradenitis    HYSTERECTOMY  2010    KNEE SURGERY Bilateral 2013    ARTHROSCOPY    PILONIDAL CYSTECTOMY N/A 2023    Procedure: Excision Hidradenitis of Inguinal Area and Pilonidal Cystectomy;  Surgeon: Donato You MD;  Location: [facility];  Service: General;  Laterality: N/A;         Physical Assessment:  Wound 24 1410 coccyx Incision (Active)   Wound Image   24 1240   Dressing Appearance intact;moist drainage 24 1240   Closure None 24 1240   Base moist;red;granulating 24 1240   Red (%), Wound Tissue Color 100 24 1240   Periwound intact;redness;dry 24 1240   Periwound Temperature warm 24 1240   Periwound Skin  Turgor soft 08/26/24 1240   Edges open 08/26/24 1240   Wound Length (cm) 5.4 cm 08/26/24 1240   Wound Width (cm) 1 cm 08/26/24 1240   Wound Depth (cm) 0.5 cm 08/26/24 1240   Wound Surface Area (cm^2) 5.4 cm^2 08/26/24 1240   Wound Volume (cm^3) 2.7 cm^3 08/26/24 1240   Drainage Characteristics/Odor serosanguineous 08/26/24 1240   Drainage Amount moderate 08/26/24 1240   Care, Wound cleansed with;irrigated with;sterile normal saline;negative pressure wound therapy 08/26/24 1240   Dressing Care dressing applied;foam;transparent film 08/26/24 1240   Periwound Care barrier film applied 08/26/24 1240       Wound 07/24/24 1406 gluteal Incision (Active)   Wound Image   08/26/24 1240   Dressing Appearance intact;moist drainage 08/26/24 1240   Closure None 08/26/24 1240   Base red;moist 08/26/24 1240   Red (%), Wound Tissue Color 100 08/26/24 1240   Periwound dry;intact;pink 08/26/24 1240   Periwound Temperature warm 08/26/24 1240   Periwound Skin Turgor soft 08/26/24 1240   Edges open 08/26/24 1240   Wound Length (cm) 1 cm 08/26/24 1240   Wound Width (cm) 0.4 cm 08/26/24 1240   Wound Depth (cm) 0.3 cm 08/26/24 1240   Wound Surface Area (cm^2) 0.4 cm^2 08/26/24 1240   Wound Volume (cm^3) 0.12 cm^3 08/26/24 1240   Undermining [Depth (cm)/Location] 2.1 cm at 12 o'clock 08/26/24 1240   Drainage Characteristics/Odor serosanguineous 08/26/24 1240   Drainage Amount small 08/26/24 1240   Care, Wound cleansed with;sterile normal saline 08/26/24 1240   Dressing Care dressing applied;packed with;gauze, iodoform;silicone;border dressing 08/26/24 1240   Periwound Care absorptive dressing applied 08/26/24 1240       Wound 08/02/24 1421 sacral spine Incision (Active)   Wound Image   08/26/24 1240   Dressing Appearance intact;moist drainage 08/26/24 1240   Closure None 08/26/24 1240   Base red;moist;granulating 08/26/24 1240   Red (%), Wound Tissue Color 100 08/26/24 1240   Periwound intact;redness;dry 08/26/24 1240   Periwound Temperature  warm 08/26/24 1240   Periwound Skin Turgor soft 08/26/24 1240   Edges open 08/26/24 1240   Wound Length (cm) 3 cm 08/26/24 1240   Wound Width (cm) 0.5 cm 08/26/24 1240   Wound Depth (cm) 3 cm 08/26/24 1240   Wound Surface Area (cm^2) 1.5 cm^2 08/26/24 1240   Wound Volume (cm^3) 4.5 cm^3 08/26/24 1240   Drainage Characteristics/Odor serosanguineous 08/26/24 1240   Drainage Amount small 08/26/24 1240   Care, Wound cleansed with;irrigated with;sterile normal saline;negative pressure wound therapy 08/26/24 1240   Dressing Care dressing applied;foam;transparent film 08/26/24 1240   Periwound Care barrier film applied 08/26/24 1240       NPWT (Negative Pressure Wound Therapy) 07/24/24 1520 Gluteal Crease (Active)   Therapy Setting continuous therapy 08/26/24 1240   Dressing foam, black 08/26/24 1240   Pressure Setting 100 mmHg 08/26/24 1240   Sponges Inserted 4 08/26/24 1240   Sponges Removed 3 08/26/24 1240   Finger sweep complete Yes 08/26/24 1240        Wound Check / Follow-up: Patient seen today for wound check and dressing change.  Patient denies any issues with wound VAC over the weekend.    Wound VAC removed with no issues, patient tolerated well.  Wound bases to the upper and lower incision are granular with beefy red moist tissue wound.  Wound bases are filling in, there is a tunnel present to the upper incision remaining open.  Periwound is reddened with some irritation, likely from foam shifting onto the intact tissue.  Wound edges are under ludy.  Cleansed and irrigated wounds with copious amount of normal saline.  Applied skin barrier to the periwound and then windowpane wound edges.  Filled the wound bases with a piece of black foam into each wound base and secured with transparent drape.  Applied a second black foam as a track pad and again secured with transparent drape.  A fourth black foam and drape were applied to the right hip.  Wound VAC connected, foam well compressed; no alarms noted.  Will  recommend to windowpane wound edges to protect the tissue and to continue wound VAC therapy as the wounds are responding appropriately to the treatment.    Right lower gluteal incision with red moist wound base, there is a small amount of yellow sloughing tissue present in the wound base is easily cleaned out with normal saline.  Periwound is soft pink and intact.  Cleansed and irrigated with normal saline.  Filled the wound base with quarter inch iodoform packing strip.  Recommend to continue current care as the wound base are responding to the current treatment    Impression: Surgical incisions with secondary closure, negative pressure wound VAC therapy.    Short term goals: Regain skin integrity, skin protection, negative pressure wound VAC therapy on Mondays, Wednesdays, Fridays.    No Henriquez RN    8/26/2024    16:00 EDT

## 2024-08-28 ENCOUNTER — HOSPITAL ENCOUNTER (OUTPATIENT)
Dept: INFUSION THERAPY | Facility: HOSPITAL | Age: 54
Discharge: HOME | End: 2024-08-28
Payer: COMMERCIAL

## 2024-08-28 VITALS
TEMPERATURE: 98 F | DIASTOLIC BLOOD PRESSURE: 76 MMHG | HEART RATE: 106 BPM | SYSTOLIC BLOOD PRESSURE: 129 MMHG | RESPIRATION RATE: 20 BRPM | OXYGEN SATURATION: 100 %

## 2024-08-28 DIAGNOSIS — L73.2 HIDRADENITIS: Primary | ICD-10-CM

## 2024-08-28 NOTE — ADDENDUM NOTE
Encounter addended by: Kanchan Rodriguez, PCT on: 8/28/2024 1:14 PM   Actions taken: Vitals modified

## 2024-08-30 ENCOUNTER — HOSPITAL ENCOUNTER (OUTPATIENT)
Dept: INFUSION THERAPY | Facility: HOSPITAL | Age: 54
Discharge: HOME | End: 2024-08-30
Payer: COMMERCIAL

## 2024-08-30 VITALS
HEART RATE: 107 BPM | DIASTOLIC BLOOD PRESSURE: 80 MMHG | OXYGEN SATURATION: 97 % | SYSTOLIC BLOOD PRESSURE: 136 MMHG | TEMPERATURE: 97.7 F | RESPIRATION RATE: 20 BRPM

## 2024-08-30 DIAGNOSIS — L73.2 HIDRADENITIS: Primary | ICD-10-CM

## 2024-08-30 PROCEDURE — 97605 NEG PRS WND THER DME<=50SQCM: CPT

## 2024-08-30 PROCEDURE — G0463 HOSPITAL OUTPT CLINIC VISIT: HCPCS

## 2024-09-02 ENCOUNTER — HOSPITAL ENCOUNTER (OUTPATIENT)
Dept: INFUSION THERAPY | Facility: HOSPITAL | Age: 54
Discharge: HOME | End: 2024-09-02
Admitting: NURSE PRACTITIONER
Payer: COMMERCIAL

## 2024-09-02 VITALS
HEART RATE: 110 BPM | DIASTOLIC BLOOD PRESSURE: 69 MMHG | SYSTOLIC BLOOD PRESSURE: 116 MMHG | RESPIRATION RATE: 20 BRPM | TEMPERATURE: 98.2 F | OXYGEN SATURATION: 99 %

## 2024-09-02 DIAGNOSIS — L73.2 HIDRADENITIS: Primary | ICD-10-CM

## 2024-09-02 PROCEDURE — G0463 HOSPITAL OUTPT CLINIC VISIT: HCPCS

## 2024-09-02 PROCEDURE — 97605 NEG PRS WND THER DME<=50SQCM: CPT

## 2024-09-04 ENCOUNTER — HOSPITAL ENCOUNTER (OUTPATIENT)
Dept: INFUSION THERAPY | Facility: HOSPITAL | Age: 54
Discharge: HOME | End: 2024-09-04
Admitting: NURSE PRACTITIONER
Payer: COMMERCIAL

## 2024-09-04 VITALS
DIASTOLIC BLOOD PRESSURE: 72 MMHG | TEMPERATURE: 97.4 F | RESPIRATION RATE: 20 BRPM | SYSTOLIC BLOOD PRESSURE: 115 MMHG | HEART RATE: 105 BPM | OXYGEN SATURATION: 98 %

## 2024-09-04 DIAGNOSIS — L73.2 HIDRADENITIS: Primary | ICD-10-CM

## 2024-09-04 PROCEDURE — 97605 NEG PRS WND THER DME<=50SQCM: CPT

## 2024-09-04 NOTE — SIGNIFICANT NOTE
Wound Eval / Progress Noted     [facility]     Patient Name: Mariela Aldrich  : 1970  MRN: [id number]  Today's Date: 2024                 Admit Date: 2024    Visit Dx:    ICD-10-CM ICD-9-CM   1. Hidradenitis  L73.2 705.83         * No active hospital problems. *        Past Medical History:   Diagnosis Date    Anxiety     Asthma     PRN INHALER AND TAKES ALLERGY INJECTIONS    Diabetes mellitus type 2, controlled     AVERAGE     Hidradenitis     Hyperlipidemia     Hypothyroidism     Murmur     DIAGNOSED WHEN 17 BUT IS ASYMPTOMATIC AND IS FOLLOWED ONLY BY PCP    Panic attack     Pilonidal cyst     Rheumatic fever     AS A CHILD    RLS (restless legs syndrome)     Sinus trouble         Past Surgical History:   Procedure Laterality Date    COLONOSCOPY  2020    exernal hemorrhoids    EXCISION LESION Bilateral 2023    Procedure: Excision Hidradenitis of Bilateral Inguinal Areas;  Surgeon: Donato You MD;  Location: [facility];  Service: General;  Laterality: Bilateral;    EXCISION LESION N/A 2024    Procedure: DEBRIDEMENT OF ISCHIAL ULCER/BUTTOCKS WOUND;  Surgeon: Donato You MD;  Location: [facility] OR;  Service: General;  Laterality: N/A;    GROIN ABSCESS INCISION AND DRAINAGE Right     hidradenitis    HYSTERECTOMY  2010    KNEE SURGERY Bilateral 2013    ARTHROSCOPY    PILONIDAL CYSTECTOMY N/A 2023    Procedure: Excision Hidradenitis of Inguinal Area and Pilonidal Cystectomy;  Surgeon: Donato You MD;  Location: [facility];  Service: General;  Laterality: N/A;         Physical Assessment:  Wound 24 1410 coccyx Incision (Active)   Wound Image   24 1245   Dressing Appearance intact;dry 24 1245   Closure None 24 1245   Base granulating;red;moist 24 1245   Red (%), Wound Tissue Color 100 24 1245   Periwound dry;intact;redness 24 1245   Periwound Temperature warm 24 1245   Periwound Skin Turgor soft  09/04/24 1245   Edges open 09/04/24 1245   Wound Length (cm) 5 cm 09/04/24 1245   Wound Width (cm) 1.3 cm 09/04/24 1245   Wound Depth (cm) 1.3 cm 09/04/24 1245   Wound Surface Area (cm^2) 6.5 cm^2 09/04/24 1245   Wound Volume (cm^3) 8.45 cm^3 09/04/24 1245   Drainage Characteristics/Odor serosanguineous 09/04/24 1245   Drainage Amount small 09/04/24 1245   Care, Wound irrigated with;cleansed with;sterile normal saline;negative pressure wound therapy 09/04/24 1245   Dressing Care dressing applied;foam;transparent film;hydrocolloid 09/04/24 1245   Periwound Care absorptive dressing applied 09/04/24 1245       Wound 07/24/24 1406 gluteal Incision (Active)   Wound Image   09/04/24 1245   Dressing Appearance intact;dry 09/04/24 1245   Closure None 09/04/24 1245   Base red;moist 09/04/24 1245   Red (%), Wound Tissue Color 100 09/04/24 1245   Periwound intact;dry;pink 09/04/24 1245   Periwound Temperature warm 09/04/24 1245   Periwound Skin Turgor soft 09/04/24 1245   Edges open 09/04/24 1245   Wound Length (cm) 0.9 cm 09/04/24 1245   Wound Width (cm) 0.5 cm 09/04/24 1245   Wound Depth (cm) 0.5 cm 09/04/24 1245   Wound Surface Area (cm^2) 0.45 cm^2 09/04/24 1245   Wound Volume (cm^3) 0.225 cm^3 09/04/24 1245   Drainage Characteristics/Odor serosanguineous 09/04/24 1245   Drainage Amount small 09/04/24 1245   Care, Wound cleansed with;irrigated with;sterile normal saline 09/04/24 1245   Dressing Care dressing applied;packed with;gauze, iodoform;packing strip;silicone;border dressing 09/04/24 1245   Periwound Care absorptive dressing applied 09/04/24 1245       Wound 08/02/24 1421 sacral spine Incision (Active)   Wound Image   09/04/24 1245   Dressing Appearance moist drainage;intact 09/04/24 1245   Closure None 09/04/24 1245   Base red;moist;granulating 09/04/24 1245   Red (%), Wound Tissue Color 100 09/04/24 1245   Periwound intact;dry;redness 09/04/24 1245   Periwound Temperature warm 09/04/24 1245   Periwound Skin Turgor  soft 09/04/24 1245   Edges open 09/04/24 1245   Wound Length (cm) 3 cm 09/04/24 1245   Wound Width (cm) 0.5 cm 09/04/24 1245   Wound Depth (cm) 1.4 cm 09/04/24 1245   Wound Surface Area (cm^2) 1.5 cm^2 09/04/24 1245   Wound Volume (cm^3) 2.1 cm^3 09/04/24 1245   Drainage Characteristics/Odor serosanguineous 09/04/24 1245   Drainage Amount small 09/04/24 1245   Care, Wound cleansed with;irrigated with;sterile normal saline;negative pressure wound therapy 09/04/24 1245   Dressing Care dressing applied;foam;transparent film 09/04/24 1245   Periwound Care barrier film applied 09/04/24 1245       NPWT (Negative Pressure Wound Therapy) 07/24/24 1520 Gluteal Crease (Active)   Therapy Setting continuous therapy 09/04/24 1245   Dressing foam, black 09/04/24 1245   Pressure Setting 125 mmHg 09/04/24 1245   Sponges Inserted 4 09/04/24 1245   Sponges Removed 3 09/04/24 1245   Finger sweep complete Yes 09/04/24 1245        Wound Check / Follow-up: Patient seen today for a wound check and dressing change.  Patient denies any issues with the wound VAC at home.    Dressing removed with adhesive remover with no issues.  Wound bases are both granular red and moist; does appear that the wounds are responding appropriately to the wound VAC.  Wound bases are filling in and wound edges are ludy.  There is some redness to the periwound but otherwise has improved greatly.  Cleansed and irrigated with normal saline.  Applied skin barrier to the periwound.  Filled each wound base with 1 black foam and secured with transparent drape.  Applied a second black foam to connect the wound bases and then a track pad of the right hip.  Wound VAC connected, foam well compressed; no alarms noted.    Incision to the right lower gluteal aspect continues to respond to iodoform packing.  Wound base is red and moist, there is some pink tissue present to the wound base as well.  Wound edges are ludy with filling in.  No induration noted.   Cleansed and irrigated with normal saline.  Filled the wound base with iodoform packing strip and secured with a silicone border dressing.  Patient is to continue performing daily wound care to that site.      Impression: Surgical incision with secondary closure, negative pressure wound VAC therapy.    Short term goals: Regain skin integrity, skin protection, negative pressure wound VAC therapy on Mondays, Wednesdays, Fridays.    No Henriquez RN    9/4/2024    14:55 EDT

## 2024-09-06 ENCOUNTER — HOSPITAL ENCOUNTER (OUTPATIENT)
Dept: INFUSION THERAPY | Facility: HOSPITAL | Age: 54
Discharge: HOME | End: 2024-09-06
Payer: COMMERCIAL

## 2024-09-06 VITALS
DIASTOLIC BLOOD PRESSURE: 82 MMHG | SYSTOLIC BLOOD PRESSURE: 131 MMHG | TEMPERATURE: 98.3 F | HEART RATE: 98 BPM | OXYGEN SATURATION: 98 % | RESPIRATION RATE: 20 BRPM

## 2024-09-06 DIAGNOSIS — L73.2 HIDRADENITIS: Primary | ICD-10-CM

## 2024-09-06 PROCEDURE — 97606 NEG PRS WND THER DME>50 SQCM: CPT

## 2024-09-09 ENCOUNTER — HOSPITAL ENCOUNTER (OUTPATIENT)
Dept: INFUSION THERAPY | Facility: HOSPITAL | Age: 54
Discharge: HOME | End: 2024-09-09
Admitting: NURSE PRACTITIONER
Payer: COMMERCIAL

## 2024-09-09 VITALS
RESPIRATION RATE: 20 BRPM | TEMPERATURE: 97.8 F | HEART RATE: 110 BPM | OXYGEN SATURATION: 99 % | SYSTOLIC BLOOD PRESSURE: 120 MMHG | DIASTOLIC BLOOD PRESSURE: 87 MMHG

## 2024-09-09 DIAGNOSIS — L73.2 HIDRADENITIS: Primary | ICD-10-CM

## 2024-09-09 PROCEDURE — G0463 HOSPITAL OUTPT CLINIC VISIT: HCPCS

## 2024-09-09 NOTE — SIGNIFICANT NOTE
Wound Eval / Progress Noted     [facility]     Patient Name: Mariela Aldrich  : 1970  MRN: [id number]  Today's Date: 2024                 Admit Date: 2024    Visit Dx:    ICD-10-CM ICD-9-CM   1. Hidradenitis  L73.2 705.83         * No active hospital problems. *        Past Medical History:   Diagnosis Date    Anxiety     Asthma     PRN INHALER AND TAKES ALLERGY INJECTIONS    Diabetes mellitus type 2, controlled     AVERAGE     Hidradenitis     Hyperlipidemia     Hypothyroidism     Murmur     DIAGNOSED WHEN 17 BUT IS ASYMPTOMATIC AND IS FOLLOWED ONLY BY PCP    Panic attack     Pilonidal cyst     Rheumatic fever     AS A CHILD    RLS (restless legs syndrome)     Sinus trouble         Past Surgical History:   Procedure Laterality Date    COLONOSCOPY  2020    exernal hemorrhoids    EXCISION LESION Bilateral 2023    Procedure: Excision Hidradenitis of Bilateral Inguinal Areas;  Surgeon: Donato You MD;  Location: [facility];  Service: General;  Laterality: Bilateral;    EXCISION LESION N/A 2024    Procedure: DEBRIDEMENT OF ISCHIAL ULCER/BUTTOCKS WOUND;  Surgeon: Donato You MD;  Location: [facility] OR;  Service: General;  Laterality: N/A;    GROIN ABSCESS INCISION AND DRAINAGE Right     hidradenitis    HYSTERECTOMY  2010    KNEE SURGERY Bilateral 2013    ARTHROSCOPY    PILONIDAL CYSTECTOMY N/A 2023    Procedure: Excision Hidradenitis of Inguinal Area and Pilonidal Cystectomy;  Surgeon: Donato You MD;  Location: [facility];  Service: General;  Laterality: N/A;         Physical Assessment:  Wound 24 1410 coccyx Incision (Active)   Wound Image   24 1342   Dressing Appearance dry;intact 24 1342   Closure None 24 1342   Base granulating;red;moist 24 1342   Periwound moist;redness;macerated 24 1342   Periwound Temperature warm 24 1342   Periwound Skin Turgor soft 24 1342   Edges open 24 1342    Wound Length (cm) 4.5 cm 09/09/24 1342   Wound Width (cm) 0.8 cm 09/09/24 1342   Wound Depth (cm) 0.9 cm 09/09/24 1342   Wound Surface Area (cm^2) 3.6 cm^2 09/09/24 1342   Wound Volume (cm^3) 3.24 cm^3 09/09/24 1342   Drainage Characteristics/Odor serosanguineous 09/09/24 1342   Drainage Amount small 09/09/24 1342   Care, Wound cleansed with;irrigated with;sterile normal saline 09/09/24 1342   Dressing Care dressing applied;calcium alginate;silver impregnated;silicone;border dressing;hydrocolloid 09/09/24 1342   Periwound Care absorptive dressing applied;barrier film applied 09/09/24 1342       Wound 07/24/24 1406 gluteal Incision (Active)   Wound Image   09/09/24 1342   Dressing Appearance dry;intact 09/09/24 1342   Closure None 09/09/24 1342   Base red;moist 09/09/24 1342   Periwound intact;dry;pink 09/09/24 1342   Periwound Temperature warm 09/09/24 1342   Periwound Skin Turgor soft 09/09/24 1342   Edges open 09/09/24 1342   Wound Length (cm) 0.9 cm 09/09/24 1342   Wound Width (cm) 0.4 cm 09/09/24 1342   Wound Depth (cm) 0.6 cm 09/09/24 1342   Wound Surface Area (cm^2) 0.36 cm^2 09/09/24 1342   Wound Volume (cm^3) 0.216 cm^3 09/09/24 1342   Drainage Characteristics/Odor serosanguineous 09/09/24 1342   Drainage Amount small 09/09/24 1342   Care, Wound cleansed with;irrigated with;sterile normal saline 09/09/24 1342   Dressing Care dressing applied;packed with;packing strip;gauze, iodoform;silicone;border dressing 09/09/24 1342   Periwound Care absorptive dressing applied 09/09/24 1342       Wound 08/02/24 1421 sacral spine Incision (Active)   Wound Image   09/09/24 1342   Dressing Appearance dry;intact 09/09/24 1342   Closure None 09/09/24 1342   Base red;moist;granulating 09/09/24 1342   Periwound moist;redness;macerated 09/09/24 1342   Periwound Temperature warm 09/09/24 1342   Periwound Skin Turgor soft 09/09/24 1342   Edges open 09/09/24 1342   Wound Length (cm) 1.1 cm 09/09/24 1342   Wound Width (cm) 0.4 cm  09/09/24 1342   Wound Depth (cm) 1.2 cm 09/09/24 1342   Wound Surface Area (cm^2) 0.44 cm^2 09/09/24 1342   Wound Volume (cm^3) 0.528 cm^3 09/09/24 1342   Drainage Characteristics/Odor serosanguineous 09/09/24 1342   Drainage Amount small 09/09/24 1342   Care, Wound cleansed with;irrigated with;sterile normal saline 09/09/24 1342   Dressing Care dressing applied;calcium alginate;silver impregnated;silicone;border dressing 09/09/24 1342   Periwound Care absorptive dressing applied;barrier film applied 09/09/24 1342       NPWT (Negative Pressure Wound Therapy) 07/24/24 1520 Gluteal Crease (Active)   Therapy Setting vacuum off 09/09/24 1342   Sponges Removed 3 09/09/24 1342   Finger sweep complete Yes 09/09/24 1342      Wound Check / Follow-up: Patient seen today for wound follow-up and dressing change in outpatient nursing services.  Patient states that she is having increased discomfort to bilateral gluteal aspects.  Wound VAC is functioning properly without alarms upon arrival into room.    Dressing removed.  Wound bases to upper and lower gluteal crease with moist, red granulating tissue.  Minimal depth remains.  Periwound tissue to bilateral gluteal aspects with redness, moisture and maceration.  Small area of redness also noted to right lateral hip, likely irritation from wound VAC foam.  Cleansed and irrigated all wounds with normal saline.  Recommending to discontinue use of wound VAC at this time to allow for a break for skin healing.  Applied silver impregnated calcium alginate to these wounds then secured with silicone border dressing.  Strips of hydrocolloid dressing added to distal portion to aid in seal/securement.  Recommending patient continue Monday, Wednesday, Friday dressing changes.  Instructed her to return for her scheduled appointments.  Patient verbalized understanding.    Right lower gluteal aspect incision with red, moist tissue present.  Cleansed and irrigated wound with normal saline.   Filled wound with iodoform packing strip then secured with silicone border dressing.  Recommended to continue daily dressing changes.  Patient denies any issues with her spouse changing dressings in absence of the wound care nurse.      Impression: Surgical incisions with delayed closure.      Short term goals: Regain skin integrity, skin protection, daily dressing changes, 3 times weekly dressing changes, 3 times weekly wound checks.    Lanette Cantu RN    9/9/2024    14:42 EDT

## 2024-09-11 ENCOUNTER — HOSPITAL ENCOUNTER (OUTPATIENT)
Dept: INFUSION THERAPY | Facility: HOSPITAL | Age: 54
Discharge: HOME | End: 2024-09-11
Admitting: NURSE PRACTITIONER
Payer: COMMERCIAL

## 2024-09-11 VITALS
HEART RATE: 94 BPM | TEMPERATURE: 98.1 F | OXYGEN SATURATION: 98 % | SYSTOLIC BLOOD PRESSURE: 136 MMHG | RESPIRATION RATE: 20 BRPM | DIASTOLIC BLOOD PRESSURE: 75 MMHG

## 2024-09-11 DIAGNOSIS — Z51.89 VISIT FOR WOUND CHECK: Primary | ICD-10-CM

## 2024-09-11 PROCEDURE — G0463 HOSPITAL OUTPT CLINIC VISIT: HCPCS

## 2024-09-11 NOTE — ADDENDUM NOTE
Encounter addended by: No Henriquez RN on: 9/11/2024 4:26 PM   Actions taken: Flowsheet data copied forward, LDA properties accepted, Flowsheet accepted, Clinical Note Signed, Order list changed, Charge Capture section accepted, Therapy plan modified

## 2024-09-11 NOTE — SIGNIFICANT NOTE
Wound Eval / Progress Noted     [facility]     Patient Name: Mariela Aldrich  : 1970  MRN: [id number]  Today's Date: 2024                 Admit Date: 2024    Visit Dx:    ICD-10-CM ICD-9-CM   1. Visit for wound check  Z51.89 V58.89         * No active hospital problems. *        Past Medical History:   Diagnosis Date    Anxiety     Asthma     PRN INHALER AND TAKES ALLERGY INJECTIONS    Diabetes mellitus type 2, controlled     AVERAGE     Hidradenitis     Hyperlipidemia     Hypothyroidism     Murmur     DIAGNOSED WHEN 17 BUT IS ASYMPTOMATIC AND IS FOLLOWED ONLY BY PCP    Panic attack     Pilonidal cyst     Rheumatic fever     AS A CHILD    RLS (restless legs syndrome)     Sinus trouble         Past Surgical History:   Procedure Laterality Date    COLONOSCOPY  2020    exernal hemorrhoids    EXCISION LESION Bilateral 2023    Procedure: Excision Hidradenitis of Bilateral Inguinal Areas;  Surgeon: Donato You MD;  Location: [facility];  Service: General;  Laterality: Bilateral;    EXCISION LESION N/A 2024    Procedure: DEBRIDEMENT OF ISCHIAL ULCER/BUTTOCKS WOUND;  Surgeon: Donato You MD;  Location: [facility] OR;  Service: General;  Laterality: N/A;    GROIN ABSCESS INCISION AND DRAINAGE Right     hidradenitis    HYSTERECTOMY  2010    KNEE SURGERY Bilateral 2013    ARTHROSCOPY    PILONIDAL CYSTECTOMY N/A 2023    Procedure: Excision Hidradenitis of Inguinal Area and Pilonidal Cystectomy;  Surgeon: Donato You MD;  Location: [facility];  Service: General;  Laterality: N/A;         Physical Assessment:  Wound 24 1410 coccyx Incision (Active)   Wound Image   24 1345   Dressing Appearance intact;dry 24 1345   Closure None 24 1345   Base granulating;red;moist;yellow 24 1345   Red (%), Wound Tissue Color 95 24 1345   Yellow (%), Wound Tissue Color 5 24 1345   Periwound moist;redness;macerated 24 1345    Periwound Temperature warm 09/11/24 1345   Periwound Skin Turgor soft 09/11/24 1345   Edges open 09/11/24 1345   Drainage Characteristics/Odor serosanguineous 09/11/24 1345   Drainage Amount small 09/11/24 1345   Care, Wound cleansed with;irrigated with;sterile normal saline 09/11/24 1345   Dressing Care dressing applied;calcium alginate;silver impregnated;hydrofiber;silicone;border dressing 09/11/24 1345   Periwound Care absorptive dressing applied 09/11/24 1345       Wound 07/24/24 1406 gluteal Incision (Active)   Wound Image   09/11/24 1345   Dressing Appearance intact;dry 09/11/24 1345   Closure None 09/11/24 1345   Base red;moist 09/11/24 1345   Periwound intact;dry;pink 09/11/24 1345   Periwound Temperature warm 09/11/24 1345   Periwound Skin Turgor soft 09/11/24 1345   Edges open 09/11/24 1345   Drainage Characteristics/Odor serosanguineous 09/11/24 1345   Drainage Amount small 09/11/24 1345   Care, Wound cleansed with;irrigated with;sterile normal saline 09/11/24 1345   Dressing Care dressing applied;packed with;gauze, iodoform;packing strip;silicone;border dressing 09/11/24 1345   Periwound Care absorptive dressing applied 09/11/24 1345       Wound 08/02/24 1421 sacral spine Incision (Active)   Wound Image   09/11/24 1345   Dressing Appearance intact;dry 09/11/24 1345   Closure None 09/11/24 1345   Base red;moist;granulating 09/11/24 1345   Red (%), Wound Tissue Color 100 09/11/24 1345   Periwound moist;redness;macerated 09/11/24 1345   Periwound Temperature warm 09/11/24 1345   Periwound Skin Turgor soft 09/11/24 1345   Edges open 09/11/24 1345   Drainage Characteristics/Odor serosanguineous 09/11/24 1345   Drainage Amount small 09/11/24 1345   Care, Wound cleansed with;irrigated with;sterile normal saline 09/11/24 1345   Dressing Care dressing applied;calcium alginate;silver impregnated;hydrofiber;silicone;border dressing 09/11/24 1343   Periwound Care absorptive dressing applied 09/11/24 1347      "  Wound 09/11/24 1610 Right anterior hip Traumatic (Active)   Wound Image   09/11/24 1345   Dressing Appearance intact;dry 09/11/24 1345   Closure None 09/11/24 1345   Base dry;red 09/11/24 1345   Periwound intact;dry;pink 09/11/24 1345   Periwound Temperature warm 09/11/24 1345   Periwound Skin Turgor soft 09/11/24 1345   Edges rolled/closed 09/11/24 1345   Drainage Characteristics/Odor serosanguineous 09/11/24 1345   Drainage Amount scant 09/11/24 1345   Care, Wound cleansed with;sterile normal saline 09/11/24 1345   Dressing Care dressing applied;calcium alginate;silver impregnated;hydrofiber;silicone;border dressing 09/11/24 1345   Periwound Care absorptive dressing applied 09/11/24 1345       NPWT (Negative Pressure Wound Therapy) 07/24/24 1520 Gluteal Crease (Active)   Therapy Setting vacuum off 09/11/24 1345     buttocks    Wound Check / Follow-up:  Patient seen today for a wound check and dressing change. Patient reports that she has not had any issues or needs to change the dressing since last seen; wound vac remains off at this current time.    Upper and lower gluteal incisions with red moist wound bases, granular tissue remains present; there is a small amount of yellow tissue seen in the wound base. Periwound remains macerated and reddened with erosion present. Cleansed and irrigated with NS and gauze. Filled the wounds with silver impregnated calcium alginate. Recommending to continue the current wound care as ordered, will recommend to add non-adherent petroleum gauze to the areas of erosion to the bilateral gluteal aspects.    Lower right gluteal incision with red moist wound base. Patient is currently responding to the iodoform packing however slowly. Cleansed and irrigated with NS. Filled the wound base with a strip of 1/4\" iodoform packing. Recommending to continue the current treatment at this time.     Erosion also identified to the right hip region; appears to be erosion from wound vac foam. " Tissue is reddened and dry. Cleansed with NS. Will also recommend application of non-adherent petroleum gauze to tissue as well.     Impression: surgical incisions with delayed closure    Short term goals:  regain skin integrity, skin protection 3x weekly dressing changes.    No Henriquez RN    9/11/2024    16:11 EDT

## 2024-09-13 ENCOUNTER — HOSPITAL ENCOUNTER (OUTPATIENT)
Dept: INFUSION THERAPY | Facility: HOSPITAL | Age: 54
Discharge: HOME | End: 2024-09-13
Payer: COMMERCIAL

## 2024-09-13 VITALS
HEART RATE: 104 BPM | OXYGEN SATURATION: 96 % | SYSTOLIC BLOOD PRESSURE: 111 MMHG | RESPIRATION RATE: 20 BRPM | TEMPERATURE: 98.1 F | DIASTOLIC BLOOD PRESSURE: 76 MMHG

## 2024-09-13 DIAGNOSIS — L73.2 HIDRADENITIS: Primary | ICD-10-CM

## 2024-09-13 PROCEDURE — G0463 HOSPITAL OUTPT CLINIC VISIT: HCPCS

## 2024-09-16 ENCOUNTER — OFFICE VISIT (OUTPATIENT)
Dept: FAMILY MEDICINE CLINIC | Facility: CLINIC | Age: 54
End: 2024-09-16
Payer: COMMERCIAL

## 2024-09-16 VITALS
HEART RATE: 111 BPM | SYSTOLIC BLOOD PRESSURE: 120 MMHG | WEIGHT: 194 LBS | BODY MASS INDEX: 33.12 KG/M2 | OXYGEN SATURATION: 100 % | TEMPERATURE: 97.3 F | DIASTOLIC BLOOD PRESSURE: 80 MMHG | HEIGHT: 64 IN

## 2024-09-16 DIAGNOSIS — E11.65 TYPE 2 DIABETES MELLITUS WITH HYPERGLYCEMIA, WITH LONG-TERM CURRENT USE OF INSULIN: Primary | ICD-10-CM

## 2024-09-16 DIAGNOSIS — R11.0 NAUSEA: ICD-10-CM

## 2024-09-16 DIAGNOSIS — E55.9 VITAMIN D DEFICIENCY: ICD-10-CM

## 2024-09-16 DIAGNOSIS — E03.8 OTHER SPECIFIED HYPOTHYROIDISM: ICD-10-CM

## 2024-09-16 DIAGNOSIS — E78.2 MIXED HYPERLIPIDEMIA: ICD-10-CM

## 2024-09-16 DIAGNOSIS — K52.9 GASTROENTERITIS: ICD-10-CM

## 2024-09-16 DIAGNOSIS — G25.81 RESTLESS LEG: ICD-10-CM

## 2024-09-16 DIAGNOSIS — E03.9 HYPOTHYROIDISM, UNSPECIFIED TYPE: ICD-10-CM

## 2024-09-16 DIAGNOSIS — Z79.4 TYPE 2 DIABETES MELLITUS WITH HYPERGLYCEMIA, WITH LONG-TERM CURRENT USE OF INSULIN: Primary | ICD-10-CM

## 2024-09-16 DIAGNOSIS — F41.9 ANXIETY: ICD-10-CM

## 2024-09-16 LAB
25(OH)D3 SERPL-MCNC: 33.8 NG/ML (ref 30–100)
ALBUMIN SERPL-MCNC: 3.8 G/DL (ref 3.5–5.2)
ALBUMIN/GLOB SERPL: 0.8 G/DL
ALP SERPL-CCNC: 152 U/L (ref 39–117)
ALT SERPL W P-5'-P-CCNC: 13 U/L (ref 1–33)
ANION GAP SERPL CALCULATED.3IONS-SCNC: 11.5 MMOL/L (ref 5–15)
AST SERPL-CCNC: 13 U/L (ref 1–32)
BILIRUB SERPL-MCNC: 0.3 MG/DL (ref 0–1.2)
BUN SERPL-MCNC: 15 MG/DL (ref 6–20)
BUN/CREAT SERPL: 16.7 (ref 7–25)
CALCIUM SPEC-SCNC: 9.4 MG/DL (ref 8.6–10.5)
CHLORIDE SERPL-SCNC: 104 MMOL/L (ref 98–107)
CHOLEST SERPL-MCNC: 136 MG/DL (ref 0–200)
CO2 SERPL-SCNC: 21.5 MMOL/L (ref 22–29)
CREAT SERPL-MCNC: 0.9 MG/DL (ref 0.57–1)
EGFRCR SERPLBLD CKD-EPI 2021: 75.7 ML/MIN/1.73
FOLATE SERPL-MCNC: 14 NG/ML (ref 4.78–24.2)
GLOBULIN UR ELPH-MCNC: 4.6 GM/DL
GLUCOSE SERPL-MCNC: 187 MG/DL (ref 65–99)
HBA1C MFR BLD: 7.9 % (ref 4.8–5.6)
HDLC SERPL-MCNC: 47 MG/DL (ref 40–60)
LDLC SERPL CALC-MCNC: 74 MG/DL (ref 0–100)
LDLC/HDLC SERPL: 1.56 {RATIO}
POTASSIUM SERPL-SCNC: 4.3 MMOL/L (ref 3.5–5.2)
PROT SERPL-MCNC: 8.4 G/DL (ref 6–8.5)
SODIUM SERPL-SCNC: 137 MMOL/L (ref 136–145)
T4 FREE SERPL-MCNC: 1.24 NG/DL (ref 0.92–1.68)
TRIGL SERPL-MCNC: 78 MG/DL (ref 0–150)
TSH SERPL DL<=0.05 MIU/L-ACNC: 1.43 UIU/ML (ref 0.27–4.2)
VIT B12 BLD-MCNC: 667 PG/ML (ref 211–946)
VLDLC SERPL-MCNC: 15 MG/DL (ref 5–40)

## 2024-09-16 PROCEDURE — 82607 VITAMIN B-12: CPT | Performed by: NURSE PRACTITIONER

## 2024-09-16 PROCEDURE — 96372 THER/PROPH/DIAG INJ SC/IM: CPT | Performed by: NURSE PRACTITIONER

## 2024-09-16 PROCEDURE — 99214 OFFICE O/P EST MOD 30 MIN: CPT | Performed by: NURSE PRACTITIONER

## 2024-09-16 PROCEDURE — 84443 ASSAY THYROID STIM HORMONE: CPT | Performed by: NURSE PRACTITIONER

## 2024-09-16 PROCEDURE — 82306 VITAMIN D 25 HYDROXY: CPT | Performed by: NURSE PRACTITIONER

## 2024-09-16 PROCEDURE — 84439 ASSAY OF FREE THYROXINE: CPT | Performed by: NURSE PRACTITIONER

## 2024-09-16 PROCEDURE — 83036 HEMOGLOBIN GLYCOSYLATED A1C: CPT | Performed by: NURSE PRACTITIONER

## 2024-09-16 PROCEDURE — 80053 COMPREHEN METABOLIC PANEL: CPT | Performed by: NURSE PRACTITIONER

## 2024-09-16 PROCEDURE — 82746 ASSAY OF FOLIC ACID SERUM: CPT | Performed by: NURSE PRACTITIONER

## 2024-09-16 PROCEDURE — 80061 LIPID PANEL: CPT | Performed by: NURSE PRACTITIONER

## 2024-09-16 RX ORDER — ACYCLOVIR 400 MG/1
TABLET ORAL
Qty: 9 EACH | Refills: 0 | Status: SHIPPED | OUTPATIENT
Start: 2024-09-16

## 2024-09-16 RX ORDER — ONDANSETRON 2 MG/ML
4 INJECTION INTRAMUSCULAR; INTRAVENOUS ONCE
Status: COMPLETED | OUTPATIENT
Start: 2024-09-16 | End: 2024-09-16

## 2024-09-16 RX ORDER — PITAVASTATIN CALCIUM 2.09 MG/1
2 TABLET, FILM COATED ORAL NIGHTLY
Qty: 90 TABLET | Refills: 1 | Status: SHIPPED | OUTPATIENT
Start: 2024-09-16

## 2024-09-16 RX ORDER — GABAPENTIN 300 MG/1
300 CAPSULE ORAL NIGHTLY
Qty: 90 CAPSULE | Refills: 1 | Status: SHIPPED | OUTPATIENT
Start: 2024-09-16

## 2024-09-16 RX ORDER — LEVOTHYROXINE SODIUM 88 MCG
88 TABLET ORAL DAILY
Qty: 90 TABLET | Refills: 1 | Status: SHIPPED | OUTPATIENT
Start: 2024-09-16

## 2024-09-16 RX ORDER — SERTRALINE HYDROCHLORIDE 100 MG/1
150 TABLET, FILM COATED ORAL EVERY MORNING
Qty: 90 TABLET | Refills: 1 | Status: SHIPPED | OUTPATIENT
Start: 2024-09-16

## 2024-09-16 RX ORDER — ONDANSETRON 4 MG/1
4 TABLET, FILM COATED ORAL EVERY 8 HOURS PRN
Qty: 10 TABLET | Refills: 0 | Status: SHIPPED | OUTPATIENT
Start: 2024-09-16

## 2024-09-16 RX ADMIN — ONDANSETRON 4 MG: 2 INJECTION INTRAMUSCULAR; INTRAVENOUS at 09:05

## 2024-09-18 ENCOUNTER — HOSPITAL ENCOUNTER (OUTPATIENT)
Dept: INFUSION THERAPY | Facility: HOSPITAL | Age: 54
Discharge: HOME | End: 2024-09-18
Admitting: NURSE PRACTITIONER
Payer: COMMERCIAL

## 2024-09-18 VITALS
OXYGEN SATURATION: 98 % | SYSTOLIC BLOOD PRESSURE: 125 MMHG | TEMPERATURE: 98.4 F | RESPIRATION RATE: 20 BRPM | HEART RATE: 106 BPM | DIASTOLIC BLOOD PRESSURE: 75 MMHG

## 2024-09-18 DIAGNOSIS — L73.2 HIDRADENITIS: Primary | ICD-10-CM

## 2024-09-18 PROCEDURE — G0463 HOSPITAL OUTPT CLINIC VISIT: HCPCS

## 2024-09-20 ENCOUNTER — HOSPITAL ENCOUNTER (OUTPATIENT)
Dept: INFUSION THERAPY | Facility: HOSPITAL | Age: 54
Discharge: HOME | End: 2024-09-20
Payer: COMMERCIAL

## 2024-09-20 VITALS
HEART RATE: 93 BPM | TEMPERATURE: 98.3 F | OXYGEN SATURATION: 97 % | DIASTOLIC BLOOD PRESSURE: 75 MMHG | RESPIRATION RATE: 20 BRPM | SYSTOLIC BLOOD PRESSURE: 120 MMHG

## 2024-09-20 DIAGNOSIS — L73.2 HIDRADENITIS: Primary | ICD-10-CM

## 2024-09-20 PROCEDURE — G0463 HOSPITAL OUTPT CLINIC VISIT: HCPCS

## 2024-09-23 ENCOUNTER — HOSPITAL ENCOUNTER (OUTPATIENT)
Dept: INFUSION THERAPY | Facility: HOSPITAL | Age: 54
Discharge: HOME | End: 2024-09-23
Admitting: NURSE PRACTITIONER
Payer: COMMERCIAL

## 2024-09-23 VITALS
DIASTOLIC BLOOD PRESSURE: 77 MMHG | OXYGEN SATURATION: 99 % | SYSTOLIC BLOOD PRESSURE: 125 MMHG | RESPIRATION RATE: 20 BRPM | TEMPERATURE: 97.5 F | HEART RATE: 90 BPM

## 2024-09-23 DIAGNOSIS — L73.2 HIDRADENITIS: Primary | ICD-10-CM

## 2024-09-23 PROCEDURE — G0463 HOSPITAL OUTPT CLINIC VISIT: HCPCS

## 2024-09-25 ENCOUNTER — HOSPITAL ENCOUNTER (OUTPATIENT)
Dept: INFUSION THERAPY | Facility: HOSPITAL | Age: 54
Discharge: HOME | End: 2024-09-25
Admitting: NURSE PRACTITIONER
Payer: COMMERCIAL

## 2024-09-25 VITALS
RESPIRATION RATE: 20 BRPM | HEART RATE: 98 BPM | DIASTOLIC BLOOD PRESSURE: 80 MMHG | TEMPERATURE: 97.9 F | SYSTOLIC BLOOD PRESSURE: 127 MMHG | OXYGEN SATURATION: 99 %

## 2024-09-25 DIAGNOSIS — L73.2 HIDRADENITIS: Primary | ICD-10-CM

## 2024-09-25 PROCEDURE — G0463 HOSPITAL OUTPT CLINIC VISIT: HCPCS

## 2024-09-27 ENCOUNTER — HOSPITAL ENCOUNTER (OUTPATIENT)
Dept: INFUSION THERAPY | Facility: HOSPITAL | Age: 54
Discharge: HOME | End: 2024-09-27
Payer: COMMERCIAL

## 2024-09-27 VITALS
RESPIRATION RATE: 20 BRPM | SYSTOLIC BLOOD PRESSURE: 121 MMHG | HEART RATE: 106 BPM | DIASTOLIC BLOOD PRESSURE: 72 MMHG | TEMPERATURE: 97.7 F | OXYGEN SATURATION: 98 %

## 2024-09-27 DIAGNOSIS — L73.2 HIDRADENITIS: Primary | ICD-10-CM

## 2024-09-27 PROCEDURE — G0463 HOSPITAL OUTPT CLINIC VISIT: HCPCS

## 2024-09-27 RX ADMIN — SILVER NITRATE APPLICATORS 1 APPLICATION: 25; 75 STICK TOPICAL at 12:07

## 2024-09-30 ENCOUNTER — HOSPITAL ENCOUNTER (OUTPATIENT)
Dept: INFUSION THERAPY | Facility: HOSPITAL | Age: 54
Discharge: HOME | End: 2024-09-30
Admitting: NURSE PRACTITIONER
Payer: COMMERCIAL

## 2024-09-30 VITALS
DIASTOLIC BLOOD PRESSURE: 70 MMHG | TEMPERATURE: 97.7 F | OXYGEN SATURATION: 100 % | HEART RATE: 102 BPM | SYSTOLIC BLOOD PRESSURE: 115 MMHG | RESPIRATION RATE: 16 BRPM

## 2024-09-30 DIAGNOSIS — L73.2 HIDRADENITIS: Primary | ICD-10-CM

## 2024-09-30 PROCEDURE — 97602 WOUND(S) CARE NON-SELECTIVE: CPT

## 2024-09-30 PROCEDURE — G0463 HOSPITAL OUTPT CLINIC VISIT: HCPCS

## 2024-09-30 NOTE — SIGNIFICANT NOTE
Wound Eval / Progress Noted     [facility]     Patient Name: Mariela Aldrich  : 1970  MRN: [id number]  Today's Date: 2024                 Admit Date: 2024    Visit Dx:    ICD-10-CM ICD-9-CM   1. Hidradenitis  L73.2 705.83         * No active hospital problems. *        Past Medical History:   Diagnosis Date    Anxiety     Asthma     PRN INHALER AND TAKES ALLERGY INJECTIONS    Diabetes mellitus type 2, controlled     AVERAGE     Hidradenitis     Hyperlipidemia     Hypothyroidism     Murmur     DIAGNOSED WHEN 17 BUT IS ASYMPTOMATIC AND IS FOLLOWED ONLY BY PCP    Panic attack     Pilonidal cyst     Rheumatic fever     AS A CHILD    RLS (restless legs syndrome)     Sinus trouble         Past Surgical History:   Procedure Laterality Date    COLONOSCOPY  2020    exernal hemorrhoids    EXCISION LESION Bilateral 2023    Procedure: Excision Hidradenitis of Bilateral Inguinal Areas;  Surgeon: Donato You MD;  Location: [facility];  Service: General;  Laterality: Bilateral;    EXCISION LESION N/A 2024    Procedure: DEBRIDEMENT OF ISCHIAL ULCER/BUTTOCKS WOUND;  Surgeon: Donato You MD;  Location: [facility] OR;  Service: General;  Laterality: N/A;    GROIN ABSCESS INCISION AND DRAINAGE Right     hidradenitis    HYSTERECTOMY  2010    KNEE SURGERY Bilateral 2013    ARTHROSCOPY    PILONIDAL CYSTECTOMY N/A 2023    Procedure: Excision Hidradenitis of Inguinal Area and Pilonidal Cystectomy;  Surgeon: Donato You MD;  Location: [facility];  Service: General;  Laterality: N/A;         Physical Assessment:  Wound 24 1410 coccyx Incision (Active)   Wound Image   24 1145   Dressing Appearance intact;dry 24 1145   Closure None 24 1145   Base red;moist 24 1145   Red (%), Wound Tissue Color 100 24 1145   Periwound intact;redness 24 1145   Periwound Temperature warm 24 1145   Periwound Skin Turgor soft 24 1145    Edges open 09/30/24 1145   Drainage Characteristics/Odor serosanguineous 09/30/24 1145   Drainage Amount scant 09/30/24 1145   Care, Wound cleansed with;sterile normal saline 09/30/24 1145   Dressing Care dressing applied;calcium alginate;silver impregnated;hydrofiber;silicone;border dressing 09/30/24 1145   Periwound Care absorptive dressing applied 09/30/24 1145       Wound 07/24/24 1406 gluteal Incision (Active)   Wound Image   09/30/24 1145   Dressing Appearance intact;dry 09/30/24 1145   Closure None 09/30/24 1145   Base red;moist 09/30/24 1145   Red (%), Wound Tissue Color 100 09/30/24 1145   Periwound intact;redness 09/30/24 1145   Periwound Temperature warm 09/30/24 1145   Periwound Skin Turgor soft 09/30/24 1145   Edges open 09/30/24 1145   Drainage Characteristics/Odor serosanguineous 09/30/24 1145   Drainage Amount scant 09/30/24 1145   Care, Wound cleansed with;sterile normal saline 09/30/24 1145   Dressing Care dressing applied;packed with;calcium alginate;silver impregnated;hydrofiber;silicone;border dressing 09/30/24 1145   Periwound Care absorptive dressing applied 09/30/24 1145       Wound 08/02/24 1421 sacral spine Incision (Active)   Wound Image   09/30/24 1145   Dressing Appearance intact;dry 09/30/24 1145   Closure None 09/30/24 1145   Base red;moist 09/30/24 1145   Red (%), Wound Tissue Color 100 09/30/24 1145   Periwound intact;redness 09/30/24 1145   Periwound Temperature warm 09/30/24 1145   Periwound Skin Turgor soft 09/30/24 1145   Edges open 09/30/24 1145   Drainage Characteristics/Odor serosanguineous 09/30/24 1145   Drainage Amount scant 09/30/24 1145   Care, Wound cleansed with;irrigated with;sterile normal saline 09/30/24 1145   Dressing Care dressing applied;packed with;calcium alginate;silver impregnated;hydrofiber;silicone;border dressing 09/30/24 1145   Periwound Care absorptive dressing applied 09/30/24 1145        Wound Check / Follow-up: Patient seen today for a wound check  and dressing change. Patient denies any issues over the weekend.    Patient did respond nicely to the silver nitrate application to the  hypergranular tissue to lower incision. Wound base is red and moist with a small amount of yellow tissue as well. Periwound is pink and soft. Cleansed and irrigated with NS. Filled the wound bed with calcium alginate and secured with a silicone border dressing.    Upper incision continues to respond to the current treatment. Wound edges and depth continue to contract and fill in. Wound base is red and moist. Recommending to continue current wound care as ordered.    Incision to the right lower gluteal aspect with red moist tissue. Tissue continues to respond to the current treatment albeit slowly. Will recommend to change current treatment with a strip of calcium alginate.     Impression: surgical incisions with delayed closure    Short term goals:  regain skin integrity, skin protection, daily dressing changes.     No Henriquez RN    9/30/2024    13:16 EDT

## 2024-09-30 NOTE — ADDENDUM NOTE
Encounter addended by: No Henriquez RN on: 9/30/2024 1:23 PM   Actions taken: Flowsheet data copied forward, Flowsheet accepted, Clinical Note Signed, Order list changed, Charge Capture section accepted, Therapy plan modified

## 2024-10-02 ENCOUNTER — HOSPITAL ENCOUNTER (OUTPATIENT)
Dept: INFUSION THERAPY | Facility: HOSPITAL | Age: 54
Discharge: HOME | End: 2024-10-02
Admitting: NURSE PRACTITIONER
Payer: COMMERCIAL

## 2024-10-02 VITALS
TEMPERATURE: 98.1 F | SYSTOLIC BLOOD PRESSURE: 122 MMHG | OXYGEN SATURATION: 100 % | DIASTOLIC BLOOD PRESSURE: 68 MMHG | HEART RATE: 93 BPM | RESPIRATION RATE: 20 BRPM

## 2024-10-02 DIAGNOSIS — L73.2 HIDRADENITIS: Primary | ICD-10-CM

## 2024-10-02 PROCEDURE — G0463 HOSPITAL OUTPT CLINIC VISIT: HCPCS

## 2024-10-02 PROCEDURE — 97602 WOUND(S) CARE NON-SELECTIVE: CPT

## 2024-10-04 ENCOUNTER — HOSPITAL ENCOUNTER (OUTPATIENT)
Dept: INFUSION THERAPY | Facility: HOSPITAL | Age: 54
Discharge: HOME | End: 2024-10-04
Payer: COMMERCIAL

## 2024-10-04 VITALS
SYSTOLIC BLOOD PRESSURE: 115 MMHG | HEART RATE: 101 BPM | DIASTOLIC BLOOD PRESSURE: 71 MMHG | TEMPERATURE: 97.7 F | OXYGEN SATURATION: 98 % | RESPIRATION RATE: 20 BRPM

## 2024-10-04 DIAGNOSIS — L73.2 HIDRADENITIS: Primary | ICD-10-CM

## 2024-10-04 PROCEDURE — G0463 HOSPITAL OUTPT CLINIC VISIT: HCPCS

## 2024-10-06 DIAGNOSIS — E11.65 TYPE 2 DIABETES MELLITUS WITH HYPERGLYCEMIA, WITH LONG-TERM CURRENT USE OF INSULIN: ICD-10-CM

## 2024-10-06 DIAGNOSIS — Z79.4 TYPE 2 DIABETES MELLITUS WITH HYPERGLYCEMIA, WITH LONG-TERM CURRENT USE OF INSULIN: ICD-10-CM

## 2024-10-06 DIAGNOSIS — J45.20 MILD INTERMITTENT ASTHMA WITHOUT COMPLICATION: ICD-10-CM

## 2024-10-07 ENCOUNTER — HOSPITAL ENCOUNTER (OUTPATIENT)
Dept: INFUSION THERAPY | Facility: HOSPITAL | Age: 54
Discharge: HOME | End: 2024-10-07
Admitting: SURGERY
Payer: COMMERCIAL

## 2024-10-07 VITALS
SYSTOLIC BLOOD PRESSURE: 112 MMHG | RESPIRATION RATE: 16 BRPM | OXYGEN SATURATION: 100 % | HEART RATE: 91 BPM | DIASTOLIC BLOOD PRESSURE: 72 MMHG | TEMPERATURE: 97.6 F

## 2024-10-07 DIAGNOSIS — L73.2 HIDRADENITIS: Primary | ICD-10-CM

## 2024-10-07 PROCEDURE — G0463 HOSPITAL OUTPT CLINIC VISIT: HCPCS

## 2024-10-07 PROCEDURE — 97602 WOUND(S) CARE NON-SELECTIVE: CPT

## 2024-10-07 RX ORDER — TIRZEPATIDE 7.5 MG/.5ML
INJECTION, SOLUTION SUBCUTANEOUS
Qty: 8 ML | Refills: 0 | Status: SHIPPED | OUTPATIENT
Start: 2024-10-07

## 2024-10-07 RX ORDER — FLUTICASONE PROPIONATE AND SALMETEROL 250; 50 UG/1; UG/1
POWDER RESPIRATORY (INHALATION)
Qty: 60 EACH | Refills: 0 | Status: SHIPPED | OUTPATIENT
Start: 2024-10-07

## 2024-10-07 NOTE — ADDENDUM NOTE
Encounter addended by: No Henriquez RN on: 10/7/2024 4:15 PM   Actions taken: Flowsheet data copied forward, Flowsheet accepted, Clinical Note Signed, Charge Capture section accepted

## 2024-10-07 NOTE — SIGNIFICANT NOTE
Wound Eval / Progress Noted     [facility]     Patient Name: Mariela Aldrich  : 1970  MRN: [id number]  Today's Date: 10/7/2024                 Admit Date: 10/7/2024    Visit Dx:    ICD-10-CM ICD-9-CM   1. Hidradenitis  L73.2 705.83         * No active hospital problems. *        Past Medical History:   Diagnosis Date    Anxiety     Asthma     PRN INHALER AND TAKES ALLERGY INJECTIONS    Diabetes mellitus type 2, controlled     AVERAGE     Hidradenitis     Hyperlipidemia     Hypothyroidism     Murmur     DIAGNOSED WHEN 17 BUT IS ASYMPTOMATIC AND IS FOLLOWED ONLY BY PCP    Panic attack     Pilonidal cyst     Rheumatic fever     AS A CHILD    RLS (restless legs syndrome)     Sinus trouble         Past Surgical History:   Procedure Laterality Date    COLONOSCOPY  2020    exernal hemorrhoids    EXCISION LESION Bilateral 2023    Procedure: Excision Hidradenitis of Bilateral Inguinal Areas;  Surgeon: Donato You MD;  Location: [facility] ([facility]);  Service: General;  Laterality: Bilateral;    EXCISION LESION N/A 2024    Procedure: DEBRIDEMENT OF ISCHIAL ULCER/BUTTOCKS WOUND;  Surgeon: Donato You MD;  Location: [facility] OR;  Service: General;  Laterality: N/A;    GROIN ABSCESS INCISION AND DRAINAGE Right     hidradenitis    HYSTERECTOMY  2010    KNEE SURGERY Bilateral 2013    ARTHROSCOPY    PILONIDAL CYSTECTOMY N/A 2023    Procedure: Excision Hidradenitis of Inguinal Area and Pilonidal Cystectomy;  Surgeon: Donato You MD;  Location: [facility] ([facility]);  Service: General;  Laterality: N/A;         Physical Assessment:  Wound 24 1410 coccyx Incision (Active)   Wound Image   10/07/24 1320   Dressing Appearance intact;dry 10/07/24 1320   Closure None 10/07/24 1320   Base red;moist;epithelialization 10/07/24 1320   Periwound intact;redness 10/07/24 1320   Periwound Temperature warm 10/07/24 1320   Periwound Skin Turgor soft 10/07/24 1320   Edges open 10/07/24 1320    Wound Length (cm) 4.5 cm 10/07/24 1320   Wound Width (cm) 0.4 cm 10/07/24 1320   Wound Depth (cm) 0.5 cm 10/07/24 1320   Wound Surface Area (cm^2) 1.8 cm^2 10/07/24 1320   Wound Volume (cm^3) 0.9 cm^3 10/07/24 1320   Drainage Characteristics/Odor serosanguineous 10/07/24 1320   Drainage Amount scant 10/07/24 1320   Care, Wound cleansed with;irrigated with;sterile normal saline 10/07/24 1320   Dressing Care dressing applied;silver impregnated;calcium alginate;hydrofiber;silicone;border dressing 10/07/24 1320   Periwound Care absorptive dressing applied 10/07/24 1320       Wound 07/24/24 1406 gluteal Incision (Active)   Wound Image   10/07/24 1320   Dressing Appearance moist drainage;intact 10/07/24 1320   Closure None 10/07/24 1320   Base red;moist 10/07/24 1320   Red (%), Wound Tissue Color 100 10/07/24 1320   Periwound intact;redness 10/07/24 1320   Periwound Temperature warm 10/07/24 1320   Periwound Skin Turgor soft 10/07/24 1320   Edges open 10/07/24 1320   Wound Length (cm) 1 cm 10/07/24 1320   Wound Width (cm) 0.2 cm 10/07/24 1320   Wound Depth (cm) 0.9 cm 10/07/24 1320   Wound Surface Area (cm^2) 0.2 cm^2 10/07/24 1320   Wound Volume (cm^3) 0.18 cm^3 10/07/24 1320   Tunneling [Depth (cm)/Location] tunneling at 2 o'clock at 2.0 cm 10/07/24 1320   Drainage Characteristics/Odor serosanguineous 10/07/24 1320   Drainage Amount scant 10/07/24 1320   Care, Wound cleansed with;irrigated with;sterile normal saline 10/07/24 1320   Dressing Care dressing applied;packed with;calcium alginate;silver impregnated;hydrofiber;silicone;border dressing 10/07/24 1320   Periwound Care absorptive dressing applied 10/07/24 1320       Wound 08/02/24 1421 sacral spine Incision (Active)   Wound Image   10/07/24 1320   Dressing Appearance intact;dry 10/07/24 1320   Closure None 10/07/24 1320   Base red;moist;epithelialization 10/07/24 1320   Red (%), Wound Tissue Color 100 10/07/24 1320   Periwound intact;redness 10/07/24 1320    Periwound Temperature warm 10/07/24 1320   Periwound Skin Turgor soft 10/07/24 1320   Edges open 10/07/24 1320   Wound Length (cm) 0.4 cm 10/07/24 1320   Wound Width (cm) 0.1 cm 10/07/24 1320   Wound Depth (cm) 0.2 cm 10/07/24 1320   Wound Surface Area (cm^2) 0.04 cm^2 10/07/24 1320   Wound Volume (cm^3) 0.008 cm^3 10/07/24 1320   Drainage Characteristics/Odor serosanguineous 10/07/24 1320   Drainage Amount scant 10/07/24 1320   Care, Wound cleansed with;sterile normal saline 10/07/24 1320   Dressing Care dressing applied;packed with;calcium alginate;silver impregnated;hydrofiber;silicone;border dressing 10/07/24 1320   Periwound Care absorptive dressing applied 10/07/24 1320        Wound Check / Follow-up: Patient seen today for wound check and dressing change.  Patient denies any issues over the weekend, spouse performs wound care in the absence of the wound nurse.    Dressing removed with no issues.  Upper sacral incision wound depth is filling in there is evidence of epithelialization present with minimal wound depth noted.  Wound base that is visible is red and moist.  Periwound is reddened and tissues intact.  Cleansed and irrigated with normal saline.  Filled the wound with a strip of calcium alginate silver impregnated Hydrofiber and secured with a silicone border dressing.    Lower incision with red moist tissue present; there is a small amount of hypergranulation noted to the upper aspect of the incision but overall has improved since the use of the silver nitrate.  Wound base continues to fill in and wound edges are ludy.  Cleansed with normal saline.  Applied calcium alginate silver impregnated Hydrofiber to the wound base and secured with silicone border dressing.  Recommending to continue current wound care as the wound is responding to the treatment at this time.    Right gluteal incision remains open with depth noted at 12 o'clock.  Wound base remains red and moist.  Periwound is soft and  intact.  Cleansed and irrigated with normal saline.  Filled the wound base with a strip of calcium alginate silver impregnated Hydrofiber and secured with a silicone border dressing.  Will recommend to continue current wound care.    Impression: surgical incisions with delayed closure    Short term goals: Regain skin integrity, skin protection, daily dressing change, quality skin care and hygiene.    No Henriquez RN    10/7/2024    16:11 EDT

## 2024-10-09 ENCOUNTER — HOSPITAL ENCOUNTER (OUTPATIENT)
Dept: INFUSION THERAPY | Facility: HOSPITAL | Age: 54
Discharge: HOME | End: 2024-10-09
Admitting: NURSE PRACTITIONER
Payer: COMMERCIAL

## 2024-10-09 VITALS
OXYGEN SATURATION: 100 % | TEMPERATURE: 98 F | DIASTOLIC BLOOD PRESSURE: 74 MMHG | SYSTOLIC BLOOD PRESSURE: 115 MMHG | RESPIRATION RATE: 20 BRPM | HEART RATE: 95 BPM

## 2024-10-09 DIAGNOSIS — L73.2 HIDRADENITIS: Primary | ICD-10-CM

## 2024-10-09 PROCEDURE — G0463 HOSPITAL OUTPT CLINIC VISIT: HCPCS

## 2024-10-09 PROCEDURE — 97602 WOUND(S) CARE NON-SELECTIVE: CPT

## 2024-10-11 ENCOUNTER — HOSPITAL ENCOUNTER (OUTPATIENT)
Dept: INFUSION THERAPY | Facility: HOSPITAL | Age: 54
Discharge: HOME | End: 2024-10-11
Payer: COMMERCIAL

## 2024-10-11 VITALS
SYSTOLIC BLOOD PRESSURE: 130 MMHG | HEART RATE: 106 BPM | TEMPERATURE: 98.1 F | OXYGEN SATURATION: 96 % | DIASTOLIC BLOOD PRESSURE: 70 MMHG | RESPIRATION RATE: 20 BRPM

## 2024-10-11 DIAGNOSIS — L73.2 HIDRADENITIS: Primary | ICD-10-CM

## 2024-10-11 PROCEDURE — G0463 HOSPITAL OUTPT CLINIC VISIT: HCPCS

## 2024-10-13 DIAGNOSIS — E11.65 TYPE 2 DIABETES MELLITUS WITH HYPERGLYCEMIA, WITH LONG-TERM CURRENT USE OF INSULIN: ICD-10-CM

## 2024-10-13 DIAGNOSIS — Z79.4 TYPE 2 DIABETES MELLITUS WITH HYPERGLYCEMIA, WITH LONG-TERM CURRENT USE OF INSULIN: ICD-10-CM

## 2024-10-14 ENCOUNTER — HOSPITAL ENCOUNTER (OUTPATIENT)
Dept: INFUSION THERAPY | Facility: HOSPITAL | Age: 54
Discharge: HOME | End: 2024-10-14
Admitting: NURSE PRACTITIONER
Payer: COMMERCIAL

## 2024-10-14 VITALS
DIASTOLIC BLOOD PRESSURE: 76 MMHG | OXYGEN SATURATION: 98 % | HEART RATE: 104 BPM | SYSTOLIC BLOOD PRESSURE: 137 MMHG | RESPIRATION RATE: 20 BRPM | TEMPERATURE: 98.2 F

## 2024-10-14 DIAGNOSIS — L73.2 HIDRADENITIS: Primary | ICD-10-CM

## 2024-10-14 PROCEDURE — G0463 HOSPITAL OUTPT CLINIC VISIT: HCPCS

## 2024-10-14 PROCEDURE — 97602 WOUND(S) CARE NON-SELECTIVE: CPT

## 2024-10-14 RX ORDER — PEN NEEDLE, DIABETIC 29 G X1/2"
NEEDLE, DISPOSABLE MISCELLANEOUS
Qty: 100 EACH | Refills: 0 | Status: SHIPPED | OUTPATIENT
Start: 2024-10-14

## 2024-10-16 ENCOUNTER — HOSPITAL ENCOUNTER (OUTPATIENT)
Dept: INFUSION THERAPY | Facility: HOSPITAL | Age: 54
Discharge: HOME | End: 2024-10-16
Admitting: NURSE PRACTITIONER
Payer: COMMERCIAL

## 2024-10-16 VITALS
SYSTOLIC BLOOD PRESSURE: 128 MMHG | TEMPERATURE: 97.8 F | RESPIRATION RATE: 20 BRPM | DIASTOLIC BLOOD PRESSURE: 76 MMHG | OXYGEN SATURATION: 100 % | HEART RATE: 97 BPM

## 2024-10-16 DIAGNOSIS — L73.2 HIDRADENITIS: Primary | ICD-10-CM

## 2024-10-16 PROCEDURE — G0463 HOSPITAL OUTPT CLINIC VISIT: HCPCS

## 2024-10-16 PROCEDURE — 97602 WOUND(S) CARE NON-SELECTIVE: CPT

## 2024-10-18 ENCOUNTER — HOSPITAL ENCOUNTER (OUTPATIENT)
Dept: INFUSION THERAPY | Facility: HOSPITAL | Age: 54
Discharge: HOME | End: 2024-10-18
Payer: COMMERCIAL

## 2024-10-18 VITALS
TEMPERATURE: 98.3 F | RESPIRATION RATE: 20 BRPM | HEART RATE: 98 BPM | SYSTOLIC BLOOD PRESSURE: 129 MMHG | OXYGEN SATURATION: 97 % | DIASTOLIC BLOOD PRESSURE: 70 MMHG

## 2024-10-18 DIAGNOSIS — L73.2 HIDRADENITIS: Primary | ICD-10-CM

## 2024-10-18 PROCEDURE — G0463 HOSPITAL OUTPT CLINIC VISIT: HCPCS

## 2024-10-18 NOTE — SIGNIFICANT NOTE
Wound Eval / Progress Noted    [facility]     Patient Name: Mariela Aldrich  : 1970  MRN: [id number]  Today's Date: 10/18/2024                 Admit Date: 10/18/2024    Visit Dx:  No diagnosis found.      * No active hospital problems. *        Past Medical History:   Diagnosis Date    Anxiety     Asthma     PRN INHALER AND TAKES ALLERGY INJECTIONS    Diabetes mellitus type 2, controlled     AVERAGE     Hidradenitis     Hyperlipidemia     Hypothyroidism     Murmur     DIAGNOSED WHEN 17 BUT IS ASYMPTOMATIC AND IS FOLLOWED ONLY BY PCP    Panic attack     Pilonidal cyst     Rheumatic fever     AS A CHILD    RLS (restless legs syndrome)     Sinus trouble         Past Surgical History:   Procedure Laterality Date    COLONOSCOPY  2020    exernal hemorrhoids    EXCISION LESION Bilateral 2023    Procedure: Excision Hidradenitis of Bilateral Inguinal Areas;  Surgeon: Donato You MD;  Location: [facility];  Service: General;  Laterality: Bilateral;    EXCISION LESION N/A 2024    Procedure: DEBRIDEMENT OF ISCHIAL ULCER/BUTTOCKS WOUND;  Surgeon: Donato You MD;  Location: [facility] OR;  Service: General;  Laterality: N/A;    GROIN ABSCESS INCISION AND DRAINAGE Right     hidradenitis    HYSTERECTOMY  2010    KNEE SURGERY Bilateral 2013    ARTHROSCOPY    PILONIDAL CYSTECTOMY N/A 2023    Procedure: Excision Hidradenitis of Inguinal Area and Pilonidal Cystectomy;  Surgeon: Donato You MD;  Location: [facility];  Service: General;  Laterality: N/A;         Physical Assessment:  Wound 24 1410 coccyx Incision (Active)   Wound Image   10/18/24 1345   Dressing Appearance moist drainage;intact 10/18/24 1345   Closure None 10/18/24 1345   Base red;moist;epithelialization;hypergranulation 10/18/24 1345   Red (%), Wound Tissue Color 100 10/18/24 1345   Periwound intact;redness 10/18/24 1345   Periwound Temperature warm 10/18/24 1345   Periwound Skin Turgor soft  10/18/24 1345   Edges open 10/18/24 1345   Wound Length (cm) 4.4 cm 10/18/24 1345   Wound Width (cm) 0.4 cm 10/18/24 1345   Wound Depth (cm) 0.4 cm 10/18/24 1345   Wound Surface Area (cm^2) 1.76 cm^2 10/18/24 1345   Wound Volume (cm^3) 0.704 cm^3 10/18/24 1345   Drainage Characteristics/Odor serosanguineous 10/18/24 1345   Drainage Amount small 10/18/24 1345   Care, Wound cleansed with;irrigated with;sterile normal saline 10/18/24 1345   Dressing Care dressing applied;collagen;calcium alginate;silver impregnated;hydrofiber;silicone border foam 10/18/24 1345   Periwound Care absorptive dressing applied 10/18/24 1345       Wound 07/24/24 1406 gluteal Incision (Active)   Wound Image   10/18/24 1345   Dressing Appearance intact;dry 10/18/24 1345   Closure None 10/18/24 1345   Base red;moist 10/18/24 1345   Red (%), Wound Tissue Color 100 10/18/24 1345   Periwound intact;redness 10/18/24 1345   Periwound Temperature warm 10/18/24 1345   Periwound Skin Turgor soft 10/18/24 1345   Edges open 10/18/24 1345   Drainage Characteristics/Odor serosanguineous 10/18/24 1345   Drainage Amount scant 10/18/24 1345   Care, Wound cleansed with;irrigated with;sterile normal saline 10/18/24 1345   Dressing Care dressing applied;calcium alginate;packed with;silver impregnated;hydrofiber;silicone border foam 10/18/24 1345   Periwound Care absorptive dressing applied 10/18/24 1345       Wound 08/02/24 1421 sacral spine Incision (Active)   Wound Image   10/18/24 1345   Dressing Appearance moist drainage;intact 10/18/24 1345   Closure None 10/18/24 1345   Base red;moist;epithelialization 10/18/24 1345   Red (%), Wound Tissue Color 100 10/18/24 1345   Periwound intact;redness 10/18/24 1345   Periwound Temperature warm 10/18/24 1345   Periwound Skin Turgor soft 10/18/24 1345   Edges open 10/18/24 1345   Wound Length (cm) 1 cm 10/18/24 1345   Wound Width (cm) 0.3 cm 10/18/24 1345   Wound Depth (cm) 1 cm 10/18/24 1345   Wound Surface Area (cm^2)  0.3 cm^2 10/18/24 1345   Wound Volume (cm^3) 0.3 cm^3 10/18/24 1345   Drainage Characteristics/Odor serosanguineous 10/18/24 1345   Drainage Amount scant 10/18/24 1345   Care, Wound cleansed with;irrigated with;sterile normal saline 10/18/24 1345   Dressing Care dressing applied;packed with;calcium alginate;silver impregnated;hydrofiber;silicone border foam 10/18/24 1345   Periwound Care absorptive dressing applied 10/18/24 1345        Wound Check / Follow-up: Patient seen today for a wound check and dressing change.  Patient denies any issues with wound dressings; spouse performs dressing care on days the patient does not come to [facility] for wound care.    Wound bases to all wounds are red  and moist, there is some epithelialization to the wound edges present.  There is also some hypergranulation noted to the wound edges of the incision to the sacrum and coccyx. Patient would benefit from the application of the silver nitrate to the hypergranular tissue. Cleansed with NS and gauze. Filled the wound bases with collagen and silver impregnated calcium alginate. Recommending to continue current wound care at this time.    Right lower gluteal incision remains unchanged; patient would benefit from the application of collagen to the wound base. Wound is red and moist. There is epibole present to the wound edges as well. Periwound is soft and intact. Cleansed and irrigated with NS. Filled the wound with collagen and a strip of calcium alginate silver impregnated hydrofiber.     Impression: surgical incisions with delayed closure     Short term goals:  regain skin integrity, skin protection, daily dressing changes.    No Henriquez RN    10/18/2024    14:00 EDT

## 2024-10-21 ENCOUNTER — HOSPITAL ENCOUNTER (OUTPATIENT)
Dept: INFUSION THERAPY | Facility: HOSPITAL | Age: 54
Discharge: HOME | End: 2024-10-21
Admitting: SURGERY
Payer: COMMERCIAL

## 2024-10-21 VITALS
TEMPERATURE: 98.7 F | SYSTOLIC BLOOD PRESSURE: 122 MMHG | OXYGEN SATURATION: 98 % | HEART RATE: 102 BPM | RESPIRATION RATE: 20 BRPM | DIASTOLIC BLOOD PRESSURE: 72 MMHG

## 2024-10-21 DIAGNOSIS — L73.2 HIDRADENITIS: Primary | ICD-10-CM

## 2024-10-21 PROCEDURE — G0463 HOSPITAL OUTPT CLINIC VISIT: HCPCS

## 2024-10-21 PROCEDURE — 97602 WOUND(S) CARE NON-SELECTIVE: CPT

## 2024-10-21 RX ADMIN — SILVER NITRATE APPLICATORS 1 APPLICATION: 25; 75 STICK TOPICAL at 13:22

## 2024-10-21 NOTE — SIGNIFICANT NOTE
Wound Eval / Progress Noted     [facility]     Patient Name: Mariela Aldrich  : 1970  MRN: [id number]  Today's Date: 10/21/2024                 Admit Date: 10/21/2024    Visit Dx:    ICD-10-CM ICD-9-CM   1. Hidradenitis  L73.2 705.83         * No active hospital problems. *        Past Medical History:   Diagnosis Date    Anxiety     Asthma     PRN INHALER AND TAKES ALLERGY INJECTIONS    Diabetes mellitus type 2, controlled     AVERAGE     Hidradenitis     Hyperlipidemia     Hypothyroidism     Murmur     DIAGNOSED WHEN 17 BUT IS ASYMPTOMATIC AND IS FOLLOWED ONLY BY PCP    Panic attack     Pilonidal cyst     Rheumatic fever     AS A CHILD    RLS (restless legs syndrome)     Sinus trouble         Past Surgical History:   Procedure Laterality Date    COLONOSCOPY  2020    exernal hemorrhoids    EXCISION LESION Bilateral 2023    Procedure: Excision Hidradenitis of Bilateral Inguinal Areas;  Surgeon: Donato You MD;  Location: [facility];  Service: General;  Laterality: Bilateral;    EXCISION LESION N/A 2024    Procedure: DEBRIDEMENT OF ISCHIAL ULCER/BUTTOCKS WOUND;  Surgeon: Donato You MD;  Location: [facility] OR;  Service: General;  Laterality: N/A;    GROIN ABSCESS INCISION AND DRAINAGE Right     hidradenitis    HYSTERECTOMY  2010    KNEE SURGERY Bilateral 2013    ARTHROSCOPY    PILONIDAL CYSTECTOMY N/A 2023    Procedure: Excision Hidradenitis of Inguinal Area and Pilonidal Cystectomy;  Surgeon: Donato You MD;  Location: [facility];  Service: General;  Laterality: N/A;         Physical Assessment:  Wound 24 1410 coccyx Incision (Active)   Wound Image   10/21/24 1330   Dressing Appearance moist drainage;intact 10/21/24 1330   Closure None 10/21/24 1330   Base red;moist;hypergranulation 10/21/24 1330   Periwound dry;intact;redness 10/21/24 1330   Periwound Temperature warm 10/21/24 1330   Periwound Skin Turgor soft 10/21/24 1330   Edges open  10/21/24 1330   Wound Length (cm) 3.9 cm 10/21/24 1330   Wound Width (cm) 0.5 cm 10/21/24 1330   Wound Depth (cm) 0.4 cm 10/21/24 1330   Wound Surface Area (cm^2) 1.95 cm^2 10/21/24 1330   Wound Volume (cm^3) 0.78 cm^3 10/21/24 1330   Drainage Characteristics/Odor serosanguineous 10/21/24 1330   Drainage Amount small 10/21/24 1330   Care, Wound cleansed with;irrigated with;sterile normal saline;silver agent applied 10/21/24 1330   Dressing Care dressing applied;calcium alginate;silver impregnated;silicone border foam 10/21/24 1330   Periwound Care absorptive dressing applied 10/21/24 1330       Wound 07/24/24 1406 gluteal Incision (Active)   Wound Image   10/21/24 1330   Dressing Appearance dry;intact 10/21/24 1330   Closure None 10/21/24 1330   Base red;moist 10/21/24 1330   Periwound dry;intact 10/21/24 1330   Periwound Temperature warm 10/21/24 1330   Periwound Skin Turgor soft 10/21/24 1330   Edges open 10/21/24 1330   Wound Length (cm) 1.1 cm 10/21/24 1330   Wound Width (cm) 0.2 cm 10/21/24 1330   Wound Depth (cm) 0.5 cm 10/21/24 1330   Wound Surface Area (cm^2) 0.22 cm^2 10/21/24 1330   Wound Volume (cm^3) 0.11 cm^3 10/21/24 1330   Drainage Characteristics/Odor serosanguineous 10/21/24 1330   Drainage Amount scant 10/21/24 1330   Care, Wound cleansed with;irrigated with;sterile normal saline 10/21/24 1330   Dressing Care dressing applied;packed with;calcium alginate;silver impregnated;silicone border foam 10/21/24 1330   Periwound Care absorptive dressing applied 10/21/24 1330       Wound 08/02/24 1421 sacral spine Incision (Active)   Wound Image   10/21/24 1330   Dressing Appearance moist drainage;intact 10/21/24 1330   Closure None 10/21/24 1330   Base moist;red;hypergranulation 10/21/24 1330   Periwound dry;intact;redness 10/21/24 1330   Periwound Temperature warm 10/21/24 1330   Periwound Skin Turgor soft 10/21/24 1330   Edges open 10/21/24 1330   Wound Length (cm) 0.8 cm 10/21/24 1330   Wound Width (cm)  0.3 cm 10/21/24 1330   Wound Depth (cm) 1 cm 10/21/24 1330   Wound Surface Area (cm^2) 0.24 cm^2 10/21/24 1330   Wound Volume (cm^3) 0.24 cm^3 10/21/24 1330   Drainage Characteristics/Odor serosanguineous 10/21/24 1330   Drainage Amount small 10/21/24 1330   Care, Wound cleansed with;irrigated with;sterile normal saline;silver agent applied 10/21/24 1330   Dressing Care dressing applied;calcium alginate;silver impregnated;silicone border foam;hydrocolloid 10/21/24 1330   Periwound Care absorptive dressing applied 10/21/24 1330      Wound Check / Follow-up: Patient seen today for wound follow-up and dressing change in outpatient nursing services.  Patient's spouse continues to perform dressing changes in absence of nursing staff.    Wound bases all with red, moist tissue.  Hypergranulation is noted along wound margins to upper and lower gluteal crease incisions.  Cleansed and irrigated all wounds with normal saline.  Silver nitrate applied to hyper granulated tissue.  Filled all wound bases with silver impregnated calcium alginate then secured with silicone border dressing.  Strip of hydrocolloid dressing added to aid in securement/seal.  Recommending to continue current treatment at this time.      Impression: Surgical incisions with delayed closure.      Short term goals: Regain skin integrity, skin protection, daily dressing changes.    Lanette Cantu RN    10/21/2024    16:41 EDT

## 2024-10-21 NOTE — ADDENDUM NOTE
Encounter addended by: Lanette Cantu RN on: 10/21/2024 4:44 PM   Actions taken: Flowsheet data copied forward, Flowsheet accepted, Clinical Note Signed, Charge Capture section accepted, Therapy plan modified

## 2024-10-23 ENCOUNTER — HOSPITAL ENCOUNTER (OUTPATIENT)
Dept: INFUSION THERAPY | Facility: HOSPITAL | Age: 54
Discharge: HOME | End: 2024-10-23
Admitting: SURGERY
Payer: COMMERCIAL

## 2024-10-23 VITALS
DIASTOLIC BLOOD PRESSURE: 69 MMHG | OXYGEN SATURATION: 97 % | RESPIRATION RATE: 20 BRPM | SYSTOLIC BLOOD PRESSURE: 124 MMHG | TEMPERATURE: 98.1 F | HEART RATE: 91 BPM

## 2024-10-23 DIAGNOSIS — L73.2 HIDRADENITIS: Primary | ICD-10-CM

## 2024-10-23 PROCEDURE — 97602 WOUND(S) CARE NON-SELECTIVE: CPT

## 2024-10-23 PROCEDURE — G0463 HOSPITAL OUTPT CLINIC VISIT: HCPCS

## 2024-10-25 ENCOUNTER — HOSPITAL ENCOUNTER (OUTPATIENT)
Dept: INFUSION THERAPY | Facility: HOSPITAL | Age: 54
Discharge: HOME | End: 2024-10-25
Payer: COMMERCIAL

## 2024-10-25 VITALS
OXYGEN SATURATION: 98 % | HEART RATE: 96 BPM | DIASTOLIC BLOOD PRESSURE: 70 MMHG | RESPIRATION RATE: 20 BRPM | TEMPERATURE: 98.3 F | SYSTOLIC BLOOD PRESSURE: 119 MMHG

## 2024-10-25 DIAGNOSIS — L73.2 HIDRADENITIS: Primary | ICD-10-CM

## 2024-10-25 PROCEDURE — G0463 HOSPITAL OUTPT CLINIC VISIT: HCPCS

## 2024-10-28 ENCOUNTER — HOSPITAL ENCOUNTER (OUTPATIENT)
Dept: INFUSION THERAPY | Facility: HOSPITAL | Age: 54
End: 2024-10-28
Payer: COMMERCIAL

## 2024-10-28 DIAGNOSIS — L73.2 HIDRADENITIS: Primary | ICD-10-CM

## 2024-10-30 ENCOUNTER — HOSPITAL ENCOUNTER (OUTPATIENT)
Dept: INFUSION THERAPY | Facility: HOSPITAL | Age: 54
Discharge: HOME | End: 2024-10-30
Admitting: SURGERY
Payer: COMMERCIAL

## 2024-10-30 VITALS
SYSTOLIC BLOOD PRESSURE: 132 MMHG | RESPIRATION RATE: 20 BRPM | HEART RATE: 98 BPM | DIASTOLIC BLOOD PRESSURE: 77 MMHG | TEMPERATURE: 97.8 F | OXYGEN SATURATION: 99 %

## 2024-10-30 DIAGNOSIS — L73.2 HIDRADENITIS: Primary | ICD-10-CM

## 2024-10-30 PROCEDURE — 97602 WOUND(S) CARE NON-SELECTIVE: CPT

## 2024-10-30 PROCEDURE — G0463 HOSPITAL OUTPT CLINIC VISIT: HCPCS

## 2024-10-30 NOTE — SIGNIFICANT NOTE
Wound Eval / Progress Noted    [facility]     Patient Name: Mariela Aldrich  : 1970  MRN: [id number]  Today's Date: 10/30/2024                 Admit Date: 10/30/2024    Visit Dx:    ICD-10-CM ICD-9-CM   1. Hidradenitis  L73.2 705.83         * No active hospital problems. *        Past Medical History:   Diagnosis Date    Anxiety     Asthma     PRN INHALER AND TAKES ALLERGY INJECTIONS    Diabetes mellitus type 2, controlled     AVERAGE     Hidradenitis     Hyperlipidemia     Hypothyroidism     Murmur     DIAGNOSED WHEN 17 BUT IS ASYMPTOMATIC AND IS FOLLOWED ONLY BY PCP    Panic attack     Pilonidal cyst     Rheumatic fever     AS A CHILD    RLS (restless legs syndrome)     Sinus trouble         Past Surgical History:   Procedure Laterality Date    COLONOSCOPY  2020    exernal hemorrhoids    EXCISION LESION Bilateral 2023    Procedure: Excision Hidradenitis of Bilateral Inguinal Areas;  Surgeon: Donato You MD;  Location: [facility];  Service: General;  Laterality: Bilateral;    EXCISION LESION N/A 2024    Procedure: DEBRIDEMENT OF ISCHIAL ULCER/BUTTOCKS WOUND;  Surgeon: Donato You MD;  Location: [facility] OR;  Service: General;  Laterality: N/A;    GROIN ABSCESS INCISION AND DRAINAGE Right     hidradenitis    HYSTERECTOMY  2010    KNEE SURGERY Bilateral 2013    ARTHROSCOPY    PILONIDAL CYSTECTOMY N/A 2023    Procedure: Excision Hidradenitis of Inguinal Area and Pilonidal Cystectomy;  Surgeon: Donato You MD;  Location: [facility];  Service: General;  Laterality: N/A;         Physical Assessment:  Wound 24 1410 coccyx Incision (Active)   Wound Image   10/30/24 1315   Dressing Appearance intact;dry 10/30/24 1315   Closure None 10/30/24 1315   Base red;moist;hypergranulation 10/30/24 1315   Red (%), Wound Tissue Color 100 10/30/24 1315   Periwound dry;intact;redness 10/30/24 1315   Periwound Temperature warm 10/30/24 1315   Periwound Skin  Turgor soft 10/30/24 1315   Edges open 10/30/24 1315   Wound Length (cm) 4 cm 10/30/24 1315   Wound Width (cm) 0.3 cm 10/30/24 1315   Wound Depth (cm) 0.4 cm 10/30/24 1315   Wound Surface Area (cm^2) 1.2 cm^2 10/30/24 1315   Wound Volume (cm^3) 0.48 cm^3 10/30/24 1315   Drainage Characteristics/Odor serosanguineous 10/30/24 1315   Drainage Amount small 10/30/24 1315   Care, Wound cleansed with;irrigated with;sterile normal saline 10/30/24 1315   Dressing Care dressing applied;calcium alginate;silver impregnated;hydrofiber;silicone border foam 10/30/24 1315   Periwound Care absorptive dressing applied 10/30/24 1315       Wound 07/24/24 1406 gluteal Incision (Active)   Wound Image   10/30/24 1315   Dressing Appearance intact;dry 10/30/24 1315   Closure None 10/30/24 1315   Base red;moist 10/30/24 1315   Red (%), Wound Tissue Color 100 10/30/24 1315   Periwound dry;intact 10/30/24 1315   Periwound Temperature warm 10/30/24 1315   Periwound Skin Turgor soft 10/30/24 1315   Edges open 10/30/24 1315   Wound Length (cm) 1.3 cm 10/30/24 1315   Wound Width (cm) 0.2 cm 10/30/24 1315   Wound Depth (cm) 0.5 cm 10/30/24 1315   Wound Surface Area (cm^2) 0.26 cm^2 10/30/24 1315   Wound Volume (cm^3) 0.13 cm^3 10/30/24 1315   Drainage Characteristics/Odor serosanguineous 10/30/24 1315   Drainage Amount scant 10/30/24 1315   Care, Wound cleansed with;irrigated with;sterile normal saline 10/30/24 1315   Dressing Care dressing applied;calcium alginate;silver impregnated;hydrofiber;silicone border foam 10/30/24 1315   Periwound Care absorptive dressing applied 10/30/24 1315       Wound 08/02/24 1421 sacral spine Incision (Active)   Wound Image   10/30/24 1315   Dressing Appearance intact;dry 10/30/24 1315   Closure None 10/30/24 1315   Base red;moist 10/30/24 1315   Red (%), Wound Tissue Color 100 10/30/24 1315   Periwound dry;intact;redness 10/30/24 1315   Periwound Temperature warm 10/30/24 1315   Periwound Skin Turgor soft 10/30/24  1315   Edges open 10/30/24 1315   Wound Length (cm) 0.4 cm 10/30/24 1315   Wound Width (cm) 0.2 cm 10/30/24 1315   Wound Depth (cm) 0.1 cm 10/30/24 1315   Wound Surface Area (cm^2) 0.08 cm^2 10/30/24 1315   Wound Volume (cm^3) 0.008 cm^3 10/30/24 1315   Drainage Characteristics/Odor serosanguineous 10/30/24 1315   Drainage Amount scant 10/30/24 1315   Care, Wound cleansed with;sterile normal saline 10/30/24 1315   Dressing Care dressing applied;calcium alginate;silver impregnated;hydrofiber;silicone border foam 10/30/24 1315   Periwound Care absorptive dressing applied 10/30/24 1315        Wound Check / Follow-up:  Patient seen today for a wound check and dressing change. Patient denies any issues with the wound or with dressing changes.    Wound base to the sacral incision is improving and responding to treatment. Wound edges are ludy and wound base is filling in. Minimal wound depth noted. Incision noted to the coccyx present with hypergranulation to the wound edges from 12-3 o'clock. Wound base is red and moist, wound depth is filling in. Cleansed all wounds with NS. Covered the open tissue with silver impregnated calcium alginate. Will recommend to continue current wound care at this time. Plan to apply silver nitrate to the wound next visit with the [facility].    Right lower gluteal wound with moist red wound base. Epibole remains present to the wound edges. Patient would benefit from having this wound silver nitrated as well. Periwound is soft and intact. Cleansed and irrigated with NS. Filled the open tissue with silver impregnated calcium alginate. Will recommend to continue current wound care at this time.     Impression: surgical incisions with delayed closure    Short term goals:  regain skin integrity, skin protection, daily dressing changes    No Henriquez RN    10/30/2024    13:19 EDT

## 2024-11-01 ENCOUNTER — HOSPITAL ENCOUNTER (OUTPATIENT)
Dept: INFUSION THERAPY | Facility: HOSPITAL | Age: 54
Discharge: HOME | End: 2024-11-01
Payer: COMMERCIAL

## 2024-11-05 ENCOUNTER — HOSPITAL ENCOUNTER (OUTPATIENT)
Dept: INFUSION THERAPY | Facility: HOSPITAL | Age: 54
Discharge: HOME | End: 2024-11-05
Admitting: SURGERY
Payer: COMMERCIAL

## 2024-11-05 VITALS
TEMPERATURE: 98.3 F | RESPIRATION RATE: 20 BRPM | DIASTOLIC BLOOD PRESSURE: 76 MMHG | SYSTOLIC BLOOD PRESSURE: 142 MMHG | HEART RATE: 93 BPM | OXYGEN SATURATION: 97 %

## 2024-11-05 DIAGNOSIS — L73.2 HIDRADENITIS: Primary | ICD-10-CM

## 2024-11-05 PROCEDURE — 97602 WOUND(S) CARE NON-SELECTIVE: CPT

## 2024-11-05 PROCEDURE — G0463 HOSPITAL OUTPT CLINIC VISIT: HCPCS

## 2024-11-05 RX ADMIN — SILVER NITRATE APPLICATORS 1 APPLICATION: 25; 75 STICK TOPICAL at 12:26

## 2024-11-05 NOTE — SIGNIFICANT NOTE
Wound Eval / Progress Noted     [facility]     Patient Name: Mariela Aldrich  : 1970  MRN: [id number]  Today's Date: 2024                 Admit Date: 2024    Visit Dx:    ICD-10-CM ICD-9-CM   1. Hidradenitis  L73.2 705.83         * No active hospital problems. *        Past Medical History:   Diagnosis Date    Anxiety     Asthma     PRN INHALER AND TAKES ALLERGY INJECTIONS    Diabetes mellitus type 2, controlled     AVERAGE     Hidradenitis     Hyperlipidemia     Hypothyroidism     Murmur     DIAGNOSED WHEN 17 BUT IS ASYMPTOMATIC AND IS FOLLOWED ONLY BY PCP    Panic attack     Pilonidal cyst     Rheumatic fever     AS A CHILD    RLS (restless legs syndrome)     Sinus trouble         Past Surgical History:   Procedure Laterality Date    COLONOSCOPY  2020    exernal hemorrhoids    EXCISION LESION Bilateral 2023    Procedure: Excision Hidradenitis of Bilateral Inguinal Areas;  Surgeon: Donato You MD;  Location: [facility];  Service: General;  Laterality: Bilateral;    EXCISION LESION N/A 2024    Procedure: DEBRIDEMENT OF ISCHIAL ULCER/BUTTOCKS WOUND;  Surgeon: Donato You MD;  Location: [facility] OR;  Service: General;  Laterality: N/A;    GROIN ABSCESS INCISION AND DRAINAGE Right     hidradenitis    HYSTERECTOMY  2010    KNEE SURGERY Bilateral 2013    ARTHROSCOPY    PILONIDAL CYSTECTOMY N/A 2023    Procedure: Excision Hidradenitis of Inguinal Area and Pilonidal Cystectomy;  Surgeon: Donato You MD;  Location: [facility];  Service: General;  Laterality: N/A;         Physical Assessment:  Wound 24 1410 coccyx Incision (Active)   Wound Image   24 1230   Dressing Appearance intact;dry 24 1230   Closure None 24 1230   Base red;moist;hypergranulation 24 1230   Red (%), Wound Tissue Color 100 24 1230   Periwound dry;intact;redness 24 1230   Periwound Temperature warm 24 1230   Periwound Skin Turgor  soft 11/05/24 1230   Edges open 11/05/24 1230   Wound Length (cm) 3.7 cm 11/05/24 1230   Wound Width (cm) 0.3 cm 11/05/24 1230   Wound Depth (cm) 0.2 cm 11/05/24 1230   Wound Surface Area (cm^2) 1.11 cm^2 11/05/24 1230   Wound Volume (cm^3) 0.222 cm^3 11/05/24 1230   Drainage Characteristics/Odor serosanguineous 11/05/24 1230   Drainage Amount scant 11/05/24 1230   Care, Wound cleansed with;sterile normal saline;silver agent applied 11/05/24 1230   Dressing Care dressing applied;calcium alginate;silver impregnated;hydrofiber;silicone border foam 11/05/24 1230       Wound 07/24/24 1406 gluteal Incision (Active)   Wound Image   11/05/24 1230   Dressing Appearance intact;dry 11/05/24 1230   Closure None 11/05/24 1230   Base pink;dry 11/05/24 1230   Periwound dry;intact 11/05/24 1230   Periwound Temperature warm 11/05/24 1230   Periwound Skin Turgor soft 11/05/24 1230   Edges open 11/05/24 1230   Wound Length (cm) 1.3 cm 11/05/24 1230   Wound Width (cm) 0.2 cm 11/05/24 1230   Wound Depth (cm) 0.5 cm 11/05/24 1230   Wound Surface Area (cm^2) 0.26 cm^2 11/05/24 1230   Wound Volume (cm^3) 0.13 cm^3 11/05/24 1230   Drainage Amount none 11/05/24 1230   Care, Wound cleansed with;irrigated with;sterile normal saline;silver agent applied 11/05/24 1230   Dressing Care dressing applied;packed with;calcium alginate;silver impregnated;hydrofiber;silicone border foam 11/05/24 1230   Periwound Care absorptive dressing applied 11/05/24 1230        Wound Check / Follow-up:  Patient seen today for wound check and dressing change. Patient is going out of town Thursday to Tuesday; she is also going to be see at the wound clinic tomorrow.    Wound base to the sacrum is currently closed and resurfaced.    Incision to coccyx remains open with pink to red moist wound base. Hypergranulation noted from 1-2 o'clock. Wound base is continuing to respond to the current treatment albeit slow to response. Cleansed with NS. Applied silver nitrate to  the wound base and hypergranular tissue. Then covered with calcium alginate silver impregnated hydrofiber and secured with silicone border dressing. Will recommend to continue current wound care at this time.    Right lower gluteal wound with pink dry wound base, pink epithelium noted. Epibole remains present to the wound edges. Periwound is soft and intact. Cleansed and irrigated with NS. Applied silver nitrate to the wound base and wound edges. Filled the open tissue with silver impregnated calcium alginate.     Impression: surgical incisions with delayed closure     Short term goals:  regain skin integrity, skin protection, daily dressing changes    No Henriquez RN    11/5/2024    14:59 EST

## 2024-11-05 NOTE — ADDENDUM NOTE
Encounter addended by: No Henriquez RN on: 11/5/2024 3:09 PM   Actions taken: Flowsheet data copied forward, Flowsheet accepted, Clinical Note Signed, Charge Capture section accepted, Therapy plan modified

## 2024-11-06 ENCOUNTER — OFFICE VISIT (OUTPATIENT)
Dept: WOUND CARE | Facility: HOSPITAL | Age: 54
End: 2024-11-06
Payer: COMMERCIAL

## 2024-11-06 VITALS — DIASTOLIC BLOOD PRESSURE: 80 MMHG | HEART RATE: 90 BPM | RESPIRATION RATE: 18 BRPM | SYSTOLIC BLOOD PRESSURE: 125 MMHG

## 2024-11-06 DIAGNOSIS — Z79.4 TYPE 2 DIABETES MELLITUS WITH HYPERGLYCEMIA, WITH LONG-TERM CURRENT USE OF INSULIN: ICD-10-CM

## 2024-11-06 DIAGNOSIS — E11.65 TYPE 2 DIABETES MELLITUS WITH HYPERGLYCEMIA, WITH LONG-TERM CURRENT USE OF INSULIN: ICD-10-CM

## 2024-11-06 DIAGNOSIS — T81.89XA DELAYED SURGICAL WOUND HEALING, INITIAL ENCOUNTER: ICD-10-CM

## 2024-11-06 DIAGNOSIS — L05.91 PILONIDAL CYST: Primary | ICD-10-CM

## 2024-11-06 DIAGNOSIS — L73.2 HIDRADENITIS: ICD-10-CM

## 2024-11-06 PROCEDURE — G0463 HOSPITAL OUTPT CLINIC VISIT: HCPCS | Performed by: NURSE PRACTITIONER

## 2024-11-06 NOTE — PROGRESS NOTES
Chief Complaint  Wound Check (New patient appointment for wounds to coccyx and right gluteal.)    Subjective      History of Present Illness    Mareila Aldrich  is a 54 y.o. female with chronic wounds along the gluteal crease and a wound to the right gluteal aspect.  Patient presents today for ongoing long-term wound management.    Patient has surgical a surgical excision of Hidradenitis in the Inguinal area and a Pilonidal Cystectomy in February of 2023. In March of 2023 she required additional excision of Hidradenitis to bilateral inguinal areas.     On July 24, 2024, patient under Debridement of Ischial ulcer and buttocks wounds. The tissue pathology confirmed Pilonidal cyst to the gluteal region and Hidradenitis Suppurativa to the ischial ulceration on the right buttock.     Patient has had a very difficult time healing her wounds following the procedures.  She additionally has combated recurrent abscess formations consistent with her hidradenitis suppurativa diagnosis that continue to pose complications at times.  The gluteal incisions after her initial pilonidal cystectomy in February 2023 never completely closed thus prompting the ultimate decision to do a further debridement in July 2024.  Since that surgery, patient has been undergoing treatment and receiving wound care through [facility] and also from her spouse as needed.  For period of time after surgery she did have NPWT in place, however most recently she has been applying silver impregnated calcium alginate along the wound bases of her open wounds and then securing them with a dry dressing.  The wound nurses have also on occasion had to apply silver nitrate along the wound margins into the wound borders for epiboly as well as hypergranulation.      Allergies:  Etodolac, Penicillins, Statins, Atorvastatin, Crestor [rosuvastatin], Lisinopril, and Pravastatin      Current Outpatient Medications:     albuterol sulfate  (90 Base) MCG/ACT inhaler, Inhale  2 puffs Every 4 (Four) Hours As Needed for Shortness of Air., Disp: 18 g, Rfl: 1    cetirizine (zyrTEC) 10 MG tablet, Take 1 tablet by mouth Daily., Disp: , Rfl:     Continuous Blood Gluc Transmit (Dexcom G6 Transmitter) misc, 1 Device Every 3 (Three) Months., Disp: 1 each, Rfl: 3    Continuous Glucose Sensor (Dexcom G7 Sensor) misc, CHANGE ONE SENSOR EVRY 10 DAYS, Disp: 9 each, Rfl: 0    Empagliflozin-metFORMIN HCl ER (Synjardy XR) 12.5-1000 MG tablet sustained-release 24 hour, Take 1 tablet by mouth 2 (Two) Times a Day., Disp: 180 tablet, Rfl: 1    Fluticasone-Salmeterol (ADVAIR/WIXELA) 250-50 MCG/ACT DISKUS, INHALE 1 DOSE BY MOUTH TWICE DAILY, Disp: 60 each, Rfl: 0    gabapentin (NEURONTIN) 300 MG capsule, Take 1 capsule by mouth Every Night., Disp: 90 capsule, Rfl: 1    glipizide (GLUCOTROL) 10 MG tablet, TAKE 1 TABLET BY MOUTH TWICE DAILY BEFORE MEAL(S), Disp: 180 tablet, Rfl: 0    glucose blood (OneTouch Verio) test strip, USE 1 STRIP TO CHECK GLUCOSE THREE TIMES DAILY, Disp: 100 each, Rfl: 0    Insulin Degludec (Tresiba FlexTouch) 200 UNIT/ML solution pen-injector pen injection, INJECT 56 UNITS UNDER THE SKIN INTO THE APPROPRIATE AREA AS DIRECTED EVERY NIGHT., Disp: 18 mL, Rfl: 2    losartan (Cozaar) 25 MG tablet, Take 0.5 tablets by mouth Daily. (Patient taking differently: Take 0.5 tablets by mouth Every Night.), Disp: 90 tablet, Rfl: 1    montelukast (SINGULAIR) 10 MG tablet, Take 1 tablet by mouth every night at bedtime., Disp: , Rfl:     Mounjaro 7.5 MG/0.5ML solution pen-injector pen, INJECT 1/2 (ONE-HALF) ML  ONCE A WEEK, Disp: 8 mL, Rfl: 0    multivitamin (THERAGRAN) tablet tablet, , Disp: , Rfl:     ondansetron (Zofran) 4 MG tablet, Take 1 tablet by mouth Every 8 (Eight) Hours As Needed for Nausea or Vomiting., Disp: 10 tablet, Rfl: 0    ONE TOUCH CLUB LANCETS misc, 90 each 3 (Three) Times a Day As Needed (check blood sugar)., Disp: 100 each, Rfl: 3    pitavastatin calcium (Livalo) 2 MG tablet  tablet, Take 1 tablet by mouth Every Night., Disp: 90 tablet, Rfl: 1    RELION PEN NEEDLE 31G/8MM 31G X 8 MM misc, USE 1  ONCE DAILY UNDER THE SKIN INTO THE APPROPRIATE AREA AS DIRECTED., Disp: 100 each, Rfl: 0    rOPINIRole (REQUIP) 5 MG tablet, Take 1 tablet by mouth Every Night., Disp: 90 tablet, Rfl: 1    sertraline (ZOLOFT) 100 MG tablet, Take 1.5 tablets by mouth Every Morning., Disp: 90 tablet, Rfl: 1    Synthroid 88 MCG tablet, Take 1 tablet by mouth Daily., Disp: 90 tablet, Rfl: 1    Zinc 50 MG tablet, Take 1 tablet by mouth Daily., Disp: , Rfl:   No current facility-administered medications for this visit.    Facility-Administered Medications Ordered in Other Visits:     silver nitrate 75-25 % applicator 1 Application, 1 Application, Topical, PRN, Donato You MD, 1 Application at 11/05/24 1226    Past Medical History:   Diagnosis Date    Anxiety     Asthma     PRN INHALER AND TAKES ALLERGY INJECTIONS    Diabetes mellitus type 2, controlled     AVERAGE     Hidradenitis     Hyperlipidemia     Hypothyroidism     Murmur     DIAGNOSED WHEN 17 BUT IS ASYMPTOMATIC AND IS FOLLOWED ONLY BY PCP    Panic attack     Pilonidal cyst     Rheumatic fever     AS A CHILD    RLS (restless legs syndrome)     Sinus trouble      Past Surgical History:   Procedure Laterality Date    COLONOSCOPY  01/21/2020    exernal hemorrhoids    EXCISION LESION Bilateral 03/20/2023    Procedure: Excision Hidradenitis of Bilateral Inguinal Areas;  Surgeon: Donato You MD;  Location: [facility] MAIN OR;  Service: General;  Laterality: Bilateral;    EXCISION LESION N/A 7/24/2024    Procedure: DEBRIDEMENT OF ISCHIAL ULCER/BUTTOCKS WOUND;  Surgeon: Donato You MD;  Location: [facility] MAIN OR;  Service: General;  Laterality: N/A;    GROIN ABSCESS INCISION AND DRAINAGE Right     hidradenitis    HYSTERECTOMY  2010    KNEE SURGERY Bilateral 2013    ARTHROSCOPY    PILONIDAL CYSTECTOMY N/A 02/08/2023    Procedure: Excision  Hidradenitis of Inguinal Area and Pilonidal Cystectomy;  Surgeon: Donato You MD;  Location: [facility] MAIN OR;  Service: General;  Laterality: N/A;     Social History     Socioeconomic History    Marital status:    Tobacco Use    Smoking status: Never    Smokeless tobacco: Never   Vaping Use    Vaping status: Never Used   Substance and Sexual Activity    Alcohol use: Never    Drug use: Never    Sexual activity: Defer           Objective     Vitals:    11/06/24 1259   BP: 125/80   BP Location: Right arm   Pulse: 90  Comment: 161 BS this AM   Resp: 18  Comment: 98 02   PainSc: 0-No pain     There is no height or weight on file to calculate BMI.    The following data has been reviewed by WILLIAM Guo on 11/06/2024   CMP          3/13/2024    07:38 6/17/2024    07:46 9/16/2024    08:25   CMP   Glucose 118  80  187    BUN 15  13  15    Creatinine 0.87  0.82  0.90    EGFR 79.3  85.1  75.7    Sodium 139  137  137    Potassium 4.4  3.9  4.3    Chloride 104  103  104    Calcium 9.4  9.0  9.4    Total Protein 8.6  7.6  8.4    Albumin 4.0  3.7  3.8    Globulin 4.6  3.9  4.6    Total Bilirubin 0.3  0.4  0.3    Alkaline Phosphatase 109  107  152    AST (SGOT) 21  21  13    ALT (SGPT) 21  15  13    Albumin/Globulin Ratio 0.9  0.9  0.8    BUN/Creatinine Ratio 17.2  15.9  16.7    Anion Gap 10.9  9.1  11.5      CBC          12/14/2023    07:55   CBC   WBC 9.31    RBC 5.16    Hemoglobin 13.5    Hematocrit 43.1    MCV 83.5    MCH 26.2    MCHC 31.3    RDW 12.3    Platelets 320      Most Recent A1C          9/16/2024    08:25   HGBA1C Most Recent   Hemoglobin A1C 7.90               No Images in the past 120 days found..     STEADI Fall Risk Assessment has not been completed.     Review of Systems     ROS:  Per HPI.     I have reviewed the HPI and ROS as documented by MA/SONIA. WILLIAM Guo    Physical Exam     NAD  AAOx3, pleasant, cooperative    Right gluteal: Surgical incision to right lower gluteal  aspect.  Open wound with tissue formation that has folded over the open wound base causing the illusion of closure.  Open wound base with red moist tissue.  Wound margin previously with silver nitrate application with residual discoloration present.  Periwound tissue with contact dermatitis and irritation.  No active drainage.  No erythema.  No edema.  No TTP.  No active signs of infection.    Gluteal crease: Open surgical wound along the lower gluteal crease.  Hypergranulation along the proximal and right upper wound margins.  Wound base with red moist healthy tissue.  Scant amount of serosanguineous drainage.  Silver nitrate applied to the hypergranulation this visit.  Periwound tissue with minor skin irritation and contact dermatitis.  No erythema.  No edema.  No active signs of infection.    Upper gluteal crease incision that was previously open remains closed at this time.    See photos for details.    Right gluteal:        Gluteal crease:           Result Review :  The following data was reviewed by: WILLIAM Guo on 11/06/2024:    General surgery notes, Op report, Wound Nurse notes and images, Pathology, Labs    Assessment and Plan   Diagnoses and all orders for this visit:    1. Pilonidal cyst (Primary)    2. Hidradenitis    3. Delayed surgical wound healing, initial encounter    4. Type 2 diabetes mellitus with hyperglycemia, with long-term current use of insulin      Patient presents today with surgical wounds with delayed wound healing to the right gluteal aspect as well as to the lower gluteal crease. Patient has Hidradenitis Suppurativa with recurrent flares that complicates her healing process. Her wounds are demonstrating healing abilities today at her appointment, albeit responding very slowly. Recommending today that she begin daily dressing changes that should consist of cleansing / irrigating the wounds with NS, blotting dry, applying silver impregnated collagen, such as Michelle, then  applying silver impregnated calcium alginate over the wounds and securing the dressing with a border dressing. Patient's spouse has performed patient's dressings in the past and may resume doing so at this time. Patient has been instructed that the collagen with silver may not need to be reapplied daily if it is not absorbed into the wound.     Patient should continue going to [facility] for weekly wound checks and to allow for any additional silver nitrite applications to prevent excess hypergranulation to allow and promote closure.     Patient is diabetic with a most recent Hgb A1C reading of 7.9%. Patient should work to continue to achieve a tight glucose control to promote wound healing and decrease risks and chances of bacterial infections. Keeping a tight glucose control can also decrease the potential for Hidradenitis Suppurative flares.     Patient encouraged to continue taking a multivitamin and her zinc as well as to increase her protein intake to promote wound healing.     Patient to call with any questions or concerns prior to next visit.     Follow-up in five weeks.     Patient was given instructions and counseling regarding their condition or for health maintenance advice, as well as the wound care plan and recommendations. They understand and agree with the plan.  They will follow back up here in the clinic but are instructed to contact us in the interim should they have any new, returning, or worsening symptoms or concerns. Please see specific information pulled into the AVS if appropriate.     Dragon Dictation utilized for chart completion.    Follow Up   Return in about 5 weeks (around 12/11/2024) for Wound Check.      WILLIAM Guo

## 2024-11-12 DIAGNOSIS — J45.20 MILD INTERMITTENT ASTHMA WITHOUT COMPLICATION: ICD-10-CM

## 2024-11-13 ENCOUNTER — HOSPITAL ENCOUNTER (OUTPATIENT)
Dept: INFUSION THERAPY | Facility: HOSPITAL | Age: 54
Discharge: HOME | End: 2024-11-13
Admitting: SURGERY
Payer: COMMERCIAL

## 2024-11-13 VITALS
OXYGEN SATURATION: 98 % | HEART RATE: 106 BPM | SYSTOLIC BLOOD PRESSURE: 102 MMHG | DIASTOLIC BLOOD PRESSURE: 71 MMHG | RESPIRATION RATE: 20 BRPM | TEMPERATURE: 98.2 F

## 2024-11-13 DIAGNOSIS — L73.2 HIDRADENITIS: Primary | ICD-10-CM

## 2024-11-13 DIAGNOSIS — L05.91 PILONIDAL CYST: ICD-10-CM

## 2024-11-13 PROCEDURE — G0463 HOSPITAL OUTPT CLINIC VISIT: HCPCS

## 2024-11-13 PROCEDURE — 97602 WOUND(S) CARE NON-SELECTIVE: CPT

## 2024-11-13 RX ORDER — FLUTICASONE PROPIONATE AND SALMETEROL 250; 50 UG/1; UG/1
POWDER RESPIRATORY (INHALATION)
Qty: 60 EACH | Refills: 0 | Status: SHIPPED | OUTPATIENT
Start: 2024-11-13

## 2024-11-13 NOTE — SIGNIFICANT NOTE
Wound Eval / Progress Noted     [facility]     Patient Name: Mariela Aldrich  : 1970  MRN: [id number]  Today's Date: 2024                 Admit Date: 2024    Visit Dx:    ICD-10-CM ICD-9-CM   1. Hidradenitis  L73.2 705.83   2. Pilonidal cyst  L05.91 685.1         * No active hospital problems. *        Past Medical History:   Diagnosis Date    Anxiety     Asthma     PRN INHALER AND TAKES ALLERGY INJECTIONS    Diabetes mellitus type 2, controlled     AVERAGE     Hidradenitis     Hyperlipidemia     Hypothyroidism     Murmur     DIAGNOSED WHEN 17 BUT IS ASYMPTOMATIC AND IS FOLLOWED ONLY BY PCP    Panic attack     Pilonidal cyst     Rheumatic fever     AS A CHILD    RLS (restless legs syndrome)     Sinus trouble         Past Surgical History:   Procedure Laterality Date    COLONOSCOPY  2020    exernal hemorrhoids    EXCISION LESION Bilateral 2023    Procedure: Excision Hidradenitis of Bilateral Inguinal Areas;  Surgeon: Donato You MD;  Location: [facility];  Service: General;  Laterality: Bilateral;    EXCISION LESION N/A 2024    Procedure: DEBRIDEMENT OF ISCHIAL ULCER/BUTTOCKS WOUND;  Surgeon: Donato You MD;  Location: [facility];  Service: General;  Laterality: N/A;    GROIN ABSCESS INCISION AND DRAINAGE Right     hidradenitis    HYSTERECTOMY  2010    KNEE SURGERY Bilateral 2013    ARTHROSCOPY    PILONIDAL CYSTECTOMY N/A 2023    Procedure: Excision Hidradenitis of Inguinal Area and Pilonidal Cystectomy;  Surgeon: Donato You MD;  Location: [facility];  Service: General;  Laterality: N/A;         Physical Assessment:  Wound 24 1410 coccyx Incision (Active)   Wound Image   24 1235   Dressing Appearance intact;dry 24 1235   Closure None 24 1235   Base red;moist;epithelialization 24 1235   Red (%), Wound Tissue Color 100 24 1235   Periwound dry;intact;redness 24 1235   Periwound Temperature warm  11/13/24 1235   Periwound Skin Turgor soft 11/13/24 1235   Edges open 11/13/24 1235   Wound Length (cm) 3 cm 11/13/24 1235   Wound Width (cm) 0.2 cm 11/13/24 1235   Wound Depth (cm) 0.3 cm 11/13/24 1235   Wound Surface Area (cm^2) 0.6 cm^2 11/13/24 1235   Wound Volume (cm^3) 0.18 cm^3 11/13/24 1235   Drainage Characteristics/Odor serosanguineous 11/13/24 1235   Drainage Amount scant 11/13/24 1235   Care, Wound cleansed with;sterile normal saline 11/13/24 1235   Dressing Care dressing applied;calcium alginate;silver impregnated;hydrofiber;silicone border foam 11/13/24 1235   Periwound Care absorptive dressing applied 11/13/24 1235       Wound 07/24/24 1406 gluteal Incision (Active)   Wound Image   11/13/24 1235   Dressing Appearance intact;dry 11/13/24 1235   Closure None 11/13/24 1235   Base pink;dry;hypergranulation 11/13/24 1235   Red (%), Wound Tissue Color 100 11/13/24 1235   Periwound intact;dry;pink 11/13/24 1235   Periwound Temperature warm 11/13/24 1235   Periwound Skin Turgor soft 11/13/24 1235   Edges open 11/13/24 1235   Wound Length (cm) 1.5 cm 11/13/24 1235   Wound Width (cm) 0.1 cm 11/13/24 1235   Wound Depth (cm) 0.3 cm 11/13/24 1235   Wound Surface Area (cm^2) 0.15 cm^2 11/13/24 1235   Wound Volume (cm^3) 0.045 cm^3 11/13/24 1235   Drainage Characteristics/Odor serosanguineous 11/13/24 1235   Drainage Amount scant 11/13/24 1235   Care, Wound cleansed with;sterile normal saline 11/13/24 1235   Dressing Care dressing applied;packed with;silver impregnated;calcium alginate;hydrofiber;silicone border foam 11/13/24 1235   Periwound Care absorptive dressing applied 11/13/24 1235      Wound Check / Follow-up:  Patient seen today for a wound check and dressing change. Patient has been established at the wound clinic and is being followed by the wound care APRN.    Sacral wound remains closed.    Incision to coccyx remains open with red moist wound base, there is evidence of pink epithelium present to the  wound edges. Periwound is pink and soft. Cleansed wound with NS and gauze. Filled the wound with silver impregnated calcium alginate and secured with a silicone border dressing.    Right lower gluteal remains open, more red moist tissue. Epibole still present but overall the depth is filling in and improving. Periwound is pink and soft. Cleansed wound with NS and gauze. Filled the wound with silver impregnated calcium alginate and secured with a silicone border dressing.    Will recommend to continue the current wound care as the wounds are responding nicely to the treatment.    Impression: surgical incisions with secondary closure    Short term goals:  regain skin integrity, skin protection, daily dressing changes.     No Henriquez RN    11/13/2024    17:07 EST

## 2024-11-20 ENCOUNTER — HOSPITAL ENCOUNTER (OUTPATIENT)
Dept: INFUSION THERAPY | Facility: HOSPITAL | Age: 54
Discharge: HOME | End: 2024-11-20
Admitting: SURGERY
Payer: COMMERCIAL

## 2024-11-20 VITALS
SYSTOLIC BLOOD PRESSURE: 120 MMHG | OXYGEN SATURATION: 99 % | HEART RATE: 89 BPM | RESPIRATION RATE: 20 BRPM | TEMPERATURE: 98.2 F | DIASTOLIC BLOOD PRESSURE: 63 MMHG

## 2024-11-20 DIAGNOSIS — L73.2 HIDRADENITIS: Primary | ICD-10-CM

## 2024-11-20 DIAGNOSIS — L05.91 PILONIDAL CYST: ICD-10-CM

## 2024-11-20 PROCEDURE — 97602 WOUND(S) CARE NON-SELECTIVE: CPT

## 2024-11-20 PROCEDURE — G0463 HOSPITAL OUTPT CLINIC VISIT: HCPCS

## 2024-11-20 NOTE — SIGNIFICANT NOTE
Wound Eval / Progress Noted     [facility]     Patient Name: Mariela Aldrich  : 1970  MRN: [id number]  Today's Date: 2024                 Admit Date: 2024    Visit Dx:    ICD-10-CM ICD-9-CM   1. Hidradenitis  L73.2 705.83   2. Pilonidal cyst  L05.91 685.1         * No active hospital problems. *        Past Medical History:   Diagnosis Date    Anxiety     Asthma     PRN INHALER AND TAKES ALLERGY INJECTIONS    Diabetes mellitus type 2, controlled     AVERAGE     Hidradenitis     Hyperlipidemia     Hypothyroidism     Murmur     DIAGNOSED WHEN 17 BUT IS ASYMPTOMATIC AND IS FOLLOWED ONLY BY PCP    Panic attack     Pilonidal cyst     Rheumatic fever     AS A CHILD    RLS (restless legs syndrome)     Sinus trouble         Past Surgical History:   Procedure Laterality Date    COLONOSCOPY  2020    exernal hemorrhoids    EXCISION LESION Bilateral 2023    Procedure: Excision Hidradenitis of Bilateral Inguinal Areas;  Surgeon: Donato You MD;  Location: [facility];  Service: General;  Laterality: Bilateral;    EXCISION LESION N/A 2024    Procedure: DEBRIDEMENT OF ISCHIAL ULCER/BUTTOCKS WOUND;  Surgeon: Donato You MD;  Location: [facility];  Service: General;  Laterality: N/A;    GROIN ABSCESS INCISION AND DRAINAGE Right     hidradenitis    HYSTERECTOMY  2010    KNEE SURGERY Bilateral 2013    ARTHROSCOPY    PILONIDAL CYSTECTOMY N/A 2023    Procedure: Excision Hidradenitis of Inguinal Area and Pilonidal Cystectomy;  Surgeon: Donato You MD;  Location: [facility];  Service: General;  Laterality: N/A;         Physical Assessment:  Wound 24 1410 coccyx Incision (Active)   Wound Image   24 1236   Dressing Appearance dry;intact 24 1236   Closure None 24 1236   Base moist;red;pink;epithelialization 24 1236   Periwound dry;intact 24 1236   Periwound Temperature warm 24 1236   Periwound Skin Turgor soft 24  1236   Edges open 11/20/24 1236   Wound Length (cm) 1 cm 11/20/24 1236   Wound Width (cm) 0.3 cm 11/20/24 1236   Wound Depth (cm) 0.5 cm 11/20/24 1236   Wound Surface Area (cm^2) 0.3 cm^2 11/20/24 1236   Wound Volume (cm^3) 0.15 cm^3 11/20/24 1236   Drainage Characteristics/Odor serosanguineous 11/20/24 1236   Drainage Amount scant 11/20/24 1236   Care, Wound cleansed with;irrigated with;sterile normal saline 11/20/24 1236   Dressing Care dressing applied;collagen;calcium alginate;silver impregnated;silicone border foam;hydrocolloid 11/20/24 1236   Periwound Care absorptive dressing applied 11/20/24 1236       Wound 07/24/24 1406 gluteal Incision (Active)   Wound Image   11/20/24 1254   Dressing Appearance dry;intact 11/20/24 1254   Closure None 11/20/24 1254   Base moist;red;pink;epithelialization 11/20/24 1254   Periwound dry;intact 11/20/24 1254   Periwound Temperature warm 11/20/24 1254   Periwound Skin Turgor soft 11/20/24 1254   Edges open 11/20/24 1254   Wound Length (cm) 1.3 cm 11/20/24 1254   Wound Width (cm) 0.4 cm 11/20/24 1254   Wound Depth (cm) 0.3 cm 11/20/24 1254   Wound Surface Area (cm^2) 0.52 cm^2 11/20/24 1254   Wound Volume (cm^3) 0.156 cm^3 11/20/24 1254   Drainage Characteristics/Odor serosanguineous 11/20/24 1254   Drainage Amount scant 11/20/24 1254   Care, Wound cleansed with;irrigated with;sterile normal saline 11/20/24 1254   Dressing Care dressing applied;collagen;calcium alginate;silver impregnated;silicone border foam 11/20/24 1254   Periwound Care absorptive dressing applied 11/20/24 1254      Wound Check / Follow-up: Patient seen today for wound follow-up and dressing change in outpatient nursing services. Patient denies any issues with her spouse changing dressings / performing wound care at home in absence of the wound care nurse. Patient was ordered supplies by wound care provider and she verified she has these supplies at home.    Sacral wound remains with full closure at this  time.    Lower gluteal crease incision with red, moist tissue noted along with pink epithelial tissue. Periwound tissue is dry and intact. Wound margins continue to contract and there is evidence of response to current treatment. Cleansed and irrigated wound with normal saline. Applied silver impregnated collagen (Michelle Ag) then applied silver impregnated calcium alginate then secured with silicone border dressing. Strips of hydrocolloid dressing added to aid in seal / securement. Recommending to continue current treatment.    Right lower gluteal wound remains with red, moist tissue to wound base and pink epithelial tissue along wound margins. Periwound tissue is dry and intact. Cleansed and irrigated wound with normal saline. Applied silver impregnated collagen (Michelle Ag) then applied silver impregnated calcium alginate and secured with silicone border dressing. Recommending to continue current treatment at this time.      Impression: Surgical wounds with delayed closure.      Short term goals: Regain skin integrity, skin protection, daily dressing changes, once weekly wound checks / follow-ups.      Lanette Cantu RN    11/20/2024    12:55 EST

## 2024-11-27 ENCOUNTER — HOSPITAL ENCOUNTER (OUTPATIENT)
Dept: INFUSION THERAPY | Facility: HOSPITAL | Age: 54
Discharge: HOME | End: 2024-11-27
Admitting: SURGERY
Payer: COMMERCIAL

## 2024-11-27 DIAGNOSIS — L05.91 PILONIDAL CYST: ICD-10-CM

## 2024-11-27 DIAGNOSIS — L73.2 HIDRADENITIS: Primary | ICD-10-CM

## 2024-11-27 PROCEDURE — 97602 WOUND(S) CARE NON-SELECTIVE: CPT

## 2024-11-27 PROCEDURE — G0463 HOSPITAL OUTPT CLINIC VISIT: HCPCS

## 2024-11-27 RX ADMIN — SILVER NITRATE APPLICATORS 1 APPLICATION: 25; 75 STICK TOPICAL at 12:09

## 2024-11-27 NOTE — SIGNIFICANT NOTE
Wound Eval / Progress Noted     [facility]     Patient Name: Mariela Aldrich  : 1970  MRN: [id number]  Today's Date: 2024                 Admit Date: 2024    Visit Dx:    ICD-10-CM ICD-9-CM   1. Hidradenitis  L73.2 705.83   2. Pilonidal cyst  L05.91 685.1         * No active hospital problems. *        Past Medical History:   Diagnosis Date    Anxiety     Asthma     PRN INHALER AND TAKES ALLERGY INJECTIONS    Diabetes mellitus type 2, controlled     AVERAGE     Hidradenitis     Hyperlipidemia     Hypothyroidism     Murmur     DIAGNOSED WHEN 17 BUT IS ASYMPTOMATIC AND IS FOLLOWED ONLY BY PCP    Panic attack     Pilonidal cyst     Rheumatic fever     AS A CHILD    RLS (restless legs syndrome)     Sinus trouble         Past Surgical History:   Procedure Laterality Date    COLONOSCOPY  2020    exernal hemorrhoids    EXCISION LESION Bilateral 2023    Procedure: Excision Hidradenitis of Bilateral Inguinal Areas;  Surgeon: Donato You MD;  Location: [facility];  Service: General;  Laterality: Bilateral;    EXCISION LESION N/A 2024    Procedure: DEBRIDEMENT OF ISCHIAL ULCER/BUTTOCKS WOUND;  Surgeon: Donato You MD;  Location: [facility];  Service: General;  Laterality: N/A;    GROIN ABSCESS INCISION AND DRAINAGE Right     hidradenitis    HYSTERECTOMY  2010    KNEE SURGERY Bilateral 2013    ARTHROSCOPY    PILONIDAL CYSTECTOMY N/A 2023    Procedure: Excision Hidradenitis of Inguinal Area and Pilonidal Cystectomy;  Surgeon: Donato You MD;  Location: [facility];  Service: General;  Laterality: N/A;         Physical Assessment:  Wound 24 1410 coccyx Incision (Active)   Wound Image   24 1210   Dressing Appearance intact;dry 24 1210   Closure None 24 1210   Base moist;pink;epithelialization 24 1210   Periwound dry;intact 24 1210   Periwound Temperature warm 24 1210   Periwound Skin Turgor soft 24 1210    Edges open 11/27/24 1210   Drainage Characteristics/Odor serosanguineous 11/27/24 1210   Drainage Amount scant 11/27/24 1210   Care, Wound cleansed with;sterile normal saline 11/27/24 1210   Dressing Care dressing applied;collagen;silver impregnated;hydrofiber;calcium alginate;silicone border foam 11/27/24 1210   Periwound Care absorptive dressing applied 11/27/24 1210       Wound 07/24/24 1406 gluteal Incision (Active)   Wound Image   11/27/24 1210   Dressing Appearance intact;dry 11/27/24 1210   Closure None 11/27/24 1210   Base moist;pink;epithelialization 11/27/24 1210   Periwound dry;intact 11/27/24 1210   Periwound Temperature warm 11/27/24 1210   Edges open 11/27/24 1210   Wound Length (cm) 1 cm 11/27/24 1210   Wound Width (cm) 0.3 cm 11/27/24 1210   Wound Depth (cm) 0.3 cm 11/27/24 1210   Wound Surface Area (cm^2) 0.3 cm^2 11/27/24 1210   Wound Volume (cm^3) 0.09 cm^3 11/27/24 1210   Drainage Characteristics/Odor serosanguineous 11/27/24 1210   Drainage Amount scant 11/27/24 1210   Care, Wound cleansed with;irrigated with;sterile normal saline;silver agent applied 11/27/24 1210   Dressing Care dressing applied;collagen;calcium alginate;silver impregnated;hydrofiber;silicone border foam 11/27/24 1210   Periwound Care absorptive dressing applied 11/27/24 1210        Wound Check / Follow-up: Patient seen today for a wound check and dressing change. Patient reports that she has had some drainage from the incision to the coccyx but overall it has not been an issue.    Wound base to the coccyx is minimal in presentation. The tissue is pink and moist, there is some epithelialization present to the wound edges. Periwound is soft and intact. Cleansed with NS and gauze. Cleansed with NS. Filled the wound base with collagen and then placed calcium alginate to the wound and secured with a silicone border dressing. Recommending to continue the current wound care as the patient is responding to the treatment.    Epibole  remains present to the wound edges, with pink epithelium present. There is visible moist wound base present. Periwound is soft and intact. Cleansed and irrigated the wound with NS. Applied silver nitrate to the wound edges and the base of the wound then filled the wound with calcium alginate silver impregnated hydrofiber. Will recommend to continue current treatment as the wound depth is decreasing. Will discuss findings with the wound APRN.    Impression: surgical incisions with delayed closure    Short term goals: Regain skin integrity, skin protection, moisture prevention, pressure reduction, daily dressing change.    No Henriquez RN    11/27/2024    16:18 EST

## 2024-11-27 NOTE — ADDENDUM NOTE
Encounter addended by: No Henriquez RN on: 11/27/2024 4:26 PM   Actions taken: Flowsheet data copied forward, Flowsheet accepted, Clinical Note Signed, Charge Capture section accepted

## 2024-12-02 NOTE — PROGRESS NOTES
Chief Complaint  Diabetes (Follow up), Ear Fullness (X2 weeks), and Mouth Lesions (Blisters in mouth; worse lately)    Subjective          Mariela Aldrich is a 54 y.o. female who presents to [facility]    History of Present Illness  History of Present Illness  The patient presents for evaluation of aphthous ulcers, sinus headaches, blood glucose management, neuropathy, restless legs, right cerumen impaction, and health maintenance.    She has been experiencing oral blisters for the past few weeks, a condition she has been dealing with since her teenage years. However, the severity has escalated in recent weeks. She reports the presence of an ulcer on her tongue and suspects additional ulcers on the upper part of her mouth due to a sensation of swelling in her lips. She has not been using any mouthwash for several years and typically manages the condition without intervention. She acknowledges recent stressors in her life.    She has been prescribed doxycycline for her symptoms but is uncertain about the need for additional treatment.    She has been experiencing sinus headaches, a symptom she does not frequently encounter.    Her blood glucose level was recorded at 119 this morning. She experienced three episodes of hypoglycemia alarm last night, prompting her to consume food and return to bed. When she checked it was 127. She is unsure if she needs a refill of Synjardy. She is currently on Mounjaro 7.5 mg and wishes to continue at this dosage. She anticipates elevated results due to discontinuation of Mounjaro. She is currently on Synjardy and Tresiba 56 units in the morning.    She reports that gabapentin provides relief from her leg pain, although she still experiences occasional discomfort.    She finds Requip effective for managing her restless legs syndrome.    She reports feeling sore and itchy. She takes losartan half tablet in the mornings. She takes her thyroid  medication every morning on an empty stomach. She does not report any lower extremity edema.    MEDICATIONS  Doxycycline, Synjardy, Zoloft, gabapentin, Tresiba, losartan, Mounjaro, Requip.      The PHQ has not been completed during this encounter.               Health Maintenance Due   Topic Date Due    ZOSTER VACCINE (1 of 2) Never done    DIABETIC FOOT EXAM  09/07/2023    DIABETIC EYE EXAM  07/12/2024    ANNUAL PHYSICAL  12/14/2024    HEMOGLOBIN A1C  03/16/2025        Review of Systems   Constitutional:  Negative for fatigue.   Respiratory:  Negative for cough and shortness of breath.    Cardiovascular:  Negative for chest pain and palpitations.   Gastrointestinal:  Negative for nausea and vomiting.   Endocrine: Negative for cold intolerance and heat intolerance.   Genitourinary:  Negative for difficulty urinating and dysuria.   Musculoskeletal:  Negative for arthralgias, gait problem and joint swelling.   Neurological:  Negative for dizziness, seizures and headaches.   Hematological:  Negative for adenopathy. Does not bruise/bleed easily.   Psychiatric/Behavioral:  Negative for confusion, dysphoric mood and sleep disturbance. The patient is not nervous/anxious.           Medical History: has a past medical history of Anxiety, Asthma, Diabetes mellitus type 2, controlled, Hidradenitis, Hyperlipidemia, Hypothyroidism, Murmur, Panic attack, Pilonidal cyst, Rheumatic fever, RLS (restless legs syndrome), and Sinus trouble.     Surgical History: has a past surgical history that includes Colonoscopy (01/21/2020); Groin Abscess Incision and Drainage (Right); Hysterectomy (2010); Knee surgery (Bilateral, 2013); Pilonidal Cystectomy (N/A, 02/08/2023); Excision Lesion (Bilateral, 03/20/2023); and Excision Lesion (N/A, 7/24/2024).     Family History: family history includes Diabetes in her brother, maternal grandmother, and sister; Thyroid disease in her sister; Thyroid disease (age of onset: 43) in her brother; Thyroid  disease (age of onset: 70) in her father.     Social History: reports that she has never smoked. She has never used smokeless tobacco. She reports that she does not drink alcohol and does not use drugs.    Allergies: Etodolac, Penicillins, Statins, Atorvastatin, Crestor [rosuvastatin], Lisinopril, and Pravastatin      Current Outpatient Medications:     albuterol sulfate  (90 Base) MCG/ACT inhaler, Inhale 2 puffs Every 4 (Four) Hours As Needed for Shortness of Air., Disp: 18 g, Rfl: 1    cetirizine (zyrTEC) 10 MG tablet, Take 1 tablet by mouth Daily., Disp: , Rfl:     Continuous Blood Gluc Transmit (Dexcom G6 Transmitter) misc, 1 Device Every 3 (Three) Months., Disp: 1 each, Rfl: 3    Continuous Glucose Sensor (Dexcom G7 Sensor) misc, CHANGE ONE SENSOR EVERY 10 DAYS, Disp: 9 each, Rfl: 0    doxycycline (VIBRAMYCIN) 100 MG capsule, Take 1 capsule by mouth 2 (Two) Times a Day for 7 days. Do not take at the same time as any vitamin or mineral supplements., Disp: 14 capsule, Rfl: 0    Empagliflozin-metFORMIN HCl ER (Synjardy XR) 12.5-1000 MG tablet sustained-release 24 hour, Take 1 tablet by mouth 2 (Two) Times a Day., Disp: 180 tablet, Rfl: 1    Fluticasone-Salmeterol (ADVAIR/WIXELA) 250-50 MCG/ACT DISKUS, INHALE 1 DOSE BY MOUTH TWICE DAILY, Disp: 60 each, Rfl: 0    gabapentin (NEURONTIN) 300 MG capsule, Take 1 capsule by mouth Every Night., Disp: 90 capsule, Rfl: 1    glipizide (GLUCOTROL) 10 MG tablet, Take 1 tablet by mouth 2 (Two) Times a Day Before Meals., Disp: 180 tablet, Rfl: 0    glucose blood (OneTouch Verio) test strip, USE 1 STRIP TO CHECK GLUCOSE THREE TIMES DAILY, Disp: 100 each, Rfl: 0    Insulin Degludec (Tresiba FlexTouch) 200 UNIT/ML solution pen-injector pen injection, Inject 56 Units under the skin into the appropriate area as directed Every Morning., Disp: 18 mL, Rfl: 2    losartan (Cozaar) 25 MG tablet, Take 0.5 tablets by mouth Daily., Disp: 90 tablet, Rfl: 1    montelukast (SINGULAIR) 10  "MG tablet, Take 1 tablet by mouth every night at bedtime., Disp: , Rfl:     multivitamin (THERAGRAN) tablet tablet, , Disp: , Rfl:     ONE TOUCH CLUB LANCETS misc, 90 each 3 (Three) Times a Day As Needed (check blood sugar)., Disp: 100 each, Rfl: 3    pitavastatin calcium (Livalo) 2 MG tablet tablet, Take 1 tablet by mouth Every Night., Disp: 90 tablet, Rfl: 1    RELION PEN NEEDLE 31G/8MM 31G X 8 MM misc, USE 1  ONCE DAILY UNDER THE SKIN INTO THE APPROPRIATE AREA AS DIRECTED., Disp: 100 each, Rfl: 0    rOPINIRole (REQUIP) 5 MG tablet, Take 1 tablet by mouth Every Night., Disp: 90 tablet, Rfl: 1    sertraline (ZOLOFT) 100 MG tablet, TAKE 1 & 1/2 (ONE & ONE-HALF) TABLETS BY MOUTH ONCE DAILY, Disp: 135 tablet, Rfl: 1    Synthroid 88 MCG tablet, Take 1 tablet by mouth Daily., Disp: 90 tablet, Rfl: 1    Zinc 50 MG tablet, Take 1 tablet by mouth Daily., Disp: , Rfl:     chlorhexidine (PERIDEX) 0.12 % solution, Swish and spit 15 ml. Or apply to site with q-tip. 2-3 times per day., Disp: 473 mL, Rfl: 0    ondansetron (Zofran) 4 MG tablet, Take 1 tablet by mouth Every 8 (Eight) Hours As Needed for Nausea or Vomiting. (Patient not taking: Reported on 12/16/2024), Disp: 10 tablet, Rfl: 0    Tirzepatide 7.5 MG/0.5ML solution auto-injector, Inject 7.5 mg under the skin into the appropriate area as directed 1 (One) Time Per Week., Disp: 6 mL, Rfl: 1      Immunization History   Administered Date(s) Administered    Fluzone (or Fluarix & Flulaval for VFC) >6mos 09/28/2023    Fluzone Quad >6mos (Multi-dose) 10/01/2020    Pneumococcal Conjugate 20-Valent (PCV20) 09/28/2023    Pneumococcal Polysaccharide (PPSV23) 07/20/2019    Td (TDVAX) 05/08/1998    Tdap 03/16/2020         Objective       Vitals:    12/16/24 0727   BP: 120/82   Pulse: 98   Temp: 97.7 °F (36.5 °C)   TempSrc: Temporal   SpO2: 96%   Weight: 90.3 kg (199 lb)   Height: 162.6 cm (64.02\")      Body mass index is 34.14 kg/m².   Wt Readings from Last 3 Encounters:   12/16/24 " 90.3 kg (199 lb)   09/16/24 88 kg (194 lb)   08/08/24 88.5 kg (195 lb)      BP Readings from Last 3 Encounters:   12/16/24 120/82   12/11/24 120/72   12/05/24 132/70             Physical Exam  Vitals reviewed.   Constitutional:       Appearance: Normal appearance. She is well-developed.   HENT:      Head: Normocephalic and atraumatic.      Right Ear: There is impacted cerumen.      Mouth/Throat:        Comments: Apthous ulcer on inner lower left lip approximately 4 mm.  Eyes:      Conjunctiva/sclera: Conjunctivae normal.      Pupils: Pupils are equal, round, and reactive to light.   Neck:      Thyroid: No thyroid mass, thyromegaly or thyroid tenderness.      Vascular: No carotid bruit.   Cardiovascular:      Rate and Rhythm: Normal rate and regular rhythm.      Heart sounds: Normal heart sounds. No murmur heard.  Pulmonary:      Effort: Pulmonary effort is normal.      Breath sounds: Normal breath sounds. No wheezing or rhonchi.   Abdominal:      General: Bowel sounds are normal. There is no distension.      Palpations: Abdomen is soft.      Tenderness: There is no abdominal tenderness.   Musculoskeletal:      Right lower leg: No edema.      Left lower leg: No edema.   Skin:     General: Skin is warm and dry.   Neurological:      Mental Status: She is alert and oriented to person, place, and time.   Psychiatric:         Mood and Affect: Mood and affect normal.         Behavior: Behavior normal.         Thought Content: Thought content normal.         Judgment: Judgment normal.         Physical Exam  The eardrum appears normal, but there is a significant amount of wax in the right ear. The left ear appears normal.      Vital Signs  Blood pressure is normal.      Result Review :   Results  Laboratory Studies  Blood sugar was 119 this morning. A1c was 7.9 three months ago.         Common labs          3/13/2024    07:38 6/17/2024    07:46 9/16/2024    08:25   Common Labs   Glucose 118  80  187    BUN 15  13  15     Creatinine 0.87  0.82  0.90    Sodium 139  137  137    Potassium 4.4  3.9  4.3    Chloride 104  103  104    Calcium 9.4  9.0  9.4    Albumin 4.0  3.7  3.8    Total Bilirubin 0.3  0.4  0.3    Alkaline Phosphatase 109  107  152    AST (SGOT) 21  21  13    ALT (SGPT) 21  15  13    Total Cholesterol 145  129  136    Triglycerides 74  69  78    HDL Cholesterol 53  51  47    LDL Cholesterol  77  64  74    Hemoglobin A1C 8.50  7.60  7.90    Microalbumin, Urine  1.8           Ear Cerumen Removal    Date/Time: 12/16/2024 7:47 AM    Performed by: Berenice Avelar APRN  Authorized by: Berenice Avelar APRN    Anesthesia:  Local Anesthetic: none  Location details: right ear  Patient tolerance: patient tolerated the procedure well with no immediate complications  Procedure type: irrigation   Sedation:  Patient sedated: no                  Assessment and Plan        Diagnoses and all orders for this visit:    1. Type 2 diabetes mellitus with hyperglycemia, with long-term current use of insulin (Primary)  -     Empagliflozin-metFORMIN HCl ER (Synjardy XR) 12.5-1000 MG tablet sustained-release 24 hour; Take 1 tablet by mouth 2 (Two) Times a Day.  Dispense: 180 tablet; Refill: 1  -     glipizide (GLUCOTROL) 10 MG tablet; Take 1 tablet by mouth 2 (Two) Times a Day Before Meals.  Dispense: 180 tablet; Refill: 0  -     Insulin Degludec (Tresiba FlexTouch) 200 UNIT/ML solution pen-injector pen injection; Inject 56 Units under the skin into the appropriate area as directed Every Morning.  Dispense: 18 mL; Refill: 2  -     losartan (Cozaar) 25 MG tablet; Take 0.5 tablets by mouth Daily.  Dispense: 90 tablet; Refill: 1  -     Tirzepatide 7.5 MG/0.5ML solution auto-injector; Inject 7.5 mg under the skin into the appropriate area as directed 1 (One) Time Per Week.  Dispense: 6 mL; Refill: 1  -     Comprehensive Metabolic Panel; Future  -     Lipid Panel; Future  -     Hemoglobin A1c; Future  -     Microalbumin / Creatinine Urine Ratio -  Urine, Clean Catch; Future    2. Aphthous ulcer of mouth  -     chlorhexidine (PERIDEX) 0.12 % solution; Swish and spit 15 ml. Or apply to site with q-tip. 2-3 times per day.  Dispense: 473 mL; Refill: 0    3. RLS (restless legs syndrome)  -     rOPINIRole (REQUIP) 5 MG tablet; Take 1 tablet by mouth Every Night.  Dispense: 90 tablet; Refill: 1    4. Impacted cerumen of right ear  -     Ear Cerumen Removal        Assessment & Plan  1. Aphthous ulcers.  The ulcers may be stress-induced. Chlorhexidine mouthwash has been prescribed. She can apply it directly to the affected areas using a Q-tip or use it as a swish and spit solution.    2. Sinus headaches.  The headaches could be a result of atmospheric pressure changes.    3. Blood glucose management.  Her A1C level was 7.9 three months ago, indicating a need for further improvement to reach a target of 6.9 or less. She will continue with Mounjaro 7.5 mg. A prescription for Synjardy has been provided. She will continue taking Tresiba 56 units in the mornings.    4. Neuropathy.  Gabapentin is helping with the neuropathy pain in her legs. She will continue with her current regimen.    5. Restless legs.  Requip is helping with her restless legs, and gabapentin has been added to manage the symptoms. She will continue with her current regimen.    6. Right cerumen impaction.  The right ear will be flushed today to remove the impacted cerumen.    7. Health maintenance.  Blood pressure is normal. She will continue taking losartan half tablet in the mornings. She will continue taking her thyroid medication every morning on an empty stomach. A prescription for Zoloft has been sent to [facility] pharmacy.    Follow-up  The patient will follow up in 3 months.    Return in about 3 months (around 3/16/2025) for Next scheduled follow up.    Patient was given instructions and counseling regarding her condition or for health maintenance advice. Please see specific information pulled  into the AVS if appropriate.     WILLIAM Hickman    Patient or patient representative verbalized consent for the use of Ambient Listening during the visit with  WILLIAM Hickman for chart documentation. 12/16/2024  07:47 EST

## 2024-12-05 ENCOUNTER — HOSPITAL ENCOUNTER (OUTPATIENT)
Dept: INFUSION THERAPY | Facility: HOSPITAL | Age: 54
Discharge: HOME | End: 2024-12-05
Admitting: SURGERY
Payer: COMMERCIAL

## 2024-12-05 VITALS
RESPIRATION RATE: 20 BRPM | OXYGEN SATURATION: 99 % | SYSTOLIC BLOOD PRESSURE: 132 MMHG | HEART RATE: 98 BPM | DIASTOLIC BLOOD PRESSURE: 70 MMHG | TEMPERATURE: 97.7 F

## 2024-12-05 DIAGNOSIS — L73.2 HIDRADENITIS: Primary | ICD-10-CM

## 2024-12-05 DIAGNOSIS — L05.91 PILONIDAL CYST: ICD-10-CM

## 2024-12-05 PROCEDURE — G0463 HOSPITAL OUTPT CLINIC VISIT: HCPCS

## 2024-12-05 PROCEDURE — 97602 WOUND(S) CARE NON-SELECTIVE: CPT

## 2024-12-05 NOTE — ADDENDUM NOTE
Encounter addended by: No Henriquez RN on: 12/5/2024 3:34 PM   Actions taken: Flowsheet data copied forward, Flowsheet accepted, Clinical Note Signed, Charge Capture section accepted

## 2024-12-05 NOTE — SIGNIFICANT NOTE
Wound Eval / Progress Noted     [facility]     Patient Name: Mariela Aldrich  : 1970  MRN: [id number]  Today's Date: 2024                 Admit Date: 2024    Visit Dx:    ICD-10-CM ICD-9-CM   1. Hidradenitis  L73.2 705.83   2. Pilonidal cyst  L05.91 685.1         * No active hospital problems. *        Past Medical History:   Diagnosis Date    Anxiety     Asthma     PRN INHALER AND TAKES ALLERGY INJECTIONS    Diabetes mellitus type 2, controlled     AVERAGE     Hidradenitis     Hyperlipidemia     Hypothyroidism     Murmur     DIAGNOSED WHEN 17 BUT IS ASYMPTOMATIC AND IS FOLLOWED ONLY BY PCP    Panic attack     Pilonidal cyst     Rheumatic fever     AS A CHILD    RLS (restless legs syndrome)     Sinus trouble         Past Surgical History:   Procedure Laterality Date    COLONOSCOPY  2020    exernal hemorrhoids    EXCISION LESION Bilateral 2023    Procedure: Excision Hidradenitis of Bilateral Inguinal Areas;  Surgeon: Donato You MD;  Location: [facility] (formerly [facility]);  Service: General;  Laterality: Bilateral;    EXCISION LESION N/A 2024    Procedure: DEBRIDEMENT OF ISCHIAL ULCER/BUTTOCKS WOUND;  Surgeon: Donato You MD;  Location: [facility] (formerly [facility]);  Service: General;  Laterality: N/A;    GROIN ABSCESS INCISION AND DRAINAGE Right     hidradenitis    HYSTERECTOMY  2010    KNEE SURGERY Bilateral 2013    ARTHROSCOPY    PILONIDAL CYSTECTOMY N/A 2023    Procedure: Excision Hidradenitis of Inguinal Area and Pilonidal Cystectomy;  Surgeon: Donato You MD;  Location: [facility] (formerly [facility]);  Service: General;  Laterality: N/A;         Physical Assessment:  Wound 24 1410 coccyx Incision (Active)   Wound Image   24 141   Dressing Appearance intact;dry 24 141   Closure None 24 141   Base red;moist;epithelialization 24 141   Red (%), Wound Tissue Color 100 24 141   Periwound intact;dry;pink 24 141   Periwound Temperature warm  12/05/24 1415   Periwound Skin Turgor soft 12/05/24 1415   Edges open 12/05/24 1415   Wound Length (cm) 1.8 cm 12/05/24 1415   Wound Width (cm) 0.2 cm 12/05/24 1415   Wound Depth (cm) 0.3 cm 12/05/24 1415   Wound Surface Area (cm^2) 0.36 cm^2 12/05/24 1415   Wound Volume (cm^3) 0.108 cm^3 12/05/24 1415   Drainage Characteristics/Odor serosanguineous 12/05/24 1415   Drainage Amount scant 12/05/24 1415   Care, Wound cleansed with;sterile normal saline 12/05/24 1415   Dressing Care dressing applied;calcium alginate;silver impregnated;hydrofiber;silicone border foam 12/05/24 1415   Periwound Care absorptive dressing applied 12/05/24 1415       Wound 07/24/24 1406 gluteal Incision (Active)   Dressing Appearance intact;dry 12/05/24 1230   Closure None 12/05/24 1230   Base moist;pink;epithelialization 12/05/24 1230   Periwound dry;intact 12/05/24 1230   Periwound Temperature warm 12/05/24 1230   Periwound Skin Turgor soft 12/05/24 1230   Edges open 12/05/24 1230   Wound Length (cm) 1 cm 12/05/24 1230   Wound Width (cm) 0.3 cm 12/05/24 1230   Wound Depth (cm) 0.3 cm 12/05/24 1230   Wound Surface Area (cm^2) 0.3 cm^2 12/05/24 1230   Wound Volume (cm^3) 0.09 cm^3 12/05/24 1230   Drainage Characteristics/Odor serosanguineous 12/05/24 1230   Drainage Amount scant 12/05/24 1230   Care, Wound cleansed with 12/05/24 1230   Dressing Care dressing applied;packed with;calcium alginate;silver impregnated;hydrofiber;elastic bandage 12/05/24 1230   Periwound Care absorptive dressing applied 12/05/24 1230        Wound Check / Follow-up:  Patient seen today for a wound check and dressing change.     Wound base to the coccyx appears larger than last week; patient denies any new drainage or issues with dressing changes. Wound base is red and moist, some pink epithelium present to the wound edges. Periwound is pink and soft. Cleansed with NS and gauze. Filled the wound with a strip of silver impregnated calcium alginate and secured with a  silicone border dressing.    Right lower gluteal incision remains unchanged with epibole present to the periwound. Wound base is pink and moist. All tissue to the periwound remains soft. Filled the wound with a strip of silver impregnated calcium alginate and secured with a silicone border dressing. Will recommend to continue the current wound care at this time; discussed with the wound APRN the findings, patient is to see the APRN next Wednesday in the clinic.    No Henriquez RN    12/5/2024    15:30 EST

## 2024-12-11 ENCOUNTER — OFFICE VISIT (OUTPATIENT)
Dept: WOUND CARE | Facility: HOSPITAL | Age: 54
End: 2024-12-11
Payer: COMMERCIAL

## 2024-12-11 VITALS
HEART RATE: 90 BPM | SYSTOLIC BLOOD PRESSURE: 120 MMHG | RESPIRATION RATE: 18 BRPM | TEMPERATURE: 97.8 F | DIASTOLIC BLOOD PRESSURE: 72 MMHG

## 2024-12-11 DIAGNOSIS — T81.89XA DELAYED SURGICAL WOUND HEALING, INITIAL ENCOUNTER: ICD-10-CM

## 2024-12-11 DIAGNOSIS — Z79.4 UNCONTROLLED DIABETES MELLITUS WITH HYPERGLYCEMIA, WITH LONG-TERM CURRENT USE OF INSULIN: ICD-10-CM

## 2024-12-11 DIAGNOSIS — L73.2 HIDRADENITIS: ICD-10-CM

## 2024-12-11 DIAGNOSIS — E11.65 UNCONTROLLED DIABETES MELLITUS WITH HYPERGLYCEMIA, WITH LONG-TERM CURRENT USE OF INSULIN: ICD-10-CM

## 2024-12-11 DIAGNOSIS — L05.91 PILONIDAL CYST: Primary | ICD-10-CM

## 2024-12-11 DIAGNOSIS — J45.20 MILD INTERMITTENT ASTHMA WITHOUT COMPLICATION: ICD-10-CM

## 2024-12-11 PROCEDURE — G0463 HOSPITAL OUTPT CLINIC VISIT: HCPCS | Performed by: NURSE PRACTITIONER

## 2024-12-11 RX ORDER — DOXYCYCLINE 100 MG/1
100 CAPSULE ORAL 2 TIMES DAILY
Qty: 14 CAPSULE | Refills: 0 | Status: SHIPPED | OUTPATIENT
Start: 2024-12-11 | End: 2024-12-18

## 2024-12-11 NOTE — PROGRESS NOTES
Chief Complaint  Wound Check (Follow-up visit for coccyx and right gluteal wounds. )    Subjective      Wound Check    Mariela Aldrich  is a 54 y.o. female with chronic wounds along the gluteal crease and a wound to the right gluteal aspect s/p surgical intervention.  Patient presents today for ongoing long-term wound management.    Patient has surgical a surgical excision of Hidradenitis in the Inguinal area and a Pilonidal Cystectomy in February of 2023. In March of 2023 she required additional excision of Hidradenitis to bilateral inguinal areas.     On July 24, 2024, patient under Debridement of Ischial ulcer and buttocks wounds. The tissue pathology confirmed Pilonidal cyst to the gluteal region and Hidradenitis Suppurativa to the ischial ulceration on the right buttock.     Patient continues to have a very difficult time with wound healing. She has had   recurrent abscess formations consistent with her hidradenitis suppurativa diagnosis that complicate her healing. The gluteal incisions after her initial pilonidal cystectomy in February 2023 never completely closed thus prompting the ultimate decision to do a further debridement in July 2024.   Since the most recent surgery, patient has been receiving wound care through [facility] and from her spouse.  She initially had NPWT in place, but has since transitioned to applying collagen with silver and silver impregnated calcium alginate to the wounds and then applying a dry dressing.    She has received several applications of silver nitrate to her wounds to assist in the management of epiboly and hypergranulation.    She states that her blood sugars have recently been elevated and that her a.m. readings have been around the 160s.  She is taking Mounjaro and is having a difficult time eating consistent meals.      Allergies:  Etodolac, Penicillins, Statins, Atorvastatin, Crestor [rosuvastatin], Lisinopril, and Pravastatin      Current Outpatient Medications:      albuterol sulfate  (90 Base) MCG/ACT inhaler, Inhale 2 puffs Every 4 (Four) Hours As Needed for Shortness of Air., Disp: 18 g, Rfl: 1    cetirizine (zyrTEC) 10 MG tablet, Take 1 tablet by mouth Daily., Disp: , Rfl:     Continuous Blood Gluc Transmit (Dexcom G6 Transmitter) misc, 1 Device Every 3 (Three) Months., Disp: 1 each, Rfl: 3    Continuous Glucose Sensor (Dexcom G7 Sensor) misc, CHANGE ONE SENSOR EVRY 10 DAYS, Disp: 9 each, Rfl: 0    doxycycline (VIBRAMYCIN) 100 MG capsule, Take 1 capsule by mouth 2 (Two) Times a Day for 7 days. Do not take at the same time as any vitamin or mineral supplements., Disp: 14 capsule, Rfl: 0    Empagliflozin-metFORMIN HCl ER (Synjardy XR) 12.5-1000 MG tablet sustained-release 24 hour, Take 1 tablet by mouth 2 (Two) Times a Day., Disp: 180 tablet, Rfl: 1    Fluticasone-Salmeterol (ADVAIR/WIXELA) 250-50 MCG/ACT DISKUS, INHALE 1 DOSE BY MOUTH TWICE DAILY, Disp: 60 each, Rfl: 0    gabapentin (NEURONTIN) 300 MG capsule, Take 1 capsule by mouth Every Night., Disp: 90 capsule, Rfl: 1    glipizide (GLUCOTROL) 10 MG tablet, TAKE 1 TABLET BY MOUTH TWICE DAILY BEFORE MEAL(S), Disp: 180 tablet, Rfl: 0    glucose blood (OneTouch Verio) test strip, USE 1 STRIP TO CHECK GLUCOSE THREE TIMES DAILY, Disp: 100 each, Rfl: 0    Insulin Degludec (Tresiba FlexTouch) 200 UNIT/ML solution pen-injector pen injection, INJECT 56 UNITS UNDER THE SKIN INTO THE APPROPRIATE AREA AS DIRECTED EVERY NIGHT., Disp: 18 mL, Rfl: 2    losartan (Cozaar) 25 MG tablet, Take 0.5 tablets by mouth Daily. (Patient taking differently: Take 0.5 tablets by mouth Every Night.), Disp: 90 tablet, Rfl: 1    montelukast (SINGULAIR) 10 MG tablet, Take 1 tablet by mouth every night at bedtime., Disp: , Rfl:     Mounjaro 7.5 MG/0.5ML solution pen-injector pen, INJECT 1/2 (ONE-HALF) ML  ONCE A WEEK, Disp: 8 mL, Rfl: 0    multivitamin (THERAGRAN) tablet tablet, , Disp: , Rfl:     ondansetron (Zofran) 4 MG tablet, Take 1 tablet by  mouth Every 8 (Eight) Hours As Needed for Nausea or Vomiting., Disp: 10 tablet, Rfl: 0    ONE TOUCH CLUB LANCETS misc, 90 each 3 (Three) Times a Day As Needed (check blood sugar)., Disp: 100 each, Rfl: 3    pitavastatin calcium (Livalo) 2 MG tablet tablet, Take 1 tablet by mouth Every Night., Disp: 90 tablet, Rfl: 1    RELION PEN NEEDLE 31G/8MM 31G X 8 MM misc, USE 1  ONCE DAILY UNDER THE SKIN INTO THE APPROPRIATE AREA AS DIRECTED., Disp: 100 each, Rfl: 0    rOPINIRole (REQUIP) 5 MG tablet, Take 1 tablet by mouth Every Night., Disp: 90 tablet, Rfl: 1    sertraline (ZOLOFT) 100 MG tablet, Take 1.5 tablets by mouth Every Morning., Disp: 90 tablet, Rfl: 1    Synthroid 88 MCG tablet, Take 1 tablet by mouth Daily., Disp: 90 tablet, Rfl: 1    Zinc 50 MG tablet, Take 1 tablet by mouth Daily., Disp: , Rfl:     Past Medical History:   Diagnosis Date    Anxiety     Asthma     PRN INHALER AND TAKES ALLERGY INJECTIONS    Diabetes mellitus type 2, controlled     AVERAGE     Hidradenitis     Hyperlipidemia     Hypothyroidism     Murmur     DIAGNOSED WHEN 17 BUT IS ASYMPTOMATIC AND IS FOLLOWED ONLY BY PCP    Panic attack     Pilonidal cyst     Rheumatic fever     AS A CHILD    RLS (restless legs syndrome)     Sinus trouble      Past Surgical History:   Procedure Laterality Date    COLONOSCOPY  01/21/2020    exernal hemorrhoids    EXCISION LESION Bilateral 03/20/2023    Procedure: Excision Hidradenitis of Bilateral Inguinal Areas;  Surgeon: Donato You MD;  Location: [facility] MAIN OR;  Service: General;  Laterality: Bilateral;    EXCISION LESION N/A 7/24/2024    Procedure: DEBRIDEMENT OF ISCHIAL ULCER/BUTTOCKS WOUND;  Surgeon: Donato You MD;  Location: [facility] MAIN OR;  Service: General;  Laterality: N/A;    GROIN ABSCESS INCISION AND DRAINAGE Right     hidradenitis    HYSTERECTOMY  2010    KNEE SURGERY Bilateral 2013    ARTHROSCOPY    PILONIDAL CYSTECTOMY N/A 02/08/2023    Procedure: Excision Hidradenitis of  Inguinal Area and Pilonidal Cystectomy;  Surgeon: Donato You MD;  Location: [facility] MAIN OR;  Service: General;  Laterality: N/A;     Social History     Socioeconomic History    Marital status:    Tobacco Use    Smoking status: Never    Smokeless tobacco: Never   Vaping Use    Vaping status: Never Used   Substance and Sexual Activity    Alcohol use: Never    Drug use: Never    Sexual activity: Defer           Objective     Vitals:    12/11/24 1314   BP: 120/72   BP Location: Left arm   Patient Position: Sitting   Pulse: 90  Comment: 163 BS this Am   Resp: 18  Comment: 99 02   Temp: 97.8 °F (36.6 °C)   TempSrc: Oral   PainSc: 0-No pain     There is no height or weight on file to calculate BMI.    The following data has been reviewed by WILLIAM Guo on 12/11/2024   CMP          3/13/2024    07:38 6/17/2024    07:46 9/16/2024    08:25   CMP   Glucose 118  80  187    BUN 15  13  15    Creatinine 0.87  0.82  0.90    EGFR 79.3  85.1  75.7    Sodium 139  137  137    Potassium 4.4  3.9  4.3    Chloride 104  103  104    Calcium 9.4  9.0  9.4    Total Protein 8.6  7.6  8.4    Albumin 4.0  3.7  3.8    Globulin 4.6  3.9  4.6    Total Bilirubin 0.3  0.4  0.3    Alkaline Phosphatase 109  107  152    AST (SGOT) 21  21  13    ALT (SGPT) 21  15  13    Albumin/Globulin Ratio 0.9  0.9  0.8    BUN/Creatinine Ratio 17.2  15.9  16.7    Anion Gap 10.9  9.1  11.5          Most Recent A1C          9/16/2024    08:25   HGBA1C Most Recent   Hemoglobin A1C 7.90        No Images in the past 120 days found..     STEADI Fall Risk Assessment has not been completed.     Review of Systems     ROS:  Per HPI.     I have reviewed the HPI and ROS as documented by MA/RN. WILLIAM Guo    Physical Exam     NAD  AAOx3, pleasant, cooperative    Right gluteal: Surgical incision with open cavity similar in presentation to last visit.  The open wound base has healthy red tissue.  With palpation along the medial aspect of the  wound base there is some yellow purulent drainage noted draining from within the wound.  Minor induration noted to the medial periwound.  No erythema or edema.  Minor TTP.    Gluteal crease: Slightly larger open wound along the lower gluteal crease.  Shallow wound base with healthy red moist tissue.  Wound has increased in size.  There is no active drainage.  There is no induration.  No TTP.  No erythema or edema.    See photos for details.    Right gluteal:        Gluteal crease:           Result Review :  The following data was reviewed by: WILLIAM Guo on 12/11/2024:    Prior wound care notes and images.  [facility] images and wound nurse notes.    Assessment and Plan   Diagnoses and all orders for this visit:    1. Pilonidal cyst (Primary)  -     doxycycline (VIBRAMYCIN) 100 MG capsule; Take 1 capsule by mouth 2 (Two) Times a Day for 7 days. Do not take at the same time as any vitamin or mineral supplements.  Dispense: 14 capsule; Refill: 0    2. Hidradenitis  -     doxycycline (VIBRAMYCIN) 100 MG capsule; Take 1 capsule by mouth 2 (Two) Times a Day for 7 days. Do not take at the same time as any vitamin or mineral supplements.  Dispense: 14 capsule; Refill: 0    3. Delayed surgical wound healing, initial encounter    4. Uncontrolled diabetes mellitus with hyperglycemia, with long-term current use of insulin    Patient presents today with surgical wounds demonstrating delayed wound healing to both the right gluteal aspect as well as within the gluteal crease.  The wound within the gluteal crease is slightly larger today.  The wound along the right gluteal aspect has made minimal progress.    The right gluteal aspect wound presents with purulent yellow drainage today and minor induration to the medial aspect.  Will order antibiotics for the patient due to recurrent abscess formations.  Silver nitrate was also applied within the wound base today to promote new tissue formation.  Will recommend that the patient  continue daily dressing changes that consist of cleansing/irrigating the wound with normal saline, blotting dry, applying collagen with silver, such as Michelle, within the wound base and then additionally applying silver impregnated calcium alginate within the wound and securing with a dry dressing.  Dressing may be changed more often for rolling or saturation.    The lower gluteal crease wound is slightly larger today but overall presents with healthy tissue.  Silver nitrate again applied to this area today during the visit to promote new tissue growth.  Recommending that the patient continue daily dressing changes that consist of cleansing/irrigating the wound with normal saline, blotting dry, applying collagen with silver, such as Michelle, within the wound base and then additionally applying silver impregnated calcium alginate to the wound base that is then secured with a dry dressing.  This dressing may be changed more often for rolling or saturation.    Patient may remove all dressings for showering.    Patient encouraged to achieve a tight glucose control to assist with overall wound healing.    Patient further encouraged to increase her protein intake to promote wound healing.  She has been struggling to eat regular meals or at times has skipped meals due to being on Mounjaro.  We discussed the importance of trying to regulate her intake or to ensure that anything that she eats is of high-quality and supports wound healing.    Patient to call with any questions or concerns prior to next visit.     Follow-up in 2 weeks.    Patient was given instructions and counseling regarding their condition or for health maintenance advice, as well as the wound care plan and recommendations. They understand and agree with the plan.  They will follow back up here in the clinic but are instructed to contact us in the interim should they have any new, returning, or worsening symptoms or concerns. Please see specific information  pulled into the AVS if appropriate.     Dragon Dictation utilized for chart completion.    Follow Up   Return in about 2 weeks (around 12/25/2024) for Wound Check.      WILLIAM Guo

## 2024-12-12 RX ORDER — ACYCLOVIR 400 MG/1
TABLET ORAL
Qty: 9 EACH | Refills: 0 | Status: SHIPPED | OUTPATIENT
Start: 2024-12-12

## 2024-12-12 RX ORDER — FLUTICASONE PROPIONATE AND SALMETEROL 250; 50 UG/1; UG/1
POWDER RESPIRATORY (INHALATION)
Qty: 60 EACH | Refills: 0 | Status: SHIPPED | OUTPATIENT
Start: 2024-12-12

## 2024-12-14 DIAGNOSIS — F41.9 ANXIETY: ICD-10-CM

## 2024-12-16 ENCOUNTER — OFFICE VISIT (OUTPATIENT)
Dept: FAMILY MEDICINE CLINIC | Facility: CLINIC | Age: 54
End: 2024-12-16
Payer: COMMERCIAL

## 2024-12-16 VITALS
HEART RATE: 98 BPM | WEIGHT: 199 LBS | BODY MASS INDEX: 33.97 KG/M2 | OXYGEN SATURATION: 96 % | DIASTOLIC BLOOD PRESSURE: 82 MMHG | SYSTOLIC BLOOD PRESSURE: 120 MMHG | HEIGHT: 64 IN | TEMPERATURE: 97.7 F

## 2024-12-16 DIAGNOSIS — K12.0 APHTHOUS ULCER OF MOUTH: ICD-10-CM

## 2024-12-16 DIAGNOSIS — E11.65 TYPE 2 DIABETES MELLITUS WITH HYPERGLYCEMIA, WITH LONG-TERM CURRENT USE OF INSULIN: Primary | ICD-10-CM

## 2024-12-16 DIAGNOSIS — Z79.4 TYPE 2 DIABETES MELLITUS WITH HYPERGLYCEMIA, WITH LONG-TERM CURRENT USE OF INSULIN: Primary | ICD-10-CM

## 2024-12-16 DIAGNOSIS — G25.81 RLS (RESTLESS LEGS SYNDROME): ICD-10-CM

## 2024-12-16 DIAGNOSIS — H61.21 IMPACTED CERUMEN OF RIGHT EAR: ICD-10-CM

## 2024-12-16 PROCEDURE — 69209 REMOVE IMPACTED EAR WAX UNI: CPT | Performed by: NURSE PRACTITIONER

## 2024-12-16 PROCEDURE — 99214 OFFICE O/P EST MOD 30 MIN: CPT | Performed by: NURSE PRACTITIONER

## 2024-12-16 RX ORDER — LOSARTAN POTASSIUM 25 MG/1
12.5 TABLET ORAL DAILY
Qty: 90 TABLET | Refills: 1 | Status: SHIPPED | OUTPATIENT
Start: 2024-12-16

## 2024-12-16 RX ORDER — GLIPIZIDE 10 MG/1
10 TABLET ORAL
Qty: 180 TABLET | Refills: 0 | Status: SHIPPED | OUTPATIENT
Start: 2024-12-16

## 2024-12-16 RX ORDER — ROPINIROLE 5 MG/1
5 TABLET, FILM COATED ORAL NIGHTLY
Qty: 90 TABLET | Refills: 1 | Status: SHIPPED | OUTPATIENT
Start: 2024-12-16

## 2024-12-16 RX ORDER — EMPAGLIFLOZIN, METFORMIN HYDROCHLORIDE 12.5; 1 MG/1; MG/1
1 TABLET, EXTENDED RELEASE ORAL 2 TIMES DAILY
Qty: 180 TABLET | Refills: 1 | Status: SHIPPED | OUTPATIENT
Start: 2024-12-16

## 2024-12-16 RX ORDER — SERTRALINE HYDROCHLORIDE 100 MG/1
150 TABLET, FILM COATED ORAL DAILY
Qty: 135 TABLET | Refills: 1 | Status: SHIPPED | OUTPATIENT
Start: 2024-12-16

## 2024-12-16 RX ORDER — CHLORHEXIDINE GLUCONATE ORAL RINSE 1.2 MG/ML
SOLUTION DENTAL
Qty: 473 ML | Refills: 0 | Status: SHIPPED | OUTPATIENT
Start: 2024-12-16

## 2024-12-16 RX ORDER — INSULIN DEGLUDEC 200 U/ML
56 INJECTION, SOLUTION SUBCUTANEOUS EVERY MORNING
Qty: 18 ML | Refills: 2 | Status: SHIPPED | OUTPATIENT
Start: 2024-12-16

## 2024-12-16 NOTE — PATIENT INSTRUCTIONS
Diabetes Mellitus and Nutrition, Adult  When you have diabetes, or diabetes mellitus, it is very important to have healthy eating habits because your blood sugar (glucose) levels are greatly affected by what you eat and drink. Eating healthy foods in the right amounts, at about the same times every day, can help you:  Manage your blood glucose.  Lower your risk of heart disease.  Improve your blood pressure.  Reach or maintain a healthy weight.  What can affect my meal plan?  Every person with diabetes is different, and each person has different needs for a meal plan. Your health care provider may recommend that you work with a dietitian to make a meal plan that is best for you. Your meal plan may vary depending on factors such as:  The calories you need.  The medicines you take.  Your weight.  Your blood glucose, blood pressure, and cholesterol levels.  Your activity level.  Other health conditions you have, such as heart or kidney disease.  How do carbohydrates affect me?  Carbohydrates, also called carbs, affect your blood glucose level more than any other type of food. Eating carbs raises the amount of glucose in your blood.  It is important to know how many carbs you can safely have in each meal. This is different for every person. Your dietitian can help you calculate how many carbs you should have at each meal and for each snack.  How does alcohol affect me?  Alcohol can cause a decrease in blood glucose (hypoglycemia), especially if you use insulin or take certain diabetes medicines by mouth. Hypoglycemia can be a life-threatening condition. Symptoms of hypoglycemia, such as sleepiness, dizziness, and confusion, are similar to symptoms of having too much alcohol.  Do not drink alcohol if:  Your health care provider tells you not to drink.  You are pregnant, may be pregnant, or are planning to become pregnant.  If you drink alcohol:  Limit how much you have to:  0-1 drink a day for women.  0-2 drinks a day  "for men.  Know how much alcohol is in your drink. In the [place] one drink equals one 12 oz bottle of beer (355 mL), one 5 oz glass of wine (148 mL), or one 1½ oz glass of hard liquor (44 mL).  Keep yourself hydrated with water, diet soda, or unsweetened iced tea. Keep in mind that regular soda, juice, and other mixers may contain a lot of sugar and must be counted as carbs.  What are tips for following this plan?    Reading food labels  Start by checking the serving size on the Nutrition Facts label of packaged foods and drinks. The number of calories and the amount of carbs, fats, and other nutrients listed on the label are based on one serving of the item. Many items contain more than one serving per package.  Check the total grams (g) of carbs in one serving.  Check the number of grams of saturated fats and trans fats in one serving. Choose foods that have a low amount or none of these fats.  Check the number of milligrams (mg) of salt (sodium) in one serving. Most people should limit total sodium intake to less than 2,300 mg per day.  Always check the nutrition information of foods labeled as \"low-fat\" or \"nonfat.\" These foods may be higher in added sugar or refined carbs and should be avoided.  Talk to your dietitian to identify your daily goals for nutrients listed on the label.  Shopping  Avoid buying canned, pre-made, or processed foods. These foods tend to be high in fat, sodium, and added sugar.  Shop around the outside edge of the grocery store. This is where you will most often find fresh fruits and vegetables, bulk grains, fresh meats, and fresh dairy products.  Cooking  Use low-heat cooking methods, such as baking, instead of high-heat cooking methods, such as deep frying.  Cook using healthy oils, such as olive, canola, or sunflower oil.  Avoid cooking with butter, cream, or high-fat meats.  Meal planning  Eat meals and snacks regularly, preferably at the same times every day. Avoid going long periods " of time without eating.  Eat foods that are high in fiber, such as fresh fruits, vegetables, beans, and whole grains.  Eat 4-6 oz (112-168 g) of lean protein each day, such as lean meat, chicken, fish, eggs, or tofu. One ounce (oz) (28 g) of lean protein is equal to:  1 oz (28 g) of meat, chicken, or fish.  1 egg.  ¼ cup (62 g) of tofu.  Eat some foods each day that contain healthy fats, such as avocado, nuts, seeds, and fish.  What foods should I eat?  Fruits  Berries. Apples. Oranges. Peaches. Apricots. Plums. Grapes. Mangoes. Papayas. Pomegranates. Kiwi. Cherries.  Vegetables  Leafy greens, including lettuce, spinach, kale, chard, guru greens, mustard greens, and cabbage. Beets. Cauliflower. Broccoli. Carrots. Green beans. Tomatoes. Peppers. Onions. Cucumbers. [place] sprouts.  Grains  Whole grains, such as whole-wheat or whole-grain bread, crackers, tortillas, cereal, and pasta. Unsweetened oatmeal. Quinoa. Brown or wild rice.  Meats and other proteins  Seafood. Poultry without skin. Lean cuts of poultry and beef. Tofu. Nuts. Seeds.  Dairy  Low-fat or fat-free dairy products such as milk, yogurt, and cheese.  The items listed above may not be a complete list of foods and beverages you can eat and drink. Contact a dietitian for more information.  What foods should I avoid?  Fruits  Fruits canned with syrup.  Vegetables  Canned vegetables. Frozen vegetables with butter or cream sauce.  Grains  Refined white flour and flour products such as bread, pasta, snack foods, and cereals. Avoid all processed foods.  Meats and other proteins  Fatty cuts of meat. Poultry with skin. Breaded or fried meats. Processed meat. Avoid saturated fats.  Dairy  Full-fat yogurt, cheese, or milk.  Beverages  Sweetened drinks, such as soda or iced tea.  The items listed above may not be a complete list of foods and beverages you should avoid. Contact a dietitian for more information.  Questions to ask a health care provider  Do I need  to meet with a certified diabetes care and ?  Do I need to meet with a dietitian?  What number can I call if I have questions?  When are the best times to check my blood glucose?  Where to find more information:  American Diabetes Association: diabetes.org  Academy of Nutrition and Dietetics: eatright.org  National [place] of Diabetes and Digestive and Kidney Diseases: niddk.nih.gov  Association of Diabetes Care & Education Specialists: diabeteseducator.org  Summary  It is important to have healthy eating habits because your blood sugar (glucose) levels are greatly affected by what you eat and drink. It is important to use alcohol carefully.  A healthy meal plan will help you manage your blood glucose and lower your risk of heart disease.  Your health care provider may recommend that you work with a dietitian to make a meal plan that is best for you.  This information is not intended to replace advice given to you by your health care provider. Make sure you discuss any questions you have with your health care provider.  Document Revised: 07/21/2021 Document Reviewed: 07/21/2021  Elsevier Patient Education © 2024 [employer]

## 2024-12-31 ENCOUNTER — OFFICE VISIT (OUTPATIENT)
Dept: WOUND CARE | Facility: HOSPITAL | Age: 54
End: 2024-12-31
Payer: COMMERCIAL

## 2024-12-31 VITALS
DIASTOLIC BLOOD PRESSURE: 73 MMHG | TEMPERATURE: 99.3 F | HEART RATE: 97 BPM | RESPIRATION RATE: 18 BRPM | SYSTOLIC BLOOD PRESSURE: 109 MMHG

## 2024-12-31 DIAGNOSIS — T81.89XD DELAYED SURGICAL WOUND HEALING, SUBSEQUENT ENCOUNTER: ICD-10-CM

## 2024-12-31 DIAGNOSIS — E11.65 UNCONTROLLED DIABETES MELLITUS WITH HYPERGLYCEMIA, WITH LONG-TERM CURRENT USE OF INSULIN: ICD-10-CM

## 2024-12-31 DIAGNOSIS — L05.91 PILONIDAL CYST: Primary | ICD-10-CM

## 2024-12-31 DIAGNOSIS — Z79.4 UNCONTROLLED DIABETES MELLITUS WITH HYPERGLYCEMIA, WITH LONG-TERM CURRENT USE OF INSULIN: ICD-10-CM

## 2024-12-31 DIAGNOSIS — L73.2 HIDRADENITIS: ICD-10-CM

## 2024-12-31 PROCEDURE — G0463 HOSPITAL OUTPT CLINIC VISIT: HCPCS | Performed by: NURSE PRACTITIONER

## 2024-12-31 NOTE — PROGRESS NOTES
Chief Complaint  Wound Check (Follow up on cyst from right gluteal fold and coccyx.  Pt spouse packing daily with collagen & calcium alginate. Blood sugar this am fasting was 200.)    Subjective      Wound Check    Mariela Aldrich  is a 54 y.o. female with chronic wounds along the gluteal crease and a wound to the right gluteal aspect s/p surgical intervention.  Patient presents today for ongoing long-term wound management.    Patient has surgical a surgical excision of Hidradenitis in the Inguinal area and a Pilonidal Cystectomy in February of 2023. In March of 2023 she required additional excision of Hidradenitis to bilateral inguinal areas.     On July 24, 2024, patient under Debridement of Ischial ulcer and buttocks wounds. The tissue pathology confirmed Pilonidal cyst to the gluteal region and Hidradenitis Suppurativa to the ischial ulceration on the right buttock.     Patient continues to have a very difficult time with wound healing. She has had   recurrent abscess formations consistent with her hidradenitis suppurativa diagnosis that complicate her healing. Her surgical incision from 2023 never fully healed prompting further exploration in 2024.      Patient's spouse is currently performing daily wound care with collagen with silver and calcium alginate to her wounds. She states that her spouse reported that her gluteal crease incision had closed but has reopened again.     Her blood sugars have continued to fluctuate. She is trying to manage them but admits that they could be better. She is taking Mounjaro and is having a difficult time eating consistent meals.    She has a significant medical history of Hyperlipidemia, Anxiety, Hypothyroidism, Asthma.     Allergies:  Etodolac, Penicillins, Statins, Atorvastatin, Crestor [rosuvastatin], Lisinopril, and Pravastatin      Current Outpatient Medications:     albuterol sulfate  (90 Base) MCG/ACT inhaler, Inhale 2 puffs Every 4 (Four) Hours As Needed for  Shortness of Air., Disp: 18 g, Rfl: 1    cetirizine (zyrTEC) 10 MG tablet, Take 1 tablet by mouth Daily., Disp: , Rfl:     chlorhexidine (PERIDEX) 0.12 % solution, Swish and spit 15 ml. Or apply to site with q-tip. 2-3 times per day., Disp: 473 mL, Rfl: 0    Continuous Blood Gluc Transmit (Dexcom G6 Transmitter) misc, 1 Device Every 3 (Three) Months., Disp: 1 each, Rfl: 3    Continuous Glucose Sensor (Dexcom G7 Sensor) misc, CHANGE ONE SENSOR EVERY 10 DAYS, Disp: 9 each, Rfl: 0    Empagliflozin-metFORMIN HCl ER (Synjardy XR) 12.5-1000 MG tablet sustained-release 24 hour, Take 1 tablet by mouth 2 (Two) Times a Day., Disp: 180 tablet, Rfl: 1    Fluticasone-Salmeterol (ADVAIR/WIXELA) 250-50 MCG/ACT DISKUS, INHALE 1 DOSE BY MOUTH TWICE DAILY, Disp: 60 each, Rfl: 0    gabapentin (NEURONTIN) 300 MG capsule, Take 1 capsule by mouth Every Night., Disp: 90 capsule, Rfl: 1    glipizide (GLUCOTROL) 10 MG tablet, Take 1 tablet by mouth 2 (Two) Times a Day Before Meals., Disp: 180 tablet, Rfl: 0    glucose blood (OneTouch Verio) test strip, USE 1 STRIP TO CHECK GLUCOSE THREE TIMES DAILY, Disp: 100 each, Rfl: 0    Insulin Degludec (Tresiba FlexTouch) 200 UNIT/ML solution pen-injector pen injection, Inject 56 Units under the skin into the appropriate area as directed Every Morning., Disp: 18 mL, Rfl: 2    losartan (Cozaar) 25 MG tablet, Take 0.5 tablets by mouth Daily., Disp: 90 tablet, Rfl: 1    montelukast (SINGULAIR) 10 MG tablet, Take 1 tablet by mouth every night at bedtime., Disp: , Rfl:     multivitamin (THERAGRAN) tablet tablet, , Disp: , Rfl:     ondansetron (Zofran) 4 MG tablet, Take 1 tablet by mouth Every 8 (Eight) Hours As Needed for Nausea or Vomiting. (Patient not taking: Reported on 12/16/2024), Disp: 10 tablet, Rfl: 0    ONE TOUCH CLUB LANCETS misc, 90 each 3 (Three) Times a Day As Needed (check blood sugar)., Disp: 100 each, Rfl: 3    pitavastatin calcium (Livalo) 2 MG tablet tablet, Take 1 tablet by mouth Every  Night., Disp: 90 tablet, Rfl: 1    RELION PEN NEEDLE 31G/8MM 31G X 8 MM misc, USE 1  ONCE DAILY UNDER THE SKIN INTO THE APPROPRIATE AREA AS DIRECTED., Disp: 100 each, Rfl: 0    rOPINIRole (REQUIP) 5 MG tablet, Take 1 tablet by mouth Every Night., Disp: 90 tablet, Rfl: 1    sertraline (ZOLOFT) 100 MG tablet, TAKE 1 & 1/2 (ONE & ONE-HALF) TABLETS BY MOUTH ONCE DAILY, Disp: 135 tablet, Rfl: 1    Synthroid 88 MCG tablet, Take 1 tablet by mouth Daily., Disp: 90 tablet, Rfl: 1    Tirzepatide 7.5 MG/0.5ML solution auto-injector, Inject 7.5 mg under the skin into the appropriate area as directed 1 (One) Time Per Week., Disp: 6 mL, Rfl: 1    Zinc 50 MG tablet, Take 1 tablet by mouth Daily., Disp: , Rfl:     Past Medical History:   Diagnosis Date    Anxiety     Asthma     PRN INHALER AND TAKES ALLERGY INJECTIONS    Diabetes mellitus type 2, controlled     AVERAGE     Hidradenitis     Hyperlipidemia     Hypothyroidism     Murmur     DIAGNOSED WHEN 17 BUT IS ASYMPTOMATIC AND IS FOLLOWED ONLY BY PCP    Panic attack     Pilonidal cyst     Rheumatic fever     AS A CHILD    RLS (restless legs syndrome)     Sinus trouble      Past Surgical History:   Procedure Laterality Date    COLONOSCOPY  01/21/2020    exernal hemorrhoids    EXCISION LESION Bilateral 03/20/2023    Procedure: Excision Hidradenitis of Bilateral Inguinal Areas;  Surgeon: Donato You MD;  Location: [facility] OR;  Service: General;  Laterality: Bilateral;    EXCISION LESION N/A 7/24/2024    Procedure: DEBRIDEMENT OF ISCHIAL ULCER/BUTTOCKS WOUND;  Surgeon: Donato You MD;  Location: [facility] MAIN OR;  Service: General;  Laterality: N/A;    GROIN ABSCESS INCISION AND DRAINAGE Right     hidradenitis    HYSTERECTOMY  2010    KNEE SURGERY Bilateral 2013    ARTHROSCOPY    PILONIDAL CYSTECTOMY N/A 02/08/2023    Procedure: Excision Hidradenitis of Inguinal Area and Pilonidal Cystectomy;  Surgeon: Donato You MD;  Location: [facility] OR;  Service:  General;  Laterality: N/A;     Social History     Socioeconomic History    Marital status:    Tobacco Use    Smoking status: Never    Smokeless tobacco: Never   Vaping Use    Vaping status: Never Used   Substance and Sexual Activity    Alcohol use: Never    Drug use: Never    Sexual activity: Defer           Objective     Vitals:    12/31/24 1322   BP: 109/73   BP Location: Left arm   Patient Position: Sitting   Pulse: 97   Resp: 18   Temp: 99.3 °F (37.4 °C)   PainSc: 0-No pain       There is no height or weight on file to calculate BMI.    The following data has been reviewed by WILLIAM Guo on 12/31/2024   CMP          3/13/2024    07:38 6/17/2024    07:46 9/16/2024    08:25   CMP   Glucose 118  80  187    BUN 15  13  15    Creatinine 0.87  0.82  0.90    EGFR 79.3  85.1  75.7    Sodium 139  137  137    Potassium 4.4  3.9  4.3    Chloride 104  103  104    Calcium 9.4  9.0  9.4    Total Protein 8.6  7.6  8.4    Albumin 4.0  3.7  3.8    Globulin 4.6  3.9  4.6    Total Bilirubin 0.3  0.4  0.3    Alkaline Phosphatase 109  107  152    AST (SGOT) 21  21  13    ALT (SGPT) 21  15  13    Albumin/Globulin Ratio 0.9  0.9  0.8    BUN/Creatinine Ratio 17.2  15.9  16.7    Anion Gap 10.9  9.1  11.5          Most Recent A1C          9/16/2024    08:25   HGBA1C Most Recent   Hemoglobin A1C 7.90        No Images in the past 120 days found..     STEADI Fall Risk Assessment has not been completed.     Review of Systems     ROS:  Per HPI.     I have reviewed the HPI and ROS as documented by MA/RN. WILLIAM Guo    Physical Exam     NAD  AAOx3, pleasant, cooperative    Right gluteal: Surgical incision with open cavity that is much smaller today.  The cavity does have some continued depth with noted active seropurulent drainage.  Visibility of the tissue within the cavity is very limited.  Palpation of the tissue could be consistent with scar tissue formation consistency.  No erythema or edema.  No TTP.  Silver  nitrate applied within the cavity today and softening of the tissue consistency noted.    Gluteal crease: Significantly smaller wound opening today.  Very shallow wound opening with healthy red wound base at the most distal aspect of the incision line.  No active drainage.  No erythema or edema.  No active signs of infection.    See photos for details.    Right gluteal:      Gluteal crease:           Result Review :  The following data was reviewed by: WILLIAM Guo on 12/31/2024:    Prior wound care notes and images.    Assessment and Plan   Diagnoses and all orders for this visit:    1. Pilonidal cyst (Primary)    2. Hidradenitis    3. Delayed surgical wound healing, subsequent encounter    4. Uncontrolled diabetes mellitus with hyperglycemia, with long-term current use of insulin    Patient presents today with overall improvement to her surgical wounds along the right gluteal aspect as well as within the gluteal crease.  The wounds are significantly smaller today.  Patient reports that the gluteal crease wound had closed but has reopened.  This is likely as result of moisture accumulation within the gluteal crease.    Recommending that the patient continue daily dressing changes to both wounds that consist of cleansing/irrigating the wound with normal saline, blotting dry, applying collagen within the wound bases, covering with calcium alginate and then securing with a dry dressing.  Further recommending that the periwound tissue should have skin prep applied prior to application of the dressings to assist with skin protection and drying of the tissue to prevent moisture buildup.  Adhesive remover may be utilized to remove the dressings to prevent tissue trauma.    Patient is encouraged to achieve a tight glucose control to assist with overall wound healing.    Patient may reach out with any questions or concerns prior to her next visit.    Follow-up in 2 to 3 weeks.    Patient was given instructions and  counseling regarding their condition or for health maintenance advice, as well as the wound care plan and recommendations. They understand and agree with the plan.  They will follow back up here in the clinic but are instructed to contact us in the interim should they have any new, returning, or worsening symptoms or concerns. Please see specific information pulled into the AVS if appropriate.     Dragon Dictation utilized for chart completion.    Follow Up   No follow-ups on file.      WILLIAM Guo

## 2025-01-14 DIAGNOSIS — J45.20 MILD INTERMITTENT ASTHMA WITHOUT COMPLICATION: ICD-10-CM

## 2025-01-14 RX ORDER — FLUTICASONE PROPIONATE AND SALMETEROL 250; 50 UG/1; UG/1
POWDER RESPIRATORY (INHALATION)
Qty: 60 EACH | Refills: 2 | Status: SHIPPED | OUTPATIENT
Start: 2025-01-14

## 2025-01-23 ENCOUNTER — OFFICE VISIT (OUTPATIENT)
Dept: WOUND CARE | Facility: HOSPITAL | Age: 55
End: 2025-01-23
Payer: COMMERCIAL

## 2025-01-23 VITALS
HEART RATE: 96 BPM | SYSTOLIC BLOOD PRESSURE: 105 MMHG | DIASTOLIC BLOOD PRESSURE: 83 MMHG | TEMPERATURE: 97.8 F | RESPIRATION RATE: 18 BRPM

## 2025-01-23 DIAGNOSIS — Z79.4 UNCONTROLLED DIABETES MELLITUS WITH HYPERGLYCEMIA, WITH LONG-TERM CURRENT USE OF INSULIN: ICD-10-CM

## 2025-01-23 DIAGNOSIS — L73.2 HIDRADENITIS: ICD-10-CM

## 2025-01-23 DIAGNOSIS — T81.89XD DELAYED SURGICAL WOUND HEALING, SUBSEQUENT ENCOUNTER: ICD-10-CM

## 2025-01-23 DIAGNOSIS — L05.91 PILONIDAL CYST: Primary | ICD-10-CM

## 2025-01-23 DIAGNOSIS — E11.65 UNCONTROLLED DIABETES MELLITUS WITH HYPERGLYCEMIA, WITH LONG-TERM CURRENT USE OF INSULIN: ICD-10-CM

## 2025-01-23 NOTE — PROGRESS NOTES
Chief Complaint  Wound Check (Follow up on buttock ulcers. Spouse packing with collagen and calcium alginate daily. Blood sugar ranges 120-160 in am. )    Subjective      History of Present Illness    Mariela Aldrich  is a 54 y.o. female   History of Present Illness  The patient presents today for follow-up of chronic gluteal wounds s/p surgical intervention.    She reports a satisfactory condition, with her  observing near-complete healing of the wounds. He has noted that one wound at the base of the right gluteal aspect appears to be healing internally.     She experienced a burning sensation following the application of skin prep but did not report any pain after the silver nitrate treatment during her last visit.     She also reports fluctuating blood sugar levels, which she is actively managing.     The current treatment regimen includes the use of collagen and calcium alginate.  Her spouse has been performing the dressing changes.    Additionally, skin prep has been applied around the wound edges to assist with overall moisture management and skin protection.       Allergies:  Etodolac, Penicillins, Statins, Atorvastatin, Crestor [rosuvastatin], Lisinopril, and Pravastatin      Current Outpatient Medications:     albuterol sulfate  (90 Base) MCG/ACT inhaler, Inhale 2 puffs Every 4 (Four) Hours As Needed for Shortness of Air., Disp: 18 g, Rfl: 1    cetirizine (zyrTEC) 10 MG tablet, Take 1 tablet by mouth Daily., Disp: , Rfl:     chlorhexidine (PERIDEX) 0.12 % solution, Swish and spit 15 ml. Or apply to site with q-tip. 2-3 times per day., Disp: 473 mL, Rfl: 0    Continuous Blood Gluc Transmit (Dexcom G6 Transmitter) misc, 1 Device Every 3 (Three) Months., Disp: 1 each, Rfl: 3    Continuous Glucose Sensor (Dexcom G7 Sensor) misc, CHANGE ONE SENSOR EVERY 10 DAYS, Disp: 9 each, Rfl: 0    Empagliflozin-metFORMIN HCl ER (Synjardy XR) 12.5-1000 MG tablet sustained-release 24 hour, Take 1 tablet by  mouth 2 (Two) Times a Day., Disp: 180 tablet, Rfl: 1    Fluticasone-Salmeterol (ADVAIR/WIXELA) 250-50 MCG/ACT DISKUS, INHALE 1 PUFF INTO THE LUNGS TWICE DAILY AS DIRECTED, Disp: 60 each, Rfl: 2    gabapentin (NEURONTIN) 300 MG capsule, Take 1 capsule by mouth Every Night., Disp: 90 capsule, Rfl: 1    glipizide (GLUCOTROL) 10 MG tablet, Take 1 tablet by mouth 2 (Two) Times a Day Before Meals., Disp: 180 tablet, Rfl: 0    glucose blood (OneTouch Verio) test strip, USE 1 STRIP TO CHECK GLUCOSE THREE TIMES DAILY, Disp: 100 each, Rfl: 0    Insulin Degludec (Tresiba FlexTouch) 200 UNIT/ML solution pen-injector pen injection, Inject 56 Units under the skin into the appropriate area as directed Every Morning., Disp: 18 mL, Rfl: 2    losartan (Cozaar) 25 MG tablet, Take 0.5 tablets by mouth Daily., Disp: 90 tablet, Rfl: 1    montelukast (SINGULAIR) 10 MG tablet, Take 1 tablet by mouth every night at bedtime., Disp: , Rfl:     multivitamin (THERAGRAN) tablet tablet, , Disp: , Rfl:     ondansetron (Zofran) 4 MG tablet, Take 1 tablet by mouth Every 8 (Eight) Hours As Needed for Nausea or Vomiting. (Patient not taking: Reported on 12/16/2024), Disp: 10 tablet, Rfl: 0    ONE TOUCH CLUB LANCETS misc, 90 each 3 (Three) Times a Day As Needed (check blood sugar)., Disp: 100 each, Rfl: 3    pitavastatin calcium (Livalo) 2 MG tablet tablet, Take 1 tablet by mouth Every Night., Disp: 90 tablet, Rfl: 1    RELION PEN NEEDLE 31G/8MM 31G X 8 MM misc, USE 1  ONCE DAILY UNDER THE SKIN INTO THE APPROPRIATE AREA AS DIRECTED., Disp: 100 each, Rfl: 0    rOPINIRole (REQUIP) 5 MG tablet, Take 1 tablet by mouth Every Night., Disp: 90 tablet, Rfl: 1    sertraline (ZOLOFT) 100 MG tablet, TAKE 1 & 1/2 (ONE & ONE-HALF) TABLETS BY MOUTH ONCE DAILY, Disp: 135 tablet, Rfl: 1    Synthroid 88 MCG tablet, Take 1 tablet by mouth Daily., Disp: 90 tablet, Rfl: 1    Tirzepatide 7.5 MG/0.5ML solution auto-injector, Inject 7.5 mg under the skin into the appropriate  area as directed 1 (One) Time Per Week., Disp: 6 mL, Rfl: 1    Zinc 50 MG tablet, Take 1 tablet by mouth Daily., Disp: , Rfl:     Past Medical History:   Diagnosis Date    Anxiety     Asthma     PRN INHALER AND TAKES ALLERGY INJECTIONS    Diabetes mellitus type 2, controlled     AVERAGE     Hidradenitis     Hyperlipidemia     Hypothyroidism     Murmur     DIAGNOSED WHEN 17 BUT IS ASYMPTOMATIC AND IS FOLLOWED ONLY BY PCP    Panic attack     Pilonidal cyst     Rheumatic fever     AS A CHILD    RLS (restless legs syndrome)     Sinus trouble      Past Surgical History:   Procedure Laterality Date    COLONOSCOPY  01/21/2020    exernal hemorrhoids    EXCISION LESION Bilateral 03/20/2023    Procedure: Excision Hidradenitis of Bilateral Inguinal Areas;  Surgeon: Donato You MD;  Location: [facility] OR;  Service: General;  Laterality: Bilateral;    EXCISION LESION N/A 7/24/2024    Procedure: DEBRIDEMENT OF ISCHIAL ULCER/BUTTOCKS WOUND;  Surgeon: Donato You MD;  Location: [facility] OR;  Service: General;  Laterality: N/A;    GROIN ABSCESS INCISION AND DRAINAGE Right     hidradenitis    HYSTERECTOMY  2010    KNEE SURGERY Bilateral 2013    ARTHROSCOPY    PILONIDAL CYSTECTOMY N/A 02/08/2023    Procedure: Excision Hidradenitis of Inguinal Area and Pilonidal Cystectomy;  Surgeon: Donato You MD;  Location: [facility] OR;  Service: General;  Laterality: N/A;     Social History     Socioeconomic History    Marital status:    Tobacco Use    Smoking status: Never    Smokeless tobacco: Never   Vaping Use    Vaping status: Never Used   Substance and Sexual Activity    Alcohol use: Never    Drug use: Never    Sexual activity: Defer           Objective     Vitals:    01/23/25 1526   BP: 105/83   BP Location: Right arm   Patient Position: Sitting   Pulse: 96   Resp: 18  Comment: O2 sat 98%   Temp: 97.8 °F (36.6 °C)   PainSc: 0-No pain     There is no height or weight on file to calculate BMI.    The  following data has been reviewed by WILLIAM Guo on 01/23/2025   Most Recent A1C          9/16/2024    08:25   HGBA1C Most Recent   Hemoglobin A1C 7.90        No Images in the past 120 days found..     STEADI Fall Risk Assessment has not been completed.     Review of Systems     ROS:  Per HPI.     I have reviewed the HPI and ROS as documented by MA/RN. WILLIAM Guo    Physical Exam     NAD  AAOx3, pleasant, cooperative    Physical Exam  The left lower gluteal wound on the skin has a small continued opening with no visible open wound base, but instead intact epithelium.  Silver nitrate applied within the crease of the tissue to promote approximation.    The lower gluteal crease wound has decreased in size and does have an area of hypergranulation along the wound margins.  Visible wound base is healthy and red.  No active drainage.  No induration.  No signs of infection.  Silver nitrate applied to the hypergranulation to promote wound healing.    The remainder of the incision line is well approximated and closed.    See photos for details.              Result Review :  The following data was reviewed by: WILLIAM Guo on 01/23/2025:      Prior wound care notes and images.  Labs.        Assessment and Plan   Diagnoses and all orders for this visit:    1. Pilonidal cyst (Primary)    2. Hidradenitis    3. Delayed surgical wound healing, subsequent encounter    4. Uncontrolled diabetes mellitus with hyperglycemia, with long-term current use of insulin      Assessment & Plan  1. Chronic gluteal wounds.  The wounds are demonstrating a positive healing trajectory, with the exception of a minor area of hypergranulation. he wound is becoming more superficial and has attempted to connect centrally. The dimpling appears to be internally sealed, with no evidence of exposed wet tissue. Silver nitrate will be applied to the wounds today, followed by the continuation of collagen and calcium alginate  treatments for the lower gluteal crease. The lower gluteal crease wound should remain covered unless showering. The wound on the right gluteal aspect should be left undisturbed unless it begins to drain, in which case it should be covered with a bandage. Skin prep can be applied to protect the skin, but otherwise, no additional coverage is necessary.    2. Diabetes.  The patient should continue to monitor her blood sugar levels and maintain a balanced diet.  She is encouraged to continue making her reported changes to achieve a tighter glucose control.    She is advised to contact the clinic if there is any increase in hypergranulation or concerns arise prior to her next appointment.    Follow-up in 4 weeks.      Patient was given instructions and counseling regarding their condition or for health maintenance advice, as well as the wound care plan and recommendations. They understand and agree with the plan.  They will follow back up here in the clinic but are instructed to contact us in the interim should they have any new, returning, or worsening symptoms or concerns. Please see specific information pulled into the AVS if appropriate.     Dragon Dictation utilized for chart completion.    Follow Up   Return in about 4 weeks (around 2/20/2025) for Wound Check.      WILLIAM Guo    Patient or patient representative verbalized consent for the use of Ambient Listening during the visit with  WILLIAM Guo for chart documentation. 1/23/2025  17:38 EST

## 2025-02-09 DIAGNOSIS — Z79.4 TYPE 2 DIABETES MELLITUS WITH HYPERGLYCEMIA, WITH LONG-TERM CURRENT USE OF INSULIN: ICD-10-CM

## 2025-02-09 DIAGNOSIS — E11.65 TYPE 2 DIABETES MELLITUS WITH HYPERGLYCEMIA, WITH LONG-TERM CURRENT USE OF INSULIN: ICD-10-CM

## 2025-02-10 RX ORDER — PEN NEEDLE, DIABETIC 31 GX5/16"
NEEDLE, DISPOSABLE MISCELLANEOUS
Qty: 100 EACH | Refills: 0 | Status: SHIPPED | OUTPATIENT
Start: 2025-02-10

## 2025-02-20 ENCOUNTER — OFFICE VISIT (OUTPATIENT)
Dept: WOUND CARE | Facility: HOSPITAL | Age: 55
End: 2025-02-20
Payer: COMMERCIAL

## 2025-02-20 VITALS
TEMPERATURE: 98 F | HEART RATE: 100 BPM | RESPIRATION RATE: 16 BRPM | SYSTOLIC BLOOD PRESSURE: 131 MMHG | DIASTOLIC BLOOD PRESSURE: 74 MMHG

## 2025-02-20 DIAGNOSIS — Z79.4 UNCONTROLLED DIABETES MELLITUS WITH HYPERGLYCEMIA, WITH LONG-TERM CURRENT USE OF INSULIN: ICD-10-CM

## 2025-02-20 DIAGNOSIS — T81.89XS DELAYED SURGICAL WOUND HEALING, SEQUELA: ICD-10-CM

## 2025-02-20 DIAGNOSIS — E11.65 UNCONTROLLED DIABETES MELLITUS WITH HYPERGLYCEMIA, WITH LONG-TERM CURRENT USE OF INSULIN: ICD-10-CM

## 2025-02-20 DIAGNOSIS — L05.91 PILONIDAL CYST: Primary | ICD-10-CM

## 2025-02-20 DIAGNOSIS — L73.2 HIDRADENITIS: ICD-10-CM

## 2025-02-20 PROCEDURE — G0463 HOSPITAL OUTPT CLINIC VISIT: HCPCS | Performed by: NURSE PRACTITIONER

## 2025-02-20 NOTE — PROGRESS NOTES
Chief Complaint  Wound Check (FOLLOW-UP ON BUTTOCK WOUNDS)    Subjective      History of Present Illness    Mariela Aldrich  is a 54 y.o. female   History of Present Illness  The patient presents today for a follow-up of chronic wounds s/p surgical intervention with wound dehiscence within the gluteal crease and to the right gluteal aspect.    The patient reports a significant improvement in the condition of the wound, with no observed drainage from the right gluteal wound. The patient has been applying skin prep around and to the gluteal crease wound, which causes a slight burning sensation. The patient has been dressing the wound almost daily with and reports no increase in pain or discomfort associated with the wound. The patient continues to use collagen and calcium alginate on the wound. Patient's spouse assists her with her wound care.     The patient's blood sugar levels have not been optimal recently, with a reading of 208 this morning. The patient uses a continuous monitor to check the blood sugar levels.      Allergies:  Etodolac, Penicillins, Statins, Atorvastatin, Crestor [rosuvastatin], Lisinopril, and Pravastatin      Current Outpatient Medications:     albuterol sulfate  (90 Base) MCG/ACT inhaler, Inhale 2 puffs Every 4 (Four) Hours As Needed for Shortness of Air., Disp: 18 g, Rfl: 1    cetirizine (zyrTEC) 10 MG tablet, Take 1 tablet by mouth Daily., Disp: , Rfl:     chlorhexidine (PERIDEX) 0.12 % solution, Swish and spit 15 ml. Or apply to site with q-tip. 2-3 times per day., Disp: 473 mL, Rfl: 0    Continuous Blood Gluc Transmit (Dexcom G6 Transmitter) misc, 1 Device Every 3 (Three) Months., Disp: 1 each, Rfl: 3    Continuous Glucose Sensor (Dexcom G7 Sensor) misc, CHANGE ONE SENSOR EVERY 10 DAYS, Disp: 9 each, Rfl: 0    Empagliflozin-metFORMIN HCl ER (Synjardy XR) 12.5-1000 MG tablet sustained-release 24 hour, Take 1 tablet by mouth 2 (Two) Times a Day., Disp: 180 tablet, Rfl: 1     Fluticasone-Salmeterol (ADVAIR/WIXELA) 250-50 MCG/ACT DISKUS, INHALE 1 PUFF INTO THE LUNGS TWICE DAILY AS DIRECTED, Disp: 60 each, Rfl: 2    gabapentin (NEURONTIN) 300 MG capsule, Take 1 capsule by mouth Every Night., Disp: 90 capsule, Rfl: 1    glipizide (GLUCOTROL) 10 MG tablet, Take 1 tablet by mouth 2 (Two) Times a Day Before Meals., Disp: 180 tablet, Rfl: 0    glucose blood (OneTouch Verio) test strip, USE 1 STRIP TO CHECK GLUCOSE THREE TIMES DAILY, Disp: 100 each, Rfl: 0    Insulin Degludec (Tresiba FlexTouch) 200 UNIT/ML solution pen-injector pen injection, Inject 56 Units under the skin into the appropriate area as directed Every Morning., Disp: 18 mL, Rfl: 2    losartan (Cozaar) 25 MG tablet, Take 0.5 tablets by mouth Daily., Disp: 90 tablet, Rfl: 1    MM Pen Needles 31G X 8 MM misc, USE ONCE DAILY UNDER THE SKIN AS DIRECTED, Disp: 100 each, Rfl: 0    montelukast (SINGULAIR) 10 MG tablet, Take 1 tablet by mouth every night at bedtime., Disp: , Rfl:     multivitamin (THERAGRAN) tablet tablet, , Disp: , Rfl:     ondansetron (Zofran) 4 MG tablet, Take 1 tablet by mouth Every 8 (Eight) Hours As Needed for Nausea or Vomiting. (Patient not taking: Reported on 12/16/2024), Disp: 10 tablet, Rfl: 0    ONE TOUCH CLUB LANCETS misc, 90 each 3 (Three) Times a Day As Needed (check blood sugar)., Disp: 100 each, Rfl: 3    pitavastatin calcium (Livalo) 2 MG tablet tablet, Take 1 tablet by mouth Every Night., Disp: 90 tablet, Rfl: 1    rOPINIRole (REQUIP) 5 MG tablet, Take 1 tablet by mouth Every Night., Disp: 90 tablet, Rfl: 1    sertraline (ZOLOFT) 100 MG tablet, TAKE 1 & 1/2 (ONE & ONE-HALF) TABLETS BY MOUTH ONCE DAILY, Disp: 135 tablet, Rfl: 1    Synthroid 88 MCG tablet, Take 1 tablet by mouth Daily., Disp: 90 tablet, Rfl: 1    Tirzepatide 7.5 MG/0.5ML solution auto-injector, Inject 7.5 mg under the skin into the appropriate area as directed 1 (One) Time Per Week., Disp: 6 mL, Rfl: 1    Zinc 50 MG tablet, Take 1 tablet  by mouth Daily., Disp: , Rfl:     Past Medical History:   Diagnosis Date    Anxiety     Asthma     PRN INHALER AND TAKES ALLERGY INJECTIONS    Diabetes mellitus type 2, controlled     AVERAGE     Hidradenitis     Hyperlipidemia     Hypothyroidism     Murmur     DIAGNOSED WHEN 17 BUT IS ASYMPTOMATIC AND IS FOLLOWED ONLY BY PCP    Panic attack     Pilonidal cyst     Rheumatic fever     AS A CHILD    RLS (restless legs syndrome)     Sinus trouble      Past Surgical History:   Procedure Laterality Date    COLONOSCOPY  01/21/2020    exernal hemorrhoids    EXCISION LESION Bilateral 03/20/2023    Procedure: Excision Hidradenitis of Bilateral Inguinal Areas;  Surgeon: Donato You MD;  Location: [facility]  Service: General;  Laterality: Bilateral;    EXCISION LESION N/A 7/24/2024    Procedure: DEBRIDEMENT OF ISCHIAL ULCER/BUTTOCKS WOUND;  Surgeon: Donato You MD;  Location: [facility] OR;  Service: General;  Laterality: N/A;    GROIN ABSCESS INCISION AND DRAINAGE Right     hidradenitis    HYSTERECTOMY  2010    KNEE SURGERY Bilateral 2013    ARTHROSCOPY    PILONIDAL CYSTECTOMY N/A 02/08/2023    Procedure: Excision Hidradenitis of Inguinal Area and Pilonidal Cystectomy;  Surgeon: Donato You MD;  Location: [facility]  Service: General;  Laterality: N/A;     Social History     Socioeconomic History    Marital status:    Tobacco Use    Smoking status: Never    Smokeless tobacco: Never   Vaping Use    Vaping status: Never Used   Substance and Sexual Activity    Alcohol use: Never    Drug use: Never    Sexual activity: Defer           Objective     Vitals:    02/20/25 1534   BP: 131/74   BP Location: Right arm   Patient Position: Sitting   Cuff Size: Adult   Pulse: 100   Resp: 16  Comment: O2: 99% ON RA   Temp: 98 °F (36.7 °C)   TempSrc: Temporal   PainSc: 0-No pain     There is no height or weight on file to calculate BMI.    The following data has been reviewed by Miladys Dockery  WILLIAM on 02/20/2025   Most Recent A1C          9/16/2024    08:25   HGBA1C Most Recent   Hemoglobin A1C 7.90        No Images in the past 120 days found..     STEADI Fall Risk Assessment has not been completed.     Review of Systems     ROS:  Per HPI.     I have reviewed the HPI and ROS as documented by MA/RN. WILLIMA Guo    Physical Exam     NAD  AAOx3, pleasant, cooperative    Physical Exam  The incisional dehiscence within the distal gluteal crease has significantly improved, with a very small remaining opening containing healthy red, moist tissue at the base. The wound is very shallow. There is no active drainage and no signs of infection. The remainder of the incision is completely approximated.    The right gluteal aspect continues to have a small area of indentation with intact pink epithelium within the fold of the wound. No active drainage is observed.    See photos for details.    Gluteal crease:      Right gluteal:        Result Review :  The following data was reviewed by: WILLIAM Guo on 02/20/2025:    Prior wound care notes and images.       Assessment and Plan   Diagnoses and all orders for this visit:    1. Pilonidal cyst (Primary)  Comments:  Daily or every other day dressing changes: Cleanse with normal saline and gauze, blot dry, apply skin barrier, collagen and calcium alginate to wound.    2. Hidradenitis    3. Delayed surgical wound healing, sequela    4. Uncontrolled diabetes mellitus with hyperglycemia, with long-term current use of insulin      Assessment & Plan  1. Chronic wounds.  The chronic wounds located in the gluteal crease and right gluteal aspect have shown significant improvement. The incision within the gluteal crease has a very small remaining opening with no signs of infection. The right gluteal aspect has a small area of indentation, which will likely remain a chronic defect with intact pink epithelium and no active drainage.   The patient is advised to  continue the current treatment regimen, which includes daily to every other day application of collagen and calcium alginate to the gluteal crease wound. Skin prep should be applied around the wound or over the edges to maintain moisture protection.  A small bandage should be used to cover the wound.   The patient is instructed to monitor the wound for any signs of reopening or drainage and to report any such occurrences immediately.    2.  Diabetes.    The patient's blood glucose levels have been elevated, with a reported average of 208 mg/dL. The patient is advised to work on managing blood glucose levels to prevent potential infections or abscess formation. The importance of maintaining optimal blood glucose levels to reduce the risk of bacterial infections was discussed.    Follow-up in 4 weeks.    Patient was given instructions and counseling regarding their condition or for health maintenance advice, as well as the wound care plan and recommendations. They understand and agree with the plan.  They will follow back up here in the clinic but are instructed to contact us in the interim should they have any new, returning, or worsening symptoms or concerns. Please see specific information pulled into the AVS if appropriate.     Dragon Dictation utilized for chart completion.    Follow Up   Return in about 4 weeks (around 3/20/2025) for Wound Check.      WILLIAM Guo    Patient or patient representative verbalized consent for the use of Ambient Listening during the visit with  WILLIAM Guo for chart documentation. 2/20/2025  15:58 EST

## 2025-02-28 NOTE — PROGRESS NOTES
Chief Complaint  Diabetes (Follow up; would like to discuss mounjaro)    Subjective          Mariela Aldrich is a 54 y.o. female who presents to [facility]    History of Present Illness  History of Present Illness  The patient presents for evaluation of nausea, diabetes, leg cramps, and sleep apnea.    She reports experiencing morning nausea, which she attributes to her medication regimen. She has been managing her reflux symptoms by elevating the head of her bed, a strategy that has proven effective. Prior to this adjustment, she experienced episodes of nighttime aspiration, characterized by coughing and a burning sensation. She is not currently on any reflux medication. She also reports occasional sulfur burps, lasting for 2 to 3 days, but does not recall if these occurred while on a 5 mg dose of Mounjaro.    She expresses a desire to discontinue Mounjaro due to persistent fatigue, insomnia, and anxiety, which she believes are side effects of the medication. She reports no episodes of hypoglycemia at night. She acknowledges inadequate water intake and constipation, which she attributes to Mounjaro. She has reduced her Synjardy dosage to once daily instead of twice. She reports that Mounjaro is effective as long as she maintains a moderate diet. She is currently on a 56-unit dose of Tresiba. She believes her A1c level is around 9, based on her Dexcom readings. She recalls that her A1c level decreased to 7 when she initially started Mounjaro, but notes that she was consuming less food at that time. She admits to overeating    She has a history of restless leg syndrome and leg cramps, which she suspects may be related to Mounjaro. She reports that her toes occasionally feel as though they are curling up.    She reports snoring, although her  has observed a decrease in this since the bed elevation. She experiences awakenings at night, sometimes hourly, accompanied by  anxiety.    MEDICATIONS  Current: Mounjaro, glipizide, Synjardy, Tresiba, Zoloft, Advair, Livalo      Little interest or pleasure in doing things? Not at all   Feeling down, depressed, or hopeless? Not at all   PHQ-2 Total Score 0                Health Maintenance Due   Topic Date Due    ZOSTER VACCINE (1 of 2) Never done    DIABETIC FOOT EXAM  09/07/2023    DIABETIC EYE EXAM  07/12/2024    ANNUAL PHYSICAL  12/14/2024    HEMOGLOBIN A1C  03/16/2025        Review of Systems   Constitutional:  Negative for fatigue and fever.   Respiratory:  Negative for cough and shortness of breath.    Cardiovascular:  Negative for chest pain and palpitations.   Gastrointestinal:  Positive for nausea. Negative for vomiting.   Endocrine: Negative for cold intolerance and heat intolerance.   Genitourinary:  Negative for difficulty urinating and dysuria.   Musculoskeletal:  Negative for arthralgias, gait problem and joint swelling.   Neurological:  Negative for dizziness, seizures and headaches.   Hematological:  Negative for adenopathy. Does not bruise/bleed easily.   Psychiatric/Behavioral:  Negative for confusion, dysphoric mood and sleep disturbance. The patient is not nervous/anxious.           Medical History: has a past medical history of Anxiety, Asthma, Diabetes mellitus type 2, controlled, Hidradenitis, Hyperlipidemia, Hypothyroidism, Murmur, Panic attack, Pilonidal cyst, Rheumatic fever, RLS (restless legs syndrome), and Sinus trouble.     Surgical History: has a past surgical history that includes Colonoscopy (01/21/2020); Groin Abscess Incision and Drainage (Right); Hysterectomy (2010); Knee surgery (Bilateral, 2013); Pilonidal Cystectomy (N/A, 02/08/2023); Excision Lesion (Bilateral, 03/20/2023); and Excision Lesion (N/A, 7/24/2024).     Family History: family history includes Diabetes in her brother, maternal grandmother, and sister; Thyroid disease in her sister; Thyroid disease (age of onset: 43) in her brother; Thyroid  disease (age of onset: 70) in her father.     Social History: reports that she has never smoked. She has never used smokeless tobacco. She reports that she does not drink alcohol and does not use drugs.    Allergies: Etodolac, Penicillins, Statins, Atorvastatin, Crestor [rosuvastatin], Lisinopril, and Pravastatin      Current Outpatient Medications:     albuterol sulfate  (90 Base) MCG/ACT inhaler, Inhale 2 puffs Every 4 (Four) Hours As Needed for Shortness of Air., Disp: 18 g, Rfl: 1    cetirizine (zyrTEC) 10 MG tablet, Take 1 tablet by mouth Daily., Disp: , Rfl:     chlorhexidine (PERIDEX) 0.12 % solution, Swish and spit 15 ml. Or apply to site with q-tip. 2-3 times per day., Disp: 473 mL, Rfl: 0    Continuous Blood Gluc Transmit (Dexcom G6 Transmitter) misc, 1 Device Every 3 (Three) Months., Disp: 1 each, Rfl: 3    Continuous Glucose Sensor (Dexcom G7 Sensor) misc, CHANGE ONE SENSOR EVERY 10 DAYS, Disp: 9 each, Rfl: 0    Empagliflozin-metFORMIN HCl ER (Synjardy XR) 12.5-1000 MG tablet sustained-release 24 hour, Take 1 tablet by mouth 2 (Two) Times a Day., Disp: 180 tablet, Rfl: 1    Fluticasone-Salmeterol (ADVAIR/WIXELA) 250-50 MCG/ACT DISKUS, INHALE 1 PUFF INTO THE LUNGS TWICE DAILY AS DIRECTED, Disp: 60 each, Rfl: 2    gabapentin (NEURONTIN) 300 MG capsule, Take 1 capsule by mouth Every Night., Disp: 90 capsule, Rfl: 1    glipizide (GLUCOTROL) 10 MG tablet, Take 1 tablet by mouth 2 (Two) Times a Day Before Meals., Disp: 180 tablet, Rfl: 0    glucose blood (OneTouch Verio) test strip, USE 1 STRIP TO CHECK GLUCOSE THREE TIMES DAILY, Disp: 100 each, Rfl: 0    Insulin Degludec (Tresiba FlexTouch) 200 UNIT/ML solution pen-injector pen injection, Inject 56 Units under the skin into the appropriate area as directed Every Morning., Disp: 18 mL, Rfl: 2    losartan (Cozaar) 25 MG tablet, Take 0.5 tablets by mouth Daily., Disp: 90 tablet, Rfl: 1    MM Pen Needles 31G X 8 MM misc, USE ONCE DAILY UNDER THE SKIN AS  "DIRECTED, Disp: 100 each, Rfl: 0    montelukast (SINGULAIR) 10 MG tablet, Take 1 tablet by mouth every night at bedtime., Disp: , Rfl:     ONE TOUCH CLUB LANCETS misc, 90 each 3 (Three) Times a Day As Needed (check blood sugar)., Disp: 100 each, Rfl: 3    pitavastatin calcium (Livalo) 2 MG tablet tablet, Take 1 tablet by mouth Every Night., Disp: 90 tablet, Rfl: 1    rOPINIRole (REQUIP) 5 MG tablet, Take 1 tablet by mouth Every Night., Disp: 90 tablet, Rfl: 1    sertraline (ZOLOFT) 100 MG tablet, Take 1.5 tablets by mouth Daily., Disp: 135 tablet, Rfl: 1    Synthroid 88 MCG tablet, Take 1 tablet by mouth Daily., Disp: 90 tablet, Rfl: 1    Tirzepatide 7.5 MG/0.5ML solution auto-injector, Inject 7.5 mg under the skin into the appropriate area as directed 1 (One) Time Per Week., Disp: 6 mL, Rfl: 1    Vitamin D, Cholecalciferol, 50 MCG (2000 UT) capsule, Take  by mouth., Disp: , Rfl:     Zinc 50 MG tablet, Take 1 tablet by mouth Daily., Disp: , Rfl:     multivitamin (THERAGRAN) tablet tablet, , Disp: , Rfl:     pantoprazole (PROTONIX) 40 MG EC tablet, Take 1 tablet by mouth Daily., Disp: 90 tablet, Rfl: 1      Immunization History   Administered Date(s) Administered    Fluzone (or Fluarix & Flulaval for VFC) >6mos 09/28/2023    Fluzone Quad >6mos (Multi-dose) 10/01/2020    Pneumococcal Conjugate 20-Valent (PCV20) 09/28/2023    Pneumococcal Polysaccharide (PPSV23) 07/20/2019    Td (TDVAX) 05/08/1998    Tdap 03/16/2020         Objective       Vitals:    03/13/25 0717   BP: 122/82   Pulse: 102   Temp: 97.2 °F (36.2 °C)   TempSrc: Temporal   SpO2: 95%   Weight: 93.2 kg (205 lb 6.4 oz)   Height: 162.6 cm (64.02\")      Body mass index is 35.24 kg/m².   Wt Readings from Last 3 Encounters:   03/13/25 93.2 kg (205 lb 6.4 oz)   12/16/24 90.3 kg (199 lb)   09/16/24 88 kg (194 lb)      BP Readings from Last 3 Encounters:   03/13/25 122/82   02/20/25 131/74   01/23/25 105/83             Physical Exam  Vitals reviewed. "   Constitutional:       General: She is not in acute distress.     Appearance: Normal appearance. She is well-developed.   HENT:      Head: Normocephalic and atraumatic.   Eyes:      General: Lids are normal. No scleral icterus.     Conjunctiva/sclera: Conjunctivae normal.      Pupils: Pupils are equal, round, and reactive to light.   Neck:      Thyroid: No thyroid mass, thyromegaly or thyroid tenderness.      Vascular: No carotid bruit.   Cardiovascular:      Rate and Rhythm: Normal rate and regular rhythm.      Pulses: Normal pulses.      Heart sounds: Normal heart sounds. No murmur heard.     No friction rub. No gallop.   Pulmonary:      Effort: Pulmonary effort is normal. No retractions.      Breath sounds: Normal breath sounds. No wheezing or rhonchi.   Abdominal:      General: Abdomen is flat. Bowel sounds are normal. There is no distension.      Palpations: Abdomen is soft.      Tenderness: There is no abdominal tenderness. There is no guarding.   Musculoskeletal:      Cervical back: Full passive range of motion without pain, normal range of motion and neck supple. No rigidity or tenderness.      Right lower leg: No edema.      Left lower leg: No edema.   Lymphadenopathy:      Cervical: No cervical adenopathy.   Skin:     General: Skin is warm and dry.      Findings: No lesion or rash.      Nails: There is no clubbing.   Neurological:      General: No focal deficit present.      Mental Status: She is alert and oriented to person, place, and time.      Coordination: Coordination is intact.      Gait: Gait is intact.   Psychiatric:         Attention and Perception: Attention normal.         Mood and Affect: Mood and affect normal.         Speech: Speech normal.         Behavior: Behavior normal. Behavior is cooperative.         Thought Content: Thought content normal.         Cognition and Memory: Cognition normal.         Judgment: Judgment normal.         Physical Exam        Result Review :   Results            Common labs          6/17/2024    07:46 9/16/2024    08:25   Common Labs   Glucose 80  187    BUN 13  15    Creatinine 0.82  0.90    Sodium 137  137    Potassium 3.9  4.3    Chloride 103  104    Calcium 9.0  9.4    Albumin 3.7  3.8    Total Bilirubin 0.4  0.3    Alkaline Phosphatase 107  152    AST (SGOT) 21  13    ALT (SGPT) 15  13    Total Cholesterol 129  136    Triglycerides 69  78    HDL Cholesterol 51  47    LDL Cholesterol  64  74    Hemoglobin A1C 7.60  7.90    Microalbumin, Urine 1.8                      Assessment and Plan        Diagnoses and all orders for this visit:    1. Type 2 diabetes mellitus with hyperglycemia, with long-term current use of insulin (Primary)  Assessment & Plan:  Diabetes is worsening.   Continue current treatment regimen.  Recommended an ADA diet.  Recommended a Mediterranean style of eating  Regular aerobic exercise.  Diabetes will be reassessed in 1 month                                     Orders:  -     Comprehensive Metabolic Panel  -     Lipid Panel  -     Hemoglobin A1c  -     Microalbumin / Creatinine Urine Ratio - Urine, Clean Catch    2. Breast cancer screening by mammogram  -     Mammo Screening Digital Tomosynthesis Bilateral With CAD; Future    3. Gastroesophageal reflux disease without esophagitis  -     pantoprazole (PROTONIX) 40 MG EC tablet; Take 1 tablet by mouth Daily.  Dispense: 90 tablet; Refill: 1    4. Myalgia  Comments:  hold Livalo for 2 weeks and advise if resolves.    5. Snoring  -     Ambulatory Referral to Sleep Medicine    6. Daytime somnolence  -     Ambulatory Referral to Sleep Medicine    7. Anxiety  -     sertraline (ZOLOFT) 100 MG tablet; Take 1.5 tablets by mouth Daily.  Dispense: 135 tablet; Refill: 1      Continue blood sugar monitoring daily and record.  Bring your log to office visits.  Call the office for readings below 70 and above 250 or any complications.    Daily foot care.  Avoid walking barefoot.    Annual dilated eye  exam.    Discussed with patient blood pressure monitoring, hemoglobin A1c levels need to be below 7, and LDL goals below 70.  Assessment & Plan  1. Nausea.  Her symptoms of nausea, reflux, and nocturnal coughing are indicative of uncontrolled reflux. She is advised to elevate the head of her bed and avoid overeating. Dietary modifications include a low-fat, low-acid, and low-FODMAP diet. She is also advised to avoid eating 3 hours before bedtime. Pantoprazole will be initiated, to be taken every morning on an empty stomach. If there is no improvement within 7 to 10 days, she should inform the provider for further evaluation, potentially including an endoscopy.    2. Diabetes Mellitus.  Her diabetes is currently uncontrolled, with a hemoglobin A1c level around 9. She is on 56 units of Tresiba and Mounjaro 7.5 mg. She is advised to make dietary changes and increase physical activity. The Mounjaro dosage can be reduced to 5 mg if needed. Blood work will be conducted today. If her blood sugar levels remain uncontrolled, increasing insulin dosage or considering an insulin pump may be necessary.    3. Leg cramps.  Her leg cramps may be a side effect of Livalo. She is advised to discontinue Livalo for 2 weeks and monitor for resolution of the cramps. If the cramps persist, she can resume Livalo and inform the provider.    4. Sleep Apnea.  Her symptoms suggest the possibility of sleep apnea. A sleep study will be arranged, either at a sleep center or as a home study, depending on insurance coverage.    Follow-up  The patient will follow up in 1 month.    Return in about 4 weeks (around 4/10/2025) for Next scheduled follow up.    Patient was given instructions and counseling regarding her condition or for health maintenance advice. Please see specific information pulled into the AVS if appropriate.     WILLIAM Hickman    Patient or patient representative verbalized consent for the use of Ambient Listening during the  visit with  WILLIAM Hickman for chart documentation. 3/13/2025  07:49 EDT

## 2025-03-13 ENCOUNTER — OFFICE VISIT (OUTPATIENT)
Dept: FAMILY MEDICINE CLINIC | Facility: CLINIC | Age: 55
End: 2025-03-13
Payer: COMMERCIAL

## 2025-03-13 VITALS
SYSTOLIC BLOOD PRESSURE: 122 MMHG | HEIGHT: 64 IN | OXYGEN SATURATION: 95 % | TEMPERATURE: 97.2 F | WEIGHT: 205.4 LBS | BODY MASS INDEX: 35.07 KG/M2 | HEART RATE: 102 BPM | DIASTOLIC BLOOD PRESSURE: 82 MMHG

## 2025-03-13 DIAGNOSIS — Z12.31 BREAST CANCER SCREENING BY MAMMOGRAM: ICD-10-CM

## 2025-03-13 DIAGNOSIS — E11.65 TYPE 2 DIABETES MELLITUS WITH HYPERGLYCEMIA, WITH LONG-TERM CURRENT USE OF INSULIN: Primary | ICD-10-CM

## 2025-03-13 DIAGNOSIS — Z79.4 TYPE 2 DIABETES MELLITUS WITH HYPERGLYCEMIA, WITH LONG-TERM CURRENT USE OF INSULIN: Primary | ICD-10-CM

## 2025-03-13 DIAGNOSIS — R06.83 SNORING: ICD-10-CM

## 2025-03-13 DIAGNOSIS — F41.9 ANXIETY: ICD-10-CM

## 2025-03-13 DIAGNOSIS — K21.9 GASTROESOPHAGEAL REFLUX DISEASE WITHOUT ESOPHAGITIS: ICD-10-CM

## 2025-03-13 DIAGNOSIS — M79.10 MYALGIA: ICD-10-CM

## 2025-03-13 DIAGNOSIS — R40.0 DAYTIME SOMNOLENCE: ICD-10-CM

## 2025-03-13 LAB
ALBUMIN SERPL-MCNC: 3.6 G/DL (ref 3.5–5.2)
ALBUMIN UR-MCNC: <1.2 MG/DL
ALBUMIN/GLOB SERPL: 0.8 G/DL
ALP SERPL-CCNC: 139 U/L (ref 39–117)
ALT SERPL W P-5'-P-CCNC: 26 U/L (ref 1–33)
ANION GAP SERPL CALCULATED.3IONS-SCNC: 11.4 MMOL/L (ref 5–15)
AST SERPL-CCNC: 27 U/L (ref 1–32)
BILIRUB SERPL-MCNC: 0.3 MG/DL (ref 0–1.2)
BUN SERPL-MCNC: 15 MG/DL (ref 6–20)
BUN/CREAT SERPL: 17 (ref 7–25)
CALCIUM SPEC-SCNC: 9.5 MG/DL (ref 8.6–10.5)
CHLORIDE SERPL-SCNC: 105 MMOL/L (ref 98–107)
CHOLEST SERPL-MCNC: 163 MG/DL (ref 0–200)
CO2 SERPL-SCNC: 24.6 MMOL/L (ref 22–29)
CREAT SERPL-MCNC: 0.88 MG/DL (ref 0.57–1)
CREAT UR-MCNC: 45.3 MG/DL
EGFRCR SERPLBLD CKD-EPI 2021: 77.7 ML/MIN/1.73
GLOBULIN UR ELPH-MCNC: 4.8 GM/DL
GLUCOSE SERPL-MCNC: 185 MG/DL (ref 65–99)
HBA1C MFR BLD: 8.5 % (ref 4.8–5.6)
HDLC SERPL-MCNC: 63 MG/DL (ref 40–60)
LDLC SERPL CALC-MCNC: 84 MG/DL (ref 0–100)
LDLC/HDLC SERPL: 1.32 {RATIO}
MICROALBUMIN/CREAT UR: NORMAL MG/G{CREAT}
POTASSIUM SERPL-SCNC: 4.3 MMOL/L (ref 3.5–5.2)
PROT SERPL-MCNC: 8.4 G/DL (ref 6–8.5)
SODIUM SERPL-SCNC: 141 MMOL/L (ref 136–145)
TRIGL SERPL-MCNC: 84 MG/DL (ref 0–150)
VLDLC SERPL-MCNC: 16 MG/DL (ref 5–40)

## 2025-03-13 PROCEDURE — 82570 ASSAY OF URINE CREATININE: CPT | Performed by: NURSE PRACTITIONER

## 2025-03-13 PROCEDURE — 83036 HEMOGLOBIN GLYCOSYLATED A1C: CPT | Performed by: NURSE PRACTITIONER

## 2025-03-13 PROCEDURE — 80053 COMPREHEN METABOLIC PANEL: CPT | Performed by: NURSE PRACTITIONER

## 2025-03-13 PROCEDURE — 82043 UR ALBUMIN QUANTITATIVE: CPT | Performed by: NURSE PRACTITIONER

## 2025-03-13 PROCEDURE — 80061 LIPID PANEL: CPT | Performed by: NURSE PRACTITIONER

## 2025-03-13 RX ORDER — SERTRALINE HYDROCHLORIDE 100 MG/1
150 TABLET, FILM COATED ORAL DAILY
Qty: 135 TABLET | Refills: 1 | Status: SHIPPED | OUTPATIENT
Start: 2025-03-13

## 2025-03-13 RX ORDER — MULTIVIT-MIN/IRON/FOLIC ACID/K 18-600-40
CAPSULE ORAL
COMMUNITY

## 2025-03-13 RX ORDER — PANTOPRAZOLE SODIUM 40 MG/1
40 TABLET, DELAYED RELEASE ORAL DAILY
Qty: 90 TABLET | Refills: 1 | Status: SHIPPED | OUTPATIENT
Start: 2025-03-13

## 2025-03-13 NOTE — PATIENT INSTRUCTIONS
Diabetes Mellitus and Nutrition, Adult  When you have diabetes, or diabetes mellitus, it is very important to have healthy eating habits because your blood sugar (glucose) levels are greatly affected by what you eat and drink. Eating healthy foods in the right amounts, at about the same times every day, can help you:  Manage your blood glucose.  Lower your risk of heart disease.  Improve your blood pressure.  Reach or maintain a healthy weight.  What can affect my meal plan?  Every person with diabetes is different, and each person has different needs for a meal plan. Your health care provider may recommend that you work with a dietitian to make a meal plan that is best for you. Your meal plan may vary depending on factors such as:  The calories you need.  The medicines you take.  Your weight.  Your blood glucose, blood pressure, and cholesterol levels.  Your activity level.  Other health conditions you have, such as heart or kidney disease.  How do carbohydrates affect me?  Carbohydrates, also called carbs, affect your blood glucose level more than any other type of food. Eating carbs raises the amount of glucose in your blood.  It is important to know how many carbs you can safely have in each meal. This is different for every person. Your dietitian can help you calculate how many carbs you should have at each meal and for each snack.  How does alcohol affect me?  Alcohol can cause a decrease in blood glucose (hypoglycemia), especially if you use insulin or take certain diabetes medicines by mouth. Hypoglycemia can be a life-threatening condition. Symptoms of hypoglycemia, such as sleepiness, dizziness, and confusion, are similar to symptoms of having too much alcohol.  Do not drink alcohol if:  Your health care provider tells you not to drink.  You are pregnant, may be pregnant, or are planning to become pregnant.  If you drink alcohol:  Limit how much you have to:  0-1 drink a day for women.  0-2 drinks a day  "for men.  Know how much alcohol is in your drink. In the [place] one drink equals one 12 oz bottle of beer (355 mL), one 5 oz glass of wine (148 mL), or one 1½ oz glass of hard liquor (44 mL).  Keep yourself hydrated with water, diet soda, or unsweetened iced tea. Keep in mind that regular soda, juice, and other mixers may contain a lot of sugar and must be counted as carbs.  What are tips for following this plan?    Reading food labels  Start by checking the serving size on the Nutrition Facts label of packaged foods and drinks. The number of calories and the amount of carbs, fats, and other nutrients listed on the label are based on one serving of the item. Many items contain more than one serving per package.  Check the total grams (g) of carbs in one serving.  Check the number of grams of saturated fats and trans fats in one serving. Choose foods that have a low amount or none of these fats.  Check the number of milligrams (mg) of salt (sodium) in one serving. Most people should limit total sodium intake to less than 2,300 mg per day.  Always check the nutrition information of foods labeled as \"low-fat\" or \"nonfat.\" These foods may be higher in added sugar or refined carbs and should be avoided.  Talk to your dietitian to identify your daily goals for nutrients listed on the label.  Shopping  Avoid buying canned, pre-made, or processed foods. These foods tend to be high in fat, sodium, and added sugar.  Shop around the outside edge of the grocery store. This is where you will most often find fresh fruits and vegetables, bulk grains, fresh meats, and fresh dairy products.  Cooking  Use low-heat cooking methods, such as baking, instead of high-heat cooking methods, such as deep frying.  Cook using healthy oils, such as olive, canola, or sunflower oil.  Avoid cooking with butter, cream, or high-fat meats.  Meal planning  Eat meals and snacks regularly, preferably at the same times every day. Avoid going long periods " of time without eating.  Eat foods that are high in fiber, such as fresh fruits, vegetables, beans, and whole grains.  Eat 4-6 oz (112-168 g) of lean protein each day, such as lean meat, chicken, fish, eggs, or tofu. One ounce (oz) (28 g) of lean protein is equal to:  1 oz (28 g) of meat, chicken, or fish.  1 egg.  ¼ cup (62 g) of tofu.  Eat some foods each day that contain healthy fats, such as avocado, nuts, seeds, and fish.  What foods should I eat?  Fruits  Berries. Apples. Oranges. Peaches. Apricots. Plums. Grapes. Mangoes. Papayas. Pomegranates. Kiwi. Cherries.  Vegetables  Leafy greens, including lettuce, spinach, kale, chard, guru greens, mustard greens, and cabbage. Beets. Cauliflower. Broccoli. Carrots. Green beans. Tomatoes. Peppers. Onions. Cucumbers. [place] sprouts.  Grains  Whole grains, such as whole-wheat or whole-grain bread, crackers, tortillas, cereal, and pasta. Unsweetened oatmeal. Quinoa. Brown or wild rice.  Meats and other proteins  Seafood. Poultry without skin. Lean cuts of poultry and beef. Tofu. Nuts. Seeds.  Dairy  Low-fat or fat-free dairy products such as milk, yogurt, and cheese.  The items listed above may not be a complete list of foods and beverages you can eat and drink. Contact a dietitian for more information.  What foods should I avoid?  Fruits  Fruits canned with syrup.  Vegetables  Canned vegetables. Frozen vegetables with butter or cream sauce.  Grains  Refined white flour and flour products such as bread, pasta, snack foods, and cereals. Avoid all processed foods.  Meats and other proteins  Fatty cuts of meat. Poultry with skin. Breaded or fried meats. Processed meat. Avoid saturated fats.  Dairy  Full-fat yogurt, cheese, or milk.  Beverages  Sweetened drinks, such as soda or iced tea.  The items listed above may not be a complete list of foods and beverages you should avoid. Contact a dietitian for more information.  Questions to ask a health care provider  Do I need  to meet with a certified diabetes care and ?  Do I need to meet with a dietitian?  What number can I call if I have questions?  When are the best times to check my blood glucose?  Where to find more information:  American Diabetes Association: diabetes.org  Academy of Nutrition and Dietetics: eatright.org  National [place] of Diabetes and Digestive and Kidney Diseases: niddk.nih.gov  Association of Diabetes Care & Education Specialists: diabeteseducator.org  Summary  It is important to have healthy eating habits because your blood sugar (glucose) levels are greatly affected by what you eat and drink. It is important to use alcohol carefully.  A healthy meal plan will help you manage your blood glucose and lower your risk of heart disease.  Your health care provider may recommend that you work with a dietitian to make a meal plan that is best for you.  This information is not intended to replace advice given to you by your health care provider. Make sure you discuss any questions you have with your health care provider.  Document Revised: 07/20/2021 Document Reviewed: 07/21/2021  Elsevier Patient Education © 2024 Elsevier Inc.

## 2025-03-13 NOTE — ASSESSMENT & PLAN NOTE
Diabetes is worsening.   Continue current treatment regimen.  Recommended an ADA diet.  Recommended a Mediterranean style of eating  Regular aerobic exercise.  Diabetes will be reassessed in 1 month

## 2025-03-17 RX ORDER — ACYCLOVIR 400 MG/1
TABLET ORAL
Qty: 9 EACH | Refills: 0 | Status: SHIPPED | OUTPATIENT
Start: 2025-03-17

## 2025-03-18 DIAGNOSIS — J45.20 MILD INTERMITTENT ASTHMA WITHOUT COMPLICATION: ICD-10-CM

## 2025-03-18 RX ORDER — FLUTICASONE PROPIONATE AND SALMETEROL 250; 50 UG/1; UG/1
1 POWDER RESPIRATORY (INHALATION)
Qty: 60 EACH | Refills: 2 | Status: SHIPPED | OUTPATIENT
Start: 2025-03-18

## 2025-03-20 ENCOUNTER — OFFICE VISIT (OUTPATIENT)
Dept: WOUND CARE | Facility: HOSPITAL | Age: 55
End: 2025-03-20
Payer: COMMERCIAL

## 2025-03-20 VITALS
TEMPERATURE: 97.1 F | DIASTOLIC BLOOD PRESSURE: 63 MMHG | SYSTOLIC BLOOD PRESSURE: 122 MMHG | RESPIRATION RATE: 16 BRPM | HEART RATE: 77 BPM

## 2025-03-20 DIAGNOSIS — L05.91 PILONIDAL CYST: ICD-10-CM

## 2025-03-20 DIAGNOSIS — L73.2 HIDRADENITIS: ICD-10-CM

## 2025-03-20 DIAGNOSIS — E11.65 UNCONTROLLED DIABETES MELLITUS WITH HYPERGLYCEMIA, WITH LONG-TERM CURRENT USE OF INSULIN: ICD-10-CM

## 2025-03-20 DIAGNOSIS — Z79.4 UNCONTROLLED DIABETES MELLITUS WITH HYPERGLYCEMIA, WITH LONG-TERM CURRENT USE OF INSULIN: ICD-10-CM

## 2025-03-20 DIAGNOSIS — T81.89XS DELAYED SURGICAL WOUND HEALING, SEQUELA: Primary | ICD-10-CM

## 2025-03-20 DIAGNOSIS — L30.9 DERMATITIS: ICD-10-CM

## 2025-03-20 PROCEDURE — G0463 HOSPITAL OUTPT CLINIC VISIT: HCPCS | Performed by: NURSE PRACTITIONER

## 2025-03-20 NOTE — PROGRESS NOTES
Chief Complaint  Wound Check (Follow up on coccyx wound. Pt states wounds had healed although recently opened back up after having an episode of increased sweating.  )    Subjective      History of Present Illness    Mariela Aldrich  is a 54 y.o. female   History of Present Illness  The patient presents today for a follow-up wound check of the chronic wound to the gluteal crease. She is s/p surgical intervention with wound dehiscence within the gluteal crease and to the right gluteal aspect.     The wound to the right gluteal aspect has demonstrated no changes and has caused no concerns.     The wound within the gluteal crease had closed since her last visit but recently reopened due to perspiration. Her spouse assists her with her wound care and he continued applying skin barrier for several days post-closure but discontinued its use, after which the wound reopened. He told the patient that the wound appears healthier than when it first opened. He continues to apply collagen, calcium alginate and a dressing to the wound at this time.     She also reports irritation with the lower gluteal region that is causing discomfort.     Supplemental Information  She is working on his blood sugars and they are better. She was put on an acid reflux medicine and is being sent for a sleep study.        Allergies:  Etodolac, Penicillins, Statins, Atorvastatin, Crestor [rosuvastatin], Lisinopril, and Pravastatin      Current Outpatient Medications:     albuterol sulfate  (90 Base) MCG/ACT inhaler, Inhale 2 puffs Every 4 (Four) Hours As Needed for Shortness of Air., Disp: 18 g, Rfl: 1    cetirizine (zyrTEC) 10 MG tablet, Take 1 tablet by mouth Daily., Disp: , Rfl:     chlorhexidine (PERIDEX) 0.12 % solution, Swish and spit 15 ml. Or apply to site with q-tip. 2-3 times per day., Disp: 473 mL, Rfl: 0    Continuous Blood Gluc Transmit (Dexcom G6 Transmitter) misc, 1 Device Every 3 (Three) Months., Disp: 1 each, Rfl: 3     Continuous Glucose Sensor (Dexcom G7 Sensor) misc, CHANGE SENSOR EVERY 10 DAYS AS DIRECTED, Disp: 9 each, Rfl: 0    Empagliflozin-metFORMIN HCl ER (Synjardy XR) 12.5-1000 MG tablet sustained-release 24 hour, Take 1 tablet by mouth 2 (Two) Times a Day., Disp: 180 tablet, Rfl: 1    Fluticasone-Salmeterol (ADVAIR/WIXELA) 250-50 MCG/ACT DISKUS, Inhale 1 puff 2 (Two) Times a Day., Disp: 60 each, Rfl: 2    gabapentin (NEURONTIN) 300 MG capsule, Take 1 capsule by mouth Every Night., Disp: 90 capsule, Rfl: 1    glipizide (GLUCOTROL) 10 MG tablet, Take 1 tablet by mouth 2 (Two) Times a Day Before Meals., Disp: 180 tablet, Rfl: 0    glucose blood (OneTouch Verio) test strip, USE 1 STRIP TO CHECK GLUCOSE THREE TIMES DAILY, Disp: 100 each, Rfl: 0    Insulin Degludec (Tresiba FlexTouch) 200 UNIT/ML solution pen-injector pen injection, Inject 56 Units under the skin into the appropriate area as directed Every Morning., Disp: 18 mL, Rfl: 2    losartan (Cozaar) 25 MG tablet, Take 0.5 tablets by mouth Daily., Disp: 90 tablet, Rfl: 1    MM Pen Needles 31G X 8 MM misc, USE ONCE DAILY UNDER THE SKIN AS DIRECTED, Disp: 100 each, Rfl: 0    montelukast (SINGULAIR) 10 MG tablet, Take 1 tablet by mouth every night at bedtime., Disp: , Rfl:     multivitamin (THERAGRAN) tablet tablet, , Disp: , Rfl:     ONE TOUCH CLUB LANCETS [facility] [place], 90 each 3 (Three) Times a Day As Needed (check blood sugar)., Disp: 100 each, Rfl: 3    pantoprazole (PROTONIX) 40 MG EC tablet, Take 1 tablet by mouth Daily., Disp: 90 tablet, Rfl: 1    pitavastatin calcium (Livalo) 2 MG tablet tablet, Take 1 tablet by mouth Every Night., Disp: 90 tablet, Rfl: 1    rOPINIRole (REQUIP) 5 MG tablet, Take 1 tablet by mouth Every Night., Disp: 90 tablet, Rfl: 1    sertraline (ZOLOFT) 100 MG tablet, Take 1.5 tablets by mouth Daily., Disp: 135 tablet, Rfl: 1    Synthroid 88 MCG tablet, Take 1 tablet by mouth Daily., Disp: 90 tablet, Rfl: 1    Tirzepatide 7.5 MG/0.5ML solution  auto-injector, Inject 7.5 mg under the skin into the appropriate area as directed 1 (One) Time Per Week., Disp: 6 mL, Rfl: 1    Vitamin D, Cholecalciferol, 50 MCG (2000 UT) capsule, Take  by mouth., Disp: , Rfl:     Zinc 50 MG tablet, Take 1 tablet by mouth Daily., Disp: , Rfl:     Past Medical History:   Diagnosis Date    Anxiety     Asthma     PRN INHALER AND TAKES ALLERGY INJECTIONS    Diabetes mellitus type 2, controlled     AVERAGE     Hidradenitis     Hyperlipidemia     Hypothyroidism     Murmur     DIAGNOSED WHEN 17 BUT IS ASYMPTOMATIC AND IS FOLLOWED ONLY BY PCP    Panic attack     Pilonidal cyst     Rheumatic fever     AS A CHILD    RLS (restless legs syndrome)     Sinus trouble      Past Surgical History:   Procedure Laterality Date    COLONOSCOPY  01/21/2020    exernal hemorrhoids    EXCISION LESION Bilateral 03/20/2023    Procedure: Excision Hidradenitis of Bilateral Inguinal Areas;  Surgeon: Donato Yuo MD;  Location: [facility];  Service: General;  Laterality: Bilateral;    EXCISION LESION N/A 7/24/2024    Procedure: DEBRIDEMENT OF ISCHIAL ULCER/BUTTOCKS WOUND;  Surgeon: Donato You MD;  Location: [facility] OR;  Service: General;  Laterality: N/A;    GROIN ABSCESS INCISION AND DRAINAGE Right     hidradenitis    HYSTERECTOMY  2010    KNEE SURGERY Bilateral 2013    ARTHROSCOPY    PILONIDAL CYSTECTOMY N/A 02/08/2023    Procedure: Excision Hidradenitis of Inguinal Area and Pilonidal Cystectomy;  Surgeon: Donato You MD;  Location: [facility];  Service: General;  Laterality: N/A;     Social History     Socioeconomic History    Marital status:    Tobacco Use    Smoking status: Never    Smokeless tobacco: Never   Vaping Use    Vaping status: Never Used   Substance and Sexual Activity    Alcohol use: Never    Drug use: Never    Sexual activity: Defer           Objective     Vitals:    03/20/25 1509   BP: 122/63   BP Location: Left arm   Patient Position: Lying   Cuff  Size: Adult   Pulse: 77   Resp: 16  Comment: O2 sat 98%   Temp: 97.1 °F (36.2 °C)   PainSc: 0-No pain     There is no height or weight on file to calculate BMI.    The following data has been reviewed by WILLIAM Guo on 03/20/2025   Most Recent A1C          3/13/2025    07:59   HGBA1C Most Recent   Hemoglobin A1C 8.50        STEADI Fall Risk Assessment has not been completed.     Review of Systems     ROS:  Per HPI.     I have reviewed the HPI and ROS as documented by MA/RN. WILLIAM Guo    Physical Exam     NAD  AAOx3, pleasant, cooperative    Physical Exam  The lower portion of the surgical incision within the gluteal crease is open, revealing healthy red moist tissue with scan amount of serosanguinous drainage. Wound is very shallow. Periwound tissue shows redness and irritation with raised rash.    The right gluteal wound is essentially healed leaving deep dimpling and firm scar tissue formation.  There is no active drainage no visibly open tissue.    See photos for details.              Result Review :  The following data was reviewed by: WILLIAM Guo on 03/20/2025:    Prior wound care notes and images.    Assessment and Plan   Diagnoses and all orders for this visit:    1. Delayed surgical wound healing, sequela (Primary)    2. Dermatitis    3. Pilonidal cyst    4. Hidradenitis    5. Uncontrolled diabetes mellitus with hyperglycemia, with long-term current use of insulin      Assessment & Plan  1. Chronic wound in the gluteal crease.  The wound has shown signs of healing, with the top layers of tissue appearing healthy. However, the wound has been re-irritated, likely due to perspiration. The presence of bleeding indicates healthy tissue.  It is recommended that they continue daily to every other day dressing changes using collagen and calcium alginate application, along with dressing over the affected area. Once the wound has healed, the continued use of Skin-Prep wipes for a  period of time was suggested. After this, the application of powder or a barrier cream or thinner paste such as Balmex, Desitin or Zinc was recommended to help reduce moisture and prevent tissue tearing. The use of a bandage for a few days immediately post-healing was also advised.     2. Right gluteal wound.  The area surrounding the wound is expected to remain slightly tender due the firmness of the tissue.   The application of cream and gentle massage over the scar tissue to help break up its denseness and potentially soften it was recommended.    3. Dermatitis.   Excessive moisture and perspiration is likely led to the dermatitis along the lower gluteal crease extending into the perineum.  The application of antifungal powder over the irritated area was recommended, followed by the use of Skin-Prep wipes to seal the powder into the skin. This will protect the surrounding area while allowing the powder to treat the wound.     4. Diabetes.  Patient is encouraged to continue working towards achieving a tight glucose control to assist with wound healing and to decrease risks for infection.      Follow-up  The patient will follow up in 4 weeks.      Patient was given instructions and counseling regarding their condition or for health maintenance advice, as well as the wound care plan and recommendations. They understand and agree with the plan.  They will follow back up here in the clinic but are instructed to contact us in the interim should they have any new, returning, or worsening symptoms or concerns. Please see specific information pulled into the AVS if appropriate.     Dragon Dictation utilized for chart completion.    Follow Up   Return in about 4 weeks (around 4/17/2025).      WILLIAM Guo    Patient or patient representative verbalized consent for the use of Ambient Listening during the visit with  WILLIAM Guo for chart documentation. 3/20/2025  16:20 EDT

## 2025-03-23 DIAGNOSIS — G25.81 RESTLESS LEG: ICD-10-CM

## 2025-03-24 RX ORDER — GABAPENTIN 300 MG/1
300 CAPSULE ORAL NIGHTLY
Qty: 90 CAPSULE | Refills: 1 | Status: SHIPPED | OUTPATIENT
Start: 2025-03-24

## 2025-03-25 ENCOUNTER — LAB (OUTPATIENT)
Dept: LAB | Facility: HOSPITAL | Age: 55
End: 2025-03-25
Payer: COMMERCIAL

## 2025-03-25 ENCOUNTER — OFFICE VISIT (OUTPATIENT)
Dept: SLEEP MEDICINE | Facility: HOSPITAL | Age: 55
End: 2025-03-25
Payer: COMMERCIAL

## 2025-03-25 VITALS
BODY MASS INDEX: 34.88 KG/M2 | OXYGEN SATURATION: 97 % | WEIGHT: 204.3 LBS | HEART RATE: 98 BPM | HEIGHT: 64 IN | DIASTOLIC BLOOD PRESSURE: 69 MMHG | SYSTOLIC BLOOD PRESSURE: 113 MMHG

## 2025-03-25 DIAGNOSIS — E11.65 TYPE 2 DIABETES MELLITUS WITH HYPERGLYCEMIA, WITH LONG-TERM CURRENT USE OF INSULIN: ICD-10-CM

## 2025-03-25 DIAGNOSIS — Z79.4 TYPE 2 DIABETES MELLITUS WITH HYPERGLYCEMIA, WITH LONG-TERM CURRENT USE OF INSULIN: ICD-10-CM

## 2025-03-25 DIAGNOSIS — G47.10 HYPERSOMNIA: ICD-10-CM

## 2025-03-25 DIAGNOSIS — F41.0 ANXIETY ATTACK: ICD-10-CM

## 2025-03-25 DIAGNOSIS — R29.818 SUSPECTED SLEEP APNEA: Primary | ICD-10-CM

## 2025-03-25 DIAGNOSIS — G25.81 RLS (RESTLESS LEGS SYNDROME): ICD-10-CM

## 2025-03-25 DIAGNOSIS — E78.5 HYPERLIPIDEMIA, UNSPECIFIED HYPERLIPIDEMIA TYPE: ICD-10-CM

## 2025-03-25 DIAGNOSIS — E66.9 OBESITY (BMI 30-39.9): ICD-10-CM

## 2025-03-25 DIAGNOSIS — R06.83 SNORING: ICD-10-CM

## 2025-03-25 DIAGNOSIS — J45.20 MILD INTERMITTENT ASTHMA WITHOUT COMPLICATION: ICD-10-CM

## 2025-03-25 LAB
FERRITIN SERPL-MCNC: 75.3 NG/ML (ref 13–150)
IRON 24H UR-MRATE: 59 MCG/DL (ref 37–145)

## 2025-03-25 PROCEDURE — 82728 ASSAY OF FERRITIN: CPT

## 2025-03-25 PROCEDURE — 36415 COLL VENOUS BLD VENIPUNCTURE: CPT

## 2025-03-25 PROCEDURE — 83540 ASSAY OF IRON: CPT

## 2025-03-25 PROCEDURE — G0463 HOSPITAL OUTPT CLINIC VISIT: HCPCS

## 2025-03-25 NOTE — PROGRESS NOTES
Sleep Consultation    Patient Name: Mariela Aldrich  Age/Sex: 54 y.o. female  : 1970  MRN: [id number]    Date of Encounter Visit: 2025  Encounter Provider: Shaila Buchanan MD  Referring Provider: WILLIAM Hickman  Place of Service: [facility]  Patient Care Team:  Berenice Avelar APRN as PCP - General (Nurse Practitioner)  Donato You MD as Consulting Physician (General Surgery)    Subjective:     Reason for Consult: Sleep apnea evaluation    History of Present Illness:  Mariela Aldrich is a 54 y.o. female is here for evaluation of SHIREEN due to suggestive symptoms.    Patient complains of daytime fatigue and sleepiness despite the normal  [place] Sleepiness Scale (ESS) of 4.  Patient complains of loud snoring in all position, waking up gasping for breath and waking up with a sore dry mouth  Patient also have some restless leg syndrome symptoms  Patient has problem falling asleep and problem with frequent awakenings and restless sleep  Positive nocturnal reflux  Patient denies any cataplexy, sleep paralysis or other symptoms to suggest narcolepsy.  Patient denies any parasomnias.  Denies any history of seizure disorder or recent head trauma.  Patient has restless leg syndrome and she is already on the maximum dose of Requip at 5 mg and she is on gabapentin 300 mg and even with those 2 drugs she still having issues and the restless leg seems to be affecting her sleep efficiency and sleep onset.  No documentation of parotic leg movement disorder  Patient spends adequate amount of time in bed with no evidence of sleep restriction or improper sleep hygiene.  Average bedtime is around 10 PM wake up time 6 AM with 6-8 hours of sleep in between, sleep onset can be up to 2 hours and the patient always wake up feeling tired  Caffeine intake is 4 caffeinated soda beverages per day, no smoking alcohol or illicit substance abuse  Comorbidities include: Anxiety, restless leg  syndrome, asthma/COPD, acid reflux, arthritis, obesity, hyperlipidemia, type 2 diabetes mellitus,    Review of Systems:   A twelve-system review was conducted and was negative except for the following: Frequent urination, ear pain, sores in the mouth, cough, anxiety.        Past Medical History:  Past Medical History:   Diagnosis Date    Acid reflux     Anxiety     Arthritis     Asthma     PRN INHALER AND TAKES ALLERGY INJECTIONS    Diabetes mellitus type 2, controlled     AVERAGE     Hidradenitis     Hyperlipidemia     Hypothyroidism     Murmur     DIAGNOSED WHEN 17 BUT IS ASYMPTOMATIC AND IS FOLLOWED ONLY BY PCP    Panic attack     Pilonidal cyst     Rheumatic fever     AS A CHILD    RLS (restless legs syndrome)     Sinus trouble        Past Surgical History:   Procedure Laterality Date    COLONOSCOPY  01/21/2020    exernal hemorrhoids    EXCISION LESION Bilateral 03/20/2023    Procedure: Excision Hidradenitis of Bilateral Inguinal Areas;  Surgeon: Donato You MD;  Location: [facility];  Service: General;  Laterality: Bilateral;    EXCISION LESION N/A 7/24/2024    Procedure: DEBRIDEMENT OF ISCHIAL ULCER/BUTTOCKS WOUND;  Surgeon: Donato You MD;  Location: [facility] OR;  Service: General;  Laterality: N/A;    GROIN ABSCESS INCISION AND DRAINAGE Right     hidradenitis    HYSTERECTOMY  2010    KNEE SURGERY Bilateral 2013    ARTHROSCOPY    PILONIDAL CYSTECTOMY N/A 02/08/2023    Procedure: Excision Hidradenitis of Inguinal Area and Pilonidal Cystectomy;  Surgeon: Donato You MD;  Location: [facility];  Service: General;  Laterality: N/A;       Home Medications:     Current Outpatient Medications:     albuterol sulfate  (90 Base) MCG/ACT inhaler, Inhale 2 puffs Every 4 (Four) Hours As Needed for Shortness of Air., Disp: 18 g, Rfl: 1    cetirizine (zyrTEC) 10 MG tablet, Take 1 tablet by mouth Daily., Disp: , Rfl:     Continuous Glucose Sensor (Dexcom G7 Sensor) misc, CHANGE  SENSOR EVERY 10 DAYS AS DIRECTED, Disp: 9 each, Rfl: 0    Empagliflozin-metFORMIN HCl ER (Synjardy XR) 12.5-1000 MG tablet sustained-release 24 hour, Take 1 tablet by mouth 2 (Two) Times a Day., Disp: 180 tablet, Rfl: 1    Fluticasone-Salmeterol (ADVAIR/WIXELA) 250-50 MCG/ACT DISKUS, Inhale 1 puff 2 (Two) Times a Day., Disp: 60 each, Rfl: 2    gabapentin (NEURONTIN) 300 MG capsule, TAKE 1 CAPSULE BY MOUTH ONCE DAILY AT NIGHT, Disp: 90 capsule, Rfl: 1    glipizide (GLUCOTROL) 10 MG tablet, Take 1 tablet by mouth 2 (Two) Times a Day Before Meals., Disp: 180 tablet, Rfl: 0    glucose blood (OneTouch Verio) test strip, USE 1 STRIP TO CHECK GLUCOSE THREE TIMES DAILY, Disp: 100 each, Rfl: 0    Insulin Degludec (Tresiba FlexTouch) 200 UNIT/ML solution pen-injector pen injection, Inject 56 Units under the skin into the appropriate area as directed Every Morning., Disp: 18 mL, Rfl: 2    losartan (Cozaar) 25 MG tablet, Take 0.5 tablets by mouth Daily., Disp: 90 tablet, Rfl: 1    MM Pen Needles 31G X 8 MM misc, USE ONCE DAILY UNDER THE SKIN AS DIRECTED, Disp: 100 each, Rfl: 0    montelukast (SINGULAIR) 10 MG tablet, Take 1 tablet by mouth every night at bedtime., Disp: , Rfl:     ONE TOUCH CLUB LANCETS [facility], 90 each 3 (Three) Times a Day As Needed (check blood sugar)., Disp: 100 each, Rfl: 3    pantoprazole (PROTONIX) 40 MG EC tablet, Take 1 tablet by mouth Daily., Disp: 90 tablet, Rfl: 1    pitavastatin calcium (Livalo) 2 MG tablet tablet, Take 1 tablet by mouth Every Night., Disp: 90 tablet, Rfl: 1    rOPINIRole (REQUIP) 5 MG tablet, Take 1 tablet by mouth Every Night., Disp: 90 tablet, Rfl: 1    sertraline (ZOLOFT) 100 MG tablet, Take 1.5 tablets by mouth Daily., Disp: 135 tablet, Rfl: 1    Synthroid 88 MCG tablet, Take 1 tablet by mouth Daily., Disp: 90 tablet, Rfl: 1    Tirzepatide 7.5 MG/0.5ML solution auto-injector, Inject 7.5 mg under the skin into the appropriate area as directed 1 (One) Time Per Week., Disp: 6 mL,  "Rfl: 1    Vitamin D, Cholecalciferol, 50 MCG (2000 UT) capsule, Take  by mouth., Disp: , Rfl:     Zinc 50 MG tablet, Take 1 tablet by mouth Daily., Disp: , Rfl:     chlorhexidine (PERIDEX) 0.12 % solution, Swish and spit 15 ml. Or apply to site with q-tip. 2-3 times per day., Disp: 473 mL, Rfl: 0    Continuous Blood Gluc Transmit (Dexcom G6 Transmitter) misc, 1 Device Every 3 (Three) Months., Disp: 1 each, Rfl: 3    multivitamin (THERAGRAN) tablet tablet, , Disp: , Rfl:     Allergies:  Allergies   Allergen Reactions    Etodolac Shortness Of Breath     HEAVINESS ON CHEST    Penicillins Swelling     Beta lactam allergy details  Antibiotic reaction: other (lips swelled and broke out in welps)  Age at reaction: adult  Dose to reaction time: (!) hours  Reason for antibiotic: sore throat (strep)  Epinephrine required for reaction?: unknown (came ER)  Tolerated antibiotics: amoxicillin, augmentin, cephalexin       Statins Myalgia    Atorvastatin Myalgia    Crestor [Rosuvastatin] Myalgia     Legs cramps     Lisinopril Cough    Pravastatin Myalgia       Past Social History:  Social History     Socioeconomic History    Marital status:    Tobacco Use    Smoking status: Never    Smokeless tobacco: Never   Vaping Use    Vaping status: Never Used   Substance and Sexual Activity    Alcohol use: Never    Drug use: Never    Sexual activity: Defer       Past Family History:  Family History   Problem Relation Age of Onset    Thyroid disease Father 70    Restless legs syndrome Father     Thyroid disease Sister     Diabetes Sister     Thyroid disease Brother 43        thyroid cancer    Diabetes Brother     Diabetes Maternal Grandmother     Malig Hyperthermia Neg Hx      Father has herson   Objective:        Vital Signs:   Visit Vitals  /69   Pulse 98   Ht 162.6 cm (64.02\")   Wt 92.7 kg (204 lb 4.8 oz)   SpO2 97%   BMI 35.05 kg/m²     Wt Readings from Last 3 Encounters:   03/25/25 92.7 kg (204 lb 4.8 oz)   03/13/25 93.2 kg (205 " lb 6.4 oz)   12/16/24 90.3 kg (199 lb)     Neck Circumference: 13 inches    Physical Exam:   GEN:  No acute distress, alert, cooperative, well developed, obese  EYES:   Sclerae clear. No icterus. PERRL. Normal EOM  ENT:   External ears/nose normal, no oral lesions, no thrush, mucous membranes moist, Septum midline. Mallampati IV airway. Tongue hypertrophy   NECK:  Supple, midline trachea, no JVD  LUNGS: Normal chest on inspection, CTAB, no wheezes. No rhonchi. No crackles. Respirations regular, even and unlabored.   CV:  Regular rhythm and rate. Normal S1/S2. No murmurs, gallops, or rubs noted.  ABD:  Soft, nontender and nondistended. Normal bowel sounds. No guarding  EXT:  Moves all extremities well. No cyanosis. No redness. No edema.   Skin: Dry, intact, no bleeding      Diagnostic Data:  No prior sleep study on records    Assessment and Plan:       ICD-10-CM ICD-9-CM   1. Suspected sleep apnea  R29.818 781.99   2. Mild intermittent asthma without complication  J45.20 493.90   3. RLS (restless legs syndrome)  G25.81 333.94   4. Type 2 diabetes mellitus with hyperglycemia, with long-term current use of insulin  E11.65 250.00    Z79.4 790.29     V58.67   5. Hyperlipidemia, unspecified hyperlipidemia type  E78.5 272.4   6. Anxiety attack  F41.0 300.01   7. Snoring  R06.83 786.09   8. Hypersomnia  G47.10 780.54   9. Obesity (BMI 30-39.9)  E66.9 278.00       Recommendations:     She c/o uncontrolled RLS symptoms   She is already on Requip 5mg and Gabapentin 300mg.   No iron or Ferritin levels on file, will order today     Patient is a good candidate for the home sleep study which will be ordered today  If the home sleep study is inconclusive or nondiagnostic, we will consider the in-lab sleep study  Patient is agreeable with the CPAP therapy and will be initiated accordingly    This lady has restless leg syndrome and she is already maxed out on the high dose Requip and she is already on the gabapentin and still having  significant symptoms  Patient has not had any recent iron study, she did have iron deficiency anemia on her last pregnancy years ago and she needs to be retested because iron deficiency has to be repleted before any further pharmacological therapy for restless leg can be tried  If still having symptoms despite that patient may be a good candidate to add tramadol to her regimen and even trying to decrease the dose on her dopaminergic agent       Patient was educated in depth about SHIREEN and cardiovascular consequences if left untreated, including but not limited to CHF, CAD, arrhythmias, CVA, and/or HTN. Education also provided about the diagnostic tools for SHIREEN, including the polysomnography and the treatment modalities, including the CPAP.     If patient has obstructive sleep apnea the recommend treatment is CPAP and will start CPAP and patient will follow up within 31-90 days after starting CPAP for compliance review.   Will address alternative treatment option if intolerant to CPAP     Adherence to the CPAP is a key factor in successful treatment of SHIREEN and the patient was encouraged to contact us in case of problem with the CPAP or the mask that can limit the tolerance of the compliance with the therapy.    Patient was educated about the impact of obesity on sleep apnea and the benefit of weight loss and weight loss was recommended    Orders Placed This Encounter   Procedures    Ferritin Level    Iron    Home Sleep Study     No orders of the defined types were placed in this encounter.     Return in about 3 months (around 6/25/2025).    Shaila Buchanan MD   [facility]   03/25/25  12:08 EDT    Dictated utilizing Dragon dictation

## 2025-04-07 NOTE — PROGRESS NOTES
Chief Complaint  Follow-up (Pt would like to have right ear, pt states she feels like something is in ear. ), Diabetes, and Sleep Apnea (Pt would like to discuss sleep test)    Subjective          Mariela Aldrich is a 54 y.o. female who presents to [facility]    History of Present Illness  History of Present Illness  The patient presents for evaluation of right ear irritation, sleep apnea, restless legs syndrome, diabetes mellitus, and cholesterol management.    She reports experiencing nausea, which she attributes to sinus drainage. Additionally, she has been experiencing irritation in her right ear, particularly at night, despite the absence of pain. She describes the sensation as similar to having a hair in her ear.    She has consulted with a sleep specialist, Dr. Russo, but has not received any follow-up communication. A home sleep test was conducted due to insurance coverage limitations. She is aware of a diagnosis of mild sleep apnea with her lowest oxygen level recorded at 82% and an AHI of 13. She is unfamiliar with the term hypersomnia. She recalls a single episode of aspiration during sleep. She has recently acquired an adjustable bed.    She continues to experience restless legs, even after discontinuing Livalo, but no longer experiences the sensation of her toes curling under. She takes ropinirole and gabapentin at bedtime. Despite this, she occasionally experiences restless legs for 2 to 3 consecutive nights. She also reports that her legs did not bother her while she was cleaning her room.    She experienced adverse effects from her Mounjaro injection this week, including loose stools, nausea, and sulfur burps, which she admit to having a meal of biscuits and fried pork chops. These symptoms have since subsided. She has been maintaining a food and blood sugar log for better management of her diabetes.    She has a mammogram scheduled.     She reports her HS is  finally healed after 2 years, but it has come up 2 or 3 times if she sweats. Her  says it is not the blue as purple it was, so hopefully she is done and will be able to get in a pool this year. She is trying not to do anything that makes her sweat.      The PHQ has not been completed during this encounter.               Health Maintenance Due   Topic Date Due    ZOSTER VACCINE (1 of 2) Never done    DIABETIC FOOT EXAM  09/07/2023    DIABETIC EYE EXAM  07/12/2024    ANNUAL PHYSICAL  12/14/2024    URINE MICROALBUMIN-CREATININE RATIO (uACR)  06/17/2025        Review of Systems   Constitutional:  Negative for chills, fatigue and fever.   Respiratory:  Negative for cough and shortness of breath.    Cardiovascular:  Negative for chest pain and palpitations.   Gastrointestinal:  Negative for constipation, diarrhea, nausea and vomiting.   Musculoskeletal:  Negative for back pain and neck pain.   Skin:  Negative for rash.   Neurological:  Negative for dizziness and headaches.          Medical History: has a past medical history of Acid reflux, Anxiety, Arthritis, Asthma (Asthma), Diabetes mellitus type 2, controlled, Hidradenitis, Hyperlipidemia, Hypothyroidism, Murmur, Panic attack, Pilonidal cyst, Rheumatic fever, RLS (restless legs syndrome), and Sinus trouble.     Surgical History: has a past surgical history that includes Colonoscopy (01/21/2020); Groin Abscess Incision and Drainage (Right); Hysterectomy (2010); Knee surgery (Bilateral, 2013); Pilonidal Cystectomy (N/A, 02/08/2023); Excision Lesion (Bilateral, 03/20/2023); and Excision Lesion (N/A, 7/24/2024).     Family History: family history includes Cancer in her brother, father, mother, and sister; Diabetes in her brother, maternal grandmother, and sister; Restless legs syndrome in her father; Thyroid disease in her sister; Thyroid disease (age of onset: 43) in her brother; Thyroid disease (age of onset: 70) in her father.     Social History: reports that  she has never smoked. She has never used smokeless tobacco. She reports that she does not drink alcohol and does not use drugs.    Allergies: Etodolac, Penicillins, Statins, Atorvastatin, Crestor [rosuvastatin], Lisinopril, and Pravastatin      Current Outpatient Medications:     albuterol sulfate  (90 Base) MCG/ACT inhaler, Inhale 2 puffs Every 4 (Four) Hours As Needed for Shortness of Air., Disp: 18 g, Rfl: 1    cetirizine (zyrTEC) 10 MG tablet, Take 1 tablet by mouth Daily., Disp: , Rfl:     chlorhexidine (PERIDEX) 0.12 % solution, Swish and spit 15 ml. Or apply to site with q-tip. 2-3 times per day., Disp: 473 mL, Rfl: 0    Continuous Glucose Sensor (Dexcom G7 Sensor) misc, CHANGE SENSOR EVERY 10 DAYS AS DIRECTED, Disp: 9 each, Rfl: 0    Empagliflozin-metFORMIN HCl ER (Synjardy XR) 12.5-1000 MG tablet sustained-release 24 hour, Take 1 tablet by mouth 2 (Two) Times a Day., Disp: 180 tablet, Rfl: 1    Fluticasone-Salmeterol (ADVAIR/WIXELA) 250-50 MCG/ACT DISKUS, Inhale 1 puff 2 (Two) Times a Day., Disp: 60 each, Rfl: 2    gabapentin (NEURONTIN) 300 MG capsule, TAKE 1 CAPSULE BY MOUTH ONCE DAILY AT NIGHT, Disp: 90 capsule, Rfl: 1    glipizide (GLUCOTROL) 10 MG tablet, Take 1 tablet by mouth 2 (Two) Times a Day Before Meals., Disp: 180 tablet, Rfl: 0    glucose blood (OneTouch Verio) test strip, USE 1 STRIP TO CHECK GLUCOSE THREE TIMES DAILY, Disp: 100 each, Rfl: 0    Insulin Degludec (Tresiba FlexTouch) 200 UNIT/ML solution pen-injector pen injection, Inject 56 Units under the skin into the appropriate area as directed Every Morning., Disp: 18 mL, Rfl: 2    losartan (Cozaar) 25 MG tablet, Take 0.5 tablets by mouth Daily., Disp: 90 tablet, Rfl: 1    MM Pen Needles 31G X 8 MM misc, USE ONCE DAILY UNDER THE SKIN AS DIRECTED, Disp: 100 each, Rfl: 0    montelukast (SINGULAIR) 10 MG tablet, Take 1 tablet by mouth every night at bedtime., Disp: , Rfl:     multivitamin (THERAGRAN) tablet tablet, , Disp: , Rfl:      "ONE TOUCH CLUB LANCETS misc, 90 each 3 (Three) Times a Day As Needed (check blood sugar)., Disp: 100 each, Rfl: 3    pantoprazole (PROTONIX) 40 MG EC tablet, Take 1 tablet by mouth Daily., Disp: 90 tablet, Rfl: 1    rOPINIRole (REQUIP) 5 MG tablet, Take 1 tablet by mouth Every Night., Disp: 90 tablet, Rfl: 1    sertraline (ZOLOFT) 100 MG tablet, Take 1.5 tablets by mouth Daily., Disp: 135 tablet, Rfl: 1    Synthroid 88 MCG tablet, Take 1 tablet by mouth once daily, Disp: 90 tablet, Rfl: 0    Tirzepatide 7.5 MG/0.5ML solution auto-injector, Inject 7.5 mg under the skin into the appropriate area as directed 1 (One) Time Per Week., Disp: 6 mL, Rfl: 1    Vitamin D, Cholecalciferol, 50 MCG (2000 UT) capsule, Take  by mouth., Disp: , Rfl:     Zinc 50 MG tablet, Take 1 tablet by mouth Daily., Disp: , Rfl:     Bempedoic Acid (Nexletol) 180 MG tablet, Take 1 tablet by mouth Daily., Disp: 30 tablet, Rfl: 5    Continuous Blood Gluc Transmit (Dexcom G6 Transmitter) misc, 1 Device Every 3 (Three) Months., Disp: 1 each, Rfl: 3      Immunization History   Administered Date(s) Administered    Fluzone (or Fluarix & Flulaval for VFC) >6mos 09/28/2023    Fluzone Quad >6mos (Multi-dose) 10/01/2020    Pneumococcal Conjugate 20-Valent (PCV20) 09/28/2023    Pneumococcal Polysaccharide (PPSV23) 07/20/2019    Td (TDVAX) 05/08/1998    Tdap 03/16/2020         Objective       Vitals:    05/08/25 0740   BP: 109/76   Pulse: 101   Temp: 97.4 °F (36.3 °C)   TempSrc: Temporal   SpO2: 95%   Weight: 93.2 kg (205 lb 6.4 oz)   Height: 162.6 cm (64.02\")      Body mass index is 35.23 kg/m².   Wt Readings from Last 3 Encounters:   05/08/25 93.2 kg (205 lb 6.4 oz)   03/25/25 92.7 kg (204 lb 4.8 oz)   03/13/25 93.2 kg (205 lb 6.4 oz)      BP Readings from Last 3 Encounters:   05/08/25 109/76   04/15/25 111/70   03/25/25 113/69             Physical Exam  Constitutional:       General: She is not in acute distress.     Appearance: Normal appearance.   HENT: "      Right Ear: There is impacted cerumen.   Eyes:      General: Lids are normal. No scleral icterus.     Conjunctiva/sclera: Conjunctivae normal.      Pupils: Pupils are equal, round, and reactive to light.   Neck:      Thyroid: No thyroid mass, thyromegaly or thyroid tenderness.      Vascular: No carotid bruit.   Cardiovascular:      Rate and Rhythm: Normal rate.      Pulses: Normal pulses.      Heart sounds: Normal heart sounds. No murmur heard.     No friction rub. No gallop.   Pulmonary:      Effort: Pulmonary effort is normal. No retractions.      Breath sounds: Normal breath sounds.   Abdominal:      General: Abdomen is flat. Bowel sounds are normal. There is no distension.      Palpations: Abdomen is soft.      Tenderness: There is no abdominal tenderness. There is no guarding.   Musculoskeletal:      Cervical back: Full passive range of motion without pain, normal range of motion and neck supple. No rigidity or tenderness.      Right lower leg: No edema.      Left lower leg: No edema.   Lymphadenopathy:      Cervical: No cervical adenopathy.   Skin:     General: Skin is warm and dry.      Findings: No lesion or rash.      Nails: There is no clubbing.   Neurological:      General: No focal deficit present.      Mental Status: She is alert and oriented to person, place, and time.      Coordination: Coordination is intact.      Gait: Gait is intact.   Psychiatric:         Attention and Perception: Attention normal.         Mood and Affect: Mood and affect normal.         Speech: Speech normal.         Behavior: Behavior normal. Behavior is cooperative.         Thought Content: Thought content normal.         Cognition and Memory: Cognition normal.         Judgment: Judgment normal.         Physical Exam  Ears: Small amount of wax in the right ear      Result Review :   Results  Diagnostic Testing   - Home sleep test: Lowest oxygen level at 82% and AHI (acute hypoxia index) at 13, indicating mild sleep apnea.          Common labs          6/17/2024    07:46 9/16/2024    08:25 3/13/2025    07:59   Common Labs   Glucose 80  187  185    BUN 13  15  15    Creatinine 0.82  0.90  0.88    Sodium 137  137  141    Potassium 3.9  4.3  4.3    Chloride 103  104  105    Calcium 9.0  9.4  9.5    Albumin 3.7  3.8  3.6    Total Bilirubin 0.4  0.3  0.3    Alkaline Phosphatase 107  152  139    AST (SGOT) 21  13  27    ALT (SGPT) 15  13  26    Total Cholesterol 129  136  163    Triglycerides 69  78  84    HDL Cholesterol 51  47  63    LDL Cholesterol  64  74  84    Hemoglobin A1C 7.60  7.90  8.50    Microalbumin, Urine 1.8   <1.2          Ear Cerumen Removal    Date/Time: 5/8/2025 8:08 AM    Performed by: Berenice Avelar APRN  Authorized by: Berenice Avelar APRN  Location details: right ear  Patient tolerance: patient tolerated the procedure well with no immediate complications  Procedure type: instrumentation, curette   Sedation:  Patient sedated: no                  Assessment and Plan        Diagnoses and all orders for this visit:    1. Type 2 diabetes mellitus with hyperglycemia, with long-term current use of insulin (Primary)  -     Empagliflozin-metFORMIN HCl ER (Synjardy XR) 12.5-1000 MG tablet sustained-release 24 hour; Take 1 tablet by mouth 2 (Two) Times a Day.  Dispense: 180 tablet; Refill: 1  -     glipizide (GLUCOTROL) 10 MG tablet; Take 1 tablet by mouth 2 (Two) Times a Day Before Meals.  Dispense: 180 tablet; Refill: 0  -     Insulin Degludec (Tresiba FlexTouch) 200 UNIT/ML solution pen-injector pen injection; Inject 56 Units under the skin into the appropriate area as directed Every Morning.  Dispense: 18 mL; Refill: 2  -     losartan (Cozaar) 25 MG tablet; Take 0.5 tablets by mouth Daily.  Dispense: 90 tablet; Refill: 1  -     Tirzepatide 7.5 MG/0.5ML solution auto-injector; Inject 7.5 mg under the skin into the appropriate area as directed 1 (One) Time Per Week.  Dispense: 6 mL; Refill: 1  -     Cancel: Comprehensive  Metabolic Panel; Future  -     Cancel: Lipid Panel; Future  -     Cancel: Hemoglobin A1c; Future  -     Bempedoic Acid (Nexletol) 180 MG tablet; Take 1 tablet by mouth Daily.  Dispense: 30 tablet; Refill: 5  -     Lipid Panel; Future  -     Hemoglobin A1c; Future  -     Comprehensive Metabolic Panel; Future    2. RLS (restless legs syndrome)  -     rOPINIRole (REQUIP) 5 MG tablet; Take 1 tablet by mouth Every Night.  Dispense: 90 tablet; Refill: 1    3. Obstructive sleep apnea syndrome    4. Impacted cerumen of right ear  -     Ear Cerumen Removal        Assessment & Plan  1. Right ear irritation.  - The presence of cerumen in the right ear is likely causing the irritation.  - Cerumen was removed during this visit.  - Patient reported feeling irritation at night, which has improved post-removal.  - No further treatment required at this time.    2. Sleep apnea.  - Lowest oxygen saturation level recorded was 82%, indicating a definite diagnosis of sleep apnea.  - The acute hypoxia index (AHI) was 13, suggesting mild sleep apnea.  - Patient was informed that hypersomnia refers to excessive sleepiness, although this term does not appear on her report.  - Advised to contact Dr. Russo's office regarding the initiation of PAP therapy, as orders for AutoPAP 5-15 pressure have been placed.    3. Restless legs syndrome.  - Symptoms have improved since discontinuing Livalo, although occasional symptoms persist.  - Currently taking ropinirole and gabapentin at bedtime, which appear to be effective.  - Patient reports no longer experiencing the sensation of toes curling under.  - Continue current medication regimen.    4. Diabetes mellitus.  - Experienced nausea and loose bowels after taking weekly tirzepatide injection, attributed to consuming high-fat foods.  - Advised to avoid high-fat and high-acid foods to prevent sulfur burps.  - Dosage of tirzepatide will remain at 7.5 mg.  - Basic diabetic panel to be ordered around  06/13/2025.    PROCEDURE  Procedure: Cerumen removal from the right ear    All questions were answered and agreement to proceed was given after the following Pre-Procedure details were reviewed:  - Risks and Benefits: Discussed potential discomfort and relief from irritation.  - Side effects: Possible minor discomfort during removal.  - Consent: Verbal consent obtained.    Intra-Procedure:  - Time-Out: Confirmed right ear for cerumen removal.    Post-Procedure:  - Tolerance Level: Tolerated well.  - Complications: None.  - Home Care Instructions: Advised to monitor for any discomfort or signs of infection and to avoid inserting objects into the ear.    Return in about 3 months (around 8/8/2025).    Patient was given instructions and counseling regarding her condition or for health maintenance advice. Please see specific information pulled into the AVS if appropriate.     WILLIAM Hickman    Patient or patient representative verbalized consent for the use of Ambient Listening during the visit with  WILLIAM Hickman for chart documentation. 5/12/2025  13:02 EDT

## 2025-04-08 ENCOUNTER — HOSPITAL ENCOUNTER (OUTPATIENT)
Dept: SLEEP MEDICINE | Facility: HOSPITAL | Age: 55
Discharge: HOME | End: 2025-04-08
Admitting: INTERNAL MEDICINE
Payer: COMMERCIAL

## 2025-04-08 DIAGNOSIS — R29.818 SUSPECTED SLEEP APNEA: ICD-10-CM

## 2025-04-08 DIAGNOSIS — R06.83 SNORING: ICD-10-CM

## 2025-04-08 DIAGNOSIS — G47.10 HYPERSOMNIA: ICD-10-CM

## 2025-04-08 PROCEDURE — G0399 HOME SLEEP TEST/TYPE 3 PORTA: HCPCS

## 2025-04-15 ENCOUNTER — OFFICE VISIT (OUTPATIENT)
Dept: WOUND CARE | Facility: HOSPITAL | Age: 55
End: 2025-04-15
Payer: COMMERCIAL

## 2025-04-15 VITALS
TEMPERATURE: 98.3 F | RESPIRATION RATE: 18 BRPM | HEART RATE: 92 BPM | DIASTOLIC BLOOD PRESSURE: 70 MMHG | SYSTOLIC BLOOD PRESSURE: 111 MMHG

## 2025-04-15 DIAGNOSIS — T81.89XS DELAYED SURGICAL WOUND HEALING, SEQUELA: Primary | ICD-10-CM

## 2025-04-15 DIAGNOSIS — Z79.4 UNCONTROLLED DIABETES MELLITUS WITH HYPERGLYCEMIA, WITH LONG-TERM CURRENT USE OF INSULIN: ICD-10-CM

## 2025-04-15 DIAGNOSIS — L30.9 DERMATITIS: ICD-10-CM

## 2025-04-15 DIAGNOSIS — E11.65 UNCONTROLLED DIABETES MELLITUS WITH HYPERGLYCEMIA, WITH LONG-TERM CURRENT USE OF INSULIN: ICD-10-CM

## 2025-04-15 PROCEDURE — 99213 OFFICE O/P EST LOW 20 MIN: CPT | Performed by: NURSE PRACTITIONER

## 2025-04-15 PROCEDURE — 17250 CHEM CAUT OF GRANLTJ TISSUE: CPT | Performed by: NURSE PRACTITIONER

## 2025-04-15 PROCEDURE — G0463 HOSPITAL OUTPT CLINIC VISIT: HCPCS | Performed by: NURSE PRACTITIONER

## 2025-04-15 NOTE — PROGRESS NOTES
Chief Complaint  Wound Check (Follow-up visit for coccyx wound. Patient continues with collagen ag and aquacel ag dressing changes. )    Subjective      History of Present Illness    Mariela Aldrich  is a 54 y.o. 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    Patient underwent surgical excision of Hidradenitis in the Inguinal area and a Pilonidal Cystectomy in February of 2023. In March of 2023 she required additional excision of Hidradenitis to bilateral inguinal areas. 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     Her  has continued assisting her with wound care. She has been managing the wound with powder, skin prep, collagen, and calcium alginate. The wound has not been covered with a bandage. She reports no pain or tenderness in the area unless direct pressure is applied.     She inquires about when he can take a bath or get in a pool.    She reports overall improvement in her Diabetes.          Allergies:  Etodolac, Penicillins, Statins, Atorvastatin, Crestor [rosuvastatin], Lisinopril, and Pravastatin      Current Outpatient Medications:     albuterol sulfate  (90 Base) MCG/ACT inhaler, Inhale 2 puffs Every 4 (Four) Hours As Needed for Shortness of Air., Disp: 18 g, Rfl: 1    cetirizine (zyrTEC) 10 MG tablet, Take 1 tablet by mouth Daily., Disp: , Rfl:     chlorhexidine (PERIDEX) 0.12 % solution, Swish and spit 15 ml. Or apply to site with q-tip. 2-3 times per day., Disp: 473 mL, Rfl: 0    Continuous Blood Gluc Transmit (Dexcom G6 Transmitter) misc, 1 Device Every 3 (Three) Months., Disp: 1 each, Rfl: 3    Continuous Glucose Sensor (Dexcom G7 Sensor) misc, CHANGE SENSOR EVERY 10 DAYS AS DIRECTED, Disp: 9 each, Rfl: 0    Empagliflozin-metFORMIN HCl ER (Synjardy XR) 12.5-1000 MG tablet sustained-release 24 hour, Take 1 tablet  by mouth 2 (Two) Times a Day., Disp: 180 tablet, Rfl: 1    Fluticasone-Salmeterol (ADVAIR/WIXELA) 250-50 MCG/ACT DISKUS, Inhale 1 puff 2 (Two) Times a Day., Disp: 60 each, Rfl: 2    gabapentin (NEURONTIN) 300 MG capsule, TAKE 1 CAPSULE BY MOUTH ONCE DAILY AT NIGHT, Disp: 90 capsule, Rfl: 1    glipizide (GLUCOTROL) 10 MG tablet, Take 1 tablet by mouth 2 (Two) Times a Day Before Meals., Disp: 180 tablet, Rfl: 0    glucose blood (OneTouch Verio) test strip, USE 1 STRIP TO CHECK GLUCOSE THREE TIMES DAILY, Disp: 100 each, Rfl: 0    Insulin Degludec (Tresiba FlexTouch) 200 UNIT/ML solution pen-injector pen injection, Inject 56 Units under the skin into the appropriate area as directed Every Morning., Disp: 18 mL, Rfl: 2    losartan (Cozaar) 25 MG tablet, Take 0.5 tablets by mouth Daily., Disp: 90 tablet, Rfl: 1    MM Pen Needles 31G X 8 MM misc, USE ONCE DAILY UNDER THE SKIN AS DIRECTED, Disp: 100 each, Rfl: 0    montelukast (SINGULAIR) 10 MG tablet, Take 1 tablet by mouth every night at bedtime., Disp: , Rfl:     multivitamin (THERAGRAN) tablet tablet, , Disp: , Rfl:     ONE TOUCH CLUB LANCETS misc, 90 each 3 (Three) Times a Day As Needed (check blood sugar)., Disp: 100 each, Rfl: 3    pantoprazole (PROTONIX) 40 MG EC tablet, Take 1 tablet by mouth Daily., Disp: 90 tablet, Rfl: 1    pitavastatin calcium (Livalo) 2 MG tablet tablet, Take 1 tablet by mouth Every Night., Disp: 90 tablet, Rfl: 1    rOPINIRole (REQUIP) 5 MG tablet, Take 1 tablet by mouth Every Night., Disp: 90 tablet, Rfl: 1    sertraline (ZOLOFT) 100 MG tablet, Take 1.5 tablets by mouth Daily., Disp: 135 tablet, Rfl: 1    Synthroid 88 MCG tablet, Take 1 tablet by mouth Daily., Disp: 90 tablet, Rfl: 1    Tirzepatide 7.5 MG/0.5ML solution auto-injector, Inject 7.5 mg under the skin into the appropriate area as directed 1 (One) Time Per Week., Disp: 6 mL, Rfl: 1    Vitamin D, Cholecalciferol, 50 MCG (2000 UT) capsule, Take  by mouth., Disp: , Rfl:     Zinc 50  MG tablet, Take 1 tablet by mouth Daily., Disp: , Rfl:     Past Medical History:   Diagnosis Date    Acid reflux     Anxiety     Arthritis     Asthma     PRN INHALER AND TAKES ALLERGY INJECTIONS    Diabetes mellitus type 2, controlled     AVERAGE     Hidradenitis     Hyperlipidemia     Hypothyroidism     Murmur     DIAGNOSED WHEN 17 BUT IS ASYMPTOMATIC AND IS FOLLOWED ONLY BY PCP    Panic attack     Pilonidal cyst     Rheumatic fever     AS A CHILD    RLS (restless legs syndrome)     Sinus trouble      Past Surgical History:   Procedure Laterality Date    COLONOSCOPY  01/21/2020    exernal hemorrhoids    EXCISION LESION Bilateral 03/20/2023    Procedure: Excision Hidradenitis of Bilateral Inguinal Areas;  Surgeon: Donato You MD;  Location: [facility] OR;  Service: General;  Laterality: Bilateral;    EXCISION LESION N/A 7/24/2024    Procedure: DEBRIDEMENT OF ISCHIAL ULCER/BUTTOCKS WOUND;  Surgeon: Donato You MD;  Location: [facility] OR;  Service: General;  Laterality: N/A;    GROIN ABSCESS INCISION AND DRAINAGE Right     hidradenitis    HYSTERECTOMY  2010    KNEE SURGERY Bilateral 2013    ARTHROSCOPY    PILONIDAL CYSTECTOMY N/A 02/08/2023    Procedure: Excision Hidradenitis of Inguinal Area and Pilonidal Cystectomy;  Surgeon: Donato You MD;  Location: [facility] OR;  Service: General;  Laterality: N/A;     Social History     Socioeconomic History    Marital status:    Tobacco Use    Smoking status: Never    Smokeless tobacco: Never   Vaping Use    Vaping status: Never Used   Substance and Sexual Activity    Alcohol use: Never    Drug use: Never    Sexual activity: Defer       Objective     Vitals:    04/15/25 1517   BP: 111/70   BP Location: Left arm   Patient Position: Lying   Pulse: 92   Resp: 18  Comment: 98 02   Temp: 98.3 °F (36.8 °C)   TempSrc: Temporal   PainSc: 0-No pain     There is no height or weight on file to calculate BMI.    The following data has been reviewed  by WILLIAM Guo on 04/15/2025   Most Recent A1C          3/13/2025    07:59   HGBA1C Most Recent   Hemoglobin A1C 8.50        No Images in the past 120 days found..     STEADI Fall Risk Assessment has not been completed.     Review of Systems     ROS:  Per HPI.     I have reviewed the HPI and ROS as documented by MA/RN. WILLIAM Guo    Physical Exam     NAD  AAOx3, pleasant, cooperative    Physical Exam  Gluteal Crease: The original surgical wound dehiscence has epithelialized and closed. There is a small area of superficial tissue loss just distally to the incision, proximally to the rectum with healthy pink moist tissue. Silver nitrite applied to promote closure and new tissue formation:    Right gluteal: Right gluteal surgical wound healed leaving a dimple, creating a cavity with epithelium on the inner margins. She does have a small area of raised reddened tissue along the superior margin with scant amount of serous drainage noted. Silver nitrite applied to the area today to promote continued healing.    See photos for details.    Gluteal crease:      Result Review :  The following data was reviewed by: WILLIAM Guo on 04/15/2025:    Prior wound care notes and images.    Assessment and Plan   Diagnoses and all orders for this visit:    1. Delayed surgical wound healing, sequela (Primary)    2. Dermatitis    3. Uncontrolled diabetes mellitus with hyperglycemia, with long-term current use of insulin      Assessment & Plan  1. 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  The original area of dehiscence along the gluteal crease has epithelialized and resolved.  There is an area of superficial tissue loss distally to the initial area of dehiscence that has pink and moist tissue and is likely due to moisture.  Area was blotted dry and silver nitrate was applied to assist with drying of the tissue and to allow for continued closure.  Recommending that the patient continue applications  of skin powder and skin barrier wipes to assist with drying and protection of the affected area.  This should be performed at least daily but preferably twice daily.  Today in the office we will apply a small layer of Calmoseptine to assist with skin protection and drying of the tissue.    She did have an additional area to the right gluteal aspect that had closed and a dimple formation with epithelium within the wound margins.  There is a small raised reddened area along the superior margin with scant amount of serous drainage today.  Silver nitrate was also applied to this area.  Again patient should continue applications of skin powder and skin prep wipes to assist with drying and continued healing of the tissues.    2. Diabetes.  Patient should continue monitoring her diabetes and ensuring that she achieves a tight glucose control to prevent further complications and breakouts.    She was encouraged to have her  monitor the areas closely to ensure that no openings occur.  Should this occur, patient should call the clinic to have a follow-up appointment.    Patient is additionally encouraged to avoid bathing until complete closure of the tissue on the gluteal crease.  She is then encouraged to be cautious in her bathing and swimming as moisture can cause the tissue to breakdown.  She is encouraged that after bathing or swimming to wash with antibacterial soap to decrease bacterial burden and prevent possible complications due to her medical hx.     Patient may follow-up as needed.      Patient was given instructions and counseling regarding their condition or for health maintenance advice, as well as the wound care plan and recommendations. They understand and agree with the plan.  They will follow back up here in the clinic but are instructed to contact us in the interim should they have any new, returning, or worsening symptoms or concerns. Please see specific information pulled into the AVS if  appropriate.     Dragon Dictation utilized for chart completion.    Follow Up   Return if symptoms worsen or fail to improve, for Wound Check.      WILLIAM Guo    Patient or patient representative verbalized consent for the use of Ambient Listening during the visit with  WILLIAM Guo for chart documentation. 4/15/2025  16:41 EDT

## 2025-04-28 DIAGNOSIS — G47.33 OSA (OBSTRUCTIVE SLEEP APNEA): Primary | ICD-10-CM

## 2025-05-01 DIAGNOSIS — E03.8 OTHER SPECIFIED HYPOTHYROIDISM: ICD-10-CM

## 2025-05-02 RX ORDER — LEVOTHYROXINE SODIUM 88 MCG
88 TABLET ORAL DAILY
Qty: 90 TABLET | Refills: 0 | Status: SHIPPED | OUTPATIENT
Start: 2025-05-02

## 2025-05-08 ENCOUNTER — TELEPHONE (OUTPATIENT)
Dept: SLEEP MEDICINE | Facility: HOSPITAL | Age: 55
End: 2025-05-08
Payer: COMMERCIAL

## 2025-05-08 ENCOUNTER — OFFICE VISIT (OUTPATIENT)
Dept: FAMILY MEDICINE CLINIC | Facility: CLINIC | Age: 55
End: 2025-05-08
Payer: COMMERCIAL

## 2025-05-08 VITALS
OXYGEN SATURATION: 95 % | WEIGHT: 205.4 LBS | DIASTOLIC BLOOD PRESSURE: 76 MMHG | BODY MASS INDEX: 35.07 KG/M2 | HEART RATE: 101 BPM | TEMPERATURE: 97.4 F | HEIGHT: 64 IN | SYSTOLIC BLOOD PRESSURE: 109 MMHG

## 2025-05-08 DIAGNOSIS — Z79.4 TYPE 2 DIABETES MELLITUS WITH HYPERGLYCEMIA, WITH LONG-TERM CURRENT USE OF INSULIN: Primary | ICD-10-CM

## 2025-05-08 DIAGNOSIS — E11.65 TYPE 2 DIABETES MELLITUS WITH HYPERGLYCEMIA, WITH LONG-TERM CURRENT USE OF INSULIN: Primary | ICD-10-CM

## 2025-05-08 DIAGNOSIS — G47.33 OBSTRUCTIVE SLEEP APNEA SYNDROME: ICD-10-CM

## 2025-05-08 DIAGNOSIS — H61.21 IMPACTED CERUMEN OF RIGHT EAR: ICD-10-CM

## 2025-05-08 DIAGNOSIS — G25.81 RLS (RESTLESS LEGS SYNDROME): ICD-10-CM

## 2025-05-08 RX ORDER — BEMPEDOIC ACID 180 MG/1
1 TABLET, FILM COATED ORAL DAILY
Qty: 30 TABLET | Refills: 5 | Status: SHIPPED | OUTPATIENT
Start: 2025-05-08

## 2025-05-08 RX ORDER — INSULIN DEGLUDEC 200 U/ML
56 INJECTION, SOLUTION SUBCUTANEOUS EVERY MORNING
Qty: 18 ML | Refills: 2 | Status: SHIPPED | OUTPATIENT
Start: 2025-05-08

## 2025-05-08 RX ORDER — ROPINIROLE 5 MG/1
5 TABLET, FILM COATED ORAL NIGHTLY
Qty: 90 TABLET | Refills: 1 | Status: SHIPPED | OUTPATIENT
Start: 2025-05-08

## 2025-05-08 RX ORDER — EMPAGLIFLOZIN, METFORMIN HYDROCHLORIDE 12.5; 1 MG/1; MG/1
1 TABLET, EXTENDED RELEASE ORAL 2 TIMES DAILY
Qty: 180 TABLET | Refills: 1 | Status: SHIPPED | OUTPATIENT
Start: 2025-05-08

## 2025-05-08 RX ORDER — GLIPIZIDE 10 MG/1
10 TABLET ORAL
Qty: 180 TABLET | Refills: 0 | Status: SHIPPED | OUTPATIENT
Start: 2025-05-08

## 2025-05-08 RX ORDER — LOSARTAN POTASSIUM 25 MG/1
12.5 TABLET ORAL DAILY
Qty: 90 TABLET | Refills: 1 | Status: SHIPPED | OUTPATIENT
Start: 2025-05-08

## 2025-05-21 DIAGNOSIS — E11.65 TYPE 2 DIABETES MELLITUS WITH HYPERGLYCEMIA, WITH LONG-TERM CURRENT USE OF INSULIN: ICD-10-CM

## 2025-05-21 DIAGNOSIS — Z79.4 TYPE 2 DIABETES MELLITUS WITH HYPERGLYCEMIA, WITH LONG-TERM CURRENT USE OF INSULIN: ICD-10-CM

## 2025-05-21 RX ORDER — PEN NEEDLE, DIABETIC 29 G X1/2"
NEEDLE, DISPOSABLE MISCELLANEOUS
Qty: 100 EACH | Refills: 1 | Status: SHIPPED | OUTPATIENT
Start: 2025-05-21

## 2025-05-22 ENCOUNTER — HOSPITAL ENCOUNTER (OUTPATIENT)
Dept: MAMMOGRAPHY | Facility: HOSPITAL | Age: 55
Discharge: HOME | End: 2025-05-22
Admitting: NURSE PRACTITIONER
Payer: COMMERCIAL

## 2025-05-22 DIAGNOSIS — Z12.31 BREAST CANCER SCREENING BY MAMMOGRAM: ICD-10-CM

## 2025-05-22 PROCEDURE — 77067 SCR MAMMO BI INCL CAD: CPT

## 2025-05-22 PROCEDURE — 77063 BREAST TOMOSYNTHESIS BI: CPT

## 2025-06-17 ENCOUNTER — HOSPITAL ENCOUNTER (OUTPATIENT)
Dept: GENERAL RADIOLOGY | Facility: HOSPITAL | Age: 55
Discharge: HOME | End: 2025-06-17
Admitting: NURSE PRACTITIONER
Payer: COMMERCIAL

## 2025-06-17 DIAGNOSIS — G25.81 RLS (RESTLESS LEGS SYNDROME): ICD-10-CM

## 2025-06-17 DIAGNOSIS — G25.81 RLS (RESTLESS LEGS SYNDROME): Primary | ICD-10-CM

## 2025-06-17 DIAGNOSIS — M79.10 MYALGIA: ICD-10-CM

## 2025-06-17 PROCEDURE — 72100 X-RAY EXAM L-S SPINE 2/3 VWS: CPT

## 2025-06-18 ENCOUNTER — OFFICE VISIT (OUTPATIENT)
Dept: FAMILY MEDICINE CLINIC | Facility: CLINIC | Age: 55
End: 2025-06-18
Payer: COMMERCIAL

## 2025-06-18 VITALS
HEART RATE: 96 BPM | DIASTOLIC BLOOD PRESSURE: 79 MMHG | TEMPERATURE: 97.3 F | WEIGHT: 210 LBS | OXYGEN SATURATION: 98 % | HEIGHT: 64 IN | BODY MASS INDEX: 35.85 KG/M2 | SYSTOLIC BLOOD PRESSURE: 128 MMHG

## 2025-06-18 DIAGNOSIS — Z51.81 ENCOUNTER FOR MEDICATION MONITORING: ICD-10-CM

## 2025-06-18 DIAGNOSIS — G57.01 SCIATIC NERVE DISEASE, RIGHT: Primary | ICD-10-CM

## 2025-06-18 LAB
AMPHET+METHAMPHET UR QL: NEGATIVE
AMPHETAMINE INTERNAL CONTROL: NORMAL
AMPHETAMINES UR QL: NEGATIVE
BARBITURATE INTERNAL CONTROL: NORMAL
BARBITURATES UR QL SCN: NEGATIVE
BENZODIAZ UR QL SCN: NEGATIVE
BENZODIAZEPINE INTERNAL CONTROL: NORMAL
BUPRENORPHINE INTERNAL CONTROL: NORMAL
BUPRENORPHINE SERPL-MCNC: NEGATIVE NG/ML
CANNABINOIDS SERPL QL: NEGATIVE
COCAINE INTERNAL CONTROL: NORMAL
COCAINE UR QL: NEGATIVE
EXPIRATION DATE: NORMAL
Lab: NORMAL
MDMA (ECSTASY) INTERNAL CONTROL: NORMAL
MDMA UR QL SCN: NEGATIVE
METHADONE INTERNAL CONTROL: NORMAL
METHADONE UR QL SCN: NEGATIVE
METHAMPHETAMINE INTERNAL CONTROL: NORMAL
MORPHINE INTERNAL CONTROL: NORMAL
MORPHINE/OPIATES SCREEN, URINE: NEGATIVE
OXYCODONE INTERNAL CONTROL: NORMAL
OXYCODONE UR QL SCN: NEGATIVE
PCP UR QL SCN: NEGATIVE
PHENCYCLIDINE INTERNAL CONTROL: NORMAL
THC INTERNAL CONTROL: NORMAL

## 2025-06-18 RX ORDER — KETOROLAC TROMETHAMINE 30 MG/ML
30 INJECTION, SOLUTION INTRAMUSCULAR; INTRAVENOUS ONCE
Status: COMPLETED | OUTPATIENT
Start: 2025-06-18 | End: 2025-06-18

## 2025-06-18 RX ORDER — HYDROCODONE BITARTRATE AND ACETAMINOPHEN 5; 325 MG/1; MG/1
1 TABLET ORAL EVERY 8 HOURS PRN
Qty: 30 TABLET | Refills: 0 | Status: SHIPPED | OUTPATIENT
Start: 2025-06-18

## 2025-06-18 RX ORDER — CYCLOBENZAPRINE HCL 10 MG
10 TABLET ORAL 3 TIMES DAILY PRN
Qty: 30 TABLET | Refills: 2 | Status: SHIPPED | OUTPATIENT
Start: 2025-06-18

## 2025-06-18 RX ADMIN — KETOROLAC TROMETHAMINE 30 MG: 30 INJECTION, SOLUTION INTRAMUSCULAR; INTRAVENOUS at 08:32

## 2025-06-18 NOTE — LETTER
June 18, 2025     Patient: Mariela Aldrich   YOB: 1970   Date of Visit: 6/18/2025       To Whom It May Concern:    It is my medical opinion that Mariela Aldrich may return to work on 6/18/25.           Sincerely,        WILLIAM Hickman

## 2025-06-18 NOTE — PROGRESS NOTES
Chief Complaint  Hip Pain (Right side is worst )    Subjective          Mariela Aldrich is a 54 y.o. female who presents to [facility]    History of Present Illness  History of Present Illness  The patient presents for evaluation of right-sided sciatica.    She reports persistent pain that began a week ago, initially affecting her entire body but has since localized to her right side. The pain extends from her hip to her toes, and she suspects it may be due to medication or gout. She describes the pain as deep-seated and radiating down her leg. Her sleep is disrupted due to discomfort, and she experiences heat sensations in the affected area. The pain intensifies when she lies down, leading to restlessness and difficulty finding a comfortable position. She rates her pain as 10 on a scale of 1 to 10. Despite the severity of her pain, she managed to drive herself to the clinic this morning. She has a high pain tolerance.    She underwent an x-ray examination on 06/17/2025. She has been managing the pain with Flexeril, which provided temporary relief, and Tylenol Arthritis, but notes that the effects of these medications are short-lived. She also tried Aleve, which allowed her to sleep for a few hours, and took four ibuprofen tablets at work yesterday. She recalls experiencing shortness of breath and chest heaviness while on Lodine and generic Darvocet.      The PHQ has not been completed during this encounter.               Health Maintenance Due   Topic Date Due    ZOSTER VACCINE (1 of 2) Never done    DIABETIC FOOT EXAM  09/07/2023    DIABETIC EYE EXAM  07/12/2024    ANNUAL PHYSICAL  12/14/2024    HEMOGLOBIN A1C  09/13/2025        Review of Systems   Constitutional:  Negative for chills, fatigue and fever.   Respiratory:  Negative for cough and shortness of breath.    Cardiovascular:  Negative for chest pain and palpitations.   Gastrointestinal:  Negative for constipation,  diarrhea, nausea and vomiting.   Musculoskeletal:  Positive for back pain. Negative for neck pain.   Skin:  Negative for rash.   Neurological:  Positive for numbness. Negative for dizziness and headaches.          Medical History: has a past medical history of Acid reflux, Anxiety, Arthritis, Asthma (Asthma), Diabetes mellitus type 2, controlled, Hidradenitis, Hyperlipidemia, Hypothyroidism, Murmur, Panic attack, Pilonidal cyst, Rheumatic fever, RLS (restless legs syndrome), and Sinus trouble.     Surgical History: has a past surgical history that includes Colonoscopy (01/21/2020); Groin Abscess Incision and Drainage (Right); Hysterectomy (2010); Knee surgery (Bilateral, 2013); Pilonidal Cystectomy (N/A, 02/08/2023); Excision Lesion (Bilateral, 03/20/2023); and Excision Lesion (N/A, 7/24/2024).     Family History: family history includes Cancer in her brother, father, mother, and sister; Diabetes in her brother, maternal grandmother, and sister; Restless legs syndrome in her father; Thyroid disease in her sister; Thyroid disease (age of onset: 43) in her brother; Thyroid disease (age of onset: 70) in her father.     Social History: reports that she has never smoked. She has never used smokeless tobacco. She reports that she does not drink alcohol and does not use drugs.    Allergies: Etodolac, Penicillins, Statins, Atorvastatin, Crestor [rosuvastatin], Lisinopril, and Pravastatin      Current Outpatient Medications:     albuterol sulfate  (90 Base) MCG/ACT inhaler, Inhale 2 puffs Every 4 (Four) Hours As Needed for Shortness of Air., Disp: 18 g, Rfl: 1    cetirizine (zyrTEC) 10 MG tablet, Take 1 tablet by mouth Daily., Disp: , Rfl:     chlorhexidine (PERIDEX) 0.12 % solution, Swish and spit 15 ml. Or apply to site with q-tip. 2-3 times per day., Disp: 473 mL, Rfl: 0    Continuous Glucose Sensor (Dexcom G7 Sensor) misc, CHANGE SENSOR EVERY 10 DAYS AS DIRECTED, Disp: 9 each, Rfl: 0    Empagliflozin-metFORMIN HCl  ER (Synjardy XR) 12.5-1000 MG tablet sustained-release 24 hour, Take 1 tablet by mouth 2 (Two) Times a Day., Disp: 180 tablet, Rfl: 1    Fluticasone-Salmeterol (ADVAIR/WIXELA) 250-50 MCG/ACT DISKUS, Inhale 1 puff 2 (Two) Times a Day., Disp: 60 each, Rfl: 2    gabapentin (NEURONTIN) 300 MG capsule, TAKE 1 CAPSULE BY MOUTH ONCE DAILY AT NIGHT, Disp: 90 capsule, Rfl: 1    glipizide (GLUCOTROL) 10 MG tablet, Take 1 tablet by mouth 2 (Two) Times a Day Before Meals. (Patient taking differently: Take 1 tablet by mouth Daily.), Disp: 180 tablet, Rfl: 0    Insulin Degludec (Tresiba FlexTouch) 200 UNIT/ML solution pen-injector pen injection, Inject 56 Units under the skin into the appropriate area as directed Every Morning., Disp: 18 mL, Rfl: 2    Insulin Pen Needle (RELION PEN NEEDLE 31G/8MM) 31G X 8 MM misc, USE ONE NEEDLE ONCE DAILY AS DIRECTED, Disp: 100 each, Rfl: 1    losartan (Cozaar) 25 MG tablet, Take 0.5 tablets by mouth Daily., Disp: 90 tablet, Rfl: 1    montelukast (SINGULAIR) 10 MG tablet, Take 1 tablet by mouth every night at bedtime., Disp: , Rfl:     pantoprazole (PROTONIX) 40 MG EC tablet, Take 1 tablet by mouth Daily., Disp: 90 tablet, Rfl: 1    rOPINIRole (REQUIP) 5 MG tablet, Take 1 tablet by mouth Every Night., Disp: 90 tablet, Rfl: 1    sertraline (ZOLOFT) 100 MG tablet, Take 1.5 tablets by mouth Daily., Disp: 135 tablet, Rfl: 1    Synthroid 88 MCG tablet, Take 1 tablet by mouth once daily, Disp: 90 tablet, Rfl: 0    Tirzepatide 7.5 MG/0.5ML solution auto-injector, Inject 7.5 mg under the skin into the appropriate area as directed 1 (One) Time Per Week., Disp: 6 mL, Rfl: 1    Vitamin D, Cholecalciferol, 50 MCG (2000 UT) capsule, Take  by mouth., Disp: , Rfl:     Zinc 50 MG tablet, Take 1 tablet by mouth Daily., Disp: , Rfl:     Bempedoic Acid (Nexletol) 180 MG tablet, Take 1 tablet by mouth Daily. (Patient not taking: Reported on 6/18/2025), Disp: 30 tablet, Rfl: 5      Immunization History  "  Administered Date(s) Administered    Fluzone (or Fluarix & Flulaval for VFC) >6mos 09/28/2023    Fluzone Quad >6mos (Multi-dose) 10/01/2020    Pneumococcal Conjugate 20-Valent (PCV20) 09/28/2023    Pneumococcal Polysaccharide (PPSV23) 07/20/2019    Td (TDVAX) 05/08/1998    Tdap 03/16/2020         Objective       Vitals:    06/18/25 0730   BP: 128/79   BP Location: Left arm   Pulse: 96   Temp: 97.3 °F (36.3 °C)   TempSrc: Temporal   SpO2: 98%   Weight: 95.3 kg (210 lb)   Height: 162.6 cm (64\")      Body mass index is 36.05 kg/m².   Wt Readings from Last 3 Encounters:   06/18/25 95.3 kg (210 lb)   05/08/25 93.2 kg (205 lb 6.4 oz)   03/25/25 92.7 kg (204 lb 4.8 oz)      BP Readings from Last 3 Encounters:   06/18/25 128/79   05/08/25 109/76   04/15/25 111/70             Physical Exam  Vitals reviewed.   Constitutional:       Appearance: Normal appearance. She is well-developed.   HENT:      Head: Normocephalic and atraumatic.   Eyes:      Conjunctiva/sclera: Conjunctivae normal.      Pupils: Pupils are equal, round, and reactive to light.   Cardiovascular:      Rate and Rhythm: Normal rate and regular rhythm.      Heart sounds: Normal heart sounds. No murmur heard.  Pulmonary:      Effort: Pulmonary effort is normal.      Breath sounds: Normal breath sounds. No wheezing or rhonchi.   Abdominal:      General: Bowel sounds are normal. There is no distension.      Palpations: Abdomen is soft.      Tenderness: There is no abdominal tenderness.   Musculoskeletal:      Lumbar back: No tenderness or bony tenderness. Decreased range of motion.   Skin:     General: Skin is warm and dry.   Neurological:      Mental Status: She is alert and oriented to person, place, and time.   Psychiatric:         Mood and Affect: Mood and affect normal.         Behavior: Behavior normal.         Thought Content: Thought content normal.         Judgment: Judgment normal.         Physical Exam  Musculoskeletal: Tenderness noted in the right " leg, consistent with sciatica      Result Review :   Results           Common labs          9/16/2024    08:25 3/13/2025    07:59   Common Labs   Glucose 187  185    BUN 15  15    Creatinine 0.90  0.88    Sodium 137  141    Potassium 4.3  4.3    Chloride 104  105    Calcium 9.4  9.5    Albumin 3.8  3.6    Total Bilirubin 0.3  0.3    Alkaline Phosphatase 152  139    AST (SGOT) 13  27    ALT (SGPT) 13  26    Total Cholesterol 136  163    Triglycerides 78  84    HDL Cholesterol 47  63    LDL Cholesterol  74  84    Hemoglobin A1C 7.90  8.50    Microalbumin, Urine  <1.2                     Assessment and Plan        There are no diagnoses linked to this encounter.    Assessment & Plan  1. Right-sided sciatica.  - Symptoms suggest a definitive diagnosis of right-sided sciatica.  - Increased pain and limited mobility.  - X-ray results pending; patient reports worsening pain when lying down.  - Anti-inflammatory Toradol injection administered with a 20-minute observation period; hydrocodone and Flexeril prescribed with instructions to avoid driving when taking hydrocodone; urine drug screen conducted; advised against concurrent use of Motrin, ibuprofen, or Aleve while on diclofenac; work note issued.        Patient was given instructions and counseling regarding her condition or for health maintenance advice. Please see specific information pulled into the AVS if appropriate.     Dilcia Sampson    Patient or patient representative verbalized consent for the use of Ambient Listening during the visit with  WILLIAM Hickman for chart documentation. 6/18/2025  10:06 EDT

## 2025-06-20 ENCOUNTER — TELEPHONE (OUTPATIENT)
Dept: FAMILY MEDICINE CLINIC | Facility: CLINIC | Age: 55
End: 2025-06-20
Payer: COMMERCIAL

## 2025-06-20 ENCOUNTER — RESULTS FOLLOW-UP (OUTPATIENT)
Dept: FAMILY MEDICINE CLINIC | Facility: CLINIC | Age: 55
End: 2025-06-20
Payer: COMMERCIAL

## 2025-06-20 DIAGNOSIS — R93.7 ABNORMAL X-RAY OF LUMBAR SPINE: Primary | ICD-10-CM

## 2025-06-20 NOTE — TELEPHONE ENCOUNTER
Patient called stating the shot you gave her when she came in helped, however, the muscle relaxers and hydrocodone are not even touching it.  Can she take more of the HYDROcodone-acetaminophen (NORCO) 5-325 MG per tablet  or take something in addition to that medication, Please advise.

## 2025-06-20 NOTE — TELEPHONE ENCOUNTER
Spoke to patient and relayed   Recommend pt increase gabapentin to 600 mg at night, to help pain.      Berenice    Please see patient advice request response from 6/20/25 for more information regarding this.

## 2025-06-20 NOTE — TELEPHONE ENCOUNTER
Caller: Mariela Aldrich    Relationship to patient: Self    Best call back number: [phone]    Patient is needing: PATIENT CALLED REQUESTING TO SPEAK WITH CLINICAL STAFF. PATIENT STATES IT IS URGENT BUT WOULD NOT GIVE HUB ANY DETAILS.

## 2025-06-23 RX ORDER — ACYCLOVIR 400 MG/1
TABLET ORAL
Qty: 9 EACH | Refills: 0 | Status: SHIPPED | OUTPATIENT
Start: 2025-06-23

## 2025-07-02 ENCOUNTER — OFFICE VISIT (OUTPATIENT)
Dept: SLEEP MEDICINE | Facility: HOSPITAL | Age: 55
End: 2025-07-02
Payer: COMMERCIAL

## 2025-07-02 VITALS
SYSTOLIC BLOOD PRESSURE: 113 MMHG | BODY MASS INDEX: 35.87 KG/M2 | WEIGHT: 210.1 LBS | HEART RATE: 104 BPM | OXYGEN SATURATION: 96 % | HEIGHT: 64 IN | DIASTOLIC BLOOD PRESSURE: 61 MMHG

## 2025-07-02 DIAGNOSIS — G47.34 SLEEP RELATED HYPOXIA: ICD-10-CM

## 2025-07-02 DIAGNOSIS — G47.33 OSA ON CPAP: Primary | ICD-10-CM

## 2025-07-02 DIAGNOSIS — G25.81 RLS (RESTLESS LEGS SYNDROME): ICD-10-CM

## 2025-07-02 DIAGNOSIS — G47.10 HYPERSOMNIA: ICD-10-CM

## 2025-07-02 PROCEDURE — G0463 HOSPITAL OUTPT CLINIC VISIT: HCPCS

## 2025-07-02 NOTE — PROGRESS NOTES
Sleep Disorder Follow Up    Patient Name: Mariela Aldrich  Age/Sex: 54 y.o. female  : 1970  MRN: [id number]    Date of Encounter Visit: 25   Referring Provider: WILLIAM Hickman  Place of Service: [facility]  Patient Care Team:  Berenice Avelar APRN as PCP - General (Nurse Practitioner)  Donato You MD as Consulting Physician (General Surgery)    PROBLEM LIST:  SHIREEN  Sleep hypoxemia  Obesity  Diabetes  Asthma    History of Present Illness:  Mariela Aldrich is a 54 y.o. female who is here for follow up on SHIREEN. Patient was last seen in the office on 3/25/2025  At the time of initial evaluation patient had restless leg syndrome, diabetes with some neuropathy and mild asthma but sleep apnea was also suspected so home sleep study was ordered.  The mild to moderate case of sleep apnea with AHI of 13 along with hypoxemia with 9.2-minute in the hypoxemic range, auto CPAP 5-15 with recommended sleep study was done on 2025 and was positive for    Patient uses machine every night with no complaints from the mask or the pressure.  Patient uses a FFM mask, which does not  fit well (she did not get the one that was fitted on her). Patient has no  air leak.no dry mouth.   Patient sleeps better and has a deeper sleep with the machine and feels more energy during the day time.  Currently on Aut o CPAP 5-15  cm H20.  [place] Sleepiness Scale (ESS) is 4.  Compliance download was reviewed and documented below.  Weight is up by 6 pounds since last visit.  Other comorbidities include Anxiety, restless leg syndrome, asthma/COPD, acid reflux, arthritis, obesity, hyperlipidemia, type 2 diabetes mellitus     Review of Systems:   A ten-system review was conducted and was negative except for the following: Nasal congestion and anxiety.        Past Medical History:  Past medical, surgical, social, and family history, except as mentioned above, was unchanged from the last visit.      Past Medical History:   Diagnosis Date    Acid reflux     Anxiety     Arthritis     Asthma Asthma    PRN INHALER AND TAKES ALLERGY INJECTIONS    Diabetes mellitus type 2, controlled     AVERAGE     Hidradenitis     Hyperlipidemia     Hypothyroidism     Murmur     DIAGNOSED WHEN 17 BUT IS ASYMPTOMATIC AND IS FOLLOWED ONLY BY PCP    Panic attack     Pilonidal cyst     Rheumatic fever     AS A CHILD    RLS (restless legs syndrome)     Sinus trouble        Past Surgical History:   Procedure Laterality Date    COLONOSCOPY  01/21/2020    exernal hemorrhoids    EXCISION LESION Bilateral 03/20/2023    Procedure: Excision Hidradenitis of Bilateral Inguinal Areas;  Surgeon: Donato You MD;  Location: [facility] MAIN OR;  Service: General;  Laterality: Bilateral;    EXCISION LESION N/A 7/24/2024    Procedure: DEBRIDEMENT OF ISCHIAL ULCER/BUTTOCKS WOUND;  Surgeon: Donato You MD;  Location: [facility] MAIN OR;  Service: General;  Laterality: N/A;    GROIN ABSCESS INCISION AND DRAINAGE Right     hidradenitis    HYSTERECTOMY  2010    KNEE SURGERY Bilateral 2013    ARTHROSCOPY    PILONIDAL CYSTECTOMY N/A 02/08/2023    Procedure: Excision Hidradenitis of Inguinal Area and Pilonidal Cystectomy;  Surgeon: Donato You MD;  Location: [facility] MAIN OR;  Service: General;  Laterality: N/A;       Home Medications:     Current Outpatient Medications:     albuterol sulfate  (90 Base) MCG/ACT inhaler, Inhale 2 puffs Every 4 (Four) Hours As Needed for Shortness of Air., Disp: 18 g, Rfl: 1    cetirizine (zyrTEC) 10 MG tablet, Take 1 tablet by mouth Daily., Disp: , Rfl:     chlorhexidine (PERIDEX) 0.12 % solution, Swish and spit 15 ml. Or apply to site with q-tip. 2-3 times per day., Disp: 473 mL, Rfl: 0    Continuous Glucose Sensor (Dexcom G7 Sensor) misc, CHANGE SENSOR EVERY 10 DAYS AS DIRECTED, Disp: 9 each, Rfl: 0    cyclobenzaprine (FLEXERIL) 10 MG tablet, Take 1 tablet by mouth 3 (Three) Times a Day As Needed  for Muscle Spasms., Disp: 30 tablet, Rfl: 2    Empagliflozin-metFORMIN HCl ER (Synjardy XR) 12.5-1000 MG tablet sustained-release 24 hour, Take 1 tablet by mouth 2 (Two) Times a Day., Disp: 180 tablet, Rfl: 1    Fluticasone-Salmeterol (ADVAIR/WIXELA) 250-50 MCG/ACT DISKUS, Inhale 1 puff 2 (Two) Times a Day., Disp: 60 each, Rfl: 2    gabapentin (NEURONTIN) 300 MG capsule, TAKE 1 CAPSULE BY MOUTH ONCE DAILY AT NIGHT, Disp: 90 capsule, Rfl: 1    glipizide (GLUCOTROL) 10 MG tablet, Take 1 tablet by mouth 2 (Two) Times a Day Before Meals. (Patient taking differently: Take 1 tablet by mouth Daily.), Disp: 180 tablet, Rfl: 0    HYDROcodone-acetaminophen (NORCO) 5-325 MG per tablet, Take 1 tablet by mouth Every 8 (Eight) Hours As Needed for Severe Pain., Disp: 30 tablet, Rfl: 0    Insulin Degludec (Tresiba FlexTouch) 200 UNIT/ML solution pen-injector pen injection, Inject 56 Units under the skin into the appropriate area as directed Every Morning., Disp: 18 mL, Rfl: 2    Insulin Pen Needle (RELION PEN NEEDLE 31G/8MM) 31G X 8 MM misc, USE ONE NEEDLE ONCE DAILY AS DIRECTED, Disp: 100 each, Rfl: 1    losartan (Cozaar) 25 MG tablet, Take 0.5 tablets by mouth Daily., Disp: 90 tablet, Rfl: 1    montelukast (SINGULAIR) 10 MG tablet, Take 1 tablet by mouth every night at bedtime., Disp: , Rfl:     pantoprazole (PROTONIX) 40 MG EC tablet, Take 1 tablet by mouth Daily., Disp: 90 tablet, Rfl: 1    rOPINIRole (REQUIP) 5 MG tablet, Take 1 tablet by mouth Every Night., Disp: 90 tablet, Rfl: 1    sertraline (ZOLOFT) 100 MG tablet, Take 1.5 tablets by mouth Daily., Disp: 135 tablet, Rfl: 1    Synthroid 88 MCG tablet, Take 1 tablet by mouth once daily, Disp: 90 tablet, Rfl: 0    Tirzepatide 7.5 MG/0.5ML solution auto-injector, Inject 7.5 mg under the skin into the appropriate area as directed 1 (One) Time Per Week., Disp: 6 mL, Rfl: 1    Vitamin D, Cholecalciferol, 50 MCG (2000 UT) capsule, Take  by mouth., Disp: , Rfl:     Zinc 50 MG  "tablet, Take 1 tablet by mouth Daily., Disp: , Rfl:     Bempedoic Acid (Nexletol) 180 MG tablet, Take 1 tablet by mouth Daily. (Patient not taking: Reported on 7/2/2025), Disp: 30 tablet, Rfl: 5    Allergies:  Allergies   Allergen Reactions    Etodolac Shortness Of Breath     HEAVINESS ON CHEST    Penicillins Swelling     Beta lactam allergy details  Antibiotic reaction: other (lips swelled and broke out in welps)  Age at reaction: adult  Dose to reaction time: (!) hours  Reason for antibiotic: sore throat (strep)  Epinephrine required for reaction?: unknown (came ER)  Tolerated antibiotics: amoxicillin, augmentin, cephalexin       Statins Myalgia    Atorvastatin Myalgia    Crestor [Rosuvastatin] Myalgia     Legs cramps     Lisinopril Cough    Pravastatin Myalgia       Past Social History:  Social History     Socioeconomic History    Marital status:    Tobacco Use    Smoking status: Never    Smokeless tobacco: Never   Vaping Use    Vaping status: Never Used   Substance and Sexual Activity    Alcohol use: Never    Drug use: Never    Sexual activity: Defer       Past Family History:  Family History   Problem Relation Age of Onset    Thyroid disease Father 70    Restless legs syndrome Father     Cancer Father         Mesothelioma    Thyroid disease Sister     Diabetes Sister     Cancer Sister         Thyroid    Thyroid disease Brother 43        thyroid cancer    Diabetes Brother     Cancer Brother         Thyroid    Diabetes Maternal Grandmother     Cancer Mother         Not sure    Malig Hyperthermia Neg Hx          Objective:        Vital Signs:   Visit Vitals  /61   Pulse 104   Ht 162.6 cm (64.02\")   Wt 95.3 kg (210 lb 1.6 oz)   SpO2 96%   BMI 36.05 kg/m²     Wt Readings from Last 3 Encounters:   07/02/25 95.3 kg (210 lb 1.6 oz)   06/18/25 95.3 kg (210 lb)   05/08/25 93.2 kg (205 lb 6.4 oz)          Physical Exam:   GEN:  No acute distress, alert, cooperative, well developed, obese  EYES:   Sclerae " clear. No icterus. PERRL. Normal EOM  ENT:   External ears/nose normal, no oral lesions, no thrush, mucous membranes moist, Septum midline. Mallampati IV airway. Tongue hypertrophy   NECK:  Supple, midline trachea, no JVD  LUNGS: Normal chest on inspection, CTAB, no wheezes. No rhonchi. No crackles. Respirations regular, even and unlabored.   CV:  Regular rhythm and rate. Normal S1/S2. No murmurs, gallops, or rubs noted.  ABD:  Soft, nontender and nondistended. Normal bowel sounds. No guarding  EXT:  Moves all extremities well. No cyanosis. No redness. No edema.   Skin: Dry, intact, no bleeding    Diagnostic Data:  Sleep study 4/8/2025    Respiratory Disturbance Events:     Body weight on the night of the study 92.7 kg with a BMI of 35.05 kg/m²  Recording time 492.1 minutes, monitoring time 475.0 minutes  Respiratory summary was positive for mild obstructive sleep apnea with overall AHI of 13.0.  AHI was quite similar in all different positions  Positive hypoxemia with a jose roberto desaturation down to 82% with 9.2 minutes in the hypoxemic range  Percentage of snoring was 23%  Heart rate was fluctuating normal range with episodic tachycardia fastest heart rate recorded 107  Impression:    Obstructive sleep apnea  Sleep-related hypoxemia  Snoring  Plan:    Treatment is recommended  Initiate auto CPAP 5-15 cm H2O       Compliance download from 5/27/2025 - 6/25/2025 showed 90% adherence  Average nightly use was 7 hours and 58 per night on the nights used, with auto CPAP 5-15 cm H2O  Median/95th percentile pressure was 10.4/12.1 cm H2O  Very minimal to no air leak with a median/95th percentile of 0.0/2.2 L/min  Good control of the sleep apnea with residual AHI of 3.3     Latest Reference Range & Units 03/25/25 12:22   Iron 37 - 145 mcg/dL 59   Ferritin 13.00 - 150.00 ng/mL 75.30         Assessment and Plan:       ICD-10-CM ICD-9-CM   1. SHIREEN on CPAP  G47.33 327.23   2. Sleep related hypoxia  G47.34 327.24   3. Hypersomnia   G47.10 780.54   4. RLS (restless legs syndrome)  G25.81 333.94       Recommendations:   I did review with the patient in detail the results from her sleep study as well as the results from her compliance download and they were summarized above  Patient is compliant with the CPAP as evident from the compliance download  Patient is getting both clinical and subjective benefit from the use of the CPAP machine  The CPAP machine is effective in controlling the underlying sleep apnea  CPAP is strongly recommended to be continued  Patient to continue work on the weight loss  Based on the download I will adjust the CPAP pressure range to a new range of 9-18 cm H2O  Continue with a yearly follow-up or sooner as needed  As far as the mask, we will notify the DME to provide her with the proper mask that she picked at the time of the mask fitting rather than the current one she has at home  As far as restless leg syndrome, she is currently doing well and I do not recommend any adjustment in her regimen, Patient is on gabapentin that should help with that she is also on Requip.  Her iron level and ferritin level were both satisfactory and no iron replacement is recommended         Orders Placed This Encounter   Procedures    PAP Therapy     No orders of the defined types were placed in this encounter.    Return in about 1 year (around 7/2/2026).    Shaila Buchanan MD   [facility]   07/02/25  15:53 EDT    Dictated utilizing Dragon dictation

## 2025-07-10 DIAGNOSIS — J45.20 MILD INTERMITTENT ASTHMA WITHOUT COMPLICATION: ICD-10-CM

## 2025-07-10 RX ORDER — FLUTICASONE PROPIONATE AND SALMETEROL 250; 50 UG/1; UG/1
POWDER RESPIRATORY (INHALATION) 2 TIMES DAILY
Qty: 60 EACH | Refills: 1 | Status: SHIPPED | OUTPATIENT
Start: 2025-07-10

## 2025-07-30 ENCOUNTER — PATIENT MESSAGE (OUTPATIENT)
Dept: FAMILY MEDICINE CLINIC | Facility: CLINIC | Age: 55
End: 2025-07-30
Payer: COMMERCIAL

## 2025-07-30 DIAGNOSIS — Z79.4 TYPE 2 DIABETES MELLITUS WITH HYPERGLYCEMIA, WITH LONG-TERM CURRENT USE OF INSULIN: Primary | ICD-10-CM

## 2025-07-30 DIAGNOSIS — E11.65 TYPE 2 DIABETES MELLITUS WITH HYPERGLYCEMIA, WITH LONG-TERM CURRENT USE OF INSULIN: Primary | ICD-10-CM

## 2025-07-31 DIAGNOSIS — Z79.4 TYPE 2 DIABETES MELLITUS WITH HYPERGLYCEMIA, WITH LONG-TERM CURRENT USE OF INSULIN: ICD-10-CM

## 2025-07-31 DIAGNOSIS — E11.65 TYPE 2 DIABETES MELLITUS WITH HYPERGLYCEMIA, WITH LONG-TERM CURRENT USE OF INSULIN: ICD-10-CM

## 2025-07-31 DIAGNOSIS — E03.8 OTHER SPECIFIED HYPOTHYROIDISM: ICD-10-CM

## 2025-07-31 RX ORDER — LEVOTHYROXINE SODIUM 88 MCG
88 TABLET ORAL DAILY
Qty: 90 TABLET | Refills: 1 | Status: SHIPPED | OUTPATIENT
Start: 2025-07-31

## 2025-07-31 RX ORDER — TIRZEPATIDE 7.5 MG/.5ML
INJECTION, SOLUTION SUBCUTANEOUS
Qty: 12 ML | Refills: 0 | Status: SHIPPED | OUTPATIENT
Start: 2025-07-31

## (undated) DEVICE — MAJOR-LF: Brand: MEDLINE INDUSTRIES, INC.

## (undated) DEVICE — INTENDED FOR TISSUE SEPARATION, AND OTHER PROCEDURES THAT REQUIRE A SHARP SURGICAL BLADE TO PUNCTURE OR CUT.: Brand: BARD-PARKER ® CARBON RIB-BACK BLADES

## (undated) DEVICE — GAUZE,SPONGE,4"X4",16PLY,STRL,LF,10/TRAY: Brand: MEDLINE

## (undated) DEVICE — GLOVE,SURG,SENSICARE SLT,LF,PF,7.5: Brand: MEDLINE

## (undated) DEVICE — PENCL E/S SMOKEEVAC W/TELESCP CANN

## (undated) DEVICE — DRN PENRS RO SILCNE 1/4X12IN LF STRL

## (undated) DEVICE — SLV SCD KN/LEN ADJ EXPRSS BLENDED MD 1P/U

## (undated) DEVICE — ELECTRD BLD EDGE COAT 3IN

## (undated) DEVICE — GLOVE,SURG,SENSICARE SLT,LF,PF,6.5: Brand: MEDLINE

## (undated) DEVICE — SUT VIC 3/0 SH 27IN J416H

## (undated) DEVICE — SPNG LAP PREWSH SFTPK 18X18IN STRL PK/5

## (undated) DEVICE — DRSNG WND GZ CURAD OIL EMULSION 3X3IN STRL

## (undated) DEVICE — TP SXN YANKR BULB STRL

## (undated) DEVICE — GOWN,REINFRCE,POLY,SIRUS,BREATH SLV,XXLG: Brand: MEDLINE

## (undated) DEVICE — STERILE POLYISOPRENE POWDER-FREE SURGICAL GLOVES WITH EMOLLIENT COATING: Brand: PROTEXIS

## (undated) DEVICE — ANTIBACTERIAL VIOLET BRAIDED (POLYGLACTIN 910), SYNTHETIC ABSORBABLE SUTURE: Brand: COATED VICRYL

## (undated) DEVICE — SOL IRR NACL 0.9PCT BT 1000ML

## (undated) DEVICE — DRSNG WND GZ CURAD OIL EMULSION 3X8IN LF STRL 1PK

## (undated) DEVICE — DRN PENRS SIL 1/4X18IN LXF

## (undated) DEVICE — ANTIBACTERIAL UNDYED BRAIDED (POLYGLACTIN 910), SYNTHETIC ABSORBABLE SUTURE: Brand: COATED VICRYL

## (undated) DEVICE — GLV SURG SENSICARE SLT PF LF 6.5 STRL

## (undated) DEVICE — SUT ETHLN 3-0 FS118IN 663H

## (undated) DEVICE — DRSNG PAD ABD 8X10IN STRL

## (undated) DEVICE — GLV SURG SENSICARE SLT PF LF 7.5 STRL

## (undated) DEVICE — KT CANSTR VAC WND W/ISOLYSER SENSATRAC 500CC 10CS

## (undated) DEVICE — PENCL SMOKE/EVAC MEGADYNE TELESCP 10FT

## (undated) DEVICE — DRSNG WND VAC GRANUFOAM THN 26X15X1.6CM

## (undated) DEVICE — PROXIMATE RH ROTATING HEAD SKIN STAPLERS (35 WIDE) CONTAINS 35 STAINLESS STEEL STAPLES: Brand: PROXIMATE

## (undated) DEVICE — BRIEF KNIT SEAMLSS PREM 70IN 3XL PK/2

## (undated) DEVICE — Device

## (undated) DEVICE — ADHS SKIN SURG TISS VISC PREMIERPRO EXOFIN HI/VISC FAST/DRY

## (undated) DEVICE — VAGINAL PREP TRAY: Brand: MEDLINE INDUSTRIES, INC.